# Patient Record
Sex: MALE | Race: WHITE | Employment: OTHER | URBAN - METROPOLITAN AREA
[De-identification: names, ages, dates, MRNs, and addresses within clinical notes are randomized per-mention and may not be internally consistent; named-entity substitution may affect disease eponyms.]

---

## 2017-01-02 ENCOUNTER — TELEPHONE (OUTPATIENT)
Dept: INTERNAL MEDICINE | Age: 52
End: 2017-01-02

## 2017-01-07 ENCOUNTER — TELEPHONE (OUTPATIENT)
Dept: INTERNAL MEDICINE | Age: 52
End: 2017-01-07

## 2017-01-18 RX ORDER — SIMVASTATIN 20 MG
20 TABLET ORAL EVERY EVENING
Qty: 30 TABLET | Refills: 2
Start: 2017-01-18 | End: 2017-04-21 | Stop reason: SDUPTHER

## 2017-01-18 RX ORDER — RAMIPRIL 10 MG/1
10 CAPSULE ORAL 2 TIMES DAILY
Qty: 30 CAPSULE | Refills: 2
Start: 2017-01-18 | End: 2017-04-21 | Stop reason: SDUPTHER

## 2017-01-18 RX ORDER — FAMOTIDINE 20 MG/1
20 TABLET, FILM COATED ORAL 2 TIMES DAILY
Qty: 60 TABLET | Refills: 2
Start: 2017-01-18 | End: 2017-04-21 | Stop reason: SDUPTHER

## 2017-01-24 ENCOUNTER — TELEPHONE (OUTPATIENT)
Dept: SURGERY | Age: 52
End: 2017-01-24

## 2017-01-30 ENCOUNTER — OFFICE VISIT (OUTPATIENT)
Dept: INTERNAL MEDICINE | Age: 52
End: 2017-01-30
Attending: INTERNAL MEDICINE

## 2017-01-30 VITALS
SYSTOLIC BLOOD PRESSURE: 166 MMHG | HEART RATE: 68 BPM | RESPIRATION RATE: 16 BRPM | TEMPERATURE: 97.3 F | BODY MASS INDEX: 33.86 KG/M2 | WEIGHT: 250 LBS | OXYGEN SATURATION: 97 % | DIASTOLIC BLOOD PRESSURE: 95 MMHG | HEIGHT: 72 IN

## 2017-01-30 DIAGNOSIS — H93.12 TINNITUS AURIUM, LEFT: ICD-10-CM

## 2017-01-30 DIAGNOSIS — R13.13 PHARYNGEAL DYSPHAGIA: Primary | ICD-10-CM

## 2017-01-30 RX ORDER — METHADONE HYDROCHLORIDE 5 MG/1
5 TABLET ORAL EVERY 4 HOURS PRN
COMMUNITY
End: 2017-07-24 | Stop reason: ALTCHOICE

## 2017-01-30 RX ORDER — ASPIRIN 81 MG/1
81 TABLET ORAL DAILY
Qty: 30 TABLET | Refills: 0 | Status: SHIPPED | OUTPATIENT
Start: 2017-01-30 | End: 2017-02-27 | Stop reason: ALTCHOICE

## 2017-01-30 RX ORDER — METOPROLOL SUCCINATE 100 MG/1
TABLET, EXTENDED RELEASE ORAL
Qty: 30 TABLET | Refills: 2 | Status: SHIPPED | OUTPATIENT
Start: 2017-01-30 | End: 2017-03-27 | Stop reason: SDUPTHER

## 2017-01-30 ASSESSMENT — ENCOUNTER SYMPTOMS
SHORTNESS OF BREATH: 0
BACK PAIN: 1
BLOOD IN STOOL: 0
DIARRHEA: 0
NAUSEA: 0
ANAL BLEEDING: 0
COUGH: 0
ABDOMINAL PAIN: 0

## 2017-02-07 ENCOUNTER — OFFICE VISIT (OUTPATIENT)
Dept: SURGERY | Age: 52
End: 2017-02-07

## 2017-02-07 VITALS
HEART RATE: 72 BPM | SYSTOLIC BLOOD PRESSURE: 144 MMHG | DIASTOLIC BLOOD PRESSURE: 86 MMHG | OXYGEN SATURATION: 94 % | TEMPERATURE: 98.5 F | WEIGHT: 253 LBS | BODY MASS INDEX: 34.27 KG/M2 | HEIGHT: 72 IN

## 2017-02-07 DIAGNOSIS — K63.5 DYSPLASTIC POLYP OF COLON: Primary | ICD-10-CM

## 2017-02-07 PROCEDURE — 99213 OFFICE O/P EST LOW 20 MIN: CPT | Performed by: SURGERY

## 2017-02-07 PROCEDURE — 4004F PT TOBACCO SCREEN RCVD TLK: CPT | Performed by: SURGERY

## 2017-02-07 PROCEDURE — 3017F COLORECTAL CA SCREEN DOC REV: CPT | Performed by: SURGERY

## 2017-02-07 PROCEDURE — G8427 DOCREV CUR MEDS BY ELIG CLIN: HCPCS | Performed by: SURGERY

## 2017-02-07 PROCEDURE — G8598 ASA/ANTIPLAT THER USED: HCPCS | Performed by: SURGERY

## 2017-02-07 PROCEDURE — G8484 FLU IMMUNIZE NO ADMIN: HCPCS | Performed by: SURGERY

## 2017-02-07 PROCEDURE — G8419 CALC BMI OUT NRM PARAM NOF/U: HCPCS | Performed by: SURGERY

## 2017-02-27 ENCOUNTER — OFFICE VISIT (OUTPATIENT)
Dept: INTERNAL MEDICINE | Age: 52
End: 2017-02-27
Attending: INTERNAL MEDICINE

## 2017-02-27 VITALS
HEART RATE: 66 BPM | WEIGHT: 250 LBS | OXYGEN SATURATION: 97 % | RESPIRATION RATE: 16 BRPM | BODY MASS INDEX: 33.86 KG/M2 | HEIGHT: 72 IN | TEMPERATURE: 97.9 F | SYSTOLIC BLOOD PRESSURE: 144 MMHG | DIASTOLIC BLOOD PRESSURE: 91 MMHG

## 2017-02-27 DIAGNOSIS — I10 ESSENTIAL HYPERTENSION: Primary | ICD-10-CM

## 2017-02-27 ASSESSMENT — ENCOUNTER SYMPTOMS
BLOOD IN STOOL: 0
BACK PAIN: 1
NAUSEA: 0
VOMITING: 0
SHORTNESS OF BREATH: 0
ABDOMINAL PAIN: 0
TROUBLE SWALLOWING: 0

## 2017-03-13 PROBLEM — Z90.49 HISTORY OF PARTIAL COLECTOMY: Chronic | Status: ACTIVE | Noted: 2017-03-13

## 2017-03-13 PROBLEM — Z90.49 HISTORY OF PARTIAL COLECTOMY: Status: ACTIVE | Noted: 2017-03-13

## 2017-03-20 ENCOUNTER — TELEPHONE (OUTPATIENT)
Dept: INTERNAL MEDICINE | Age: 52
End: 2017-03-20

## 2017-03-20 DIAGNOSIS — K13.0 CHEILITIS: Primary | ICD-10-CM

## 2017-03-20 DIAGNOSIS — K13.0 ANGULAR CHEILOSIS: ICD-10-CM

## 2017-03-20 DIAGNOSIS — K13.0 LIP PAIN: ICD-10-CM

## 2017-03-20 DIAGNOSIS — D64.9 ANEMIA, UNSPECIFIED TYPE: ICD-10-CM

## 2017-03-20 DIAGNOSIS — K13.0 SORE LIPS: ICD-10-CM

## 2017-03-27 ENCOUNTER — OFFICE VISIT (OUTPATIENT)
Dept: INTERNAL MEDICINE | Age: 52
End: 2017-03-27
Attending: INTERNAL MEDICINE

## 2017-03-27 VITALS
BODY MASS INDEX: 34.27 KG/M2 | TEMPERATURE: 97.9 F | OXYGEN SATURATION: 95 % | DIASTOLIC BLOOD PRESSURE: 79 MMHG | HEIGHT: 72 IN | RESPIRATION RATE: 18 BRPM | WEIGHT: 253 LBS | HEART RATE: 70 BPM | SYSTOLIC BLOOD PRESSURE: 128 MMHG

## 2017-03-27 DIAGNOSIS — L82.1 SEBORRHEIC KERATOSIS: ICD-10-CM

## 2017-03-27 DIAGNOSIS — K13.0 CHEILITIS: Primary | ICD-10-CM

## 2017-03-27 RX ORDER — METOPROLOL SUCCINATE 100 MG/1
100 TABLET, EXTENDED RELEASE ORAL DAILY
Qty: 30 TABLET | Refills: 3 | Status: ON HOLD
Start: 2017-03-27 | End: 2017-06-07 | Stop reason: HOSPADM

## 2017-03-27 ASSESSMENT — ENCOUNTER SYMPTOMS
NAUSEA: 0
COUGH: 0
BLOOD IN STOOL: 0
ABDOMINAL PAIN: 0
SORE THROAT: 0
SHORTNESS OF BREATH: 0
DIARRHEA: 0
VOICE CHANGE: 0

## 2017-03-30 ENCOUNTER — OFFICE VISIT (OUTPATIENT)
Dept: CARDIOLOGY CLINIC | Age: 52
End: 2017-03-30

## 2017-03-30 VITALS
WEIGHT: 253.2 LBS | DIASTOLIC BLOOD PRESSURE: 78 MMHG | HEART RATE: 64 BPM | SYSTOLIC BLOOD PRESSURE: 122 MMHG | BODY MASS INDEX: 34.34 KG/M2

## 2017-03-30 DIAGNOSIS — I49.3 PVC (PREMATURE VENTRICULAR CONTRACTION): ICD-10-CM

## 2017-03-30 DIAGNOSIS — Z95.2 S/P AVR (AORTIC VALVE REPLACEMENT): ICD-10-CM

## 2017-03-30 DIAGNOSIS — Z98.61 POST PTCA: ICD-10-CM

## 2017-03-30 DIAGNOSIS — I25.10 CAD IN NATIVE ARTERY: Primary | ICD-10-CM

## 2017-03-30 DIAGNOSIS — I10 ESSENTIAL HYPERTENSION: ICD-10-CM

## 2017-03-30 DIAGNOSIS — Z95.1 S/P CABG (CORONARY ARTERY BYPASS GRAFT): ICD-10-CM

## 2017-03-30 PROCEDURE — G8598 ASA/ANTIPLAT THER USED: HCPCS | Performed by: INTERNAL MEDICINE

## 2017-03-30 PROCEDURE — 4004F PT TOBACCO SCREEN RCVD TLK: CPT | Performed by: INTERNAL MEDICINE

## 2017-03-30 PROCEDURE — G8417 CALC BMI ABV UP PARAM F/U: HCPCS | Performed by: INTERNAL MEDICINE

## 2017-03-30 PROCEDURE — G8427 DOCREV CUR MEDS BY ELIG CLIN: HCPCS | Performed by: INTERNAL MEDICINE

## 2017-03-30 PROCEDURE — 99214 OFFICE O/P EST MOD 30 MIN: CPT | Performed by: INTERNAL MEDICINE

## 2017-03-30 PROCEDURE — G8484 FLU IMMUNIZE NO ADMIN: HCPCS | Performed by: INTERNAL MEDICINE

## 2017-03-30 PROCEDURE — 3017F COLORECTAL CA SCREEN DOC REV: CPT | Performed by: INTERNAL MEDICINE

## 2017-04-06 ENCOUNTER — HOSPITAL ENCOUNTER (OUTPATIENT)
Dept: NON INVASIVE DIAGNOSTICS | Age: 52
Discharge: HOME | End: 2017-04-06
Attending: INTERNAL MEDICINE | Admitting: INTERNAL MEDICINE

## 2017-04-06 ENCOUNTER — HOSPITAL ENCOUNTER (OUTPATIENT)
Dept: OTHER | Age: 52
Discharge: HOME | End: 2017-04-06
Attending: ANESTHESIOLOGY | Admitting: ANESTHESIOLOGY

## 2017-04-06 DIAGNOSIS — M25.559 PAIN IN JOINT, PELVIC REGION AND THIGH, UNSPECIFIED LATERALITY: ICD-10-CM

## 2017-04-06 DIAGNOSIS — I25.10 ATHEROSCLEROTIC HEART DISEASE OF NATIVE CORONARY ARTERY WITHOUT ANGINA PECTORIS: ICD-10-CM

## 2017-04-06 LAB
LV EF: 58 %
LVEF MODALITY: NORMAL

## 2017-04-21 RX ORDER — SIMVASTATIN 20 MG
20 TABLET ORAL EVERY EVENING
Qty: 30 TABLET | Refills: 3 | Status: ON HOLD
Start: 2017-04-21 | End: 2017-05-10 | Stop reason: HOSPADM

## 2017-04-21 RX ORDER — RAMIPRIL 10 MG/1
10 CAPSULE ORAL 2 TIMES DAILY
Qty: 60 CAPSULE | Refills: 3 | Status: ON HOLD
Start: 2017-04-21 | End: 2017-05-10 | Stop reason: HOSPADM

## 2017-04-21 RX ORDER — FAMOTIDINE 20 MG/1
20 TABLET, FILM COATED ORAL 2 TIMES DAILY
Qty: 60 TABLET | Refills: 3
Start: 2017-04-21 | End: 2017-07-26 | Stop reason: SDUPTHER

## 2017-05-03 RX ORDER — SIMVASTATIN 20 MG
20 TABLET ORAL EVERY EVENING
Qty: 30 TABLET | Refills: 3 | Status: CANCELLED | OUTPATIENT
Start: 2017-05-03

## 2017-05-06 ENCOUNTER — TELEPHONE (OUTPATIENT)
Dept: INTERNAL MEDICINE | Age: 52
End: 2017-05-06

## 2017-05-08 ENCOUNTER — OFFICE VISIT (OUTPATIENT)
Dept: INTERNAL MEDICINE | Age: 52
End: 2017-05-08

## 2017-05-08 VITALS
BODY MASS INDEX: 34.61 KG/M2 | WEIGHT: 255.5 LBS | HEIGHT: 72 IN | TEMPERATURE: 97.2 F | SYSTOLIC BLOOD PRESSURE: 96 MMHG | HEART RATE: 79 BPM | DIASTOLIC BLOOD PRESSURE: 55 MMHG | OXYGEN SATURATION: 97 % | RESPIRATION RATE: 20 BRPM

## 2017-05-08 DIAGNOSIS — R07.89 OTHER CHEST PAIN: ICD-10-CM

## 2017-05-08 DIAGNOSIS — R10.32 LEFT LOWER QUADRANT PAIN: Primary | ICD-10-CM

## 2017-05-08 RX ORDER — PANTOPRAZOLE SODIUM 40 MG/1
40 TABLET, DELAYED RELEASE ORAL DAILY
Qty: 30 TABLET | Refills: 2 | Status: SHIPPED | OUTPATIENT
Start: 2017-05-08 | End: 2017-06-14 | Stop reason: ALTCHOICE

## 2017-05-08 ASSESSMENT — ENCOUNTER SYMPTOMS
BACK PAIN: 1
COUGH: 1
SHORTNESS OF BREATH: 1
CHEST TIGHTNESS: 0
RHINORRHEA: 1
SORE THROAT: 0

## 2017-05-10 PROBLEM — I21.4 NSTEMI (NON-ST ELEVATED MYOCARDIAL INFARCTION) (HCC): Status: ACTIVE | Noted: 2017-05-10

## 2017-05-10 PROBLEM — I35.1 NONRHEUMATIC AORTIC VALVE INSUFFICIENCY: Status: ACTIVE | Noted: 2017-05-10

## 2017-05-17 ENCOUNTER — OFFICE VISIT (OUTPATIENT)
Dept: CARDIOTHORACIC SURGERY | Age: 52
End: 2017-05-17

## 2017-05-17 VITALS
TEMPERATURE: 97 F | SYSTOLIC BLOOD PRESSURE: 120 MMHG | HEART RATE: 80 BPM | HEIGHT: 72 IN | WEIGHT: 244.8 LBS | BODY MASS INDEX: 33.16 KG/M2 | DIASTOLIC BLOOD PRESSURE: 64 MMHG | OXYGEN SATURATION: 94 %

## 2017-05-17 DIAGNOSIS — T82.897A AORTIC PROSTHETIC VALVE REGURGITATION, INITIAL ENCOUNTER: Primary | ICD-10-CM

## 2017-05-17 DIAGNOSIS — I25.10 CORONARY ARTERY DISEASE INVOLVING NATIVE CORONARY ARTERY OF NATIVE HEART WITHOUT ANGINA PECTORIS: ICD-10-CM

## 2017-05-17 DIAGNOSIS — I21.4 NON-ST ELEVATION MYOCARDIAL INFARCTION (NSTEMI), SUBENDOCARDIAL INFARCTION, SUBSEQUENT EPISODE OF CARE (HCC): ICD-10-CM

## 2017-05-17 DIAGNOSIS — I10 ESSENTIAL HYPERTENSION: ICD-10-CM

## 2017-05-17 DIAGNOSIS — E66.9 OBESITY (BMI 30.0-34.9): ICD-10-CM

## 2017-05-17 DIAGNOSIS — F17.200 TOBACCO USE DISORDER: ICD-10-CM

## 2017-05-17 PROCEDURE — 99205 OFFICE O/P NEW HI 60 MIN: CPT | Performed by: THORACIC SURGERY (CARDIOTHORACIC VASCULAR SURGERY)

## 2017-05-17 ASSESSMENT — ENCOUNTER SYMPTOMS
CONSTIPATION: 0
APNEA: 0
SHORTNESS OF BREATH: 1
SORE THROAT: 0
EYE PAIN: 0
COUGH: 0
ABDOMINAL PAIN: 1
TROUBLE SWALLOWING: 0
DIARRHEA: 0
EYE ITCHING: 0
CHEST TIGHTNESS: 1
EYE REDNESS: 0
BACK PAIN: 1
BLOOD IN STOOL: 0
NAUSEA: 1

## 2017-05-18 ENCOUNTER — TELEPHONE (OUTPATIENT)
Dept: INTERNAL MEDICINE | Age: 52
End: 2017-05-18

## 2017-05-19 ENCOUNTER — TELEPHONE (OUTPATIENT)
Dept: INTERNAL MEDICINE | Age: 52
End: 2017-05-19

## 2017-05-26 ENCOUNTER — HOSPITAL ENCOUNTER (OUTPATIENT)
Dept: VASCULAR LAB | Age: 52
Discharge: HOME | End: 2017-05-26
Attending: THORACIC SURGERY (CARDIOTHORACIC VASCULAR SURGERY) | Admitting: THORACIC SURGERY (CARDIOTHORACIC VASCULAR SURGERY)

## 2017-05-26 ENCOUNTER — HOSPITAL ENCOUNTER (OUTPATIENT)
Dept: PREADMISSION TESTING | Age: 52
Discharge: HOME | End: 2017-05-26
Attending: THORACIC SURGERY (CARDIOTHORACIC VASCULAR SURGERY) | Admitting: THORACIC SURGERY (CARDIOTHORACIC VASCULAR SURGERY)

## 2017-05-26 ENCOUNTER — TELEPHONE (OUTPATIENT)
Dept: CARDIOTHORACIC SURGERY | Age: 52
End: 2017-05-26

## 2017-05-26 VITALS
BODY MASS INDEX: 32.37 KG/M2 | HEIGHT: 72 IN | HEART RATE: 71 BPM | OXYGEN SATURATION: 93 % | RESPIRATION RATE: 18 BRPM | WEIGHT: 239 LBS | DIASTOLIC BLOOD PRESSURE: 59 MMHG | SYSTOLIC BLOOD PRESSURE: 104 MMHG | TEMPERATURE: 97 F

## 2017-05-26 DIAGNOSIS — I25.10 ATHEROSCLEROTIC HEART DISEASE OF NATIVE CORONARY ARTERY WITHOUT ANGINA PECTORIS: ICD-10-CM

## 2017-05-26 LAB
A/G RATIO: 1.3 (ref 1.1–2.2)
ABO/RH: NORMAL
ALBUMIN SERPL-MCNC: 4.2 G/DL (ref 3.4–5)
ALP BLD-CCNC: 69 U/L (ref 40–129)
ALT SERPL-CCNC: 14 U/L (ref 10–40)
ANION GAP SERPL CALCULATED.3IONS-SCNC: 13 MMOL/L (ref 3–16)
ANTIBODY SCREEN: NORMAL
APTT: 32 SEC (ref 21–31.8)
AST SERPL-CCNC: 16 U/L (ref 15–37)
BASE EXCESS ARTERIAL: 1.8 MMOL/L (ref -3–3)
BILIRUB SERPL-MCNC: 1 MG/DL (ref 0–1)
BILIRUBIN URINE: NEGATIVE
BLOOD, URINE: ABNORMAL
BUN BLDV-MCNC: 9 MG/DL (ref 7–20)
CALCIUM SERPL-MCNC: 9.3 MG/DL (ref 8.3–10.6)
CARBOXYHEMOGLOBIN ARTERIAL: 2.7 % (ref 0–1.5)
CARBOXYHEMOGLOBIN: 2.9 % (ref 0–2)
CHLORIDE BLD-SCNC: 97 MMOL/L (ref 99–110)
CLARITY: CLEAR
CO2: 26 MMOL/L (ref 21–32)
COLOR: YELLOW
CREAT SERPL-MCNC: 1.1 MG/DL (ref 0.9–1.3)
EKG ATRIAL RATE: 70 BPM
EKG DIAGNOSIS: NORMAL
EKG P AXIS: 66 DEGREES
EKG P-R INTERVAL: 172 MS
EKG Q-T INTERVAL: 444 MS
EKG QRS DURATION: 116 MS
EKG QTC CALCULATION (BAZETT): 479 MS
EKG R AXIS: 59 DEGREES
EKG T AXIS: 72 DEGREES
EKG VENTRICULAR RATE: 70 BPM
FIBRINOGEN: 497 MG/DL (ref 200–397)
GFR AFRICAN AMERICAN: >60
GFR NON-AFRICAN AMERICAN: >60
GLOBULIN: 3.3 G/DL
GLUCOSE BLD-MCNC: 116 MG/DL (ref 70–99)
GLUCOSE BLD-MCNC: 125 MG/DL (ref 70–99)
GLUCOSE URINE: NEGATIVE MG/DL
HCO3 ARTERIAL: 25 MMOL/L (ref 21–29)
HCT VFR BLD CALC: 39 % (ref 40.5–52.5)
HEMOGLOBIN, ART, EXTENDED: 12.6 G/DL
HEMOGLOBIN: 12.8 G/DL (ref 13.5–17.5)
INR BLD: 1.24 (ref 0.85–1.15)
KETONES, URINE: NEGATIVE MG/DL
LEUKOCYTE ESTERASE, URINE: NEGATIVE
MAGNESIUM: 2.6 MG/DL (ref 1.8–2.4)
MCH RBC QN AUTO: 31.6 PG (ref 26–34)
MCHC RBC AUTO-ENTMCNC: 32.7 G/DL (ref 31–36)
MCV RBC AUTO: 96.5 FL (ref 80–100)
METHEMOGLOBIN ARTERIAL: 0.5 % (ref 0–1.4)
MICROSCOPIC EXAMINATION: YES
NITRITE, URINE: NEGATIVE
O2 SAT, ARTERIAL: 92 % (ref 93–100)
PCO2 ARTERIAL: 38 MMHG (ref 35–45)
PDW BLD-RTO: 15.7 % (ref 12.4–15.4)
PERFORMED ON: ABNORMAL
PH ARTERIAL: 7.44 (ref 7.35–7.45)
PH UA: 6
PLATELET # BLD: 211 K/UL (ref 135–450)
PMV BLD AUTO: 8.4 FL (ref 5–10.5)
PO2 ARTERIAL: 62.7 MMHG (ref 75–108)
POTASSIUM SERPL-SCNC: 4.4 MMOL/L (ref 3.5–5.1)
PROTEIN UA: NEGATIVE MG/DL
PROTHROMBIN TIME: 14 SEC (ref 9.6–13)
RBC # BLD: 4.04 M/UL (ref 4.2–5.9)
RBC UA: NORMAL /HPF (ref 0–2)
SODIUM BLD-SCNC: 136 MMOL/L (ref 136–145)
SPECIFIC GRAVITY UA: <=1.005
TCO2 ARTERIAL: 27 MMOL/L
TOTAL PROTEIN: 7.5 G/DL (ref 6.4–8.2)
URINE TYPE: ABNORMAL
UROBILINOGEN, URINE: 2 E.U./DL
WBC # BLD: 9.3 K/UL (ref 4–11)
WBC UA: NORMAL /HPF (ref 0–5)

## 2017-05-26 PROCEDURE — 93010 ELECTROCARDIOGRAM REPORT: CPT | Performed by: INTERNAL MEDICINE

## 2017-05-26 ASSESSMENT — PAIN DESCRIPTION - LOCATION: LOCATION: BACK;KNEE

## 2017-05-26 ASSESSMENT — ENCOUNTER SYMPTOMS: SHORTNESS OF BREATH: 1

## 2017-05-26 ASSESSMENT — PAIN SCALES - GENERAL: PAINLEVEL_OUTOF10: 4

## 2017-05-26 ASSESSMENT — PAIN DESCRIPTION - DESCRIPTORS: DESCRIPTORS: ACHING

## 2017-05-26 ASSESSMENT — PAIN DESCRIPTION - PAIN TYPE: TYPE: CHRONIC PAIN

## 2017-05-27 LAB
ESTIMATED AVERAGE GLUCOSE: 119.8 MG/DL
HBA1C MFR BLD: 5.8 %
MRSA SCREEN RT-PCR: NORMAL

## 2017-05-29 LAB — URINE CULTURE, ROUTINE: NORMAL

## 2017-06-08 ENCOUNTER — ANTI-COAG VISIT (OUTPATIENT)
Dept: CARDIOTHORACIC SURGERY | Age: 52
End: 2017-06-08

## 2017-06-08 LAB — INR BLD: 2

## 2017-06-09 ENCOUNTER — ANTI-COAG VISIT (OUTPATIENT)
Dept: CARDIOTHORACIC SURGERY | Age: 52
End: 2017-06-09

## 2017-06-09 LAB — INR BLD: 2.4

## 2017-06-12 ENCOUNTER — ANTI-COAG VISIT (OUTPATIENT)
Dept: CARDIOTHORACIC SURGERY | Age: 52
End: 2017-06-12

## 2017-06-12 LAB — INR BLD: 2.5

## 2017-06-14 ENCOUNTER — OFFICE VISIT (OUTPATIENT)
Dept: CARDIOTHORACIC SURGERY | Age: 52
End: 2017-06-14

## 2017-06-14 VITALS
HEIGHT: 72 IN | OXYGEN SATURATION: 98 % | TEMPERATURE: 98.6 F | DIASTOLIC BLOOD PRESSURE: 78 MMHG | SYSTOLIC BLOOD PRESSURE: 124 MMHG | BODY MASS INDEX: 32.02 KG/M2 | HEART RATE: 72 BPM | WEIGHT: 236.4 LBS

## 2017-06-14 DIAGNOSIS — Z09 FOLLOW-UP EXAMINATION FOLLOWING SURGERY: Primary | ICD-10-CM

## 2017-06-14 PROCEDURE — 99024 POSTOP FOLLOW-UP VISIT: CPT | Performed by: THORACIC SURGERY (CARDIOTHORACIC VASCULAR SURGERY)

## 2017-06-15 ENCOUNTER — ANTI-COAG VISIT (OUTPATIENT)
Dept: CARDIOTHORACIC SURGERY | Age: 52
End: 2017-06-15

## 2017-06-15 LAB — INR BLD: 2.4

## 2017-06-16 RX ORDER — ROSUVASTATIN CALCIUM 20 MG/1
20 TABLET, COATED ORAL NIGHTLY
Qty: 30 TABLET | Refills: 3
Start: 2017-06-16 | End: 2017-10-17 | Stop reason: SDUPTHER

## 2017-06-19 ENCOUNTER — OFFICE VISIT (OUTPATIENT)
Dept: INTERNAL MEDICINE | Age: 52
End: 2017-06-19
Attending: INTERNAL MEDICINE

## 2017-06-19 VITALS
RESPIRATION RATE: 18 BRPM | HEART RATE: 72 BPM | SYSTOLIC BLOOD PRESSURE: 117 MMHG | BODY MASS INDEX: 33.18 KG/M2 | DIASTOLIC BLOOD PRESSURE: 70 MMHG | HEIGHT: 72 IN | OXYGEN SATURATION: 97 % | TEMPERATURE: 99.2 F | WEIGHT: 245 LBS

## 2017-06-19 DIAGNOSIS — Z95.2 S/P AORTIC VALVE REPLACEMENT: Primary | ICD-10-CM

## 2017-06-19 ASSESSMENT — ENCOUNTER SYMPTOMS
BACK PAIN: 1
CHEST TIGHTNESS: 0
SORE THROAT: 0
NAUSEA: 0
SHORTNESS OF BREATH: 0
DIARRHEA: 0
WHEEZING: 0
ABDOMINAL PAIN: 0
COUGH: 1

## 2017-06-20 ENCOUNTER — TELEPHONE (OUTPATIENT)
Dept: CARDIOTHORACIC SURGERY | Age: 52
End: 2017-06-20

## 2017-06-20 ENCOUNTER — ANTI-COAG VISIT (OUTPATIENT)
Dept: CARDIOTHORACIC SURGERY | Age: 52
End: 2017-06-20

## 2017-06-20 LAB — INR BLD: 3.3

## 2017-06-23 ENCOUNTER — ANTI-COAG VISIT (OUTPATIENT)
Dept: CARDIOTHORACIC SURGERY | Age: 52
End: 2017-06-23

## 2017-06-23 LAB — INR BLD: 4.1

## 2017-06-26 ENCOUNTER — ANTI-COAG VISIT (OUTPATIENT)
Dept: CARDIOTHORACIC SURGERY | Age: 52
End: 2017-06-26

## 2017-06-26 ENCOUNTER — TELEPHONE (OUTPATIENT)
Dept: INTERNAL MEDICINE | Age: 52
End: 2017-06-26

## 2017-06-26 LAB — INR BLD: 2.8

## 2017-06-29 ENCOUNTER — ANTI-COAG VISIT (OUTPATIENT)
Dept: CARDIOTHORACIC SURGERY | Age: 52
End: 2017-06-29

## 2017-06-29 ENCOUNTER — OFFICE VISIT (OUTPATIENT)
Dept: DERMATOLOGY | Age: 52
End: 2017-06-29

## 2017-06-29 DIAGNOSIS — L57.0 AK (ACTINIC KERATOSIS): ICD-10-CM

## 2017-06-29 DIAGNOSIS — L30.9 LIP LICKING DERMATITIS: Primary | ICD-10-CM

## 2017-06-29 DIAGNOSIS — L82.1 SK (SEBORRHEIC KERATOSIS): ICD-10-CM

## 2017-06-29 DIAGNOSIS — Z95.2 S/P AVR: Primary | ICD-10-CM

## 2017-06-29 LAB — INR BLD: 2

## 2017-06-29 PROCEDURE — G8417 CALC BMI ABV UP PARAM F/U: HCPCS | Performed by: DERMATOLOGY

## 2017-06-29 PROCEDURE — 99213 OFFICE O/P EST LOW 20 MIN: CPT | Performed by: DERMATOLOGY

## 2017-06-29 PROCEDURE — 4004F PT TOBACCO SCREEN RCVD TLK: CPT | Performed by: DERMATOLOGY

## 2017-06-29 PROCEDURE — G8427 DOCREV CUR MEDS BY ELIG CLIN: HCPCS | Performed by: DERMATOLOGY

## 2017-06-29 PROCEDURE — G8598 ASA/ANTIPLAT THER USED: HCPCS | Performed by: DERMATOLOGY

## 2017-06-29 PROCEDURE — 3017F COLORECTAL CA SCREEN DOC REV: CPT | Performed by: DERMATOLOGY

## 2017-06-29 PROCEDURE — 1111F DSCHRG MED/CURRENT MED MERGE: CPT | Performed by: DERMATOLOGY

## 2017-06-29 RX ORDER — OXYCODONE HYDROCHLORIDE AND ACETAMINOPHEN 5; 325 MG/1; MG/1
1 TABLET ORAL EVERY 4 HOURS PRN
COMMUNITY

## 2017-07-03 ENCOUNTER — ANTI-COAG VISIT (OUTPATIENT)
Dept: CARDIOTHORACIC SURGERY | Age: 52
End: 2017-07-03

## 2017-07-03 ENCOUNTER — TELEPHONE (OUTPATIENT)
Dept: INTERNAL MEDICINE | Age: 52
End: 2017-07-03

## 2017-07-03 DIAGNOSIS — Z95.2 S/P AVR: ICD-10-CM

## 2017-07-03 DIAGNOSIS — Z95.1 S/P CABG (CORONARY ARTERY BYPASS GRAFT): Primary | ICD-10-CM

## 2017-07-03 DIAGNOSIS — Z95.1 S/P CABG (CORONARY ARTERY BYPASS GRAFT): ICD-10-CM

## 2017-07-03 DIAGNOSIS — Z95.2 S/P AVR: Primary | ICD-10-CM

## 2017-07-03 NOTE — TELEPHONE ENCOUNTER
I have discussed with Coral Mitchell NP of CT surgeon, Dr. Kervin Uribe:  Cardiac rehab after evaluation of cardiologist. I will issue a referral for cardiologist Dr. Tuan Key

## 2017-07-05 ENCOUNTER — ANTI-COAG VISIT (OUTPATIENT)
Dept: CARDIOTHORACIC SURGERY | Age: 52
End: 2017-07-05

## 2017-07-05 DIAGNOSIS — Z95.2 S/P AVR: ICD-10-CM

## 2017-07-05 LAB
INR BLD: 1.23 (ref 0.85–1.15)
PROTHROMBIN TIME: 13.9 SEC (ref 9.6–13)

## 2017-07-07 ENCOUNTER — ANTI-COAG VISIT (OUTPATIENT)
Dept: CARDIOTHORACIC SURGERY | Age: 52
End: 2017-07-07

## 2017-07-07 DIAGNOSIS — Z95.2 S/P AVR: ICD-10-CM

## 2017-07-07 LAB
INR BLD: 1.3 (ref 0.85–1.15)
PROTHROMBIN TIME: 14.7 SEC (ref 9.6–13)

## 2017-07-10 ENCOUNTER — ANTI-COAG VISIT (OUTPATIENT)
Dept: CARDIOTHORACIC SURGERY | Age: 52
End: 2017-07-10

## 2017-07-10 DIAGNOSIS — Z95.2 S/P AVR: ICD-10-CM

## 2017-07-10 LAB
INR BLD: 1.68 (ref 0.85–1.15)
PROTHROMBIN TIME: 19 SEC (ref 9.6–13)

## 2017-07-13 ENCOUNTER — ANTI-COAG VISIT (OUTPATIENT)
Dept: CARDIOTHORACIC SURGERY | Age: 52
End: 2017-07-13

## 2017-07-13 DIAGNOSIS — Z95.2 S/P AVR: ICD-10-CM

## 2017-07-13 LAB
INR BLD: 2.88 (ref 0.85–1.15)
PROTHROMBIN TIME: 32.6 SEC (ref 9.6–13)

## 2017-07-17 ENCOUNTER — ANTI-COAG VISIT (OUTPATIENT)
Dept: CARDIOTHORACIC SURGERY | Age: 52
End: 2017-07-17

## 2017-07-18 ENCOUNTER — ANTI-COAG VISIT (OUTPATIENT)
Dept: CARDIOTHORACIC SURGERY | Age: 52
End: 2017-07-18

## 2017-07-18 DIAGNOSIS — Z95.2 S/P AVR: ICD-10-CM

## 2017-07-18 LAB
INR BLD: 2.57 (ref 0.85–1.15)
PROTHROMBIN TIME: 29 SEC (ref 9.6–13)

## 2017-07-21 ENCOUNTER — ANTI-COAG VISIT (OUTPATIENT)
Dept: CARDIOTHORACIC SURGERY | Age: 52
End: 2017-07-21

## 2017-07-21 ENCOUNTER — OFFICE VISIT (OUTPATIENT)
Dept: INTERNAL MEDICINE | Age: 52
End: 2017-07-21
Attending: RADIOLOGY

## 2017-07-21 ENCOUNTER — OFFICE VISIT (OUTPATIENT)
Dept: INTERNAL MEDICINE | Age: 52
End: 2017-07-21
Attending: PODIATRIST

## 2017-07-21 VITALS
BODY MASS INDEX: 31.97 KG/M2 | OXYGEN SATURATION: 97 % | RESPIRATION RATE: 18 BRPM | HEART RATE: 72 BPM | SYSTOLIC BLOOD PRESSURE: 172 MMHG | TEMPERATURE: 98.4 F | WEIGHT: 236 LBS | HEIGHT: 72 IN | DIASTOLIC BLOOD PRESSURE: 91 MMHG

## 2017-07-21 DIAGNOSIS — M79.671 RIGHT FOOT PAIN: Primary | ICD-10-CM

## 2017-07-21 DIAGNOSIS — Z95.2 S/P AVR: ICD-10-CM

## 2017-07-21 DIAGNOSIS — K43.2 INCISIONAL HERNIA, WITHOUT OBSTRUCTION OR GANGRENE: ICD-10-CM

## 2017-07-21 DIAGNOSIS — M72.2 PLANTAR FASCIITIS: ICD-10-CM

## 2017-07-21 LAB
INR BLD: 3.21 (ref 0.85–1.15)
PROTHROMBIN TIME: 36.3 SEC (ref 9.6–13)

## 2017-07-21 RX ORDER — BLOOD PRESSURE TEST KIT
KIT MISCELLANEOUS
Qty: 1 KIT | Refills: 0 | Status: SHIPPED | OUTPATIENT
Start: 2017-07-21 | End: 2017-07-21 | Stop reason: SDUPTHER

## 2017-07-21 RX ORDER — BLOOD PRESSURE TEST KIT
KIT MISCELLANEOUS
Qty: 1 KIT | Refills: 0 | Status: SHIPPED | OUTPATIENT
Start: 2017-07-21

## 2017-07-24 ENCOUNTER — TELEPHONE (OUTPATIENT)
Dept: CARDIOTHORACIC SURGERY | Age: 52
End: 2017-07-24

## 2017-07-24 ENCOUNTER — ANTI-COAG VISIT (OUTPATIENT)
Dept: CARDIOTHORACIC SURGERY | Age: 52
End: 2017-07-24

## 2017-07-24 ENCOUNTER — OFFICE VISIT (OUTPATIENT)
Dept: CARDIOLOGY CLINIC | Age: 52
End: 2017-07-24

## 2017-07-24 VITALS
HEART RATE: 68 BPM | SYSTOLIC BLOOD PRESSURE: 152 MMHG | DIASTOLIC BLOOD PRESSURE: 80 MMHG | WEIGHT: 237 LBS | HEIGHT: 72 IN | BODY MASS INDEX: 32.1 KG/M2

## 2017-07-24 DIAGNOSIS — I10 ESSENTIAL HYPERTENSION: ICD-10-CM

## 2017-07-24 DIAGNOSIS — Z98.61 POST PTCA: ICD-10-CM

## 2017-07-24 DIAGNOSIS — I49.3 PVC (PREMATURE VENTRICULAR CONTRACTION): ICD-10-CM

## 2017-07-24 DIAGNOSIS — I25.10 CAD IN NATIVE ARTERY: Primary | ICD-10-CM

## 2017-07-24 DIAGNOSIS — Z95.1 S/P CABG (CORONARY ARTERY BYPASS GRAFT): ICD-10-CM

## 2017-07-24 DIAGNOSIS — Z95.2 S/P AVR (AORTIC VALVE REPLACEMENT): ICD-10-CM

## 2017-07-24 PROBLEM — M72.2 PLANTAR FASCIITIS: Status: ACTIVE | Noted: 2017-07-24

## 2017-07-24 PROCEDURE — G8417 CALC BMI ABV UP PARAM F/U: HCPCS | Performed by: INTERNAL MEDICINE

## 2017-07-24 PROCEDURE — 3017F COLORECTAL CA SCREEN DOC REV: CPT | Performed by: INTERNAL MEDICINE

## 2017-07-24 PROCEDURE — 4004F PT TOBACCO SCREEN RCVD TLK: CPT | Performed by: INTERNAL MEDICINE

## 2017-07-24 PROCEDURE — 99214 OFFICE O/P EST MOD 30 MIN: CPT | Performed by: INTERNAL MEDICINE

## 2017-07-24 PROCEDURE — G8427 DOCREV CUR MEDS BY ELIG CLIN: HCPCS | Performed by: INTERNAL MEDICINE

## 2017-07-24 PROCEDURE — G8598 ASA/ANTIPLAT THER USED: HCPCS | Performed by: INTERNAL MEDICINE

## 2017-07-25 ENCOUNTER — ANTI-COAG VISIT (OUTPATIENT)
Dept: CARDIOTHORACIC SURGERY | Age: 52
End: 2017-07-25

## 2017-07-25 DIAGNOSIS — Z95.2 S/P AVR: ICD-10-CM

## 2017-07-25 LAB
INR BLD: 2.45 (ref 0.85–1.15)
PROTHROMBIN TIME: 27.7 SEC (ref 9.6–13)

## 2017-07-26 ENCOUNTER — OFFICE VISIT (OUTPATIENT)
Dept: CARDIOTHORACIC SURGERY | Age: 52
End: 2017-07-26

## 2017-07-26 VITALS
SYSTOLIC BLOOD PRESSURE: 142 MMHG | TEMPERATURE: 98.1 F | WEIGHT: 236.4 LBS | DIASTOLIC BLOOD PRESSURE: 86 MMHG | HEIGHT: 72 IN | OXYGEN SATURATION: 98 % | HEART RATE: 64 BPM | BODY MASS INDEX: 32.02 KG/M2

## 2017-07-26 DIAGNOSIS — Z09 FOLLOW-UP EXAMINATION FOLLOWING SURGERY: Primary | ICD-10-CM

## 2017-07-26 PROCEDURE — 99024 POSTOP FOLLOW-UP VISIT: CPT | Performed by: THORACIC SURGERY (CARDIOTHORACIC VASCULAR SURGERY)

## 2017-07-26 RX ORDER — FAMOTIDINE 20 MG/1
20 TABLET, FILM COATED ORAL 2 TIMES DAILY
Qty: 60 TABLET | Refills: 2
Start: 2017-07-26 | End: 2017-11-20 | Stop reason: SDUPTHER

## 2017-07-28 ENCOUNTER — ANTI-COAG VISIT (OUTPATIENT)
Dept: CARDIOTHORACIC SURGERY | Age: 52
End: 2017-07-28

## 2017-07-31 ENCOUNTER — ANTI-COAG VISIT (OUTPATIENT)
Dept: CARDIOTHORACIC SURGERY | Age: 52
End: 2017-07-31

## 2017-07-31 ENCOUNTER — OFFICE VISIT (OUTPATIENT)
Dept: INTERNAL MEDICINE | Age: 52
End: 2017-07-31
Attending: SURGERY

## 2017-07-31 VITALS
DIASTOLIC BLOOD PRESSURE: 92 MMHG | BODY MASS INDEX: 32.41 KG/M2 | SYSTOLIC BLOOD PRESSURE: 188 MMHG | HEART RATE: 66 BPM | TEMPERATURE: 98.4 F | WEIGHT: 239 LBS | OXYGEN SATURATION: 96 % | RESPIRATION RATE: 20 BRPM

## 2017-07-31 DIAGNOSIS — Z95.2 S/P AVR: ICD-10-CM

## 2017-07-31 DIAGNOSIS — R10.9 ABDOMINAL DISCOMFORT: Primary | ICD-10-CM

## 2017-07-31 LAB
INR BLD: 2.4 (ref 0.85–1.15)
PROTHROMBIN TIME: 27.1 SEC (ref 9.6–13)

## 2017-08-04 RX ORDER — AMIODARONE HYDROCHLORIDE 200 MG/1
200 TABLET ORAL DAILY
Qty: 30 TABLET | Refills: 2
Start: 2017-08-04 | End: 2017-10-11 | Stop reason: ALTCHOICE

## 2017-08-07 ENCOUNTER — ANTI-COAG VISIT (OUTPATIENT)
Dept: CARDIOTHORACIC SURGERY | Age: 52
End: 2017-08-07

## 2017-08-08 ENCOUNTER — ANTI-COAG VISIT (OUTPATIENT)
Dept: CARDIOTHORACIC SURGERY | Age: 52
End: 2017-08-08

## 2017-08-08 DIAGNOSIS — Z95.2 S/P AVR: ICD-10-CM

## 2017-08-08 LAB
INR BLD: 2.81 (ref 0.85–1.15)
PROTHROMBIN TIME: 31.7 SEC (ref 9.6–13)

## 2017-08-14 ENCOUNTER — TELEPHONE (OUTPATIENT)
Dept: CARDIOTHORACIC SURGERY | Age: 52
End: 2017-08-14

## 2017-08-14 ENCOUNTER — OFFICE VISIT (OUTPATIENT)
Dept: INTERNAL MEDICINE | Age: 52
End: 2017-08-14
Attending: INTERNAL MEDICINE

## 2017-08-14 ENCOUNTER — ANTI-COAG VISIT (OUTPATIENT)
Dept: CARDIOTHORACIC SURGERY | Age: 52
End: 2017-08-14

## 2017-08-14 VITALS
WEIGHT: 245.6 LBS | OXYGEN SATURATION: 98 % | HEART RATE: 63 BPM | RESPIRATION RATE: 18 BRPM | SYSTOLIC BLOOD PRESSURE: 157 MMHG | BODY MASS INDEX: 33.31 KG/M2 | TEMPERATURE: 99.1 F | DIASTOLIC BLOOD PRESSURE: 90 MMHG

## 2017-08-14 DIAGNOSIS — I10 ESSENTIAL HYPERTENSION: Primary | Chronic | ICD-10-CM

## 2017-08-14 DIAGNOSIS — Z95.2 S/P AVR: ICD-10-CM

## 2017-08-14 LAB
INR BLD: 3.45 (ref 0.85–1.15)
PROTHROMBIN TIME: 39 SEC (ref 9.6–13)

## 2017-08-14 RX ORDER — LISINOPRIL 5 MG/1
5 TABLET ORAL 2 TIMES DAILY
Qty: 60 TABLET | Refills: 3 | Status: SHIPPED | OUTPATIENT
Start: 2017-08-14 | End: 2017-08-17

## 2017-08-14 RX ORDER — LISINOPRIL 2.5 MG/1
5 TABLET ORAL 2 TIMES DAILY
Qty: 30 TABLET | Refills: 3 | Status: SHIPPED | OUTPATIENT
Start: 2017-08-14 | End: 2017-08-14 | Stop reason: SDUPTHER

## 2017-08-17 ENCOUNTER — OFFICE VISIT (OUTPATIENT)
Dept: INTERNAL MEDICINE | Age: 52
End: 2017-08-17
Attending: RADIOLOGY

## 2017-08-17 ENCOUNTER — CLINICAL DOCUMENTATION (OUTPATIENT)
Dept: INTERNAL MEDICINE | Age: 52
End: 2017-08-17

## 2017-08-17 VITALS
OXYGEN SATURATION: 99 % | HEART RATE: 68 BPM | SYSTOLIC BLOOD PRESSURE: 229 MMHG | DIASTOLIC BLOOD PRESSURE: 127 MMHG | RESPIRATION RATE: 22 BRPM

## 2017-08-17 DIAGNOSIS — I10 ESSENTIAL HYPERTENSION: Primary | ICD-10-CM

## 2017-08-18 ENCOUNTER — ANTI-COAG VISIT (OUTPATIENT)
Dept: CARDIOTHORACIC SURGERY | Age: 52
End: 2017-08-18

## 2017-08-18 DIAGNOSIS — Z95.2 S/P AVR: ICD-10-CM

## 2017-08-18 LAB
INR BLD: 1.7 (ref 0.85–1.15)
PROTHROMBIN TIME: 19.2 SEC (ref 9.6–13)

## 2017-08-21 ENCOUNTER — TELEPHONE (OUTPATIENT)
Dept: CARDIOTHORACIC SURGERY | Age: 52
End: 2017-08-21

## 2017-08-22 ENCOUNTER — ANTI-COAG VISIT (OUTPATIENT)
Dept: CARDIOTHORACIC SURGERY | Age: 52
End: 2017-08-22

## 2017-08-22 DIAGNOSIS — Z95.2 S/P AVR: ICD-10-CM

## 2017-08-22 LAB
INR BLD: 2.06 (ref 0.85–1.15)
PROTHROMBIN TIME: 23.3 SEC (ref 9.6–13)

## 2017-08-25 ENCOUNTER — ANTI-COAG VISIT (OUTPATIENT)
Dept: CARDIOTHORACIC SURGERY | Age: 52
End: 2017-08-25

## 2017-08-28 ENCOUNTER — ANTI-COAG VISIT (OUTPATIENT)
Dept: CARDIOTHORACIC SURGERY | Age: 52
End: 2017-08-28

## 2017-08-28 DIAGNOSIS — Z95.2 S/P AVR: ICD-10-CM

## 2017-08-28 LAB
INR BLD: 2.35 (ref 0.85–1.15)
PROTHROMBIN TIME: 26.5 SEC (ref 9.6–13)

## 2017-09-01 ENCOUNTER — ANTI-COAG VISIT (OUTPATIENT)
Dept: CARDIOTHORACIC SURGERY | Age: 52
End: 2017-09-01

## 2017-09-05 ENCOUNTER — ANTI-COAG VISIT (OUTPATIENT)
Dept: CARDIOTHORACIC SURGERY | Age: 52
End: 2017-09-05

## 2017-09-05 DIAGNOSIS — Z95.2 S/P AVR: ICD-10-CM

## 2017-09-05 LAB
INR BLD: 2.16 (ref 0.85–1.15)
PROTHROMBIN TIME: 24.4 SEC (ref 9.6–13)

## 2017-09-06 RX ORDER — WARFARIN SODIUM 5 MG/1
TABLET ORAL
Qty: 30 TABLET | Refills: 0 | Status: SHIPPED | OUTPATIENT
Start: 2017-09-06 | End: 2017-09-25 | Stop reason: SDUPTHER

## 2017-09-07 ENCOUNTER — ANTI-COAG VISIT (OUTPATIENT)
Dept: CARDIOTHORACIC SURGERY | Age: 52
End: 2017-09-07

## 2017-09-11 ENCOUNTER — ANTI-COAG VISIT (OUTPATIENT)
Dept: CARDIOTHORACIC SURGERY | Age: 52
End: 2017-09-11

## 2017-09-11 DIAGNOSIS — Z95.2 S/P AVR: ICD-10-CM

## 2017-09-11 LAB
INR BLD: 2.27 (ref 0.85–1.15)
PROTHROMBIN TIME: 25.6 SEC (ref 9.6–13)

## 2017-09-15 ENCOUNTER — OFFICE VISIT (OUTPATIENT)
Dept: INTERNAL MEDICINE | Age: 52
End: 2017-09-15
Attending: RADIOLOGY

## 2017-09-15 VITALS
OXYGEN SATURATION: 96 % | TEMPERATURE: 98.6 F | SYSTOLIC BLOOD PRESSURE: 169 MMHG | WEIGHT: 252 LBS | HEART RATE: 61 BPM | HEIGHT: 72 IN | DIASTOLIC BLOOD PRESSURE: 110 MMHG | BODY MASS INDEX: 34.13 KG/M2

## 2017-09-15 DIAGNOSIS — I10 ESSENTIAL HYPERTENSION: Primary | ICD-10-CM

## 2017-09-15 RX ORDER — AMLODIPINE BESYLATE 5 MG/1
5 TABLET ORAL NIGHTLY PRN
Qty: 30 TABLET | Refills: 2 | Status: SHIPPED | OUTPATIENT
Start: 2017-09-15 | End: 2018-01-19 | Stop reason: SDUPTHER

## 2017-09-15 RX ORDER — LISINOPRIL 10 MG/1
10 TABLET ORAL 2 TIMES DAILY
Qty: 60 TABLET | Refills: 3 | Status: SHIPPED | OUTPATIENT
Start: 2017-09-15 | End: 2018-01-19 | Stop reason: SDUPTHER

## 2017-09-15 RX ORDER — LISINOPRIL 10 MG/1
10 TABLET ORAL 2 TIMES DAILY
Qty: 30 TABLET | Refills: 3 | Status: SHIPPED | OUTPATIENT
Start: 2017-09-15 | End: 2017-09-15 | Stop reason: SDUPTHER

## 2017-09-18 ENCOUNTER — ANTI-COAG VISIT (OUTPATIENT)
Dept: CARDIOTHORACIC SURGERY | Age: 52
End: 2017-09-18

## 2017-09-18 DIAGNOSIS — Z95.2 S/P AVR: ICD-10-CM

## 2017-09-18 LAB
INR BLD: 2.31 (ref 0.85–1.15)
PROTHROMBIN TIME: 26.1 SEC (ref 9.6–13)

## 2017-09-22 ENCOUNTER — TELEPHONE (OUTPATIENT)
Dept: CARDIOTHORACIC SURGERY | Age: 52
End: 2017-09-22

## 2017-09-22 ENCOUNTER — OFFICE VISIT (OUTPATIENT)
Dept: CARDIOLOGY CLINIC | Age: 52
End: 2017-09-22

## 2017-09-22 VITALS
HEART RATE: 66 BPM | SYSTOLIC BLOOD PRESSURE: 145 MMHG | WEIGHT: 256 LBS | DIASTOLIC BLOOD PRESSURE: 80 MMHG | BODY MASS INDEX: 34.72 KG/M2

## 2017-09-22 DIAGNOSIS — Z98.61 POST PTCA: ICD-10-CM

## 2017-09-22 DIAGNOSIS — I10 ESSENTIAL HYPERTENSION: ICD-10-CM

## 2017-09-22 DIAGNOSIS — I25.10 CAD IN NATIVE ARTERY: Primary | ICD-10-CM

## 2017-09-22 DIAGNOSIS — I48.0 PAROXYSMAL ATRIAL FIBRILLATION (HCC): ICD-10-CM

## 2017-09-22 DIAGNOSIS — I49.3 PVC (PREMATURE VENTRICULAR CONTRACTION): ICD-10-CM

## 2017-09-22 DIAGNOSIS — Z95.2 S/P AVR (AORTIC VALVE REPLACEMENT): ICD-10-CM

## 2017-09-22 DIAGNOSIS — Z95.1 S/P CABG (CORONARY ARTERY BYPASS GRAFT): ICD-10-CM

## 2017-09-22 PROCEDURE — 3017F COLORECTAL CA SCREEN DOC REV: CPT | Performed by: INTERNAL MEDICINE

## 2017-09-22 PROCEDURE — G8428 CUR MEDS NOT DOCUMENT: HCPCS | Performed by: INTERNAL MEDICINE

## 2017-09-22 PROCEDURE — 99214 OFFICE O/P EST MOD 30 MIN: CPT | Performed by: INTERNAL MEDICINE

## 2017-09-22 PROCEDURE — G8598 ASA/ANTIPLAT THER USED: HCPCS | Performed by: INTERNAL MEDICINE

## 2017-09-22 PROCEDURE — 4004F PT TOBACCO SCREEN RCVD TLK: CPT | Performed by: INTERNAL MEDICINE

## 2017-09-22 PROCEDURE — G8417 CALC BMI ABV UP PARAM F/U: HCPCS | Performed by: INTERNAL MEDICINE

## 2017-09-25 ENCOUNTER — ANTI-COAG VISIT (OUTPATIENT)
Dept: CARDIOTHORACIC SURGERY | Age: 52
End: 2017-09-25

## 2017-09-25 DIAGNOSIS — Z95.2 S/P AVR: Primary | ICD-10-CM

## 2017-09-25 DIAGNOSIS — Z79.01 WARFARIN ANTICOAGULATION: ICD-10-CM

## 2017-09-25 RX ORDER — WARFARIN SODIUM 5 MG/1
TABLET ORAL
Qty: 60 TABLET | Refills: 0 | Status: SHIPPED | OUTPATIENT
Start: 2017-09-25 | End: 2017-11-22 | Stop reason: SDUPTHER

## 2017-09-26 ENCOUNTER — ANTI-COAG VISIT (OUTPATIENT)
Dept: CARDIOTHORACIC SURGERY | Age: 52
End: 2017-09-26

## 2017-09-27 ENCOUNTER — ANTI-COAG VISIT (OUTPATIENT)
Dept: CARDIOTHORACIC SURGERY | Age: 52
End: 2017-09-27

## 2017-09-27 DIAGNOSIS — Z95.2 S/P AVR: ICD-10-CM

## 2017-09-27 LAB
INR BLD: 2.1 (ref 0.85–1.15)
PROTHROMBIN TIME: 23.7 SEC (ref 9.6–13)

## 2017-10-02 ENCOUNTER — TELEPHONE (OUTPATIENT)
Dept: DERMATOLOGY | Age: 52
End: 2017-10-02

## 2017-10-02 NOTE — TELEPHONE ENCOUNTER
Rx sent. When better, use Aquaphor frequently throughout the day. If continues to have issues, I need to see him back in the office.

## 2017-10-02 NOTE — TELEPHONE ENCOUNTER
Aishwarya Sigala was in on 6/29/17 and was prescribed triamcinolone ointment for his lips. He is now out of that medication and his lips are very dry and cracked again. His lips were better when he used it but he always had to or his lips would flare up. He is wondering if Dr. Naga Renee would need to see him again or if he can get a refill on the medication or even if Dr. Naag Renee would recommend another. He can be reached at [phone].

## 2017-10-04 ENCOUNTER — ANTI-COAG VISIT (OUTPATIENT)
Dept: CARDIOTHORACIC SURGERY | Age: 52
End: 2017-10-04

## 2017-10-04 ENCOUNTER — TELEPHONE (OUTPATIENT)
Dept: PHARMACY | Facility: CLINIC | Age: 52
End: 2017-10-04

## 2017-10-04 DIAGNOSIS — Z95.2 S/P AVR: ICD-10-CM

## 2017-10-04 LAB
INR BLD: 1.95 (ref 0.85–1.15)
PROTHROMBIN TIME: 22 SEC (ref 9.6–13)

## 2017-10-05 NOTE — TELEPHONE ENCOUNTER
First appointment scheduled for Wednesday, 10/11 @ 8788. Patient provided clinic address and phone number. Patient asked to bring insurance card and arrive 15 minutes early to first appointment. [place].  Emily Salcido, PharmD  [facility]  Ph: [phone]  10/5/2017 3:47 PM

## 2017-10-11 ENCOUNTER — ANTI-COAG VISIT (OUTPATIENT)
Dept: PHARMACY | Facility: CLINIC | Age: 52
End: 2017-10-11

## 2017-10-11 ENCOUNTER — HOSPITAL ENCOUNTER (OUTPATIENT)
Dept: OTHER | Age: 52
Discharge: HOME | End: 2017-10-31
Attending: INTERNAL MEDICINE | Admitting: INTERNAL MEDICINE

## 2017-10-11 DIAGNOSIS — Z95.2 S/P AORTIC VALVE REPLACEMENT: ICD-10-CM

## 2017-10-11 DIAGNOSIS — I48.0 PAROXYSMAL ATRIAL FIBRILLATION (HCC): ICD-10-CM

## 2017-10-11 LAB — INTERNATIONAL NORMALIZATION RATIO, POC: 3

## 2017-10-11 NOTE — PROGRESS NOTES
ANTICOAGULATION SERVICE    Clark Zepeda is a 45 y.o. male with PMHx significant for AVR (re-do in May, 2017), CAD s/p CABG (x3 in May, 2017), pA-fib, HLD, HTN, testicular CA who presents to clinic 10/11/2017 for anticoagulation monitoring and adjustment. Anticoagulation Indication(s):  Heart Valve Replacement - AVR   Referring Physician:  Dr. Lynda Vitale  Goal INR Range:  2-3  Duration of Anticoagulation Therapy:  TBD  Time of day dose taken:  PM  Product patient has at home:  warfarin 5 mg (peach)    Recent INR Results:  Lab Results   Component Value Date    INR 1.95 (H) 10/04/2017    INR 2.10 (H) 09/27/2017    INR 2.31 (H) 09/18/2017    INR 2.27 (H) 09/11/2017       INR Summary                            Warfarin regimen (mg)  Date INR   A/P    Sun Mon Tue Wed Thu Fri Sat Mg/wk  10/11 3.0 At goal, decrease  5 7.5 5 5 5 7.5 5 40    Patient History:  Recent hospitalizations/HC visits Patient is s/p re-do AVR and re-do CABG x3 on 5/30/17 per Dr. Suellen Goodpasture; patient has been following with Dr. Bettina Andrews office until now   Recent medication changes D/C amiodarone as of 9/22 per Dr. Lynda Vitale   Medications taken regularly that may interact with warfarin or alter INR ASA 81 mg   Warfarin dose taken as prescribed Yes - no missed doses, usually compliant  Does not use pillbox  Patient has been taking warfarin since re-do AVR in May, 2017   Signs/symptoms of bleeding No h/o major bleeding   Vitamin K intake Normally has ~0 servings of green, leafy vegetables per week   Recent vomiting/diarrhea/fever, changes in weight or activity level None reported   Tobacco or alcohol use Patient reports smoking 3-4 cigarettes/day  Patient denies any alcohol or illicit drug use   Upcoming surgeries or procedures None reported     Assessment/Plan:  Patient's INR was therapeutic today (3.0). Clark Zepeda was instructed to decrease warfarin to 5 mg daily, except 7.5 mg on Mon/Fri. Repeat INR in 1 week.   Patient was reminded to maintain consistent vitamin K intake and call with any bleeding, medication changes, or fever/vomiting/diarrhea. As it was the first visit to [facility] for Darren Stearns, the following was also reviewed with the patient in detail:  · Reason for and intended duration of therapy  · INR goal and frequency of INR monitoring  · Medication Interactions: Call clinic if new any new medications are started--especially antibiotics. Avoid NSAIDs for pain. Use Tylenol instead. · Food-Drug Interactions: Foods high in vitamin K can change the effects of warfarin (decrease INR)--Stressed consistency with these foods. Reviewed a list of high vitamin K foods- mostly leafy green vegetables. · Side effects: May bruise easier and small cuts may take longer to clot than usual.  Seek medical attention for any cuts that won't stop bleeding or falls involving head injury. Any blood in the urine or stool should be reported immediately. · Effect of alcohol and tobacco on INR  · Call the clinic with planned surgeries or procedures  · An information pamphlet \"A Guide to Your Warfarin Therapy\" was provided to the patient    Next Clinic Appointment:  10/    Please call [facility] at [phone] with any questions. Thanks! Little Sessions.  Matthew De Leon, PharmD  [facility]  Ph: [phone]  10/11/2017 2:08 PM

## 2017-10-16 DIAGNOSIS — Z95.2 S/P AORTIC VALVE REPLACEMENT: Chronic | ICD-10-CM

## 2017-10-16 DIAGNOSIS — I10 ESSENTIAL HYPERTENSION: Primary | Chronic | ICD-10-CM

## 2017-10-16 DIAGNOSIS — I25.10 CORONARY ARTERY DISEASE INVOLVING NATIVE CORONARY ARTERY OF NATIVE HEART WITHOUT ANGINA PECTORIS: ICD-10-CM

## 2017-10-16 RX ORDER — METOPROLOL TARTRATE 50 MG/1
TABLET, FILM COATED ORAL
Qty: 60 TABLET | Refills: 5 | Status: SHIPPED | OUTPATIENT
Start: 2017-10-16 | End: 2018-04-23 | Stop reason: SDUPTHER

## 2017-10-17 RX ORDER — ROSUVASTATIN CALCIUM 20 MG/1
20 TABLET, COATED ORAL NIGHTLY
Qty: 30 TABLET | Refills: 3
Start: 2017-10-17 | End: 2018-02-20 | Stop reason: SDUPTHER

## 2017-10-18 ENCOUNTER — ANTI-COAG VISIT (OUTPATIENT)
Dept: PHARMACY | Facility: CLINIC | Age: 52
End: 2017-10-18

## 2017-10-18 DIAGNOSIS — Z95.2 S/P AORTIC VALVE REPLACEMENT: ICD-10-CM

## 2017-10-18 DIAGNOSIS — I48.0 PAROXYSMAL ATRIAL FIBRILLATION (HCC): ICD-10-CM

## 2017-10-18 LAB — INTERNATIONAL NORMALIZATION RATIO, POC: 2.2

## 2017-10-20 ENCOUNTER — OFFICE VISIT (OUTPATIENT)
Dept: INTERNAL MEDICINE | Age: 52
End: 2017-10-20
Attending: RADIOLOGY

## 2017-10-20 VITALS
TEMPERATURE: 97.4 F | HEART RATE: 70 BPM | WEIGHT: 262 LBS | RESPIRATION RATE: 18 BRPM | DIASTOLIC BLOOD PRESSURE: 81 MMHG | SYSTOLIC BLOOD PRESSURE: 130 MMHG | BODY MASS INDEX: 35.53 KG/M2 | OXYGEN SATURATION: 99 %

## 2017-10-20 DIAGNOSIS — G56.23 ULNAR NEUROPATHY OF BOTH UPPER EXTREMITIES: Primary | ICD-10-CM

## 2017-10-20 RX ORDER — TIZANIDINE 4 MG/1
4 TABLET ORAL 3 TIMES DAILY
COMMUNITY
End: 2019-06-13 | Stop reason: ALTCHOICE

## 2017-10-20 NOTE — PROGRESS NOTES
Department of Internal Medicine  Clinic Progress Note    Date: 10/20/17                                               Subjective     Last seen: 9/15/17    Patient presents to clinic today for follow up visit after change in blood pressure medications from last visit, as well as new symptoms of bilateral hand pain and URI symptoms. Hand pain is described as bilateral, mostly in the 5th digit and lateral half of 4th (ulnar distribution) that is worse in the morning, made better by activity. Patient goes to a friends shop to work on things daily as a hobby and says that working with his hands actually improves symptoms. No associated numbness / tingling, but on palpation of elbow elicits a pain down the medial side of his arms with pain in the 4-5th digits, worse on the right. He is not taking any NSAIDs at home for this pain, and has an extensive history of arthritis in hips / knees as well as spinal stenosis and chronic back pain issues. URI symptoms started a few days ago, not associated with fevers or productive sputum. Occasionally has clear nasal discharge. He remains a 4-6 cigarette smoker daily. No sick contacts, no sore throat, fevers, chills, chest pain, shortness of breath. Occasional wheezes, but this is temporary and chronic. No loss of hearing or ear fullness. Does describe occasional headaches located above his eyes.        Objectives     Patient Active Problem List    Diagnosis Date Noted    Pure hypercholesterolemia      Priority: High    Coronary artery disease involving native coronary artery of native heart without angina pectoris      Priority: High    Paroxysmal atrial fibrillation (HCC)      Priority: High    NSTEMI (non-ST elevated myocardial infarction) ([facility] [address] 05/10/2017     Priority: High    Nonrheumatic aortic valve insufficiency 05/10/2017     Priority: High    Plantar fasciitis 07/24/2017    Obesity, Class I, BMI 30.0-34.9 (see actual BMI)     S/P AVR     History of partial colectomy 03/13/2017    Carpal tunnel syndrome 09/24/2014    GERD (gastroesophageal reflux disease) 09/13/2013    Myofascial pain dysfunction syndrome 08/07/2013    Postlaminectomy syndrome, lumbar region 08/07/2013    S/P hernia repair 08/03/2012    Exostosis of toe 01/19/2012    Lumbar spinal stenosis 09/02/2011    Lumbar herniated disc 09/02/2011    S/P aortic valve replacement 09/02/2011    Hx of CABG 09/02/2011    Needle phobia 09/02/2011    CAD, multiple vessel 09/02/2011    History of testicular cancer 09/02/2011    Degenerative arthritis of right knee 09/02/2011    Essential hypertension 08/19/2011    Hyperlipidemia 08/19/2011    Generalized Arthralgias  08/19/2011       Review of Systems    ROS  Listed above     Vitals:    /81   Pulse 70   Temp 97.4 °F (36.3 °C)   Resp 18   Wt 262 lb (118.8 kg)   SpO2 99%   BMI 35.53 kg/m²     PHYSICAL EXAM:    Physical Exam   Constitutional: He is oriented to person, place, and time and well-developed, well-nourished, and in no distress. No distress. HENT:   Head: Normocephalic and atraumatic. Mouth/Throat: No oropharyngeal exudate. Bilateral mild fluid behind TMs, no sign of infection, light reflex normal    Eyes: Conjunctivae and EOM are normal. Pupils are equal, round, and reactive to light. Neck: Normal range of motion. Neck supple. No tracheal deviation present. Cardiovascular: Normal rate and regular rhythm. Exam reveals no gallop. Murmur (II/VI systolic murmur RUSB) heard. Pulmonary/Chest: Effort normal and breath sounds normal. No respiratory distress. He has no wheezes. He has no rales. Abdominal: Soft. Bowel sounds are normal. He exhibits no distension. There is no tenderness. Musculoskeletal: Normal range of motion. He exhibits no deformity. Lymphadenopathy:     He has no cervical adenopathy. Neurological: He is alert and oriented to person, place, and time. No cranial nerve deficit.    Normal strength throughout, pain elicited bilateral with handshake, palpation of medial elbow elicits pain down medial side of forearm into 4-5th digits bilaterally, worse on right   Skin: He is not diaphoretic. Assessment/Plan     The patient is a 45 y.o. male with h/o of HTN, CAD s/p CABG x3 and aVR, A-fib on warfarin, and chronic back pain who presents with bilateral hand pain for 1 month, and new onset URI symptoms. 1. Bilateral Hand Pain, likely ulnar neuropathy   -Given symptoms and history, patient likely has osteoarthritis vs radiculopathy. He has chronic arthritis issues as well as a history of spinal stenosis. Patient today in a hurry to help his wife who is in the hospital, so at this time will defer imaging and refer patient to previous orthopedic surgeon for evaluation. 2. URI  -No fevers or productive sputum, likely viral URI vs allergies  -Patient takes benadryl at home and does not have subjective history of allergies  -No intervention at this time, advised patient risks of continued smoking and how it relates to nonspecific URI symptoms, and also can lead to infection.  -Advised to return to clinic if symptoms worsen  -Coricidin cold and sinus prescribed over the counter as needed for symptoms       3. Essential HTN  -Blood pressure much better controlled today with resolution of symptoms after increase in lisinopril last visit, as well as adding amlodipine. Instructed patient to continue to monitor symptoms and blood pressure at home.        4. CAD s/p CABGx3 + AVR 5/30/17  -F/U with cardiology & coumadin clinic  -Warfarin 7.5mg Tues and Friday, 5mg other days  - continue statin     5, Afib: Afib during recent hospitalization, NSR on most recent 6/2 EKG  -Continue warfarin as prescribed      6.  Chronic back pain: Managed by pain management  -On percocet  -Per patient on Tizanidine TID      Medication changes made at this visit: Added coricidin for URI symptoms   Labs needing follow up: None  Follow up in: 4-6 weeks     Discussed with Dr. John Abdalla MD PGY-1  Internal Medicine Resident  10/20/2017  1:34 PM  Pager #: [phone]    Addendum to Resident H& P/Progress note:  I have personally seen,examined and evaluated the patient.  I have reviewed the current history, physical findings, labs and assessment and plan and agree with note as documented by resident MD ( Vicenta Copeland)      Catarino Tamayo MD, [address]

## 2017-10-20 NOTE — PATIENT INSTRUCTIONS
Before any of you medication is completely gone, call your pharmacy AT LEAST 1 WEEK ahead for refills. Review all information regarding your medication before starting. If you become ill when the clinic is closed, please call the [facility]  at   #[phone] and ask the  to page the AOD between 6:00 AM and 6:00 PM or page the AON between 6:00 PM and 6:00 am    Labs are done Tuesday thru Friday from 8:00 to 9:00 AM. For fasting labs do not eat anything for 12 hours prior. You may drink water. The clinic is not able to process MY CHART requests for appointments or messages including requests. Please call the [address] [place] [address] at [phone]  For appointments and messages. Call your pharmacy for medication refills.        Return to clinic  6 weeks    Coricidin for nasal congestion    Referral to Dr Solitario Winter [phone]    Continue medication as listed on discharge sheet    Instructions reviewed before discharge and copy given to patient    [address] [phone]

## 2017-10-23 ENCOUNTER — TELEPHONE (OUTPATIENT)
Dept: DERMATOLOGY | Age: 52
End: 2017-10-23

## 2017-10-23 NOTE — TELEPHONE ENCOUNTER
Oscar Payton was using aquaphor and triamcinolone ointment and those broke his lips out worse and he said it is extreme pain when he tries to eat. He stopped using both of those and his lips have gotten a bit better. He said Dr. Thompson wanted to see him back if those two things didn't work. He is asking to come in next week if possible. He is unavailable this week due to his wife having treatments. He can be reached at [phone].

## 2017-11-01 ENCOUNTER — HOSPITAL ENCOUNTER (OUTPATIENT)
Dept: OTHER | Age: 52
Discharge: HOME | End: 2017-11-30
Attending: INTERNAL MEDICINE | Admitting: INTERNAL MEDICINE

## 2017-11-06 ENCOUNTER — ANTI-COAG VISIT (OUTPATIENT)
Dept: PHARMACY | Facility: CLINIC | Age: 52
End: 2017-11-06

## 2017-11-06 DIAGNOSIS — Z95.2 S/P AORTIC VALVE REPLACEMENT: ICD-10-CM

## 2017-11-06 DIAGNOSIS — I48.0 PAROXYSMAL ATRIAL FIBRILLATION (HCC): ICD-10-CM

## 2017-11-06 LAB — INTERNATIONAL NORMALIZATION RATIO, POC: 1.8

## 2017-11-10 ENCOUNTER — TELEPHONE (OUTPATIENT)
Dept: DERMATOLOGY | Age: 52
End: 2017-11-10

## 2017-11-14 ENCOUNTER — TELEPHONE (OUTPATIENT)
Dept: DERMATOLOGY | Age: 52
End: 2017-11-14

## 2017-11-14 ENCOUNTER — OFFICE VISIT (OUTPATIENT)
Dept: DERMATOLOGY | Age: 52
End: 2017-11-14

## 2017-11-14 DIAGNOSIS — K13.0 CHEILITIS: Primary | ICD-10-CM

## 2017-11-14 PROCEDURE — 3017F COLORECTAL CA SCREEN DOC REV: CPT | Performed by: DERMATOLOGY

## 2017-11-14 PROCEDURE — G8598 ASA/ANTIPLAT THER USED: HCPCS | Performed by: DERMATOLOGY

## 2017-11-14 PROCEDURE — G8417 CALC BMI ABV UP PARAM F/U: HCPCS | Performed by: DERMATOLOGY

## 2017-11-14 PROCEDURE — G8427 DOCREV CUR MEDS BY ELIG CLIN: HCPCS | Performed by: DERMATOLOGY

## 2017-11-14 PROCEDURE — G8484 FLU IMMUNIZE NO ADMIN: HCPCS | Performed by: DERMATOLOGY

## 2017-11-14 PROCEDURE — 99213 OFFICE O/P EST LOW 20 MIN: CPT | Performed by: DERMATOLOGY

## 2017-11-14 PROCEDURE — 4004F PT TOBACCO SCREEN RCVD TLK: CPT | Performed by: DERMATOLOGY

## 2017-11-14 RX ORDER — NYSTATIN AND TRIAMCINOLONE ACETONIDE 100000; 1 [USP'U]/G; MG/G
OINTMENT TOPICAL
Qty: 30 G | Refills: 1 | Status: SHIPPED | OUTPATIENT
Start: 2017-11-14 | End: 2019-09-18 | Stop reason: ALTCHOICE

## 2017-11-14 NOTE — TELEPHONE ENCOUNTER
Spoke to Pedro Luis at [facility] she states she can see the rx in pt's chart but they didn't receive it. I gave a verbal rx.

## 2017-11-14 NOTE — PROGRESS NOTES
10/10/2016    Dr. Diogo Garcia - w/laparascopic lysis of extensive adhesions, open transverse colon resection, excision of old abd mesh adhering to colon    CORONARY ANGIOPLASTY WITH STENT PLACEMENT  07/2014    CORONARY ARTERY BYPASS GRAFT  08/27/2004    Dr. Melany Navas - x2 (LIMA-LAD, SVG sequentially to ascending aorta & D1)    CORONARY ARTERY BYPASS GRAFT  05/30/2017    Dr. Arcos Body - redo x3 (reverse AC SVG sequentially to D1, OM1 & PDA) w/explant of prior prosthetic valve & removal of embedded sternal wires x7    EMG  04/16/2012    FOOT SURGERY Left 01/24/2012    Dr. Marcia Schultz, DPM - hallux nail evulsion & exostectomy    [address]  2010    multiple    LUMBAR DISCECTOMY  2012    L4-5    SHOULDER ARTHROSCOPY Right 11/21/2013    Dr. Eloise Landau - w/subacromial decompression, debridement of the SLAP tear, distal clavicle excision    SOFT TISSUE TUMOR RESECTION  2001    retroperitoneal mass    TESTICLE REMOVAL Left 2001    radical orchiectomy    TRANSESOPHAGEAL ECHOCARDIOGRAM  05/30/2017    during redo CABG & AVR    TUNNELED VENOUS PORT PLACEMENT  2002    portacath        Allergies   Allergen Reactions    Atorvastatin Calcium [Lipitor] Other (See Comments)     Severe headaches      Vicodin [Hydrocodone-Acetaminophen] Itching     Outpatient Prescriptions Marked as Taking for the 11/14/17 encounter (Office Visit) with Kiana Marcus MD   Medication Sig Dispense Refill    nystatin-triamcinolone (MYCOLOG) 403531-1.7 UNIT/GM-% ointment Apply topically 2 times daily until improved.  30 g 1    tiZANidine (ZANAFLEX) 4 MG tablet Take 4 mg by mouth 3 times daily      rosuvastatin (CRESTOR) 20 MG tablet Take 1 tablet by mouth nightly 30 tablet 3    metoprolol tartrate (LOPRESSOR) 50 MG tablet Take 1 tablet by mouth 2 times daily for BP & heart 60 tablet 5    warfarin (COUMADIN) 5 MG tablet Start with 5 mg daily; dose will be adjusted as needed by Dr. Darlyn Israel office based on INR result

## 2017-11-14 NOTE — TELEPHONE ENCOUNTER
Brenda Ferrara called from [facility] on this patient. She sees the nystatin-triamcinolone [facility]) 912841-8.1 UNIT/GM-% ointment in the system but it needs released to her.     Call back # [phone]

## 2017-11-14 NOTE — PATIENT INSTRUCTIONS
For your well being, we encourage you to stop smoking or using tobacco products. Smoking and the use of other tobacco products, including cigars and smokeless tobacco, causes or worsens numerous diseases and conditions. Some products also expose nearby people to toxic secondhand smoke. Smoking is the leading cause of preventable death in the [place] causing over 438,000 deaths per year. Secondhand smoke is a serious health hazard for people of all ages, causing more than 41,000 deaths each year. Marijuana smoke contains many of the same toxins, irritants and carcinogens as tobacco smoke. Electronic cigarettes are a new tobacco product, and the potential health consequences and safety of these products are unknown. Smokeless Tobacco products are a known cause of cancer, and are not a safe alternative to cigarettes. 1. Make the decision to quit smoking  Stopping smoking is the best thing you can do for your health. If you smoke, you are more likely to get diseases of the lungs, heart, and brain. You are also more likely to get many kinds of cancer. After you quit, you will be less likely to get these diseases. Stopping smoking is hardbut you can do it! Your doctor can help you. 2. Get ready to quit  Once you decide to quit, make a plan with the help of your doctor. Here are some things you need to do:  Choose a day to quit. Talk to your primary care doctor about using the nicotine patch, gum, inhaler, or nasal spray to help you quit smoking. Getting nicotine some way other than in a cigarette can help make quitting easier. Talk to your primary care doctor about using a prescription medicine like bupropion (brand name: Zyban) to reduce your urge to smoke. If you decide to use a medicine, start taking it two weeks before your quit day. Talk with your primary care doctor about when you smoke. For example, you may smoke first thing in the morning, after a meal, or when you feel stressed.  Plan what you will do instead of smoking. Tell your family and friends that you are going to try to quit and on what date. Put together a list of phone numbers of friends and family members who can give you support when you feel you might break down and have a cigarette. Before your quit day, put all tobacco products, ashtrays, and lighters away. 3. Put your plan into action  When you wake up on your quit day, start using a nicotine replacement method if you had planned to do that. For the first few days after you quit, you may have some signs of nicotine withdrawal. You may feel restless and cranky. You may find it hard to think. You might need to change your dose of nicotine if these signs upset you. Do not smoke! If you feel like you want to smoke, call a friend or family member who has agreed to help you. Also, there might be someone in your doctor's office you can call. Put into action your plans for doing things other than smoking. For example, when you feel the urge to smoke, you might take a walk. Or, you might visit friends who do not smoke. It is best to stay away from places where you used to smoke. 4. If you return to smoking  Quitting smoking is not easy. Many people have to try several times before they succeed. If you start smoking again, call your primary care doctor's office soon to talk about what happened. Think about what you can do to keep from smoking when you try to quit again. Part of this handout is provided by the [employer]. More information is available at the American Lung Association https://rodrigues.com/.  This information provides a general overview and may not apply to everyone. Talk to your primary care doctor to find out if this information applies to you and to get more information on this subject.

## 2017-11-20 RX ORDER — FAMOTIDINE 20 MG/1
20 TABLET, FILM COATED ORAL 2 TIMES DAILY
Qty: 60 TABLET | Refills: 5
Start: 2017-11-20 | End: 2018-04-18 | Stop reason: SDUPTHER

## 2017-11-21 ENCOUNTER — ANTI-COAG VISIT (OUTPATIENT)
Dept: PHARMACY | Facility: CLINIC | Age: 52
End: 2017-11-21

## 2017-11-21 DIAGNOSIS — I48.0 PAROXYSMAL ATRIAL FIBRILLATION (HCC): ICD-10-CM

## 2017-11-21 DIAGNOSIS — Z95.2 S/P AORTIC VALVE REPLACEMENT: ICD-10-CM

## 2017-11-21 LAB — INTERNATIONAL NORMALIZATION RATIO, POC: 2

## 2017-11-21 NOTE — PROGRESS NOTES
warfarin 5 mg daily, except 7.5 mg on Tue/Fri. Repeat INR in 2 weeks. Patient was reminded to maintain consistent vitamin K intake and call with any bleeding, medication changes, or fever/vomiting/diarrhea. Next Clinic Appointment:  12/6    Please call [facility] at [phone] with any questions. Thanks! Kenya Montano.  Vasiliy Schuler, PharmD  [facility]  Ph: [phone]  11/21/2017 9:43 AM

## 2017-11-22 DIAGNOSIS — Z79.01 WARFARIN ANTICOAGULATION: ICD-10-CM

## 2017-11-22 DIAGNOSIS — Z95.2 S/P AVR: ICD-10-CM

## 2017-11-22 RX ORDER — WARFARIN SODIUM 5 MG/1
TABLET ORAL
Qty: 60 TABLET | Refills: 6 | Status: SHIPPED | OUTPATIENT
Start: 2017-11-22 | End: 2018-09-18 | Stop reason: SDUPTHER

## 2017-12-01 ENCOUNTER — HOSPITAL ENCOUNTER (OUTPATIENT)
Dept: OTHER | Age: 52
Discharge: HOME | End: 2017-12-31
Attending: INTERNAL MEDICINE | Admitting: INTERNAL MEDICINE

## 2017-12-06 ENCOUNTER — OFFICE VISIT (OUTPATIENT)
Dept: CARDIOLOGY CLINIC | Age: 52
End: 2017-12-06

## 2017-12-06 ENCOUNTER — ANTI-COAG VISIT (OUTPATIENT)
Dept: PHARMACY | Facility: CLINIC | Age: 52
End: 2017-12-06

## 2017-12-06 VITALS
BODY MASS INDEX: 36.81 KG/M2 | SYSTOLIC BLOOD PRESSURE: 122 MMHG | DIASTOLIC BLOOD PRESSURE: 62 MMHG | HEART RATE: 62 BPM | WEIGHT: 271.4 LBS

## 2017-12-06 DIAGNOSIS — Z95.2 S/P AVR (AORTIC VALVE REPLACEMENT): ICD-10-CM

## 2017-12-06 DIAGNOSIS — I48.0 PAROXYSMAL ATRIAL FIBRILLATION (HCC): ICD-10-CM

## 2017-12-06 DIAGNOSIS — Z95.1 HX OF CABG: ICD-10-CM

## 2017-12-06 DIAGNOSIS — I49.3 PVC (PREMATURE VENTRICULAR CONTRACTION): ICD-10-CM

## 2017-12-06 DIAGNOSIS — I25.10 CAD IN NATIVE ARTERY: Primary | ICD-10-CM

## 2017-12-06 DIAGNOSIS — I10 ESSENTIAL HYPERTENSION: ICD-10-CM

## 2017-12-06 DIAGNOSIS — Z95.2 S/P AORTIC VALVE REPLACEMENT: ICD-10-CM

## 2017-12-06 DIAGNOSIS — Z98.61 POST PTCA: ICD-10-CM

## 2017-12-06 LAB — INTERNATIONAL NORMALIZATION RATIO, POC: 2.1

## 2017-12-06 PROCEDURE — G8417 CALC BMI ABV UP PARAM F/U: HCPCS | Performed by: INTERNAL MEDICINE

## 2017-12-06 PROCEDURE — 99214 OFFICE O/P EST MOD 30 MIN: CPT | Performed by: INTERNAL MEDICINE

## 2017-12-06 PROCEDURE — G8427 DOCREV CUR MEDS BY ELIG CLIN: HCPCS | Performed by: INTERNAL MEDICINE

## 2017-12-06 PROCEDURE — 4004F PT TOBACCO SCREEN RCVD TLK: CPT | Performed by: INTERNAL MEDICINE

## 2017-12-06 PROCEDURE — G8598 ASA/ANTIPLAT THER USED: HCPCS | Performed by: INTERNAL MEDICINE

## 2017-12-06 PROCEDURE — G8484 FLU IMMUNIZE NO ADMIN: HCPCS | Performed by: INTERNAL MEDICINE

## 2017-12-06 PROCEDURE — 3017F COLORECTAL CA SCREEN DOC REV: CPT | Performed by: INTERNAL MEDICINE

## 2017-12-06 NOTE — PROGRESS NOTES
ANTICOAGULATION SERVICE    Slick Layton is a 45 y.o. male with PMHx significant for AVR (re-do in May, 2017), CAD s/p CABG (x3 in May, 2017), pA-fib, HLD, HTN, testicular CA who presents to clinic 12/6/2017 for anticoagulation monitoring and adjustment. Anticoagulation Indication(s):  Heart Valve Replacement - AVR   Referring Physician:  Dr. Smaantha Butts  Goal INR Range:  2-3  Duration of Anticoagulation Therapy:  TBD  Time of day dose taken:  PM  Product patient has at home:  warfarin 5 mg (peach)      INR Summary                            Warfarin regimen (mg)  Date INR   A/P    Sun Mon Tue Wed Thu Fri Sat Mg/wk  12/6 2.1 At goal, no change  5 5 7.5 5 5 7.5 5 40  11/21 2.0 At goal, no change  5 5 7.5 5 5 7.5 5 40  11/6 1.8 Below goal, continue  5 7.5/5 7.5 5 5 7.5 5 40  10/18 2.2 At goal, no change  5 5 7.5 5 5 7.5 5 40  10/11 3.0 At goal, decrease  5 7.5 5 5 5 7.5 5 40    Patient History:  Recent hospitalizations/HC visits Patient is s/p re-do AVR and re-do CABG x3 on 5/30/17 per Dr. Miriam Jones; patient has been following with Dr. Pizano Divers office until now   Recent medication changes D/C amiodarone as of 9/22 per Dr. Samantha Butts   Medications taken regularly that may interact with warfarin or alter INR ASA 81 mg   Warfarin dose taken as prescribed Yes - no missed doses, usually compliant  Does not use pillbox  Patient has been taking warfarin since re-do AVR in May, 2017   Signs/symptoms of bleeding No h/o major bleeding   Vitamin K intake Normally has ~0 servings of green, leafy vegetables per week   Recent vomiting/diarrhea/fever, changes in weight or activity level None reported   Tobacco or alcohol use Patient reports smoking 3-4 cigarettes/day  Patient denies any alcohol or illicit drug use   Upcoming surgeries or procedures None reported     Assessment/Plan:  Patient's INR was therapeutic today (2.1).   Patient has been stable at 40 mg of warfarin weekly; however, d/c of amiodarone 2 months ago may cause gradual INR

## 2017-12-27 ENCOUNTER — ANTI-COAG VISIT (OUTPATIENT)
Dept: PHARMACY | Facility: CLINIC | Age: 52
End: 2017-12-27

## 2017-12-27 DIAGNOSIS — I48.0 PAROXYSMAL ATRIAL FIBRILLATION (HCC): ICD-10-CM

## 2017-12-27 DIAGNOSIS — Z95.2 S/P AORTIC VALVE REPLACEMENT: ICD-10-CM

## 2017-12-27 LAB — INTERNATIONAL NORMALIZATION RATIO, POC: 1.7

## 2017-12-27 NOTE — PROGRESS NOTES
Patient has been stable at 40 mg of warfarin weekly. D/c of amiodarone was 3 months ago, thus would not expect effect on INR now. Patient denies missed warfarin doses, medication changes, and confirms that he does not eat any greens. Tamea Sports was instructed to increase warfarin to 7.5 mg on Sun/Tue/Fri, and 5 mg all other days (~6% weekly dose increase). Repeat INR in 2 weeks. Patient was reminded to maintain consistent vitamin K intake and call with any bleeding, medication changes, or fever/vomiting/diarrhea. Next Clinic Appointment:  1/10    Please call [facility] at [phone] with any questions. Thanks! Donnie Ahmadi.  Peña Thompson, PharmD  [facility]  Ph: [phone]  12/27/2017 7:55 AM

## 2018-01-01 ENCOUNTER — HOSPITAL ENCOUNTER (OUTPATIENT)
Dept: OTHER | Age: 53
Discharge: HOME | End: 2018-01-31
Attending: INTERNAL MEDICINE | Admitting: INTERNAL MEDICINE

## 2018-01-05 ENCOUNTER — OFFICE VISIT (OUTPATIENT)
Dept: INTERNAL MEDICINE | Age: 53
End: 2018-01-05
Attending: RADIOLOGY

## 2018-01-05 VITALS
WEIGHT: 270 LBS | SYSTOLIC BLOOD PRESSURE: 126 MMHG | OXYGEN SATURATION: 97 % | RESPIRATION RATE: 20 BRPM | HEART RATE: 72 BPM | BODY MASS INDEX: 36.57 KG/M2 | HEIGHT: 72 IN | DIASTOLIC BLOOD PRESSURE: 87 MMHG | TEMPERATURE: 98 F

## 2018-01-05 DIAGNOSIS — I10 ESSENTIAL HYPERTENSION: ICD-10-CM

## 2018-01-05 DIAGNOSIS — K13.0 ANGULAR CHEILOSIS: ICD-10-CM

## 2018-01-05 DIAGNOSIS — D64.9 ANEMIA, UNSPECIFIED TYPE: ICD-10-CM

## 2018-01-05 DIAGNOSIS — R07.89 OTHER CHEST PAIN: ICD-10-CM

## 2018-01-05 DIAGNOSIS — K13.0 LIP PAIN: ICD-10-CM

## 2018-01-05 DIAGNOSIS — K13.0 CHEILITIS: ICD-10-CM

## 2018-01-05 DIAGNOSIS — K13.0 SORE LIPS: ICD-10-CM

## 2018-01-05 DIAGNOSIS — R10.32 LEFT LOWER QUADRANT PAIN: ICD-10-CM

## 2018-01-05 RX ORDER — LANOLIN ALCOHOL/MO/W.PET/CERES
3 CREAM (GRAM) TOPICAL NIGHTLY PRN
Qty: 30 TABLET | Refills: 3 | Status: SHIPPED | OUTPATIENT
Start: 2018-01-05

## 2018-01-05 NOTE — PATIENT INSTRUCTIONS
Call your pharmacy for medication refills at least 48 hours ahead at [phone] (Monday -Friday, 08:00 AM to 4:00 PM .If you become ill when the clinic is closed, please call [facility]  at [phone] and ask the  to page the AOD between 6:00 AM and 6:00 PM or page the AON between 6:00 PM & 6:00 AM.    The clinic is not able to process [facility] requests for appointments or messages including refill requests. Please call the [employer] at [phone] for appointments and messages. The [facility] [employer]  Telephone:[phone]    Return 2-3 months. Try over the counter Melatonin for sleep    Check Blood Pressure in the mornings when you have a headache. Write them down. .  If elevated greater than 150s give us a call. [phone]. If headaches worsen, call us. Instructions reviewed with patient and copy given to patient upon discharge.      1:47 PM1/5/2018

## 2018-01-05 NOTE — PROGRESS NOTES
[facility]       NURSING PROGRESS NOTE      January 5, 2018  Kojomichael Ortiz    Chief Complaint:   Chief Complaint   Patient presents with    Follow-up     HTN; CAD; A-fib; chronic back pain       Constitutional: alert and oriented,calm; weight gain 14lbs in 3 months. denies fever,chills,illness. Eyes: negative  Ears, nose, mouth, throat, and face: negative  Respiratory: negative  Cardiovascular: patient checks his B/P at night and per order takes Amlodipine 5 mg. if systolic is over 510; he needs to take the Amlodipine a few days per week. When he takes it, the next am he gets a \"bad headache\" that lasts about one hr. after arising. He follows with  and Coumadin clinic. Gastrointestinal: negative  Genitourinary: negative  Integument/breast: negative  Hematologic/lymphatic: negative  Musculoskeletal: pain mgmt. for chronic back pain: Percocet,Zanaflex. Neurological: negative  Diabetes: No    Pain Assessment:  Pain Present: no  Pain Score: 0  Pain Quality/Description: no c/o pain now    Mobility/Safety/Self-Care:  Steady Gait: Yes  History of Falls: No   History of a Fall within the last 30 days No  Assistive Device: None  Poor Hygiene: No    Psychosocial:   Depression: Yes; wife is very ill.   If YES,    Does Patient express current thoughts of harming self or others?: No  Anxious: No  Insomnia: Yes  Inappropriate Behavior: No  Alcohol Abuse: no  Substance Abuse: no  Signs of Physical Abuse: No  Signs of Emotional Abuse: No    Educational:  Identify the learner who is being assessed for education:  patient                    Ability to Learn:  Exhibits ability to grasp concepts and respond to questions: High  Ready to Learn: Yes  calm   Preferred Method of Learning:  written  Barriers to Learning: Verbalizes interest  Special Considerations due to cultural, Jainism, spiritual beliefs:  No  Language:  English  :  No    Note:   wife is very ill at this time      1:45 PM

## 2018-01-10 ENCOUNTER — ANTI-COAG VISIT (OUTPATIENT)
Dept: PHARMACY | Facility: CLINIC | Age: 53
End: 2018-01-10

## 2018-01-10 DIAGNOSIS — Z95.2 S/P AORTIC VALVE REPLACEMENT: ICD-10-CM

## 2018-01-10 DIAGNOSIS — I48.0 PAROXYSMAL ATRIAL FIBRILLATION (HCC): ICD-10-CM

## 2018-01-10 LAB — INTERNATIONAL NORMALIZATION RATIO, POC: 2.1

## 2018-01-10 NOTE — PROGRESS NOTES
ANTICOAGULATION SERVICE    Armando Jaimes is a 45 y.o. male with PMHx significant for AVR (re-do in May, 2017), CAD s/p CABG (x3 in May, 2017), pA-fib, HLD, HTN, testicular CA who presents to clinic 1/10/2018 for anticoagulation monitoring and adjustment.     Anticoagulation Indication(s):  Heart Valve Replacement - AVR  Referring Physician:  Dr. Leroy Meadows  Goal INR Range:  2-3  Duration of Anticoagulation Therapy:  TBD  Time of day dose taken:  PM  Product patient has at home:  warfarin 5 mg (peach)      INR Summary                            Warfarin regimen (mg)  Date INR   A/P    Sun Mon Tue Wed Thu Fri Sat Mg/wk  1/10 2.1 At goal, no change  7.5 5 7.5 5 5 7.5 5 42.5  12/27 1.7 Below goal, increase  7.5 5 7.5 5 5 7.5 5 42.5    12/6 2.1 At goal, no change  5 5 7.5 5 5 7.5 5 40  11/21 2.0 At goal, no change  5 5 7.5 5 5 7.5 5 40  11/6 1.8 Below goal, continue  5 7.5/5 7.5 5 5 7.5 5 40  10/18 2.2 At goal, no change  5 5 7.5 5 5 7.5 5 40  10/11 3.0 At goal, decrease  5 7.5 5 5 5 7.5 5 40    Patient History:  Recent hospitalizations/HC visits -12/6 Dr. Leroy Meadows: no med changes  -Patient is s/p re-do AVR and re-do CABG x3 on 5/30/17 per Dr. Gretel Loving; patient has been following with Dr. Maddison Carrasquillo office until now   Recent medication changes -Started melatonin for sleep as of 1/5, but has not been taking regularly (no interaction with warfarin)  -D/C amiodarone as of 9/22 per Dr. Leroy Meadows   Medications taken regularly that may interact with warfarin or alter INR ASA 81 mg   Warfarin dose taken as prescribed Yes - no missed doses, usually compliant  Does not use pillbox  Patient has been taking warfarin since re-do AVR in May, 2017   Signs/symptoms of bleeding No h/o major bleeding   Vitamin K intake Normally has ~0 servings of green, leafy vegetables per week   Recent vomiting/diarrhea/fever, changes in weight or activity level None reported   Tobacco or alcohol use Patient reports smoking 3-4 cigarettes/day; now increased to 8-10 cigarettes/day  Patient denies any alcohol or illicit drug use   Upcoming surgeries or procedures None reported     Assessment/Plan:  Patient's INR was therapeutic today (2.1) following dose increase at last visit. Patient had been stable at 40 mg of warfarin weekly. D/c of amiodarone was 3 months ago, thus would not expect effect on INR now. Patient does report increased use of tobacco from 3-4 cigarettes/day to 8-10 cigarettes/day, which could potentially have an effect on warfarin requirements. Aziza Mccray was instructed to continue warfarin 7.5 mg on Sun/Tue/Fri, and 5 mg all other days. Repeat INR in 2 weeks. Patient was reminded to maintain consistent vitamin K intake and call with any bleeding, medication changes, or fever/vomiting/diarrhea. Next Clinic Appointment:  1/24    Please call [facility] at [phone] with any questions. Thanks! Rafal Edge.  Mitch Chan, PharmD  [facility]  Ph: [phone]  1/10/2018 8:11 AM

## 2018-01-19 RX ORDER — LISINOPRIL 10 MG/1
10 TABLET ORAL 2 TIMES DAILY
Qty: 60 TABLET | Refills: 3
Start: 2018-01-19 | End: 2018-04-18 | Stop reason: SDUPTHER

## 2018-01-19 RX ORDER — AMLODIPINE BESYLATE 5 MG/1
5 TABLET ORAL NIGHTLY PRN
Qty: 30 TABLET | Refills: 5 | Status: SHIPPED | OUTPATIENT
Start: 2018-01-19 | End: 2018-09-18 | Stop reason: SDUPTHER

## 2018-01-22 ENCOUNTER — OFFICE VISIT (OUTPATIENT)
Dept: DERMATOLOGY | Age: 53
End: 2018-01-22

## 2018-01-22 DIAGNOSIS — K13.0 ANGULAR CHEILITIS: ICD-10-CM

## 2018-01-22 DIAGNOSIS — K13.0 CHEILITIS: Primary | ICD-10-CM

## 2018-01-22 PROCEDURE — G8598 ASA/ANTIPLAT THER USED: HCPCS | Performed by: DERMATOLOGY

## 2018-01-22 PROCEDURE — G8417 CALC BMI ABV UP PARAM F/U: HCPCS | Performed by: DERMATOLOGY

## 2018-01-22 PROCEDURE — G8484 FLU IMMUNIZE NO ADMIN: HCPCS | Performed by: DERMATOLOGY

## 2018-01-22 PROCEDURE — 4004F PT TOBACCO SCREEN RCVD TLK: CPT | Performed by: DERMATOLOGY

## 2018-01-22 PROCEDURE — 3017F COLORECTAL CA SCREEN DOC REV: CPT | Performed by: DERMATOLOGY

## 2018-01-22 PROCEDURE — 99212 OFFICE O/P EST SF 10 MIN: CPT | Performed by: DERMATOLOGY

## 2018-01-22 PROCEDURE — G8427 DOCREV CUR MEDS BY ELIG CLIN: HCPCS | Performed by: DERMATOLOGY

## 2018-01-22 NOTE — PROGRESS NOTES
as needed by Dr. Lucien Caal office based on INR result 60 tablet 6    famotidine (PEPCID) 20 MG tablet Take 1 tablet by mouth 2 times daily 60 tablet 5    nystatin-triamcinolone (MYCOLOG) 176062-6.1 UNIT/GM-% ointment Apply topically 2 times daily until improved. 30 g 1    tiZANidine (ZANAFLEX) 4 MG tablet Take 4 mg by mouth 3 times daily      rosuvastatin (CRESTOR) 20 MG tablet Take 1 tablet by mouth nightly 30 tablet 3    metoprolol tartrate (LOPRESSOR) 50 MG tablet Take 1 tablet by mouth 2 times daily for BP & heart 60 tablet 5    Blood Pressure KIT 1 Unit 1 kit 0    oxyCODONE-acetaminophen (PERCOCET) 5-325 MG per tablet Take 1 tablet by mouth every 4 hours as needed for Pain  .  aspirin 81 MG EC tablet Take 1 tablet by mouth daily 30 tablet 0    docusate sodium (COLACE) 100 MG capsule Take 100 mg by mouth every other day       gabapentin (NEURONTIN) 300 MG capsule Take 300 mg by mouth 3 times daily            Physical Examination       The following were examined and determined to be normal: Psych/Neuro, Scalp/hair, Head/face, Conjunctivae/eyelids, Gums/teeth/lips and Neck. The following were examined and determined to be abnormal: None. Well appearing. 1.  Clear. Assessment and Plan     1. Cheilitis - clear   2. Angular cheilitis - clear     Continue use of Vaseline. Mycolog ointment twice daily as needed for recurrences. Return in about 6 months (around 7/22/2018).

## 2018-01-22 NOTE — PATIENT INSTRUCTIONS
For your well being, we encourage you to stop smoking or using tobacco products. Smoking and the use of other tobacco products, including cigars and smokeless tobacco, causes or worsens numerous diseases and conditions. Some products also expose nearby people to toxic secondhand smoke. Smoking is the leading cause of preventable death in the [place] causing over 438,000 deaths per year. Secondhand smoke is a serious health hazard for people of all ages, causing more than 41,000 deaths each year. Marijuana smoke contains many of the same toxins, irritants and carcinogens as tobacco smoke. Electronic cigarettes are a new tobacco product, and the potential health consequences and safety of these products are unknown. Smokeless Tobacco products are a known cause of cancer, and are not a safe alternative to cigarettes. 1. Make the decision to quit smoking  Stopping smoking is the best thing you can do for your health. If you smoke, you are more likely to get diseases of the lungs, heart, and brain. You are also more likely to get many kinds of cancer. After you quit, you will be less likely to get these diseases. Stopping smoking is hardbut you can do it! Your doctor can help you. 2. Get ready to quit  Once you decide to quit, make a plan with the help of your doctor. Here are some things you need to do:  Choose a day to quit. Talk to your primary care doctor about using the nicotine patch, gum, inhaler, or nasal spray to help you quit smoking. Getting nicotine some way other than in a cigarette can help make quitting easier. Talk to your primary care doctor about using a prescription medicine like bupropion (brand name: Zyban) to reduce your urge to smoke. If you decide to use a medicine, start taking it two weeks before your quit day. Talk with your primary care doctor about when you smoke. For example, you may smoke first thing in the morning, after a meal, or when you feel stressed.  Plan what you will do instead of smoking. Tell your family and friends that you are going to try to quit and on what date. Put together a list of phone numbers of friends and family members who can give you support when you feel you might break down and have a cigarette. Before your quit day, put all tobacco products, ashtrays, and lighters away. 3. Put your plan into action  When you wake up on your quit day, start using a nicotine replacement method if you had planned to do that. For the first few days after you quit, you may have some signs of nicotine withdrawal. You may feel restless and cranky. You may find it hard to think. You might need to change your dose of nicotine if these signs upset you. Do not smoke! If you feel like you want to smoke, call a friend or family member who has agreed to help you. Also, there might be someone in your doctor's office you can call. Put into action your plans for doing things other than smoking. For example, when you feel the urge to smoke, you might take a walk. Or, you might visit friends who do not smoke. It is best to stay away from places where you used to smoke. 4. If you return to smoking  Quitting smoking is not easy. Many people have to try several times before they succeed. If you start smoking again, call your primary care doctor's office soon to talk about what happened. Think about what you can do to keep from smoking when you try to quit again. Part of this handout is provided by the [employer]. More information is available at the American Lung Association https://rodrigues.com/.  This information provides a general overview and may not apply to everyone. Talk to your primary care doctor to find out if this information applies to you and to get more information on this subject.

## 2018-01-24 ENCOUNTER — ANTI-COAG VISIT (OUTPATIENT)
Dept: PHARMACY | Facility: CLINIC | Age: 53
End: 2018-01-24

## 2018-01-24 DIAGNOSIS — I48.0 PAROXYSMAL ATRIAL FIBRILLATION (HCC): ICD-10-CM

## 2018-01-24 DIAGNOSIS — Z95.2 S/P AORTIC VALVE REPLACEMENT: ICD-10-CM

## 2018-01-24 LAB — INTERNATIONAL NORMALIZATION RATIO, POC: 2.4

## 2018-01-24 NOTE — PROGRESS NOTES
ANTICOAGULATION SERVICE    Rocio Arreaga is a 45 y.o. male with PMHx significant for AVR (re-do in May, 2017), CAD s/p CABG (x3 in May, 2017), pA-fib, HLD, HTN, testicular CA who presents to clinic 1/24/2018 for anticoagulation monitoring and adjustment.     Anticoagulation Indication(s):  Heart Valve Replacement - AVR  Referring Physician:  Dr. Savanah Chan  Goal INR Range:  2-3  Duration of Anticoagulation Therapy:  TBD  Time of day dose taken:  PM  Product patient has at home:  warfarin 5 mg (peach)      INR Summary                            Warfarin regimen (mg)  Date INR   A/P    Sun Mon Tue Wed Thu Fri Sat Mg/wk  1/24 2.4 At goal, no change  7.5 5 7.5 5 5 7.5 5 42.5  1/10 2.1 At goal, no change  7.5 5 7.5 5 5 7.5 5 42.5  12/27 1.7 Below goal, increase  7.5 5 7.5 5 5 7.5 5 42.5    12/6 2.1 At goal, no change  5 5 7.5 5 5 7.5 5 40  11/21 2.0 At goal, no change  5 5 7.5 5 5 7.5 5 40  11/6 1.8 Below goal, continue  5 7.5/5 7.5 5 5 7.5 5 40  10/18 2.2 At goal, no change  5 5 7.5 5 5 7.5 5 40  10/11 3.0 At goal, decrease  5 7.5 5 5 5 7.5 5 40    Patient History:  Recent hospitalizations/HC visits -12/6 Dr. Savanah Chan: no med changes  -Patient is s/p re-do AVR and re-do CABG x3 on 5/30/17 per Dr. Shan Saunders; patient has been following with Dr. Gilbert Ward office until now   Recent medication changes -Percocet switched to morphine  -Started melatonin for sleep as of 1/5, but has not been taking regularly (no interaction with warfarin)  -D/C amiodarone as of 9/22 per Dr. Savanah Chan   Medications taken regularly that may interact with warfarin or alter INR ASA 81 mg   Warfarin dose taken as prescribed Yes - no missed doses, usually compliant  Does not use pillbox  Patient has been taking warfarin since re-do AVR in May, 2017   Signs/symptoms of bleeding No h/o major bleeding   Vitamin K intake Normally has ~0 servings of green, leafy vegetables per week   Recent vomiting/diarrhea/fever, changes in weight or activity level None reported

## 2018-02-01 ENCOUNTER — HOSPITAL ENCOUNTER (OUTPATIENT)
Dept: OTHER | Age: 53
Discharge: HOME | End: 2018-02-28
Attending: INTERNAL MEDICINE | Admitting: INTERNAL MEDICINE

## 2018-02-20 RX ORDER — ROSUVASTATIN CALCIUM 20 MG/1
20 TABLET, COATED ORAL NIGHTLY
Qty: 30 TABLET | Refills: 2
Start: 2018-02-20 | End: 2018-04-18 | Stop reason: SDUPTHER

## 2018-02-23 ENCOUNTER — ANTI-COAG VISIT (OUTPATIENT)
Dept: PHARMACY | Facility: CLINIC | Age: 53
End: 2018-02-23

## 2018-02-23 DIAGNOSIS — Z95.2 S/P AORTIC VALVE REPLACEMENT: ICD-10-CM

## 2018-02-23 DIAGNOSIS — I48.0 PAROXYSMAL ATRIAL FIBRILLATION (HCC): ICD-10-CM

## 2018-02-23 LAB — INTERNATIONAL NORMALIZATION RATIO, POC: 2

## 2018-03-01 ENCOUNTER — HOSPITAL ENCOUNTER (OUTPATIENT)
Dept: OTHER | Age: 53
Discharge: HOME | End: 2018-03-31
Attending: INTERNAL MEDICINE | Admitting: INTERNAL MEDICINE

## 2018-03-02 ENCOUNTER — OFFICE VISIT (OUTPATIENT)
Dept: CARDIOLOGY CLINIC | Age: 53
End: 2018-03-02

## 2018-03-02 VITALS
SYSTOLIC BLOOD PRESSURE: 130 MMHG | OXYGEN SATURATION: 98 % | HEART RATE: 78 BPM | WEIGHT: 273 LBS | HEIGHT: 72 IN | DIASTOLIC BLOOD PRESSURE: 72 MMHG | BODY MASS INDEX: 36.98 KG/M2

## 2018-03-02 DIAGNOSIS — Z98.61 POST PTCA: ICD-10-CM

## 2018-03-02 DIAGNOSIS — I48.0 PAROXYSMAL ATRIAL FIBRILLATION (HCC): ICD-10-CM

## 2018-03-02 DIAGNOSIS — I10 ESSENTIAL HYPERTENSION: ICD-10-CM

## 2018-03-02 DIAGNOSIS — I49.3 PVC (PREMATURE VENTRICULAR CONTRACTION): ICD-10-CM

## 2018-03-02 DIAGNOSIS — I25.10 CAD IN NATIVE ARTERY: Primary | ICD-10-CM

## 2018-03-02 DIAGNOSIS — Z95.2 S/P AVR (AORTIC VALVE REPLACEMENT): ICD-10-CM

## 2018-03-02 DIAGNOSIS — Z95.1 HX OF CABG: ICD-10-CM

## 2018-03-02 PROCEDURE — G8417 CALC BMI ABV UP PARAM F/U: HCPCS | Performed by: INTERNAL MEDICINE

## 2018-03-02 PROCEDURE — G8598 ASA/ANTIPLAT THER USED: HCPCS | Performed by: INTERNAL MEDICINE

## 2018-03-02 PROCEDURE — G8484 FLU IMMUNIZE NO ADMIN: HCPCS | Performed by: INTERNAL MEDICINE

## 2018-03-02 PROCEDURE — 4004F PT TOBACCO SCREEN RCVD TLK: CPT | Performed by: INTERNAL MEDICINE

## 2018-03-02 PROCEDURE — G8427 DOCREV CUR MEDS BY ELIG CLIN: HCPCS | Performed by: INTERNAL MEDICINE

## 2018-03-02 PROCEDURE — 3017F COLORECTAL CA SCREEN DOC REV: CPT | Performed by: INTERNAL MEDICINE

## 2018-03-02 PROCEDURE — 99214 OFFICE O/P EST MOD 30 MIN: CPT | Performed by: INTERNAL MEDICINE

## 2018-03-23 ENCOUNTER — ANTI-COAG VISIT (OUTPATIENT)
Dept: PHARMACY | Facility: CLINIC | Age: 53
End: 2018-03-23

## 2018-03-23 DIAGNOSIS — Z95.2 S/P AORTIC VALVE REPLACEMENT: ICD-10-CM

## 2018-03-23 DIAGNOSIS — I48.0 PAROXYSMAL ATRIAL FIBRILLATION (HCC): ICD-10-CM

## 2018-03-23 LAB — INTERNATIONAL NORMALIZATION RATIO, POC: 1.7

## 2018-03-23 NOTE — PROGRESS NOTES
week   Tobacco or alcohol use Patient reports smoking 3-4 cigarettes/day; now increased to 8-10 cigarettes/day  Patient denies any alcohol or illicit drug use   Upcoming surgeries or procedures None reported     Assessment/Plan:  Patient's INR was subtherapeutic today (1.7), likely due to missed dose on Sunday. Patient did recently report increased use of tobacco from 3-4 cigarettes/day to 8-10 cigarettes/day, which could potentially have an effect on warfarin requirements. [facility] was instructed to continue warfarin 7.5 mg on Sun/Tue/Fri, and 5 mg all other days for now. Repeat INR in 2 weeks, and may need to increase maintenance dose if still low. Patient was reminded to maintain consistent vitamin K intake and call with any bleeding, medication changes, or fever/vomiting/diarrhea. Next Clinic Appointment:  4/6    Please call [facility] at [phone] with any questions. Thanks! Bill Yeung.  Pepe Spicer, PharmD  [facility]  Ph: [phone]  3/23/2018 7:56 AM

## 2018-04-01 ENCOUNTER — HOSPITAL ENCOUNTER (OUTPATIENT)
Dept: OTHER | Age: 53
Discharge: HOME | End: 2018-04-30
Attending: INTERNAL MEDICINE | Admitting: INTERNAL MEDICINE

## 2018-04-06 ENCOUNTER — ANTI-COAG VISIT (OUTPATIENT)
Dept: PHARMACY | Facility: CLINIC | Age: 53
End: 2018-04-06

## 2018-04-06 ENCOUNTER — OFFICE VISIT (OUTPATIENT)
Dept: INTERNAL MEDICINE | Age: 53
End: 2018-04-06
Attending: RADIOLOGY

## 2018-04-06 VITALS
WEIGHT: 274.5 LBS | RESPIRATION RATE: 20 BRPM | HEART RATE: 80 BPM | SYSTOLIC BLOOD PRESSURE: 155 MMHG | TEMPERATURE: 97.5 F | DIASTOLIC BLOOD PRESSURE: 101 MMHG | BODY MASS INDEX: 37.23 KG/M2 | OXYGEN SATURATION: 95 %

## 2018-04-06 DIAGNOSIS — I10 ESSENTIAL HYPERTENSION: Primary | ICD-10-CM

## 2018-04-06 DIAGNOSIS — Z95.2 S/P AORTIC VALVE REPLACEMENT: ICD-10-CM

## 2018-04-06 DIAGNOSIS — I48.0 PAROXYSMAL ATRIAL FIBRILLATION (HCC): ICD-10-CM

## 2018-04-06 DIAGNOSIS — F43.22 ADJUSTMENT DISORDER WITH ANXIOUS MOOD: ICD-10-CM

## 2018-04-06 LAB — INTERNATIONAL NORMALIZATION RATIO, POC: 1.6

## 2018-04-06 RX ORDER — PAROXETINE 10 MG/1
10 TABLET, FILM COATED ORAL DAILY
Qty: 30 TABLET | Refills: 0 | Status: SHIPPED | OUTPATIENT
Start: 2018-04-06 | End: 2018-05-04 | Stop reason: ALTCHOICE

## 2018-04-06 ASSESSMENT — ENCOUNTER SYMPTOMS
DIARRHEA: 0
DOUBLE VISION: 0
WHEEZING: 0
NAUSEA: 0
VOMITING: 0
HEMOPTYSIS: 0
COUGH: 0
SORE THROAT: 0
SHORTNESS OF BREATH: 0
BLURRED VISION: 0
BACK PAIN: 1
ABDOMINAL PAIN: 0
HEARTBURN: 0
SPUTUM PRODUCTION: 0
SINUS PAIN: 0

## 2018-04-18 RX ORDER — LISINOPRIL 10 MG/1
10 TABLET ORAL 2 TIMES DAILY
Qty: 60 TABLET | Refills: 2
Start: 2018-04-18 | End: 2018-07-24 | Stop reason: SDUPTHER

## 2018-04-18 RX ORDER — FAMOTIDINE 20 MG/1
20 TABLET, FILM COATED ORAL 2 TIMES DAILY
Qty: 60 TABLET | Refills: 2 | Status: SHIPPED | OUTPATIENT
Start: 2018-04-18 | End: 2018-10-19 | Stop reason: SDUPTHER

## 2018-04-18 RX ORDER — ROSUVASTATIN CALCIUM 20 MG/1
20 TABLET, COATED ORAL NIGHTLY
Qty: 30 TABLET | Refills: 2
Start: 2018-04-18 | End: 2018-10-19 | Stop reason: SDUPTHER

## 2018-04-20 ENCOUNTER — ANTI-COAG VISIT (OUTPATIENT)
Dept: PHARMACY | Facility: CLINIC | Age: 53
End: 2018-04-20

## 2018-04-20 DIAGNOSIS — Z95.2 S/P AORTIC VALVE REPLACEMENT: ICD-10-CM

## 2018-04-20 DIAGNOSIS — I48.0 PAROXYSMAL ATRIAL FIBRILLATION (HCC): ICD-10-CM

## 2018-04-20 LAB — INTERNATIONAL NORMALIZATION RATIO, POC: 1.8

## 2018-04-23 DIAGNOSIS — Z95.2 S/P AORTIC VALVE REPLACEMENT: Chronic | ICD-10-CM

## 2018-04-23 DIAGNOSIS — I10 ESSENTIAL HYPERTENSION: Chronic | ICD-10-CM

## 2018-04-23 DIAGNOSIS — I25.10 CORONARY ARTERY DISEASE INVOLVING NATIVE CORONARY ARTERY OF NATIVE HEART WITHOUT ANGINA PECTORIS: ICD-10-CM

## 2018-04-23 RX ORDER — METOPROLOL TARTRATE 50 MG/1
TABLET, FILM COATED ORAL
Qty: 60 TABLET | Refills: 2
Start: 2018-04-23 | End: 2018-07-20 | Stop reason: SDUPTHER

## 2018-05-01 ENCOUNTER — HOSPITAL ENCOUNTER (OUTPATIENT)
Dept: OTHER | Age: 53
Discharge: HOME | End: 2018-05-31
Attending: INTERNAL MEDICINE | Admitting: INTERNAL MEDICINE

## 2018-05-04 ENCOUNTER — OFFICE VISIT (OUTPATIENT)
Dept: INTERNAL MEDICINE | Age: 53
End: 2018-05-04
Attending: RADIOLOGY

## 2018-05-04 VITALS
DIASTOLIC BLOOD PRESSURE: 88 MMHG | BODY MASS INDEX: 37.11 KG/M2 | TEMPERATURE: 98.5 F | HEART RATE: 72 BPM | HEIGHT: 72 IN | OXYGEN SATURATION: 97 % | WEIGHT: 274 LBS | RESPIRATION RATE: 20 BRPM | SYSTOLIC BLOOD PRESSURE: 145 MMHG

## 2018-05-04 DIAGNOSIS — F43.21 ADJUSTMENT DISORDER WITH DEPRESSED MOOD: Primary | ICD-10-CM

## 2018-05-04 RX ORDER — PAROXETINE HYDROCHLORIDE 20 MG/1
20 TABLET, FILM COATED ORAL DAILY
Qty: 30 TABLET | Refills: 3 | Status: SHIPPED | OUTPATIENT
Start: 2018-05-04 | End: 2018-08-20 | Stop reason: SDUPTHER

## 2018-05-04 RX ORDER — PAROXETINE HYDROCHLORIDE 20 MG/1
20 TABLET, FILM COATED ORAL DAILY
Qty: 30 TABLET | Refills: 3 | Status: SHIPPED | OUTPATIENT
Start: 2018-05-04 | End: 2018-05-04 | Stop reason: CLARIF

## 2018-05-04 ASSESSMENT — ENCOUNTER SYMPTOMS
RESPIRATORY NEGATIVE: 1
GASTROINTESTINAL NEGATIVE: 1

## 2018-05-11 ENCOUNTER — ANTI-COAG VISIT (OUTPATIENT)
Dept: PHARMACY | Facility: CLINIC | Age: 53
End: 2018-05-11

## 2018-05-11 DIAGNOSIS — Z95.2 S/P AORTIC VALVE REPLACEMENT: ICD-10-CM

## 2018-05-11 DIAGNOSIS — I48.0 PAROXYSMAL ATRIAL FIBRILLATION (HCC): ICD-10-CM

## 2018-05-11 LAB — INTERNATIONAL NORMALIZATION RATIO, POC: 2.2

## 2018-06-01 ENCOUNTER — HOSPITAL ENCOUNTER (OUTPATIENT)
Dept: OTHER | Age: 53
Discharge: HOME | End: 2018-06-30
Attending: INTERNAL MEDICINE | Admitting: INTERNAL MEDICINE

## 2018-06-01 ENCOUNTER — ANTI-COAG VISIT (OUTPATIENT)
Dept: PHARMACY | Facility: CLINIC | Age: 53
End: 2018-06-01

## 2018-06-01 ENCOUNTER — OFFICE VISIT (OUTPATIENT)
Dept: INTERNAL MEDICINE | Age: 53
End: 2018-06-01
Attending: RADIOLOGY

## 2018-06-01 VITALS
HEIGHT: 72 IN | SYSTOLIC BLOOD PRESSURE: 132 MMHG | OXYGEN SATURATION: 97 % | RESPIRATION RATE: 20 BRPM | WEIGHT: 270 LBS | HEART RATE: 67 BPM | BODY MASS INDEX: 36.57 KG/M2 | TEMPERATURE: 98.4 F | DIASTOLIC BLOOD PRESSURE: 86 MMHG

## 2018-06-01 DIAGNOSIS — E66.9 OBESITY (BMI 30-39.9): ICD-10-CM

## 2018-06-01 DIAGNOSIS — R19.7 DIARRHEA DUE TO MALABSORPTION: ICD-10-CM

## 2018-06-01 DIAGNOSIS — K90.9 DIARRHEA DUE TO MALABSORPTION: ICD-10-CM

## 2018-06-01 DIAGNOSIS — R19.7 DIARRHEA, UNSPECIFIED TYPE: Primary | ICD-10-CM

## 2018-06-01 DIAGNOSIS — Z79.899 OTHER LONG TERM (CURRENT) DRUG THERAPY: ICD-10-CM

## 2018-06-01 LAB — INTERNATIONAL NORMALIZATION RATIO, POC: 2.1

## 2018-06-08 ENCOUNTER — OFFICE VISIT (OUTPATIENT)
Dept: CARDIOLOGY CLINIC | Age: 53
End: 2018-06-08

## 2018-06-08 VITALS
HEART RATE: 60 BPM | SYSTOLIC BLOOD PRESSURE: 130 MMHG | DIASTOLIC BLOOD PRESSURE: 80 MMHG | WEIGHT: 270 LBS | BODY MASS INDEX: 36.62 KG/M2

## 2018-06-08 DIAGNOSIS — I49.3 PVC (PREMATURE VENTRICULAR CONTRACTION): ICD-10-CM

## 2018-06-08 DIAGNOSIS — I10 ESSENTIAL HYPERTENSION: ICD-10-CM

## 2018-06-08 DIAGNOSIS — I48.0 PAROXYSMAL ATRIAL FIBRILLATION (HCC): ICD-10-CM

## 2018-06-08 DIAGNOSIS — Z95.2 S/P AVR (AORTIC VALVE REPLACEMENT): ICD-10-CM

## 2018-06-08 DIAGNOSIS — Z98.61 HISTORY OF PTCA: ICD-10-CM

## 2018-06-08 DIAGNOSIS — I25.10 CAD IN NATIVE ARTERY: Primary | ICD-10-CM

## 2018-06-08 DIAGNOSIS — Z95.1 HX OF CABG: ICD-10-CM

## 2018-06-08 PROCEDURE — G8417 CALC BMI ABV UP PARAM F/U: HCPCS | Performed by: INTERNAL MEDICINE

## 2018-06-08 PROCEDURE — G8427 DOCREV CUR MEDS BY ELIG CLIN: HCPCS | Performed by: INTERNAL MEDICINE

## 2018-06-08 PROCEDURE — 99214 OFFICE O/P EST MOD 30 MIN: CPT | Performed by: INTERNAL MEDICINE

## 2018-06-08 PROCEDURE — G8598 ASA/ANTIPLAT THER USED: HCPCS | Performed by: INTERNAL MEDICINE

## 2018-06-08 PROCEDURE — 3017F COLORECTAL CA SCREEN DOC REV: CPT | Performed by: INTERNAL MEDICINE

## 2018-06-08 PROCEDURE — 4004F PT TOBACCO SCREEN RCVD TLK: CPT | Performed by: INTERNAL MEDICINE

## 2018-06-19 ENCOUNTER — OFFICE VISIT (OUTPATIENT)
Dept: SURGERY | Age: 53
End: 2018-06-19

## 2018-06-19 VITALS
SYSTOLIC BLOOD PRESSURE: 142 MMHG | WEIGHT: 275 LBS | HEIGHT: 72 IN | OXYGEN SATURATION: 95 % | TEMPERATURE: 97.6 F | DIASTOLIC BLOOD PRESSURE: 89 MMHG | HEART RATE: 64 BPM | BODY MASS INDEX: 37.25 KG/M2

## 2018-06-19 DIAGNOSIS — K43.2 RECURRENT INCISIONAL HERNIA: ICD-10-CM

## 2018-06-19 DIAGNOSIS — K63.5 DYSPLASTIC POLYP OF COLON: Primary | ICD-10-CM

## 2018-06-19 PROCEDURE — G8598 ASA/ANTIPLAT THER USED: HCPCS | Performed by: SURGERY

## 2018-06-19 PROCEDURE — 99215 OFFICE O/P EST HI 40 MIN: CPT | Performed by: SURGERY

## 2018-06-19 PROCEDURE — G8427 DOCREV CUR MEDS BY ELIG CLIN: HCPCS | Performed by: SURGERY

## 2018-06-19 PROCEDURE — G8417 CALC BMI ABV UP PARAM F/U: HCPCS | Performed by: SURGERY

## 2018-06-19 PROCEDURE — 3017F COLORECTAL CA SCREEN DOC REV: CPT | Performed by: SURGERY

## 2018-06-19 PROCEDURE — 4004F PT TOBACCO SCREEN RCVD TLK: CPT | Performed by: SURGERY

## 2018-06-19 NOTE — PROGRESS NOTES
resection, excision of old abd mesh adhering to colon    CORONARY ANGIOPLASTY WITH STENT PLACEMENT  07/2014    CORONARY ARTERY BYPASS GRAFT  08/27/2004    Dr. Berenice Roach - x2 (LIMA-LAD, SVG sequentially to ascending aorta & D1)    CORONARY ARTERY BYPASS GRAFT  05/30/2017    Dr. Ariella Castellon - redo x3 (reverse AC SVG sequentially to D1, OM1 & PDA) w/explant of prior prosthetic valve & removal of embedded sternal wires x7    EMG  04/16/2012    FOOT SURGERY Left 01/24/2012    Dr. Maria D Minor, DPM - hallux nail evulsion & exostectomy    [address]  [address]    multiple    LUMBAR DISCECTOMY  2012    L4-5    SHOULDER ARTHROSCOPY Right 11/21/2013    Dr. Hari Wesley - w/subacromial decompression, debridement of the SLAP tear, distal clavicle excision    SOFT TISSUE TUMOR RESECTION  2001    retroperitoneal mass    TESTICLE REMOVAL Left 2001    radical orchiectomy    TRANSESOPHAGEAL ECHOCARDIOGRAM  05/30/2017    during redo CABG & AVR    TUNNELED VENOUS PORT PLACEMENT  2002    portacath      Social:   Social History   Substance Use Topics    Smoking status: Current Some Day Smoker     Packs/day: 0.10     Years: 32.00     Types: Cigarettes     Last attempt to quit: 5/20/2017    Smokeless tobacco: Never Used      Comment: stopped 5-18    Alcohol use No     Family History   Problem Relation Age of Onset    Cancer Mother     Cancer Father      lung    Alzheimer's Disease Sister     Liver Cancer Brother      Allergies: Atorvastatin calcium [lipitor] and Vicodin [hydrocodone-acetaminophen]  Current Outpatient Prescriptions   Medication Sig Dispense Refill    Psyllium (METAMUCIL SMOOTH TEXTURE) 28.3 % POWD Mixed with orange juice once or twice daily      PARoxetine (PAXIL) 20 MG tablet Take 1 tablet by mouth daily 30 tablet 3    metoprolol tartrate (LOPRESSOR) 50 MG tablet Take 1 tablet by mouth 2 times daily for BP & heart 60 tablet 2    lisinopril (PRINIVIL;ZESTRIL) 10 MG tablet Take 1 tablet by mouth 2 times daily 60 tablet 2    rosuvastatin (CRESTOR) 20 MG tablet Take 1 tablet by mouth nightly 30 tablet 2    famotidine (PEPCID) 20 MG tablet Take 1 tablet by mouth 2 times daily 60 tablet 2    amLODIPine (NORVASC) 5 MG tablet Take 1 tablet by mouth nightly as needed (If systolic blood pressure over 130, take amlodipine 5mg at night, if under 130 do not take amlopidine) 30 tablet 5    melatonin (RA MELATONIN) 3 MG TABS tablet Take 1 tablet by mouth nightly as needed (insomnia) 30 tablet 3    warfarin (COUMADIN) 5 MG tablet Start with 5 mg daily; dose will be adjusted as needed by Dr. Jamey Kraft office based on INR result 60 tablet 6    nystatin-triamcinolone (MYCOLOG) 723625-0.1 UNIT/GM-% ointment Apply topically 2 times daily until improved. 30 g 1    tiZANidine (ZANAFLEX) 4 MG tablet Take 4 mg by mouth 3 times daily      Blood Pressure KIT 1 Unit 1 kit 0    oxyCODONE-acetaminophen (PERCOCET) 5-325 MG per tablet Take 1 tablet by mouth every 4 hours as needed for Pain  .  aspirin 81 MG EC tablet Take 1 tablet by mouth daily 30 tablet 0    docusate sodium (COLACE) 100 MG capsule Take 100 mg by mouth every other day       gabapentin (NEURONTIN) 300 MG capsule Take 300 mg by mouth 3 times daily        No current facility-administered medications for this visit. Review of Systems: See HPI  All other systems reviewed and are negative. Vitals:    06/19/18 0909   BP: (!) 142/89   Pulse: 64   Temp: 97.6 °F (36.4 °C)   TempSrc: Oral   SpO2: 95%   Weight: 275 lb (124.7 kg)   Height: 6' (1.829 m)     Wt Readings from Last 3 Encounters:   06/19/18 275 lb (124.7 kg)   06/08/18 270 lb (122.5 kg)   06/01/18 270 lb (122.5 kg)     Body mass index is 37.3 kg/m². Physical Exam:   Constitutional: He is oriented to person, place, and time. He appears well-developed and well-nourished. No distress. HENT: Moist mucus membranes  Head: Normocephalic and atraumatic.    Eyes: EOM are normal. Pupils are

## 2018-06-22 ENCOUNTER — ANTI-COAG VISIT (OUTPATIENT)
Dept: PHARMACY | Facility: CLINIC | Age: 53
End: 2018-06-22

## 2018-06-22 DIAGNOSIS — Z95.2 S/P AORTIC VALVE REPLACEMENT: ICD-10-CM

## 2018-06-22 DIAGNOSIS — I48.0 PAROXYSMAL ATRIAL FIBRILLATION (HCC): ICD-10-CM

## 2018-06-22 LAB — INTERNATIONAL NORMALIZATION RATIO, POC: 2.6

## 2018-07-01 ENCOUNTER — HOSPITAL ENCOUNTER (OUTPATIENT)
Dept: OTHER | Age: 53
Discharge: HOME | End: 2018-07-01
Attending: INTERNAL MEDICINE | Admitting: INTERNAL MEDICINE

## 2018-07-19 ENCOUNTER — ANTI-COAG VISIT (OUTPATIENT)
Dept: PHARMACY | Age: 53
End: 2018-07-19
Payer: MEDICARE

## 2018-07-19 ENCOUNTER — OFFICE VISIT (OUTPATIENT)
Dept: DERMATOLOGY | Age: 53
End: 2018-07-19

## 2018-07-19 DIAGNOSIS — I48.0 PAROXYSMAL ATRIAL FIBRILLATION (HCC): ICD-10-CM

## 2018-07-19 DIAGNOSIS — K13.0 CHEILITIS: Primary | ICD-10-CM

## 2018-07-19 DIAGNOSIS — Z95.2 S/P AORTIC VALVE REPLACEMENT: ICD-10-CM

## 2018-07-19 LAB — INTERNATIONAL NORMALIZATION RATIO, POC: 2.9

## 2018-07-19 PROCEDURE — G8427 DOCREV CUR MEDS BY ELIG CLIN: HCPCS | Performed by: DERMATOLOGY

## 2018-07-19 PROCEDURE — 4004F PT TOBACCO SCREEN RCVD TLK: CPT | Performed by: DERMATOLOGY

## 2018-07-19 PROCEDURE — 3017F COLORECTAL CA SCREEN DOC REV: CPT | Performed by: DERMATOLOGY

## 2018-07-19 PROCEDURE — 99211 OFF/OP EST MAY X REQ PHY/QHP: CPT

## 2018-07-19 PROCEDURE — 85610 PROTHROMBIN TIME: CPT

## 2018-07-19 PROCEDURE — G8598 ASA/ANTIPLAT THER USED: HCPCS | Performed by: DERMATOLOGY

## 2018-07-19 PROCEDURE — G8417 CALC BMI ABV UP PARAM F/U: HCPCS | Performed by: DERMATOLOGY

## 2018-07-19 PROCEDURE — 99212 OFFICE O/P EST SF 10 MIN: CPT | Performed by: DERMATOLOGY

## 2018-07-19 NOTE — PROGRESS NOTES
CARDIAC CATHETERIZATION  08/26/2004    Dr. Sunita Root Right 03/17/2015    Dr. Manuela De Anda  10/10/2016    Dr. Ann Adame - w/laparascopic lysis of extensive adhesions, open transverse colon resection, excision of old abd mesh adhering to colon    CORONARY ANGIOPLASTY WITH STENT PLACEMENT  07/2014    CORONARY ARTERY BYPASS GRAFT  08/27/2004    Dr. Sirena Mensah - x2 (VELEZ-LAD, SVG sequentially to ascending aorta & D1)    CORONARY ARTERY BYPASS GRAFT  05/30/2017    Dr. Manju Grijalva - redo x3 (reverse AC SVG sequentially to D1, OM1 & PDA) w/explant of prior prosthetic valve & removal of embedded sternal wires x7    EMG  04/16/2012    FOOT SURGERY Left 01/24/2012    Dr. Asha Khan, DP - hallux nail evulsion & exostectomy    [address]  2010    multiple    LUMBAR DISCECTOMY  2012    L4-5    SHOULDER ARTHROSCOPY Right 11/21/2013    Dr. Karthik Green - w/subacromial decompression, debridement of the SLAP tear, distal clavicle excision    SOFT TISSUE TUMOR RESECTION  2001    retroperitoneal mass    TESTICLE REMOVAL Left 2001    radical orchiectomy    TRANSESOPHAGEAL ECHOCARDIOGRAM  05/30/2017    during redo CABG & AVR    TUNNELED VENOUS PORT PLACEMENT  2002    portacath        Allergies   Allergen Reactions    Atorvastatin Calcium [Lipitor] Other (See Comments)     Severe headaches      Vicodin [Hydrocodone-Acetaminophen] Itching     Outpatient Prescriptions Marked as Taking for the 7/19/18 encounter (Office Visit) with Annmarie Jordan MD   Medication Sig Dispense Refill    PARoxetine (PAXIL) 20 MG tablet Take 1 tablet by mouth daily 30 tablet 3    metoprolol tartrate (LOPRESSOR) 50 MG tablet Take 1 tablet by mouth 2 times daily for BP & heart 60 tablet 2    lisinopril (PRINIVIL;ZESTRIL) 10 MG tablet Take 1 tablet by mouth 2 times daily 60 tablet 2    rosuvastatin (CRESTOR) 20 MG tablet Take 1 tablet by mouth nightly 30 tablet 2    famotidine (PEPCID) 20 MG tablet Take 1 tablet by mouth 2 times daily 60 tablet 2    amLODIPine (NORVASC) 5 MG tablet Take 1 tablet by mouth nightly as needed (If systolic blood pressure over 130, take amlodipine 5mg at night, if under 130 do not take amlopidine) 30 tablet 5    melatonin (RA MELATONIN) 3 MG TABS tablet Take 1 tablet by mouth nightly as needed (insomnia) 30 tablet 3    warfarin (COUMADIN) 5 MG tablet Start with 5 mg daily; dose will be adjusted as needed by Dr. [facility] based on INR result 60 tablet 6    nystatin-triamcinolone (MYCOLOG) 172354-7.1 UNIT/GM-% ointment Apply topically 2 times daily until improved. 30 g 1    tiZANidine (ZANAFLEX) 4 MG tablet Take 4 mg by mouth 3 times daily      Blood Pressure KIT 1 Unit 1 kit 0    oxyCODONE-acetaminophen (PERCOCET) 5-325 MG per tablet Take 1 tablet by mouth every 4 hours as needed for Pain  .  aspirin 81 MG EC tablet Take 1 tablet by mouth daily 30 tablet 0    gabapentin (NEURONTIN) 300 MG capsule Take 300 mg by mouth 3 times daily            Physical Examination       Well appearing. 1.  Lips unremarkable today. Assessment and Plan     1. Cheilitis - under good control    Continue use of petrolatum. Mycolog for recurrences. Return in about 1 year (around 7/19/2019).

## 2018-07-19 NOTE — PROGRESS NOTES
ANTICOAGULATION SERVICE    Jane Huntley is a 45 y.o. male with PMHx significant for AVR (re-do in May, 2017), CAD s/p CABG (x3 in May, 2017), pA-fib, HLD, HTN, testicular CA who presents to clinic 7/19/2018 for anticoagulation monitoring and adjustment.     Anticoagulation Indication(s):  Heart Valve Replacement - AVR  Referring Physician:  Dr. Yuni Orozco  Goal INR Range:  2-3  Duration of Anticoagulation Therapy:  TBD  Time of day dose taken:  PM  Product patient has at home:  warfarin 5 mg (peach)    Lab Results   Component Value Date    RBC 5.52 08/17/2017    HGB 15.9 08/17/2017    HCT 49.5 08/17/2017    MCV 89.6 08/17/2017    MCH 28.8 08/17/2017    MPV 8.2 08/17/2017    RDW 18.1 (H) 08/17/2017     08/17/2017     INR Summary                            Warfarin regimen (mg)  Date INR   A/P    Sun Mon Tue Wed Thu Fri Sat Mg/wk  7/19 2.9 At goal, no change  7.5 7.5 7.5 5 5 7.5 7.5 47.5  6/22 2.6 At goal, no change  7.5 7.5 7.5 5 5 7.5 7.5 47.5  6/1 2.1 At goal, no change  7.5 7.5 7.5 5 5 7.5 7.5 47.5  5/11 2.2 At goal, no change  7.5 7.5 7.5 5 5 7.5 7.5 47.5  4/20 1.8 Below goal, increase  7.5 7.5 7.5 5 5 7.5 7.5 47.5  4/6 1.6 Below goal, increase  7.5 5 7.5 5 5 7.5 7.5 45  3/23 1.7 Missed dose - continue 7.5 5 7.5 5 5 7.5 5 42.5  2/23 2.0 At goal, no change  7.5 5 7.5 5 5 7.5 5 42.5  1/24 2.4 At goal, no change  7.5 5 7.5 5 5 7.5 5 42.5  1/10 2.1 At goal, no change  7.5 5 7.5 5 5 7.5 5 42.5  12/27 1.7 Below goal, increase  7.5 5 7.5 5 5 7.5 5 42.5    12/6 2.1 At goal, no change  5 5 7.5 5 5 7.5 5 40  11/21 2.0 At goal, no change  5 5 7.5 5 5 7.5 5 40  11/6 1.8 Below goal, continue  5 7.5/5 7.5 5 5 7.5 5 40  10/18 2.2 At goal, no change  5 5 7.5 5 5 7.5 5 40  10/11 3.0 At goal, decrease  5 7.5 5 5 5 7.5 5 40    Patient History:  Recent hospitalizations/HC visits -6/19 Dr. Chasity Estrada for potential hernia repair  -6/8 Dr. Yuni Orozco: no med changes  -6/1 Dr. Jodi Stringer for routine follow up: no med changes  -Patient is s/p re-do AVR and re-do CABG x3 on 5/30/17 per Dr. Demetrius Loaiza   Recent medication changes None reported today   Medications taken regularly that may interact with warfarin or alter INR ASA 81 mg   Warfarin dose taken as prescribed Yes, usually compliant  Does not use pillbox  Patient has been taking warfarin since re-do AVR in May, 2017   Signs/symptoms of bleeding No h/o major bleeding   Vitamin K intake Normally has ~0 servings of green, leafy vegetables per week   Recent vomiting/diarrhea/fever, changes in weight or activity level Patient's wife passed from cancer in mid-March   Tobacco or alcohol use Patient reports smoking 3-4 cigarettes/day; now increased to 1 PPD  Patient denies any alcohol or illicit drug use   Upcoming surgeries or procedures None reported     Assessment/Plan:  Patient's INR was therapeutic today (2.9). Patient was instructed to continue warfarin 7.5 mg daily, except 5 mg on Wed/Thu.  Repeat INR in 4 weeks. Patient was reminded to maintain consistent vitamin K intake and call with any bleeding, medication changes, or fever/vomiting/diarrhea. Patient understands dosing directions and information discussed. Dosing schedule and follow up appointment given to patient. Progress note routed to referring physician's office. Patient acknowledges working in consult agreement with pharmacist as referred by his/her physician. Next Clinic Appointment:  8/16    Please call [facility] at [phone] with any questions. Thanks! Jeff Masterson.  Yola Jay, PharmD  [facility]  Ph: [phone]  7/19/2018 10:31 AM

## 2018-07-20 DIAGNOSIS — Z95.2 S/P AORTIC VALVE REPLACEMENT: Chronic | ICD-10-CM

## 2018-07-20 DIAGNOSIS — I25.10 CORONARY ARTERY DISEASE INVOLVING NATIVE CORONARY ARTERY OF NATIVE HEART WITHOUT ANGINA PECTORIS: ICD-10-CM

## 2018-07-20 DIAGNOSIS — I10 ESSENTIAL HYPERTENSION: Chronic | ICD-10-CM

## 2018-07-20 RX ORDER — METOPROLOL TARTRATE 50 MG/1
TABLET, FILM COATED ORAL
Qty: 60 TABLET | Refills: 2
Start: 2018-07-20 | End: 2018-08-21 | Stop reason: SDUPTHER

## 2018-07-24 RX ORDER — LISINOPRIL 10 MG/1
10 TABLET ORAL 2 TIMES DAILY
Qty: 60 TABLET | Refills: 2 | Status: SHIPPED | OUTPATIENT
Start: 2018-07-24 | End: 2018-10-19 | Stop reason: SDUPTHER

## 2018-07-25 ENCOUNTER — TELEPHONE (OUTPATIENT)
Dept: INTERNAL MEDICINE CLINIC | Age: 53
End: 2018-07-25

## 2018-08-17 ENCOUNTER — ANTI-COAG VISIT (OUTPATIENT)
Dept: PHARMACY | Age: 53
End: 2018-08-17
Payer: MEDICARE

## 2018-08-17 DIAGNOSIS — Z95.2 S/P AORTIC VALVE REPLACEMENT: ICD-10-CM

## 2018-08-17 DIAGNOSIS — I48.0 PAROXYSMAL ATRIAL FIBRILLATION (HCC): ICD-10-CM

## 2018-08-17 LAB — INTERNATIONAL NORMALIZATION RATIO, POC: 2.8

## 2018-08-17 PROCEDURE — 85610 PROTHROMBIN TIME: CPT

## 2018-08-17 PROCEDURE — 99211 OFF/OP EST MAY X REQ PHY/QHP: CPT

## 2018-08-17 NOTE — PROGRESS NOTES
ANTICOAGULATION SERVICE    Lion Patten is a 52 y.o. male with PMHx significant for AVR (re-do in May, 2017), CAD s/p CABG (x3 in May, 2017), pA-fib, HLD, HTN, testicular CA who presents to clinic 8/17/2018 for anticoagulation monitoring and adjustment.     Anticoagulation Indication(s):  Heart Valve Replacement - AVR  Referring Physician:  Dr. Hawk Goldberg  Goal INR Range:  2-3  Duration of Anticoagulation Therapy:  TBD  Time of day dose taken:  PM  Product patient has at home:  warfarin 5 mg (peach)    Lab Results   Component Value Date    RBC 5.52 08/17/2017    HGB 15.9 08/17/2017    HCT 49.5 08/17/2017    MCV 89.6 08/17/2017    MCH 28.8 08/17/2017    MPV 8.2 08/17/2017    RDW 18.1 (H) 08/17/2017     08/17/2017     INR Summary                            Warfarin regimen (mg)  Date INR   A/P    Sun Mon Tue Wed Thu Fri Sat Mg/wk  8/17 2.8 At goal, no change  7.5 7.5 7.5 5 5 7.5 7.5 47.5   7/19 2.9 At goal, no change  7.5 7.5 7.5 5 5 7.5 7.5 47.5  6/22 2.6 At goal, no change  7.5 7.5 7.5 5 5 7.5 7.5 47.5  6/1 2.1 At goal, no change  7.5 7.5 7.5 5 5 7.5 7.5 47.5  5/11 2.2 At goal, no change  7.5 7.5 7.5 5 5 7.5 7.5 47.5  4/20 1.8 Below goal, increase  7.5 7.5 7.5 5 5 7.5 7.5 47.5  4/6 1.6 Below goal, increase  7.5 5 7.5 5 5 7.5 7.5 45  3/23 1.7 Missed dose - continue 7.5 5 7.5 5 5 7.5 5 42.5  2/23 2.0 At goal, no change  7.5 5 7.5 5 5 7.5 5 42.5  1/24 2.4 At goal, no change  7.5 5 7.5 5 5 7.5 5 42.5  1/10 2.1 At goal, no change  7.5 5 7.5 5 5 7.5 5 42.5  12/27 1.7 Below goal, increase  7.5 5 7.5 5 5 7.5 5 42.5    12/6 2.1 At goal, no change  5 5 7.5 5 5 7.5 5 40  11/21 2.0 At goal, no change  5 5 7.5 5 5 7.5 5 40  11/6 1.8 Below goal, continue  5 7.5/5 7.5 5 5 7.5 5 40  10/18 2.2 At goal, no change  5 5 7.5 5 5 7.5 5 40  10/11 3.0 At goal, decrease  5 7.5 5 5 5 7.5 5 40    Patient History:  Recent hospitalizations/HC visits -7/19 OV Dr Gustavo Johnson   -6/19 Dr. Jennifer Ramos for potential hernia repair  -6/8 Dr. Hawk Goldberg: no med

## 2018-08-20 RX ORDER — PAROXETINE HYDROCHLORIDE 20 MG/1
20 TABLET, FILM COATED ORAL DAILY
Qty: 30 TABLET | Refills: 2
Start: 2018-08-20 | End: 2018-10-19 | Stop reason: SDUPTHER

## 2018-08-21 DIAGNOSIS — Z95.2 S/P AORTIC VALVE REPLACEMENT: Chronic | ICD-10-CM

## 2018-08-21 DIAGNOSIS — I25.10 CORONARY ARTERY DISEASE INVOLVING NATIVE CORONARY ARTERY OF NATIVE HEART WITHOUT ANGINA PECTORIS: ICD-10-CM

## 2018-08-21 DIAGNOSIS — I10 ESSENTIAL HYPERTENSION: Chronic | ICD-10-CM

## 2018-08-21 RX ORDER — METOPROLOL TARTRATE 50 MG/1
TABLET, FILM COATED ORAL
Qty: 60 TABLET | Refills: 3
Start: 2018-08-21 | End: 2018-11-16 | Stop reason: SDUPTHER

## 2018-09-14 ENCOUNTER — OFFICE VISIT (OUTPATIENT)
Dept: CARDIOLOGY CLINIC | Age: 53
End: 2018-09-14

## 2018-09-14 VITALS
WEIGHT: 282 LBS | SYSTOLIC BLOOD PRESSURE: 132 MMHG | HEART RATE: 72 BPM | DIASTOLIC BLOOD PRESSURE: 88 MMHG | BODY MASS INDEX: 38.25 KG/M2

## 2018-09-14 DIAGNOSIS — Z98.61 HISTORY OF PTCA: ICD-10-CM

## 2018-09-14 DIAGNOSIS — I10 ESSENTIAL HYPERTENSION: ICD-10-CM

## 2018-09-14 DIAGNOSIS — I48.0 PAROXYSMAL ATRIAL FIBRILLATION (HCC): ICD-10-CM

## 2018-09-14 DIAGNOSIS — Z95.2 S/P AVR: ICD-10-CM

## 2018-09-14 DIAGNOSIS — I25.10 CAD IN NATIVE ARTERY: Primary | ICD-10-CM

## 2018-09-14 DIAGNOSIS — Z95.1 HX OF CABG: ICD-10-CM

## 2018-09-14 DIAGNOSIS — I49.3 PVC (PREMATURE VENTRICULAR CONTRACTION): ICD-10-CM

## 2018-09-14 PROCEDURE — 99214 OFFICE O/P EST MOD 30 MIN: CPT | Performed by: INTERNAL MEDICINE

## 2018-09-14 PROCEDURE — G8427 DOCREV CUR MEDS BY ELIG CLIN: HCPCS | Performed by: INTERNAL MEDICINE

## 2018-09-14 PROCEDURE — 4004F PT TOBACCO SCREEN RCVD TLK: CPT | Performed by: INTERNAL MEDICINE

## 2018-09-14 PROCEDURE — 3017F COLORECTAL CA SCREEN DOC REV: CPT | Performed by: INTERNAL MEDICINE

## 2018-09-14 PROCEDURE — G8598 ASA/ANTIPLAT THER USED: HCPCS | Performed by: INTERNAL MEDICINE

## 2018-09-14 PROCEDURE — G8417 CALC BMI ABV UP PARAM F/U: HCPCS | Performed by: INTERNAL MEDICINE

## 2018-09-17 ENCOUNTER — ANTI-COAG VISIT (OUTPATIENT)
Dept: PHARMACY | Age: 53
End: 2018-09-17
Payer: MEDICARE

## 2018-09-17 ENCOUNTER — OFFICE VISIT (OUTPATIENT)
Dept: INTERNAL MEDICINE CLINIC | Age: 53
End: 2018-09-17
Payer: MEDICARE

## 2018-09-17 VITALS
SYSTOLIC BLOOD PRESSURE: 104 MMHG | RESPIRATION RATE: 18 BRPM | DIASTOLIC BLOOD PRESSURE: 63 MMHG | OXYGEN SATURATION: 95 % | HEART RATE: 90 BPM | TEMPERATURE: 98.7 F | WEIGHT: 282.4 LBS | BODY MASS INDEX: 38.3 KG/M2

## 2018-09-17 DIAGNOSIS — N40.1 BPH ASSOCIATED WITH NOCTURIA: ICD-10-CM

## 2018-09-17 DIAGNOSIS — I25.10 CORONARY ARTERY DISEASE INVOLVING NATIVE CORONARY ARTERY OF NATIVE HEART WITHOUT ANGINA PECTORIS: ICD-10-CM

## 2018-09-17 DIAGNOSIS — Z95.2 S/P AORTIC VALVE REPLACEMENT: ICD-10-CM

## 2018-09-17 DIAGNOSIS — Z85.47 HISTORY OF TESTICULAR CANCER: ICD-10-CM

## 2018-09-17 DIAGNOSIS — I48.0 PAROXYSMAL ATRIAL FIBRILLATION (HCC): ICD-10-CM

## 2018-09-17 DIAGNOSIS — R35.1 BPH ASSOCIATED WITH NOCTURIA: ICD-10-CM

## 2018-09-17 DIAGNOSIS — E66.9 OBESITY (BMI 35.0-39.9 WITHOUT COMORBIDITY): ICD-10-CM

## 2018-09-17 DIAGNOSIS — I10 ESSENTIAL HYPERTENSION: Primary | ICD-10-CM

## 2018-09-17 DIAGNOSIS — E78.00 PURE HYPERCHOLESTEROLEMIA: ICD-10-CM

## 2018-09-17 LAB — INTERNATIONAL NORMALIZATION RATIO, POC: 2.6

## 2018-09-17 PROCEDURE — 99211 OFF/OP EST MAY X REQ PHY/QHP: CPT

## 2018-09-17 PROCEDURE — G0463 HOSPITAL OUTPT CLINIC VISIT: HCPCS | Performed by: STUDENT IN AN ORGANIZED HEALTH CARE EDUCATION/TRAINING PROGRAM

## 2018-09-17 PROCEDURE — 85610 PROTHROMBIN TIME: CPT

## 2018-09-17 PROCEDURE — 99213 OFFICE O/P EST LOW 20 MIN: CPT | Performed by: STUDENT IN AN ORGANIZED HEALTH CARE EDUCATION/TRAINING PROGRAM

## 2018-09-17 PROCEDURE — G0463 HOSPITAL OUTPT CLINIC VISIT: HCPCS

## 2018-09-17 NOTE — PROGRESS NOTES
Department Of Internal Medicine  General Medicine/Primary Care  Established Patient Visit    Patient:  Skyler Gardner                                               : 1965  Age: 52 y.o. MRN: [id number]  Date : 2018    History Obtained From:  patient    REASON FOR VISIT:  headache    HISTORY OF PRESENT ILLNESS:   The patient is a 52 y.o. male  With past medical history of CAD,HLD, HTN, HX OF testicular cancer, who presents for mild headache for the last 10 days, headache 4/10 In intensity, in frontal area, dull in nature, comes and goes, no aggravating, no relieving factors, no radiating. Patient mention that blood pressure reading was high in the last week measured by home device while in clinic Dr with cardiologist it was normal reading we measured BP three times it was less than 125/80. Patient denies blurred vision, chest pain, nausea, neck pain, stiffness, fever, weakness. patient lost his wife before 3 months, however he does not showed any sign of depression.      Past Medical History:        Diagnosis Date    Abdominal hernia     s/p repair     Aortic valve prosthesis present     CAD (coronary artery disease)     Chronic back pain greater than 3 months duration     Clostridium difficile diarrhea 10/17/2016    PCR    DDD (degenerative disc disease), lumbosacral 2013    Erectile dysfunction     GERD (gastroesophageal reflux disease)     Hyperlipidemia     Hypertension     Joint pain     Left testicular cancer ([facility] [address]     s/p abdominal resection    Myalgia and myositis, unspecified 2013    Neuropathy     OA (osteoarthritis) of knee     bilateral    Obesity, Class I, BMI 30.0-34.9 (see actual BMI)     Prolonged emergence from general anesthesia     after CABG    S/P AVR 2005    tissue valve    S/P CABG x 2     w/AVR & primary repair of dissected ascending aorta       Past Surgical History:        Procedure Laterality Date    AORTA SURGERY  2004     smokeless tobacco.  ETOH:   reports that he does not drink alcohol. OCCUPATION:      Allergies:  Atorvastatin calcium [lipitor] and Vicodin [hydrocodone-acetaminophen]    Current Medications:    Prior to Admission medications    Medication Sig Start Date End Date Taking? Authorizing Provider   metoprolol tartrate (LOPRESSOR) 50 MG tablet Take 1 tablet by mouth 2 times daily for BP & heart 8/21/18  Yes Mickiel Schwab My [place] MD Saeid   PARoxetine (PAXIL) 20 MG tablet Take 1 tablet by mouth daily 8/20/18  Yes Bobbi Rivera MD   lisinopril (PRINIVIL;ZESTRIL) 10 MG tablet Take 1 tablet by mouth 2 times daily 7/24/18  Yes Rabia Farooq MD   rosuvastatin (CRESTOR) 20 MG tablet Take 1 tablet by mouth nightly 4/18/18  Yes Johnathan Townsend MD   famotidine (PEPCID) 20 MG tablet Take 1 tablet by mouth 2 times daily 4/18/18  Yes Johnathan Townsend MD   amLODIPine (NORVASC) 5 MG tablet Take 1 tablet by mouth nightly as needed (If systolic blood pressure over 130, take amlodipine 5mg at night, if under 130 do not take amlopidine) 1/19/18  Yes Hiral Pollack MD   melatonin (RA MELATONIN) 3 MG TABS tablet Take 1 tablet by mouth nightly as needed (insomnia) 1/5/18  Yes Raman Madden MD   warfarin (COUMADIN) 5 MG tablet Start with 5 mg daily; dose will be adjusted as needed by Dr. Robert Vargas office based on INR result 11/22/17  Yes Noreen Robles MD   nystatin-triamcinolone [facility]) 538978-9.1 UNIT/GM-% ointment Apply topically 2 times daily until improved. 11/14/17  Yes Rowena Peres MD   tiZANidine (ZANAFLEX) 4 MG tablet Take 4 mg by mouth 3 times daily   Yes Historical Provider, MD   Blood Pressure KIT 1 Unit 7/21/17  Yes Raman Madden MD   oxyCODONE-acetaminophen (PERCOCET) 5-325 MG per tablet Take 1 tablet by mouth every 4 hours as needed for Pain  .    Yes Historical Provider, MD   aspirin 81 MG EC tablet Take 1 tablet by mouth daily 5/10/17  Yes Jama Stacie, DO   docusate sodium (COLACE) 100 MG capsule Take 100 mg by mouth every other day    Yes MCV, MCH, MCHC, RDW, EOSABS in the last 72 hours. Invalid input(s): NEUTP, LYMPHP, MONOSP, EOSP, BASOP, NEUTABS, LYMPHABS, MONOABS, BASOABS    Lab Results   Component Value Date    IRON 113 03/20/2017    TIBC 324 03/20/2017    FERRITIN 168.6 03/20/2017    FOLATE 11.86 03/20/2017    OWODIXSZ33 358 03/20/2017       Lipid:  Lab Results   Component Value Date    CHOL 147 04/13/2016    HDL 22 (L) 04/13/2016    LDLCALC see below 04/13/2016    TRIG 325 (H) 04/13/2016       U/A:  Lab Results   Component Value Date    LABMICR YES 05/26/2017       Imaging:   No results found. Assessment/Plan:     Olga Bradford is a 52 y.o. male  With past medical history of CAD,HLD, HTN, HX OF testicular cancer, who presents for mild headache for the last 10 days, headache 4/10 In intensity, in frontal area, dull in nature, comes and goes, no aggravating, no relieving factors, no radiating. Patient mention that blood pressure reading was high in the last week measured by home device while in clinic Dr with cardiologist it was normal reading we measured BP three times it was less than 125/80    Mild headache:  Frontal headache, 3/10, no radiating comes and goes,hx of psychological stress loss his wife before 3 months. - relaxing technique was demonstrated  - advise to participate in social activity  - Pain medication on need        Essential hypertension: long hx of hypertension, CAD, CABAG, followed up with cardiologist  - advise to lose weight  -followed dash diet  -Check blood pressure regularly  -Continue home medication Metoprolol, lisinopril, amlodipine on need  -     Health Maintenance       Case discussed with Dr Yenny Gonzales, PGY1  [phone]  9/17/2018  1:45 PM     Addendum to Resident H& P/Progress note:  I have personally seen,examined and evaluated the patient.  I have reviewed the current history, physical findings, labs and assessment and plan and agree with note as documented by resident MD (

## 2018-09-17 NOTE — PROGRESS NOTES
changes  -6/19 Dr. Mejia Ours for potential hernia repair   Recent medication changes None reported today   Medications taken regularly that may interact with warfarin or alter INR ASA 81 mg   Warfarin dose taken as prescribed Yes, usually compliant  Does not use pillbox  Patient has been taking warfarin since re-do AVR in May, 2017   Signs/symptoms of bleeding No h/o major bleeding   Vitamin K intake Normally has ~0 servings of green, leafy vegetables per week   Recent vomiting/diarrhea/fever, changes in weight or activity level Patient's wife passed from cancer in mid-March   Tobacco or alcohol use Patient reports smoking 3-4 cigarettes/day; now increased to 1 PPD, which continues  Patient denies any alcohol or illicit drug use   Upcoming surgeries or procedures None reported     Assessment/Plan:  Patient's INR was therapeutic today (2.6). Patient was instructed to continue warfarin 7.5 mg daily, except 5 mg on Wed/Thu.  Repeat INR in 4 weeks. Patient was reminded to maintain consistent vitamin K intake and call with any bleeding, medication changes, or fever/vomiting/diarrhea. Patient understands dosing directions and information discussed. Dosing schedule and follow up appointment given to patient. Progress note routed to referring physician's office. Patient acknowledges working in consult agreement with pharmacist as referred by his/her physician. Next Clinic Appointment:  10/15    Please call [facility] at [phone] with any questions. Thanks! Michelle Valenzuela.  Sol Golden, PharmD  [facility]  Ph: [phone]  9/17/2018 11:22 AM

## 2018-09-18 DIAGNOSIS — Z79.01 WARFARIN ANTICOAGULATION: ICD-10-CM

## 2018-09-18 DIAGNOSIS — Z95.2 S/P AVR: ICD-10-CM

## 2018-09-18 RX ORDER — WARFARIN SODIUM 5 MG/1
TABLET ORAL
Qty: 60 TABLET | Refills: 2
Start: 2018-09-18 | End: 2019-06-18 | Stop reason: SDUPTHER

## 2018-09-18 RX ORDER — AMLODIPINE BESYLATE 5 MG/1
5 TABLET ORAL NIGHTLY PRN
Qty: 30 TABLET | Refills: 2
Start: 2018-09-18 | End: 2019-04-11 | Stop reason: SDUPTHER

## 2018-10-15 ENCOUNTER — ANTI-COAG VISIT (OUTPATIENT)
Dept: PHARMACY | Age: 53
End: 2018-10-15
Payer: MEDICARE

## 2018-10-15 DIAGNOSIS — Z95.2 S/P AORTIC VALVE REPLACEMENT: ICD-10-CM

## 2018-10-15 DIAGNOSIS — I48.0 PAROXYSMAL ATRIAL FIBRILLATION (HCC): ICD-10-CM

## 2018-10-15 LAB — INTERNATIONAL NORMALIZATION RATIO, POC: 2.1

## 2018-10-15 PROCEDURE — 85610 PROTHROMBIN TIME: CPT

## 2018-10-15 PROCEDURE — 99211 OFF/OP EST MAY X REQ PHY/QHP: CPT

## 2018-10-19 RX ORDER — ROSUVASTATIN CALCIUM 20 MG/1
20 TABLET, COATED ORAL NIGHTLY
Qty: 30 TABLET | Refills: 3
Start: 2018-10-19 | End: 2019-02-15 | Stop reason: SDUPTHER

## 2018-10-19 RX ORDER — PAROXETINE HYDROCHLORIDE 20 MG/1
20 TABLET, FILM COATED ORAL DAILY
Qty: 30 TABLET | Refills: 3
Start: 2018-10-19 | End: 2019-02-15 | Stop reason: SDUPTHER

## 2018-10-19 RX ORDER — FAMOTIDINE 20 MG/1
20 TABLET, FILM COATED ORAL 2 TIMES DAILY
Qty: 60 TABLET | Refills: 3 | OUTPATIENT
Start: 2018-10-19 | End: 2019-02-15 | Stop reason: SDUPTHER

## 2018-10-19 RX ORDER — LISINOPRIL 10 MG/1
10 TABLET ORAL 2 TIMES DAILY
Qty: 60 TABLET | Refills: 3 | OUTPATIENT
Start: 2018-10-19 | End: 2019-02-15 | Stop reason: SDUPTHER

## 2018-11-12 ENCOUNTER — ANTI-COAG VISIT (OUTPATIENT)
Dept: PHARMACY | Age: 53
End: 2018-11-12
Payer: MEDICARE

## 2018-11-12 DIAGNOSIS — Z95.2 S/P AORTIC VALVE REPLACEMENT: ICD-10-CM

## 2018-11-12 DIAGNOSIS — I48.0 PAROXYSMAL ATRIAL FIBRILLATION (HCC): ICD-10-CM

## 2018-11-12 LAB — INTERNATIONAL NORMALIZATION RATIO, POC: 2.8

## 2018-11-12 PROCEDURE — 99211 OFF/OP EST MAY X REQ PHY/QHP: CPT

## 2018-11-12 PROCEDURE — 85610 PROTHROMBIN TIME: CPT

## 2018-11-12 NOTE — PROGRESS NOTES
ANTICOAGULATION SERVICE    John Tiwari is a 52 y.o. male with PMHx significant for AVR (re-do in May, 2017), CAD s/p CABG (x3 in May, 2017), pA-fib, HLD, HTN, testicular CA who presents to clinic 11/12/2018 for anticoagulation monitoring and adjustment.     Anticoagulation Indication(s):  Heart Valve Replacement - AVR  Referring Physician:  Dr. Juan Daniel Swanson  Goal INR Range:  2-3  Duration of Anticoagulation Therapy:  TBD  Time of day dose taken:  PM  Product patient has at home:  warfarin 5 mg (peach)    Lab Results   Component Value Date    RBC 5.52 08/17/2017    HGB 15.9 08/17/2017    HCT 49.5 08/17/2017    MCV 89.6 08/17/2017    MCH 28.8 08/17/2017    MPV 8.2 08/17/2017    RDW 18.1 (H) 08/17/2017     08/17/2017     INR Summary                            Warfarin regimen (mg)  Date INR   A/P    Sun Mon Tue Wed Thu Fri Sat Mg/wk  11/12 2.8 At goal, no change  7.5 7.5 7.5 5 5 7.5 7.5 47.5  10/15 2.1 At goal, no change  7.5 7.5 7.5 5 5 7.5 7.5 47.5  9/17 2.6 At goal, no change  7.5 7.5 7.5 5 5 7.5 7.5 47.5  8/17 2.8 At goal, no change  7.5 7.5 7.5 5 5 7.5 7.5 47.5   7/19 2.9 At goal, no change  7.5 7.5 7.5 5 5 7.5 7.5 47.5  6/22 2.6 At goal, no change  7.5 7.5 7.5 5 5 7.5 7.5 47.5  6/1 2.1 At goal, no change  7.5 7.5 7.5 5 5 7.5 7.5 47.5  5/11 2.2 At goal, no change  7.5 7.5 7.5 5 5 7.5 7.5 47.5  4/20 1.8 Below goal, increase  7.5 7.5 7.5 5 5 7.5 7.5 47.5  4/6 1.6 Below goal, increase  7.5 5 7.5 5 5 7.5 7.5 45  3/23 1.7 Missed dose - continue 7.5 5 7.5 5 5 7.5 5 42.5  2/23 2.0 At goal, no change  7.5 5 7.5 5 5 7.5 5 42.5  1/24 2.4 At goal, no change  7.5 5 7.5 5 5 7.5 5 42.5  1/10 2.1 At goal, no change  7.5 5 7.5 5 5 7.5 5 42.5  12/27 1.7 Below goal, increase  7.5 5 7.5 5 5 7.5 5 42.5    12/6 2.1 At goal, no change  5 5 7.5 5 5 7.5 5 40  11/21 2.0 At goal, no change  5 5 7.5 5 5 7.5 5 40  11/6 1.8 Below goal, continue  5 7.5/5 7.5 5 5 7.5 5 40  10/18 2.2 At goal, no change  5 5 7.5 5 5 7.5 5 40  10/11 3.0 At goal,

## 2018-11-16 DIAGNOSIS — I25.10 CORONARY ARTERY DISEASE INVOLVING NATIVE CORONARY ARTERY OF NATIVE HEART WITHOUT ANGINA PECTORIS: ICD-10-CM

## 2018-11-16 DIAGNOSIS — Z95.2 S/P AORTIC VALVE REPLACEMENT: Chronic | ICD-10-CM

## 2018-11-16 DIAGNOSIS — I10 ESSENTIAL HYPERTENSION: Chronic | ICD-10-CM

## 2018-11-16 RX ORDER — METOPROLOL TARTRATE 50 MG/1
TABLET, FILM COATED ORAL
Qty: 60 TABLET | Refills: 3
Start: 2018-11-16 | End: 2019-02-15 | Stop reason: SDUPTHER

## 2018-11-29 ENCOUNTER — OFFICE VISIT (OUTPATIENT)
Dept: CARDIOLOGY CLINIC | Age: 53
End: 2018-11-29
Payer: MEDICARE

## 2018-11-29 VITALS
HEART RATE: 60 BPM | WEIGHT: 286 LBS | SYSTOLIC BLOOD PRESSURE: 128 MMHG | BODY MASS INDEX: 38.79 KG/M2 | DIASTOLIC BLOOD PRESSURE: 70 MMHG

## 2018-11-29 DIAGNOSIS — I48.0 PAROXYSMAL ATRIAL FIBRILLATION (HCC): ICD-10-CM

## 2018-11-29 DIAGNOSIS — Z98.61 HISTORY OF PTCA: ICD-10-CM

## 2018-11-29 DIAGNOSIS — Z95.2 S/P AVR (AORTIC VALVE REPLACEMENT): ICD-10-CM

## 2018-11-29 DIAGNOSIS — I49.3 PVC (PREMATURE VENTRICULAR CONTRACTION): ICD-10-CM

## 2018-11-29 DIAGNOSIS — Z95.1 HX OF CABG: ICD-10-CM

## 2018-11-29 DIAGNOSIS — I10 ESSENTIAL HYPERTENSION: ICD-10-CM

## 2018-11-29 DIAGNOSIS — I25.10 CAD IN NATIVE ARTERY: Primary | ICD-10-CM

## 2018-11-29 PROCEDURE — 3017F COLORECTAL CA SCREEN DOC REV: CPT | Performed by: INTERNAL MEDICINE

## 2018-11-29 PROCEDURE — G8427 DOCREV CUR MEDS BY ELIG CLIN: HCPCS | Performed by: INTERNAL MEDICINE

## 2018-11-29 PROCEDURE — 99214 OFFICE O/P EST MOD 30 MIN: CPT | Performed by: INTERNAL MEDICINE

## 2018-11-29 PROCEDURE — G8598 ASA/ANTIPLAT THER USED: HCPCS | Performed by: INTERNAL MEDICINE

## 2018-11-29 PROCEDURE — 4004F PT TOBACCO SCREEN RCVD TLK: CPT | Performed by: INTERNAL MEDICINE

## 2018-11-29 PROCEDURE — G8417 CALC BMI ABV UP PARAM F/U: HCPCS | Performed by: INTERNAL MEDICINE

## 2018-11-29 PROCEDURE — G8484 FLU IMMUNIZE NO ADMIN: HCPCS | Performed by: INTERNAL MEDICINE

## 2018-11-29 NOTE — PROGRESS NOTES
Musculoskeletal: Negative for myalgias. Neurological: Negative for dizziness, syncope and light-headedness. Psychiatric/Behavioral: Negative for behavioral problems, confusion and agitation. Other systems reviewed negative as done      Objective:   Physical Exam   Constitutional: He is oriented to person, place, and time. He appears well-developed and well-nourished. No distress. HENT:   Head: Normocephalic and atraumatic. Eyes: Conjunctivae and EOM are normal. Right eye exhibits no discharge. Left eye exhibits no discharge. Neck: Normal range of motion. Neck supple. No JVD present. Cardiovascular: Normal rate, regular rhythm and normal heart sounds. Exam reveals no gallop. No murmur heard. Pulmonary/Chest: Effort normal and breath sounds normal. No respiratory distress. He has min wheezes. He has no rales. Abdominal: Soft. Bowel sounds are normal. There is no tenderness. Musculoskeletal: Normal range of motion. He exhibits no edema. Neurological: He is alert and oriented to person, place, and time. Skin: Skin is warm and dry. Psychiatric: He has a normal mood and affect. His behavior is normal. Thought content normal.       Assessment:         Diagnosis Orders   1. CAD in native artery     2. Hx of CABG     3. S/P AVR (aortic valve replacement)     4. History of PTCA     5. PVC (premature ventricular contraction)     6. Essential hypertension     7. Paroxysmal atrial fibrillation (HCC)           Plan:      CV stable. Rhythm stable. Mourning wife still. No angina. Remains compensated. BP reasonable. IM watching. Remains compensated. Reviewed previous records and testing including echo 5/17 and cath 5/17. Continues to smoke. Encouraged to stop. Wt up. Encouraged diet, exercise and wt. No changes. Continue to monitor. Lipids per PCP. Follow up 3 months.

## 2018-12-10 ENCOUNTER — ANTI-COAG VISIT (OUTPATIENT)
Dept: PHARMACY | Age: 53
End: 2018-12-10
Payer: MEDICARE

## 2018-12-10 DIAGNOSIS — I48.0 PAROXYSMAL ATRIAL FIBRILLATION (HCC): ICD-10-CM

## 2018-12-10 DIAGNOSIS — Z95.2 S/P AORTIC VALVE REPLACEMENT: ICD-10-CM

## 2018-12-10 LAB — INTERNATIONAL NORMALIZATION RATIO, POC: 2.5

## 2018-12-10 PROCEDURE — 99211 OFF/OP EST MAY X REQ PHY/QHP: CPT

## 2018-12-10 PROCEDURE — 85610 PROTHROMBIN TIME: CPT

## 2018-12-10 NOTE — PROGRESS NOTES
ANTICOAGULATION SERVICE    Ray Doran is a 52 y.o. male with PMHx significant for AVR (re-do in May, 2017), CAD s/p CABG (x3 in May, 2017), pA-fib, HLD, HTN, testicular CA who presents to clinic 12/10/2018 for anticoagulation monitoring and adjustment.     Anticoagulation Indication(s):  Heart Valve Replacement - AVR  Referring Physician:  Dr. Mark De Anda  Goal INR Range:  2-3  Duration of Anticoagulation Therapy:  TBD  Time of day dose taken:  PM  Product patient has at home:  warfarin 5 mg (peach)    Lab Results   Component Value Date    RBC 5.52 08/17/2017    HGB 15.9 08/17/2017    HCT 49.5 08/17/2017    MCV 89.6 08/17/2017    MCH 28.8 08/17/2017    MPV 8.2 08/17/2017    RDW 18.1 (H) 08/17/2017     08/17/2017     INR Summary                            Warfarin regimen (mg)  Date INR   A/P    Sun Mon Tue Wed Thu Fri Sat Mg/wk  12/10 2.5 At goal, no change  7.5 7.5 7.5 5 5 7.5 7.5 47.5  11/12 2.8 At goal, no change  7.5 7.5 7.5 5 5 7.5 7.5 47.5  10/15 2.1 At goal, no change  7.5 7.5 7.5 5 5 7.5 7.5 47.5  9/17 2.6 At goal, no change  7.5 7.5 7.5 5 5 7.5 7.5 47.5  8/17 2.8 At goal, no change  7.5 7.5 7.5 5 5 7.5 7.5 47.5   7/19 2.9 At goal, no change  7.5 7.5 7.5 5 5 7.5 7.5 47.5  6/22 2.6 At goal, no change  7.5 7.5 7.5 5 5 7.5 7.5 47.5  6/1 2.1 At goal, no change  7.5 7.5 7.5 5 5 7.5 7.5 47.5  5/11 2.2 At goal, no change  7.5 7.5 7.5 5 5 7.5 7.5 47.5  4/20 1.8 Below goal, increase  7.5 7.5 7.5 5 5 7.5 7.5 47.5  4/6 1.6 Below goal, increase  7.5 5 7.5 5 5 7.5 7.5 45  3/23 1.7 Missed dose - continue 7.5 5 7.5 5 5 7.5 5 42.5  2/23 2.0 At goal, no change  7.5 5 7.5 5 5 7.5 5 42.5  1/24 2.4 At goal, no change  7.5 5 7.5 5 5 7.5 5 42.5  1/10 2.1 At goal, no change  7.5 5 7.5 5 5 7.5 5 42.5  12/27 1.7 Below goal, increase  7.5 5 7.5 5 5 7.5 5 42.5    12/6 2.1 At goal, no change  5 5 7.5 5 5 7.5 5 40  11/21 2.0 At goal, no change  5 5 7.5 5 5 7.5 5 40  11/6 1.8 Below goal,

## 2019-01-07 ENCOUNTER — ANTI-COAG VISIT (OUTPATIENT)
Dept: PHARMACY | Age: 54
End: 2019-01-07
Payer: MEDICARE

## 2019-01-07 DIAGNOSIS — I48.0 PAROXYSMAL ATRIAL FIBRILLATION (HCC): ICD-10-CM

## 2019-01-07 DIAGNOSIS — Z95.2 S/P AORTIC VALVE REPLACEMENT: ICD-10-CM

## 2019-01-07 LAB — INTERNATIONAL NORMALIZATION RATIO, POC: 2.4

## 2019-01-07 PROCEDURE — 85610 PROTHROMBIN TIME: CPT

## 2019-01-07 PROCEDURE — 99211 OFF/OP EST MAY X REQ PHY/QHP: CPT

## 2019-01-14 ENCOUNTER — OFFICE VISIT (OUTPATIENT)
Dept: INTERNAL MEDICINE CLINIC | Age: 54
End: 2019-01-14
Payer: MEDICARE

## 2019-01-14 VITALS
OXYGEN SATURATION: 96 % | DIASTOLIC BLOOD PRESSURE: 74 MMHG | BODY MASS INDEX: 38.99 KG/M2 | HEART RATE: 75 BPM | SYSTOLIC BLOOD PRESSURE: 165 MMHG | RESPIRATION RATE: 16 BRPM | TEMPERATURE: 98 F | WEIGHT: 287.5 LBS

## 2019-01-14 DIAGNOSIS — N52.9 IMPOTENCE: Primary | ICD-10-CM

## 2019-01-14 DIAGNOSIS — I48.0 PAROXYSMAL ATRIAL FIBRILLATION (HCC): ICD-10-CM

## 2019-01-14 PROCEDURE — 99213 OFFICE O/P EST LOW 20 MIN: CPT | Performed by: STUDENT IN AN ORGANIZED HEALTH CARE EDUCATION/TRAINING PROGRAM

## 2019-01-14 RX ORDER — SILDENAFIL 100 MG/1
100 TABLET, FILM COATED ORAL PRN
Qty: 10 TABLET | Refills: 0 | Status: SHIPPED | OUTPATIENT
Start: 2019-01-14 | End: 2019-02-15 | Stop reason: SDUPTHER

## 2019-01-14 ASSESSMENT — ENCOUNTER SYMPTOMS
SHORTNESS OF BREATH: 0
PHOTOPHOBIA: 0
EYE REDNESS: 0
ABDOMINAL DISTENTION: 0
COUGH: 0
EYE DISCHARGE: 0
ANAL BLEEDING: 0
CHEST TIGHTNESS: 0
BACK PAIN: 0
EYE PAIN: 0
CHOKING: 0
CONSTIPATION: 0
STRIDOR: 0
BLOOD IN STOOL: 0
EYE ITCHING: 0
FACIAL SWELLING: 0
APNEA: 0
DIARRHEA: 0
ABDOMINAL PAIN: 0

## 2019-02-11 ENCOUNTER — TELEPHONE (OUTPATIENT)
Dept: PHARMACY | Age: 54
End: 2019-02-11

## 2019-02-13 ENCOUNTER — ANTI-COAG VISIT (OUTPATIENT)
Dept: PHARMACY | Age: 54
End: 2019-02-13
Payer: MEDICARE

## 2019-02-13 DIAGNOSIS — I48.0 PAROXYSMAL ATRIAL FIBRILLATION (HCC): ICD-10-CM

## 2019-02-13 DIAGNOSIS — Z95.2 S/P AORTIC VALVE REPLACEMENT: ICD-10-CM

## 2019-02-13 LAB — INTERNATIONAL NORMALIZATION RATIO, POC: 3.1

## 2019-02-13 PROCEDURE — 85610 PROTHROMBIN TIME: CPT

## 2019-02-13 PROCEDURE — 99211 OFF/OP EST MAY X REQ PHY/QHP: CPT

## 2019-02-15 DIAGNOSIS — I25.10 CORONARY ARTERY DISEASE INVOLVING NATIVE CORONARY ARTERY OF NATIVE HEART WITHOUT ANGINA PECTORIS: ICD-10-CM

## 2019-02-15 DIAGNOSIS — Z95.2 S/P AORTIC VALVE REPLACEMENT: Chronic | ICD-10-CM

## 2019-02-15 DIAGNOSIS — I10 ESSENTIAL HYPERTENSION: Chronic | ICD-10-CM

## 2019-02-15 DIAGNOSIS — N52.9 IMPOTENCE: ICD-10-CM

## 2019-02-15 RX ORDER — FAMOTIDINE 20 MG/1
20 TABLET, FILM COATED ORAL 2 TIMES DAILY
Qty: 60 TABLET | Refills: 1
Start: 2019-02-15 | End: 2019-04-11 | Stop reason: SDUPTHER

## 2019-02-15 RX ORDER — LISINOPRIL 10 MG/1
10 TABLET ORAL 2 TIMES DAILY
Qty: 60 TABLET | Refills: 1
Start: 2019-02-15 | End: 2019-04-11 | Stop reason: SDUPTHER

## 2019-02-15 RX ORDER — SILDENAFIL 100 MG/1
100 TABLET, FILM COATED ORAL PRN
Qty: 10 TABLET | Refills: 0
Start: 2019-02-15 | End: 2021-10-12 | Stop reason: ALTCHOICE

## 2019-02-15 RX ORDER — METOPROLOL TARTRATE 50 MG/1
TABLET, FILM COATED ORAL
Qty: 60 TABLET | Refills: 2
Start: 2019-02-15 | End: 2019-04-11 | Stop reason: SDUPTHER

## 2019-02-15 RX ORDER — PAROXETINE HYDROCHLORIDE 20 MG/1
20 TABLET, FILM COATED ORAL DAILY
Qty: 30 TABLET | Refills: 1
Start: 2019-02-15 | End: 2019-04-11 | Stop reason: SDUPTHER

## 2019-02-15 RX ORDER — ROSUVASTATIN CALCIUM 20 MG/1
20 TABLET, COATED ORAL NIGHTLY
Qty: 30 TABLET | Refills: 1
Start: 2019-02-15 | End: 2019-04-11 | Stop reason: SDUPTHER

## 2019-02-27 ENCOUNTER — ANTI-COAG VISIT (OUTPATIENT)
Dept: PHARMACY | Age: 54
End: 2019-02-27
Payer: MEDICARE

## 2019-02-27 DIAGNOSIS — Z95.2 S/P AORTIC VALVE REPLACEMENT: ICD-10-CM

## 2019-02-27 DIAGNOSIS — I48.0 PAROXYSMAL ATRIAL FIBRILLATION (HCC): ICD-10-CM

## 2019-02-27 LAB — INTERNATIONAL NORMALIZATION RATIO, POC: 2.5

## 2019-02-27 PROCEDURE — 99406 BEHAV CHNG SMOKING 3-10 MIN: CPT

## 2019-02-27 PROCEDURE — 85610 PROTHROMBIN TIME: CPT

## 2019-02-27 PROCEDURE — 99211 OFF/OP EST MAY X REQ PHY/QHP: CPT

## 2019-03-07 ENCOUNTER — OFFICE VISIT (OUTPATIENT)
Dept: CARDIOLOGY CLINIC | Age: 54
End: 2019-03-07
Payer: MEDICARE

## 2019-03-07 VITALS
DIASTOLIC BLOOD PRESSURE: 60 MMHG | WEIGHT: 285 LBS | BODY MASS INDEX: 38.65 KG/M2 | HEART RATE: 68 BPM | SYSTOLIC BLOOD PRESSURE: 118 MMHG

## 2019-03-07 DIAGNOSIS — Z98.61 HISTORY OF PTCA: ICD-10-CM

## 2019-03-07 DIAGNOSIS — I25.10 CAD IN NATIVE ARTERY: Primary | ICD-10-CM

## 2019-03-07 DIAGNOSIS — Z95.2 S/P AVR (AORTIC VALVE REPLACEMENT): ICD-10-CM

## 2019-03-07 DIAGNOSIS — I48.0 PAROXYSMAL ATRIAL FIBRILLATION (HCC): ICD-10-CM

## 2019-03-07 DIAGNOSIS — Z95.1 HX OF CABG: ICD-10-CM

## 2019-03-07 DIAGNOSIS — I49.3 PVC (PREMATURE VENTRICULAR CONTRACTION): ICD-10-CM

## 2019-03-07 DIAGNOSIS — I10 ESSENTIAL HYPERTENSION: ICD-10-CM

## 2019-03-07 PROCEDURE — G8417 CALC BMI ABV UP PARAM F/U: HCPCS | Performed by: INTERNAL MEDICINE

## 2019-03-07 PROCEDURE — 3017F COLORECTAL CA SCREEN DOC REV: CPT | Performed by: INTERNAL MEDICINE

## 2019-03-07 PROCEDURE — G8484 FLU IMMUNIZE NO ADMIN: HCPCS | Performed by: INTERNAL MEDICINE

## 2019-03-07 PROCEDURE — 99214 OFFICE O/P EST MOD 30 MIN: CPT | Performed by: INTERNAL MEDICINE

## 2019-03-07 PROCEDURE — G8428 CUR MEDS NOT DOCUMENT: HCPCS | Performed by: INTERNAL MEDICINE

## 2019-03-07 PROCEDURE — G8598 ASA/ANTIPLAT THER USED: HCPCS | Performed by: INTERNAL MEDICINE

## 2019-03-07 PROCEDURE — 4004F PT TOBACCO SCREEN RCVD TLK: CPT | Performed by: INTERNAL MEDICINE

## 2019-03-13 ENCOUNTER — ANTI-COAG VISIT (OUTPATIENT)
Dept: PHARMACY | Age: 54
End: 2019-03-13
Payer: MEDICARE

## 2019-03-13 DIAGNOSIS — I48.0 PAROXYSMAL ATRIAL FIBRILLATION (HCC): ICD-10-CM

## 2019-03-13 DIAGNOSIS — Z95.2 S/P AORTIC VALVE REPLACEMENT: ICD-10-CM

## 2019-03-13 LAB — INTERNATIONAL NORMALIZATION RATIO, POC: 3

## 2019-03-13 PROCEDURE — 85610 PROTHROMBIN TIME: CPT

## 2019-03-13 PROCEDURE — 99211 OFF/OP EST MAY X REQ PHY/QHP: CPT

## 2019-03-27 ENCOUNTER — ANTI-COAG VISIT (OUTPATIENT)
Dept: PHARMACY | Age: 54
End: 2019-03-27
Payer: MEDICARE

## 2019-03-27 DIAGNOSIS — I48.0 PAROXYSMAL ATRIAL FIBRILLATION (HCC): ICD-10-CM

## 2019-03-27 DIAGNOSIS — Z95.2 S/P AORTIC VALVE REPLACEMENT: ICD-10-CM

## 2019-03-27 LAB — INTERNATIONAL NORMALIZATION RATIO, POC: 3.6

## 2019-03-27 PROCEDURE — 99211 OFF/OP EST MAY X REQ PHY/QHP: CPT

## 2019-03-27 PROCEDURE — 85610 PROTHROMBIN TIME: CPT

## 2019-04-10 ENCOUNTER — ANTI-COAG VISIT (OUTPATIENT)
Dept: PHARMACY | Age: 54
End: 2019-04-10
Payer: MEDICARE

## 2019-04-10 DIAGNOSIS — Z95.2 S/P AORTIC VALVE REPLACEMENT: ICD-10-CM

## 2019-04-10 DIAGNOSIS — I48.0 PAROXYSMAL ATRIAL FIBRILLATION (HCC): ICD-10-CM

## 2019-04-10 LAB — INTERNATIONAL NORMALIZATION RATIO, POC: 3.6

## 2019-04-10 PROCEDURE — 99211 OFF/OP EST MAY X REQ PHY/QHP: CPT

## 2019-04-10 PROCEDURE — 85610 PROTHROMBIN TIME: CPT

## 2019-04-10 NOTE — PROGRESS NOTES
ANTICOAGULATION SERVICE    Willy Max is a 52 y.o. male with PMHx significant for AVR (re-do in May, 2017), CAD s/p CABG (x3 in May, 2017), pA-fib, HLD, HTN, testicular CA who presents to clinic 4/10/2019 for anticoagulation monitoring and adjustment.     Anticoagulation Indication(s):  Heart Valve Replacement - AVR  Referring Physician:  Dr. Jeevan Izaguirre  Goal INR Range:  2-3  Duration of Anticoagulation Therapy:  TBD  Time of day dose taken:  PM  Product patient has at home:  warfarin 5 mg (peach)    Lab Results   Component Value Date    RBC 5.52 08/17/2017    HGB 15.9 08/17/2017    HCT 49.5 08/17/2017    MCV 89.6 08/17/2017    MCH 28.8 08/17/2017    MPV 8.2 08/17/2017    RDW 18.1 (H) 08/17/2017     08/17/2017     INR Summary                            Warfarin regimen (mg)  Date INR   A/P    Sun Mon Tue Wed Thu Fri Sat Mg/wk  4/10 3.6 Above goal, hold x 1  7.5 7.5 5 0/5 5 5 7.5 42.5   3/27 3.6 Above goal, decrease  7.5 7.5 5 5 5 5 7.5 42.5  3/13 3.0 At goal, no change   7.5 7.5 7.5 5 5 7.5 7.5 47.5  2/27 2.5 At goal, no change  7.5 7.5 7.5 5 5 7.5 7.5 47.5  2/13 3.1 Above goal, continue  7.5 7.5 7.5 5 5 7.5 7.5 47.5  1/7 2.4 At goal, no change  7.5 7.5 7.5 5 5 7.5 7.5 47.5  12/10 2.5 At goal, no change  7.5 7.5 7.5 5 5 7.5 7.5 47.5  11/12 2.8 At goal, no change  7.5 7.5 7.5 5 5 7.5 7.5 47.5  10/15 2.1 At goal, no change  7.5 7.5 7.5 5 5 7.5 7.5 47.5  9/17 2.6 At goal, no change  7.5 7.5 7.5 5 5 7.5 7.5 47.5  8/17 2.8 At goal, no change  7.5 7.5 7.5 5 5 7.5 7.5 47.5   7/19 2.9 At goal, no change  7.5 7.5 7.5 5 5 7.5 7.5 47.5  6/22 2.6 At goal, no change  7.5 7.5 7.5 5 5 7.5 7.5 47.5  6/1 2.1 At goal, no change  7.5 7.5 7.5 5 5 7.5 7.5 47.5  5/11 2.2 At goal, no change  7.5 7.5 7.5 5 5 7.5 7.5 47.5  4/20 1.8 Below goal, increase  7.5 7.5 7.5 5 5 7.5 7.5 47.5  4/6 1.6 Below goal, increase  7.5 5 7.5 5 5 7.5 7.5 45  3/23 1.7 Missed dose - continue 7.5 5 7.5 5 5 7.5 5 42.5  2/23 2.0 At goal, no change  7.5 5 7.5 5 5 7.5 5 42.5  1/24 2.4 At goal, no change  7.5 5 7.5 5 5 7.5 5 42.5  1/10 2.1 At goal, no change  7.5 5 7.5 5 5 7.5 5 42.5    Patient History:  Recent hospitalizations/HC visits -1/14 PCP for chronic conditions: no med changes  -11/29 Dr. Azael Pearl: no changes   Recent medication changes None reported today   Medications taken regularly that may interact with warfarin or alter INR ASA 81 mg   Warfarin dose taken as prescribed Usually compliant. Does not use pillbox  Patient has been taking warfarin since re-do AVR in May, 2017   Signs/symptoms of bleeding No h/o major bleeding   Vitamin K intake Normally has ~0 servings of green, leafy vegetables per week   Recent vomiting/diarrhea/fever, changes in weight or activity level Patient's wife passed from cancer in mid-March, this upcoming month will be difficult for him. Tobacco or alcohol use 2/27: Patient reports cutting back from 2 PPD to 3/4 PPD over the past 3 weeks since he moved in with his daughter. Decrease in smoking typically increases INR gradually. Patient denies any alcohol or illicit drug use   Upcoming surgeries or procedures None reported     Assessment/Plan:  Patient's INR was supratherapeutic today (3.6), unchanged from two weeks ago despite warfarin dose reduction. INR elevation likely due to significant decrease in number of cigarettes patient smokes everyday. Congratulated patient on smoking cessation efforts and encouraged him to pay attention to triggers, motivation, and smoking habits. Fortunately, he now lives with his daughter, who does not allow smoking in her house. Discussed medications that can help him quit, but he prefers to try on his own. Also emphasized the health benefits of quitting. Patient was instructed to hold warfarin today, then resume lower dose of 5 mg daily, except 7.5 mg on Sun/Mon/Sat (10.5% decrease in weekly dose).   Patient prefers to take the same doses of warfain in a row because it

## 2019-04-11 DIAGNOSIS — I25.10 CORONARY ARTERY DISEASE INVOLVING NATIVE CORONARY ARTERY OF NATIVE HEART WITHOUT ANGINA PECTORIS: ICD-10-CM

## 2019-04-11 DIAGNOSIS — Z95.2 S/P AORTIC VALVE REPLACEMENT: Chronic | ICD-10-CM

## 2019-04-11 DIAGNOSIS — I10 ESSENTIAL HYPERTENSION: Chronic | ICD-10-CM

## 2019-04-11 RX ORDER — ROSUVASTATIN CALCIUM 20 MG/1
20 TABLET, COATED ORAL NIGHTLY
Qty: 30 TABLET | Refills: 2
Start: 2019-04-11 | End: 2019-07-11 | Stop reason: SDUPTHER

## 2019-04-11 RX ORDER — LISINOPRIL 10 MG/1
TABLET ORAL
Qty: 60 TABLET | Refills: 1 | OUTPATIENT
Start: 2019-04-11

## 2019-04-11 RX ORDER — PAROXETINE HYDROCHLORIDE 20 MG/1
TABLET, FILM COATED ORAL
Qty: 30 TABLET | Refills: 1 | OUTPATIENT
Start: 2019-04-11

## 2019-04-11 RX ORDER — LISINOPRIL 10 MG/1
10 TABLET ORAL 2 TIMES DAILY
Qty: 60 TABLET | Refills: 2
Start: 2019-04-11 | End: 2019-07-11 | Stop reason: SDUPTHER

## 2019-04-11 RX ORDER — FAMOTIDINE 20 MG/1
20 TABLET, FILM COATED ORAL 2 TIMES DAILY
Qty: 60 TABLET | Refills: 2
Start: 2019-04-11 | End: 2019-07-11 | Stop reason: SDUPTHER

## 2019-04-11 RX ORDER — AMLODIPINE BESYLATE 5 MG/1
5 TABLET ORAL NIGHTLY PRN
Qty: 30 TABLET | Refills: 2
Start: 2019-04-11 | End: 2022-04-05

## 2019-04-11 RX ORDER — PAROXETINE HYDROCHLORIDE 20 MG/1
20 TABLET, FILM COATED ORAL DAILY
Qty: 30 TABLET | Refills: 2
Start: 2019-04-11 | End: 2019-07-17 | Stop reason: ALTCHOICE

## 2019-04-11 RX ORDER — FAMOTIDINE 20 MG/1
TABLET, FILM COATED ORAL
Qty: 60 TABLET | Refills: 1 | OUTPATIENT
Start: 2019-04-11

## 2019-04-11 RX ORDER — METOPROLOL TARTRATE 50 MG/1
TABLET, FILM COATED ORAL
Qty: 60 TABLET | Refills: 2
Start: 2019-04-11 | End: 2019-07-11 | Stop reason: SDUPTHER

## 2019-04-11 RX ORDER — ROSUVASTATIN CALCIUM 20 MG/1
TABLET, COATED ORAL
Qty: 30 TABLET | Refills: 1 | OUTPATIENT
Start: 2019-04-11

## 2019-04-22 ENCOUNTER — OFFICE VISIT (OUTPATIENT)
Dept: INTERNAL MEDICINE CLINIC | Age: 54
End: 2019-04-22
Payer: MEDICARE

## 2019-04-22 VITALS
OXYGEN SATURATION: 95 % | TEMPERATURE: 98.9 F | HEIGHT: 72 IN | SYSTOLIC BLOOD PRESSURE: 153 MMHG | DIASTOLIC BLOOD PRESSURE: 92 MMHG | HEART RATE: 68 BPM | WEIGHT: 281.4 LBS | RESPIRATION RATE: 20 BRPM | BODY MASS INDEX: 38.11 KG/M2

## 2019-04-22 DIAGNOSIS — M51.36 DISC DEGENERATION, LUMBAR: Primary | ICD-10-CM

## 2019-04-22 DIAGNOSIS — N52.1 ERECTILE DISORDER DUE TO MEDICAL CONDITION IN MALE: ICD-10-CM

## 2019-04-22 PROCEDURE — 99213 OFFICE O/P EST LOW 20 MIN: CPT | Performed by: STUDENT IN AN ORGANIZED HEALTH CARE EDUCATION/TRAINING PROGRAM

## 2019-04-22 NOTE — PROGRESS NOTES
Negative for arthralgias, back pain, gait problem and joint swelling. Neurological: Negative for dizziness, seizures, syncope, facial asymmetry, speech difficulty, light-headedness. Positive for bilateral feet numbness         Prior to Visit Medications    Medication Sig Taking? Authorizing Provider   PARoxetine (PAXIL) 20 MG tablet Take 1 tablet by mouth daily Yes Jessie Miranda MD   lisinopril (PRINIVIL;ZESTRIL) 10 MG tablet Take 1 tablet by mouth 2 times daily Yes Jessie Miranda MD   rosuvastatin (CRESTOR) 20 MG tablet Take 1 tablet by mouth nightly Yes Jessie Miranda MD   famotidine (PEPCID) 20 MG tablet Take 1 tablet by mouth 2 times daily Yes Jessie Miranda MD   amLODIPine (NORVASC) 5 MG tablet Take 1 tablet by mouth nightly as needed (If systolic blood pressure over 130, take amlodipine 5mg at night, if under 130 do not take amlopidine) Yes Jessie Miranda MD   metoprolol tartrate (LOPRESSOR) 50 MG tablet Take 1 tablet by mouth 2 times daily for BP & heart Yes Jessie Miranda MD   warfarin (COUMADIN) 5 MG tablet Start with 5 mg daily; dose will be adjusted as needed by Dr. Josue King office based on INR result Yes Viral Little MD   melatonin (RA MELATONIN) 3 MG TABS tablet Take 1 tablet by mouth nightly as needed (insomnia) Yes Yohana Bonilla MD   tiZANidine (ZANAFLEX) 4 MG tablet Take 4 mg by mouth 3 times daily Yes Historical Provider, MD   oxyCODONE-acetaminophen (PERCOCET) 5-325 MG per tablet Take 1 tablet by mouth every 4 hours as needed for Pain  . Yes Historical Provider, MD   aspirin 81 MG EC tablet Take 1 tablet by mouth daily Yes Itzel Jo,    sildenafil (VIAGRA) 100 MG tablet Take 1 tablet by mouth as needed for Erectile Dysfunction Start with 1/2 tab.  If it does not work try full tab but no more 1 tab a day  Jessie Miranda MD   Psyllium (METAMUCIL SMOOTH TEXTURE) 28.3 % POWD Mixed with orange juice once or twice daily  Yohana Bonilla MD   nystatin-triamcinolone Dr. Ricci Elizabeth  10/10/2016    Dr. Veronica Delacruz - w/laparascopic lysis of extensive adhesions, open transverse colon resection, excision of old abd mesh adhering to colon    CORONARY ANGIOPLASTY WITH STENT PLACEMENT  2014    CORONARY ARTERY BYPASS GRAFT  2004    Dr. Anni Arceo - x2 (LIMA-LAD, SVG sequentially to ascending aorta & D1)    CORONARY ARTERY BYPASS GRAFT  2017    Dr. Nan Haskins - redo x3 (reverse AC SVG sequentially to D1, OM1 & PDA) w/explant of prior prosthetic valve & removal of embedded sternal wires x7    EMG  2012    FOOT SURGERY Left 2012    Dr. Stephanie Salazar, DPM - hallux nail evulsion & exostectomy    INCISIONAL HERNIA REPAIR      multiple    LUMBAR DISCECTOMY      L4-5    SHOULDER ARTHROSCOPY Right 2013    Dr. Noelle Soler - w/subacromial decompression, debridement of the SLAP tear, distal clavicle excision    SOFT TISSUE TUMOR RESECTION      retroperitoneal mass    TESTICLE REMOVAL Left     radical orchiectomy    TRANSESOPHAGEAL ECHOCARDIOGRAM  2017    during redo CABG & AVR    TUNNELED VENOUS PORT PLACEMENT  2002    portacath        Social History     Socioeconomic History    Marital status:      Spouse name: Not on file    Number of children: Not on file    Years of education: Not on file    Highest education level: Not on file   Occupational History    Not on file   Social Needs    Financial resource strain: Not on file    Food insecurity:     Worry: Not on file     Inability: Not on file    Transportation needs:     Medical: Not on file     Non-medical: Not on file   Tobacco Use    Smoking status: Current Some Day Smoker     Packs/day: 0.10     Years: 32.00     Pack years: 3.20     Types: Cigarettes     Last attempt to quit: 2017     Years since quittin.9    Smokeless tobacco: Never Used    Tobacco comment: stopped 5-18   Substance and Sexual Activity    Alcohol use:  No Alcohol/week: 0.0 oz    Drug use: No    Sexual activity: Yes     Partners: Female   Lifestyle    Physical activity:     Days per week: Not on file     Minutes per session: Not on file    Stress: Not on file   Relationships    Social connections:     Talks on phone: Not on file     Gets together: Not on file     Attends Confucianism service: Not on file     Active member of club or organization: Not on file     Attends meetings of clubs or organizations: Not on file     Relationship status: Not on file    Intimate partner violence:     Fear of current or ex partner: Not on file     Emotionally abused: Not on file     Physically abused: Not on file     Forced sexual activity: Not on file   Other Topics Concern    Not on file   Social History Narrative    Not on file        Family History   Problem Relation Age of Onset    Cancer Mother     Cancer Father         lung    Alzheimer's Disease Sister     Liver Cancer Brother        Vitals:    04/22/19 1313   BP: (!) 153/92   Site: Left Upper Arm   Position: Sitting   Cuff Size: Large Adult   Pulse: 68   Resp: 20   Temp: 98.9 °F (37.2 °C)   SpO2: 95%   Weight: 281 lb 6.4 oz (127.6 kg)   Height: 6' (1.829 m)     Estimated body mass index is 38.16 kg/m² as calculated from the following:    Height as of this encounter: 6' (1.829 m). Weight as of this encounter: 281 lb 6.4 oz (127.6 kg). Physical Exam   Constitutional: He appears well-developed and well-nourished. HENT:   Head: Normocephalic. Right Ear: External ear normal.   Left Ear: External ear normal.   Eyes: Pupils are equal, round, and reactive to light. Conjunctivae are normal. Right eye exhibits no discharge. Left eye exhibits no discharge. Neck: Normal range of motion. Neck supple. No JVD present. No tracheal deviation present. No thyromegaly present. Cardiovascular: Normal rate, regular rhythm, normal heart sounds and intact distal pulses. Exam reveals no gallop and no friction rub.     No

## 2019-04-22 NOTE — PATIENT INSTRUCTIONS
Before any of you medication is completely gone, call your pharmacy AT LEAST 1 WEEK ahead for refills. Review all information regarding your medication before starting. If you become ill when the clinic is closed, please call the [facility]  at   #[phone] and ask the  to page the AOD between 6:00 AM and 6:00 PM or page the AON between 6:00 PM and 6:00 am    The clinic is not able to process MY CHART requests for appointments or messages including requests. Please call the [address] [place] [address] at [phone]  For appointments and messages. Call your pharmacy for medication refills. Return to clinic after colonoscopy    Continue medication as listed on discharge sheet  Call Dr. Tara Mcallister office to make appointment [phone]  Get  renal today then MRI after blood work.     Instructions reviewed before discharge and copy given to patient    [address] [phone]

## 2019-04-22 NOTE — PROGRESS NOTES
[address] [place] [address]       NURSING PROGRESS NOTE      April 22, 2019  Maria Del Carmen Weathers    Chief Complaint:   Chief Complaint   Patient presents with    Hypertension       Constitutional: negative  Eyes: negative  Ears, nose, mouth, throat, and face: negative  Respiratory: negative  Cardiovascular: negative, HTN,replaced aortic valve  Gastrointestinal: watery stools and formed stool. Occasionally incontinent. Stools were orange for a few days. Now normal in color  Genitourinary: negative  Integument/breast: negative  Hematologic/lymphatic: negative  Musculoskeletal: negative  Neurological: positive for vertigo  Diabetes: No    Pain Assessment:  Pain Present: yes  Pain Score: 7  Pain Quality/Description: Aching and Sharp    Mobility/Safety/Self-Care:  Steady Gait: Yes  History of Falls: No   History of a Fall within the last 30 days No  Assistive Device: None  Poor Hygiene: No    Psychosocial:   Depression: No  If YES,    Does Patient express current thoughts of harming self or others?: No  Anxious: No  Insomnia: No  Inappropriate Behavior: No  Alcohol Abuse: no  Substance Abuse: no  Signs of Physical Abuse: No  Signs of Emotional Abuse: No    Educational:  Identify the learner who is being assessed for education:  patient                    Ability to Learn:  Exhibits ability to grasp concepts and respond to questions: Medium  Ready to Learn: Yes  calm   Preferred Method of Learning:  written  Barriers to Learning: Verbalizes interest  Special Considerations due to cultural, Mosque, spiritual beliefs:  No  Language:  English  :  No    Note:   Wants to stop Paxil.       1:18 PM 4/22/2019

## 2019-04-24 ENCOUNTER — ANTI-COAG VISIT (OUTPATIENT)
Dept: PHARMACY | Age: 54
End: 2019-04-24
Payer: MEDICARE

## 2019-04-24 DIAGNOSIS — Z95.2 S/P AORTIC VALVE REPLACEMENT: ICD-10-CM

## 2019-04-24 DIAGNOSIS — I48.0 PAROXYSMAL ATRIAL FIBRILLATION (HCC): ICD-10-CM

## 2019-04-24 DIAGNOSIS — N52.1 ERECTILE DISORDER DUE TO MEDICAL CONDITION IN MALE: ICD-10-CM

## 2019-04-24 LAB
ANION GAP SERPL CALCULATED.3IONS-SCNC: 14 MMOL/L (ref 3–16)
BUN BLDV-MCNC: 7 MG/DL (ref 7–20)
CALCIUM SERPL-MCNC: 9 MG/DL (ref 8.3–10.6)
CHLORIDE BLD-SCNC: 98 MMOL/L (ref 99–110)
CO2: 27 MMOL/L (ref 21–32)
CREAT SERPL-MCNC: 1.1 MG/DL (ref 0.9–1.3)
GFR AFRICAN AMERICAN: >60
GFR NON-AFRICAN AMERICAN: >60
GLUCOSE BLD-MCNC: 114 MG/DL (ref 70–99)
INTERNATIONAL NORMALIZATION RATIO, POC: 2.9
POTASSIUM SERPL-SCNC: 3.8 MMOL/L (ref 3.5–5.1)
SODIUM BLD-SCNC: 139 MMOL/L (ref 136–145)

## 2019-04-24 PROCEDURE — 85610 PROTHROMBIN TIME: CPT

## 2019-04-24 PROCEDURE — 99211 OFF/OP EST MAY X REQ PHY/QHP: CPT

## 2019-04-24 NOTE — PROGRESS NOTES
continue 7.5 5 7.5 5 5 7.5 5 42.5  2/23 2.0 At goal, no change  7.5 5 7.5 5 5 7.5 5 42.5  1/24 2.4 At goal, no change  7.5 5 7.5 5 5 7.5 5 42.5  1/10 2.1 At goal, no change  7.5 5 7.5 5 5 7.5 5 42.5    Patient History:  Recent hospitalizations/HC visits -1/14 PCP for chronic conditions: no med changes  -11/29 Dr. Shweta Roy: no changes   Recent medication changes Tapering off Paxil   Medications taken regularly that may interact with warfarin or alter INR ASA 81 mg   Warfarin dose taken as prescribed Usually compliant   Does not use pillbox  Patient has been taking warfarin since re-do AVR in May, 2017   Signs/symptoms of bleeding No h/o major bleeding   Vitamin K intake Normally has ~0 servings of green, leafy vegetables per week   Recent vomiting/diarrhea/fever, changes in weight or activity level Patient's wife passed from cancer in mid-March, this upcoming month will be difficult for him. Tobacco or alcohol use 2/27: Patient reports cutting back from 2 PPD to 3/4 PPD over the past 3 weeks since he moved in with his daughter. Decrease in smoking typically increases INR gradually. Patient denies any alcohol or illicit drug use   Upcoming surgeries or procedures Colonoscopy in near future     Assessment/Plan:  Patient's INR was therapeutic today (2.9). Recent INR elevations likely due to significant decrease in cigarette smoking. Congratulated patient on smoking cessation efforts and encouraged him to pay attention to triggers, motivation, and smoking habits. Fortunately, he now lives with his daughter, who does not allow smoking in her house. Discussed medications that can help him quit, but he prefers to try on his own. Also emphasized the health benefits of quitting. Patient was instructed to continue warfarin 5 mg daily, except 7.5 mg on Sun/Mon/Sat. Patient prefers to take the same doses of warfain in a row because it is easier to remember.   Patient is aware that he may require more frequent INR monitoring while quitting smoking. Repeat INR in 3 weeks. Patient was reminded to maintain consistent vitamin K intake and call with any bleeding, medication changes, or fever/vomiting/diarrhea. Patient understands dosing directions and information discussed. Dosing schedule and follow up appointment given to patient. Progress note routed to referring physician's office. Patient acknowledges working in consult agreement with pharmacist as referred by his/her physician. Next Clinic Appointment:  5/15    Please call [facility] at [phone] with any questions. Thanks! Alejandra Matamoros.  Gia Minaya, PharmD  [facility]  Ph: [phone]  4/24/2019 9:03 AM

## 2019-04-25 ENCOUNTER — HOSPITAL ENCOUNTER (OUTPATIENT)
Dept: MRI IMAGING | Age: 54
Discharge: HOME | End: 2019-04-25
Payer: MEDICARE

## 2019-04-25 DIAGNOSIS — M51.36 DISC DEGENERATION, LUMBAR: ICD-10-CM

## 2019-04-25 PROCEDURE — 72148 MRI LUMBAR SPINE W/O DYE: CPT

## 2019-05-16 ENCOUNTER — ANTI-COAG VISIT (OUTPATIENT)
Dept: PHARMACY | Age: 54
End: 2019-05-16
Payer: MEDICARE

## 2019-05-16 DIAGNOSIS — I48.0 PAROXYSMAL ATRIAL FIBRILLATION (HCC): ICD-10-CM

## 2019-05-16 DIAGNOSIS — Z95.2 S/P AORTIC VALVE REPLACEMENT: ICD-10-CM

## 2019-05-16 LAB — INTERNATIONAL NORMALIZATION RATIO, POC: 2.3

## 2019-05-16 PROCEDURE — 99211 OFF/OP EST MAY X REQ PHY/QHP: CPT

## 2019-05-16 PROCEDURE — 85610 PROTHROMBIN TIME: CPT

## 2019-05-16 NOTE — PROGRESS NOTES
ANTICOAGULATION SERVICE    Marcela Sher is a 52 y.o. male with PMHx significant for AVR (re-do in May, 2017), CAD s/p CABG (x3 in May, 2017), pA-fib, HLD, HTN, testicular CA who presents to clinic 5/16/2019 for anticoagulation monitoring and adjustment.     Anticoagulation Indication(s):  Heart Valve Replacement - bovine AVR  Referring Physician:  Dr. Leonel Lewis  Goal INR Range:  2-3  Duration of Anticoagulation Therapy:  TBD  Time of day dose taken:  PM  Product patient has at home:  warfarin 5 mg (peach)    [id number] Score for Atrial Fibrillation Stroke Risk   Risk   Factors  Component Value   C CHF No 0   H HTN Yes 1   A2 Age >= 75 No,  (52 y.o.) 0   D DM No 0   S2 Prior Stroke/TIA No 0   V Vascular Disease Yes 1   A Age 74-69 No,  (52 y.o.) 0   Sc Sex male 0    [id number]  Score  2   Score last updated 5/30/19 1:51 PM      Lab Results   Component Value Date    RBC 5.52 08/17/2017    HGB 15.9 08/17/2017    HCT 49.5 08/17/2017    MCV 89.6 08/17/2017    MCH 28.8 08/17/2017    MPV 8.2 08/17/2017    RDW 18.1 (H) 08/17/2017     08/17/2017     INR Summary                            Warfarin regimen (mg)  Date INR   A/P    Sun Mon Tue Wed Thu Fri Sat Mg/wk  5/16 2.3 At goal, no change  7.5 7.5 5 5 5 5 7.5 42.5  4/24 2.9 At goal, no change  7.5 7.5 5 5 5 5 7.5 42.5  4/10 3.6 Above goal, hold x 1  7.5 7.5 5 0/5 5 5 7.5 42.5   3/27 3.6 Above goal, decrease  7.5 7.5 5 5 5 5 7.5 42.5  3/13 3.0 At goal, no change   7.5 7.5 7.5 5 5 7.5 7.5 47.5  2/27 2.5 At goal, no change  7.5 7.5 7.5 5 5 7.5 7.5 47.5  2/13 3.1 Above goal, continue  7.5 7.5 7.5 5 5 7.5 7.5 47.5  1/7 2.4 At goal, no change  7.5 7.5 7.5 5 5 7.5 7.5 47.5  12/10 2.5 At goal, no change  7.5 7.5 7.5 5 5 7.5 7.5 47.5  11/12 2.8 At goal, no change  7.5 7.5 7.5 5 5 7.5 7.5 47.5  10/15 2.1 At goal, no change  7.5 7.5 7.5 5 5 7.5 7.5 47.5  9/17 2.6 At goal, no change  7.5 7.5 7.5 5 5 7.5 7.5 47.5  8/17 2.8 At goal, no change  7.5 7.5 7.5 5 5 7.5 7.5 47.5   7/19 2.9 At goal, no change  7.5 7.5 7.5 5 5 7.5 7.5 47.5  6/22 2.6 At goal, no change  7.5 7.5 7.5 5 5 7.5 7.5 47.5  6/1 2.1 At goal, no change  7.5 7.5 7.5 5 5 7.5 7.5 47.5  5/11 2.2 At goal, no change  7.5 7.5 7.5 5 5 7.5 7.5 47.5  4/20 1.8 Below goal, increase  7.5 7.5 7.5 5 5 7.5 7.5 47.5  4/6 1.6 Below goal, increase  7.5 5 7.5 5 5 7.5 7.5 45  3/23 1.7 Missed dose - continue 7.5 5 7.5 5 5 7.5 5 42.5  2/23 2.0 At goal, no change  7.5 5 7.5 5 5 7.5 5 42.5  1/24 2.4 At goal, no change  7.5 5 7.5 5 5 7.5 5 42.5  1/10 2.1 At goal, no change  7.5 5 7.5 5 5 7.5 5 42.5    Patient History:  Recent hospitalizations/HC visits -1/14 PCP for chronic conditions: no med changes  -11/29 Dr. Shira Shah: no changes   Recent medication changes Tapering off Paxil   Medications taken regularly that may interact with warfarin or alter INR ASA 81 mg   Warfarin dose taken as prescribed Usually compliant   Does not use pillbox  Patient has been taking warfarin since re-do AVR in May, 2017   Signs/symptoms of bleeding No h/o major bleeding   Vitamin K intake Normally has ~0 servings of green, leafy vegetables per week   Recent vomiting/diarrhea/fever, changes in weight or activity level Patient's wife passed from cancer in March, 2018    Tobacco or alcohol use 2/27: Patient reports cutting back from 2 PPD to 3/4 PPD over the past 3 weeks since he moved in with his daughter. Decrease in smoking typically increases INR gradually. Patient denies any alcohol or illicit drug use   Upcoming surgeries or procedures Colonoscopy in near future     Assessment/Plan:  Patient's INR was therapeutic today (2.3). Recent INR elevations likely due to significant decrease in cigarette smoking. Congratulated patient on smoking cessation efforts and encouraged him to pay attention to triggers, motivation, and smoking habits. Fortunately, he now lives with his daughter, who does not allow smoking in her house. Discussed medications that can help him quit, but he prefers to try on his own. Also emphasized the health benefits of quitting. Patient was instructed to continue warfarin 5 mg daily, except 7.5 mg on Sun/Mon/Sat. Patient prefers to take the same doses of warfain in a row because it is easier to remember. Patient is aware that he may require more frequent INR monitoring while quitting smoking. Repeat INR in 4 weeks. Patient was reminded to maintain consistent vitamin K intake and call with any bleeding, medication changes, or fever/vomiting/diarrhea. Patient understands dosing directions and information discussed. Dosing schedule and follow up appointment given to patient. Progress note routed to referring physician's office. Patient acknowledges working in consult agreement with pharmacist as referred by his/her physician. Next Clinic Appointment:  6/13    Please call [facility] at [phone] with any questions. Thanks! Ivy Bolanos.  Toribio Howell, PharmD  [facility]  Ph: [phone]  5/16/2019 9:52 AM

## 2019-06-13 ENCOUNTER — ANTI-COAG VISIT (OUTPATIENT)
Dept: PHARMACY | Age: 54
End: 2019-06-13
Payer: MEDICARE

## 2019-06-13 DIAGNOSIS — I48.0 PAROXYSMAL ATRIAL FIBRILLATION (HCC): ICD-10-CM

## 2019-06-13 DIAGNOSIS — Z95.2 S/P AORTIC VALVE REPLACEMENT: ICD-10-CM

## 2019-06-13 LAB — INTERNATIONAL NORMALIZATION RATIO, POC: 2.2

## 2019-06-13 PROCEDURE — 99211 OFF/OP EST MAY X REQ PHY/QHP: CPT

## 2019-06-13 PROCEDURE — 85610 PROTHROMBIN TIME: CPT

## 2019-06-13 NOTE — PROGRESS NOTES
ANTICOAGULATION SERVICE    Gerardo Serrato is a 52 y.o. male with PMHx significant for AVR (re-do in May, 2017), CAD s/p CABG (x3 in May, 2017), pA-fib, HLD, HTN, testicular CA who presents to clinic 6/13/2019 for anticoagulation monitoring and adjustment.     Anticoagulation Indication(s):  Heart Valve Replacement - bovine AVR  Referring Physician:  Dr. Rick Crandall  Goal INR Range:  2-3  Duration of Anticoagulation Therapy:  TBD  Time of day dose taken:  PM  Product patient has at home:  warfarin 5 mg (peach)    [id number] Score for Atrial Fibrillation Stroke Risk   Risk   Factors  Component Value   C CHF No 0   H HTN Yes 1   A2 Age >= 75 No,  (52 y.o.) 0   D DM No 0   S2 Prior Stroke/TIA No 0   V Vascular Disease Yes 1   A Age 74-69 No,  (52 y.o.) 0   Sc Sex male 0    [id number]  Score  2   Score last updated 5/30/19 1:51 PM      Lab Results   Component Value Date    RBC 5.52 08/17/2017    HGB 15.9 08/17/2017    HCT 49.5 08/17/2017    MCV 89.6 08/17/2017    MCH 28.8 08/17/2017    MPV 8.2 08/17/2017    RDW 18.1 (H) 08/17/2017     08/17/2017     INR Summary                            Warfarin regimen (mg)  Date INR   A/P    Sun Mon Tue Wed Thu Fri Sat Mg/wk  6/13 2.2 At goal, no change  7.5 7.5 5 5 5 5 7.5 42.5  5/16 2.3 At goal, no change  7.5 7.5 5 5 5 5 7.5 42.5  4/24 2.9 At goal, no change  7.5 7.5 5 5 5 5 7.5 42.5  4/10 3.6 Above goal, hold x 1  7.5 7.5 5 0/5 5 5 7.5 42.5   3/27 3.6 Above goal, decrease  7.5 7.5 5 5 5 5 7.5 42.5  3/13 3.0 At goal, no change   7.5 7.5 7.5 5 5 7.5 7.5 47.5  2/27 2.5 At goal, no change  7.5 7.5 7.5 5 5 7.5 7.5 47.5  2/13 3.1 Above goal, continue  7.5 7.5 7.5 5 5 7.5 7.5 47.5  1/7 2.4 At goal, no change  7.5 7.5 7.5 5 5 7.5 7.5 47.5  12/10 2.5 At goal, no change  7.5 7.5 7.5 5 5 7.5 7.5 47.5  11/12 2.8 At goal, no change  7.5 7.5 7.5 5 5 7.5 7.5 47.5  10/15 2.1 At goal, no change  7.5 7.5 7.5 5 5 7.5 7.5 47.5  9/17 2.6 At goal, no change  7.5 7.5 7.5 5 5 7.5 7.5 47.5  8/17 2.8 At goal, no change  7.5 7.5 7.5 5 5 7.5 7.5 47.5   7/19 2.9 At goal, no change  7.5 7.5 7.5 5 5 7.5 7.5 47.5  6/22 2.6 At goal, no change  7.5 7.5 7.5 5 5 7.5 7.5 47.5  6/1 2.1 At goal, no change  7.5 7.5 7.5 5 5 7.5 7.5 47.5  5/11 2.2 At goal, no change  7.5 7.5 7.5 5 5 7.5 7.5 47.5  4/20 1.8 Below goal, increase  7.5 7.5 7.5 5 5 7.5 7.5 47.5  4/6 1.6 Below goal, increase  7.5 5 7.5 5 5 7.5 7.5 45  3/23 1.7 Missed dose - continue 7.5 5 7.5 5 5 7.5 5 42.5  2/23 2.0 At goal, no change  7.5 5 7.5 5 5 7.5 5 42.5  1/24 2.4 At goal, no change  7.5 5 7.5 5 5 7.5 5 42.5  1/10 2.1 At goal, no change  7.5 5 7.5 5 5 7.5 5 42.5    Patient History:  Recent hospitalizations/HC visits -6/6 colonoscopy  -4/22 PCP for stool incontinence: referred for colonoscopy  -11/29 Dr. Kwaku Meehan: no changes   Recent medication changes Tapering off Paxil   Medications taken regularly that may interact with warfarin or alter INR ASA 81 mg   Warfarin dose taken as prescribed Held warfarin 6/1-6/5 for colonoscopy on 6/6  Usually compliant   Does not use pillbox  Patient has been taking warfarin since re-do AVR in May, 2017   Signs/symptoms of bleeding No h/o major bleeding   Vitamin K intake Normally has ~0 servings of green, leafy vegetables per week   Recent vomiting/diarrhea/fever, changes in weight or activity level Patient's wife passed from cancer in March, 2018    Tobacco or alcohol use Patient cut back from 2 PPD to 3/4 PPD in February when he moved in with his daughter. Decrease in smoking typically increases INR gradually. Patient denies any alcohol or illicit drug use   Upcoming surgeries or procedures None reported     Assessment/Plan:  Patient's INR was therapeutic today (2.2). Elevated INR in March & April likely due to significant decrease in cigarette smoking. Congratulated patient on smoking cessation efforts and encouraged him to pay attention to triggers, motivation, and smoking habits.   Fortunately, he now lives with his daughter, who does not allow smoking in her house. Discussed medications that can help him quit, but he prefers to try on his own. Also emphasized the health benefits of quitting. Patient was instructed to continue warfarin 5 mg daily, except 7.5 mg on Sat/Sun/Mon. Patient prefers to take the same doses of warfain in a row because it is easier to remember. Patient is aware that he may require more frequent INR monitoring while quitting smoking. Repeat INR in 5 weeks to coordinate with derm appt. Patient was reminded to maintain consistent vitamin K intake and call with any bleeding, medication changes, or fever/vomiting/diarrhea. Patient understands dosing directions and information discussed. Dosing schedule and follow up appointment given to patient. Progress note routed to referring physician's office. Patient acknowledges working in consult agreement with pharmacist as referred by his/her physician. Next Clinic Appointment:  7/17    Please call [facility] at [phone] with any questions. Thanks! Wendy Bernheim.  Rock Faiza PharmD  [facility]  Ph: [phone]  6/13/2019 9:39 AM

## 2019-06-17 ENCOUNTER — OFFICE VISIT (OUTPATIENT)
Dept: INTERNAL MEDICINE CLINIC | Age: 54
End: 2019-06-17
Payer: MEDICARE

## 2019-06-17 VITALS
SYSTOLIC BLOOD PRESSURE: 134 MMHG | BODY MASS INDEX: 37.58 KG/M2 | HEART RATE: 69 BPM | TEMPERATURE: 98 F | OXYGEN SATURATION: 95 % | WEIGHT: 277.1 LBS | RESPIRATION RATE: 16 BRPM | DIASTOLIC BLOOD PRESSURE: 88 MMHG

## 2019-06-17 DIAGNOSIS — Z09 FOLLOW UP: Primary | ICD-10-CM

## 2019-06-17 PROCEDURE — 99213 OFFICE O/P EST LOW 20 MIN: CPT | Performed by: STUDENT IN AN ORGANIZED HEALTH CARE EDUCATION/TRAINING PROGRAM

## 2019-06-17 NOTE — PROGRESS NOTES
2019    Jasmyne Hatch (:  1965) is a 52 y.o. male,     HPI  52 y. o. male With past medical history of CAD,HLD, HTN, HX OF testicular cancer  who presents for  follow up, pt just  recently done colonoscopy with dr Holly Bowen the removed 12 ployps  from his colon with size 1-6 mm, however he still has one plyp could not be removed by endoscopy so pt was referred to dr Lara Thapa for ileo-colectomy  in  for, pt otherwise he felt better, no stolol incontinence, blood with stool, pt states he is willing to lose some weight. Review of Systems   HENT: Negative for congestion, dental problem, drooling, ear discharge, ear pain, facial swelling, hearing loss, mouth sores and nosebleeds.    Eyes: Negative for photophobia, pain, discharge, redness, itching and visual disturbance. Respiratory: Negative for apnea, cough, choking, chest tightness, shortness of breath and stridor.    Cardiovascular: Negative for chest pain and leg swelling. Gastrointestinal: Negative for abdominal distention, abdominal pain, anal bleeding, blood in stool, constipation,   Endocrine: Negative for cold intolerance, heat intolerance and polyphagia. Genitourinary: Negative for decreased urine volume, difficulty urinating, dysuria, enuresis, flank pain, frequency, genital sores, hematuria and urgency. Musculoskeletal: Negative for arthralgias, back pain, gait problem and joint swelling. Neurological: Negative for dizziness, seizures, syncope, facial asymmetry, speech difficulty, light-headedness. Positive for bilateral feet numbness        Prior to Visit Medications    Medication Sig Taking?  Authorizing Provider   lisinopril (PRINIVIL;ZESTRIL) 10 MG tablet Take 1 tablet by mouth 2 times daily Yes Kameron Torres MD   rosuvastatin (CRESTOR) 20 MG tablet Take 1 tablet by mouth nightly Yes Kameron Torres MD   famotidine (PEPCID) 20 MG tablet Take 1 tablet by mouth 2 times daily Yes Kameron Torres MD   amLODIPine (NORVASC) 5 MG tablet Take 1 tablet by mouth nightly as needed (If systolic blood pressure over 130, take amlodipine 5mg at night, if under 130 do not take amlopidine) Yes Jessie Mirnada MD   metoprolol tartrate (LOPRESSOR) 50 MG tablet Take 1 tablet by mouth 2 times daily for BP & heart Yes Jessie Miranda MD   warfarin (COUMADIN) 5 MG tablet Start with 5 mg daily; dose will be adjusted as needed by Dr. Josue King office based on INR result Yes Viral Little MD   Psyllium (METAMUCIL SMOOTH TEXTURE) 28.3 % POWD Mixed with orange juice once or twice daily Yes Yohana Bonilla MD   melatonin (RA MELATONIN) 3 MG TABS tablet Take 1 tablet by mouth nightly as needed (insomnia) Yes Yohana Bonilla MD   Blood Pressure KIT 1 Unit Yes Yohana Bonilla MD   oxyCODONE-acetaminophen (PERCOCET) 5-325 MG per tablet Take 1 tablet by mouth every 4 hours as needed for Pain  . Yes Historical Provider, MD   aspirin 81 MG EC tablet Take 1 tablet by mouth daily Yes Itzel Jo DO   gabapentin (NEURONTIN) 300 MG capsule Take 300 mg by mouth 3 times daily  Yes Historical Provider, MD   PARoxetine (PAXIL) 20 MG tablet Take 1 tablet by mouth daily  Jessie Miranda MD   sildenafil (VIAGRA) 100 MG tablet Take 1 tablet by mouth as needed for Erectile Dysfunction Start with 1/2 tab. If it does not work try full tab but no more 1 tab a day  Jessie Miranda MD   nystatin-triamcinolone [facility]) 261808-5.1 UNIT/GM-% ointment Apply topically 2 times daily until improved.   Renata Garcia MD   docusate sodium (COLACE) 100 MG capsule Take 100 mg by mouth every other day   Historical Provider, MD        Allergies   Allergen Reactions    Atorvastatin Calcium [Lipitor] Other (See Comments)     Severe headaches      Vicodin [Hydrocodone-Acetaminophen] Itching       Past Medical History:   Diagnosis Date    Abdominal hernia     s/p repair 2010    Aortic valve prosthesis present     CAD (coronary artery disease)     Chronic back pain greater than 3 months duration     Clostridium difficile diarrhea 10/17/2016    PCR    DDD (degenerative disc disease), lumbosacral 8/7/2013    Erectile dysfunction     GERD (gastroesophageal reflux disease)     Hyperlipidemia     Hypertension     Joint pain     Left testicular cancer ([facility] [address] 2002    s/p abdominal resection    Myalgia and myositis, unspecified 8/26/2013    Neuropathy     OA (osteoarthritis) of knee     bilateral    Obesity, Class I, BMI 30.0-34.9 (see actual BMI)     Prolonged emergence from general anesthesia     after CABG    S/P AVR 2005    tissue valve    S/P CABG x 2 2005    w/AVR & primary repair of dissected ascending aorta       Past Surgical History:   Procedure Laterality Date    AORTA SURGERY  08/27/2004    Dr. Juan Alberto Frye - primary repair of limited ascending aortic dissection    AORTIC VALVE REPLACEMENT  05/30/2017    Dr. Ivan Arceo - gino w/25mm Billie Handley ThermaFix model 3300 bovine pericardial bioprosthesis    AORTIC VALVE SURGERY  08/27/2004    Dr. Juan Alberto Frye - 27mm Kelton-Castelan pericardial prosthesis     CARDIAC CATHETERIZATION  05/09/2017    Dr. Roman Brain  08/26/2004    [facility] 03/17/[facility]    Dr. Brianna Herman  10/10/2016    Dr. Marian Velarde - w/laparascopic lysis of extensive adhesions, open transverse colon resection, excision of old abd mesh adhering to colon    CORONARY ANGIOPLASTY WITH STENT PLACEMENT  07/2014    CORONARY ARTERY BYPASS GRAFT  08/27/2004    Dr. Juan Alberto Frye - x2 (LIMA-LAD, SVG sequentially to ascending aorta & D1)    CORONARY ARTERY BYPASS GRAFT  05/30/2017    Dr. Ivan Arceo - gino x3 (reverse AC SVG sequentially to D1, OM1 & PDA) w/explant of prior prosthetic valve & removal of embedded sternal wires x7    EMG  04/16/2012    FOOT SURGERY Left 01/24/2012    Dr. Jazmin Oro, DPM - hallux nail evulsion & exostectomy    INCISIONAL HERNIA REPAIR  2010 multiple    LUMBAR DISCECTOMY  2012    L4-5    SHOULDER ARTHROSCOPY Right 2013    Dr. Edelmira Collier - w/subacromial decompression, debridement of the SLAP tear, distal clavicle excision    SOFT TISSUE TUMOR RESECTION      retroperitoneal mass    TESTICLE REMOVAL Left     radical orchiectomy    TRANSESOPHAGEAL ECHOCARDIOGRAM  2017    during redo CABG & AVR    TUNNELED VENOUS PORT PLACEMENT  2002    portacath        Social History     Socioeconomic History    Marital status:       Spouse name: Not on file    Number of children: Not on file    Years of education: Not on file    Highest education level: Not on file   Occupational History    Not on file   Social Needs    Financial resource strain: Not on file    Food insecurity:     Worry: Not on file     Inability: Not on file    Transportation needs:     Medical: Not on file     Non-medical: Not on file   Tobacco Use    Smoking status: Current Some Day Smoker     Packs/day: 0.10     Years: 32.00     Pack years: 3.20     Types: Cigarettes     Last attempt to quit: 2017     Years since quittin.0    Smokeless tobacco: Never Used    Tobacco comment: stopped -18   Substance and Sexual Activity    Alcohol use: No     Alcohol/week: 0.0 oz    Drug use: No    Sexual activity: Yes     Partners: Female   Lifestyle    Physical activity:     Days per week: Not on file     Minutes per session: Not on file    Stress: Not on file   Relationships    Social connections:     Talks on phone: Not on file     Gets together: Not on file     Attends Rastafarian service: Not on file     Active member of club or organization: Not on file     Attends meetings of clubs or organizations: Not on file     Relationship status: Not on file    Intimate partner violence:     Fear of current or ex partner: Not on file     Emotionally abused: Not on file     Physically abused: Not on file     Forced sexual activity: Not on file   Other Topics Concern    Not on file   Social History Narrative    Not on file        Family History   Problem Relation Age of Onset    Cancer Mother     Cancer Father         lung    Alzheimer's Disease Sister     Liver Cancer Brother        Vitals:    06/17/19 1509   BP: 134/88   Pulse: 69   Resp: 16   Temp: 98 °F (36.7 °C)   TempSrc: Oral   SpO2: 95%   Weight: 277 lb 1.6 oz (125.7 kg)     Estimated body mass index is 37.58 kg/m² as calculated from the following:    Height as of 4/22/19: 6' (1.829 m). Weight as of this encounter: 277 lb 1.6 oz (125.7 kg). Physical Exam    Constitutional: He appears well-developed and well-nourished. HENT:   Head: Normocephalic. Right Ear: External ear normal.   Left Ear: External ear normal.   Eyes: Pupils are equal, round, and reactive to light. Conjunctivae are normal. Right eye exhibits no discharge. Left eye exhibits no discharge. Neck: Normal range of motion. Neck supple. No JVD present. No tracheal deviation present. No thyromegaly present. Cardiovascular: Normal rate, regular rhythm, normal heart sounds and intact distal pulses.  Exam reveals no gallop and no friction rub.    No murmur heard. Pulmonary/Chest: Effort normal and breath sounds normal. No respiratory distress. He has no wheezes. He has no rales. Abdominal: Soft. Bowel sounds are normal. He exhibits no distension and no mass. There is no tenderness. There is no rebound and no guarding. Musculoskeletal: Normal range of motion. He exhibits no edema, tenderness or deformity. Lymphadenopathy:     He has no cervical adenopathy. Neurological: He is alert. He displays normal reflexes except Right patellar reflex was +1(Hyporeflexia). No cranial nerve deficit,  He exhibits normal muscle tone. Coordination normal.   Skin: Skin is warm. Capillary refill takes less than 2 seconds. No rash noted. No erythema.  No pallor.              ASSESSMENT/PLAN:      Hx of dysplastic polyp followed by colectomy 2019  Pt has hx of partial colectomy with out Post op colonoscopy. 2017, pt had colonoscopy 2 weeks ago showed 13 polyps 12 removed endoscopically, one polyp is big and need surgery   -Follow up with Marco Antonio Florence for ileo-colectomy         Bilateral feet neuropathy:  Pt has  Hx of bilateral feet numbness, loss of control of stool with hx of erectile dysfunction, Back pain with hx of disc prolapse , MRI back  showed disc protrusion at L4-L5 mildly compressive with partial effacement of proximal right L5 nerve root  -Continue Gabapentin for pain control  -recommend physical therapy, lose weight  -stable.      Essential hypertension: long hx of hypertension, CAD, CABAG, followed up with cardiologist  - advise to lose weight  -followed dash diet  -Check blood pressure regularly  -Continue home medication Metoprolol, lisinopril, amlodipine on need        Smoking:  - counseling given            --Carole Mock MD on 6/17/2019 at 3:42 PM     Addendum to Resident H& P/Progress note:  I have personally seen,examined and evaluated the patient.  I have reviewed the current history, physical findings, labs and assessment and plan and agree with note as documented by resident MD ( Jeffy Hines)      Maria Isabel Chavarria MD, [address]

## 2019-06-18 DIAGNOSIS — Z95.2 S/P AVR: ICD-10-CM

## 2019-06-18 DIAGNOSIS — Z79.01 WARFARIN ANTICOAGULATION: ICD-10-CM

## 2019-06-18 RX ORDER — WARFARIN SODIUM 5 MG/1
TABLET ORAL
Qty: 60 TABLET | Refills: 2
Start: 2019-06-18 | End: 2020-02-27

## 2019-06-20 ENCOUNTER — OFFICE VISIT (OUTPATIENT)
Dept: CARDIOLOGY CLINIC | Age: 54
End: 2019-06-20
Payer: MEDICARE

## 2019-06-20 VITALS
WEIGHT: 277 LBS | HEART RATE: 72 BPM | BODY MASS INDEX: 37.57 KG/M2 | SYSTOLIC BLOOD PRESSURE: 124 MMHG | DIASTOLIC BLOOD PRESSURE: 80 MMHG

## 2019-06-20 DIAGNOSIS — Z95.1 HX OF CABG: ICD-10-CM

## 2019-06-20 DIAGNOSIS — I49.3 PVC (PREMATURE VENTRICULAR CONTRACTION): ICD-10-CM

## 2019-06-20 DIAGNOSIS — I10 ESSENTIAL HYPERTENSION: ICD-10-CM

## 2019-06-20 DIAGNOSIS — Z98.61 HISTORY OF PTCA: ICD-10-CM

## 2019-06-20 DIAGNOSIS — Z95.2 S/P AVR (AORTIC VALVE REPLACEMENT): ICD-10-CM

## 2019-06-20 DIAGNOSIS — I48.0 PAROXYSMAL ATRIAL FIBRILLATION (HCC): ICD-10-CM

## 2019-06-20 DIAGNOSIS — I25.10 CAD IN NATIVE ARTERY: Primary | ICD-10-CM

## 2019-06-20 PROCEDURE — 3017F COLORECTAL CA SCREEN DOC REV: CPT | Performed by: INTERNAL MEDICINE

## 2019-06-20 PROCEDURE — 99214 OFFICE O/P EST MOD 30 MIN: CPT | Performed by: INTERNAL MEDICINE

## 2019-06-20 PROCEDURE — G8598 ASA/ANTIPLAT THER USED: HCPCS | Performed by: INTERNAL MEDICINE

## 2019-06-20 PROCEDURE — 4004F PT TOBACCO SCREEN RCVD TLK: CPT | Performed by: INTERNAL MEDICINE

## 2019-06-20 PROCEDURE — G8417 CALC BMI ABV UP PARAM F/U: HCPCS | Performed by: INTERNAL MEDICINE

## 2019-06-20 PROCEDURE — G8427 DOCREV CUR MEDS BY ELIG CLIN: HCPCS | Performed by: INTERNAL MEDICINE

## 2019-06-20 NOTE — PROGRESS NOTES
Subjective:      Patient ID: Romana Leyland is a 52 y.o. male. HPI  Mr. Charlotte Euceda is here today for follow up CAD/CABG/AVR/Afib/HTN. No complaints. Remains active. Wt down again. Still smoking. Less though. No exertional sx. No sob/cp. No pnd. No orthopnea. No tachycardia. No syncope. BP good at home. Rhythm stable.          Past Medical History:   Diagnosis Date    Abdominal hernia     s/p repair 2010    Aortic valve prosthesis present     CAD (coronary artery disease)     Chronic back pain greater than 3 months duration     Clostridium difficile diarrhea 10/17/2016    PCR    DDD (degenerative disc disease), lumbosacral 8/7/2013    Erectile dysfunction     GERD (gastroesophageal reflux disease)     Hyperlipidemia     Hypertension     Joint pain     Left testicular cancer ([facility] [address] 2002    s/p abdominal resection    Myalgia and myositis, unspecified 8/26/2013    Neuropathy     OA (osteoarthritis) of knee     bilateral    Obesity, Class I, BMI 30.0-34.9 (see actual BMI)     Prolonged emergence from general anesthesia     after CABG    S/P AVR 2005    tissue valve    S/P CABG x 2 2005    w/AVR & primary repair of dissected ascending aorta     Past Surgical History:   Procedure Laterality Date    AORTA SURGERY  08/27/2004    Dr. Sunita Tobar - primary repair of limited ascending aortic dissection    AORTIC VALVE REPLACEMENT  05/30/2017    Dr. Abhijit Granados - redo w/25mm Hernandez Kojo ThermaFix model 3300 bovine pericardial bioprosthesis    AORTIC VALVE SURGERY  08/27/2004    Dr. Sunita Tobar - 27mm Kelton-Castelan pericardial prosthesis     CARDIAC CATHETERIZATION  05/09/2017    Dr. Kalani Alexander  08/26/2004    Dr. Lianet Szymanski [facility] 03/17/2015    Dr. Kenia Muller  10/10/2016    Dr. Cathi Prajapati - w/laparascopic lysis of extensive adhesions, open transverse colon resection, excision of old abd mesh adhering to colon    CORONARY ANGIOPLASTY WITH STENT PLACEMENT  2014    CORONARY ARTERY BYPASS GRAFT  2004    Dr. Vanessa Diop - x2 (LIMA-LAD, SVG sequentially to ascending aorta & D1)    CORONARY ARTERY BYPASS GRAFT  2017    Dr. Geralynn Osler - redo x3 (reverse AC SVG sequentially to D1, OM1 & PDA) w/explant of prior prosthetic valve & removal of embedded sternal wires x7    EMG  2012    FOOT SURGERY Left 2012    Dr. Francisco Bourgeois, DPM - hallux nail evulsion & exostectomy    INCISIONAL HERNIA REPAIR      multiple    LUMBAR DISCECTOMY      L4-5    SHOULDER ARTHROSCOPY Right 2013    Dr. Allen Nolasco - w/subacromial decompression, debridement of the SLAP tear, distal clavicle excision    SOFT TISSUE TUMOR RESECTION      retroperitoneal mass    TESTICLE REMOVAL Left     radical orchiectomy    TRANSESOPHAGEAL ECHOCARDIOGRAM  2017    during redo CABG & AVR    TUNNELED VENOUS PORT PLACEMENT  2002    portacath      Social History     Socioeconomic History    Marital status:       Spouse name: Not on file    Number of children: Not on file    Years of education: Not on file    Highest education level: Not on file   Occupational History    Not on file   Social Needs    Financial resource strain: Not on file    Food insecurity:     Worry: Not on file     Inability: Not on file    Transportation needs:     Medical: Not on file     Non-medical: Not on file   Tobacco Use    Smoking status: Current Some Day Smoker     Packs/day: 0.10     Years: 32.00     Pack years: 3.20     Types: Cigarettes     Last attempt to quit: 2017     Years since quittin.0    Smokeless tobacco: Never Used    Tobacco comment: stopped 5-18   Substance and Sexual Activity    Alcohol use: No     Alcohol/week: 0.0 oz    Drug use: No    Sexual activity: Yes     Partners: Female   Lifestyle    Physical activity:     Days per week: Not on file     Minutes per session: Not on file    Stress: Not on file Relationships    Social connections:     Talks on phone: Not on file     Gets together: Not on file     Attends Denominational service: Not on file     Active member of club or organization: Not on file     Attends meetings of clubs or organizations: Not on file     Relationship status: Not on file    Intimate partner violence:     Fear of current or ex partner: Not on file     Emotionally abused: Not on file     Physically abused: Not on file     Forced sexual activity: Not on file   Other Topics Concern    Not on file   Social History Narrative    Not on file     FH reviewed, denies FH cardiac issues     Vitals:    06/20/19 1028   BP: 124/80   Pulse: 72       Wt 277    Review of Systems   Constitutional: Negative for activity change. Has  fatigue. Respiratory: Negative for apnea, chest tightness and shortness of breath. Cardiovascular: Negative for chest pain, palpitations and leg swelling. No PND or orthopnea. No tachycardia. Gastrointestinal: Negative for abdominal distention. Musculoskeletal: Negative for myalgias. Neurological: Negative for dizziness, syncope and light-headedness. Psychiatric/Behavioral: Negative for behavioral problems, confusion and agitation. All other systems reviewed negative as done      Objective:   Physical Exam   Constitutional: He is oriented to person, place, and time. He appears well-developed and well-nourished. No distress. HENT:   Head: Normocephalic and atraumatic. Eyes: Conjunctivae and EOM are normal. Right eye exhibits no discharge. Left eye exhibits no discharge. Neck: Normal range of motion. Neck supple. No JVD present. Cardiovascular: Normal rate, regular rhythm and normal heart sounds. Exam reveals no gallop. 1/6 syst murmur heard. Pulmonary/Chest: Effort normal and breath sounds normal. No respiratory distress. He has min wheezes. He has no rales. Abdominal: Soft. Bowel sounds are normal. There is no tenderness.    Musculoskeletal: Normal range of motion. He exhibits no edema. Neurological: He is alert and oriented to person, place, and time. Skin: Skin is warm and dry. Psychiatric: He has a normal mood and affect. His behavior is normal. Thought content normal.       Assessment:         Diagnosis Orders   1. CAD in native artery     2. S/P AVR (aortic valve replacement)     3. Hx of CABG     4. History of PTCA     5. PVC (premature ventricular contraction)     6. Paroxysmal atrial fibrillation (HCC)     7. Essential hypertension           Plan:      CV stable. Rhythm stable. No angina. Remains compensated. BP good. Remains compensated. Possible colectomy for polyp. Reviewed previous records and testing including echo 5/17 and cath 5/17. Continues to smoke. Encouraged to stop. Wt better. Encouraged diet, exercise and wt. No changes. Continue to monitor. Lipids per PCP. Follow up 3 months.

## 2019-06-27 NOTE — PROGRESS NOTES
Paul    COLECTOMY  10/10/2016    Dr. Jake Engle - w/laparascopic lysis of extensive adhesions, open transverse colon resection, excision of old abd mesh adhering to colon    CORONARY ANGIOPLASTY WITH STENT PLACEMENT  2014    CORONARY ARTERY BYPASS GRAFT  2004    Dr. Cervantes Screen - x2 (LIMA-LAD, SVG sequentially to ascending aorta & D1)    CORONARY ARTERY BYPASS GRAFT  2017    Dr. Cassy Mayfield - redo x3 (reverse AC SVG sequentially to D1, OM1 & PDA) w/explant of prior prosthetic valve & removal of embedded sternal wires x7    EMG  2012    FOOT SURGERY Left 2012    Dr. Yamilex Kapoor, DPM - hallux nail evulsion & exostectomy    INCISIONAL HERNIA REPAIR      multiple    LUMBAR DISCECTOMY  2012    L4-5    SHOULDER ARTHROSCOPY Right 2013    Dr. Karrie Bajwa - w/subacromial decompression, debridement of the SLAP tear, distal clavicle excision    SOFT TISSUE TUMOR RESECTION      retroperitoneal mass    TESTICLE REMOVAL Left     radical orchiectomy    TRANSESOPHAGEAL ECHOCARDIOGRAM  2017    during redo CABG & AVR    TUNNELED VENOUS PORT PLACEMENT      portacath      Social:   Social History     Tobacco Use    Smoking status: Current Some Day Smoker     Packs/day: 0.10     Years: 32.00     Pack years: 3.20     Types: Cigarettes     Last attempt to quit: 2017     Years since quittin.1    Smokeless tobacco: Never Used    Tobacco comment: stopped 5-18   Substance Use Topics    Alcohol use: No     Alcohol/week: 0.0 oz    Drug use: No     Family History   Problem Relation Age of Onset    Cancer Mother     Cancer Father         lung    Alzheimer's Disease Sister     Liver Cancer Brother      Allergies: Atorvastatin calcium [lipitor] and Vicodin [hydrocodone-acetaminophen]  Current Outpatient Medications   Medication Sig Dispense Refill    warfarin (COUMADIN) 5 MG tablet Start with 5 mg daily; dose will be adjusted as needed by Dr. Isa Turcios

## 2019-06-28 ENCOUNTER — OFFICE VISIT (OUTPATIENT)
Dept: SURGERY | Age: 54
End: 2019-06-28
Payer: MEDICARE

## 2019-06-28 VITALS
TEMPERATURE: 97.5 F | HEART RATE: 70 BPM | SYSTOLIC BLOOD PRESSURE: 114 MMHG | HEIGHT: 72 IN | DIASTOLIC BLOOD PRESSURE: 77 MMHG | OXYGEN SATURATION: 98 % | WEIGHT: 277 LBS | BODY MASS INDEX: 37.52 KG/M2

## 2019-06-28 DIAGNOSIS — K63.89 CECUM MASS: ICD-10-CM

## 2019-06-28 DIAGNOSIS — Z90.49 HISTORY OF PARTIAL COLECTOMY: Primary | ICD-10-CM

## 2019-06-28 PROCEDURE — 99215 OFFICE O/P EST HI 40 MIN: CPT | Performed by: SURGERY

## 2019-06-28 PROCEDURE — G8598 ASA/ANTIPLAT THER USED: HCPCS | Performed by: SURGERY

## 2019-06-28 PROCEDURE — 3017F COLORECTAL CA SCREEN DOC REV: CPT | Performed by: SURGERY

## 2019-06-28 PROCEDURE — G8417 CALC BMI ABV UP PARAM F/U: HCPCS | Performed by: SURGERY

## 2019-06-28 PROCEDURE — G8427 DOCREV CUR MEDS BY ELIG CLIN: HCPCS | Performed by: SURGERY

## 2019-06-28 PROCEDURE — 4004F PT TOBACCO SCREEN RCVD TLK: CPT | Performed by: SURGERY

## 2019-07-01 ENCOUNTER — TELEPHONE (OUTPATIENT)
Dept: SURGERY | Age: 54
End: 2019-07-01

## 2019-07-11 DIAGNOSIS — Z95.2 S/P AORTIC VALVE REPLACEMENT: Chronic | ICD-10-CM

## 2019-07-11 DIAGNOSIS — I10 ESSENTIAL HYPERTENSION: Chronic | ICD-10-CM

## 2019-07-11 DIAGNOSIS — I25.10 CORONARY ARTERY DISEASE INVOLVING NATIVE CORONARY ARTERY OF NATIVE HEART WITHOUT ANGINA PECTORIS: ICD-10-CM

## 2019-07-12 RX ORDER — METOPROLOL TARTRATE 50 MG/1
TABLET, FILM COATED ORAL
Qty: 180 TABLET | Refills: 3 | Status: SHIPPED | OUTPATIENT
Start: 2019-07-12 | End: 2019-12-18 | Stop reason: SDUPTHER

## 2019-07-12 RX ORDER — FAMOTIDINE 20 MG/1
TABLET, FILM COATED ORAL
Qty: 180 TABLET | Refills: 0 | Status: SHIPPED | OUTPATIENT
Start: 2019-07-12 | End: 2019-10-23 | Stop reason: SDUPTHER

## 2019-07-12 RX ORDER — LISINOPRIL 10 MG/1
TABLET ORAL
Qty: 180 TABLET | Refills: 0 | Status: SHIPPED | OUTPATIENT
Start: 2019-07-12 | End: 2019-10-23 | Stop reason: SDUPTHER

## 2019-07-12 RX ORDER — ROSUVASTATIN CALCIUM 20 MG/1
TABLET, COATED ORAL
Qty: 90 TABLET | Refills: 0 | Status: SHIPPED | OUTPATIENT
Start: 2019-07-12 | End: 2019-10-23 | Stop reason: SDUPTHER

## 2019-07-16 ENCOUNTER — TELEPHONE (OUTPATIENT)
Dept: SURGERY | Age: 54
End: 2019-07-16

## 2019-07-16 NOTE — TELEPHONE ENCOUNTER
Patient states he is returning a call from Arigo. Please try to reach him again tomorrow at # [phone]. He will also try again to reach you.

## 2019-07-17 ENCOUNTER — ANTI-COAG VISIT (OUTPATIENT)
Dept: PHARMACY | Age: 54
End: 2019-07-17
Payer: MEDICARE

## 2019-07-17 ENCOUNTER — OFFICE VISIT (OUTPATIENT)
Dept: DERMATOLOGY | Age: 54
End: 2019-07-17
Payer: MEDICARE

## 2019-07-17 DIAGNOSIS — K13.0 CHEILITIS: Primary | ICD-10-CM

## 2019-07-17 DIAGNOSIS — Z95.2 S/P AORTIC VALVE REPLACEMENT: ICD-10-CM

## 2019-07-17 DIAGNOSIS — L82.1 SEBORRHEIC KERATOSIS: ICD-10-CM

## 2019-07-17 DIAGNOSIS — I48.0 PAROXYSMAL ATRIAL FIBRILLATION (HCC): ICD-10-CM

## 2019-07-17 DIAGNOSIS — L82.0 INFLAMED SEBORRHEIC KERATOSIS: ICD-10-CM

## 2019-07-17 LAB — INTERNATIONAL NORMALIZATION RATIO, POC: 2.6

## 2019-07-17 PROCEDURE — G8417 CALC BMI ABV UP PARAM F/U: HCPCS | Performed by: DERMATOLOGY

## 2019-07-17 PROCEDURE — G8427 DOCREV CUR MEDS BY ELIG CLIN: HCPCS | Performed by: DERMATOLOGY

## 2019-07-17 PROCEDURE — 17110 DESTRUCTION B9 LES UP TO 14: CPT | Performed by: DERMATOLOGY

## 2019-07-17 PROCEDURE — 85610 PROTHROMBIN TIME: CPT

## 2019-07-17 PROCEDURE — G8598 ASA/ANTIPLAT THER USED: HCPCS | Performed by: DERMATOLOGY

## 2019-07-17 PROCEDURE — 99211 OFF/OP EST MAY X REQ PHY/QHP: CPT

## 2019-07-17 PROCEDURE — 3017F COLORECTAL CA SCREEN DOC REV: CPT | Performed by: DERMATOLOGY

## 2019-07-17 PROCEDURE — 4004F PT TOBACCO SCREEN RCVD TLK: CPT | Performed by: DERMATOLOGY

## 2019-07-17 PROCEDURE — 99213 OFFICE O/P EST LOW 20 MIN: CPT | Performed by: DERMATOLOGY

## 2019-07-22 ENCOUNTER — TELEPHONE (OUTPATIENT)
Dept: PHARMACY | Age: 54
End: 2019-07-22

## 2019-07-22 NOTE — TELEPHONE ENCOUNTER
Patient LVM to report he is scheduled for robotic vs laparoscopic right colectomy and cholecystectomy per Dr. Roby Chong on 7/26. He is holding warfarin x 5 days PTP. Next INR check is not scheduled until 8/21. Called patient back and LVM to discuss moving INR check up to 1-2 weeks post-op, depending on how long he will be hospitalized after surgery. Sarah Osman.  Karin MannD  [facility]  Ph: [phone]  7/22/2019 6:45 PM

## 2019-07-23 NOTE — PROGRESS NOTES
hospitalization, the order may or may not be in effect during this procedure. I give my doctor permission to give me blood or blood products. I understand that there are risks with receiving blood such as hepatitis, AIDS, fever, or allergic reaction. I acknowledge that the risks, benefits, and alternatives of this treatment have been explained to me and that no express or implied warranty has been given by the hospital, any blood bank, or any person or entity as to the blood or blood components transfused. At the discretion of my doctor, I agree to allow observers, equipment/product representatives and allow photographing, and/or televising of the procedure, provided my name or identity is maintained confidentially. I agree the hospital may dispose of or use for scientific or educational purposes any tissue, fluid, or body parts which may be removed.     ________________________________Date________Time______ am/pm  ([facility])  Patient or Signature of Closest Relative or Legal Guardian    ________________________________Date________Time______am/pm      Page 1 of  1  Witness

## 2019-07-24 ENCOUNTER — HOSPITAL ENCOUNTER (OUTPATIENT)
Dept: PREADMISSION TESTING | Age: 54
Discharge: HOME | End: 2019-07-24
Payer: MEDICARE

## 2019-07-24 ENCOUNTER — OFFICE VISIT (OUTPATIENT)
Dept: INTERNAL MEDICINE CLINIC | Age: 54
End: 2019-07-24
Payer: MEDICARE

## 2019-07-24 VITALS
HEIGHT: 72 IN | HEART RATE: 63 BPM | RESPIRATION RATE: 20 BRPM | TEMPERATURE: 98.9 F | SYSTOLIC BLOOD PRESSURE: 147 MMHG | DIASTOLIC BLOOD PRESSURE: 86 MMHG | WEIGHT: 280 LBS | OXYGEN SATURATION: 96 % | BODY MASS INDEX: 37.93 KG/M2

## 2019-07-24 DIAGNOSIS — R46.89 HEALTH SEEKING BEHAVIOR: Primary | ICD-10-CM

## 2019-07-24 PROCEDURE — 99213 OFFICE O/P EST LOW 20 MIN: CPT | Performed by: STUDENT IN AN ORGANIZED HEALTH CARE EDUCATION/TRAINING PROGRAM

## 2019-07-24 NOTE — TELEPHONE ENCOUNTER
Patient called back to discuss upcoming procedure. He was told he will be hospitalized for ~5 days post-op. Patient will call when released from the hospital to reschedule INR check. [facility].  Edmund Hewitt, PharmD  [facility]  Ph: [phone]  7/24/2019 12:52 PM

## 2019-07-25 ENCOUNTER — ANESTHESIA EVENT (OUTPATIENT)
Dept: OPERATING ROOM | Age: 54
DRG: 330 | End: 2019-07-25
Payer: MEDICARE

## 2019-07-26 ENCOUNTER — ANESTHESIA (OUTPATIENT)
Dept: OPERATING ROOM | Age: 54
DRG: 330 | End: 2019-07-26
Payer: MEDICARE

## 2019-07-26 ENCOUNTER — HOSPITAL ENCOUNTER (INPATIENT)
Age: 54
LOS: 4 days | Discharge: HOME | DRG: 330 | End: 2019-07-30
Attending: SURGERY | Admitting: SURGERY
Payer: MEDICARE

## 2019-07-26 ENCOUNTER — APPOINTMENT (OUTPATIENT)
Dept: GENERAL RADIOLOGY | Age: 54
DRG: 330 | End: 2019-07-26
Attending: SURGERY
Payer: MEDICARE

## 2019-07-26 VITALS — DIASTOLIC BLOOD PRESSURE: 69 MMHG | TEMPERATURE: 97 F | OXYGEN SATURATION: 91 % | SYSTOLIC BLOOD PRESSURE: 130 MMHG

## 2019-07-26 PROBLEM — K63.89 COLONIC MASS: Status: ACTIVE | Noted: 2019-07-26

## 2019-07-26 LAB
A/G RATIO: 1.4 (ref 1.1–2.2)
ABO/RH: NORMAL
ALBUMIN SERPL-MCNC: 4.3 G/DL (ref 3.4–5)
ALP BLD-CCNC: 58 U/L (ref 40–129)
ALT SERPL-CCNC: 42 U/L (ref 10–40)
ANION GAP SERPL CALCULATED.3IONS-SCNC: 11 MMOL/L (ref 3–16)
ANTIBODY SCREEN: NORMAL
APTT: 37.8 SEC (ref 26–36)
AST SERPL-CCNC: 36 U/L (ref 15–37)
BILIRUB SERPL-MCNC: 0.5 MG/DL (ref 0–1)
BUN BLDV-MCNC: 7 MG/DL (ref 7–20)
CALCIUM SERPL-MCNC: 9.1 MG/DL (ref 8.3–10.6)
CHLORIDE BLD-SCNC: 96 MMOL/L (ref 99–110)
CO2: 27 MMOL/L (ref 21–32)
CREAT SERPL-MCNC: 0.8 MG/DL (ref 0.9–1.3)
EKG ATRIAL RATE: 66 BPM
EKG DIAGNOSIS: NORMAL
EKG P AXIS: 33 DEGREES
EKG P-R INTERVAL: 196 MS
EKG Q-T INTERVAL: 410 MS
EKG QRS DURATION: 106 MS
EKG QTC CALCULATION (BAZETT): 429 MS
EKG R AXIS: 41 DEGREES
EKG T AXIS: 72 DEGREES
EKG VENTRICULAR RATE: 66 BPM
GFR AFRICAN AMERICAN: >60
GFR NON-AFRICAN AMERICAN: >60
GLOBULIN: 3 G/DL
GLUCOSE BLD-MCNC: 130 MG/DL (ref 70–99)
HCT VFR BLD CALC: 42.3 % (ref 40.5–52.5)
HEMOGLOBIN: 14 G/DL (ref 13.5–17.5)
INR BLD: 1.09 (ref 0.86–1.14)
MCH RBC QN AUTO: 32.6 PG (ref 26–34)
MCHC RBC AUTO-ENTMCNC: 33.1 G/DL (ref 31–36)
MCV RBC AUTO: 98.6 FL (ref 80–100)
PDW BLD-RTO: 15.9 % (ref 12.4–15.4)
PLATELET # BLD: 190 K/UL (ref 135–450)
PMV BLD AUTO: 8 FL (ref 5–10.5)
POTASSIUM SERPL-SCNC: 4.2 MMOL/L (ref 3.5–5.1)
PROTHROMBIN TIME: 12.4 SEC (ref 9.8–13)
RBC # BLD: 4.29 M/UL (ref 4.2–5.9)
SODIUM BLD-SCNC: 134 MMOL/L (ref 136–145)
TOTAL PROTEIN: 7.3 G/DL (ref 6.4–8.2)
WBC # BLD: 8.2 K/UL (ref 4–11)

## 2019-07-26 PROCEDURE — 47562 LAPAROSCOPIC CHOLECYSTECTOMY: CPT | Performed by: SURGERY

## 2019-07-26 PROCEDURE — 49905 OMENTAL FLAP INTRA-ABDOM: CPT | Performed by: SURGERY

## 2019-07-26 PROCEDURE — 2580000003 HC RX 258: Performed by: STUDENT IN AN ORGANIZED HEALTH CARE EDUCATION/TRAINING PROGRAM

## 2019-07-26 PROCEDURE — 85730 THROMBOPLASTIN TIME PARTIAL: CPT

## 2019-07-26 PROCEDURE — 2500000003 HC RX 250 WO HCPCS: Performed by: STUDENT IN AN ORGANIZED HEALTH CARE EDUCATION/TRAINING PROGRAM

## 2019-07-26 PROCEDURE — 88307 TISSUE EXAM BY PATHOLOGIST: CPT

## 2019-07-26 PROCEDURE — 7100000000 HC PACU RECOVERY - FIRST 15 MIN: Performed by: SURGERY

## 2019-07-26 PROCEDURE — 86900 BLOOD TYPING SEROLOGIC ABO: CPT

## 2019-07-26 PROCEDURE — 1200000000 HC SEMI PRIVATE

## 2019-07-26 PROCEDURE — 7100000001 HC PACU RECOVERY - ADDTL 15 MIN: Performed by: SURGERY

## 2019-07-26 PROCEDURE — 8E0W4CZ ROBOTIC ASSISTED PROCEDURE OF TRUNK REGION, PERCUTANEOUS ENDOSCOPIC APPROACH: ICD-10-PCS | Performed by: SURGERY

## 2019-07-26 PROCEDURE — 2580000003 HC RX 258: Performed by: SURGERY

## 2019-07-26 PROCEDURE — 2580000003 HC RX 258: Performed by: NURSE ANESTHETIST, CERTIFIED REGISTERED

## 2019-07-26 PROCEDURE — 2500000003 HC RX 250 WO HCPCS: Performed by: SURGERY

## 2019-07-26 PROCEDURE — 93005 ELECTROCARDIOGRAM TRACING: CPT | Performed by: SURGERY

## 2019-07-26 PROCEDURE — 6360000002 HC RX W HCPCS: Performed by: ANESTHESIOLOGY

## 2019-07-26 PROCEDURE — 15860 IV NJX TST VASC FLO FLAP/GRF: CPT | Performed by: SURGERY

## 2019-07-26 PROCEDURE — 94761 N-INVAS EAR/PLS OXIMETRY MLT: CPT

## 2019-07-26 PROCEDURE — C9113 INJ PANTOPRAZOLE SODIUM, VIA: HCPCS | Performed by: STUDENT IN AN ORGANIZED HEALTH CARE EDUCATION/TRAINING PROGRAM

## 2019-07-26 PROCEDURE — 71046 X-RAY EXAM CHEST 2 VIEWS: CPT

## 2019-07-26 PROCEDURE — 80053 COMPREHEN METABOLIC PANEL: CPT

## 2019-07-26 PROCEDURE — 6360000002 HC RX W HCPCS: Performed by: NURSE ANESTHETIST, CERTIFIED REGISTERED

## 2019-07-26 PROCEDURE — 6370000000 HC RX 637 (ALT 250 FOR IP): Performed by: STUDENT IN AN ORGANIZED HEALTH CARE EDUCATION/TRAINING PROGRAM

## 2019-07-26 PROCEDURE — 3600000019 HC SURGERY ROBOT ADDTL 15MIN: Performed by: SURGERY

## 2019-07-26 PROCEDURE — 94150 VITAL CAPACITY TEST: CPT

## 2019-07-26 PROCEDURE — 2720000010 HC SURG SUPPLY STERILE: Performed by: SURGERY

## 2019-07-26 PROCEDURE — 6360000002 HC RX W HCPCS

## 2019-07-26 PROCEDURE — 6360000002 HC RX W HCPCS: Performed by: STUDENT IN AN ORGANIZED HEALTH CARE EDUCATION/TRAINING PROGRAM

## 2019-07-26 PROCEDURE — 3700000000 HC ANESTHESIA ATTENDED CARE: Performed by: SURGERY

## 2019-07-26 PROCEDURE — 0DN83ZZ RELEASE SMALL INTESTINE, PERCUTANEOUS APPROACH: ICD-10-PCS | Performed by: SURGERY

## 2019-07-26 PROCEDURE — 86901 BLOOD TYPING SEROLOGIC RH(D): CPT

## 2019-07-26 PROCEDURE — 2709999900 HC NON-CHARGEABLE SUPPLY: Performed by: SURGERY

## 2019-07-26 PROCEDURE — 85610 PROTHROMBIN TIME: CPT

## 2019-07-26 PROCEDURE — 0FT44ZZ RESECTION OF GALLBLADDER, PERCUTANEOUS ENDOSCOPIC APPROACH: ICD-10-PCS | Performed by: SURGERY

## 2019-07-26 PROCEDURE — 6360000002 HC RX W HCPCS: Performed by: SURGERY

## 2019-07-26 PROCEDURE — 2500000003 HC RX 250 WO HCPCS: Performed by: NURSE ANESTHETIST, CERTIFIED REGISTERED

## 2019-07-26 PROCEDURE — BF12YZZ FLUOROSCOPY OF GALLBLADDER USING OTHER CONTRAST: ICD-10-PCS | Performed by: SURGERY

## 2019-07-26 PROCEDURE — 93010 ELECTROCARDIOGRAM REPORT: CPT | Performed by: INTERNAL MEDICINE

## 2019-07-26 PROCEDURE — 88304 TISSUE EXAM BY PATHOLOGIST: CPT

## 2019-07-26 PROCEDURE — 2580000003 HC RX 258: Performed by: ANESTHESIOLOGY

## 2019-07-26 PROCEDURE — 85027 COMPLETE CBC AUTOMATED: CPT

## 2019-07-26 PROCEDURE — 0DTH4ZZ RESECTION OF CECUM, PERCUTANEOUS ENDOSCOPIC APPROACH: ICD-10-PCS | Performed by: SURGERY

## 2019-07-26 PROCEDURE — 2700000000 HC OXYGEN THERAPY PER DAY

## 2019-07-26 PROCEDURE — 44205 LAP COLECTOMY PART W/ILEUM: CPT | Performed by: SURGERY

## 2019-07-26 PROCEDURE — S2900 ROBOTIC SURGICAL SYSTEM: HCPCS | Performed by: SURGERY

## 2019-07-26 PROCEDURE — 3600000009 HC SURGERY ROBOT BASE: Performed by: SURGERY

## 2019-07-26 PROCEDURE — 3700000001 HC ADD 15 MINUTES (ANESTHESIA): Performed by: SURGERY

## 2019-07-26 PROCEDURE — 2500000003 HC RX 250 WO HCPCS: Performed by: ANESTHESIOLOGY

## 2019-07-26 PROCEDURE — 86850 RBC ANTIBODY SCREEN: CPT

## 2019-07-26 RX ORDER — LIDOCAINE HYDROCHLORIDE 20 MG/ML
INJECTION, SOLUTION INTRAVENOUS PRN
Status: DISCONTINUED | OUTPATIENT
Start: 2019-07-26 | End: 2019-07-26 | Stop reason: SDUPTHER

## 2019-07-26 RX ORDER — MAGNESIUM HYDROXIDE 1200 MG/15ML
LIQUID ORAL CONTINUOUS PRN
Status: COMPLETED | OUTPATIENT
Start: 2019-07-26 | End: 2019-07-26

## 2019-07-26 RX ORDER — GABAPENTIN 300 MG/1
300 CAPSULE ORAL 3 TIMES DAILY
Status: DISCONTINUED | OUTPATIENT
Start: 2019-07-26 | End: 2019-07-30 | Stop reason: HOSPADM

## 2019-07-26 RX ORDER — HYDROMORPHONE HCL 110MG/55ML
PATIENT CONTROLLED ANALGESIA SYRINGE INTRAVENOUS PRN
Status: DISCONTINUED | OUTPATIENT
Start: 2019-07-26 | End: 2019-07-26 | Stop reason: SDUPTHER

## 2019-07-26 RX ORDER — SODIUM CHLORIDE, SODIUM LACTATE, POTASSIUM CHLORIDE, AND CALCIUM CHLORIDE .6; .31; .03; .02 G/100ML; G/100ML; G/100ML; G/100ML
IRRIGANT IRRIGATION PRN
Status: DISCONTINUED | OUTPATIENT
Start: 2019-07-26 | End: 2019-07-26 | Stop reason: ALTCHOICE

## 2019-07-26 RX ORDER — FAMOTIDINE 20 MG/1
TABLET, FILM COATED ORAL
Status: CANCELLED | OUTPATIENT
Start: 2019-07-26

## 2019-07-26 RX ORDER — SODIUM CHLORIDE 0.9 % (FLUSH) 0.9 %
10 SYRINGE (ML) INJECTION EVERY 12 HOURS SCHEDULED
Status: DISCONTINUED | OUTPATIENT
Start: 2019-07-26 | End: 2019-07-26 | Stop reason: HOSPADM

## 2019-07-26 RX ORDER — PROPOFOL 10 MG/ML
INJECTION, EMULSION INTRAVENOUS PRN
Status: DISCONTINUED | OUTPATIENT
Start: 2019-07-26 | End: 2019-07-26 | Stop reason: SDUPTHER

## 2019-07-26 RX ORDER — METOPROLOL TARTRATE 5 MG/5ML
5 INJECTION INTRAVENOUS EVERY 6 HOURS
Status: DISCONTINUED | OUTPATIENT
Start: 2019-07-26 | End: 2019-07-30 | Stop reason: HOSPADM

## 2019-07-26 RX ORDER — SODIUM CHLORIDE, SODIUM LACTATE, POTASSIUM CHLORIDE, CALCIUM CHLORIDE 600; 310; 30; 20 MG/100ML; MG/100ML; MG/100ML; MG/100ML
INJECTION, SOLUTION INTRAVENOUS CONTINUOUS PRN
Status: DISCONTINUED | OUTPATIENT
Start: 2019-07-26 | End: 2019-07-26 | Stop reason: SDUPTHER

## 2019-07-26 RX ORDER — LABETALOL 20 MG/4 ML (5 MG/ML) INTRAVENOUS SYRINGE
5 EVERY 10 MIN PRN
Status: DISCONTINUED | OUTPATIENT
Start: 2019-07-26 | End: 2019-07-26 | Stop reason: HOSPADM

## 2019-07-26 RX ORDER — ROSUVASTATIN CALCIUM 20 MG/1
TABLET, COATED ORAL EVERY EVENING
Status: CANCELLED | OUTPATIENT
Start: 2019-07-26

## 2019-07-26 RX ORDER — SUCCINYLCHOLINE/SOD CL,ISO/PF 200MG/10ML
SYRINGE (ML) INTRAVENOUS PRN
Status: DISCONTINUED | OUTPATIENT
Start: 2019-07-26 | End: 2019-07-26 | Stop reason: SDUPTHER

## 2019-07-26 RX ORDER — DIPHENHYDRAMINE HYDROCHLORIDE 50 MG/ML
INJECTION INTRAMUSCULAR; INTRAVENOUS
Status: COMPLETED
Start: 2019-07-26 | End: 2019-07-26

## 2019-07-26 RX ORDER — MIDAZOLAM HYDROCHLORIDE 1 MG/ML
2 INJECTION INTRAMUSCULAR; INTRAVENOUS ONCE
Status: COMPLETED | OUTPATIENT
Start: 2019-07-26 | End: 2019-07-26

## 2019-07-26 RX ORDER — BUPIVACAINE HYDROCHLORIDE AND EPINEPHRINE 5; 5 MG/ML; UG/ML
INJECTION, SOLUTION EPIDURAL; INTRACAUDAL; PERINEURAL PRN
Status: DISCONTINUED | OUTPATIENT
Start: 2019-07-26 | End: 2019-07-26 | Stop reason: ALTCHOICE

## 2019-07-26 RX ORDER — INDOCYANINE GREEN AND WATER 25 MG
KIT INJECTION PRN
Status: DISCONTINUED | OUTPATIENT
Start: 2019-07-26 | End: 2019-07-26 | Stop reason: SDUPTHER

## 2019-07-26 RX ORDER — SODIUM CHLORIDE, SODIUM LACTATE, POTASSIUM CHLORIDE, CALCIUM CHLORIDE 600; 310; 30; 20 MG/100ML; MG/100ML; MG/100ML; MG/100ML
INJECTION, SOLUTION INTRAVENOUS CONTINUOUS
Status: DISCONTINUED | OUTPATIENT
Start: 2019-07-26 | End: 2019-07-26

## 2019-07-26 RX ORDER — AMLODIPINE BESYLATE 5 MG/1
5 TABLET ORAL NIGHTLY PRN
Status: CANCELLED | OUTPATIENT
Start: 2019-07-26

## 2019-07-26 RX ORDER — MIDAZOLAM HYDROCHLORIDE 1 MG/ML
2 INJECTION INTRAMUSCULAR; INTRAVENOUS ONCE
Status: DISCONTINUED | OUTPATIENT
Start: 2019-07-26 | End: 2019-07-26 | Stop reason: HOSPADM

## 2019-07-26 RX ORDER — HYDRALAZINE HYDROCHLORIDE 20 MG/ML
5 INJECTION INTRAMUSCULAR; INTRAVENOUS EVERY 10 MIN PRN
Status: DISCONTINUED | OUTPATIENT
Start: 2019-07-26 | End: 2019-07-26 | Stop reason: HOSPADM

## 2019-07-26 RX ORDER — 0.9 % SODIUM CHLORIDE 0.9 %
10 VIAL (ML) INJECTION DAILY
Status: DISCONTINUED | OUTPATIENT
Start: 2019-07-26 | End: 2019-07-30 | Stop reason: HOSPADM

## 2019-07-26 RX ORDER — CHLORZOXAZONE 750 MG/1
750 TABLET ORAL 3 TIMES DAILY
COMMUNITY
End: 2019-09-18 | Stop reason: ALTCHOICE

## 2019-07-26 RX ORDER — SODIUM CHLORIDE, SODIUM LACTATE, POTASSIUM CHLORIDE, CALCIUM CHLORIDE 600; 310; 30; 20 MG/100ML; MG/100ML; MG/100ML; MG/100ML
INJECTION, SOLUTION INTRAVENOUS CONTINUOUS
Status: DISCONTINUED | OUTPATIENT
Start: 2019-07-26 | End: 2019-07-30

## 2019-07-26 RX ORDER — PROMETHAZINE HYDROCHLORIDE 25 MG/ML
6.25 INJECTION, SOLUTION INTRAMUSCULAR; INTRAVENOUS
Status: DISCONTINUED | OUTPATIENT
Start: 2019-07-26 | End: 2019-07-26 | Stop reason: HOSPADM

## 2019-07-26 RX ORDER — GLYCOPYRROLATE 1 MG/5 ML
SYRINGE (ML) INTRAVENOUS PRN
Status: DISCONTINUED | OUTPATIENT
Start: 2019-07-26 | End: 2019-07-26 | Stop reason: SDUPTHER

## 2019-07-26 RX ORDER — SODIUM CHLORIDE 0.9 % (FLUSH) 0.9 %
10 SYRINGE (ML) INJECTION EVERY 12 HOURS SCHEDULED
Status: DISCONTINUED | OUTPATIENT
Start: 2019-07-26 | End: 2019-07-30 | Stop reason: HOSPADM

## 2019-07-26 RX ORDER — DEXAMETHASONE SODIUM PHOSPHATE 4 MG/ML
INJECTION, SOLUTION INTRA-ARTICULAR; INTRALESIONAL; INTRAMUSCULAR; INTRAVENOUS; SOFT TISSUE PRN
Status: DISCONTINUED | OUTPATIENT
Start: 2019-07-26 | End: 2019-07-26 | Stop reason: SDUPTHER

## 2019-07-26 RX ORDER — PANTOPRAZOLE SODIUM 40 MG/10ML
40 INJECTION, POWDER, LYOPHILIZED, FOR SOLUTION INTRAVENOUS DAILY
Status: DISCONTINUED | OUTPATIENT
Start: 2019-07-26 | End: 2019-07-30 | Stop reason: HOSPADM

## 2019-07-26 RX ORDER — ROCURONIUM BROMIDE 10 MG/ML
INJECTION, SOLUTION INTRAVENOUS PRN
Status: DISCONTINUED | OUTPATIENT
Start: 2019-07-26 | End: 2019-07-26 | Stop reason: SDUPTHER

## 2019-07-26 RX ORDER — ONDANSETRON 2 MG/ML
4 INJECTION INTRAMUSCULAR; INTRAVENOUS EVERY 6 HOURS PRN
Status: DISCONTINUED | OUTPATIENT
Start: 2019-07-26 | End: 2019-07-30 | Stop reason: HOSPADM

## 2019-07-26 RX ORDER — ONDANSETRON 2 MG/ML
INJECTION INTRAMUSCULAR; INTRAVENOUS PRN
Status: DISCONTINUED | OUTPATIENT
Start: 2019-07-26 | End: 2019-07-26 | Stop reason: SDUPTHER

## 2019-07-26 RX ORDER — DIPHENHYDRAMINE HYDROCHLORIDE 50 MG/ML
25 INJECTION INTRAMUSCULAR; INTRAVENOUS ONCE
Status: CANCELLED | OUTPATIENT
Start: 2019-07-26

## 2019-07-26 RX ORDER — ONDANSETRON 2 MG/ML
4 INJECTION INTRAMUSCULAR; INTRAVENOUS
Status: DISCONTINUED | OUTPATIENT
Start: 2019-07-26 | End: 2019-07-26 | Stop reason: HOSPADM

## 2019-07-26 RX ORDER — ACETAMINOPHEN 500 MG
1000 TABLET ORAL EVERY 6 HOURS
Status: DISCONTINUED | OUTPATIENT
Start: 2019-07-26 | End: 2019-07-27

## 2019-07-26 RX ORDER — FENTANYL CITRATE 50 UG/ML
25 INJECTION, SOLUTION INTRAMUSCULAR; INTRAVENOUS EVERY 5 MIN PRN
Status: COMPLETED | OUTPATIENT
Start: 2019-07-26 | End: 2019-07-26

## 2019-07-26 RX ORDER — MIDAZOLAM HYDROCHLORIDE 1 MG/ML
INJECTION INTRAMUSCULAR; INTRAVENOUS
Status: DISCONTINUED
Start: 2019-07-26 | End: 2019-07-26

## 2019-07-26 RX ORDER — SODIUM CHLORIDE 0.9 % (FLUSH) 0.9 %
10 SYRINGE (ML) INJECTION PRN
Status: DISCONTINUED | OUTPATIENT
Start: 2019-07-26 | End: 2019-07-30 | Stop reason: HOSPADM

## 2019-07-26 RX ORDER — FENTANYL CITRATE 50 UG/ML
INJECTION, SOLUTION INTRAMUSCULAR; INTRAVENOUS PRN
Status: DISCONTINUED | OUTPATIENT
Start: 2019-07-26 | End: 2019-07-26 | Stop reason: SDUPTHER

## 2019-07-26 RX ORDER — NEOSTIGMINE METHYLSULFATE 5 MG/5 ML
SYRINGE (ML) INTRAVENOUS PRN
Status: DISCONTINUED | OUTPATIENT
Start: 2019-07-26 | End: 2019-07-26 | Stop reason: SDUPTHER

## 2019-07-26 RX ORDER — DOCUSATE SODIUM 100 MG/1
100 CAPSULE, LIQUID FILLED ORAL EVERY OTHER DAY
Status: CANCELLED | OUTPATIENT
Start: 2019-07-26

## 2019-07-26 RX ORDER — LIDOCAINE HYDROCHLORIDE 10 MG/ML
1 INJECTION, SOLUTION EPIDURAL; INFILTRATION; INTRACAUDAL; PERINEURAL
Status: DISCONTINUED | OUTPATIENT
Start: 2019-07-26 | End: 2019-07-26 | Stop reason: HOSPADM

## 2019-07-26 RX ORDER — FENTANYL CITRATE 50 UG/ML
50 INJECTION, SOLUTION INTRAMUSCULAR; INTRAVENOUS EVERY 5 MIN PRN
Status: COMPLETED | OUTPATIENT
Start: 2019-07-26 | End: 2019-07-26

## 2019-07-26 RX ORDER — ACETAMINOPHEN 10 MG/ML
1000 INJECTION, SOLUTION INTRAVENOUS ONCE
Status: COMPLETED | OUTPATIENT
Start: 2019-07-26 | End: 2019-07-26

## 2019-07-26 RX ORDER — UREA 10 %
3 LOTION (ML) TOPICAL NIGHTLY PRN
Status: DISCONTINUED | OUTPATIENT
Start: 2019-07-26 | End: 2019-07-29

## 2019-07-26 RX ORDER — SODIUM CHLORIDE 0.9 % (FLUSH) 0.9 %
10 SYRINGE (ML) INJECTION PRN
Status: DISCONTINUED | OUTPATIENT
Start: 2019-07-26 | End: 2019-07-26 | Stop reason: HOSPADM

## 2019-07-26 RX ADMIN — HYDROMORPHONE HYDROCHLORIDE 0.5 MG: 2 INJECTION, SOLUTION INTRAMUSCULAR; INTRAVENOUS; SUBCUTANEOUS at 17:05

## 2019-07-26 RX ADMIN — METRONIDAZOLE 500 MG: 500 INJECTION, SOLUTION INTRAVENOUS at 11:12

## 2019-07-26 RX ADMIN — PHENYLEPHRINE HYDROCHLORIDE 100 MCG: 10 INJECTION, SOLUTION INTRAMUSCULAR; INTRAVENOUS; SUBCUTANEOUS at 11:16

## 2019-07-26 RX ADMIN — FENTANYL CITRATE 50 MCG: 50 INJECTION INTRAMUSCULAR; INTRAVENOUS at 17:30

## 2019-07-26 RX ADMIN — PHENYLEPHRINE HYDROCHLORIDE 100 MCG: 10 INJECTION, SOLUTION INTRAMUSCULAR; INTRAVENOUS; SUBCUTANEOUS at 12:41

## 2019-07-26 RX ADMIN — PHENYLEPHRINE HYDROCHLORIDE 100 MCG: 10 INJECTION, SOLUTION INTRAMUSCULAR; INTRAVENOUS; SUBCUTANEOUS at 12:25

## 2019-07-26 RX ADMIN — FENTANYL CITRATE 50 MCG: 50 INJECTION INTRAMUSCULAR; INTRAVENOUS at 17:45

## 2019-07-26 RX ADMIN — DIPHENHYDRAMINE HYDROCHLORIDE 25 MG: 50 INJECTION, SOLUTION INTRAMUSCULAR; INTRAVENOUS at 19:22

## 2019-07-26 RX ADMIN — HYDROMORPHONE HYDROCHLORIDE 0.5 MG: 1 INJECTION, SOLUTION INTRAMUSCULAR; INTRAVENOUS; SUBCUTANEOUS at 18:09

## 2019-07-26 RX ADMIN — SODIUM CHLORIDE, SODIUM LACTATE, POTASSIUM CHLORIDE, AND CALCIUM CHLORIDE: 600; 310; 30; 20 INJECTION, SOLUTION INTRAVENOUS at 09:39

## 2019-07-26 RX ADMIN — INDOCYANINE GREEN AND WATER 7.5 MG: KIT at 14:58

## 2019-07-26 RX ADMIN — FENTANYL CITRATE 25 MCG: 50 INJECTION INTRAMUSCULAR; INTRAVENOUS at 18:53

## 2019-07-26 RX ADMIN — Medication 10 ML: at 21:58

## 2019-07-26 RX ADMIN — PHENYLEPHRINE HYDROCHLORIDE 100 MCG: 10 INJECTION, SOLUTION INTRAMUSCULAR; INTRAVENOUS; SUBCUTANEOUS at 11:02

## 2019-07-26 RX ADMIN — FENTANYL CITRATE 50 MCG: 50 INJECTION INTRAMUSCULAR; INTRAVENOUS at 11:38

## 2019-07-26 RX ADMIN — SODIUM CHLORIDE, SODIUM LACTATE, POTASSIUM CHLORIDE, AND CALCIUM CHLORIDE: 600; 310; 30; 20 INJECTION, SOLUTION INTRAVENOUS at 11:00

## 2019-07-26 RX ADMIN — FENTANYL CITRATE 50 MCG: 50 INJECTION INTRAMUSCULAR; INTRAVENOUS at 15:50

## 2019-07-26 RX ADMIN — GABAPENTIN 300 MG: 300 CAPSULE ORAL at 21:57

## 2019-07-26 RX ADMIN — HYDROMORPHONE HYDROCHLORIDE 0.5 MG: 1 INJECTION, SOLUTION INTRAMUSCULAR; INTRAVENOUS; SUBCUTANEOUS at 22:09

## 2019-07-26 RX ADMIN — Medication 0.6 MG: at 16:36

## 2019-07-26 RX ADMIN — ROCURONIUM BROMIDE 45 MG: 10 INJECTION, SOLUTION INTRAVENOUS at 10:46

## 2019-07-26 RX ADMIN — PHENYLEPHRINE HYDROCHLORIDE 100 MCG: 10 INJECTION, SOLUTION INTRAMUSCULAR; INTRAVENOUS; SUBCUTANEOUS at 12:33

## 2019-07-26 RX ADMIN — ROCURONIUM BROMIDE 30 MG: 10 INJECTION, SOLUTION INTRAVENOUS at 13:25

## 2019-07-26 RX ADMIN — CEFTRIAXONE SODIUM 2 G: 2 INJECTION, POWDER, FOR SOLUTION INTRAMUSCULAR; INTRAVENOUS at 11:11

## 2019-07-26 RX ADMIN — FENTANYL CITRATE 50 MCG: 50 INJECTION INTRAMUSCULAR; INTRAVENOUS at 17:52

## 2019-07-26 RX ADMIN — SODIUM CHLORIDE, SODIUM LACTATE, POTASSIUM CHLORIDE, AND CALCIUM CHLORIDE: 600; 310; 30; 20 INJECTION, SOLUTION INTRAVENOUS at 10:33

## 2019-07-26 RX ADMIN — METOPROLOL TARTRATE 5 MG: 5 INJECTION INTRAVENOUS at 21:57

## 2019-07-26 RX ADMIN — FENTANYL CITRATE 25 MCG: 50 INJECTION INTRAMUSCULAR; INTRAVENOUS at 18:48

## 2019-07-26 RX ADMIN — INDOCYANINE GREEN AND WATER 7.5 MG: KIT at 11:26

## 2019-07-26 RX ADMIN — SODIUM CHLORIDE, SODIUM LACTATE, POTASSIUM CHLORIDE, AND CALCIUM CHLORIDE: 600; 310; 30; 20 INJECTION, SOLUTION INTRAVENOUS at 13:25

## 2019-07-26 RX ADMIN — PHENYLEPHRINE HYDROCHLORIDE 100 MCG: 10 INJECTION, SOLUTION INTRAMUSCULAR; INTRAVENOUS; SUBCUTANEOUS at 10:44

## 2019-07-26 RX ADMIN — FENTANYL CITRATE 50 MCG: 50 INJECTION INTRAMUSCULAR; INTRAVENOUS at 15:55

## 2019-07-26 RX ADMIN — ROCURONIUM BROMIDE 20 MG: 10 INJECTION, SOLUTION INTRAVENOUS at 14:09

## 2019-07-26 RX ADMIN — LIDOCAINE HYDROCHLORIDE 50 MG: 20 INJECTION, SOLUTION INTRAVENOUS at 10:40

## 2019-07-26 RX ADMIN — SODIUM CHLORIDE, SODIUM LACTATE, POTASSIUM CHLORIDE, AND CALCIUM CHLORIDE: 600; 310; 30; 20 INJECTION, SOLUTION INTRAVENOUS at 16:02

## 2019-07-26 RX ADMIN — HYDROMORPHONE HYDROCHLORIDE 0.5 MG: 1 INJECTION, SOLUTION INTRAMUSCULAR; INTRAVENOUS; SUBCUTANEOUS at 18:15

## 2019-07-26 RX ADMIN — HYDROMORPHONE HYDROCHLORIDE 0.5 MG: 2 INJECTION, SOLUTION INTRAMUSCULAR; INTRAVENOUS; SUBCUTANEOUS at 11:53

## 2019-07-26 RX ADMIN — PHENYLEPHRINE HYDROCHLORIDE 100 MCG: 10 INJECTION, SOLUTION INTRAMUSCULAR; INTRAVENOUS; SUBCUTANEOUS at 12:17

## 2019-07-26 RX ADMIN — DEXAMETHASONE SODIUM PHOSPHATE 4 MG: 4 INJECTION, SOLUTION INTRAMUSCULAR; INTRAVENOUS at 15:48

## 2019-07-26 RX ADMIN — ACETAMINOPHEN 1000 MG: 500 TABLET ORAL at 21:57

## 2019-07-26 RX ADMIN — PANTOPRAZOLE SODIUM 40 MG: 40 INJECTION, POWDER, LYOPHILIZED, FOR SOLUTION INTRAVENOUS at 21:57

## 2019-07-26 RX ADMIN — FENTANYL CITRATE 25 MCG: 50 INJECTION INTRAMUSCULAR; INTRAVENOUS at 19:03

## 2019-07-26 RX ADMIN — SODIUM CHLORIDE, POTASSIUM CHLORIDE, SODIUM LACTATE AND CALCIUM CHLORIDE 500 ML: 600; 310; 30; 20 INJECTION, SOLUTION INTRAVENOUS at 18:45

## 2019-07-26 RX ADMIN — FENTANYL CITRATE 50 MCG: 50 INJECTION INTRAMUSCULAR; INTRAVENOUS at 16:34

## 2019-07-26 RX ADMIN — INDOCYANINE GREEN AND WATER 5 MG: KIT at 14:24

## 2019-07-26 RX ADMIN — HYDROMORPHONE HYDROCHLORIDE 0.5 MG: 2 INJECTION, SOLUTION INTRAMUSCULAR; INTRAVENOUS; SUBCUTANEOUS at 14:19

## 2019-07-26 RX ADMIN — MIDAZOLAM 2 MG: 1 INJECTION INTRAMUSCULAR; INTRAVENOUS at 09:58

## 2019-07-26 RX ADMIN — Medication 120 MG: at 10:40

## 2019-07-26 RX ADMIN — PHENYLEPHRINE HYDROCHLORIDE 100 MCG: 10 INJECTION, SOLUTION INTRAMUSCULAR; INTRAVENOUS; SUBCUTANEOUS at 12:47

## 2019-07-26 RX ADMIN — HYDROMORPHONE HYDROCHLORIDE 0.5 MG: 1 INJECTION, SOLUTION INTRAMUSCULAR; INTRAVENOUS; SUBCUTANEOUS at 18:24

## 2019-07-26 RX ADMIN — ROCURONIUM BROMIDE 5 MG: 10 INJECTION, SOLUTION INTRAVENOUS at 10:40

## 2019-07-26 RX ADMIN — ROCURONIUM BROMIDE 20 MG: 10 INJECTION, SOLUTION INTRAVENOUS at 12:19

## 2019-07-26 RX ADMIN — METRONIDAZOLE 500 MG: 500 INJECTION, SOLUTION INTRAVENOUS at 18:52

## 2019-07-26 RX ADMIN — FENTANYL CITRATE 50 MCG: 50 INJECTION INTRAMUSCULAR; INTRAVENOUS at 17:20

## 2019-07-26 RX ADMIN — FENTANYL CITRATE 50 MCG: 50 INJECTION INTRAMUSCULAR; INTRAVENOUS at 11:29

## 2019-07-26 RX ADMIN — PROPOFOL 250 MG: 10 INJECTION, EMULSION INTRAVENOUS at 10:40

## 2019-07-26 RX ADMIN — PROPOFOL 50 MG: 10 INJECTION, EMULSION INTRAVENOUS at 13:02

## 2019-07-26 RX ADMIN — FENTANYL CITRATE 50 MCG: 50 INJECTION INTRAMUSCULAR; INTRAVENOUS at 16:31

## 2019-07-26 RX ADMIN — LABETALOL 20 MG/4 ML (5 MG/ML) INTRAVENOUS SYRINGE 5 MG: at 17:33

## 2019-07-26 RX ADMIN — SODIUM CHLORIDE, POTASSIUM CHLORIDE, SODIUM LACTATE AND CALCIUM CHLORIDE: 600; 310; 30; 20 INJECTION, SOLUTION INTRAVENOUS at 21:57

## 2019-07-26 RX ADMIN — FENTANYL CITRATE 100 MCG: 50 INJECTION INTRAMUSCULAR; INTRAVENOUS at 10:40

## 2019-07-26 RX ADMIN — ONDANSETRON 4 MG: 2 INJECTION INTRAMUSCULAR; INTRAVENOUS at 15:48

## 2019-07-26 RX ADMIN — PHENYLEPHRINE HYDROCHLORIDE 100 MCG: 10 INJECTION, SOLUTION INTRAMUSCULAR; INTRAVENOUS; SUBCUTANEOUS at 11:10

## 2019-07-26 RX ADMIN — Medication 3 MG: at 16:36

## 2019-07-26 RX ADMIN — PHENYLEPHRINE HYDROCHLORIDE 100 MCG: 10 INJECTION, SOLUTION INTRAMUSCULAR; INTRAVENOUS; SUBCUTANEOUS at 11:06

## 2019-07-26 RX ADMIN — HYDROMORPHONE HYDROCHLORIDE 0.5 MG: 2 INJECTION, SOLUTION INTRAMUSCULAR; INTRAVENOUS; SUBCUTANEOUS at 15:46

## 2019-07-26 RX ADMIN — PHENYLEPHRINE HYDROCHLORIDE 100 MCG: 10 INJECTION, SOLUTION INTRAMUSCULAR; INTRAVENOUS; SUBCUTANEOUS at 16:03

## 2019-07-26 RX ADMIN — HYDROMORPHONE HYDROCHLORIDE 0.5 MG: 1 INJECTION, SOLUTION INTRAMUSCULAR; INTRAVENOUS; SUBCUTANEOUS at 18:37

## 2019-07-26 RX ADMIN — PHENYLEPHRINE HYDROCHLORIDE 100 MCG: 10 INJECTION, SOLUTION INTRAMUSCULAR; INTRAVENOUS; SUBCUTANEOUS at 10:47

## 2019-07-26 RX ADMIN — HYDROMORPHONE HYDROCHLORIDE 0.5 MG: 2 INJECTION, SOLUTION INTRAMUSCULAR; INTRAVENOUS; SUBCUTANEOUS at 11:45

## 2019-07-26 RX ADMIN — ROCURONIUM BROMIDE 30 MG: 10 INJECTION, SOLUTION INTRAVENOUS at 11:28

## 2019-07-26 RX ADMIN — FENTANYL CITRATE 25 MCG: 50 INJECTION INTRAMUSCULAR; INTRAVENOUS at 18:58

## 2019-07-26 RX ADMIN — HYDROMORPHONE HYDROCHLORIDE 0.5 MG: 2 INJECTION, SOLUTION INTRAMUSCULAR; INTRAVENOUS; SUBCUTANEOUS at 17:00

## 2019-07-26 RX ADMIN — ACETAMINOPHEN 1000 MG: 10 INJECTION, SOLUTION INTRAVENOUS at 18:30

## 2019-07-26 ASSESSMENT — PULMONARY FUNCTION TESTS
PIF_VALUE: 24
PIF_VALUE: 24
PIF_VALUE: 28
PIF_VALUE: 32
PIF_VALUE: 31
PIF_VALUE: 1
PIF_VALUE: 26
PIF_VALUE: 27
PIF_VALUE: 31
PIF_VALUE: 22
PIF_VALUE: 32
PIF_VALUE: 22
PIF_VALUE: 22
PIF_VALUE: 31
PIF_VALUE: 22
PIF_VALUE: 34
PIF_VALUE: 23
PIF_VALUE: 24
PIF_VALUE: 31
PIF_VALUE: 32
PIF_VALUE: 31
PIF_VALUE: 26
PIF_VALUE: 28
PIF_VALUE: 1
PIF_VALUE: 24
PIF_VALUE: 34
PIF_VALUE: 26
PIF_VALUE: 35
PIF_VALUE: 31
PIF_VALUE: 35
PIF_VALUE: 26
PIF_VALUE: 31
PIF_VALUE: 0
PIF_VALUE: 25
PIF_VALUE: 32
PIF_VALUE: 30
PIF_VALUE: 22
PIF_VALUE: 31
PIF_VALUE: 30
PIF_VALUE: 35
PIF_VALUE: 31
PIF_VALUE: 12
PIF_VALUE: 35
PIF_VALUE: 35
PIF_VALUE: 31
PIF_VALUE: 35
PIF_VALUE: 34
PIF_VALUE: 35
PIF_VALUE: 31
PIF_VALUE: 36
PIF_VALUE: 31
PIF_VALUE: 24
PIF_VALUE: 31
PIF_VALUE: 34
PIF_VALUE: 34
PIF_VALUE: 23
PIF_VALUE: 31
PIF_VALUE: 26
PIF_VALUE: 22
PIF_VALUE: 22
PIF_VALUE: 35
PIF_VALUE: 31
PIF_VALUE: 32
PIF_VALUE: 24
PIF_VALUE: 32
PIF_VALUE: 31
PIF_VALUE: 35
PIF_VALUE: 31
PIF_VALUE: 30
PIF_VALUE: 24
PIF_VALUE: 29
PIF_VALUE: 35
PIF_VALUE: 35
PIF_VALUE: 30
PIF_VALUE: 22
PIF_VALUE: 34
PIF_VALUE: 31
PIF_VALUE: 1
PIF_VALUE: 32
PIF_VALUE: 31
PIF_VALUE: 35
PIF_VALUE: 32
PIF_VALUE: 24
PIF_VALUE: 35
PIF_VALUE: 30
PIF_VALUE: 21
PIF_VALUE: 23
PIF_VALUE: 31
PIF_VALUE: 22
PIF_VALUE: 32
PIF_VALUE: 0
PIF_VALUE: 24
PIF_VALUE: 31
PIF_VALUE: 35
PIF_VALUE: 36
PIF_VALUE: 31
PIF_VALUE: 23
PIF_VALUE: 32
PIF_VALUE: 34
PIF_VALUE: 31
PIF_VALUE: 35
PIF_VALUE: 24
PIF_VALUE: 32
PIF_VALUE: 0
PIF_VALUE: 30
PIF_VALUE: 23
PIF_VALUE: 31
PIF_VALUE: 31
PIF_VALUE: 25
PIF_VALUE: 32
PIF_VALUE: 31
PIF_VALUE: 35
PIF_VALUE: 23
PIF_VALUE: 31
PIF_VALUE: 24
PIF_VALUE: 34
PIF_VALUE: 32
PIF_VALUE: 32
PIF_VALUE: 22
PIF_VALUE: 35
PIF_VALUE: 22
PIF_VALUE: 31
PIF_VALUE: 31
PIF_VALUE: 32
PIF_VALUE: 35
PIF_VALUE: 31
PIF_VALUE: 7
PIF_VALUE: 30
PIF_VALUE: 31
PIF_VALUE: 35
PIF_VALUE: 31
PIF_VALUE: 35
PIF_VALUE: 22
PIF_VALUE: 32
PIF_VALUE: 31
PIF_VALUE: 24
PIF_VALUE: 31
PIF_VALUE: 34
PIF_VALUE: 31
PIF_VALUE: 31
PIF_VALUE: 35
PIF_VALUE: 36
PIF_VALUE: 32
PIF_VALUE: 24
PIF_VALUE: 32
PIF_VALUE: 35
PIF_VALUE: 24
PIF_VALUE: 32
PIF_VALUE: 0
PIF_VALUE: 31
PIF_VALUE: 24
PIF_VALUE: 35
PIF_VALUE: 35
PIF_VALUE: 32
PIF_VALUE: 31
PIF_VALUE: 23
PIF_VALUE: 23
PIF_VALUE: 33
PIF_VALUE: 34
PIF_VALUE: 31
PIF_VALUE: 9
PIF_VALUE: 31
PIF_VALUE: 31
PIF_VALUE: 0
PIF_VALUE: 26
PIF_VALUE: 22
PIF_VALUE: 35
PIF_VALUE: 35
PIF_VALUE: 24
PIF_VALUE: 10
PIF_VALUE: 25
PIF_VALUE: 31
PIF_VALUE: 32
PIF_VALUE: 31
PIF_VALUE: 31
PIF_VALUE: 26
PIF_VALUE: 23
PIF_VALUE: 32
PIF_VALUE: 16
PIF_VALUE: 31
PIF_VALUE: 1
PIF_VALUE: 31
PIF_VALUE: 23
PIF_VALUE: 24
PIF_VALUE: 34
PIF_VALUE: 35
PIF_VALUE: 24
PIF_VALUE: 35
PIF_VALUE: 34
PIF_VALUE: 31
PIF_VALUE: 35
PIF_VALUE: 31
PIF_VALUE: 31
PIF_VALUE: 0
PIF_VALUE: 32
PIF_VALUE: 23
PIF_VALUE: 0
PIF_VALUE: 31
PIF_VALUE: 32
PIF_VALUE: 33
PIF_VALUE: 30
PIF_VALUE: 31
PIF_VALUE: 23
PIF_VALUE: 24
PIF_VALUE: 34
PIF_VALUE: 1
PIF_VALUE: 24
PIF_VALUE: 0
PIF_VALUE: 22
PIF_VALUE: 30
PIF_VALUE: 35
PIF_VALUE: 31
PIF_VALUE: 32
PIF_VALUE: 24
PIF_VALUE: 24
PIF_VALUE: 0
PIF_VALUE: 32
PIF_VALUE: 26
PIF_VALUE: 32
PIF_VALUE: 24
PIF_VALUE: 24
PIF_VALUE: 1
PIF_VALUE: 32
PIF_VALUE: 36
PIF_VALUE: 0
PIF_VALUE: 30
PIF_VALUE: 35
PIF_VALUE: 16
PIF_VALUE: 36
PIF_VALUE: 1
PIF_VALUE: 1
PIF_VALUE: 22
PIF_VALUE: 26
PIF_VALUE: 30
PIF_VALUE: 35
PIF_VALUE: 35
PIF_VALUE: 22
PIF_VALUE: 31
PIF_VALUE: 23
PIF_VALUE: 22
PIF_VALUE: 32
PIF_VALUE: 35
PIF_VALUE: 28
PIF_VALUE: 35
PIF_VALUE: 22
PIF_VALUE: 0
PIF_VALUE: 24
PIF_VALUE: 31
PIF_VALUE: 30
PIF_VALUE: 23
PIF_VALUE: 31
PIF_VALUE: 26
PIF_VALUE: 31
PIF_VALUE: 32
PIF_VALUE: 31
PIF_VALUE: 31
PIF_VALUE: 32
PIF_VALUE: 26
PIF_VALUE: 24
PIF_VALUE: 23
PIF_VALUE: 31
PIF_VALUE: 34
PIF_VALUE: 22
PIF_VALUE: 31
PIF_VALUE: 34
PIF_VALUE: 36
PIF_VALUE: 22
PIF_VALUE: 23
PIF_VALUE: 22
PIF_VALUE: 24
PIF_VALUE: 1
PIF_VALUE: 0
PIF_VALUE: 35
PIF_VALUE: 35
PIF_VALUE: 30
PIF_VALUE: 31
PIF_VALUE: 32
PIF_VALUE: 31
PIF_VALUE: 35
PIF_VALUE: 34
PIF_VALUE: 31
PIF_VALUE: 23
PIF_VALUE: 21
PIF_VALUE: 24
PIF_VALUE: 31
PIF_VALUE: 31
PIF_VALUE: 30
PIF_VALUE: 0
PIF_VALUE: 26
PIF_VALUE: 31
PIF_VALUE: 30
PIF_VALUE: 35
PIF_VALUE: 34
PIF_VALUE: 31
PIF_VALUE: 31
PIF_VALUE: 22
PIF_VALUE: 35
PIF_VALUE: 34
PIF_VALUE: 31
PIF_VALUE: 26
PIF_VALUE: 32
PIF_VALUE: 24
PIF_VALUE: 32
PIF_VALUE: 31
PIF_VALUE: 34
PIF_VALUE: 22
PIF_VALUE: 31
PIF_VALUE: 35
PIF_VALUE: 34
PIF_VALUE: 24
PIF_VALUE: 35
PIF_VALUE: 34
PIF_VALUE: 24
PIF_VALUE: 31
PIF_VALUE: 24
PIF_VALUE: 0
PIF_VALUE: 34
PIF_VALUE: 35
PIF_VALUE: 32
PIF_VALUE: 34
PIF_VALUE: 24
PIF_VALUE: 24
PIF_VALUE: 31
PIF_VALUE: 34
PIF_VALUE: 31
PIF_VALUE: 17
PIF_VALUE: 26
PIF_VALUE: 24
PIF_VALUE: 1
PIF_VALUE: 24
PIF_VALUE: 31
PIF_VALUE: 24
PIF_VALUE: 34
PIF_VALUE: 31
PIF_VALUE: 32
PIF_VALUE: 31
PIF_VALUE: 34
PIF_VALUE: 30
PIF_VALUE: 35
PIF_VALUE: 31
PIF_VALUE: 26
PIF_VALUE: 31
PIF_VALUE: 0
PIF_VALUE: 35
PIF_VALUE: 2
PIF_VALUE: 31
PIF_VALUE: 24
PIF_VALUE: 35
PIF_VALUE: 31
PIF_VALUE: 23
PIF_VALUE: 32
PIF_VALUE: 32
PIF_VALUE: 34
PIF_VALUE: 24
PIF_VALUE: 31
PIF_VALUE: 32
PIF_VALUE: 1
PIF_VALUE: 32
PIF_VALUE: 31
PIF_VALUE: 35
PIF_VALUE: 32
PIF_VALUE: 31
PIF_VALUE: 31
PIF_VALUE: 34
PIF_VALUE: 32
PIF_VALUE: 31
PIF_VALUE: 0
PIF_VALUE: 22
PIF_VALUE: 5
PIF_VALUE: 32
PIF_VALUE: 26
PIF_VALUE: 36
PIF_VALUE: 30
PIF_VALUE: 31
PIF_VALUE: 20
PIF_VALUE: 31
PIF_VALUE: 0
PIF_VALUE: 31
PIF_VALUE: 22
PIF_VALUE: 32
PIF_VALUE: 32
PIF_VALUE: 31
PIF_VALUE: 31
PIF_VALUE: 24
PIF_VALUE: 31
PIF_VALUE: 32
PIF_VALUE: 35
PIF_VALUE: 32
PIF_VALUE: 23
PIF_VALUE: 31
PIF_VALUE: 35
PIF_VALUE: 31
PIF_VALUE: 24
PIF_VALUE: 23
PIF_VALUE: 0
PIF_VALUE: 31
PIF_VALUE: 0
PIF_VALUE: 31
PIF_VALUE: 34
PIF_VALUE: 31

## 2019-07-26 ASSESSMENT — PAIN DESCRIPTION - DESCRIPTORS
DESCRIPTORS: ACHING;CONSTANT
DESCRIPTORS: STABBING
DESCRIPTORS: ACHING;CONSTANT
DESCRIPTORS: STABBING

## 2019-07-26 ASSESSMENT — PAIN DESCRIPTION - PAIN TYPE
TYPE: SURGICAL PAIN

## 2019-07-26 ASSESSMENT — PAIN DESCRIPTION - LOCATION
LOCATION: ABDOMEN

## 2019-07-26 ASSESSMENT — PAIN SCALES - GENERAL
PAINLEVEL_OUTOF10: 8
PAINLEVEL_OUTOF10: 0
PAINLEVEL_OUTOF10: 5
PAINLEVEL_OUTOF10: 8
PAINLEVEL_OUTOF10: 7
PAINLEVEL_OUTOF10: 6
PAINLEVEL_OUTOF10: 8
PAINLEVEL_OUTOF10: 5
PAINLEVEL_OUTOF10: 5
PAINLEVEL_OUTOF10: 7
PAINLEVEL_OUTOF10: 0
PAINLEVEL_OUTOF10: 5
PAINLEVEL_OUTOF10: 8
PAINLEVEL_OUTOF10: 6
PAINLEVEL_OUTOF10: 8
PAINLEVEL_OUTOF10: 6
PAINLEVEL_OUTOF10: 5
PAINLEVEL_OUTOF10: 9
PAINLEVEL_OUTOF10: 6

## 2019-07-26 ASSESSMENT — PAIN DESCRIPTION - PROGRESSION
CLINICAL_PROGRESSION: NOT CHANGED
CLINICAL_PROGRESSION: GRADUALLY WORSENING

## 2019-07-26 ASSESSMENT — PAIN DESCRIPTION - FREQUENCY
FREQUENCY: INTERMITTENT
FREQUENCY: CONTINUOUS

## 2019-07-26 ASSESSMENT — PAIN DESCRIPTION - ONSET
ONSET: GRADUAL
ONSET: ON-GOING

## 2019-07-26 ASSESSMENT — PAIN DESCRIPTION - ORIENTATION
ORIENTATION: MID
ORIENTATION: MID

## 2019-07-26 ASSESSMENT — PAIN - FUNCTIONAL ASSESSMENT
PAIN_FUNCTIONAL_ASSESSMENT: ACTIVITIES ARE NOT PREVENTED
PAIN_FUNCTIONAL_ASSESSMENT: ACTIVITIES ARE NOT PREVENTED

## 2019-07-26 ASSESSMENT — PAIN SCALES - WONG BAKER
WONGBAKER_NUMERICALRESPONSE: 8
WONGBAKER_NUMERICALRESPONSE: 8
WONGBAKER_NUMERICALRESPONSE: 6

## 2019-07-26 ASSESSMENT — LIFESTYLE VARIABLES: SMOKING_STATUS: 1

## 2019-07-26 NOTE — ANESTHESIA PRE PROCEDURE
08/17/2017    MCV 89.6 08/17/2017    RDW 18.1 08/17/2017     08/17/2017       CMP:   Lab Results   Component Value Date     04/24/2019    K 3.8 04/24/2019    CL 98 04/24/2019    CO2 27 04/24/2019    BUN 7 04/24/2019    CREATININE 1.1 04/24/2019    GFRAA >60 04/24/2019    GFRAA 134 04/26/2013    AGRATIO 1.3 05/26/2017    LABGLOM >60 04/24/2019    GLUCOSE 114 04/24/2019    PROT 6.3 06/02/2017    PROT 7.6 08/03/2012    CALCIUM 9.0 04/24/2019    BILITOT 0.6 06/02/2017    ALKPHOS 68 06/02/2017     06/02/2017     06/02/2017       POC Tests: No results for input(s): POCGLU, POCNA, POCK, POCCL, POCBUN, POCHEMO, POCHCT in the last 72 hours. Coags:   Lab Results   Component Value Date    PROTIME 22.0 10/04/2017    INR 2.6 07/17/2019    INR 1.95 10/04/2017    APTT 27.2 05/30/2017       HCG (If Applicable): No results found for: PREGTESTUR, PREGSERUM, HCG, HCGQUANT     ABGs:   Lab Results   Component Value Date    PHART 7.450 05/31/2017    PO2ART 62 05/31/2017    LJW4VWE 32 05/31/2017    FPF7AIZ 22.4 05/31/2017    BEART -2 05/31/2017    K0QPVBLH 93 05/31/2017        Type & Screen (If Applicable):  No results found for: LABABO, [address]    Anesthesia Evaluation  Patient summary reviewed and Nursing notes reviewed no history of anesthetic complications:   Airway: Mallampati: II  TM distance: >3 FB   Neck ROM: full  Mouth opening: > = 3 FB Dental: normal exam   (+) edentulous      Pulmonary:normal exam  breath sounds clear to auscultation  (+) current smoker    (-) sleep apnea          Patient smoked on day of surgery.                  Cardiovascular:    (+) hypertension: moderate, past MI: no interval change and > 6 months, CAD: no interval change, CABG/stent:, dysrhythmias: atrial fibrillation, hyperlipidemia      ECG reviewed  Rhythm: regular  Rate: normal      Cleared by cardiology     Beta Blocker:  Dose within 24 Hrs      ROS comment: S/P aortic valve replacement  Hx of CABG 2017 Same p(ace in

## 2019-07-26 NOTE — PROGRESS NOTES
PACU Transfer Note    Vitals:    07/26/19 1945   BP: 123/79   Pulse: 100   Resp: 8   Temp: 97.2 °F (36.2 °C)   SpO2: 95%       In: 2380 [P.O.:30; I.V.:2350]  Out: 550 [Urine:450]    Pain assessment:  Pain now tolerable, ice pack in place as well as abd binder  Pain Level: 5    Report given to Receiving unit NAMRATA Cline via telephone per her request. Report included pt history, surgical procedure, intraop meds, pacu meds, all I's & o's, head to toe assessment, surgical dressing description, pt home narcotic/benadryl use and Vs. She verbalized understanding.    7/26/2019 7:52 PM

## 2019-07-26 NOTE — H&P
[address] CATHETERIZATION  2004    Dr. Mcintyre Eliot Right 2015    Dr. Meehan Meals  10/10/2016    Dr. Sarah Mayfield - w/laparascopic lysis of extensive adhesions, open transverse colon resection, excision of old abd mesh adhering to colon    CORONARY ANGIOPLASTY WITH STENT PLACEMENT  2014    CORONARY ARTERY BYPASS GRAFT  2004    Dr. Tenisha Grijalva - x2 (LIMA-LAD, SVG sequentially to ascending aorta & D1)    CORONARY ARTERY BYPASS GRAFT  2017    Dr. Anette Casillas - redo x3 (reverse AC SVG sequentially to D1, OM1 & PDA) w/explant of prior prosthetic valve & removal of embedded sternal wires x7    EMG  2012    FOOT SURGERY Left 2012    Dr. Anat Flores, DPM - hallux nail evulsion & exostectomy    INCISIONAL HERNIA REPAIR      multiple    LUMBAR DISCECTOMY  2012    L4-5    SHOULDER ARTHROSCOPY Right 2013    Dr. Silvino Carpio - w/subacromial decompression, debridement of the SLAP tear, distal clavicle excision    SOFT TISSUE TUMOR RESECTION      retroperitoneal mass    TESTICLE REMOVAL Left     radical orchiectomy    TRANSESOPHAGEAL ECHOCARDIOGRAM  2017    during redo CABG & AVR    TUNNELED VENOUS PORT PLACEMENT  2002    portacath      Past Social History:  Social History     Socioeconomic History    Marital status:       Spouse name: Not on file    Number of children: Not on file    Years of education: Not on file    Highest education level: Not on file   Occupational History    Not on file   Social Needs    Financial resource strain: Not on file    Food insecurity:     Worry: Not on file     Inability: Not on file    Transportation needs:     Medical: Not on file     Non-medical: Not on file   Tobacco Use    Smoking status: Current Some Day Smoker     Packs/day: 0.10     Years: 32.00     Pack years: 3.20     Types: Cigarettes     Last attempt to quit: 2017     Years since quittin.1    Smokeless tobacco: Never Used    Tobacco comment: stopped 5-18   Substance and Sexual Activity    Alcohol use: No     Alcohol/week: 0.0 standard drinks    Drug use: No    Sexual activity: Yes     Partners: Female   Lifestyle    Physical activity:     Days per week: Not on file     Minutes per session: Not on file    Stress: Not on file   Relationships    Social connections:     Talks on phone: Not on file     Gets together: Not on file     Attends Judaism service: Not on file     Active member of club or organization: Not on file     Attends meetings of clubs or organizations: Not on file     Relationship status: Not on file    Intimate partner violence:     Fear of current or ex partner: Not on file     Emotionally abused: Not on file     Physically abused: Not on file     Forced sexual activity: Not on file   Other Topics Concern    Not on file   Social History Narrative    Not on file         Medications Prior to Admission:      Prior to Admission medications    Medication Sig Start Date End Date Taking?  Authorizing Provider   chlorzoxazone (LORZONE) 750 MG TABS tablet Take 750 mg by mouth 3 times daily   Yes Historical Provider, MD   metoprolol tartrate (LOPRESSOR) 50 MG tablet TAKE 1 TABLET BY MOUTH TWICE DAILY FOR BLOOD PRESSURE AND HEART 7/12/19  Yes Sharon Stokes MD   famotidine (PEPCID) 20 MG tablet TAKE 1 TABLET BY MOUTH 2 TIMES A DAY 7/12/19  Yes Sharon Stokes MD   lisinopril (PRINIVIL;ZESTRIL) 10 MG tablet TAKE 1 TABLET BY MOUTH 2 TIMES A DAY 7/12/19  Yes Sharon Stokes MD   rosuvastatin (CRESTOR) 20 MG tablet TAKE ONE TABLET BY MOUTH EVERY EVENING 7/12/19  Yes Sharon Stokes MD   warfarin (COUMADIN) 5 MG tablet Start with 5 mg daily; dose will be adjusted as needed by Dr. Anna Marie Vergara office based on INR result 6/18/19  Yes Nelly Leblanc MD   amLODIPine (NORVASC) 5 MG tablet Take 1 tablet by mouth nightly as needed (If systolic blood pressure over 130, take amlodipine

## 2019-07-27 LAB
ALBUMIN SERPL-MCNC: 3.7 G/DL (ref 3.4–5)
ANION GAP SERPL CALCULATED.3IONS-SCNC: 13 MMOL/L (ref 3–16)
BASOPHILS ABSOLUTE: 0.1 K/UL (ref 0–0.2)
BASOPHILS RELATIVE PERCENT: 0.8 %
BUN BLDV-MCNC: 8 MG/DL (ref 7–20)
CALCIUM SERPL-MCNC: 8.6 MG/DL (ref 8.3–10.6)
CHLORIDE BLD-SCNC: 99 MMOL/L (ref 99–110)
CO2: 26 MMOL/L (ref 21–32)
CREAT SERPL-MCNC: 0.9 MG/DL (ref 0.9–1.3)
EOSINOPHILS ABSOLUTE: 0 K/UL (ref 0–0.6)
EOSINOPHILS RELATIVE PERCENT: 0.1 %
GFR AFRICAN AMERICAN: >60
GFR NON-AFRICAN AMERICAN: >60
GLUCOSE BLD-MCNC: 164 MG/DL (ref 70–99)
HCT VFR BLD CALC: 42.5 % (ref 40.5–52.5)
HEMOGLOBIN: 13.8 G/DL (ref 13.5–17.5)
INR BLD: 1.15 (ref 0.86–1.14)
LYMPHOCYTES ABSOLUTE: 1.6 K/UL (ref 1–5.1)
LYMPHOCYTES RELATIVE PERCENT: 14 %
MAGNESIUM: 1.9 MG/DL (ref 1.8–2.4)
MCH RBC QN AUTO: 32.5 PG (ref 26–34)
MCHC RBC AUTO-ENTMCNC: 32.4 G/DL (ref 31–36)
MCV RBC AUTO: 100.2 FL (ref 80–100)
MONOCYTES ABSOLUTE: 1.1 K/UL (ref 0–1.3)
MONOCYTES RELATIVE PERCENT: 9.9 %
NEUTROPHILS ABSOLUTE: 8.4 K/UL (ref 1.7–7.7)
NEUTROPHILS RELATIVE PERCENT: 75.2 %
PDW BLD-RTO: 15.9 % (ref 12.4–15.4)
PHOSPHORUS: 3.4 MG/DL (ref 2.5–4.9)
PLATELET # BLD: 186 K/UL (ref 135–450)
PMV BLD AUTO: 8.2 FL (ref 5–10.5)
POTASSIUM SERPL-SCNC: 4.3 MMOL/L (ref 3.5–5.1)
PROTHROMBIN TIME: 13.1 SEC (ref 9.8–13)
RBC # BLD: 4.25 M/UL (ref 4.2–5.9)
SODIUM BLD-SCNC: 138 MMOL/L (ref 136–145)
WBC # BLD: 11.2 K/UL (ref 4–11)

## 2019-07-27 PROCEDURE — C9113 INJ PANTOPRAZOLE SODIUM, VIA: HCPCS | Performed by: STUDENT IN AN ORGANIZED HEALTH CARE EDUCATION/TRAINING PROGRAM

## 2019-07-27 PROCEDURE — 6360000002 HC RX W HCPCS: Performed by: STUDENT IN AN ORGANIZED HEALTH CARE EDUCATION/TRAINING PROGRAM

## 2019-07-27 PROCEDURE — 2500000003 HC RX 250 WO HCPCS: Performed by: STUDENT IN AN ORGANIZED HEALTH CARE EDUCATION/TRAINING PROGRAM

## 2019-07-27 PROCEDURE — 85025 COMPLETE CBC W/AUTO DIFF WBC: CPT

## 2019-07-27 PROCEDURE — 85610 PROTHROMBIN TIME: CPT

## 2019-07-27 PROCEDURE — 6370000000 HC RX 637 (ALT 250 FOR IP): Performed by: STUDENT IN AN ORGANIZED HEALTH CARE EDUCATION/TRAINING PROGRAM

## 2019-07-27 PROCEDURE — 2580000003 HC RX 258: Performed by: STUDENT IN AN ORGANIZED HEALTH CARE EDUCATION/TRAINING PROGRAM

## 2019-07-27 PROCEDURE — 6360000002 HC RX W HCPCS: Performed by: ANESTHESIOLOGY

## 2019-07-27 PROCEDURE — C9290 INJ, BUPIVACAINE LIPOSOME: HCPCS | Performed by: ANESTHESIOLOGY

## 2019-07-27 PROCEDURE — 36415 COLL VENOUS BLD VENIPUNCTURE: CPT

## 2019-07-27 PROCEDURE — 83735 ASSAY OF MAGNESIUM: CPT

## 2019-07-27 PROCEDURE — 99024 POSTOP FOLLOW-UP VISIT: CPT | Performed by: SURGERY

## 2019-07-27 PROCEDURE — 80069 RENAL FUNCTION PANEL: CPT

## 2019-07-27 PROCEDURE — 64488 TAP BLOCK BI INJECTION: CPT | Performed by: ANESTHESIOLOGY

## 2019-07-27 PROCEDURE — 2500000003 HC RX 250 WO HCPCS: Performed by: ANESTHESIOLOGY

## 2019-07-27 PROCEDURE — 1200000000 HC SEMI PRIVATE

## 2019-07-27 RX ORDER — ALBUTEROL SULFATE 2.5 MG/3ML
2.5 SOLUTION RESPIRATORY (INHALATION) EVERY 6 HOURS PRN
Status: DISCONTINUED | OUTPATIENT
Start: 2019-07-27 | End: 2019-07-28

## 2019-07-27 RX ORDER — KETOROLAC TROMETHAMINE 30 MG/ML
30 INJECTION, SOLUTION INTRAMUSCULAR; INTRAVENOUS ONCE
Status: COMPLETED | OUTPATIENT
Start: 2019-07-27 | End: 2019-07-27

## 2019-07-27 RX ORDER — BUPIVACAINE HYDROCHLORIDE 5 MG/ML
INJECTION, SOLUTION EPIDURAL; INTRACAUDAL PRN
Status: DISCONTINUED | OUTPATIENT
Start: 2019-07-27 | End: 2019-07-27 | Stop reason: SDUPTHER

## 2019-07-27 RX ORDER — MAGNESIUM SULFATE 1 G/100ML
1 INJECTION INTRAVENOUS ONCE
Status: COMPLETED | OUTPATIENT
Start: 2019-07-27 | End: 2019-07-27

## 2019-07-27 RX ORDER — BUPIVACAINE HYDROCHLORIDE 5 MG/ML
30 INJECTION, SOLUTION EPIDURAL; INTRACAUDAL ONCE
Status: DISCONTINUED | OUTPATIENT
Start: 2019-07-27 | End: 2019-07-28

## 2019-07-27 RX ORDER — ENALAPRILAT 2.5 MG/2ML
1.25 INJECTION INTRAVENOUS EVERY 6 HOURS PRN
Status: DISCONTINUED | OUTPATIENT
Start: 2019-07-27 | End: 2019-07-30 | Stop reason: HOSPADM

## 2019-07-27 RX ORDER — ACETAMINOPHEN 10 MG/ML
1000 INJECTION, SOLUTION INTRAVENOUS EVERY 6 HOURS PRN
Status: DISCONTINUED | OUTPATIENT
Start: 2019-07-27 | End: 2019-07-28

## 2019-07-27 RX ORDER — KETOROLAC TROMETHAMINE 30 MG/ML
15 INJECTION, SOLUTION INTRAMUSCULAR; INTRAVENOUS EVERY 6 HOURS
Status: DISCONTINUED | OUTPATIENT
Start: 2019-07-27 | End: 2019-07-30 | Stop reason: HOSPADM

## 2019-07-27 RX ADMIN — HYDROMORPHONE HYDROCHLORIDE 0.5 MG: 1 INJECTION, SOLUTION INTRAMUSCULAR; INTRAVENOUS; SUBCUTANEOUS at 02:15

## 2019-07-27 RX ADMIN — METOPROLOL TARTRATE 5 MG: 5 INJECTION INTRAVENOUS at 20:43

## 2019-07-27 RX ADMIN — HYDROMORPHONE HYDROCHLORIDE 1 MG: 1 INJECTION, SOLUTION INTRAMUSCULAR; INTRAVENOUS; SUBCUTANEOUS at 23:26

## 2019-07-27 RX ADMIN — METOPROLOL TARTRATE 5 MG: 5 INJECTION INTRAVENOUS at 15:25

## 2019-07-27 RX ADMIN — HYDROMORPHONE HYDROCHLORIDE 0.5 MG: 1 INJECTION, SOLUTION INTRAMUSCULAR; INTRAVENOUS; SUBCUTANEOUS at 05:04

## 2019-07-27 RX ADMIN — ACETAMINOPHEN 1000 MG: 10 INJECTION, SOLUTION INTRAVENOUS at 22:20

## 2019-07-27 RX ADMIN — ACETAMINOPHEN 1000 MG: 10 INJECTION, SOLUTION INTRAVENOUS at 14:11

## 2019-07-27 RX ADMIN — HYDROMORPHONE HYDROCHLORIDE 1 MG: 1 INJECTION, SOLUTION INTRAMUSCULAR; INTRAVENOUS; SUBCUTANEOUS at 12:47

## 2019-07-27 RX ADMIN — ENOXAPARIN SODIUM 30 MG: 30 INJECTION SUBCUTANEOUS at 08:20

## 2019-07-27 RX ADMIN — Medication 3 MG: at 22:20

## 2019-07-27 RX ADMIN — HYDROMORPHONE HYDROCHLORIDE 0.5 MG: 1 INJECTION, SOLUTION INTRAMUSCULAR; INTRAVENOUS; SUBCUTANEOUS at 01:29

## 2019-07-27 RX ADMIN — METHOCARBAMOL 500 MG: 100 INJECTION, SOLUTION INTRAMUSCULAR; INTRAVENOUS at 18:25

## 2019-07-27 RX ADMIN — BUPIVACAINE 10 ML: 13.3 INJECTION, SUSPENSION, LIPOSOMAL INFILTRATION at 11:29

## 2019-07-27 RX ADMIN — METHOCARBAMOL 500 MG: 100 INJECTION, SOLUTION INTRAMUSCULAR; INTRAVENOUS at 08:10

## 2019-07-27 RX ADMIN — BUPIVACAINE 10 ML: 13.3 INJECTION, SUSPENSION, LIPOSOMAL INFILTRATION at 11:34

## 2019-07-27 RX ADMIN — Medication 3 MG: at 01:29

## 2019-07-27 RX ADMIN — BUPIVACAINE HYDROCHLORIDE 10 ML: 5 INJECTION, SOLUTION EPIDURAL; INTRACAUDAL; PERINEURAL at 11:34

## 2019-07-27 RX ADMIN — KETOROLAC TROMETHAMINE 15 MG: 30 INJECTION, SOLUTION INTRAMUSCULAR at 14:50

## 2019-07-27 RX ADMIN — ENOXAPARIN SODIUM 30 MG: 30 INJECTION SUBCUTANEOUS at 20:44

## 2019-07-27 RX ADMIN — HYDROMORPHONE HYDROCHLORIDE 0.5 MG: 1 INJECTION, SOLUTION INTRAMUSCULAR; INTRAVENOUS; SUBCUTANEOUS at 04:27

## 2019-07-27 RX ADMIN — ONDANSETRON 4 MG: 2 INJECTION INTRAMUSCULAR; INTRAVENOUS at 17:32

## 2019-07-27 RX ADMIN — CEFTRIAXONE SODIUM 2 G: 2 INJECTION, POWDER, FOR SOLUTION INTRAMUSCULAR; INTRAVENOUS at 11:30

## 2019-07-27 RX ADMIN — GABAPENTIN 300 MG: 300 CAPSULE ORAL at 14:19

## 2019-07-27 RX ADMIN — KETOROLAC TROMETHAMINE 30 MG: 30 INJECTION, SOLUTION INTRAMUSCULAR at 08:53

## 2019-07-27 RX ADMIN — PANTOPRAZOLE SODIUM 40 MG: 40 INJECTION, POWDER, LYOPHILIZED, FOR SOLUTION INTRAVENOUS at 08:20

## 2019-07-27 RX ADMIN — METRONIDAZOLE 500 MG: 500 INJECTION, SOLUTION INTRAVENOUS at 04:31

## 2019-07-27 RX ADMIN — Medication 10 ML: at 08:20

## 2019-07-27 RX ADMIN — HYDROMORPHONE HYDROCHLORIDE 1 MG: 1 INJECTION, SOLUTION INTRAMUSCULAR; INTRAVENOUS; SUBCUTANEOUS at 07:59

## 2019-07-27 RX ADMIN — ONDANSETRON 4 MG: 2 INJECTION INTRAMUSCULAR; INTRAVENOUS at 23:26

## 2019-07-27 RX ADMIN — SODIUM CHLORIDE, POTASSIUM CHLORIDE, SODIUM LACTATE AND CALCIUM CHLORIDE: 600; 310; 30; 20 INJECTION, SOLUTION INTRAVENOUS at 17:32

## 2019-07-27 RX ADMIN — GABAPENTIN 300 MG: 300 CAPSULE ORAL at 20:47

## 2019-07-27 RX ADMIN — METHOCARBAMOL 500 MG: 100 INJECTION, SOLUTION INTRAMUSCULAR; INTRAVENOUS at 13:12

## 2019-07-27 RX ADMIN — METOPROLOL TARTRATE 5 MG: 5 INJECTION INTRAVENOUS at 04:27

## 2019-07-27 RX ADMIN — HYDROMORPHONE HYDROCHLORIDE 1 MG: 1 INJECTION, SOLUTION INTRAMUSCULAR; INTRAVENOUS; SUBCUTANEOUS at 15:55

## 2019-07-27 RX ADMIN — MAGNESIUM SULFATE HEPTAHYDRATE 1 G: 1 INJECTION, SOLUTION INTRAVENOUS at 08:53

## 2019-07-27 RX ADMIN — Medication 10 ML: at 20:47

## 2019-07-27 RX ADMIN — METOPROLOL TARTRATE 5 MG: 5 INJECTION INTRAVENOUS at 10:11

## 2019-07-27 RX ADMIN — METRONIDAZOLE 500 MG: 500 INJECTION, SOLUTION INTRAVENOUS at 12:37

## 2019-07-27 RX ADMIN — ENALAPRILAT 1.25 MG: 2.5 INJECTION INTRAVENOUS at 23:03

## 2019-07-27 RX ADMIN — BUPIVACAINE HYDROCHLORIDE 10 ML: 5 INJECTION, SOLUTION EPIDURAL; INTRACAUDAL; PERINEURAL at 11:29

## 2019-07-27 RX ADMIN — GABAPENTIN 300 MG: 300 CAPSULE ORAL at 08:21

## 2019-07-27 RX ADMIN — KETOROLAC TROMETHAMINE 15 MG: 30 INJECTION, SOLUTION INTRAMUSCULAR at 20:44

## 2019-07-27 RX ADMIN — HYDROMORPHONE HYDROCHLORIDE 0.25 MG: 1 INJECTION, SOLUTION INTRAMUSCULAR; INTRAVENOUS; SUBCUTANEOUS at 20:46

## 2019-07-27 RX ADMIN — ACETAMINOPHEN 1000 MG: 10 INJECTION, SOLUTION INTRAVENOUS at 04:39

## 2019-07-27 ASSESSMENT — PAIN DESCRIPTION - DESCRIPTORS
DESCRIPTORS: ACHING;CONSTANT;SHARP;STABBING
DESCRIPTORS: ACHING;SHARP
DESCRIPTORS: ACHING;CONSTANT;CRAMPING;STABBING
DESCRIPTORS: CONSTANT;SHARP
DESCRIPTORS: BURNING
DESCRIPTORS: BURNING

## 2019-07-27 ASSESSMENT — PAIN DESCRIPTION - PAIN TYPE
TYPE: SURGICAL PAIN

## 2019-07-27 ASSESSMENT — PAIN SCALES - GENERAL
PAINLEVEL_OUTOF10: 6
PAINLEVEL_OUTOF10: 10
PAINLEVEL_OUTOF10: 9
PAINLEVEL_OUTOF10: 6
PAINLEVEL_OUTOF10: 10
PAINLEVEL_OUTOF10: 8
PAINLEVEL_OUTOF10: 10
PAINLEVEL_OUTOF10: 8
PAINLEVEL_OUTOF10: 10
PAINLEVEL_OUTOF10: 8
PAINLEVEL_OUTOF10: 7
PAINLEVEL_OUTOF10: 6
PAINLEVEL_OUTOF10: 10
PAINLEVEL_OUTOF10: 9
PAINLEVEL_OUTOF10: 8
PAINLEVEL_OUTOF10: 7
PAINLEVEL_OUTOF10: 8
PAINLEVEL_OUTOF10: 0
PAINLEVEL_OUTOF10: 10
PAINLEVEL_OUTOF10: 8
PAINLEVEL_OUTOF10: 0

## 2019-07-27 ASSESSMENT — PAIN DESCRIPTION - PROGRESSION
CLINICAL_PROGRESSION: GRADUALLY WORSENING
CLINICAL_PROGRESSION: NOT CHANGED
CLINICAL_PROGRESSION: GRADUALLY WORSENING
CLINICAL_PROGRESSION: GRADUALLY IMPROVING
CLINICAL_PROGRESSION: GRADUALLY IMPROVING
CLINICAL_PROGRESSION: NOT CHANGED
CLINICAL_PROGRESSION: GRADUALLY IMPROVING

## 2019-07-27 ASSESSMENT — PAIN DESCRIPTION - ORIENTATION
ORIENTATION: RIGHT;MID;LEFT
ORIENTATION: MID
ORIENTATION: MID
ORIENTATION: RIGHT
ORIENTATION: RIGHT
ORIENTATION: RIGHT;MID;LEFT
ORIENTATION: RIGHT;MID;LEFT

## 2019-07-27 ASSESSMENT — PAIN DESCRIPTION - FREQUENCY
FREQUENCY: CONTINUOUS

## 2019-07-27 ASSESSMENT — PAIN DESCRIPTION - LOCATION
LOCATION: ABDOMEN

## 2019-07-27 ASSESSMENT — PAIN DESCRIPTION - ONSET
ONSET: ON-GOING

## 2019-07-27 ASSESSMENT — PAIN - FUNCTIONAL ASSESSMENT
PAIN_FUNCTIONAL_ASSESSMENT: ACTIVITIES ARE NOT PREVENTED

## 2019-07-27 NOTE — PLAN OF CARE
Problem: Daily Care:  Goal: Daily care needs are met  Description  Daily care needs are met  Outcome: Ongoing     -IV antibiotics  -IV fluids  -Pain control scheduled and PRN  -incentive spirometer  -encourage activity

## 2019-07-27 NOTE — CARE COORDINATION
Case Management Assessment           Initial Evaluation                Date / Time of Evaluation: 7/27/2019 11:03 AM                 Assessment Completed by: Carisaon Severs     Spoke with patient and he does not believe he will have any needs upon discharge to home. Patient Name: Melanie Tse     YOB: 1965  Diagnosis: Colonic mass [K63.9]     Date / Time: 7/26/2019  7:33 AM    Patient Admission Status: Inpatient    If patient is discharged prior to next notation, then this note serves as note for discharge by case management. Current PCP: Nato Grant MD  Clinic Patient: No    Chart Reviewed: Yes  Patient/ Family Interviewed: Yes    Initial assessment completed at bedside with: patient    Hospitalization in the last 30 days: No    Emergency Contacts:  Extended Emergency Contact Information  Primary Emergency Contact: [address] Phone: [phone]  Mobile Phone: [phone]  Relation: Child    Advance Directives:   Code Status: Full Code    Healthcare Power of : No  Agent: NA  Contact Number: NA    Copy present: No     In paper Chart: No    Scanned into EMR No    Financial  Payor: MEDICARE / Plan: MEDICARE PART A AND B / Product Type: *No Product type* /     Pre-cert required for SNF: No    Pharmacy    [place] [address] [address] Taty Pickens [phone] - F [phone]  [facility] [place] [address]  Phone: [phone] Fax: [phone]      Potential assistance Purchasing Medications: Potential Assistance Purchasing Medications: No  Does Patient want to participate in local refill/ meds to beds program?: Yes    Meds To Beds General Rules:  1. Can ONLY be done Monday- Friday between 8:30am-5pm  2. Prescription(s) must be in pharmacy by 3pm to be filled same day  3. Copy of patient's insurance/ prescription drug card and patient face sheet must be sent along with the prescription(s)  4.  Cost of Rx cannot be added to

## 2019-07-27 NOTE — PROGRESS NOTES
Pt reporting abdominal pain, not controlled with 0.5 mg dilaudid. Dr. Joselito Estevez made aware throughout shift. Increased dilaudid dosages and added PRN ofirmev. Pt asleep upon reassessment. Abdominal binder remains in place. Pt satting 91% on 2L. Velasquez in place.

## 2019-07-27 NOTE — PROGRESS NOTES
Pt admitted in room 5304 from PACU via bed. VSS. Telemetry monitor applied. Pt oriented to room, call light within reach. Plan of care reviewed with pt and daughter at bedside. Will continue to monitor.

## 2019-07-27 NOTE — ANESTHESIA PROCEDURE NOTES
Peripheral Block    Patient location during procedure: post-op  Start time: 7/27/2019 11:26 AM  End time: 7/27/2019 11:38 AM  Staffing  Anesthesiologist: Christiano Garcia MD  Performed: anesthesiologist   Preanesthetic Checklist  Completed: patient identified, site marked, surgical consent, pre-op evaluation, timeout performed, IV checked, risks and benefits discussed, monitors and equipment checked, anesthesia consent given, oxygen available and patient being monitored  Peripheral Block  Patient position: supine  Prep: ChloraPrep  Patient monitoring: cardiac monitor, continuous pulse ox, frequent blood pressure checks and IV access  Block type: TAP  Laterality: bilateral  Injection technique: single-shot  Procedures: ultrasound guided  Provider prep: mask and sterile gloves  Needle  Needle type: short-bevel   Needle gauge: 22 G  Needle length: 8 cm  Needle localization: ultrasound guidance  Assessment  Injection assessment: negative aspiration for heme, no paresthesia on injection and local visualized surrounding nerve on ultrasound  Paresthesia pain: none  Slow fractionated injection: yes  Hemodynamics: stable  Additional Notes  Bilateral Ultrasound-Guided TAP Block Note    Indication: Postoperative analgesia upon request of the attending surgeon. Procedure: Informed consent obtained and pre-procedural timeout performed. Sterile prep and drape of both sides of the abdomen. A 22G 80mm insulated regional block needle was used to perform the blocks. The external oblique, internal oblique, and transversus abdominis muscles were identified using ultrasound at the level of the umbilicus on both sides. For each block, the needle was advanced in plane from medial to lateral under direct ultrasound visualization, with the needle traversing both the external and internal oblique muscles and the needle tip being placed just under the fascia overlying the transversus abdominis.   A mixture of 0.9% Normal Saline (10cc)

## 2019-07-27 NOTE — PROGRESS NOTES
Patient continues to complain of severe pain, patient states I'm afraid to take a deep breath because I dont want to cough. Advised patient that deep breathing is very important, educated on incentive spirometer use, which patient is familiar. Patient advised that some pain is expected but discussed importance of moving forward with POC. RN will continue to address pain.

## 2019-07-28 LAB
ALBUMIN SERPL-MCNC: 3.6 G/DL (ref 3.4–5)
ANION GAP SERPL CALCULATED.3IONS-SCNC: 12 MMOL/L (ref 3–16)
BASOPHILS ABSOLUTE: 0.1 K/UL (ref 0–0.2)
BASOPHILS RELATIVE PERCENT: 0.6 %
BUN BLDV-MCNC: 10 MG/DL (ref 7–20)
CALCIUM SERPL-MCNC: 8.4 MG/DL (ref 8.3–10.6)
CHLORIDE BLD-SCNC: 96 MMOL/L (ref 99–110)
CO2: 26 MMOL/L (ref 21–32)
CREAT SERPL-MCNC: 0.8 MG/DL (ref 0.9–1.3)
EOSINOPHILS ABSOLUTE: 0 K/UL (ref 0–0.6)
EOSINOPHILS RELATIVE PERCENT: 0.3 %
GFR AFRICAN AMERICAN: >60
GFR NON-AFRICAN AMERICAN: >60
GLUCOSE BLD-MCNC: 146 MG/DL (ref 70–99)
HCT VFR BLD CALC: 39.8 % (ref 40.5–52.5)
HEMOGLOBIN: 13.2 G/DL (ref 13.5–17.5)
INR BLD: 1.11 (ref 0.86–1.14)
LYMPHOCYTES ABSOLUTE: 1.3 K/UL (ref 1–5.1)
LYMPHOCYTES RELATIVE PERCENT: 12 %
MAGNESIUM: 2.2 MG/DL (ref 1.8–2.4)
MCH RBC QN AUTO: 33.2 PG (ref 26–34)
MCHC RBC AUTO-ENTMCNC: 33.2 G/DL (ref 31–36)
MCV RBC AUTO: 99.8 FL (ref 80–100)
MONOCYTES ABSOLUTE: 1 K/UL (ref 0–1.3)
MONOCYTES RELATIVE PERCENT: 8.9 %
NEUTROPHILS ABSOLUTE: 8.6 K/UL (ref 1.7–7.7)
NEUTROPHILS RELATIVE PERCENT: 78.2 %
PDW BLD-RTO: 15.9 % (ref 12.4–15.4)
PHOSPHORUS: 2.8 MG/DL (ref 2.5–4.9)
PLATELET # BLD: 163 K/UL (ref 135–450)
PMV BLD AUTO: 7.7 FL (ref 5–10.5)
POTASSIUM SERPL-SCNC: 3.9 MMOL/L (ref 3.5–5.1)
PROTHROMBIN TIME: 12.6 SEC (ref 9.8–13)
RBC # BLD: 3.98 M/UL (ref 4.2–5.9)
SODIUM BLD-SCNC: 134 MMOL/L (ref 136–145)
WBC # BLD: 11 K/UL (ref 4–11)

## 2019-07-28 PROCEDURE — 85025 COMPLETE CBC W/AUTO DIFF WBC: CPT

## 2019-07-28 PROCEDURE — 83735 ASSAY OF MAGNESIUM: CPT

## 2019-07-28 PROCEDURE — 2580000003 HC RX 258: Performed by: STUDENT IN AN ORGANIZED HEALTH CARE EDUCATION/TRAINING PROGRAM

## 2019-07-28 PROCEDURE — 2500000003 HC RX 250 WO HCPCS: Performed by: STUDENT IN AN ORGANIZED HEALTH CARE EDUCATION/TRAINING PROGRAM

## 2019-07-28 PROCEDURE — 6370000000 HC RX 637 (ALT 250 FOR IP): Performed by: STUDENT IN AN ORGANIZED HEALTH CARE EDUCATION/TRAINING PROGRAM

## 2019-07-28 PROCEDURE — 6360000002 HC RX W HCPCS: Performed by: STUDENT IN AN ORGANIZED HEALTH CARE EDUCATION/TRAINING PROGRAM

## 2019-07-28 PROCEDURE — 94668 MNPJ CHEST WALL SBSQ: CPT

## 2019-07-28 PROCEDURE — 99024 POSTOP FOLLOW-UP VISIT: CPT | Performed by: SURGERY

## 2019-07-28 PROCEDURE — 85610 PROTHROMBIN TIME: CPT

## 2019-07-28 PROCEDURE — 36415 COLL VENOUS BLD VENIPUNCTURE: CPT

## 2019-07-28 PROCEDURE — 97116 GAIT TRAINING THERAPY: CPT

## 2019-07-28 PROCEDURE — 1200000000 HC SEMI PRIVATE

## 2019-07-28 PROCEDURE — 97161 PT EVAL LOW COMPLEX 20 MIN: CPT

## 2019-07-28 PROCEDURE — 97535 SELF CARE MNGMENT TRAINING: CPT

## 2019-07-28 PROCEDURE — 80069 RENAL FUNCTION PANEL: CPT

## 2019-07-28 PROCEDURE — 94761 N-INVAS EAR/PLS OXIMETRY MLT: CPT

## 2019-07-28 PROCEDURE — 94667 MNPJ CHEST WALL 1ST: CPT

## 2019-07-28 PROCEDURE — 2700000000 HC OXYGEN THERAPY PER DAY

## 2019-07-28 PROCEDURE — 97165 OT EVAL LOW COMPLEX 30 MIN: CPT

## 2019-07-28 PROCEDURE — C9113 INJ PANTOPRAZOLE SODIUM, VIA: HCPCS | Performed by: STUDENT IN AN ORGANIZED HEALTH CARE EDUCATION/TRAINING PROGRAM

## 2019-07-28 RX ORDER — ACETAMINOPHEN 500 MG
1000 TABLET ORAL EVERY 6 HOURS
Status: DISPENSED | OUTPATIENT
Start: 2019-07-28 | End: 2019-07-30

## 2019-07-28 RX ORDER — POTASSIUM CHLORIDE 7.45 MG/ML
10 INJECTION INTRAVENOUS ONCE
Status: COMPLETED | OUTPATIENT
Start: 2019-07-28 | End: 2019-07-28

## 2019-07-28 RX ORDER — METOCLOPRAMIDE HYDROCHLORIDE 5 MG/ML
10 INJECTION INTRAMUSCULAR; INTRAVENOUS EVERY 6 HOURS
Status: DISCONTINUED | OUTPATIENT
Start: 2019-07-28 | End: 2019-07-30 | Stop reason: HOSPADM

## 2019-07-28 RX ADMIN — Medication 10 ML: at 19:56

## 2019-07-28 RX ADMIN — Medication 10 ML: at 08:26

## 2019-07-28 RX ADMIN — ACETAMINOPHEN 1000 MG: 500 TABLET ORAL at 04:17

## 2019-07-28 RX ADMIN — ACETAMINOPHEN 1000 MG: 500 TABLET ORAL at 15:40

## 2019-07-28 RX ADMIN — ONDANSETRON 4 MG: 2 INJECTION INTRAMUSCULAR; INTRAVENOUS at 12:21

## 2019-07-28 RX ADMIN — ACETAMINOPHEN 1000 MG: 500 TABLET ORAL at 20:42

## 2019-07-28 RX ADMIN — GABAPENTIN 300 MG: 300 CAPSULE ORAL at 08:25

## 2019-07-28 RX ADMIN — METOPROLOL TARTRATE 5 MG: 5 INJECTION INTRAVENOUS at 17:15

## 2019-07-28 RX ADMIN — Medication 3 MG: at 20:42

## 2019-07-28 RX ADMIN — ENALAPRILAT 1.25 MG: 2.5 INJECTION INTRAVENOUS at 23:35

## 2019-07-28 RX ADMIN — SODIUM PHOSPHATE, MONOBASIC, MONOHYDRATE 10 MMOL: 276; 142 INJECTION, SOLUTION INTRAVENOUS at 14:03

## 2019-07-28 RX ADMIN — HYDROMORPHONE HYDROCHLORIDE 1 MG: 1 INJECTION, SOLUTION INTRAMUSCULAR; INTRAVENOUS; SUBCUTANEOUS at 04:24

## 2019-07-28 RX ADMIN — METOCLOPRAMIDE 10 MG: 5 INJECTION, SOLUTION INTRAMUSCULAR; INTRAVENOUS at 12:09

## 2019-07-28 RX ADMIN — ACETAMINOPHEN 1000 MG: 500 TABLET ORAL at 12:09

## 2019-07-28 RX ADMIN — PANTOPRAZOLE SODIUM 40 MG: 40 INJECTION, POWDER, LYOPHILIZED, FOR SOLUTION INTRAVENOUS at 08:25

## 2019-07-28 RX ADMIN — METHOCARBAMOL 1000 MG: 100 INJECTION, SOLUTION INTRAMUSCULAR; INTRAVENOUS at 01:16

## 2019-07-28 RX ADMIN — KETOROLAC TROMETHAMINE 15 MG: 30 INJECTION, SOLUTION INTRAMUSCULAR at 19:49

## 2019-07-28 RX ADMIN — ENOXAPARIN SODIUM 30 MG: 30 INJECTION SUBCUTANEOUS at 08:25

## 2019-07-28 RX ADMIN — METOCLOPRAMIDE 10 MG: 5 INJECTION, SOLUTION INTRAMUSCULAR; INTRAVENOUS at 17:15

## 2019-07-28 RX ADMIN — GABAPENTIN 300 MG: 300 CAPSULE ORAL at 19:49

## 2019-07-28 RX ADMIN — KETOROLAC TROMETHAMINE 15 MG: 30 INJECTION, SOLUTION INTRAMUSCULAR at 02:58

## 2019-07-28 RX ADMIN — Medication 10 ML: at 08:27

## 2019-07-28 RX ADMIN — METHOCARBAMOL 1000 MG: 100 INJECTION, SOLUTION INTRAMUSCULAR; INTRAVENOUS at 19:49

## 2019-07-28 RX ADMIN — METOCLOPRAMIDE 10 MG: 5 INJECTION, SOLUTION INTRAMUSCULAR; INTRAVENOUS at 20:42

## 2019-07-28 RX ADMIN — HYDROMORPHONE HYDROCHLORIDE 1 MG: 1 INJECTION, SOLUTION INTRAMUSCULAR; INTRAVENOUS; SUBCUTANEOUS at 12:21

## 2019-07-28 RX ADMIN — METHOCARBAMOL 1000 MG: 100 INJECTION, SOLUTION INTRAMUSCULAR; INTRAVENOUS at 14:07

## 2019-07-28 RX ADMIN — METOPROLOL TARTRATE 5 MG: 5 INJECTION INTRAVENOUS at 12:09

## 2019-07-28 RX ADMIN — METOPROLOL TARTRATE 5 MG: 5 INJECTION INTRAVENOUS at 03:03

## 2019-07-28 RX ADMIN — ENOXAPARIN SODIUM 30 MG: 30 INJECTION SUBCUTANEOUS at 19:50

## 2019-07-28 RX ADMIN — METOPROLOL TARTRATE 5 MG: 5 INJECTION INTRAVENOUS at 20:43

## 2019-07-28 RX ADMIN — METHOCARBAMOL 1000 MG: 100 INJECTION, SOLUTION INTRAMUSCULAR; INTRAVENOUS at 06:15

## 2019-07-28 RX ADMIN — GABAPENTIN 300 MG: 300 CAPSULE ORAL at 15:40

## 2019-07-28 RX ADMIN — POTASSIUM CHLORIDE 10 MEQ: 7.46 INJECTION, SOLUTION INTRAVENOUS at 12:10

## 2019-07-28 RX ADMIN — KETOROLAC TROMETHAMINE 15 MG: 30 INJECTION, SOLUTION INTRAMUSCULAR at 15:41

## 2019-07-28 RX ADMIN — KETOROLAC TROMETHAMINE 15 MG: 30 INJECTION, SOLUTION INTRAMUSCULAR at 08:26

## 2019-07-28 ASSESSMENT — PAIN SCALES - GENERAL
PAINLEVEL_OUTOF10: 8
PAINLEVEL_OUTOF10: 8
PAINLEVEL_OUTOF10: 6
PAINLEVEL_OUTOF10: 8
PAINLEVEL_OUTOF10: 8
PAINLEVEL_OUTOF10: 5
PAINLEVEL_OUTOF10: 8
PAINLEVEL_OUTOF10: 5
PAINLEVEL_OUTOF10: 5
PAINLEVEL_OUTOF10: 0
PAINLEVEL_OUTOF10: 8
PAINLEVEL_OUTOF10: 5
PAINLEVEL_OUTOF10: 5
PAINLEVEL_OUTOF10: 7

## 2019-07-28 ASSESSMENT — PAIN DESCRIPTION - LOCATION
LOCATION: ABDOMEN
LOCATION: BACK

## 2019-07-28 ASSESSMENT — PAIN DESCRIPTION - PAIN TYPE: TYPE: SURGICAL PAIN

## 2019-07-28 ASSESSMENT — PAIN DESCRIPTION - DESCRIPTORS: DESCRIPTORS: BURNING

## 2019-07-28 ASSESSMENT — PAIN DESCRIPTION - ORIENTATION: ORIENTATION: MID;RIGHT

## 2019-07-28 ASSESSMENT — PAIN DESCRIPTION - FREQUENCY: FREQUENCY: CONTINUOUS

## 2019-07-28 ASSESSMENT — PAIN DESCRIPTION - ONSET: ONSET: ON-GOING

## 2019-07-28 ASSESSMENT — PAIN DESCRIPTION - PROGRESSION: CLINICAL_PROGRESSION: NOT CHANGED

## 2019-07-28 ASSESSMENT — PAIN - FUNCTIONAL ASSESSMENT: PAIN_FUNCTIONAL_ASSESSMENT: PREVENTS OR INTERFERES SOME ACTIVE ACTIVITIES AND ADLS

## 2019-07-28 NOTE — PROGRESS NOTES
bypass graft (08/27/2004); Foot surgery (Left, 01/24/2012); Shoulder arthroscopy (Right, 11/21/2013); Coronary angioplasty with stent (07/2014); Carpal tunnel release (Right, 03/17/2015); lumbar discectomy (2012); Tunneled venous port placement (2002); colectomy (10/10/2016); Incisional hernia repair (2010); EMG (04/16/2012); Cardiac catheterization (05/09/2017); Aorta surgery (08/27/2004); Soft Tissue Tumor Resection (2001); Cardiac catheterization (08/26/2004); Aortic valve replacement (05/30/2017); Coronary artery bypass graft (05/30/2017); transesophageal echocardiogram (05/30/2017); and back surgery. Restrictions  Position Activity Restriction  Other position/activity restrictions: Ambulate patient     Vision/Hearing  Vision: Impaired  Vision Exceptions: Wears glasses for reading  Hearing: Within functional limits       Subjective  General  Chart Reviewed: Yes  Additional Pertinent Hx: Pt admitted on 7/26/19 with colonic mass. H/o abdominal hernia repair 2010, CAD, chronic back pain, cdiff, DDD lumbar, HTN, joint pain, L testicular cancer 2002, myalgia and myositis 2013, neuropathy, OA, CABG x 2, CABG 2017, lumbar discectomy 2012. Family / Caregiver Present: No  Referring Practitioner: Dr. Velasquez Gamez  Diagnosis: Colonic mass  Subjective  Subjective: Pt supine in bed and agreeable to PT. Pt states \"It feels good to walk\".     Pain Screening  Patient Currently in Pain: Yes  Pain Assessment  Pain Level: 8     Orientation  Orientation  Overall Orientation Status: Within Normal Limits     Social/Functional History  Social/Functional History  Lives With: Daughter  Type of Home: House  Home Layout: Two level, Able to Live on Main level with bedroom/bathroom  Home Access: Level entry  Bathroom Shower/Tub: Walk-in shower  Bathroom Toilet: Standard  Home Equipment: Panopto  ADL Assistance: Independent  Homemaking Assistance: Independent  Ambulation Assistance: Independent  Active : Yes     Objective  AROM RLE Normal vision: sees adequately in most situations; can see medication labels, newsprint

## 2019-07-28 NOTE — OP NOTE
Ascending colon was divided using a ENRIQUE stapler robotically. Two loads  were required. A terminal ileum was divided using another load on the  ENRIQUE stapler. Terminal ileum and ascending colon were placed  side-to-side manner. Enterotomy and colotomy were done. A  side-to-side functional end-to-end anastomosis was made using a robotic  ENRIQUE stapler. Common channel was closed with running V-Loc suture. Same  suture was run backward as a second layer. Anastomosis was adequate at the  end of the procedure. Hemostasis was adequate. A well vascularized omental flap was created and placed at anastomosis given high risk of leak from patient characteristics including smoking. Thorough suction and irrigation was done. The stapler port was extended laterally and wound protector was placed. The specimen was removed. Rectus muscle and sheath were  closed with running loop PDS suture. Subcutaneous tissue was closed and  approximated with Vicryl. All the ports of the skin were closed with  2-0 Monocryl. The patient was extubated and transferred to the recovery  room uneventfully. The patient tolerated the procedure well. I was present for all critical portions of the surgery.       aMrgaret Lopez MD    D: 07/26/2019 18:05:11       T: 07/27/2019 5:27:52     /[email or web address]  Job#: [id number]     Doc#: [id number]    CC:

## 2019-07-28 NOTE — PROGRESS NOTES
Surgery Daily Progress Note      CC: CECAL MASS/ SYMPTOMATIC CHOLELITHIASIS    SUBJECTIVE:  Patient had TAP block yesterday with mild improvement in pain, other pain medications adjusted throughout the day. Was up to the chair a few times during the evening and this AM. IS 1 L. Small episode of emesis overnight and reports continued belching and hiccups. Pain is improved this morning compared to yesterday. Denies flatus or BM. Voiding appropriately. ROS:   A 14 point review of systems was conducted, significant findings as noted above. All other systems negative. OBJECTIVE:    PHYSICAL EXAM:  Vitals:    07/27/19 2039 07/27/19 2301 07/27/19 2302 07/28/19 0300   BP: (!) 185/96 (!) 170/111 (!) 163/102 (!) 134/90   Pulse: 103 100  100   Resp: 18 18  18   Temp: 98.8 °F (37.1 °C) 98.7 °F (37.1 °C)  98.7 °F (37.1 °C)   TempSrc: Oral Oral  Oral   SpO2: 91% 91%  92%   Weight:       Height:           General appearance: alert, sitting in chair, NAD  Neuro: A&Ox3, no focal deficits, sensation intact  Chest/Lungs: normal effort, coughing, roncherous breath sounds, no accessory muscle use, 2 L NC  Cardiovascular: tachycardic, hypertensive  Abdomen: soft, appropriately tender, moderately distended, no bowel sounds, abdominal binder in place  :  No farias   Extremities: no edema, no cyanosis      ASSESSMENT & PLAN:   This is a 52 y.o. male with a diagnosis of CECAL MASS/ SYMPTOMATIC CHOLELITHIASIS s/p ROBOTIC ASSISTED  LAPAROSCOPIC RIGHT COLECTOMY, ROBOTIC ASSISTED  LAPAROSCOPIC CHOLECYSTECTOMY, ICG angiography    - sips/chips/pops   - awaiting return of GI function; if bowel sounds or passing gas, can advance to clears  - IS, PRN breathing treatments, acapella, EZPAP  - continue to hold warfarin  - continue current pain control  - strongly encourage OOB and ambulation    Kate Collins DO  PGY1, General Surgery  07/28/19  8:04 AM  [phone]    I am post-call and off campus today.  If you have any questions or concerns regarding this patient's care today, please page: Mary Chang DO PGY-1 [phone]    I have seen, examined, and reviewed the patients chart. I agree with the residents assessment and have made appropriate changes. Distended, waiting for GI function, needs to minimize narcotics and ambulate.     Sabino Bhardwaj

## 2019-07-29 ENCOUNTER — APPOINTMENT (OUTPATIENT)
Dept: GENERAL RADIOLOGY | Age: 54
DRG: 330 | End: 2019-07-29
Attending: SURGERY
Payer: MEDICARE

## 2019-07-29 LAB
ALBUMIN SERPL-MCNC: 3.6 G/DL (ref 3.4–5)
ANION GAP SERPL CALCULATED.3IONS-SCNC: 13 MMOL/L (ref 3–16)
BASOPHILS ABSOLUTE: 0.1 K/UL (ref 0–0.2)
BASOPHILS RELATIVE PERCENT: 0.7 %
BUN BLDV-MCNC: 10 MG/DL (ref 7–20)
CALCIUM SERPL-MCNC: 8.8 MG/DL (ref 8.3–10.6)
CHLORIDE BLD-SCNC: 96 MMOL/L (ref 99–110)
CO2: 26 MMOL/L (ref 21–32)
CREAT SERPL-MCNC: 0.7 MG/DL (ref 0.9–1.3)
EOSINOPHILS ABSOLUTE: 0.1 K/UL (ref 0–0.6)
EOSINOPHILS RELATIVE PERCENT: 1 %
GFR AFRICAN AMERICAN: >60
GFR NON-AFRICAN AMERICAN: >60
GLUCOSE BLD-MCNC: 148 MG/DL (ref 70–99)
HCT VFR BLD CALC: 37.8 % (ref 40.5–52.5)
HEMOGLOBIN: 12.5 G/DL (ref 13.5–17.5)
LYMPHOCYTES ABSOLUTE: 1.2 K/UL (ref 1–5.1)
LYMPHOCYTES RELATIVE PERCENT: 14.4 %
MAGNESIUM: 2.1 MG/DL (ref 1.8–2.4)
MCH RBC QN AUTO: 32.8 PG (ref 26–34)
MCHC RBC AUTO-ENTMCNC: 33 G/DL (ref 31–36)
MCV RBC AUTO: 99.4 FL (ref 80–100)
MONOCYTES ABSOLUTE: 0.7 K/UL (ref 0–1.3)
MONOCYTES RELATIVE PERCENT: 8.5 %
NEUTROPHILS ABSOLUTE: 6.3 K/UL (ref 1.7–7.7)
NEUTROPHILS RELATIVE PERCENT: 75.4 %
PDW BLD-RTO: 16.3 % (ref 12.4–15.4)
PHOSPHORUS: 2.8 MG/DL (ref 2.5–4.9)
PLATELET # BLD: 150 K/UL (ref 135–450)
PMV BLD AUTO: 7.7 FL (ref 5–10.5)
POTASSIUM SERPL-SCNC: 3.7 MMOL/L (ref 3.5–5.1)
RBC # BLD: 3.8 M/UL (ref 4.2–5.9)
SODIUM BLD-SCNC: 135 MMOL/L (ref 136–145)
WBC # BLD: 8.4 K/UL (ref 4–11)

## 2019-07-29 PROCEDURE — 6360000002 HC RX W HCPCS: Performed by: STUDENT IN AN ORGANIZED HEALTH CARE EDUCATION/TRAINING PROGRAM

## 2019-07-29 PROCEDURE — 6370000000 HC RX 637 (ALT 250 FOR IP): Performed by: STUDENT IN AN ORGANIZED HEALTH CARE EDUCATION/TRAINING PROGRAM

## 2019-07-29 PROCEDURE — 80069 RENAL FUNCTION PANEL: CPT

## 2019-07-29 PROCEDURE — 2500000003 HC RX 250 WO HCPCS: Performed by: STUDENT IN AN ORGANIZED HEALTH CARE EDUCATION/TRAINING PROGRAM

## 2019-07-29 PROCEDURE — 2580000003 HC RX 258: Performed by: STUDENT IN AN ORGANIZED HEALTH CARE EDUCATION/TRAINING PROGRAM

## 2019-07-29 PROCEDURE — 1200000000 HC SEMI PRIVATE

## 2019-07-29 PROCEDURE — 15860 IV NJX TST VASC FLO FLAP/GRF: CPT | Performed by: SURGERY

## 2019-07-29 PROCEDURE — 94669 MECHANICAL CHEST WALL OSCILL: CPT

## 2019-07-29 PROCEDURE — 6370000000 HC RX 637 (ALT 250 FOR IP): Performed by: SURGERY

## 2019-07-29 PROCEDURE — 94640 AIRWAY INHALATION TREATMENT: CPT

## 2019-07-29 PROCEDURE — C9113 INJ PANTOPRAZOLE SODIUM, VIA: HCPCS | Performed by: STUDENT IN AN ORGANIZED HEALTH CARE EDUCATION/TRAINING PROGRAM

## 2019-07-29 PROCEDURE — 74019 RADEX ABDOMEN 2 VIEWS: CPT

## 2019-07-29 PROCEDURE — 99024 POSTOP FOLLOW-UP VISIT: CPT | Performed by: SURGERY

## 2019-07-29 PROCEDURE — 85025 COMPLETE CBC W/AUTO DIFF WBC: CPT

## 2019-07-29 PROCEDURE — 83735 ASSAY OF MAGNESIUM: CPT

## 2019-07-29 PROCEDURE — 36415 COLL VENOUS BLD VENIPUNCTURE: CPT

## 2019-07-29 RX ORDER — UREA 10 %
6 LOTION (ML) TOPICAL NIGHTLY PRN
Status: DISCONTINUED | OUTPATIENT
Start: 2019-07-29 | End: 2019-07-30 | Stop reason: HOSPADM

## 2019-07-29 RX ORDER — BISACODYL 10 MG
10 SUPPOSITORY, RECTAL RECTAL ONCE
Status: COMPLETED | OUTPATIENT
Start: 2019-07-29 | End: 2019-07-29

## 2019-07-29 RX ORDER — POTASSIUM CHLORIDE 7.45 MG/ML
10 INJECTION INTRAVENOUS
Status: DISCONTINUED | OUTPATIENT
Start: 2019-07-29 | End: 2019-07-29

## 2019-07-29 RX ORDER — PROMETHAZINE HYDROCHLORIDE 25 MG/ML
INJECTION, SOLUTION INTRAMUSCULAR; INTRAVENOUS
Status: DISPENSED
Start: 2019-07-29 | End: 2019-07-30

## 2019-07-29 RX ORDER — PROMETHAZINE HYDROCHLORIDE 25 MG/ML
12.5 INJECTION, SOLUTION INTRAMUSCULAR; INTRAVENOUS EVERY 4 HOURS PRN
Status: DISCONTINUED | OUTPATIENT
Start: 2019-07-29 | End: 2019-07-30 | Stop reason: HOSPADM

## 2019-07-29 RX ADMIN — ACETAMINOPHEN 1000 MG: 500 TABLET ORAL at 18:19

## 2019-07-29 RX ADMIN — Medication 10 ML: at 08:09

## 2019-07-29 RX ADMIN — KETOROLAC TROMETHAMINE 15 MG: 30 INJECTION, SOLUTION INTRAMUSCULAR at 15:21

## 2019-07-29 RX ADMIN — KETOROLAC TROMETHAMINE 15 MG: 30 INJECTION, SOLUTION INTRAMUSCULAR at 08:05

## 2019-07-29 RX ADMIN — GABAPENTIN 300 MG: 300 CAPSULE ORAL at 08:04

## 2019-07-29 RX ADMIN — ACETAMINOPHEN 1000 MG: 500 TABLET ORAL at 22:55

## 2019-07-29 RX ADMIN — KETOROLAC TROMETHAMINE 15 MG: 30 INJECTION, SOLUTION INTRAMUSCULAR at 21:24

## 2019-07-29 RX ADMIN — METOPROLOL TARTRATE 5 MG: 5 INJECTION INTRAVENOUS at 22:56

## 2019-07-29 RX ADMIN — Medication 10 MG: at 08:09

## 2019-07-29 RX ADMIN — METHOCARBAMOL 1000 MG: 100 INJECTION, SOLUTION INTRAMUSCULAR; INTRAVENOUS at 19:15

## 2019-07-29 RX ADMIN — ONDANSETRON 4 MG: 2 INJECTION INTRAMUSCULAR; INTRAVENOUS at 01:04

## 2019-07-29 RX ADMIN — POTASSIUM CHLORIDE 10 MEQ: 7.46 INJECTION, SOLUTION INTRAVENOUS at 09:41

## 2019-07-29 RX ADMIN — PANTOPRAZOLE SODIUM 40 MG: 40 INJECTION, POWDER, LYOPHILIZED, FOR SOLUTION INTRAVENOUS at 08:04

## 2019-07-29 RX ADMIN — HYDROMORPHONE HYDROCHLORIDE 1 MG: 1 INJECTION, SOLUTION INTRAMUSCULAR; INTRAVENOUS; SUBCUTANEOUS at 00:59

## 2019-07-29 RX ADMIN — PROMETHAZINE HYDROCHLORIDE 12.5 MG: 25 INJECTION INTRAMUSCULAR; INTRAVENOUS at 13:11

## 2019-07-29 RX ADMIN — METHOCARBAMOL 1000 MG: 100 INJECTION, SOLUTION INTRAMUSCULAR; INTRAVENOUS at 00:59

## 2019-07-29 RX ADMIN — KETOROLAC TROMETHAMINE 15 MG: 30 INJECTION, SOLUTION INTRAMUSCULAR at 03:21

## 2019-07-29 RX ADMIN — POTASSIUM & SODIUM PHOSPHATES POWDER PACK 280-160-250 MG 500 MG: 280-160-250 PACK at 11:33

## 2019-07-29 RX ADMIN — HYDROMORPHONE HYDROCHLORIDE 0.5 MG: 1 INJECTION, SOLUTION INTRAMUSCULAR; INTRAVENOUS; SUBCUTANEOUS at 18:18

## 2019-07-29 RX ADMIN — Medication 6 MG: at 21:25

## 2019-07-29 RX ADMIN — Medication 10 ML: at 08:11

## 2019-07-29 RX ADMIN — ENOXAPARIN SODIUM 30 MG: 30 INJECTION SUBCUTANEOUS at 08:04

## 2019-07-29 RX ADMIN — METHOCARBAMOL 1000 MG: 100 INJECTION, SOLUTION INTRAMUSCULAR; INTRAVENOUS at 06:31

## 2019-07-29 RX ADMIN — ENOXAPARIN SODIUM 30 MG: 30 INJECTION SUBCUTANEOUS at 21:25

## 2019-07-29 RX ADMIN — ACETAMINOPHEN 1000 MG: 500 TABLET ORAL at 04:24

## 2019-07-29 RX ADMIN — METOPROLOL TARTRATE 5 MG: 5 INJECTION INTRAVENOUS at 18:20

## 2019-07-29 RX ADMIN — METOCLOPRAMIDE 10 MG: 5 INJECTION, SOLUTION INTRAMUSCULAR; INTRAVENOUS at 18:19

## 2019-07-29 RX ADMIN — GABAPENTIN 300 MG: 300 CAPSULE ORAL at 15:20

## 2019-07-29 RX ADMIN — METOCLOPRAMIDE 10 MG: 5 INJECTION, SOLUTION INTRAMUSCULAR; INTRAVENOUS at 22:56

## 2019-07-29 RX ADMIN — HYDROMORPHONE HYDROCHLORIDE 1 MG: 1 INJECTION, SOLUTION INTRAMUSCULAR; INTRAVENOUS; SUBCUTANEOUS at 04:55

## 2019-07-29 RX ADMIN — METHOCARBAMOL 1000 MG: 100 INJECTION, SOLUTION INTRAMUSCULAR; INTRAVENOUS at 15:22

## 2019-07-29 RX ADMIN — METOCLOPRAMIDE 10 MG: 5 INJECTION, SOLUTION INTRAMUSCULAR; INTRAVENOUS at 03:21

## 2019-07-29 RX ADMIN — PROMETHAZINE HYDROCHLORIDE 12.5 MG: 25 INJECTION INTRAMUSCULAR; INTRAVENOUS at 18:21

## 2019-07-29 RX ADMIN — METOCLOPRAMIDE 10 MG: 5 INJECTION, SOLUTION INTRAMUSCULAR; INTRAVENOUS at 11:32

## 2019-07-29 RX ADMIN — GABAPENTIN 300 MG: 300 CAPSULE ORAL at 21:25

## 2019-07-29 RX ADMIN — METOPROLOL TARTRATE 5 MG: 5 INJECTION INTRAVENOUS at 03:21

## 2019-07-29 RX ADMIN — METOPROLOL TARTRATE 5 MG: 5 INJECTION INTRAVENOUS at 11:33

## 2019-07-29 RX ADMIN — ONDANSETRON 4 MG: 2 INJECTION INTRAMUSCULAR; INTRAVENOUS at 11:32

## 2019-07-29 RX ADMIN — POTASSIUM CHLORIDE 10 MEQ: 7.46 INJECTION, SOLUTION INTRAVENOUS at 08:03

## 2019-07-29 ASSESSMENT — PAIN SCALES - GENERAL
PAINLEVEL_OUTOF10: 7
PAINLEVEL_OUTOF10: 7
PAINLEVEL_OUTOF10: 6
PAINLEVEL_OUTOF10: 8
PAINLEVEL_OUTOF10: 5
PAINLEVEL_OUTOF10: 7
PAINLEVEL_OUTOF10: 9
PAINLEVEL_OUTOF10: 4
PAINLEVEL_OUTOF10: 7
PAINLEVEL_OUTOF10: 7
PAINLEVEL_OUTOF10: 6

## 2019-07-29 NOTE — PROGRESS NOTES
Patient has been ambulating and using IS. Voiding well. Experienced nausea and small amount of emesis. Dr. Angie Abraham was called and came to the bedside.   Electronically signed by Jo Castro RN on 7/29/2019 at 7:58 PM

## 2019-07-29 NOTE — PLAN OF CARE
Problem: Pain:  Goal: Pain level will decrease  Description  Pain level will decrease  Outcome: Ongoing  Pt's scheduled and prn medication given to manage pain. Will continue to monitor. Problem: Falls - Risk of:  Goal: Will remain free from falls  Description  Will remain free from falls  Outcome: Ongoing   Pt alert and oriented, able to use call light for help. Fall prevention in place. Will continue to monitor.

## 2019-07-29 NOTE — CARE COORDINATION
CM following, pt awaiting return GI function, plan is to return home with wife no needs.   Electronically signed by Andry Sadler RN on 7/29/2019 at 12:03 PM  [phone]

## 2019-07-30 VITALS
DIASTOLIC BLOOD PRESSURE: 117 MMHG | HEART RATE: 72 BPM | RESPIRATION RATE: 16 BRPM | WEIGHT: 275.13 LBS | OXYGEN SATURATION: 97 % | BODY MASS INDEX: 37.27 KG/M2 | SYSTOLIC BLOOD PRESSURE: 162 MMHG | HEIGHT: 72 IN | TEMPERATURE: 98.4 F

## 2019-07-30 LAB
ALBUMIN SERPL-MCNC: 3.7 G/DL (ref 3.4–5)
ANION GAP SERPL CALCULATED.3IONS-SCNC: 15 MMOL/L (ref 3–16)
BASOPHILS ABSOLUTE: 0 K/UL (ref 0–0.2)
BASOPHILS RELATIVE PERCENT: 0.7 %
BUN BLDV-MCNC: 13 MG/DL (ref 7–20)
CALCIUM SERPL-MCNC: 8.8 MG/DL (ref 8.3–10.6)
CHLORIDE BLD-SCNC: 98 MMOL/L (ref 99–110)
CO2: 25 MMOL/L (ref 21–32)
CREAT SERPL-MCNC: 0.8 MG/DL (ref 0.9–1.3)
EOSINOPHILS ABSOLUTE: 0.1 K/UL (ref 0–0.6)
EOSINOPHILS RELATIVE PERCENT: 1.7 %
GFR AFRICAN AMERICAN: >60
GFR NON-AFRICAN AMERICAN: >60
GLUCOSE BLD-MCNC: 111 MG/DL (ref 70–99)
HCT VFR BLD CALC: 36.5 % (ref 40.5–52.5)
HEMOGLOBIN: 12.4 G/DL (ref 13.5–17.5)
LYMPHOCYTES ABSOLUTE: 1.2 K/UL (ref 1–5.1)
LYMPHOCYTES RELATIVE PERCENT: 20.4 %
MAGNESIUM: 2 MG/DL (ref 1.8–2.4)
MCH RBC QN AUTO: 33.5 PG (ref 26–34)
MCHC RBC AUTO-ENTMCNC: 34 G/DL (ref 31–36)
MCV RBC AUTO: 98.7 FL (ref 80–100)
MONOCYTES ABSOLUTE: 0.5 K/UL (ref 0–1.3)
MONOCYTES RELATIVE PERCENT: 8.8 %
NEUTROPHILS ABSOLUTE: 3.9 K/UL (ref 1.7–7.7)
NEUTROPHILS RELATIVE PERCENT: 68.4 %
PDW BLD-RTO: 15.6 % (ref 12.4–15.4)
PHOSPHORUS: 2.8 MG/DL (ref 2.5–4.9)
PLATELET # BLD: 139 K/UL (ref 135–450)
PMV BLD AUTO: 7.6 FL (ref 5–10.5)
POTASSIUM SERPL-SCNC: 3.5 MMOL/L (ref 3.5–5.1)
RBC # BLD: 3.7 M/UL (ref 4.2–5.9)
SODIUM BLD-SCNC: 138 MMOL/L (ref 136–145)
WBC # BLD: 5.7 K/UL (ref 4–11)

## 2019-07-30 PROCEDURE — 6370000000 HC RX 637 (ALT 250 FOR IP): Performed by: STUDENT IN AN ORGANIZED HEALTH CARE EDUCATION/TRAINING PROGRAM

## 2019-07-30 PROCEDURE — 2580000003 HC RX 258: Performed by: STUDENT IN AN ORGANIZED HEALTH CARE EDUCATION/TRAINING PROGRAM

## 2019-07-30 PROCEDURE — 83735 ASSAY OF MAGNESIUM: CPT

## 2019-07-30 PROCEDURE — 85025 COMPLETE CBC W/AUTO DIFF WBC: CPT

## 2019-07-30 PROCEDURE — C9113 INJ PANTOPRAZOLE SODIUM, VIA: HCPCS | Performed by: STUDENT IN AN ORGANIZED HEALTH CARE EDUCATION/TRAINING PROGRAM

## 2019-07-30 PROCEDURE — 6360000002 HC RX W HCPCS: Performed by: STUDENT IN AN ORGANIZED HEALTH CARE EDUCATION/TRAINING PROGRAM

## 2019-07-30 PROCEDURE — 36415 COLL VENOUS BLD VENIPUNCTURE: CPT

## 2019-07-30 PROCEDURE — 2500000003 HC RX 250 WO HCPCS: Performed by: STUDENT IN AN ORGANIZED HEALTH CARE EDUCATION/TRAINING PROGRAM

## 2019-07-30 PROCEDURE — 80069 RENAL FUNCTION PANEL: CPT

## 2019-07-30 PROCEDURE — 99024 POSTOP FOLLOW-UP VISIT: CPT | Performed by: SURGERY

## 2019-07-30 RX ORDER — ACETAMINOPHEN 500 MG
1000 TABLET ORAL EVERY 6 HOURS
Status: DISCONTINUED | OUTPATIENT
Start: 2019-07-30 | End: 2019-07-30 | Stop reason: HOSPADM

## 2019-07-30 RX ORDER — POTASSIUM CHLORIDE 20 MEQ/1
40 TABLET, EXTENDED RELEASE ORAL ONCE
Status: COMPLETED | OUTPATIENT
Start: 2019-07-30 | End: 2019-07-30

## 2019-07-30 RX ORDER — DOCUSATE SODIUM 100 MG/1
100 CAPSULE, LIQUID FILLED ORAL 2 TIMES DAILY
Qty: 20 CAPSULE | Refills: 0 | Status: SHIPPED | OUTPATIENT
Start: 2019-07-30 | End: 2019-08-06

## 2019-07-30 RX ADMIN — Medication 10 ML: at 09:05

## 2019-07-30 RX ADMIN — KETOROLAC TROMETHAMINE 15 MG: 30 INJECTION, SOLUTION INTRAMUSCULAR at 03:11

## 2019-07-30 RX ADMIN — GABAPENTIN 300 MG: 300 CAPSULE ORAL at 09:03

## 2019-07-30 RX ADMIN — PANTOPRAZOLE SODIUM 40 MG: 40 INJECTION, POWDER, LYOPHILIZED, FOR SOLUTION INTRAVENOUS at 09:05

## 2019-07-30 RX ADMIN — SODIUM CHLORIDE, POTASSIUM CHLORIDE, SODIUM LACTATE AND CALCIUM CHLORIDE: 600; 310; 30; 20 INJECTION, SOLUTION INTRAVENOUS at 01:12

## 2019-07-30 RX ADMIN — ENOXAPARIN SODIUM 30 MG: 30 INJECTION SUBCUTANEOUS at 09:04

## 2019-07-30 RX ADMIN — METHOCARBAMOL 1000 MG: 100 INJECTION, SOLUTION INTRAMUSCULAR; INTRAVENOUS at 01:02

## 2019-07-30 RX ADMIN — METOPROLOL TARTRATE 5 MG: 5 INJECTION INTRAVENOUS at 04:02

## 2019-07-30 RX ADMIN — ACETAMINOPHEN 1000 MG: 500 TABLET ORAL at 14:44

## 2019-07-30 RX ADMIN — DIBASIC SODIUM PHOSPHATE, MONOBASIC POTASSIUM PHOSPHATE AND MONOBASIC SODIUM PHOSPHATE 2 TABLET: 852; 155; 130 TABLET ORAL at 09:04

## 2019-07-30 RX ADMIN — METOCLOPRAMIDE 10 MG: 5 INJECTION, SOLUTION INTRAMUSCULAR; INTRAVENOUS at 04:02

## 2019-07-30 RX ADMIN — METOCLOPRAMIDE 10 MG: 5 INJECTION, SOLUTION INTRAMUSCULAR; INTRAVENOUS at 17:31

## 2019-07-30 RX ADMIN — METOCLOPRAMIDE 10 MG: 5 INJECTION, SOLUTION INTRAMUSCULAR; INTRAVENOUS at 09:04

## 2019-07-30 RX ADMIN — METOPROLOL TARTRATE 5 MG: 5 INJECTION INTRAVENOUS at 17:31

## 2019-07-30 RX ADMIN — ACETAMINOPHEN 1000 MG: 500 TABLET ORAL at 07:10

## 2019-07-30 RX ADMIN — GABAPENTIN 300 MG: 300 CAPSULE ORAL at 14:44

## 2019-07-30 RX ADMIN — POTASSIUM CHLORIDE 40 MEQ: 20 TABLET, EXTENDED RELEASE ORAL at 09:03

## 2019-07-30 RX ADMIN — METHOCARBAMOL 1000 MG: 100 INJECTION, SOLUTION INTRAMUSCULAR; INTRAVENOUS at 07:08

## 2019-07-30 RX ADMIN — METHOCARBAMOL 1000 MG: 100 INJECTION, SOLUTION INTRAMUSCULAR; INTRAVENOUS at 14:43

## 2019-07-30 RX ADMIN — KETOROLAC TROMETHAMINE 15 MG: 30 INJECTION, SOLUTION INTRAMUSCULAR at 14:43

## 2019-07-30 RX ADMIN — METOPROLOL TARTRATE 5 MG: 5 INJECTION INTRAVENOUS at 09:04

## 2019-07-30 RX ADMIN — KETOROLAC TROMETHAMINE 15 MG: 30 INJECTION, SOLUTION INTRAMUSCULAR at 09:04

## 2019-07-30 RX ADMIN — ENALAPRILAT 1.25 MG: 2.5 INJECTION INTRAVENOUS at 14:40

## 2019-07-30 ASSESSMENT — PAIN SCALES - GENERAL
PAINLEVEL_OUTOF10: 3
PAINLEVEL_OUTOF10: 7
PAINLEVEL_OUTOF10: 5
PAINLEVEL_OUTOF10: 3

## 2019-07-30 NOTE — DISCHARGE SUMMARY
compared to yesterday. Denies flatus or BM. Voiding appropriately. 7/29- Patient reports that his pain has improved after changing pain regimen yesterday. Has been passing some flatus. No BM. Some postop ileus expected for extensive lysis of adhesions. 7/30- Patient had an episode of emesis last night. Had 2 large BMs yesterday, BM this AM.    Discharge physical exam:  General appearance: alert, sitting in chair, NAD  Neuro: A&Ox3, no focal deficits, sensation intact  Chest/Lungs: normal effort, coughing, roncherous breath sounds, no accessory muscle use, RA  Cardiovascular: RRR  Abdomen: soft, appropriately tender, mildly distended, erythema around port sites, abdominal binder in place  :  No farias   Extremities: no edema, no cyanosis    Disposition:    Home    Condition at discharge:  Stable    Discharge Instructions:  See separate form    Patient Instructions:      Medication List      CHANGE how you take these medications    * docusate sodium 100 MG capsule  Commonly known as:  COLACE  What changed:  Another medication with the same name was added. Make sure you understand how and when to take each. * docusate sodium 100 MG capsule  Commonly known as:  COLACE  Take 1 capsule by mouth 2 times daily for 7 days Please take while taking narcotic pain medicine. If you develop loose or watery stools, then stop taking. What changed: You were already taking a medication with the same name, and this prescription was added. Make sure you understand how and when to take each. * This list has 2 medication(s) that are the same as other medications prescribed for you. Read the directions carefully, and ask your doctor or other care provider to review them with you.             CONTINUE taking these medications    amLODIPine 5 MG tablet  Commonly known as:  NORVASC  Take 1 tablet by mouth nightly as needed (If systolic blood pressure over 130, take amlodipine 5mg at night, if under 130 do not take medications and follow up with me and PCP.     oSbia Marcus MD  Surgery Attending

## 2019-07-31 DIAGNOSIS — I48.0 PAROXYSMAL ATRIAL FIBRILLATION (HCC): Primary | ICD-10-CM

## 2019-08-06 ENCOUNTER — OFFICE VISIT (OUTPATIENT)
Dept: SURGERY | Age: 54
End: 2019-08-06

## 2019-08-06 VITALS
HEIGHT: 72 IN | SYSTOLIC BLOOD PRESSURE: 109 MMHG | DIASTOLIC BLOOD PRESSURE: 76 MMHG | OXYGEN SATURATION: 95 % | BODY MASS INDEX: 36.16 KG/M2 | HEART RATE: 73 BPM | TEMPERATURE: 98.4 F | WEIGHT: 267 LBS

## 2019-08-06 DIAGNOSIS — Z09 POSTOP CHECK: Primary | ICD-10-CM

## 2019-08-06 DIAGNOSIS — K63.89 CECUM MASS: ICD-10-CM

## 2019-08-06 PROCEDURE — 99024 POSTOP FOLLOW-UP VISIT: CPT | Performed by: SURGERY

## 2019-08-07 ENCOUNTER — ANTI-COAG VISIT (OUTPATIENT)
Dept: PHARMACY | Age: 54
End: 2019-08-07
Payer: MEDICARE

## 2019-08-07 DIAGNOSIS — Z95.2 S/P AORTIC VALVE REPLACEMENT: ICD-10-CM

## 2019-08-07 DIAGNOSIS — I48.0 PAROXYSMAL ATRIAL FIBRILLATION (HCC): ICD-10-CM

## 2019-08-07 LAB — INTERNATIONAL NORMALIZATION RATIO, POC: 1.8

## 2019-08-07 PROCEDURE — 85610 PROTHROMBIN TIME: CPT

## 2019-08-07 PROCEDURE — 99211 OFF/OP EST MAY X REQ PHY/QHP: CPT

## 2019-08-15 ENCOUNTER — ANTI-COAG VISIT (OUTPATIENT)
Dept: PHARMACY | Age: 54
End: 2019-08-15
Payer: MEDICARE

## 2019-08-15 DIAGNOSIS — Z95.2 S/P AORTIC VALVE REPLACEMENT: ICD-10-CM

## 2019-08-15 DIAGNOSIS — I48.0 PAROXYSMAL ATRIAL FIBRILLATION (HCC): ICD-10-CM

## 2019-08-15 LAB — INTERNATIONAL NORMALIZATION RATIO, POC: 3.1

## 2019-08-15 PROCEDURE — 85610 PROTHROMBIN TIME: CPT

## 2019-08-15 PROCEDURE — 99211 OFF/OP EST MAY X REQ PHY/QHP: CPT

## 2019-08-15 NOTE — PROGRESS NOTES
procedures None reported     Assessment/Plan:  Patient's INR was slightly supratherapeutic today (3.1), possibly explained by decreased PO intake following colectomy + cholecystectomy on 7/26. Patient is not on a restricted diet following surgery; however, he is eating less overall post-op. Patient denies taking any extra/incorrect warfarin doses, medication changes, vomiting, diarrhea, fever, etc.  Patient already takes Percocet at home, so he was not prescribed any new medications for pain following surgery. As patient states his diet is almost back to normal, he was instructed to continue warfarin 5 mg daily, except 7.5 mg on Sat/Sun/Mon. Patient prefers to take the same doses of warfain in a row because it is easier to remember. Repeat INR in 2 weeks. Patient was reminded to maintain consistent vitamin K intake and call with any bleeding, medication changes, or fever/vomiting/diarrhea. Patient understands dosing directions and information discussed. Dosing schedule and follow up appointment given to patient. Progress note routed to referring physician's office. Patient acknowledges working in consult agreement with pharmacist as referred by his/her physician. Next Clinic Appointment:  8/30    Please call [place] at [phone] with any questions. Thanks! Reyes Fontana.  Benny Contreras, PharmD  [facility]  Ph: [phone]  8/15/2019 7:57 AM

## 2019-08-30 ENCOUNTER — ANTI-COAG VISIT (OUTPATIENT)
Dept: PHARMACY | Age: 54
End: 2019-08-30
Payer: MEDICARE

## 2019-08-30 LAB — INTERNATIONAL NORMALIZATION RATIO, POC: 2.8

## 2019-08-30 PROCEDURE — 99211 OFF/OP EST MAY X REQ PHY/QHP: CPT | Performed by: PHARMACIST

## 2019-08-30 PROCEDURE — 85610 PROTHROMBIN TIME: CPT | Performed by: PHARMACIST

## 2019-09-13 ENCOUNTER — OFFICE VISIT (OUTPATIENT)
Dept: CARDIOLOGY CLINIC | Age: 54
End: 2019-09-13
Payer: MEDICARE

## 2019-09-13 VITALS
HEART RATE: 60 BPM | DIASTOLIC BLOOD PRESSURE: 60 MMHG | SYSTOLIC BLOOD PRESSURE: 100 MMHG | BODY MASS INDEX: 36.21 KG/M2 | WEIGHT: 267 LBS

## 2019-09-13 DIAGNOSIS — I48.0 PAROXYSMAL ATRIAL FIBRILLATION (HCC): ICD-10-CM

## 2019-09-13 DIAGNOSIS — Z95.2 S/P AVR (AORTIC VALVE REPLACEMENT): ICD-10-CM

## 2019-09-13 DIAGNOSIS — I10 ESSENTIAL HYPERTENSION: ICD-10-CM

## 2019-09-13 DIAGNOSIS — I25.10 CAD IN NATIVE ARTERY: Primary | ICD-10-CM

## 2019-09-13 DIAGNOSIS — I49.3 PVC (PREMATURE VENTRICULAR CONTRACTION): ICD-10-CM

## 2019-09-13 DIAGNOSIS — Z95.1 HX OF CABG: ICD-10-CM

## 2019-09-13 DIAGNOSIS — Z98.61 HISTORY OF PTCA: ICD-10-CM

## 2019-09-13 PROCEDURE — 4004F PT TOBACCO SCREEN RCVD TLK: CPT | Performed by: INTERNAL MEDICINE

## 2019-09-13 PROCEDURE — G8427 DOCREV CUR MEDS BY ELIG CLIN: HCPCS | Performed by: INTERNAL MEDICINE

## 2019-09-13 PROCEDURE — 3017F COLORECTAL CA SCREEN DOC REV: CPT | Performed by: INTERNAL MEDICINE

## 2019-09-13 PROCEDURE — 99214 OFFICE O/P EST MOD 30 MIN: CPT | Performed by: INTERNAL MEDICINE

## 2019-09-13 PROCEDURE — G8598 ASA/ANTIPLAT THER USED: HCPCS | Performed by: INTERNAL MEDICINE

## 2019-09-13 PROCEDURE — G8417 CALC BMI ABV UP PARAM F/U: HCPCS | Performed by: INTERNAL MEDICINE

## 2019-09-13 NOTE — PROGRESS NOTES
Subjective:      Patient ID: Kimmie Abdul is a 62 y.o. male. HPI  MrMara Aguila is here today for follow up CAD/CABG/AVR/Afib/HTN. No complaints. Remains active. Having reflux. Antiacids help. Not exertion related. Wt down further. Still smoking. No exertional sx. No sob/cp. No pnd. No orthopnea. No tachycardia. No syncope. BP good at home. Rhythm stable.          Past Medical History:   Diagnosis Date    Abdominal hernia     s/p repair 2010    Aortic valve prosthesis present     CAD (coronary artery disease)     Chronic back pain greater than 3 months duration     Clostridium difficile diarrhea 10/17/2016    PCR    DDD (degenerative disc disease), lumbosacral 8/7/2013    Erectile dysfunction     GERD (gastroesophageal reflux disease)     Hyperlipidemia     Hypertension     Joint pain     Left testicular cancer ([facility] [address] 2002    s/p abdominal resection    Myalgia and myositis, unspecified 8/26/2013    Neuropathy     OA (osteoarthritis) of knee     bilateral    Obesity, Class I, BMI 30.0-34.9 (see actual BMI)     Prolonged emergence from general anesthesia     after CABG    S/P AVR 2005    tissue valve    S/P CABG x 2 2005    w/AVR & primary repair of dissected ascending aorta     Past Surgical History:   Procedure Laterality Date    AORTA SURGERY  08/27/2004    Dr. Santos Perish - primary repair of limited ascending aortic dissection    AORTIC VALVE REPLACEMENT  05/30/2017    Dr. Brittaney Moralez - redo w/25mm Lori Bookman ThermaFix model 3300 bovine pericardial bioprosthesis    AORTIC VALVE SURGERY  08/27/2004    Dr. Santos Perish - 27mm Kelton-Castelan pericardial prosthesis     BACK SURGERY      L4-S1    CARDIAC CATHETERIZATION  05/09/2017    Dr. Gunnar Starkey  08/26/2004    Dr. David Jon 03/17/2015    Dr. Thao Ibarra  10/10/2016    Dr. Michael Miller - w/laparascopic lysis of extensive adhesions, open transverse

## 2019-09-18 ENCOUNTER — OFFICE VISIT (OUTPATIENT)
Dept: INTERNAL MEDICINE CLINIC | Age: 54
End: 2019-09-18
Payer: MEDICARE

## 2019-09-18 VITALS
HEART RATE: 67 BPM | OXYGEN SATURATION: 98 % | TEMPERATURE: 98.3 F | BODY MASS INDEX: 53.01 KG/M2 | DIASTOLIC BLOOD PRESSURE: 89 MMHG | WEIGHT: 270 LBS | HEIGHT: 60 IN | SYSTOLIC BLOOD PRESSURE: 128 MMHG

## 2019-09-18 DIAGNOSIS — M25.532 WRIST PAIN, ACUTE, LEFT: Primary | ICD-10-CM

## 2019-09-18 PROCEDURE — 99213 OFFICE O/P EST LOW 20 MIN: CPT | Performed by: STUDENT IN AN ORGANIZED HEALTH CARE EDUCATION/TRAINING PROGRAM

## 2019-09-18 ASSESSMENT — PATIENT HEALTH QUESTIONNAIRE - PHQ9
2. FEELING DOWN, DEPRESSED OR HOPELESS: 0
SUM OF ALL RESPONSES TO PHQ9 QUESTIONS 1 & 2: 0
1. LITTLE INTEREST OR PLEASURE IN DOING THINGS: 0
SUM OF ALL RESPONSES TO PHQ QUESTIONS 1-9: 0
SUM OF ALL RESPONSES TO PHQ QUESTIONS 1-9: 0

## 2019-09-18 NOTE — PROGRESS NOTES
Highest education level: Not on file   Occupational History    Not on file   Social Needs    Financial resource strain: Not on file    Food insecurity:     Worry: Not on file     Inability: Not on file    Transportation needs:     Medical: Not on file     Non-medical: Not on file   Tobacco Use    Smoking status: Current Some Day Smoker     Packs/day: 0.10     Years: 32.00     Pack years: 3.20     Types: Cigarettes     Last attempt to quit: 2017     Years since quittin.3    Smokeless tobacco: Never Used    Tobacco comment: stopped    Substance and Sexual Activity    Alcohol use: No     Alcohol/week: 0.0 standard drinks    Drug use: No    Sexual activity: Yes     Partners: Female   Lifestyle    Physical activity:     Days per week: Not on file     Minutes per session: Not on file    Stress: Not on file   Relationships    Social connections:     Talks on phone: Not on file     Gets together: Not on file     Attends Oriental orthodox service: Not on file     Active member of club or organization: Not on file     Attends meetings of clubs or organizations: Not on file     Relationship status: Not on file    Intimate partner violence:     Fear of current or ex partner: Not on file     Emotionally abused: Not on file     Physically abused: Not on file     Forced sexual activity: Not on file   Other Topics Concern    Not on file   Social History Narrative    Not on file        Family History   Problem Relation Age of Onset    Cancer Mother     Cancer Father         lung    Alzheimer's Disease Sister     Liver Cancer Brother        Vitals:    19 0914   BP: 128/89   Site: Left Upper Arm   Position: Sitting   Cuff Size: Large Adult   Pulse: 67   Temp: 98.3 °F (36.8 °C)   TempSrc: Oral   SpO2: 98%   Weight: 270 lb (122.5 kg)   Height: (!) 6\" (0.152 m)     Estimated body mass index is 5,273.08 kg/m² as calculated from the following:    Height as of this encounter: 6\" (0.152 m).     Weight as of this encounter: 270 lb (122.5 kg). Physical Exam    General appearance:  No apparent distress. HEENT:  Normal cephalic, atraumatic without obvious deformity. PERRL. Extra ocular muscles intact. Conjunctivae/corneas clear. Neck: Supple. No JVD. Trachea midline. Respiratory:  Normal respiratory effort. CTAB w/o r/r/w. Cardiovascular:  RRR; nl S1&S2; w/o m/r/g. Abdomen: Soft, nt/nd w/ normal bowel sounds. Musculoskeletal:  No clubbing, cyanosis or edema bilaterally. Skin: Skin color, texture, turgor normal.  Mild erythema over left wrist and associated swelling. Tender to palpation. Reduced ROM and tenderness in all directions. No cuts or abrasions noted. Neurologic:  Neurovascularly intact without any focal sensory/motor deficits. Cranial nerves: II-XII intact, grossly non-focal.  Psychiatric:  Alert and oriented, thought content appropriate, normal insight  Capillary Refill: Brisk,< 3 seconds   Peripheral Pulses: +2 palpable, equal bilaterally       ASSESSMENT/PLAN:  Wrist pain  Was playing with dog - got struck. Uncertain how. Pain, swelling, redness, decreased ROM.   - immobilize and ice 20 mins BID + splint or ace wrap    Hx of dysplastic polyp followed by colectomy 7/26/2019  s/p ileo-colectomy and cholecystectomy  - followed by Carter Fan MD; at post op follow up (no evidence of invasive adenocarcinoma identified, sampled lymph nodes showed no evidence of malignancy) - no post-op complications - f/u in 0-86 months.      CAD/CABG/AVR/Afib/HTN  - followed by Eve Avila; most recently on 9/13/2019 - f/u 3 months    Bilateral feet neuropathy:  Pt has  Hx of bilateral feet numbness, loss of control of stool with hx of erectile dysfunction, Back pain with hx of disc prolapse , MRI back  showed disc protrusion at L4-L5 mildly compressive with partial effacement of proximal right L5 nerve root  -Continue Gabapentin for pain control  -recommend physical therapy, lose weight  -stable.      Essential

## 2019-09-27 ENCOUNTER — ANTI-COAG VISIT (OUTPATIENT)
Dept: PHARMACY | Age: 54
End: 2019-09-27
Payer: MEDICARE

## 2019-09-27 DIAGNOSIS — Z95.2 S/P AORTIC VALVE REPLACEMENT: ICD-10-CM

## 2019-09-27 DIAGNOSIS — I48.0 PAROXYSMAL ATRIAL FIBRILLATION (HCC): ICD-10-CM

## 2019-09-27 LAB — INTERNATIONAL NORMALIZATION RATIO, POC: 2.9

## 2019-09-27 PROCEDURE — 85610 PROTHROMBIN TIME: CPT

## 2019-09-27 PROCEDURE — 99211 OFF/OP EST MAY X REQ PHY/QHP: CPT

## 2019-10-24 RX ORDER — LISINOPRIL 10 MG/1
TABLET ORAL
Qty: 180 TABLET | Refills: 0 | Status: SHIPPED | OUTPATIENT
Start: 2019-10-24 | End: 2019-12-18 | Stop reason: SDUPTHER

## 2019-10-24 RX ORDER — FAMOTIDINE 20 MG/1
TABLET, FILM COATED ORAL
Qty: 180 TABLET | Refills: 0 | Status: SHIPPED | OUTPATIENT
Start: 2019-10-24 | End: 2020-01-03

## 2019-10-24 RX ORDER — ROSUVASTATIN CALCIUM 20 MG/1
TABLET, COATED ORAL
Qty: 90 TABLET | Refills: 0 | Status: SHIPPED | OUTPATIENT
Start: 2019-10-24 | End: 2019-12-18 | Stop reason: SDUPTHER

## 2019-10-25 ENCOUNTER — ANTI-COAG VISIT (OUTPATIENT)
Dept: PHARMACY | Age: 54
End: 2019-10-25
Payer: MEDICARE

## 2019-10-25 DIAGNOSIS — I48.0 PAROXYSMAL ATRIAL FIBRILLATION (HCC): ICD-10-CM

## 2019-10-25 DIAGNOSIS — Z95.2 S/P AORTIC VALVE REPLACEMENT: ICD-10-CM

## 2019-10-25 LAB — INTERNATIONAL NORMALIZATION RATIO, POC: 2.5

## 2019-10-25 PROCEDURE — 99211 OFF/OP EST MAY X REQ PHY/QHP: CPT

## 2019-10-25 PROCEDURE — 85610 PROTHROMBIN TIME: CPT

## 2019-11-22 ENCOUNTER — ANTI-COAG VISIT (OUTPATIENT)
Dept: PHARMACY | Age: 54
End: 2019-11-22
Payer: MEDICARE

## 2019-11-22 DIAGNOSIS — Z95.2 S/P AORTIC VALVE REPLACEMENT: ICD-10-CM

## 2019-11-22 DIAGNOSIS — I48.0 PAROXYSMAL ATRIAL FIBRILLATION (HCC): ICD-10-CM

## 2019-11-22 LAB — INTERNATIONAL NORMALIZATION RATIO, POC: 2.5

## 2019-11-22 PROCEDURE — 99211 OFF/OP EST MAY X REQ PHY/QHP: CPT

## 2019-11-22 PROCEDURE — 85610 PROTHROMBIN TIME: CPT

## 2019-12-13 ENCOUNTER — OFFICE VISIT (OUTPATIENT)
Dept: CARDIOLOGY CLINIC | Age: 54
End: 2019-12-13
Payer: MEDICARE

## 2019-12-13 VITALS
WEIGHT: 276 LBS | SYSTOLIC BLOOD PRESSURE: 124 MMHG | BODY MASS INDEX: 5390.26 KG/M2 | DIASTOLIC BLOOD PRESSURE: 80 MMHG | HEART RATE: 68 BPM

## 2019-12-13 DIAGNOSIS — I49.3 PVC (PREMATURE VENTRICULAR CONTRACTION): ICD-10-CM

## 2019-12-13 DIAGNOSIS — I25.10 CAD IN NATIVE ARTERY: Primary | ICD-10-CM

## 2019-12-13 DIAGNOSIS — I10 ESSENTIAL HYPERTENSION: ICD-10-CM

## 2019-12-13 DIAGNOSIS — Z95.2 S/P AVR (AORTIC VALVE REPLACEMENT): ICD-10-CM

## 2019-12-13 DIAGNOSIS — Z98.61 HISTORY OF PTCA: ICD-10-CM

## 2019-12-13 DIAGNOSIS — I48.0 PAROXYSMAL ATRIAL FIBRILLATION (HCC): ICD-10-CM

## 2019-12-13 DIAGNOSIS — Z95.1 HX OF CABG: ICD-10-CM

## 2019-12-13 PROCEDURE — G8417 CALC BMI ABV UP PARAM F/U: HCPCS | Performed by: INTERNAL MEDICINE

## 2019-12-13 PROCEDURE — 99214 OFFICE O/P EST MOD 30 MIN: CPT | Performed by: INTERNAL MEDICINE

## 2019-12-13 PROCEDURE — G8598 ASA/ANTIPLAT THER USED: HCPCS | Performed by: INTERNAL MEDICINE

## 2019-12-13 PROCEDURE — G8484 FLU IMMUNIZE NO ADMIN: HCPCS | Performed by: INTERNAL MEDICINE

## 2019-12-13 PROCEDURE — 4004F PT TOBACCO SCREEN RCVD TLK: CPT | Performed by: INTERNAL MEDICINE

## 2019-12-13 PROCEDURE — 3017F COLORECTAL CA SCREEN DOC REV: CPT | Performed by: INTERNAL MEDICINE

## 2019-12-13 PROCEDURE — G8427 DOCREV CUR MEDS BY ELIG CLIN: HCPCS | Performed by: INTERNAL MEDICINE

## 2019-12-18 ENCOUNTER — OFFICE VISIT (OUTPATIENT)
Dept: INTERNAL MEDICINE CLINIC | Age: 54
End: 2019-12-18
Payer: MEDICARE

## 2019-12-18 ENCOUNTER — ANTI-COAG VISIT (OUTPATIENT)
Dept: PHARMACY | Age: 54
End: 2019-12-18
Payer: MEDICARE

## 2019-12-18 VITALS
HEIGHT: 72 IN | SYSTOLIC BLOOD PRESSURE: 146 MMHG | TEMPERATURE: 97.9 F | DIASTOLIC BLOOD PRESSURE: 86 MMHG | WEIGHT: 275.4 LBS | HEART RATE: 82 BPM | BODY MASS INDEX: 37.3 KG/M2 | RESPIRATION RATE: 16 BRPM | OXYGEN SATURATION: 98 %

## 2019-12-18 DIAGNOSIS — Z95.2 S/P AORTIC VALVE REPLACEMENT: ICD-10-CM

## 2019-12-18 DIAGNOSIS — Z95.2 S/P AORTIC VALVE REPLACEMENT: Chronic | ICD-10-CM

## 2019-12-18 DIAGNOSIS — I48.0 PAROXYSMAL ATRIAL FIBRILLATION (HCC): ICD-10-CM

## 2019-12-18 DIAGNOSIS — I25.10 CORONARY ARTERY DISEASE INVOLVING NATIVE CORONARY ARTERY OF NATIVE HEART WITHOUT ANGINA PECTORIS: ICD-10-CM

## 2019-12-18 DIAGNOSIS — Z00.00 ENCOUNTER FOR MEDICARE ANNUAL WELLNESS EXAM: Primary | ICD-10-CM

## 2019-12-18 DIAGNOSIS — I10 ESSENTIAL HYPERTENSION: Chronic | ICD-10-CM

## 2019-12-18 DIAGNOSIS — Z79.01 WARFARIN ANTICOAGULATION: ICD-10-CM

## 2019-12-18 DIAGNOSIS — Z95.2 S/P AVR: ICD-10-CM

## 2019-12-18 DIAGNOSIS — Z87.891 PERSONAL HISTORY OF NICOTINE DEPENDENCE: ICD-10-CM

## 2019-12-18 LAB — INTERNATIONAL NORMALIZATION RATIO, POC: 3.4

## 2019-12-18 PROCEDURE — 85610 PROTHROMBIN TIME: CPT

## 2019-12-18 PROCEDURE — 99212 OFFICE O/P EST SF 10 MIN: CPT

## 2019-12-18 PROCEDURE — 99213 OFFICE O/P EST LOW 20 MIN: CPT | Performed by: STUDENT IN AN ORGANIZED HEALTH CARE EDUCATION/TRAINING PROGRAM

## 2019-12-18 RX ORDER — ASPIRIN 81 MG/1
81 TABLET ORAL DAILY
Qty: 30 TABLET | Refills: 0 | Status: SHIPPED | OUTPATIENT
Start: 2019-12-18

## 2019-12-18 RX ORDER — ROSUVASTATIN CALCIUM 20 MG/1
TABLET, COATED ORAL
Qty: 90 TABLET | Refills: 0 | Status: SHIPPED | OUTPATIENT
Start: 2019-12-18 | End: 2020-04-22

## 2019-12-18 RX ORDER — METOPROLOL TARTRATE 50 MG/1
TABLET, FILM COATED ORAL
Qty: 180 TABLET | Refills: 3 | Status: SHIPPED | OUTPATIENT
Start: 2019-12-18 | End: 2020-05-19

## 2019-12-18 RX ORDER — LISINOPRIL 10 MG/1
TABLET ORAL
Qty: 180 TABLET | Refills: 0 | Status: SHIPPED | OUTPATIENT
Start: 2019-12-18 | End: 2020-04-22

## 2019-12-18 RX ORDER — WARFARIN SODIUM 5 MG/1
TABLET ORAL
Qty: 60 TABLET | Refills: 2 | Status: CANCELLED | OUTPATIENT
Start: 2019-12-18

## 2019-12-18 ASSESSMENT — PATIENT HEALTH QUESTIONNAIRE - PHQ9
SUM OF ALL RESPONSES TO PHQ QUESTIONS 1-9: 2
SUM OF ALL RESPONSES TO PHQ QUESTIONS 1-9: 2
SUM OF ALL RESPONSES TO PHQ9 QUESTIONS 1 & 2: 2
1. LITTLE INTEREST OR PLEASURE IN DOING THINGS: 1
2. FEELING DOWN, DEPRESSED OR HOPELESS: 1

## 2019-12-19 PROCEDURE — 6360000002 HC RX W HCPCS

## 2019-12-19 PROCEDURE — 2500000003 HC RX 250 WO HCPCS

## 2020-01-03 RX ORDER — FAMOTIDINE 20 MG/1
TABLET, FILM COATED ORAL
Qty: 180 TABLET | Refills: 0 | Status: SHIPPED | OUTPATIENT
Start: 2020-01-03 | End: 2020-04-22

## 2020-01-08 ENCOUNTER — ANTI-COAG VISIT (OUTPATIENT)
Dept: PHARMACY | Age: 55
End: 2020-01-08
Payer: MEDICARE

## 2020-01-08 LAB — INTERNATIONAL NORMALIZATION RATIO, POC: 2.9

## 2020-01-08 PROCEDURE — 85610 PROTHROMBIN TIME: CPT

## 2020-01-08 PROCEDURE — 99211 OFF/OP EST MAY X REQ PHY/QHP: CPT

## 2020-01-08 NOTE — PROGRESS NOTES
ANTICOAGULATION SERVICE    Jina Hernandez is a 62 y.o. male with PMHx significant for AVR (re-do in May, 2017), CAD s/p CABG (x3 in May, 2017), pA-fib, HLD, HTN, testicular CA who presents to clinic 1/8/2020 for anticoagulation monitoring and adjustment.     Anticoagulation Indication(s):  Heart Valve Replacement - bovine AVR  Referring Physician:  Dr. Giraldo Every  Goal INR Range:  2-3  Duration of Anticoagulation Therapy:  TBD  Time of day dose taken:  PM  Product patient has at home:  warfarin 5 mg (peach)    [id number] Score for Atrial Fibrillation Stroke Risk   Risk   Factors  Component Value   C CHF No 0   H HTN Yes 1   A2 Age >= 75 No,  (58 y.o.) 0   D DM No 0   S2 Prior Stroke/TIA No 0   V Vascular Disease Yes 1   A Age 74-69 No,  (58 y.o.) 0   Sc Sex male 0    [id number]  Score  2   Score last updated 5/30/19 1:51 PM      Lab Results   Component Value Date    RBC 3.70 (L) 07/30/2019    HGB 12.4 (L) 07/30/2019    HCT 36.5 (L) 07/30/2019    MCV 98.7 07/30/2019    MCH 33.5 07/30/2019    MPV 7.6 07/30/2019    RDW 15.6 (H) 07/30/2019     07/30/2019     INR Summary                            Warfarin regimen (mg)  Date INR   A/P    Sun Mon Tue Wed Thu Fri Sat Mg/wk  1/8 2.9 At goal, no change  7.5 7.5 5 5 5 5 7.5 42.5  12/18 3.4 Above goal, reduce x 1 7.5 7.5 5 2.5/5 5 5 7.5 42.5  11/22 2.5 At goal, no change  7.5 7.5 5 5 5 5 7.5 42.5  10/25 2.5 At goal, no change  7.5 7.5 5 5 5 5 7.5 42.5  9/27 2.9 At goal, no change  7.5 7.5 5 5 5 5 7.5 42.5   8/30 2.8 At goal, no change  7.5 7.5 5 5 5 5 7.5 42.5  8/15 3.1 Above goal, continue  7.5 7.5 5 5 5 5 7.5 42.5  8/7 1.8 Below goal (held x 10 d) 7.5 7.5 5 5 5 5 7.5 42.5  7/17 2.6 At goal, no change  7.5 7.5 5 5 5 5 7.5 42.5  6/13 2.2 At goal, no change  7.5 7.5 5 5 5 5 7.5 42.5  5/16 2.3 At goal, no change  7.5 7.5 5 5 5 5 7.5 42.5  4/24 2.9 At goal, no change  7.5 7.5 5 5 5 5 7.5 42.5  4/10 3.6 Above goal, hold x 1  7.5 7.5 5 0/5 5 5 7.5 42.5   3/27 3.6 Above goal,

## 2020-02-05 ENCOUNTER — ANTI-COAG VISIT (OUTPATIENT)
Dept: PHARMACY | Age: 55
End: 2020-02-05
Payer: MEDICARE

## 2020-02-05 LAB — INTERNATIONAL NORMALIZATION RATIO, POC: 3.3

## 2020-02-05 PROCEDURE — 85610 PROTHROMBIN TIME: CPT

## 2020-02-05 PROCEDURE — 99212 OFFICE O/P EST SF 10 MIN: CPT

## 2020-02-05 NOTE — PROGRESS NOTES
ANTICOAGULATION SERVICE    Víctor Ortiz is a 62 y.o. male with PMHx significant for AVR (re-do in May, 2017), CAD s/p CABG (x3 in May, 2017), pA-fib, HLD, HTN, testicular CA who presents to clinic 2/5/2020 for anticoagulation monitoring and adjustment.     Anticoagulation Indication(s):  Heart Valve Replacement - bovine AVR  Referring Physician:  Dr. Bisi Barnard  Goal INR Range:  2-3  Duration of Anticoagulation Therapy:  TBD  Time of day dose taken:  PM  Product patient has at home:  warfarin 5 mg (peach)    [id number] Score for Atrial Fibrillation Stroke Risk   Risk   Factors  Component Value   C CHF No 0   H HTN Yes 1   A2 Age >= 75 No,  (58 y.o.) 0   D DM No 0   S2 Prior Stroke/TIA No 0   V Vascular Disease Yes 1   A Age 74-69 No,  (58 y.o.) 0   Sc Sex male 0    [id number]  Score  2   Score last updated 5/30/19 1:51 PM      Lab Results   Component Value Date    RBC 3.70 (L) 07/30/2019    HGB 12.4 (L) 07/30/2019    HCT 36.5 (L) 07/30/2019    MCV 98.7 07/30/2019    MCH 33.5 07/30/2019    MPV 7.6 07/30/2019    RDW 15.6 (H) 07/30/2019     07/30/2019     INR Summary                            Warfarin regimen (mg)  Date INR   A/P    Sun Mon Tue Wed Thu Fri Sat Mg/wk  2/5 3.3 Above goal, decrease  7.5 5 5 5 5 5 7.5 40  1/8 2.9 At goal, no change  7.5 7.5 5 5 5 5 7.5 42.5  12/18 3.4 Above goal, reduce x 1 7.5 7.5 5 2.5/5 5 5 7.5 42.5  11/22 2.5 At goal, no change  7.5 7.5 5 5 5 5 7.5 42.5  10/25 2.5 At goal, no change  7.5 7.5 5 5 5 5 7.5 42.5  9/27 2.9 At goal, no change  7.5 7.5 5 5 5 5 7.5 42.5   8/30 2.8 At goal, no change  7.5 7.5 5 5 5 5 7.5 42.5  8/15 3.1 Above goal, continue  7.5 7.5 5 5 5 5 7.5 42.5  8/7 1.8 Below goal (held x 10 d) 7.5 7.5 5 5 5 5 7.5 42.5  7/17 2.6 At goal, no change  7.5 7.5 5 5 5 5 7.5 42.5  6/13 2.2 At goal, no change  7.5 7.5 5 5 5 5 7.5 42.5  5/16 2.3 At goal, no change  7.5 7.5 5 5 5 5 7.5 42.5  4/24 2.9 At goal, no change  7.5 7.5 5 5 5 5 7.5 42.5  4/10 3.6 Above goal, hold x None reported     Assessment/Plan:  Patient's INR was supratherapeutic today (3.3). Patient's INR was also supratherapeutic on 12/18 due to unknown etiology, and has been trending high since then. Patient denies any missed/extra warfarin doses, diet changes, medication changes, antibiotics/steroids, recent illness, change in smoking, etc since last visit. Patient was instructed to decrease warfarin dose to 5 mg daily, except 7.5 mg on Sat+Sun. Patient prefers to take the same doses of warfain in a row because it is easier to remember. Repeat INR in 3 weeks. Patient was reminded to maintain consistent vitamin K intake and call with any bleeding, medication changes, or fever/vomiting/diarrhea. Patient understands dosing directions and information discussed. Dosing schedule and follow up appointment given to patient. Progress note routed to referring physician's office. Patient acknowledges working in consult agreement with pharmacist as referred by his/her physician. Next Clinic Appointment:  2/26    Please call [facility] at [phone] with any questions. Thanks! Klaudia Martins.  Evan Farrell, PharmD, BCPS  [facility]  Ph: [phone]  2/5/2020 7:58 AM

## 2020-02-26 ENCOUNTER — ANTI-COAG VISIT (OUTPATIENT)
Dept: PHARMACY | Age: 55
End: 2020-02-26
Payer: MEDICARE

## 2020-02-26 LAB — INR BLD: 2.3

## 2020-02-26 PROCEDURE — 85610 PROTHROMBIN TIME: CPT

## 2020-02-26 PROCEDURE — 99211 OFF/OP EST MAY X REQ PHY/QHP: CPT

## 2020-02-26 NOTE — PROGRESS NOTES
ANTICOAGULATION SERVICE    Abad Goldberg is a 62 y.o. male with PMHx significant for AVR (re-do in May, 2017), CAD s/p CABG (x3 in May, 2017), pA-fib, HLD, HTN, testicular CA who presents to clinic 2/26/2020 for anticoagulation monitoring and adjustment.     Anticoagulation Indication(s):  Heart Valve Replacement - bovine AVR  Referring Physician:  Dr. Napoleon Sigala  Goal INR Range:  2-3  Duration of Anticoagulation Therapy:  TBD  Time of day dose taken:  PM  Product patient has at home:  warfarin 5 mg (peach)    [id number] Score for Atrial Fibrillation Stroke Risk   Risk   Factors  Component Value   C CHF No 0   H HTN Yes 1   A2 Age >= 75 No,  (58 y.o.) 0   D DM No 0   S2 Prior Stroke/TIA No 0   V Vascular Disease Yes 1   A Age 74-69 No,  (58 y.o.) 0   Sc Sex male 0    [id number]  Score  2   Score last updated 5/30/19 1:51 PM      Lab Results   Component Value Date    RBC 3.70 (L) 07/30/2019    HGB 12.4 (L) 07/30/2019    HCT 36.5 (L) 07/30/2019    MCV 98.7 07/30/2019    MCH 33.5 07/30/2019    MPV 7.6 07/30/2019    RDW 15.6 (H) 07/30/2019     07/30/2019     INR Summary                            Warfarin regimen (mg)  Date INR   A/P    Sun Mon Tue Wed Thu Fri Sat Mg/wk  2/26 2.3 At goal, no change  7.5 5 5 5 5 5 7.5 40  2/5 3.3 Above goal, decrease  7.5 5 5 5 5 5 7.5 40  1/8 2.9 At goal, no change  7.5 7.5 5 5 5 5 7.5 42.5  12/18 3.4 Above goal, reduce x 1 7.5 7.5 5 2.5/5 5 5 7.5 42.5  11/22 2.5 At goal, no change  7.5 7.5 5 5 5 5 7.5 42.5  10/25 2.5 At goal, no change  7.5 7.5 5 5 5 5 7.5 42.5  9/27 2.9 At goal, no change  7.5 7.5 5 5 5 5 7.5 42.5   8/30 2.8 At goal, no change  7.5 7.5 5 5 5 5 7.5 42.5  8/15 3.1 Above goal, continue  7.5 7.5 5 5 5 5 7.5 42.5  8/7 1.8 Below goal (held x 10 d) 7.5 7.5 5 5 5 5 7.5 42.5  7/17 2.6 At goal, no change  7.5 7.5 5 5 5 5 7.5 42.5  6/13 2.2 At goal, no change  7.5 7.5 5 5 5 5 7.5 42.5  5/16 2.3 At goal, no change  7.5 7.5 5 5 5 5 7.5 42.5  4/24 2.9 At goal, no change  7.5 7.5 5 5 5 5 7.5 42.5  4/10 3.6 Above goal, hold x 1  7.5 7.5 5 0/5 5 5 7.5 42.5   3/27 3.6 Above goal, decrease  7.5 7.5 5 5 5 5 7.5 42.5  3/13 3.0 At goal, no change   7.5 7.5 7.5 5 5 7.5 7.5 47.5  2/27 2.5 At goal, no change  7.5 7.5 7.5 5 5 7.5 7.5 47.5  2/13 3.1 Above goal, continue  7.5 7.5 7.5 5 5 7.5 7.5 47.5  1/7 2.4 At goal, no change  7.5 7.5 7.5 5 5 7.5 7.5 47.5  12/10 2.5 At goal, no change  7.5 7.5 7.5 5 5 7.5 7.5 47.5  11/12 2.8 At goal, no change  7.5 7.5 7.5 5 5 7.5 7.5 47.5  10/15 2.1 At goal, no change  7.5 7.5 7.5 5 5 7.5 7.5 47.5  9/17 2.6 At goal, no change  7.5 7.5 7.5 5 5 7.5 7.5 47.5  8/17 2.8 At goal, no change  7.5 7.5 7.5 5 5 7.5 7.5 47.5   7/19 2.9 At goal, no change  7.5 7.5 7.5 5 5 7.5 7.5 47.5  6/22 2.6 At goal, no change  7.5 7.5 7.5 5 5 7.5 7.5 47.5  6/1 2.1 At goal, no change  7.5 7.5 7.5 5 5 7.5 7.5 47.5  5/11 2.2 At goal, no change  7.5 7.5 7.5 5 5 7.5 7.5 47.5    Patient History:  Recent hospitalizations/HC visits -12/18: Dr. Hema Samuels: no med changes   -12/13 Dr. Fay Fuller: no med changes  -7/26-7/30 admit [facility] for laparoscopic right colectomy and cholecystectomy: warfarin held 5 days PTP and throughout admission  -6/6 colonoscopy   Recent medication changes None reported   Medications taken regularly that may interact with warfarin or alter INR ASA 81 mg   Warfarin dose taken as prescribed Yes, usually compliant   Does not use pillbox  Patient has been taking warfarin since re-do AVR in May, 2017   Signs/symptoms of bleeding No h/o major bleeding   Vitamin K intake Normally has ~0 servings of green, leafy vegetables per week   Recent vomiting/diarrhea/fever, changes in weight or activity level Patient's wife passed from cancer in March, 2018    Tobacco or alcohol use -No recent changes in smoking as of 2/26/20  -Patient cut back from 2 PPD to 3/4 PPD in February (2019) when he moved in with his daughter.  Decrease in smoking typically increases INR gradually.   -Patient

## 2020-02-27 RX ORDER — WARFARIN SODIUM 5 MG/1
TABLET ORAL
Qty: 135 TABLET | Refills: 2 | Status: SHIPPED | OUTPATIENT
Start: 2020-02-27 | End: 2020-06-16 | Stop reason: SDUPTHER

## 2020-02-27 NOTE — TELEPHONE ENCOUNTER
[facility] Medication Refills:    Requested Prescriptions     Pending Prescriptions Disp Refills    warfarin (COUMADIN) 5 MG tablet [Pharmacy Med Name: WARFARIN SODIUM 5MG TABS] 180 tablet 3     Sig: TAKE 5 MG DAILY, EXCEPT ON SATURDAY, SUNDAY, AND MONDAY TAKE 7.5 MG AS DIRECTED                 Last Office Visit:12/13/19  Next Office Visit: 09/19/20

## 2020-03-30 ENCOUNTER — OFFICE VISIT (OUTPATIENT)
Dept: INTERNAL MEDICINE CLINIC | Age: 55
End: 2020-03-30
Payer: MEDICARE

## 2020-03-30 ENCOUNTER — ANTI-COAG VISIT (OUTPATIENT)
Dept: PHARMACY | Age: 55
End: 2020-03-30
Payer: MEDICARE

## 2020-03-30 VITALS
RESPIRATION RATE: 16 BRPM | OXYGEN SATURATION: 98 % | HEART RATE: 72 BPM | DIASTOLIC BLOOD PRESSURE: 85 MMHG | WEIGHT: 272 LBS | BODY MASS INDEX: 36.89 KG/M2 | TEMPERATURE: 97.4 F | SYSTOLIC BLOOD PRESSURE: 123 MMHG

## 2020-03-30 DIAGNOSIS — Z95.2 S/P AORTIC VALVE REPLACEMENT: ICD-10-CM

## 2020-03-30 DIAGNOSIS — I48.0 PAROXYSMAL ATRIAL FIBRILLATION (HCC): ICD-10-CM

## 2020-03-30 LAB
INTERNATIONAL NORMALIZATION RATIO, POC: 3
PROTHROMBIN TIME, POC: NORMAL

## 2020-03-30 PROCEDURE — 83036 HEMOGLOBIN GLYCOSYLATED A1C: CPT | Performed by: STUDENT IN AN ORGANIZED HEALTH CARE EDUCATION/TRAINING PROGRAM

## 2020-03-30 PROCEDURE — 99211 OFF/OP EST MAY X REQ PHY/QHP: CPT

## 2020-03-30 PROCEDURE — 99213 OFFICE O/P EST LOW 20 MIN: CPT | Performed by: STUDENT IN AN ORGANIZED HEALTH CARE EDUCATION/TRAINING PROGRAM

## 2020-03-30 NOTE — PROGRESS NOTES
Outpatient Clinic Established Patient Note    Patient: Vidhi Johnson  : 1965 (54 y.o.)  Date: 3/30/2020    CC: Follow up/back pain    HPI:    Hafsa Wheeler is a 61 yo male with hx of CAD s/p CABG, HLD, HTN, afib on warfarin, testicular cancer with mets s/p chemo/cholectomy and cholecystectomy visits clinic for a follow up. He was seen 3 month ago. He continues to have back pain and joint pain and he regularly visits pain clinic. Denies SOB, fever, red eyes, chest pain, fatigue, or GI symptoms. Home Meds:  Prior to Visit Medications    Medication Sig Taking? Authorizing Provider   warfarin (COUMADIN) 5 MG tablet TAKE 1 TABLET (5 MG) OR 1.5 TABLETS (7.5 MG) BY MOUTH DAILY AS DIRECTED BY CLINIC Yes Modesto Edwards MD   famotidine (PEPCID) 20 MG tablet TAKE 1 TABLET BY MOUTH 2 TIMES A DAY Yes Elham Tenorio MD   rosuvastatin (CRESTOR) 20 MG tablet TAKE ONE TABLET BY MOUTH EVERY EVENING Yes Jere Leggett MD   lisinopril (PRINIVIL;ZESTRIL) 10 MG tablet TAKE 1 TABLET BY MOUTH 2 TIMES A DAY Yes Jere Leggett MD   metoprolol tartrate (LOPRESSOR) 50 MG tablet TAKE 1 TABLET BY MOUTH TWICE DAILY FOR BLOOD PRESSURE AND HEART Yes Jere Leggett MD   aspirin 81 MG EC tablet Take 1 tablet by mouth daily Yes Jere Leggett MD   CYCLOBENZAPRINE HCL PO Take by mouth Yes Historical Provider, MD   sildenafil (VIAGRA) 100 MG tablet Take 1 tablet by mouth as needed for Erectile Dysfunction Start with 1/2 tab. If it does not work try full tab but no more 1 tab a day Yes Jaciel Reyez MD   melatonin (RA MELATONIN) 3 MG TABS tablet Take 1 tablet by mouth nightly as needed (insomnia) Yes Edward Dang MD   Blood Pressure KIT 1 Unit Yes Edward Dang MD   oxyCODONE-acetaminophen (PERCOCET) 5-325 MG per tablet Take 1 tablet by mouth every 4 hours as needed for Pain  .  Yes Historical Provider, MD   gabapentin (NEURONTIN) 300 MG capsule Take 300 mg by mouth 3 times daily  Yes Historical Provider, MD   amLODIPine (NORVASC) 5 MG tablet Take 1 tablet by mouth nightly as needed (If systolic blood pressure over 130, take amlodipine 5mg at night, if under 130 do not take amlopidine)  Patient not taking: Reported on 3/30/2020  Jaciel Reyez MD       Allergies:    Atorvastatin calcium [lipitor] and Vicodin [hydrocodone-acetaminophen]    Health Maintenance Due   Topic Date Due    Hepatitis C screen  1965    DTaP/Tdap/Td vaccine (1 - Tdap) 08/08/1982    Shingles Vaccine (1 of 2) 08/08/2013    Lipid screen  04/13/2017    A1C test (Diabetic or Prediabetic)  05/26/2018    Low dose CT lung screening  08/08/2018    Annual Wellness Visit (AWV)  06/20/2019       Immunization History   Administered Date(s) Administered    Pneumococcal Polysaccharide ([id number]) 07/26/2014       Review of Systems  A 10-organ Review Of Systems was obtained and otherwise unremarkable except as per HPI. Data: Old records have been reviewed electronically. PHYSICAL EXAM:  /85 (Site: Right Upper Arm, Position: Sitting, Cuff Size: Large Adult)   Pulse 72   Temp 97.4 °F (36.3 °C) (Oral)   Resp 16   Wt 272 lb (123.4 kg)   SpO2 98%   BMI 36.89 kg/m²   Physical Exam  Constitutional:       Appearance: Normal appearance. He is obese. HENT:      Head: Normocephalic and atraumatic. Nose: Nose normal.      Mouth/Throat:      Mouth: Mucous membranes are dry. Eyes:      Extraocular Movements: Extraocular movements intact. Pupils: Pupils are equal, round, and reactive to light. Neck:      Musculoskeletal: Normal range of motion. Cardiovascular:      Rate and Rhythm: Normal rate and regular rhythm. Pulses: Normal pulses. Heart sounds: No murmur. Pulmonary:      Effort: Pulmonary effort is normal.      Breath sounds: Wheezing present. Abdominal:      General: Abdomen is flat. Bowel sounds are normal.      Palpations: Abdomen is soft. Genitourinary:     Penis: Normal.    Musculoskeletal: Normal range of motion. Skin:     General: Skin is warm and dry. Capillary Refill: Capillary refill takes less than 2 seconds. Neurological:      General: No focal deficit present. Mental Status: He is alert and oriented to person, place, and time. Assessment & Plan:      1. Essential hypertension  - CBC WITH AUTO DIFFERENTIAL; Future  - COMPREHENSIVE METABOLIC PANEL; Future    2. CAD, multiple vessel s/p CABG x5  - CBC WITH AUTO DIFFERENTIAL; Future  - COMPREHENSIVE METABOLIC PANEL; Future  - continue statin, BB, ACEI, ASA    3. Pure hypercholesterolemia  - Lipid, Fasting; Future    4. Obesity, Class I, BMI 30.0-34.9 (see actual BMI)  - POCT glycosylated hemoglobin (Hb A1C)  - lipid panel    5. Screening for diabetes mellitus (DM)  - POCT glycosylated hemoglobin (Hb A1C)    6. Gastroesophageal reflux disease without esophagitis  - Pepcid    7. Afib on coumadin  - continue coumadin  - INR today    8. Tobacco abuse  - smoking cessation  - lung cancer screening CT    9. Back pain chronic  - follow up pain management    Return in about 3 months (around 6/30/2020).     Dispo: Pt has been staffed with Dr. Dian Gary  _______________  Sang Valdez MD, 3/30/2020 10:02 AM   PGY-3

## 2020-03-30 NOTE — PROGRESS NOTES
ANTICOAGULATION SERVICE    Lois Starr is a 62 y.o. male with PMHx significant for AVR (re-do in May, 2017), CAD s/p CABG (x3 in May, 2017), pA-fib, HLD, HTN, testicular CA who had INR drawn by PCP on 3/30/2020 for anticoagulation monitoring and adjustment.     Anticoagulation Indication(s):  Heart Valve Replacement - bovine AVR  Referring Physician:  Dr. Karina Martinez  Goal INR Range:  2-3  Duration of Anticoagulation Therapy:  TBD  Time of day dose taken:  PM  Product patient has at home:  warfarin 5 mg (peach)    [id number] Score for Atrial Fibrillation Stroke Risk   Risk   Factors  Component Value   C CHF No 0   H HTN Yes 1   A2 Age >= 75 No,  (58 y.o.) 0   D DM No 0   S2 Prior Stroke/TIA No 0   V Vascular Disease Yes 1   A Age 74-69 No,  (58 y.o.) 0   Sc Sex male 0    [id number]  Score  2   Score last updated 5/30/19 1:51 PM      Lab Results   Component Value Date    RBC 3.70 (L) 07/30/2019    HGB 12.4 (L) 07/30/2019    HCT 36.5 (L) 07/30/2019    MCV 98.7 07/30/2019    MCH 33.5 07/30/2019    MPV 7.6 07/30/2019    RDW 15.6 (H) 07/30/2019     07/30/2019     INR Summary                            Warfarin regimen (mg)  Date INR   A/P    Sun Mon Tue Wed Thu Fri Sat Mg/wk  3/30 3.0 At goal, no change  7.5 5 5 5 5 5 7.5 40  2/26 2.3 At goal, no change  7.5 5 5 5 5 5 7.5 40  2/5 3.3 Above goal, decrease  7.5 5 5 5 5 5 7.5 40  1/8 2.9 At goal, no change  7.5 7.5 5 5 5 5 7.5 42.5  12/18 3.4 Above goal, reduce x 1 7.5 7.5 5 2.5/5 5 5 7.5 42.5  11/22 2.5 At goal, no change  7.5 7.5 5 5 5 5 7.5 42.5  10/25 2.5 At goal, no change  7.5 7.5 5 5 5 5 7.5 42.5  9/27 2.9 At goal, no change  7.5 7.5 5 5 5 5 7.5 42.5   8/30 2.8 At goal, no change  7.5 7.5 5 5 5 5 7.5 42.5  8/15 3.1 Above goal, continue  7.5 7.5 5 5 5 5 7.5 42.5  8/7 1.8 Below goal (held x 10 d) 7.5 7.5 5 5 5 5 7.5 42.5  7/17 2.6 At goal, no change  7.5 7.5 5 5 5 5 7.5 42.5  6/13 2.2 At goal, no change  7.5 7.5 5 5 5 5 7.5 42.5  5/16 2.3 At goal, no change  7.5 7.5 5 5 5 5 7.5 42.5  4/24 2.9 At goal, no change  7.5 7.5 5 5 5 5 7.5 42.5  4/10 3.6 Above goal, hold x 1  7.5 7.5 5 0/5 5 5 7.5 42.5   3/27 3.6 Above goal, decrease  7.5 7.5 5 5 5 5 7.5 42.5  3/13 3.0 At goal, no change   7.5 7.5 7.5 5 5 7.5 7.5 47.5  2/27 2.5 At goal, no change  7.5 7.5 7.5 5 5 7.5 7.5 47.5  2/13 3.1 Above goal, continue  7.5 7.5 7.5 5 5 7.5 7.5 47.5  1/7 2.4 At goal, no change  7.5 7.5 7.5 5 5 7.5 7.5 47.5  12/10 2.5 At goal, no change  7.5 7.5 7.5 5 5 7.5 7.5 47.5  11/12 2.8 At goal, no change  7.5 7.5 7.5 5 5 7.5 7.5 47.5  10/15 2.1 At goal, no change  7.5 7.5 7.5 5 5 7.5 7.5 47.5  9/17 2.6 At goal, no change  7.5 7.5 7.5 5 5 7.5 7.5 47.5  8/17 2.8 At goal, no change  7.5 7.5 7.5 5 5 7.5 7.5 47.5   7/19 2.9 At goal, no change  7.5 7.5 7.5 5 5 7.5 7.5 47.5  6/22 2.6 At goal, no change  7.5 7.5 7.5 5 5 7.5 7.5 47.5  6/1 2.1 At goal, no change  7.5 7.5 7.5 5 5 7.5 7.5 47.5  5/11 2.2 At goal, no change  7.5 7.5 7.5 5 5 7.5 7.5 47.5    Patient History:  Recent hospitalizations/HC visits -12/18: Dr. Karen Baker: no med changes   -12/13 Dr. Louise Ortiz: no med changes  -7/26-7/30 admit [facility] for laparoscopic right colectomy and cholecystectomy: warfarin held 5 days PTP and throughout admission  -6/6 colonoscopy   Recent medication changes None reported   Medications taken regularly that may interact with warfarin or alter INR ASA 81 mg   Warfarin dose taken as prescribed Yes, usually compliant   Does not use pillbox  Patient has been taking warfarin since re-do AVR in May, 2017   Signs/symptoms of bleeding No h/o major bleeding   Vitamin K intake Normally has ~0 servings of green, leafy vegetables per week   Recent vomiting/diarrhea/fever, changes in weight or activity level Patient's wife passed from cancer in March, 2018    Tobacco or alcohol use -No recent changes in smoking as of 2/26/20  -Patient cut back from 2 PPD to 3/4 PPD in February (2019) when he moved in with his daughter.  Decrease in

## 2020-03-30 NOTE — PROGRESS NOTES
CLINICAL PHARMACY CONSULT: MED RECONCILIATION/REVIEW ADDENDUM    For Pharmacy Admin Tracking Only    PHSO: No  Total # of Interventions Recommended: 0  - Maintenance Safety Lab Monitoring #: 1  Total Interventions Accepted: 0  Time Spent (min): 13    Concetta Uribe, PharmD, BCPS  [facility]  Ph: [phone]  3/30/2020 2:31 PM

## 2020-04-22 RX ORDER — FAMOTIDINE 20 MG/1
TABLET, FILM COATED ORAL
Qty: 180 TABLET | Refills: 0 | Status: SHIPPED | OUTPATIENT
Start: 2020-04-22 | End: 2020-07-29

## 2020-04-22 RX ORDER — LISINOPRIL 10 MG/1
TABLET ORAL
Qty: 180 TABLET | Refills: 0 | Status: SHIPPED | OUTPATIENT
Start: 2020-04-22 | End: 2020-07-29

## 2020-04-22 RX ORDER — ROSUVASTATIN CALCIUM 20 MG/1
TABLET, COATED ORAL
Qty: 90 TABLET | Refills: 0 | Status: SHIPPED | OUTPATIENT
Start: 2020-04-22 | End: 2020-06-18

## 2020-05-01 ENCOUNTER — TELEPHONE (OUTPATIENT)
Dept: INTERNAL MEDICINE CLINIC | Age: 55
End: 2020-05-01

## 2020-05-07 ENCOUNTER — TELEPHONE (OUTPATIENT)
Dept: PHARMACY | Age: 55
End: 2020-05-07

## 2020-05-11 ENCOUNTER — ANTI-COAG VISIT (OUTPATIENT)
Dept: PHARMACY | Age: 55
End: 2020-05-11
Payer: MEDICARE

## 2020-05-11 LAB — INTERNATIONAL NORMALIZATION RATIO, POC: 2.9

## 2020-05-11 PROCEDURE — 85610 PROTHROMBIN TIME: CPT

## 2020-05-11 PROCEDURE — 99211 OFF/OP EST MAY X REQ PHY/QHP: CPT

## 2020-05-11 NOTE — PROGRESS NOTES
ANTICOAGULATION SERVICE    Casper Roca is a 62 y.o. male with PMHx significant for AVR (re-do in May, 2017), CAD s/p CABG (x3 in May, 2017), pA-fib, HLD, HTN, testicular CA who presents to clinic on 5/11/2020 for anticoagulation monitoring and adjustment.     Anticoagulation Indication(s):  Heart Valve Replacement - bovine AVR  Referring Physician:  Dr. Marii Pierce  Goal INR Range:  2-3  Duration of Anticoagulation Therapy:  TBD  Time of day dose taken:  PM  Product patient has at home:  warfarin 5 mg (peach)    [id number] Score for Atrial Fibrillation Stroke Risk   Risk   Factors  Component Value   C CHF No 0   H HTN Yes 1   A2 Age >= 75 No,  (58 y.o.) 0   D DM No 0   S2 Prior Stroke/TIA No 0   V Vascular Disease Yes 1   A Age 74-69 No,  (58 y.o.) 0   Sc Sex male 0    [id number]  Score  2   Score last updated 5/30/19 1:51 PM      Lab Results   Component Value Date    RBC 3.70 (L) 07/30/2019    HGB 12.4 (L) 07/30/2019    HCT 36.5 (L) 07/30/2019    MCV 98.7 07/30/2019    MCH 33.5 07/30/2019    MPV 7.6 07/30/2019    RDW 15.6 (H) 07/30/2019     07/30/2019     INR Summary                            Warfarin regimen (mg)  Date INR   A/P    Sun Mon Tue Wed Thu Fri Sat Mg/wk  5/11 2.9 At goal, no change  7.5 5 5 5 5 5 7.5 40  3/30 3.0 At goal, no change  7.5 5 5 5 5 5 7.5 40  2/26 2.3 At goal, no change  7.5 5 5 5 5 5 7.5 40  2/5 3.3 Above goal, decrease  7.5 5 5 5 5 5 7.5 40  1/8 2.9 At goal, no change  7.5 7.5 5 5 5 5 7.5 42.5  12/18 3.4 Above goal, reduce x 1 7.5 7.5 5 2.5/5 5 5 7.5 42.5  11/22 2.5 At goal, no change  7.5 7.5 5 5 5 5 7.5 42.5  10/25 2.5 At goal, no change  7.5 7.5 5 5 5 5 7.5 42.5  9/27 2.9 At goal, no change  7.5 7.5 5 5 5 5 7.5 42.5   8/30 2.8 At goal, no change  7.5 7.5 5 5 5 5 7.5 42.5  8/15 3.1 Above goal, continue  7.5 7.5 5 5 5 5 7.5 42.5  8/7 1.8 Below goal (held x 10 d) 7.5 7.5 5 5 5 5 7.5 42.5  7/17 2.6 At goal, no change  7.5 7.5 5 5 5 5 7.5 42.5  6/13 2.2 At goal, no 2 PPD to 3/4 PPD in February (2019) when he moved in with his daughter. Decrease in smoking typically increases INR gradually.   -Patient denies any alcohol or illicit drug use   Upcoming surgeries or procedures None reported     Assessment/Plan:  Patient's INR was therapeutic today (2.9) for third consecutive visit following warfarin dose reduction on 2/5. He thinks he may have accidentally taken 7.5 mg instead of 5 mg on 5/8, which would correlate with INR on upper end of goal range today. Patient denies any missed warfarin doses, diet changes, medication changes, recent illness, bleeding, change in smoking, etc since last visit. As INR remains therapeutic, patient was instructed to continue warfarin dose of 5 mg daily, except 7.5 mg on Sat+Sun. Patient prefers to take the same doses of warfain in a row because it is easier to remember. Repeat INR in 4 weeks due to COVID-19 pandemic. Patient was reminded to maintain consistent vitamin K intake and call with any bleeding, medication changes, or fever/vomiting/diarrhea. Patient understands dosing directions and information discussed. Dosing schedule and follow up appointment given to patient. Progress note routed to referring physician's office. Patient acknowledges working in consult agreement with pharmacist as referred by his/her physician. Next INR Check:  6/8    Please call [facility] at [phone] with any questions. Thanks! Elmer Copeland.  Angie Garcia, PharmD, [facility]  [facility]  Ph: [phone]  5/11/2020 9:04 AM    CLINICAL PHARMACY CONSULT: MED RECONCILIATION/REVIEW ADDENDUM    For Pharmacy Admin Tracking Only    PHSO: No  Total # of Interventions Recommended: 0  - Maintenance Safety Lab Monitoring #: 1  Total Interventions Accepted: 0  Time Spent (min): 15

## 2020-05-11 NOTE — TELEPHONE ENCOUNTER
Spoke to patient-he has had 3-4 flares in the last year. But in the last week the left corner of his mouth is cracked and really sore. Patient has a follow up in 7/2020.

## 2020-05-12 RX ORDER — NYSTATIN AND TRIAMCINOLONE ACETONIDE 100000; 1 [USP'U]/G; MG/G
OINTMENT TOPICAL
Qty: 30 G | Refills: 0 | Status: SHIPPED | OUTPATIENT
Start: 2020-05-12 | End: 2021-07-27 | Stop reason: SDUPTHER

## 2020-06-03 ENCOUNTER — TELEPHONE (OUTPATIENT)
Dept: PHARMACY | Age: 55
End: 2020-06-03

## 2020-06-08 ENCOUNTER — ANTI-COAG VISIT (OUTPATIENT)
Dept: PHARMACY | Age: 55
End: 2020-06-08
Payer: MEDICARE

## 2020-06-08 LAB — INTERNATIONAL NORMALIZATION RATIO, POC: 2.8

## 2020-06-08 PROCEDURE — 85610 PROTHROMBIN TIME: CPT

## 2020-06-08 PROCEDURE — 99211 OFF/OP EST MAY X REQ PHY/QHP: CPT

## 2020-06-08 NOTE — PROGRESS NOTES
ANTICOAGULATION SERVICE    Terrence Cheadle is a 62 y.o. male with PMHx significant for AVR (re-do in May, 2017), CAD s/p CABG (x3 in May, 2017), pA-fib, HLD, HTN, testicular CA who presents to clinic on 6/8/2020 for anticoagulation monitoring and adjustment.     Anticoagulation Indication(s):  Heart Valve Replacement (bovine AVR), A-fib  Referring Physician:  Dr. Tavia Kern  Goal INR Range:  2-3  Duration of Anticoagulation Therapy:  TBD  Time of day dose taken:  PM  Product patient has at home:  warfarin 5 mg (peach)    [id number] Score for Atrial Fibrillation Stroke Risk   Risk   Factors  Component Value   C CHF No 0   H HTN Yes 1   A2 Age >= 75 No,  (58 y.o.) 0   D DM No 0   S2 Prior Stroke/TIA No 0   V Vascular Disease Yes 1   A Age 74-69 No,  (58 y.o.) 0   Sc Sex male 0    [id number]  Score  2   Score last updated 5/30/19 1:51 PM      Lab Results   Component Value Date    RBC 3.70 (L) 07/30/2019    HGB 12.4 (L) 07/30/2019    HCT 36.5 (L) 07/30/2019    MCV 98.7 07/30/2019    MCH 33.5 07/30/2019    MPV 7.6 07/30/2019    RDW 15.6 (H) 07/30/2019     07/30/2019     INR Summary                            Warfarin regimen (mg)  Date INR   A/P    Sun Mon Tue Wed Thu Fri Sat Mg/wk  6/8 2.8 At goal, no change  7.5 5 5 5 5 5 7.5 40  5/11 2.9 At goal, no change  7.5 5 5 5 5 5 7.5 40  3/30 3.0 At goal, no change  7.5 5 5 5 5 5 7.5 40  2/26 2.3 At goal, no change  7.5 5 5 5 5 5 7.5 40  2/5 3.3 Above goal, decrease  7.5 5 5 5 5 5 7.5 40  1/8 2.9 At goal, no change  7.5 7.5 5 5 5 5 7.5 42.5  12/18 3.4 Above goal, reduce x 1 7.5 7.5 5 2.5/5 5 5 7.5 42.5  11/22 2.5 At goal, no change  7.5 7.5 5 5 5 5 7.5 42.5  10/25 2.5 At goal, no change  7.5 7.5 5 5 5 5 7.5 42.5  9/27 2.9 At goal, no change  7.5 7.5 5 5 5 5 7.5 42.5   8/30 2.8 At goal, no change  7.5 7.5 5 5 5 5 7.5 42.5  8/15 3.1 Above goal, continue  7.5 7.5 5 5 5 5 7.5 42.5  8/7 1.8 Below goal (held x 10 d) 7.5 7.5 5 5 5 5 7.5 42.5  7/17 2.6 At goal, no change  7.5 7.5 5 5 5 5 7.5 42.5  6/13 2.2 At goal, no change  7.5 7.5 5 5 5 5 7.5 42.5  5/16 2.3 At goal, no change  7.5 7.5 5 5 5 5 7.5 42.5  4/24 2.9 At goal, no change  7.5 7.5 5 5 5 5 7.5 42.5  4/10 3.6 Above goal, hold x 1  7.5 7.5 5 0/5 5 5 7.5 42.5   3/27 3.6 Above goal, decrease  7.5 7.5 5 5 5 5 7.5 42.5  3/13 3.0 At goal, no change   7.5 7.5 7.5 5 5 7.5 7.5 47.5  2/27 2.5 At goal, no change  7.5 7.5 7.5 5 5 7.5 7.5 47.5  2/13 3.1 Above goal, continue  7.5 7.5 7.5 5 5 7.5 7.5 47.5  1/7 2.4 At goal, no change  7.5 7.5 7.5 5 5 7.5 7.5 47.5  12/10 2.5 At goal, no change  7.5 7.5 7.5 5 5 7.5 7.5 47.5  11/12 2.8 At goal, no change  7.5 7.5 7.5 5 5 7.5 7.5 47.5  10/15 2.1 At goal, no change  7.5 7.5 7.5 5 5 7.5 7.5 47.5  9/17 2.6 At goal, no change  7.5 7.5 7.5 5 5 7.5 7.5 47.5  8/17 2.8 At goal, no change  7.5 7.5 7.5 5 5 7.5 7.5 47.5   7/19 2.9 At goal, no change  7.5 7.5 7.5 5 5 7.5 7.5 47.5  6/22 2.6 At goal, no change  7.5 7.5 7.5 5 5 7.5 7.5 47.5  6/1 2.1 At goal, no change  7.5 7.5 7.5 5 5 7.5 7.5 47.5  5/11 2.2 At goal, no change  7.5 7.5 7.5 5 5 7.5 7.5 47.5    Patient History:  Recent hospitalizations/HC visits -3/30 Dr. Parminder Zelaya (PCP office): no med changes   -12/13 Dr. Maddie Hodge: no med changes  -7/26-7/30 admit [facility] for laparoscopic right colectomy and cholecystectomy: warfarin held 5 days PTP and throughout admission   Recent medication changes None reported   Medications taken regularly that may interact with warfarin or alter INR ASA 81 mg   Warfarin dose taken as prescribed Yes  Usually compliant   Does not use pillbox  Patient has been taking warfarin since re-do AVR in May, 2017   Signs/symptoms of bleeding No h/o major bleeding   Vitamin K intake Normally has ~0 servings of green, leafy vegetables per week   Recent vomiting/diarrhea/fever, changes in weight or activity level Patient's wife passed from cancer in March, 2018    Tobacco or alcohol use -No recent changes in smoking as of 2/26/20  -Patient cut back from 2 PPD to 3/4 PPD in February (2019) when he moved in with his daughter. Decrease in smoking typically increases INR gradually.   -Patient denies any alcohol or illicit drug use   Upcoming surgeries or procedures None reported     Assessment/Plan:  Patient's INR was therapeutic today (2.8) for fourth consecutive visit following warfarin dose reduction on 2/5. Patient denies any missed/extra warfarin doses, diet changes, medication changes, recent illness, bleeding, change in smoking, etc since last visit. As INR remains therapeutic, patient was instructed to continue warfarin dose of 5 mg daily, except 7.5 mg on Sat+Sun. Patient prefers to take the same doses of warfain in a row because it is easier to remember. Repeat INR in 4 weeks. Patient was reminded to maintain consistent vitamin K intake and call with any bleeding, medication changes, or fever/vomiting/diarrhea. Patient understands dosing directions and information discussed. Dosing schedule and follow up appointment given to patient. Progress note routed to referring physician's office. Patient acknowledges working in consult agreement with pharmacist as referred by his/her physician. Next INR Check:  7/6    Please call [facility] at [phone] with any questions. Thanks! Ronni Lan.  Chantelle Castanon, PharmD, BCPS  [facility]  Ph: [phone]  6/8/2020 8:53 AM    CLINICAL PHARMACY CONSULT: MED RECONCILIATION/REVIEW ADDENDUM    For Pharmacy Admin Tracking Only    PHSO: No  Total # of Interventions Recommended: 0  - Maintenance Safety Lab Monitoring #: 1  Total Interventions Accepted: 0  Time Spent (min): 15

## 2020-06-16 ENCOUNTER — OFFICE VISIT (OUTPATIENT)
Dept: CARDIOLOGY CLINIC | Age: 55
End: 2020-06-16
Payer: MEDICARE

## 2020-06-16 VITALS
SYSTOLIC BLOOD PRESSURE: 136 MMHG | DIASTOLIC BLOOD PRESSURE: 74 MMHG | HEART RATE: 68 BPM | TEMPERATURE: 97.5 F | WEIGHT: 274.8 LBS | BODY MASS INDEX: 37.27 KG/M2

## 2020-06-16 PROCEDURE — G8417 CALC BMI ABV UP PARAM F/U: HCPCS | Performed by: INTERNAL MEDICINE

## 2020-06-16 PROCEDURE — 4004F PT TOBACCO SCREEN RCVD TLK: CPT | Performed by: INTERNAL MEDICINE

## 2020-06-16 PROCEDURE — 3017F COLORECTAL CA SCREEN DOC REV: CPT | Performed by: INTERNAL MEDICINE

## 2020-06-16 PROCEDURE — G8427 DOCREV CUR MEDS BY ELIG CLIN: HCPCS | Performed by: INTERNAL MEDICINE

## 2020-06-16 PROCEDURE — 99214 OFFICE O/P EST MOD 30 MIN: CPT | Performed by: INTERNAL MEDICINE

## 2020-06-16 RX ORDER — WARFARIN SODIUM 5 MG/1
TABLET ORAL
Qty: 135 TABLET | Refills: 2 | Status: SHIPPED | OUTPATIENT
Start: 2020-06-16 | End: 2021-07-22

## 2020-06-16 NOTE — PROGRESS NOTES
stopped 5-18   Substance and Sexual Activity    Alcohol use: No     Alcohol/week: 0.0 standard drinks    Drug use: No    Sexual activity: Yes     Partners: Female   Lifestyle    Physical activity     Days per week: Not on file     Minutes per session: Not on file    Stress: Not on file   Relationships    Social connections     Talks on phone: Not on file     Gets together: Not on file     Attends [employer] service: Not on file     Active member of club or organization: Not on file     Attends meetings of clubs or organizations: Not on file     Relationship status: Not on file    Intimate partner violence     Fear of current or ex partner: Not on file     Emotionally abused: Not on file     Physically abused: Not on file     Forced sexual activity: Not on file   Other Topics Concern    Not on file   Social History Narrative    Not on file     FH reviewed, denies FH cardiac issues     Vitals:    06/16/20 0902   BP: 136/74   Pulse: 68   Temp: 97.5 °F (36.4 °C)       Wt 274    Review of Systems   Constitutional: Negative for activity change. Has  fatigue. Respiratory: Negative for apnea, chest tightness and shortness of breath. Cardiovascular: Negative for chest pain, palpitations and leg swelling. No PND or orthopnea. No tachycardia. Gastrointestinal: Negative for abdominal distention. Musculoskeletal: Negative for myalgias. Neurological: Negative for dizziness, syncope and light-headedness. Psychiatric/Behavioral: Negative for behavioral problems, confusion and agitation. All other systems reviewed negative as done      Objective:   Physical Exam   Constitutional: He is oriented to person, place, and time. He appears well-developed and well-nourished. No distress. HENT:   Head: Normocephalic and atraumatic. Eyes: Conjunctivae and EOM are normal. Right eye exhibits no discharge. Left eye exhibits no discharge. Neck: Normal range of motion. Neck supple. No JVD present. Cardiovascular: Normal rate, regular rhythm and normal heart sounds. Exam reveals no gallop. 1/6 syst murmur heard. Pulmonary/Chest: Effort normal and breath sounds normal. No respiratory distress. He has min wheezes. He has no rales. Abdominal: Soft. Bowel sounds are normal. There is no tenderness. Musculoskeletal: Normal range of motion. He exhibits no edema. Neurological: He is alert and oriented to person, place, and time. Skin: Skin is warm and dry. Psychiatric: He has a normal mood and affect. His behavior is normal. Thought content normal.       Assessment:         Diagnosis Orders   1. CAD in native artery     2. Hx of CABG     3. S/P AVR (aortic valve replacement)     4. Paroxysmal atrial fibrillation (HCC)     5. Essential hypertension     6. PVC (premature ventricular contraction)           Plan:      CV stable. Rhythm stable. No angina. BP good. Compensated. Reviewed previous records and testing including echo 5/17 and cath 5/17. Continues to smoke. Encouraged to stop. Wt up again. Encouraged diet, exercise and wt. No changes. Continue to monitor. Lipids per PCP. Follow up 3 months. Echo from next visit in view of covid.

## 2020-06-18 RX ORDER — ROSUVASTATIN CALCIUM 20 MG/1
TABLET, COATED ORAL
Qty: 90 TABLET | Refills: 0 | Status: SHIPPED | OUTPATIENT
Start: 2020-06-18 | End: 2020-10-23

## 2020-06-23 ENCOUNTER — OFFICE VISIT (OUTPATIENT)
Dept: INTERNAL MEDICINE CLINIC | Age: 55
End: 2020-06-23
Payer: MEDICARE

## 2020-06-23 VITALS
SYSTOLIC BLOOD PRESSURE: 146 MMHG | RESPIRATION RATE: 18 BRPM | TEMPERATURE: 98.4 F | HEART RATE: 90 BPM | OXYGEN SATURATION: 96 % | WEIGHT: 273.3 LBS | BODY MASS INDEX: 37.07 KG/M2 | DIASTOLIC BLOOD PRESSURE: 90 MMHG

## 2020-06-23 PROCEDURE — 99213 OFFICE O/P EST LOW 20 MIN: CPT

## 2020-06-23 ASSESSMENT — PATIENT HEALTH QUESTIONNAIRE - PHQ9
SUM OF ALL RESPONSES TO PHQ QUESTIONS 1-9: 0
1. LITTLE INTEREST OR PLEASURE IN DOING THINGS: 0
2. FEELING DOWN, DEPRESSED OR HOPELESS: 0
SUM OF ALL RESPONSES TO PHQ QUESTIONS 1-9: 0
SUM OF ALL RESPONSES TO PHQ9 QUESTIONS 1 & 2: 0

## 2020-07-06 ENCOUNTER — ANTI-COAG VISIT (OUTPATIENT)
Dept: PHARMACY | Age: 55
End: 2020-07-06
Payer: MEDICARE

## 2020-07-06 LAB — INTERNATIONAL NORMALIZATION RATIO, POC: 2.7

## 2020-07-06 PROCEDURE — 99211 OFF/OP EST MAY X REQ PHY/QHP: CPT

## 2020-07-06 PROCEDURE — 85610 PROTHROMBIN TIME: CPT

## 2020-07-06 NOTE — PROGRESS NOTES
d) 7.5 7.5 5 5 5 5 7.5 42.5  7/17 2.6 At goal, no change  7.5 7.5 5 5 5 5 7.5 42.5  6/13 2.2 At goal, no change  7.5 7.5 5 5 5 5 7.5 42.5  5/16 2.3 At goal, no change  7.5 7.5 5 5 5 5 7.5 42.5  4/24 2.9 At goal, no change  7.5 7.5 5 5 5 5 7.5 42.5  4/10 3.6 Above goal, hold x 1  7.5 7.5 5 0/5 5 5 7.5 42.5   3/27 3.6 Above goal, decrease  7.5 7.5 5 5 5 5 7.5 42.5  3/13 3.0 At goal, no change   7.5 7.5 7.5 5 5 7.5 7.5 47.5  2/27 2.5 At goal, no change  7.5 7.5 7.5 5 5 7.5 7.5 47.5  2/13 3.1 Above goal, continue  7.5 7.5 7.5 5 5 7.5 7.5 47.5  1/7 2.4 At goal, no change  7.5 7.5 7.5 5 5 7.5 7.5 47.5  12/10 2.5 At goal, no change  7.5 7.5 7.5 5 5 7.5 7.5 47.5  11/12 2.8 At goal, no change  7.5 7.5 7.5 5 5 7.5 7.5 47.5  10/15 2.1 At goal, no change  7.5 7.5 7.5 5 5 7.5 7.5 47.5  9/17 2.6 At goal, no change  7.5 7.5 7.5 5 5 7.5 7.5 47.5  8/17 2.8 At goal, no change  7.5 7.5 7.5 5 5 7.5 7.5 47.5   7/19 2.9 At goal, no change  7.5 7.5 7.5 5 5 7.5 7.5 47.5  6/22 2.6 At goal, no change  7.5 7.5 7.5 5 5 7.5 7.5 47.5  6/1 2.1 At goal, no change  7.5 7.5 7.5 5 5 7.5 7.5 47.5  5/11 2.2 At goal, no change  7.5 7.5 7.5 5 5 7.5 7.5 47.5    Patient History:  Recent hospitalizations/HC visits -6/23 Dr. Jorje Garrido (PCP office): ordered labs; no med changes   -6/16 Dr. Jose Peterson: no med changes  -7/26-7/30 admit [facility] for laparoscopic right colectomy and cholecystectomy: warfarin held 5 days PTP and throughout admission   Recent medication changes None reported   Medications taken regularly that may interact with warfarin or alter INR ASA 81 mg   Warfarin dose taken as prescribed Yes  Usually compliant   Does not use pillbox  Patient has been taking warfarin since re-do AVR in May, 2017   Signs/symptoms of bleeding No h/o major bleeding   Vitamin K intake Normally has ~0 servings of green, leafy vegetables per week   Recent vomiting/diarrhea/fever, changes in weight or activity level None reported   Tobacco or alcohol use -No recent changes in

## 2020-07-20 ENCOUNTER — TELEPHONE (OUTPATIENT)
Dept: DERMATOLOGY | Age: 55
End: 2020-07-20

## 2020-07-20 NOTE — TELEPHONE ENCOUNTER
Dr Briana Call pt  C/b [phone]  States:  - need to cancel apt 1/10 due to conflict of other apt  -need to r/s  pls call to discuss

## 2020-07-27 ENCOUNTER — OFFICE VISIT (OUTPATIENT)
Dept: DERMATOLOGY | Age: 55
End: 2020-07-27
Payer: MEDICARE

## 2020-07-27 VITALS — TEMPERATURE: 97.9 F

## 2020-07-27 PROCEDURE — 4004F PT TOBACCO SCREEN RCVD TLK: CPT | Performed by: DERMATOLOGY

## 2020-07-27 PROCEDURE — 3017F COLORECTAL CA SCREEN DOC REV: CPT | Performed by: DERMATOLOGY

## 2020-07-27 PROCEDURE — G8427 DOCREV CUR MEDS BY ELIG CLIN: HCPCS | Performed by: DERMATOLOGY

## 2020-07-27 PROCEDURE — G8417 CALC BMI ABV UP PARAM F/U: HCPCS | Performed by: DERMATOLOGY

## 2020-07-27 PROCEDURE — 99213 OFFICE O/P EST LOW 20 MIN: CPT | Performed by: DERMATOLOGY

## 2020-07-27 NOTE — PATIENT INSTRUCTIONS
For your well being, we encourage you to stop smoking or using tobacco products. Smoking and the use of other tobacco products, including cigars and smokeless tobacco, causes or worsens numerous diseases and conditions. Some products also expose nearby people to toxic secondhand smoke. Smoking is the leading cause of preventable death in the [place] causing over 438,000 deaths per year. Secondhand smoke is a serious health hazard for people of all ages, causing more than 41,000 deaths each year. Marijuana smoke contains many of the same toxins, irritants and carcinogens as tobacco smoke. Electronic cigarettes are a new tobacco product, and the potential health consequences and safety of these products are unknown. Smokeless Tobacco products are a known cause of cancer, and are not a safe alternative to cigarettes. 1. Make the decision to quit smoking  Stopping smoking is the best thing you can do for your health. If you smoke, you are more likely to get diseases of the lungs, heart, and brain. You are also more likely to get many kinds of cancer. After you quit, you will be less likely to get these diseases. Stopping smoking is hard--but you can do it! Your doctor can help you. 2. Get ready to quit  Once you decide to quit, make a plan with the help of your doctor. Here are some things you need to do:  Choose a day to quit. Talk to your primary care doctor about using the nicotine patch, gum, inhaler, or nasal spray to help you quit smoking. Getting nicotine some way other than in a cigarette can help make quitting easier. Talk to your primary care doctor about using a prescription medicine like bupropion (brand name: Zyban) to reduce your urge to smoke. If you decide to use a medicine, start taking it two weeks before your quit day. Talk with your primary care doctor about when you smoke. For example, you may smoke first thing in the morning, after a meal, or when you feel stressed.  Plan what you will do instead of smoking. Tell your family and friends that you are going to try to quit and on what date. Put together a list of phone numbers of friends and family members who can give you support when you feel you might break down and have a cigarette. Before your quit day, put all tobacco products, ashtrays, and lighters away. 3. Put your plan into action  When you wake up on your quit day, start using a nicotine replacement method if you had planned to do that. For the first few days after you quit, you may have some signs of nicotine withdrawal. You may feel restless and cranky. You may find it hard to think. You might need to change your dose of nicotine if these signs upset you. Do not smoke! If you feel like you want to smoke, call a friend or family member who has agreed to help you. Also, there might be someone in your doctor's office you can call. Put into action your plans for doing things other than smoking. For example, when you feel the urge to smoke, you might take a walk. Or, you might visit friends who do not smoke. It is best to stay away from places where you used to smoke. 4. If you return to smoking--  Quitting smoking is not easy. Many people have to try several times before they succeed. If you start smoking again, call your primary care doctor's office soon to talk about what happened. Think about what you can do to keep from smoking when you try to quit again. Part of this handout is provided by the [employer]. More information is available at the American [employer] Association https://rodrigues.com/.  This information provides a general overview and may not apply to everyone. Talk to your primary care doctor to find out if this information applies to you and to get more information on this subject.

## 2020-07-27 NOTE — PROGRESS NOTES
Formerly [facility], later [facility]  Raman Guillermo MD  [phone]      Nolan Khan  1965    62 y.o. male     Date of Visit: 7/27/2020    Chief Complaint: skin lesions, cheilitis    History of Present Illness:    1. He complains of several persistent asymptomatic lesions on the face and upper extremities. 2.  Unknown duration of asymptomatic scaly lesions on the scalp. 3.  Follow-up for history of chronic cheilitis-reports overall good control right now with use of Blistex. Uses nystatin triamcinolone ointment intermittently for flares. 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 On Warfarin. Has hx of AVR. Review of Systems:  Skin: No new or changing moles. Past Medical History, Family History, Surgical History, Medications and Allergies reviewed.     Past Medical History:   Diagnosis Date    Abdominal hernia     s/p repair 2010    Aortic valve prosthesis present     CAD (coronary artery disease)     Chronic back pain greater than 3 months duration     Clostridium difficile diarrhea 10/17/2016    PCR    DDD (degenerative disc disease), lumbosacral 8/7/2013    Erectile dysfunction     GERD (gastroesophageal reflux disease)     Hyperlipidemia     Hypertension     Joint pain     Left testicular cancer ([facility] [address] 2002    s/p abdominal resection    Myalgia and myositis, unspecified 8/26/2013    Neuropathy     OA (osteoarthritis) of knee     bilateral    Obesity, Class I, BMI 30.0-34.9 (see actual BMI)     Prolonged emergence from general anesthesia     after CABG    S/P AVR 2005    tissue valve    S/P CABG x 2 2005    w/AVR & primary repair of dissected ascending aorta     Past Surgical History:   Procedure Laterality Date    AORTA SURGERY  08/27/2004    Dr. Jolene Kearns - primary repair of limited ascending aortic dissection    AORTIC VALVE REPLACEMENT  05/30/2017    Dr. Dorie Ramos - redo w/25mm Juliane Ambrosio ThermaFix model 3300 bovine pericardial bioprosthesis  AORTIC VALVE SURGERY  08/27/2004    Dr. Garrett Colorado - 27mm Kelton-Castelan pericardial prosthesis     BACK SURGERY      L4-S1    CARDIAC CATHETERIZATION  05/09/2017    Dr. Rohit Saunders  08/26/2004    Dr. Diaz Torres Right 03/17/2015    Dr. Daisha Wellington  10/10/2016    Dr. Ike Merlin - w/laparascopic lysis of extensive adhesions, open transverse colon resection, excision of old abd mesh adhering to colon    CORONARY ANGIOPLASTY WITH STENT PLACEMENT  07/2014    CORONARY ARTERY BYPASS GRAFT  08/27/2004    Dr. Garrett Colorado - x2 (LIMA-LAD, SVG sequentially to ascending aorta & D1)    CORONARY ARTERY BYPASS GRAFT  05/30/2017    Dr. Esha Pina - redo x3 (reverse AC SVG sequentially to D1, OM1 & PDA) w/explant of prior prosthetic valve & removal of embedded sternal wires x7    EMG  04/16/2012    FOOT SURGERY Left 01/24/2012    Dr. Janey Her, DPM - hallux nail evulsion & exostectomy    HEMICOLECTOMY N/A 7/26/2019    ROBOTIC ASSISTED  LAPAROSCOPIC RIGHT COLECTOMY AND CHOLECYSTECTOMY performed by Candice Petreson MD at [address] [place] [address]  2010    multiple    LUMBAR DISCECTOMY  2012    L4-5    SHOULDER ARTHROSCOPY Right 11/21/2013    Dr. Shravan Barber - w/subacromial decompression, debridement of the SLAP tear, distal clavicle excision    SOFT TISSUE TUMOR RESECTION  2001    retroperitoneal mass    TESTICLE REMOVAL Left 2001    radical orchiectomy    TRANSESOPHAGEAL ECHOCARDIOGRAM  05/30/2017    during redo CABG & AVR    TUNNELED VENOUS PORT PLACEMENT  2002    portacath        Allergies   Allergen Reactions    Atorvastatin Calcium [Lipitor] Other (See Comments)     Severe headaches      Vicodin [Hydrocodone-Acetaminophen] Itching     Outpatient Medications Marked as Taking for the 7/27/20 encounter (Office Visit) with Annie Hernández MD   Medication Sig Dispense Refill    rosuvastatin (CRESTOR) 20 MG tablet TAKE ONE TABLET BY MOUTH EVERY EVENING 90 tablet 0    warfarin (COUMADIN) 5 MG tablet TAKE 1 TABLET (5 MG) OR 1.5 TABLETS (7.5 MG) BY MOUTH DAILY AS DIRECTED BY CLINIC 135 tablet 2    metoprolol tartrate (LOPRESSOR) 50 MG tablet TAKE 1 TABLET BY MOUTH 2 TIMES A DAY FOR BLOOD PRESSURE AND HEART 180 tablet 3    nystatin-triamcinolone (MYCOLOG) 995108-4.2 UNIT/GM-% ointment Apply topically 2 times daily until improved. 30 g 0    famotidine (PEPCID) 20 MG tablet TAKE 1 TABLET BY MOUTH 2 TIMES A  tablet 0    lisinopril (PRINIVIL;ZESTRIL) 10 MG tablet TAKE 1 TABLET BY MOUTH 2 TIMES A  tablet 0    aspirin 81 MG EC tablet Take 1 tablet by mouth daily 30 tablet 0    CYCLOBENZAPRINE HCL PO Take by mouth      amLODIPine (NORVASC) 5 MG tablet Take 1 tablet by mouth nightly as needed (If systolic blood pressure over 130, take amlodipine 5mg at night, if under 130 do not take amlopidine) 30 tablet 2    melatonin (RA MELATONIN) 3 MG TABS tablet Take 1 tablet by mouth nightly as needed (insomnia) 30 tablet 3    Blood Pressure KIT 1 Unit 1 kit 0    oxyCODONE-acetaminophen (PERCOCET) 5-325 MG per tablet Take 1 tablet by mouth every 4 hours as needed for Pain  .  gabapentin (NEURONTIN) 300 MG capsule Take 300 mg by mouth 3 times daily            Physical Examination       The following were examined and determined to be normal: Psych/Neuro, Head/face, Conjunctivae/eyelids, Gums/teeth/lips, Neck, Breast/axilla/chest, Abdomen, Back, RUE and LUE. The following were examined and determined to be abnormal: Scalp/hair. Well-appearing. 1.  Forehead, temples, forearms with stuck on appearing verrucous brown papules and plaques. 2.  Vertex scalp with few skin colored scaly macules. Left malar cheek with a small scaly skin colored macule. 3.  Lips unremarkable today. Assessment and Plan     1. SK (seborrheic keratosis)     Reassurance.       2. AK (actinic keratosis) - small and asymptomatic    Observe. Encouraged sun protective behaviors. 3. Cheilitis - under good control    Continue frequent use of petrolatum or Blistex throughout the day. Mycolog ointment twice daily as needed for flares. Return in about 1 year (around 7/27/2021).

## 2020-07-29 RX ORDER — LISINOPRIL 10 MG/1
TABLET ORAL
Qty: 180 TABLET | Refills: 0 | Status: SHIPPED | OUTPATIENT
Start: 2020-07-29 | End: 2021-04-16

## 2020-07-29 RX ORDER — FAMOTIDINE 20 MG/1
TABLET, FILM COATED ORAL
Qty: 180 TABLET | Refills: 0 | Status: SHIPPED | OUTPATIENT
Start: 2020-07-29 | End: 2020-08-04 | Stop reason: SDUPTHER

## 2020-08-03 ENCOUNTER — ANTI-COAG VISIT (OUTPATIENT)
Dept: PHARMACY | Age: 55
End: 2020-08-03
Payer: MEDICARE

## 2020-08-03 LAB — INTERNATIONAL NORMALIZATION RATIO, POC: 2.9

## 2020-08-03 PROCEDURE — 85610 PROTHROMBIN TIME: CPT

## 2020-08-03 PROCEDURE — 99211 OFF/OP EST MAY X REQ PHY/QHP: CPT

## 2020-08-03 NOTE — PROGRESS NOTES
continue  7.5 7.5 5 5 5 5 7.5 42.5  8/7 1.8 Below goal (held x 10 d) 7.5 7.5 5 5 5 5 7.5 42.5  7/17 2.6 At goal, no change  7.5 7.5 5 5 5 5 7.5 42.5  6/13 2.2 At goal, no change  7.5 7.5 5 5 5 5 7.5 42.5  5/16 2.3 At goal, no change  7.5 7.5 5 5 5 5 7.5 42.5  4/24 2.9 At goal, no change  7.5 7.5 5 5 5 5 7.5 42.5  4/10 3.6 Above goal, hold x 1  7.5 7.5 5 0/5 5 5 7.5 42.5   3/27 3.6 Above goal, decrease  7.5 7.5 5 5 5 5 7.5 42.5  3/13 3.0 At goal, no change   7.5 7.5 7.5 5 5 7.5 7.5 47.5  2/27 2.5 At goal, no change  7.5 7.5 7.5 5 5 7.5 7.5 47.5  2/13 3.1 Above goal, continue  7.5 7.5 7.5 5 5 7.5 7.5 47.5  1/7 2.4 At goal, no change  7.5 7.5 7.5 5 5 7.5 7.5 47.5  12/10 2.5 At goal, no change  7.5 7.5 7.5 5 5 7.5 7.5 47.5  11/12 2.8 At goal, no change  7.5 7.5 7.5 5 5 7.5 7.5 47.5  10/15 2.1 At goal, no change  7.5 7.5 7.5 5 5 7.5 7.5 47.5  9/17 2.6 At goal, no change  7.5 7.5 7.5 5 5 7.5 7.5 47.5  8/17 2.8 At goal, no change  7.5 7.5 7.5 5 5 7.5 7.5 47.5   7/19 2.9 At goal, no change  7.5 7.5 7.5 5 5 7.5 7.5 47.5  6/22 2.6 At goal, no change  7.5 7.5 7.5 5 5 7.5 7.5 47.5  6/1 2.1 At goal, no change  7.5 7.5 7.5 5 5 7.5 7.5 47.5  5/11 2.2 At goal, no change  7.5 7.5 7.5 5 5 7.5 7.5 47.5    Patient History:  Recent hospitalizations/HC visits -6/23 Dr. Leopold Oram (PCP office): ordered labs; no med changes   -6/16 Dr. Alejandra Llamas: no med changes  -7/26-7/30 admit [facility] for laparoscopic right colectomy and cholecystectomy: warfarin held 5 days PTP and throughout admission   Recent medication changes None reported   Medications taken regularly that may interact with warfarin or alter INR ASA 81 mg   Warfarin dose taken as prescribed Yes  Usually compliant   Does not use pillbox  Patient has been taking warfarin since re-do AVR in May, 2017   Signs/symptoms of bleeding No h/o major bleeding   Vitamin K intake Normally has ~0 servings of green, leafy vegetables per week   Recent vomiting/diarrhea/fever, changes in weight or activity level None reported   Tobacco or alcohol use -No recent changes in smoking as of 2/26/20  -Patient cut back from 2 PPD to 3/4 PPD in February (2019) when he moved in with his daughter. Decrease in smoking typically increases INR gradually.   -Patient denies any alcohol or illicit drug use   Upcoming surgeries or procedures Potential for stimulator implant in back for pain: patient will let clinic know if/when scheduled     Assessment/Plan:  Patient's INR was therapeutic today (2.9) for sixth consecutive visit following warfarin dose reduction on 2/5. Patient denies any missed/extra warfarin doses, diet changes, medication changes, recent illness, bleeding, change in smoking, etc since last visit. Patient was instructed to continue warfarin dose of 5 mg daily, except 7.5 mg on Sat+Sun. Patient prefers to take the same doses of warfain in a row because it is easier to remember. Repeat INR in 4 weeks. Patient was reminded to maintain consistent vitamin K intake and call with any bleeding, medication changes, or fever/vomiting/diarrhea. Patient understands dosing directions and information discussed. Dosing schedule and follow up appointment given to patient. Progress note routed to referring physician's office. Patient acknowledges working in consult agreement with pharmacist as referred by his/her physician. Next INR Check:  9/3    Please call [facility] at [phone] with any questions. Thanks! Tyler Garvey.  Riddhi Justin, PharmD, [facility]  [facility]  Ph: [phone]  8/3/2020 9:01 AM    CLINICAL PHARMACY CONSULT: MED RECONCILIATION/REVIEW ADDENDUM    For Pharmacy Admin Tracking Only    PHSO: No  Total # of Interventions Recommended: 0  - Maintenance Safety Lab Monitoring #: 1  Total Interventions Accepted: 0  Time Spent (min): 15

## 2020-08-04 RX ORDER — FAMOTIDINE 20 MG/1
20 TABLET, FILM COATED ORAL 2 TIMES DAILY
Qty: 60 TABLET | Refills: 2 | Status: SHIPPED | OUTPATIENT
Start: 2020-08-04 | End: 2021-04-16

## 2020-08-26 ENCOUNTER — TELEPHONE (OUTPATIENT)
Dept: PHARMACY | Age: 55
End: 2020-08-26

## 2020-08-26 NOTE — TELEPHONE ENCOUNTER
Patient LVM to inform [facility] that he is scheduled for procedure to have a stimulator implanted in his back on 9/18 and will be stopping warfarin on 9/11. Patient has an INR check scheduled on 9/3 and would like to discuss plan further at that time. Patient has A-fib with CHADS-VASc score of 2, so I do not anticipate the need for Lovenox bridging for procedure. Bryon Saul.  Kelley Amador, PharmD, BCPS  [facility]  Ph: [phone]  8/26/2020 12:30 PM     CLINICAL PHARMACY CONSULT: MED RECONCILIATION/REVIEW ADDENDUM    For Pharmacy Admin Tracking Only    PHSO: No  Total # of Interventions Recommended: 0  Total Interventions Accepted: 0  Time Spent (min): 5

## 2020-09-03 ENCOUNTER — ANTI-COAG VISIT (OUTPATIENT)
Dept: PHARMACY | Age: 55
End: 2020-09-03
Payer: MEDICARE

## 2020-09-03 LAB — INTERNATIONAL NORMALIZATION RATIO, POC: 2.4

## 2020-09-03 PROCEDURE — 85610 PROTHROMBIN TIME: CPT

## 2020-09-03 PROCEDURE — 99211 OFF/OP EST MAY X REQ PHY/QHP: CPT

## 2020-09-03 NOTE — PROGRESS NOTES
ANTICOAGULATION SERVICE    Abel Verma is a 60 y.o. male with PMHx significant for AVR (re-do in May, 2017), CAD s/p CABG (x3 in May, 2017), pA-fib, HLD, HTN, testicular CA who presents to clinic on 9/3/2020 for anticoagulation monitoring and adjustment.     Anticoagulation Indication(s):  Heart Valve Replacement (bovine AVR), A-fib  Referring Physician:  Dr. Lilly Wilkins  Goal INR Range:  2-3  Duration of Anticoagulation Therapy:  TBD  Time of day dose taken:  PM  Product patient has at home:  warfarin 5 mg (peach)    [id number] Score for Atrial Fibrillation Stroke Risk   Risk   Factors  Component Value   C CHF No 0   H HTN Yes 1   A2 Age >= 75 No,  (56 y.o.) 0   D DM No 0   S2 Prior Stroke/TIA No 0   V Vascular Disease Yes 1   A Age 74-69 No,  (56 y.o.) 0   Sc Sex male 0    [id number]  Score  2   Score last updated 5/30/19 1:51 PM      Lab Results   Component Value Date    RBC 3.70 (L) 07/30/2019    HGB 12.4 (L) 07/30/2019    HCT 36.5 (L) 07/30/2019    MCV 98.7 07/30/2019    MCH 33.5 07/30/2019    MPV 7.6 07/30/2019    RDW 15.6 (H) 07/30/2019     07/30/2019     INR Summary                            Warfarin regimen (mg)  Date INR   A/P    Sun Mon Tue Wed Thu Fri Sat Mg/wk  9/3 2.4 At goal, no change  7.5 5 5 5 5 5 7.5 40  8/3 2.9 At goal, no change  7.5 5 5 5 5 5 7.5 40  7/6 2.7 At goal, no change  7.5 5 5 5 5 5 7.5 40  6/8 2.8 At goal, no change  7.5 5 5 5 5 5 7.5 40  5/11 2.9 At goal, no change  7.5 5 5 5 5 5 7.5 40  3/30 3.0 At goal, no change  7.5 5 5 5 5 5 7.5 40  2/26 2.3 At goal, no change  7.5 5 5 5 5 5 7.5 40  2/5 3.3 Above goal, decrease  7.5 5 5 5 5 5 7.5 40  1/8 2.9 At goal, no change  7.5 7.5 5 5 5 5 7.5 42.5  12/18 3.4 Above goal, reduce x 1 7.5 7.5 5 2.5/5 5 5 7.5 42.5  11/22 2.5 At goal, no change  7.5 7.5 5 5 5 5 7.5 42.5  10/25 2.5 At goal, no change  7.5 7.5 5 5 5 5 7.5 42.5  9/27 2.9 At goal, no change  7.5 7.5 5 5 5 5 7.5 42.5   8/30 2.8 At goal, no change  7.5 7.5 5 5 5 5 7.5 42.5  8/15 3.1 Above goal, continue  7.5 7.5 5 5 5 5 7.5 42.5  8/7 1.8 Below goal (held x 10 d) 7.5 7.5 5 5 5 5 7.5 42.5  7/17 2.6 At goal, no change  7.5 7.5 5 5 5 5 7.5 42.5  6/13 2.2 At goal, no change  7.5 7.5 5 5 5 5 7.5 42.5  5/16 2.3 At goal, no change  7.5 7.5 5 5 5 5 7.5 42.5  4/24 2.9 At goal, no change  7.5 7.5 5 5 5 5 7.5 42.5  4/10 3.6 Above goal, hold x 1  7.5 7.5 5 0/5 5 5 7.5 42.5   3/27 3.6 Above goal, decrease  7.5 7.5 5 5 5 5 7.5 42.5  3/13 3.0 At goal, no change   7.5 7.5 7.5 5 5 7.5 7.5 47.5  2/27 2.5 At goal, no change  7.5 7.5 7.5 5 5 7.5 7.5 47.5  2/13 3.1 Above goal, continue  7.5 7.5 7.5 5 5 7.5 7.5 47.5  1/7 2.4 At goal, no change  7.5 7.5 7.5 5 5 7.5 7.5 47.5  12/10 2.5 At goal, no change  7.5 7.5 7.5 5 5 7.5 7.5 47.5  11/12 2.8 At goal, no change  7.5 7.5 7.5 5 5 7.5 7.5 47.5  10/15 2.1 At goal, no change  7.5 7.5 7.5 5 5 7.5 7.5 47.5  9/17 2.6 At goal, no change  7.5 7.5 7.5 5 5 7.5 7.5 47.5  8/17 2.8 At goal, no change  7.5 7.5 7.5 5 5 7.5 7.5 47.5   7/19 2.9 At goal, no change  7.5 7.5 7.5 5 5 7.5 7.5 47.5  6/22 2.6 At goal, no change  7.5 7.5 7.5 5 5 7.5 7.5 47.5  6/1 2.1 At goal, no change  7.5 7.5 7.5 5 5 7.5 7.5 47.5  5/11 2.2 At goal, no change  7.5 7.5 7.5 5 5 7.5 7.5 47.5    Patient History:  Recent hospitalizations/HC visits -6/23 Dr. Jorje Garrido (PCP office): ordered labs; no med changes   -6/16 Dr. Jose Peterson: no med changes  -7/26-7/30 admit [facility] for laparoscopic right colectomy and cholecystectomy: warfarin held 5 days PTP and throughout admission   Recent medication changes None reported   Medications taken regularly that may interact with warfarin or alter INR ASA 81 mg   Warfarin dose taken as prescribed Yes  Usually compliant   Does not use pillbox  Patient has been taking warfarin since re-do AVR in May, 2017   Signs/symptoms of bleeding No h/o major bleeding   Vitamin K intake Normally has ~0 servings of green, leafy vegetables per week   Recent vomiting/diarrhea/fever, changes in weight or activity level None reported   Tobacco or alcohol use -No recent changes in smoking as of 2/26/20  -Patient cut back from 2 PPD to 3/4 PPD in February (2019) when he moved in with his daughter. Decrease in smoking typically increases INR gradually.   -Patient denies any alcohol or illicit drug use   Upcoming surgeries or procedures 9/18 temporary stimulator implant in back: patient holding warfarin x 7 days PTP; may hold longer if permanent stimulator is placed the following week     Assessment/Plan:  Patient's INR was therapeutic today (2.4) for seventh consecutive visit. Patient denies any missed/extra warfarin doses, diet changes, medication changes, bleeding, etc since last visit. Patient was instructed to continue warfarin 5 mg daily, except 7.5 mg on Sat+Sun. Patient prefers to take the same doses of warfain in a row because it is easier to remember. Repeat INR in 4 weeks. Patient was reminded to maintain consistent vitamin K intake and call with any bleeding, medication changes, or fever/vomiting/diarrhea. Patient understands dosing directions and information discussed. Dosing schedule and follow up appointment given to patient. Progress note routed to referring physician's office. Patient acknowledges working in consult agreement with pharmacist as referred by his/her physician. Next INR Check:  10/2    Please call [facility] at [phone] with any questions. Thanks! Abdoul Ospina.  Dimas Madsen, PharmD, [facility]  [facility]  Ph: [phone]  9/3/2020 8:49 AM    CLINICAL PHARMACY CONSULT: MED RECONCILIATION/REVIEW ADDENDUM    For Pharmacy Admin Tracking Only    PHSO: No  Total # of Interventions Recommended: 0  - Maintenance Safety Lab Monitoring #: 1  Total Interventions Accepted: 0  Time Spent (min): 15

## 2020-10-01 ENCOUNTER — OFFICE VISIT (OUTPATIENT)
Dept: CARDIOLOGY CLINIC | Age: 55
End: 2020-10-01
Payer: MEDICARE

## 2020-10-01 VITALS
SYSTOLIC BLOOD PRESSURE: 138 MMHG | OXYGEN SATURATION: 95 % | DIASTOLIC BLOOD PRESSURE: 72 MMHG | HEART RATE: 75 BPM | TEMPERATURE: 97.8 F | HEIGHT: 72 IN | BODY MASS INDEX: 37.93 KG/M2 | WEIGHT: 280 LBS

## 2020-10-01 PROBLEM — E66.01 MORBIDLY OBESE (HCC): Status: ACTIVE | Noted: 2020-10-01

## 2020-10-01 PROCEDURE — G8427 DOCREV CUR MEDS BY ELIG CLIN: HCPCS | Performed by: INTERNAL MEDICINE

## 2020-10-01 PROCEDURE — G8484 FLU IMMUNIZE NO ADMIN: HCPCS | Performed by: INTERNAL MEDICINE

## 2020-10-01 PROCEDURE — 3017F COLORECTAL CA SCREEN DOC REV: CPT | Performed by: INTERNAL MEDICINE

## 2020-10-01 PROCEDURE — 99214 OFFICE O/P EST MOD 30 MIN: CPT | Performed by: INTERNAL MEDICINE

## 2020-10-01 PROCEDURE — 4004F PT TOBACCO SCREEN RCVD TLK: CPT | Performed by: INTERNAL MEDICINE

## 2020-10-01 PROCEDURE — G8417 CALC BMI ABV UP PARAM F/U: HCPCS | Performed by: INTERNAL MEDICINE

## 2020-10-01 NOTE — PROGRESS NOTES
Subjective:      Patient ID: Pilar Neff is a 60 y.o. male. HPI  Mr. Cristina Walden is here today for follow up CAD/CABG/AVR/Afib/HTN. No complaints. Remains active. Only back issues, spinal stenosis. No exertional chest pain. Wt up. Still smoking. No exertional sx. No sob/cp. No pnd. No orthopnea. No tachycardia. No syncope. BP good at home. Rhythm stable.          Past Medical History:   Diagnosis Date    Abdominal hernia     s/p repair 2010    Aortic valve prosthesis present     CAD (coronary artery disease)     Chronic back pain greater than 3 months duration     Clostridium difficile diarrhea 10/17/2016    PCR    DDD (degenerative disc disease), lumbosacral 8/7/2013    Erectile dysfunction     GERD (gastroesophageal reflux disease)     Hyperlipidemia     Hypertension     Joint pain     Left testicular cancer ([facility] [address] 2002    s/p abdominal resection    Myalgia and myositis, unspecified 8/26/2013    Neuropathy     OA (osteoarthritis) of knee     bilateral    Obesity, Class I, BMI 30.0-34.9 (see actual BMI)     Prolonged emergence from general anesthesia     after CABG    S/P AVR 2005    tissue valve    S/P CABG x 2 2005    w/AVR & primary repair of dissected ascending aorta     Past Surgical History:   Procedure Laterality Date    AORTA SURGERY  08/27/2004    Dr. Vin Cao - primary repair of limited ascending aortic dissection    AORTIC VALVE REPLACEMENT  05/30/2017    Dr. Angelo Guerra - redo w/25mm Keyanna Maryland ThermaFix model 3300 bovine pericardial bioprosthesis    AORTIC VALVE SURGERY  08/27/2004    Dr. Vin Cao - 27mm Kelton-Castelan pericardial prosthesis     BACK SURGERY      L4-S1    CARDIAC CATHETERIZATION  05/09/2017    Dr. Lucretia White  08/26/2004    Dr. Ava Mustafa Right 03/17/2015    Dr. Hines Amen  10/10/2016    Dr. Joselin Lucero - w/laparascopic lysis of extensive adhesions, open transverse colon resection, excision of old abd mesh adhering to colon    CORONARY ANGIOPLASTY WITH STENT PLACEMENT  07/2014    CORONARY ARTERY BYPASS GRAFT  08/27/2004    Dr. Shazia Callejas - x2 (LIMA-LAD, SVG sequentially to ascending aorta & D1)    CORONARY ARTERY BYPASS GRAFT  05/30/2017    Dr. Mazin Weeks - redo x3 (reverse AC SVG sequentially to D1, OM1 & PDA) w/explant of prior prosthetic valve & removal of embedded sternal wires x7    EMG  04/16/2012    FOOT SURGERY Left 01/24/2012    Dr. Carlos Jiménez, DPM - hallux nail evulsion & exostectomy    HEMICOLECTOMY N/A 7/26/2019    ROBOTIC ASSISTED  LAPAROSCOPIC RIGHT COLECTOMY AND CHOLECYSTECTOMY performed by Belle Mccabe MD at [address] [place] [address]  2010    multiple    LUMBAR DISCECTOMY  2012    L4-5    SHOULDER ARTHROSCOPY Right 11/21/2013    Dr. Matt Mosqueda - w/subacromial decompression, debridement of the SLAP tear, distal clavicle excision    SOFT TISSUE TUMOR RESECTION  2001    retroperitoneal mass    TESTICLE REMOVAL Left 2001    radical orchiectomy    TRANSESOPHAGEAL ECHOCARDIOGRAM  05/30/2017    during redo CABG & AVR    TUNNELED VENOUS PORT PLACEMENT  2002    portacath      Social History     Socioeconomic History    Marital status:       Spouse name: Not on file    Number of children: Not on file    Years of education: Not on file    Highest education level: Not on file   Occupational History    Not on file   Social Needs    Financial resource strain: Not on file    Food insecurity     Worry: Not on file     Inability: Not on file    Transportation needs     Medical: Not on file     Non-medical: Not on file   Tobacco Use    Smoking status: Current Some Day Smoker     Packs/day: 0.75     Years: 40.00     Pack years: 30.00     Types: Cigarettes     Start date: 8/8/1947     Last attempt to quit: 5/20/2017     Years since quitting: 3.3    Smokeless tobacco: Never Used    Tobacco comment: stopped 5-18   Substance and Sexual Activity    Alcohol use: No     Alcohol/week: 0.0 standard drinks    Drug use: No    Sexual activity: Yes     Partners: Female   Lifestyle    Physical activity     Days per week: Not on file     Minutes per session: Not on file    Stress: Not on file   Relationships    Social connections     Talks on phone: Not on file     Gets together: Not on file     Attends Buddhist service: Not on file     Active member of club or organization: Not on file     Attends meetings of clubs or organizations: Not on file     Relationship status: Not on file    Intimate partner violence     Fear of current or ex partner: Not on file     Emotionally abused: Not on file     Physically abused: Not on file     Forced sexual activity: Not on file   Other Topics Concern    Not on file   Social History Narrative    Not on file     FH reviewed, denies FH cardiac issues     Vitals:    10/01/20 0925   BP: 138/72   Pulse:    Temp:    SpO2:          Wt 280    Review of Systems   Constitutional: Negative for activity change. Has  fatigue. Respiratory: Negative for apnea, chest tightness and shortness of breath. Cardiovascular: Negative for chest pain, palpitations and leg swelling. No PND or orthopnea. No tachycardia. Gastrointestinal: Negative for abdominal distention. Musculoskeletal: Negative for myalgias. Neurological: Negative for dizziness, syncope and light-headedness. Psychiatric/Behavioral: Negative for behavioral problems, confusion and agitation. All other systems reviewed negative as done      Objective:   Physical Exam   Constitutional: He is oriented to person, place, and time. He appears well-developed and well-nourished. No distress. HENT:   Head: Normocephalic and atraumatic. Eyes: Conjunctivae and EOM are normal. Right eye exhibits no discharge. Left eye exhibits no discharge. Neck: Normal range of motion. Neck supple. No JVD present.    Cardiovascular: Normal rate, regular rhythm and

## 2020-10-02 ENCOUNTER — ANTI-COAG VISIT (OUTPATIENT)
Dept: PHARMACY | Age: 55
End: 2020-10-02
Payer: MEDICARE

## 2020-10-02 LAB — INTERNATIONAL NORMALIZATION RATIO, POC: 2.2

## 2020-10-02 PROCEDURE — 99211 OFF/OP EST MAY X REQ PHY/QHP: CPT

## 2020-10-02 PROCEDURE — 85610 PROTHROMBIN TIME: CPT

## 2020-10-02 NOTE — PROGRESS NOTES
ANTICOAGULATION SERVICE    Ganesh Schreiber is a 60 y.o. male with PMHx significant for AVR (re-do in May, 2017), CAD s/p CABG (x3 in May, 2017), pA-fib, HLD, HTN, testicular CA who presents to clinic on 10/2/2020 for anticoagulation monitoring and adjustment.     Anticoagulation Indication(s):  Heart Valve Replacement (bovine AVR), A-fib  Referring Physician:  Dr. Erika Guzman  Goal INR Range:  2-3  Duration of Anticoagulation Therapy:  TBD  Time of day dose taken:  PM  Product patient has at home:  warfarin 5 mg (peach)    [id number] Score for Atrial Fibrillation Stroke Risk   Risk   Factors  Component Value   C CHF No 0   H HTN Yes 1   A2 Age >= 75 No,  (56 y.o.) 0   D DM No 0   S2 Prior Stroke/TIA No 0   V Vascular Disease Yes 1   A Age 74-69 No,  (56 y.o.) 0   Sc Sex male 0    [id number]  Score  2   Score last updated 5/30/19 1:51 PM      Lab Results   Component Value Date    RBC 3.70 (L) 07/30/2019    HGB 12.4 (L) 07/30/2019    HCT 36.5 (L) 07/30/2019    MCV 98.7 07/30/2019    MCH 33.5 07/30/2019    MPV 7.6 07/30/2019    RDW 15.6 (H) 07/30/2019     07/30/2019     INR Summary                            Warfarin regimen (mg)  Date INR   A/P    Sun Mon Tue Wed Thu Fri Sat Mg/wk  10/2 2.2 At goal, no change  7.5 5 5 5 5 5 7.5 40  9/3 2.4 At goal, no change  7.5 5 5 5 5 5 7.5 40  8/3 2.9 At goal, no change  7.5 5 5 5 5 5 7.5 40  7/6 2.7 At goal, no change  7.5 5 5 5 5 5 7.5 40  6/8 2.8 At goal, no change  7.5 5 5 5 5 5 7.5 40  5/11 2.9 At goal, no change  7.5 5 5 5 5 5 7.5 40  3/30 3.0 At goal, no change  7.5 5 5 5 5 5 7.5 40  2/26 2.3 At goal, no change  7.5 5 5 5 5 5 7.5 40  2/5 3.3 Above goal, decrease  7.5 5 5 5 5 5 7.5 40  1/8 2.9 At goal, no change  7.5 7.5 5 5 5 5 7.5 42.5  12/18 3.4 Above goal, reduce x 1 7.5 7.5 5 2.5/5 5 5 7.5 42.5  11/22 2.5 At goal, no change  7.5 7.5 5 5 5 5 7.5 42.5  10/25 2.5 At goal, no change  7.5 7.5 5 5 5 5 7.5 42.5  9/27 2.9 At goal, no change  7.5 7.5 5 5 5 5 7.5 42.5   8/30 2.8 At goal, no change  7.5 7.5 5 5 5 5 7.5 42.5  8/15 3.1 Above goal, continue  7.5 7.5 5 5 5 5 7.5 42.5  8/7 1.8 Below goal (held x 10 d) 7.5 7.5 5 5 5 5 7.5 42.5  7/17 2.6 At goal, no change  7.5 7.5 5 5 5 5 7.5 42.5  6/13 2.2 At goal, no change  7.5 7.5 5 5 5 5 7.5 42.5  5/16 2.3 At goal, no change  7.5 7.5 5 5 5 5 7.5 42.5  4/24 2.9 At goal, no change  7.5 7.5 5 5 5 5 7.5 42.5  4/10 3.6 Above goal, hold x 1  7.5 7.5 5 0/5 5 5 7.5 42.5   3/27 3.6 Above goal, decrease  7.5 7.5 5 5 5 5 7.5 42.5  3/13 3.0 At goal, no change   7.5 7.5 7.5 5 5 7.5 7.5 47.5  2/27 2.5 At goal, no change  7.5 7.5 7.5 5 5 7.5 7.5 47.5  2/13 3.1 Above goal, continue  7.5 7.5 7.5 5 5 7.5 7.5 47.5  1/7 2.4 At goal, no change  7.5 7.5 7.5 5 5 7.5 7.5 47.5  12/10 2.5 At goal, no change  7.5 7.5 7.5 5 5 7.5 7.5 47.5  11/12 2.8 At goal, no change  7.5 7.5 7.5 5 5 7.5 7.5 47.5  10/15 2.1 At goal, no change  7.5 7.5 7.5 5 5 7.5 7.5 47.5  9/17 2.6 At goal, no change  7.5 7.5 7.5 5 5 7.5 7.5 47.5  8/17 2.8 At goal, no change  7.5 7.5 7.5 5 5 7.5 7.5 47.5   7/19 2.9 At goal, no change  7.5 7.5 7.5 5 5 7.5 7.5 47.5  6/22 2.6 At goal, no change  7.5 7.5 7.5 5 5 7.5 7.5 47.5  6/1 2.1 At goal, no change  7.5 7.5 7.5 5 5 7.5 7.5 47.5  5/11 2.2 At goal, no change  7.5 7.5 7.5 5 5 7.5 7.5 47.5    Patient History:  Recent hospitalizations/HC visits -10/1 Dr. Mariusz Hodges: no med changes  -6/23 Dr. Domenica Gamez (PCP office): ordered labs; no med changes    Recent medication changes None reported   Medications taken regularly that may interact with warfarin or alter INR ASA 81 mg   Warfarin dose taken as prescribed Yes, held 9/14-9/23 for temporary back stimulator implant  Usually compliant   Does not use pillbox  Patient has been taking warfarin since re-do AVR in May, 2017   Signs/symptoms of bleeding No h/o major bleeding   Vitamin K intake Normally has ~0 servings of green, leafy vegetables per week   Recent vomiting/diarrhea/fever, changes in weight or activity level None reported   Tobacco or alcohol use -No recent changes in smoking as of 2/26/20  -Patient cut back from 2 PPD to 3/4 PPD in February (2019) when he moved in with his daughter. Decrease in smoking typically increases INR gradually.   -Patient denies any alcohol or illicit drug use   Upcoming surgeries or procedures Permanent stimulator implant in back (date TBD): patient will likely be holding warfarin x 7 days PTP; patient will inform clinic when scheduled     Assessment/Plan:  Patient's INR was therapeutic today (2.2) for eighth consecutive visit. He held warfarin x 10 days for temporary stimulator implant in his back on 9/18, but has been back on his regular dose for a week. Patient denies any missed/extra warfarin doses, diet changes, medication changes, bleeding, etc since last visit. Patient was instructed to continue warfarin 5 mg daily, except 7.5 mg on Sat+Sun. Patient prefers to take the same doses of warfain in a row because it is easier to remember. Repeat INR in 4 weeks. Patient was reminded to maintain consistent vitamin K intake and call with any bleeding, medication changes, or fever/vomiting/diarrhea. Patient understands dosing directions and information discussed. Dosing schedule and follow up appointment given to patient. Progress note routed to referring physician's office. Patient acknowledges working in consult agreement with pharmacist as referred by his/her physician. Next INR Check:  10/30    Please call [facility] at [phone] with any questions. Thanks! Saima Ferguson.  Saadia Gonzales, PharmD, BCPS  [facility]  Ph: [phone]  10/2/2020 8:50 AM    CLINICAL PHARMACY CONSULT: MED RECONCILIATION/REVIEW ADDENDUM    For Pharmacy Admin Tracking Only    PHSO: No  Total # of Interventions Recommended: 0  - Maintenance Safety Lab Monitoring #: 1  Total Interventions Accepted: 0  Time Spent (min): 15

## 2020-10-23 RX ORDER — ROSUVASTATIN CALCIUM 20 MG/1
TABLET, COATED ORAL
Qty: 90 TABLET | Refills: 0 | Status: SHIPPED | OUTPATIENT
Start: 2020-10-23 | End: 2021-01-19

## 2020-10-30 ENCOUNTER — ANTI-COAG VISIT (OUTPATIENT)
Dept: PHARMACY | Age: 55
End: 2020-10-30
Payer: MEDICARE

## 2020-10-30 LAB — INR BLD: 2.5

## 2020-10-30 PROCEDURE — 99211 OFF/OP EST MAY X REQ PHY/QHP: CPT

## 2020-10-30 PROCEDURE — 85610 PROTHROMBIN TIME: CPT

## 2020-10-30 NOTE — PROGRESS NOTES
ANTICOAGULATION SERVICE    Terry Payton is a 60 y.o. male with PMHx significant for AVR (re-do in May, 2017), CAD s/p CABG (x3 in May, 2017), pA-fib, HLD, HTN, testicular CA who presents to clinic on 10/30/2020 for anticoagulation monitoring and adjustment.     Anticoagulation Indication(s):  Heart Valve Replacement (bovine AVR), A-fib  Referring Physician:  Dr. Raulito Fatima  Goal INR Range:  2-3  Duration of Anticoagulation Therapy:  TBD  Time of day dose taken:  PM  Product patient has at home:  warfarin 5 mg (peach)    [id number] Score for Atrial Fibrillation Stroke Risk   Risk   Factors  Component Value   C CHF No 0   H HTN Yes 1   A2 Age >= 75 No,  (56 y.o.) 0   D DM No 0   S2 Prior Stroke/TIA No 0   V Vascular Disease Yes 1   A Age 74-69 No,  (56 y.o.) 0   Sc Sex male 0    [id number]  Score  2   Score last updated 5/30/19 1:51 PM      Lab Results   Component Value Date    RBC 3.70 (L) 07/30/2019    HGB 12.4 (L) 07/30/2019    HCT 36.5 (L) 07/30/2019    MCV 98.7 07/30/2019    MCH 33.5 07/30/2019    MPV 7.6 07/30/2019    RDW 15.6 (H) 07/30/2019     07/30/2019     INR Summary                            Warfarin regimen (mg)  Date INR   A/P    Sun Mon Tue Wed Thu Fri Sat Mg/wk  10/30 2.5 At goal, no change  7.5 5 5 5 5 5 7.5 40  10/2 2.2 At goal, no change  7.5 5 5 5 5 5 7.5 40  9/3 2.4 At goal, no change  7.5 5 5 5 5 5 7.5 40  8/3 2.9 At goal, no change  7.5 5 5 5 5 5 7.5 40  7/6 2.7 At goal, no change  7.5 5 5 5 5 5 7.5 40  6/8 2.8 At goal, no change  7.5 5 5 5 5 5 7.5 40  5/11 2.9 At goal, no change  7.5 5 5 5 5 5 7.5 40  3/30 3.0 At goal, no change  7.5 5 5 5 5 5 7.5 40  2/26 2.3 At goal, no change  7.5 5 5 5 5 5 7.5 40  2/5 3.3 Above goal, decrease  7.5 5 5 5 5 5 7.5 40  1/8 2.9 At goal, no change  7.5 7.5 5 5 5 5 7.5 42.5  12/18 3.4 Above goal, reduce x 1 7.5 7.5 5 2.5/5 5 5 7.5 42.5  11/22 2.5 At goal, no change  7.5 7.5 5 5 5 5 7.5 42.5  10/25 2.5 At goal, no change  7.5 7.5 5 5 5 5 7.5 42.5  9/27 2.9 At goal, no change  7.5 7.5 5 5 5 5 7.5 42.5   8/30 2.8 At goal, no change  7.5 7.5 5 5 5 5 7.5 42.5  8/15 3.1 Above goal, continue  7.5 7.5 5 5 5 5 7.5 42.5  8/7 1.8 Below goal (held x 10 d) 7.5 7.5 5 5 5 5 7.5 42.5  7/17 2.6 At goal, no change  7.5 7.5 5 5 5 5 7.5 42.5  6/13 2.2 At goal, no change  7.5 7.5 5 5 5 5 7.5 42.5  5/16 2.3 At goal, no change  7.5 7.5 5 5 5 5 7.5 42.5  4/24 2.9 At goal, no change  7.5 7.5 5 5 5 5 7.5 42.5  4/10 3.6 Above goal, hold x 1  7.5 7.5 5 0/5 5 5 7.5 42.5   3/27 3.6 Above goal, decrease  7.5 7.5 5 5 5 5 7.5 42.5  3/13 3.0 At goal, no change   7.5 7.5 7.5 5 5 7.5 7.5 47.5  2/27 2.5 At goal, no change  7.5 7.5 7.5 5 5 7.5 7.5 47.5  2/13 3.1 Above goal, continue  7.5 7.5 7.5 5 5 7.5 7.5 47.5  1/7 2.4 At goal, no change  7.5 7.5 7.5 5 5 7.5 7.5 47.5  12/10 2.5 At goal, no change  7.5 7.5 7.5 5 5 7.5 7.5 47.5  11/12 2.8 At goal, no change  7.5 7.5 7.5 5 5 7.5 7.5 47.5  10/15 2.1 At goal, no change  7.5 7.5 7.5 5 5 7.5 7.5 47.5  9/17 2.6 At goal, no change  7.5 7.5 7.5 5 5 7.5 7.5 47.5  8/17 2.8 At goal, no change  7.5 7.5 7.5 5 5 7.5 7.5 47.5   7/19 2.9 At goal, no change  7.5 7.5 7.5 5 5 7.5 7.5 47.5  6/22 2.6 At goal, no change  7.5 7.5 7.5 5 5 7.5 7.5 47.5  6/1 2.1 At goal, no change  7.5 7.5 7.5 5 5 7.5 7.5 47.5  5/11 2.2 At goal, no change  7.5 7.5 7.5 5 5 7.5 7.5 47.5    Patient History:  Recent hospitalizations/HC visits -10/1 Dr. Scotty Prajapati: no med changes  -6/23 Dr. Darvin Ramirez (PCP office): ordered labs; no med changes    Recent medication changes 10/30: sleep aid (benadryl)   Medications taken regularly that may interact with warfarin or alter INR ASA 81 mg   Warfarin dose taken as prescribed Yes, held 9/14-9/23 for temporary back stimulator implant  Usually compliant   Does not use pillbox  Patient has been taking warfarin since re-do AVR in May, 2017   Signs/symptoms of bleeding No h/o major bleeding   Vitamin K intake Normally has ~0

## 2020-11-24 ENCOUNTER — ANTI-COAG VISIT (OUTPATIENT)
Dept: PHARMACY | Age: 55
End: 2020-11-24
Payer: MEDICARE

## 2020-11-24 LAB — INTERNATIONAL NORMALIZATION RATIO, POC: 2.4

## 2020-11-24 PROCEDURE — 85610 PROTHROMBIN TIME: CPT

## 2020-11-24 PROCEDURE — 99211 OFF/OP EST MAY X REQ PHY/QHP: CPT

## 2020-11-24 NOTE — PROGRESS NOTES
ANTICOAGULATION SERVICE    Makenzie Hazel is a 60 y.o. male with PMHx significant for AVR (re-do in May, 2017), CAD s/p CABG (x3 in May, 2017), pA-fib, HLD, HTN, testicular CA who presents to clinic on 11/24/2020 for anticoagulation monitoring and adjustment.     Anticoagulation Indication(s):  Heart Valve Replacement (bovine AVR), A-fib  Referring Physician:  Dr. Lulu Lam  Goal INR Range:  2-3  Duration of Anticoagulation Therapy:  TBD  Time of day dose taken:  PM  Product patient has at home:  warfarin 5 mg (peach)    [id number] Score for Atrial Fibrillation Stroke Risk   Risk   Factors  Component Value   C CHF No 0   H HTN Yes 1   A2 Age >= 75 No,  (56 y.o.) 0   D DM No 0   S2 Prior Stroke/TIA No 0   V Vascular Disease Yes 1   A Age 74-69 No,  (56 y.o.) 0   Sc Sex male 0    [id number]  Score  2   Score last updated 5/30/19 1:51 PM      Lab Results   Component Value Date    RBC 3.70 (L) 07/30/2019    HGB 12.4 (L) 07/30/2019    HCT 36.5 (L) 07/30/2019    MCV 98.7 07/30/2019    MCH 33.5 07/30/2019    MPV 7.6 07/30/2019    RDW 15.6 (H) 07/30/2019     07/30/2019     INR Summary                            Warfarin regimen (mg)  Date INR   A/P    Sun Mon Tue Wed Thu Fri Sat Mg/wk  11/24 2.4 At goal, no change  7.5 5 5 5 5 5 7.5 40  10/30 2.5 At goal, no change  7.5 5 5 5 5 5 7.5 40  10/2 2.2 At goal, no change  7.5 5 5 5 5 5 7.5 40  9/3 2.4 At goal, no change  7.5 5 5 5 5 5 7.5 40  8/3 2.9 At goal, no change  7.5 5 5 5 5 5 7.5 40  7/6 2.7 At goal, no change  7.5 5 5 5 5 5 7.5 40  6/8 2.8 At goal, no change  7.5 5 5 5 5 5 7.5 40  5/11 2.9 At goal, no change  7.5 5 5 5 5 5 7.5 40  3/30 3.0 At goal, no change  7.5 5 5 5 5 5 7.5 40  2/26 2.3 At goal, no change  7.5 5 5 5 5 5 7.5 40  2/5 3.3 Above goal, decrease  7.5 5 5 5 5 5 7.5 40  1/8 2.9 At goal, no change  7.5 7.5 5 5 5 5 7.5 42.5  12/18 3.4 Above goal, reduce x 1 7.5 7.5 5 2.5/5 5 5 7.5 42.5  11/22 2.5 At goal, no change  7.5 7.5 5 5 5 5 7.5 42.5  10/25 2.5 At goal, no change  7.5 7.5 5 5 5 5 7.5 42.5  9/27 2.9 At goal, no change  7.5 7.5 5 5 5 5 7.5 42.5   8/30 2.8 At goal, no change  7.5 7.5 5 5 5 5 7.5 42.5  8/15 3.1 Above goal, continue  7.5 7.5 5 5 5 5 7.5 42.5  8/7 1.8 Below goal (held x 10 d) 7.5 7.5 5 5 5 5 7.5 42.5  7/17 2.6 At goal, no change  7.5 7.5 5 5 5 5 7.5 42.5  6/13 2.2 At goal, no change  7.5 7.5 5 5 5 5 7.5 42.5  5/16 2.3 At goal, no change  7.5 7.5 5 5 5 5 7.5 42.5  4/24 2.9 At goal, no change  7.5 7.5 5 5 5 5 7.5 42.5  4/10 3.6 Above goal, hold x 1  7.5 7.5 5 0/5 5 5 7.5 42.5   3/27 3.6 Above goal, decrease  7.5 7.5 5 5 5 5 7.5 42.5  3/13 3.0 At goal, no change   7.5 7.5 7.5 5 5 7.5 7.5 47.5  2/27 2.5 At goal, no change  7.5 7.5 7.5 5 5 7.5 7.5 47.5  2/13 3.1 Above goal, continue  7.5 7.5 7.5 5 5 7.5 7.5 47.5  1/7 2.4 At goal, no change  7.5 7.5 7.5 5 5 7.5 7.5 47.5  12/10 2.5 At goal, no change  7.5 7.5 7.5 5 5 7.5 7.5 47.5  11/12 2.8 At goal, no change  7.5 7.5 7.5 5 5 7.5 7.5 47.5  10/15 2.1 At goal, no change  7.5 7.5 7.5 5 5 7.5 7.5 47.5  9/17 2.6 At goal, no change  7.5 7.5 7.5 5 5 7.5 7.5 47.5  8/17 2.8 At goal, no change  7.5 7.5 7.5 5 5 7.5 7.5 47.5   7/19 2.9 At goal, no change  7.5 7.5 7.5 5 5 7.5 7.5 47.5  6/22 2.6 At goal, no change  7.5 7.5 7.5 5 5 7.5 7.5 47.5  6/1 2.1 At goal, no change  7.5 7.5 7.5 5 5 7.5 7.5 47.5  5/11 2.2 At goal, no change  7.5 7.5 7.5 5 5 7.5 7.5 47.5    Patient History:  Recent hospitalizations/HC visits -10/1 Dr. Nenita Dixon: no med changes  -6/23 Dr. Mary Longo (PCP office): ordered labs; no med changes    Recent medication changes None reported   Medications taken regularly that may interact with warfarin or alter INR ASA 81 mg   Warfarin dose taken as prescribed Yes  Usually compliant   Does not use pillbox  Patient has been taking warfarin since re-do AVR in May, 2017   Signs/symptoms of bleeding No h/o major bleeding   Vitamin K intake Normally has ~0 servings of green, leafy vegetables per week   Recent vomiting/diarrhea/fever, changes in weight or activity level None reported   Tobacco or alcohol use -No recent changes in smoking as of 11/24/20 (~3/4 PPD)  -Patient cut back from 2 PPD to 3/4 PPD in February (2019) when he moved in with his daughter. Decrease in smoking typically increases INR gradually.   -Patient denies any alcohol or illicit drug use   Upcoming surgeries or procedures None reported  Back stimulator implant canceled due to cost     Assessment/Plan:  Patient's INR was therapeutic today (2.4). Patient denies any missed/extra warfarin doses, diet changes, medication changes, bleeding, etc since last visit. Patient was instructed to continue warfarin 5 mg daily, except 7.5 mg on Sat+Sun. Patient prefers to take the same doses of warfain in a row because it is easier to remember. Repeat INR in 4 weeks. Patient was reminded to maintain consistent vitamin K intake and call with any bleeding, medication changes, or fever/vomiting/diarrhea. Patient understands dosing directions and information discussed. Dosing schedule and follow up appointment given to patient. Progress note routed to referring physician's office. Patient acknowledges working in consult agreement with pharmacist as referred by his/her physician. Next INR Check:  12/22    Please call [facility] at [phone] with any questions. Thanks! Sivakumar Her.  Devin Wilhelm, PharmD, [facility]  [facility]  Ph: [phone]  11/24/2020 9:15 AM    CLINICAL PHARMACY CONSULT: MED RECONCILIATION/REVIEW ADDENDUM    For Pharmacy Admin Tracking Only    PHSO: No  Total # of Interventions Recommended: 0  - Maintenance Safety Lab Monitoring #: 1  Total Interventions Accepted: 0  Time Spent (min): 15

## 2020-12-21 ENCOUNTER — OFFICE VISIT (OUTPATIENT)
Dept: PRIMARY CARE CLINIC | Age: 55
End: 2020-12-21
Payer: MEDICARE

## 2020-12-21 PROCEDURE — G8417 CALC BMI ABV UP PARAM F/U: HCPCS | Performed by: NURSE PRACTITIONER

## 2020-12-21 PROCEDURE — 99211 OFF/OP EST MAY X REQ PHY/QHP: CPT | Performed by: NURSE PRACTITIONER

## 2020-12-21 PROCEDURE — G8428 CUR MEDS NOT DOCUMENT: HCPCS | Performed by: NURSE PRACTITIONER

## 2020-12-21 NOTE — PROGRESS NOTES
Luis Felipe Sun received a viral test for COVID-19. They were educated on isolation and quarantine as appropriate. For any symptoms, they were directed to seek care from their PCP, given contact information to establish with a doctor, directed to an urgent care or the emergency room.

## 2020-12-21 NOTE — PATIENT INSTRUCTIONS
When You Can be Around Others After You Had or Likely Had COVID-19     If you have or think you might have COVID-19, it is important to stay home and away from other people. Staying away from others helps stop the spread of COVID-19. If you have an emergency warning sign (including trouble breathing), get emergency medical care immediately. When you can be around others (end home isolation) depends on different factors for different situations. Find CDC's recommendations for your situation below. I think or know I had COVID-19, and I had symptoms  You can be with others after  ? 3 days with no fever and  ? Respiratory symptoms have improved (e.g. cough, shortness of breath) and  ? 10 days since symptoms first appeared  Depending on your healthcare provider's advice and availability of testing, you might get tested to see if you still have COVID-19. If you will be tested, you can be around others when you have no fever, respiratory symptoms have improved, and you receive two negative test results in a row, at least 24 hours apart. I tested positive for COVID-19 but had no symptoms  If you continue to have no symptoms, you can be with others after:  ? 10 days have passed since test or 14 days since your exposure test   Depending on your healthcare provider's advice and availability of testing, you might get tested to see if you still have COVID-19. If you will be tested, you can be around others after you receive two negative test results in a row, at least 24 hours apart. If you develop symptoms after testing positive, follow the guidance above for I think or know I had COVID, and I had symptoms.   For Anyone Who Has Been Around a Person with COVID-19  It is important to remember that anyone who has close contact with someone with COVID-19 should stay home for 14 days after exposure based on the time it takes to develop illness. Testing is not necessary.     www.cdc.gov/coronavirus/2019-ncov/index.html

## 2020-12-22 LAB — SARS-COV-2, NAA: NOT DETECTED

## 2020-12-28 ENCOUNTER — ANTI-COAG VISIT (OUTPATIENT)
Dept: PHARMACY | Age: 55
End: 2020-12-28
Payer: MEDICARE

## 2020-12-28 ENCOUNTER — OFFICE VISIT (OUTPATIENT)
Dept: INTERNAL MEDICINE CLINIC | Age: 55
End: 2020-12-28
Payer: MEDICARE

## 2020-12-28 VITALS
OXYGEN SATURATION: 97 % | BODY MASS INDEX: 37.49 KG/M2 | HEART RATE: 74 BPM | TEMPERATURE: 97.5 F | SYSTOLIC BLOOD PRESSURE: 133 MMHG | HEIGHT: 72 IN | RESPIRATION RATE: 16 BRPM | WEIGHT: 276.8 LBS | DIASTOLIC BLOOD PRESSURE: 80 MMHG

## 2020-12-28 LAB — INTERNATIONAL NORMALIZATION RATIO, POC: 2

## 2020-12-28 PROCEDURE — 85610 PROTHROMBIN TIME: CPT

## 2020-12-28 PROCEDURE — 99211 OFF/OP EST MAY X REQ PHY/QHP: CPT

## 2020-12-28 PROCEDURE — 99213 OFFICE O/P EST LOW 20 MIN: CPT | Performed by: STUDENT IN AN ORGANIZED HEALTH CARE EDUCATION/TRAINING PROGRAM

## 2020-12-28 RX ORDER — DULOXETIN HYDROCHLORIDE 30 MG/1
30 CAPSULE, DELAYED RELEASE ORAL DAILY
Qty: 30 CAPSULE | Refills: 0 | Status: SHIPPED | OUTPATIENT
Start: 2020-12-28 | End: 2021-01-22 | Stop reason: SDUPTHER

## 2020-12-28 ASSESSMENT — ENCOUNTER SYMPTOMS
WHEEZING: 0
ABDOMINAL DISTENTION: 0
VOMITING: 0
COUGH: 0
SHORTNESS OF BREATH: 0
DIARRHEA: 0
NAUSEA: 0

## 2020-12-28 ASSESSMENT — PATIENT HEALTH QUESTIONNAIRE - PHQ9
6. FEELING BAD ABOUT YOURSELF - OR THAT YOU ARE A FAILURE OR HAVE LET YOURSELF OR YOUR FAMILY DOWN: 0
8. MOVING OR SPEAKING SO SLOWLY THAT OTHER PEOPLE COULD HAVE NOTICED. OR THE OPPOSITE, BEING SO FIGETY OR RESTLESS THAT YOU HAVE BEEN MOVING AROUND A LOT MORE THAN USUAL: 0
5. POOR APPETITE OR OVEREATING: 3
10. IF YOU CHECKED OFF ANY PROBLEMS, HOW DIFFICULT HAVE THESE PROBLEMS MADE IT FOR YOU TO DO YOUR WORK, TAKE CARE OF THINGS AT HOME, OR GET ALONG WITH OTHER PEOPLE: 0
SUM OF ALL RESPONSES TO PHQ9 QUESTIONS 1 & 2: 6
SUM OF ALL RESPONSES TO PHQ QUESTIONS 1-9: 16
1. LITTLE INTEREST OR PLEASURE IN DOING THINGS: 3
9. THOUGHTS THAT YOU WOULD BE BETTER OFF DEAD, OR OF HURTING YOURSELF: 0
3. TROUBLE FALLING OR STAYING ASLEEP: 3
4. FEELING TIRED OR HAVING LITTLE ENERGY: 3
SUM OF ALL RESPONSES TO PHQ QUESTIONS 1-9: 16
SUM OF ALL RESPONSES TO PHQ QUESTIONS 1-9: 16
2. FEELING DOWN, DEPRESSED OR HOPELESS: 3
7. TROUBLE CONCENTRATING ON THINGS, SUCH AS READING THE NEWSPAPER OR WATCHING TELEVISION: 1

## 2020-12-28 ASSESSMENT — COLUMBIA-SUICIDE SEVERITY RATING SCALE - C-SSRS
2. HAVE YOU ACTUALLY HAD ANY THOUGHTS OF KILLING YOURSELF?: NO
1. WITHIN THE PAST MONTH, HAVE YOU WISHED YOU WERE DEAD OR WISHED YOU COULD GO TO SLEEP AND NOT WAKE UP?: NO
6. HAVE YOU EVER DONE ANYTHING, STARTED TO DO ANYTHING, OR PREPARED TO DO ANYTHING TO END YOUR LIFE?: NO

## 2020-12-28 NOTE — PROGRESS NOTES
Outpatient Clinic Visit Note    Patient: Kait Ewing  : 1965 (30 y.o.)  Date: 2020    CC: Follow up    HPI:      Depression - Daughter concerned he is depressed. He has no interest in doing anything. Depresssion screen of 14. Has no interest in doing anything as he feels that he is unable to do anything due to pain. Becomes more emotional when discussing his mood with others and ideas of depression    HTN - well controlled, takes BP at home in the evening SBP < 130. Seeing cardiologist. Has not needed to take amlodipine as his BP is well controlled. No disequilibrium, nausea, vomiting, headache, chest pain, SOB    Afib on Coumadin - followed by anticoag clinic. No iussuies with bleeding. CAD s/p CABG - followed by cardiology. No chest pain, SOB, leg swelling     Pain clinic - follows with pain. Tries to get some work in when he can. 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 Flavia Carter works for about an hour until the pain is too bad. Diffiuculty getting to the store from his car. Needs to rest and see if pain improves with short walks. Pain is worst in his hips and knees. No falls. ROS:  Review of Systems   Constitutional: Negative for activity change, appetite change, fatigue and fever. Respiratory: Negative for cough, shortness of breath and wheezing. Cardiovascular: Negative for chest pain, palpitations and leg swelling. Gastrointestinal: Negative for abdominal distention, diarrhea, nausea and vomiting. Genitourinary: Negative for difficulty urinating and hematuria. Musculoskeletal: Positive for arthralgias, gait problem, joint swelling and myalgias. Skin: Negative for rash. Neurological: Negative for dizziness, tremors, seizures, syncope, weakness, numbness and headaches. Psychiatric/Behavioral: Positive for decreased concentration and dysphoric mood. Negative for confusion and self-injury. The patient is not nervous/anxious.         Past Medical History: Dr. Tierney Graft - redo x3 (reverse AC SVG sequentially to D1, OM1 & PDA) w/explant of prior prosthetic valve & removal of embedded sternal wires x7    EMG  04/16/2012    FOOT SURGERY Left 01/24/2012    Dr. Estela Ferrara, DPM - hallux nail evulsion & exostectomy    HEMICOLECTOMY N/A 7/26/2019    ROBOTIC ASSISTED  LAPAROSCOPIC RIGHT COLECTOMY AND CHOLECYSTECTOMY performed by Billie Harper MD at [address] [place] [address]  2010    multiple    LUMBAR DISCECTOMY  2012    L4-5    SHOULDER ARTHROSCOPY Right 11/21/2013    Dr. Kalia Howe - w/subacromial decompression, debridement of the SLAP tear, distal clavicle excision    SOFT TISSUE TUMOR RESECTION  2001    retroperitoneal mass    TESTICLE REMOVAL Left 2001    radical orchiectomy    TRANSESOPHAGEAL ECHOCARDIOGRAM  05/30/2017    during redo CABG & AVR    TUNNELED VENOUS PORT PLACEMENT  2002    portacath        Home Medications:  Prior to Visit Medications    Medication Sig Taking? Authorizing Provider   DULoxetine (CYMBALTA) 30 MG extended release capsule Take 1 capsule by mouth daily Yes Sanya Bailey DO   Handicap Placard MISC by Does not apply route Effective through 12/28/2020 through 12/27/2025 Yes Sanya Bailey DO   rosuvastatin (CRESTOR) 20 MG tablet TAKE ONE TABLET BY MOUTH EVERY EVENING Yes Genaro Chow MD   lisinopril (PRINIVIL;ZESTRIL) 10 MG tablet TAKE 1 TABLET BY MOUTH 2 TIMES A DAY Yes Amrit Mata MD   warfarin (COUMADIN) 5 MG tablet TAKE 1 TABLET (5 MG) OR 1.5 TABLETS (7.5 MG) BY MOUTH DAILY AS DIRECTED BY CLINIC Yes Vincent Enamorado MD   metoprolol tartrate (LOPRESSOR) 50 MG tablet TAKE 1 TABLET BY MOUTH 2 TIMES A DAY FOR BLOOD PRESSURE AND HEART Yes Merlene Quintanilla MD   nystatin-triamcinolone [facility]) 392327-9.3 UNIT/GM-% ointment Apply topically 2 times daily until improved.  Yes Michelle Nielson MD   aspirin 81 MG EC tablet Take 1 tablet by mouth daily Yes Jessenia Severino MD CYCLOBENZAPRINE HCL PO Take by mouth Yes Historical Provider, MD   amLODIPine (NORVASC) 5 MG tablet Take 1 tablet by mouth nightly as needed (If systolic blood pressure over 130, take amlodipine 5mg at night, if under 130 do not take amlopidine) Yes Jama Bagley MD   sildenafil (VIAGRA) 100 MG tablet Take 1 tablet by mouth as needed for Erectile Dysfunction Start with 1/2 tab. If it does not work try full tab but no more 1 tab a day Yes Jama Bagley MD   melatonin (RA MELATONIN) 3 MG TABS tablet Take 1 tablet by mouth nightly as needed (insomnia) Yes Anitra Camargo MD   Blood Pressure KIT 1 Unit Yes Anitra Camargo MD   oxyCODONE-acetaminophen (PERCOCET) 5-325 MG per tablet Take 1 tablet by mouth every 4 hours as needed for Pain  . Yes Historical Provider, MD   gabapentin (NEURONTIN) 300 MG capsule Take 300 mg by mouth 3 times daily  Yes Historical Provider, MD   famotidine (PEPCID) 20 MG tablet Take 1 tablet by mouth 2 times daily  Jayy Terry MD        Allergies:    Atorvastatin calcium [lipitor] and Vicodin [hydrocodone-acetaminophen]    Family History:       Problem Relation Age of Onset    Cancer Mother     Cancer Father         lung    Alzheimer's Disease Sister     Liver Cancer Brother        Social History:  Social History     Tobacco Use    Smoking status: Current Some Day Smoker     Packs/day: 0.75     Years: 40.00     Pack years: 30.00     Types: Cigarettes     Start date: 8/8/1947     Last attempt to quit: 5/20/2017     Years since quitting: 3.6    Smokeless tobacco: Never Used    Tobacco comment: stopped 5-18   Substance Use Topics    Alcohol use: No     Alcohol/week: 0.0 standard drinks    Drug use: No       Data: Old records have been reviewed electronically.     PHYSICAL EXAM: /80 (Site: Left Upper Arm, Position: Sitting, Cuff Size: Large Adult)   Pulse 74   Temp 97.5 °F (36.4 °C) (Temporal)   Resp 16   Ht 6' (1.829 m)   Wt 276 lb 12.8 oz (125.6 kg)   SpO2 97%   BMI 37.54 kg/m²   Physical Exam  Constitutional:       General: He is not in acute distress. Appearance: He is not diaphoretic. HENT:      Head: Normocephalic and atraumatic. Mouth/Throat:      Mouth: Mucous membranes are moist.      Pharynx: Oropharynx is clear. No oropharyngeal exudate. Eyes:      General: No scleral icterus. Extraocular Movements: Extraocular movements intact. Conjunctiva/sclera: Conjunctivae normal.      Pupils: Pupils are equal, round, and reactive to light. Cardiovascular:      Rate and Rhythm: Normal rate and regular rhythm. Pulses: Normal pulses. Heart sounds: No murmur. No gallop. Pulmonary:      Effort: Pulmonary effort is normal. No respiratory distress. Breath sounds: Normal breath sounds. No wheezing or rales. Abdominal:      General: There is no distension. Palpations: Abdomen is soft. Tenderness: There is no abdominal tenderness. Musculoskeletal: Normal range of motion. General: No swelling. Right lower leg: No edema. Left lower leg: No edema. Neurological:      General: No focal deficit present. Mental Status: He is alert and oriented to person, place, and time.          Health Maintanence:  Health Maintenance   Topic Date Due    Hepatitis C screen  1965    DTaP/Tdap/Td vaccine (1 - Tdap) 08/08/1982    Shingles Vaccine (1 of 2) 08/08/2013    Lipid screen  04/13/2017    A1C test (Diabetic or Prediabetic)  05/26/2018    Low dose CT lung screening  08/08/2018    Annual Wellness Visit (AWV)  06/20/2019    Potassium monitoring  07/30/2020    Creatinine monitoring  07/30/2020    Flu vaccine (1) 09/01/2020    Colon cancer screen colonoscopy  08/31/2026

## 2020-12-28 NOTE — PROGRESS NOTES
ANTICOAGULATION SERVICE    Kym Hodges is a 60 y.o. male with PMHx significant for AVR (re-do in May, 2017), CAD s/p CABG (x3 in May, 2017), pA-fib, HLD, HTN, testicular CA who presents to clinic on 12/28/2020 for anticoagulation monitoring and adjustment.     Anticoagulation Indication(s):  Heart Valve Replacement (bovine AVR), A-fib  Referring Physician:  Dr. Scotty Prajapati  Goal INR Range:  2-3  Duration of Anticoagulation Therapy:  TBD  Time of day dose taken:  PM  Product patient has at home:  warfarin 5 mg (peach)    [id number] Score for Atrial Fibrillation Stroke Risk   Risk   Factors  Component Value   C CHF No 0   H HTN Yes 1   A2 Age >= 75 No,  (56 y.o.) 0   D DM No 0   S2 Prior Stroke/TIA No 0   V Vascular Disease Yes 1   A Age 74-69 No,  (56 y.o.) 0   Sc Sex male 0    [id number]  Score  2   Score last updated 5/30/19 1:51 PM      Lab Results   Component Value Date    RBC 3.70 (L) 07/30/2019    HGB 12.4 (L) 07/30/2019    HCT 36.5 (L) 07/30/2019    MCV 98.7 07/30/2019    MCH 33.5 07/30/2019    MPV 7.6 07/30/2019    RDW 15.6 (H) 07/30/2019     07/30/2019     INR Summary                            Warfarin regimen (mg)  Date INR   A/P    Sun Mon Tue Wed Thu Fri Sat Mg/wk  12/28 2.0 At goal, no change  7.5 5 5 5 5 5 7.5 40  11/24 2.4 At goal, no change  7.5 5 5 5 5 5 7.5 40  10/30 2.5 At goal, no change  7.5 5 5 5 5 5 7.5 40  10/2 2.2 At goal, no change  7.5 5 5 5 5 5 7.5 40  9/3 2.4 At goal, no change  7.5 5 5 5 5 5 7.5 40  8/3 2.9 At goal, no change  7.5 5 5 5 5 5 7.5 40  7/6 2.7 At goal, no change  7.5 5 5 5 5 5 7.5 40  6/8 2.8 At goal, no change  7.5 5 5 5 5 5 7.5 40  5/11 2.9 At goal, no change  7.5 5 5 5 5 5 7.5 40  3/30 3.0 At goal, no change  7.5 5 5 5 5 5 7.5 40  2/26 2.3 At goal, no change  7.5 5 5 5 5 5 7.5 40  2/5 3.3 Above goal, decrease  7.5 5 5 5 5 5 7.5 40  1/8 2.9 At goal, no change  7.5 7.5 5 5 5 5 7.5 42.5  12/18 3.4 Above goal, reduce x 1 7.5 7.5 5 2.5/5 5 5 7.5 42.5 11/22 2.5 At goal, no change  7.5 7.5 5 5 5 5 7.5 42.5  10/25 2.5 At goal, no change  7.5 7.5 5 5 5 5 7.5 42.5  9/27 2.9 At goal, no change  7.5 7.5 5 5 5 5 7.5 42.5   8/30 2.8 At goal, no change  7.5 7.5 5 5 5 5 7.5 42.5  8/15 3.1 Above goal, continue  7.5 7.5 5 5 5 5 7.5 42.5  8/7 1.8 Below goal (held x 10 d) 7.5 7.5 5 5 5 5 7.5 42.5  7/17 2.6 At goal, no change  7.5 7.5 5 5 5 5 7.5 42.5  6/13 2.2 At goal, no change  7.5 7.5 5 5 5 5 7.5 42.5  5/16 2.3 At goal, no change  7.5 7.5 5 5 5 5 7.5 42.5  4/24 2.9 At goal, no change  7.5 7.5 5 5 5 5 7.5 42.5  4/10 3.6 Above goal, hold x 1  7.5 7.5 5 0/5 5 5 7.5 42.5   3/27 3.6 Above goal, decrease  7.5 7.5 5 5 5 5 7.5 42.5  3/13 3.0 At goal, no change   7.5 7.5 7.5 5 5 7.5 7.5 47.5  2/27 2.5 At goal, no change  7.5 7.5 7.5 5 5 7.5 7.5 47.5  2/13 3.1 Above goal, continue  7.5 7.5 7.5 5 5 7.5 7.5 47.5  1/7 2.4 At goal, no change  7.5 7.5 7.5 5 5 7.5 7.5 47.5  12/10 2.5 At goal, no change  7.5 7.5 7.5 5 5 7.5 7.5 47.5  11/12 2.8 At goal, no change  7.5 7.5 7.5 5 5 7.5 7.5 47.5  10/15 2.1 At goal, no change  7.5 7.5 7.5 5 5 7.5 7.5 47.5  9/17 2.6 At goal, no change  7.5 7.5 7.5 5 5 7.5 7.5 47.5  8/17 2.8 At goal, no change  7.5 7.5 7.5 5 5 7.5 7.5 47.5   7/19 2.9 At goal, no change  7.5 7.5 7.5 5 5 7.5 7.5 47.5  6/22 2.6 At goal, no change  7.5 7.5 7.5 5 5 7.5 7.5 47.5  6/1 2.1 At goal, no change  7.5 7.5 7.5 5 5 7.5 7.5 47.5  5/11 2.2 At goal, no change  7.5 7.5 7.5 5 5 7.5 7.5 47.5    Patient History:  Recent hospitalizations/HC visits -12/28 Dr. Marcus Riggs (PCP office): prescribed Cymbalta  -10/1 Dr. Kya Louis: no med changes    Recent medication changes 12/28 Cymbalta 30 mg daily per PCP (may increase INR)   Medications taken regularly that may interact with warfarin or alter INR ASA 81 mg   Warfarin dose taken as prescribed Took 5 mg instead of 7.5 mg on 12/26  Usually compliant   Does not use pillbox  Patient has been taking warfarin since re-do AVR in May, 2017 Signs/symptoms of bleeding No h/o major bleeding   Vitamin K intake Normally has ~0 servings of green, leafy vegetables per week   Recent vomiting/diarrhea/fever, changes in weight or activity level None reported   Tobacco or alcohol use -No recent changes in smoking as of 11/24/20 (~3/4 PPD)  -Patient cut back from 2 PPD to 3/4 PPD in February (2019) when he moved in with his daughter. Decrease in smoking typically increases INR gradually.   -Patient denies any alcohol or illicit drug use   Upcoming surgeries or procedures None reported  Back stimulator implant canceled due to cost     Assessment/Plan:  Patient's INR was therapeutic today (2.0). He only took 5 mg of warfarin instead of 7.5 mg on 12/26, which would explain INR on low end of goal range today. He was prescribed Cymbalta today and plans to pick it up in 2-3 days. Cymbalta can increase INR; will monitor closely. Patient was instructed to continue warfarin 5 mg daily, except 7.5 mg on Sat+Sun. Patient prefers to take the same doses of warfain in a row because it is easier to remember. Repeat INR in 2 weeks. Patient was reminded to maintain consistent vitamin K intake and call with any bleeding, medication changes, or fever/vomiting/diarrhea. Patient understands dosing directions and information discussed. Dosing schedule and follow up appointment given to patient. Progress note routed to referring physician's office. Patient acknowledges working in consult agreement with pharmacist as referred by his/her physician. Next INR Check:  1/11    Please call [facility] at [phone] with any questions. Thanks! Carmita Hsu.  Cedrick Payan, KarinD, [facility]  [facility]  Ph: [phone]  12/28/2020 9:47 AM    CLINICAL PHARMACY CONSULT: MED RECONCILIATION/REVIEW ADDENDUM    For Pharmacy Admin Tracking Only    PHSO: No  Total # of Interventions Recommended: 0  - Maintenance Safety Lab Monitoring #: 1 Total Interventions Accepted: 0  Time Spent (min): 15

## 2021-01-08 ENCOUNTER — OFFICE VISIT (OUTPATIENT)
Dept: CARDIOLOGY CLINIC | Age: 56
End: 2021-01-08
Payer: MEDICARE

## 2021-01-08 VITALS
HEART RATE: 60 BPM | TEMPERATURE: 97.8 F | DIASTOLIC BLOOD PRESSURE: 70 MMHG | BODY MASS INDEX: 37.57 KG/M2 | SYSTOLIC BLOOD PRESSURE: 130 MMHG | WEIGHT: 277 LBS

## 2021-01-08 DIAGNOSIS — I25.10 CAD IN NATIVE ARTERY: Primary | ICD-10-CM

## 2021-01-08 DIAGNOSIS — I49.3 PVC (PREMATURE VENTRICULAR CONTRACTION): ICD-10-CM

## 2021-01-08 DIAGNOSIS — I10 ESSENTIAL HYPERTENSION: ICD-10-CM

## 2021-01-08 DIAGNOSIS — I48.0 PAROXYSMAL ATRIAL FIBRILLATION (HCC): ICD-10-CM

## 2021-01-08 DIAGNOSIS — Z95.1 HX OF CABG: ICD-10-CM

## 2021-01-08 DIAGNOSIS — Z95.2 S/P AVR (AORTIC VALVE REPLACEMENT): ICD-10-CM

## 2021-01-08 PROCEDURE — G8427 DOCREV CUR MEDS BY ELIG CLIN: HCPCS | Performed by: INTERNAL MEDICINE

## 2021-01-08 PROCEDURE — 99214 OFFICE O/P EST MOD 30 MIN: CPT | Performed by: INTERNAL MEDICINE

## 2021-01-08 PROCEDURE — 3017F COLORECTAL CA SCREEN DOC REV: CPT | Performed by: INTERNAL MEDICINE

## 2021-01-08 PROCEDURE — G8484 FLU IMMUNIZE NO ADMIN: HCPCS | Performed by: INTERNAL MEDICINE

## 2021-01-08 PROCEDURE — G8417 CALC BMI ABV UP PARAM F/U: HCPCS | Performed by: INTERNAL MEDICINE

## 2021-01-08 PROCEDURE — 4004F PT TOBACCO SCREEN RCVD TLK: CPT | Performed by: INTERNAL MEDICINE

## 2021-01-08 NOTE — PROGRESS NOTES
Subjective:      Patient ID: El Dunne is a 60 y.o. male. HPI  . Lydia Bernheim is here today for follow up CAD/CABG/AVR/Afib/HTN. No complaints. Remains active. Only back issues, spinal stenosis. No exertional chest pain. Wt up. Still smoking. No exertional sx. No sob/cp. No pnd. No orthopnea. No tachycardia. No syncope. BP good at home. Rhythm stable.          Past Medical History:   Diagnosis Date    Abdominal hernia     s/p repair 2010    Aortic valve prosthesis present     CAD (coronary artery disease)     Chronic back pain greater than 3 months duration     Clostridium difficile diarrhea 10/17/2016    PCR    DDD (degenerative disc disease), lumbosacral 8/7/2013    Erectile dysfunction     GERD (gastroesophageal reflux disease)     Hyperlipidemia     Hypertension     Joint pain     Left testicular cancer ([facility] [address] 2002    s/p abdominal resection    Myalgia and myositis, unspecified 8/26/2013    Neuropathy     OA (osteoarthritis) of knee     bilateral    Obesity, Class I, BMI 30.0-34.9 (see actual BMI)     Prolonged emergence from general anesthesia     after CABG    S/P AVR 2005    tissue valve    S/P CABG x 2 2005    w/AVR & primary repair of dissected ascending aorta     Past Surgical History:   Procedure Laterality Date    AORTA SURGERY  08/27/2004    Dr. Villa Rinaldi - primary repair of limited ascending aortic dissection    AORTIC VALVE REPLACEMENT  05/30/2017    Dr. Lluvia Savage - redo w/25mm Emaline Feliciano ThermaFix model 3300 bovine pericardial bioprosthesis    AORTIC VALVE SURGERY  08/27/2004    Dr. Villa Rinaldi - 27mm Kelton-Castelan pericardial prosthesis     BACK SURGERY      L4-S1    CARDIAC CATHETERIZATION  05/09/2017    Dr. Coats Haylie  08/26/2004    Dr. Ray Pat Right 03/17/2015    Dr. Mcintyre [place]  10/10/2016    Dr. Adalberto Arreaga - w/laparascopic lysis of extensive adhesions, open transverse colon resection, excision of old abd mesh adhering to colon    CORONARY ANGIOPLASTY WITH STENT PLACEMENT  07/2014    CORONARY ARTERY BYPASS GRAFT  08/27/2004    Dr. Conrado Hess - x2 (VELEZ-LAD, SVG sequentially to ascending aorta & D1)    CORONARY ARTERY BYPASS GRAFT  05/30/2017    Dr. Harish Kahn - redo x3 (reverse AC SVG sequentially to D1, OM1 & PDA) w/explant of prior prosthetic valve & removal of embedded sternal wires x7    EMG  04/16/2012    FOOT SURGERY Left 01/24/2012    Dr. Aurora Acosta, DPM - hallux nail evulsion & exostectomy    HEMICOLECTOMY N/A 7/26/2019    ROBOTIC ASSISTED  LAPAROSCOPIC RIGHT COLECTOMY AND CHOLECYSTECTOMY performed by Loki Gauthier MD at [address] [place] [address]  2010    multiple    LUMBAR DISCECTOMY  2012    L4-5    SHOULDER ARTHROSCOPY Right 11/21/2013    Dr. Karuna Jacobo - w/subacromial decompression, debridement of the SLAP tear, distal clavicle excision    SOFT TISSUE TUMOR RESECTION  2001    retroperitoneal mass    TESTICLE REMOVAL Left 2001    radical orchiectomy    TRANSESOPHAGEAL ECHOCARDIOGRAM  05/30/2017    during redo CABG & AVR    TUNNELED VENOUS PORT PLACEMENT  2002    portacath      Social History     Socioeconomic History    Marital status:       Spouse name: Not on file    Number of children: Not on file    Years of education: Not on file    Highest education level: Not on file   Occupational History    Not on file   Social Needs    Financial resource strain: Not on file    Food insecurity     Worry: Not on file     Inability: Not on file    Transportation needs     Medical: Not on file     Non-medical: Not on file   Tobacco Use    Smoking status: Current Some Day Smoker     Packs/day: 0.75     Years: 40.00     Pack years: 30.00     Types: Cigarettes     Start date: 8/8/1947     Last attempt to quit: 5/20/2017     Years since quitting: 3.6    Smokeless tobacco: Never Used    Tobacco comment: stopped 5-18   Substance and rhythm and prosthetic valve sounds normal .  Exam reveals no gallop. 1/6 syst murmur heard. Pulmonary/Chest: Effort normal and breath sounds normal. No respiratory distress. He has min wheezes. He has no rales. Abdominal: Soft. Bowel sounds are normal. There is no tenderness. Musculoskeletal: Normal range of motion. He exhibits no edema. Neurological: He is alert and oriented to person, place, and time. Skin: Skin is warm and dry. Psychiatric: He has a normal mood and affect. His behavior is normal. Thought content normal.       Assessment:         Diagnosis Orders   1. CAD in native artery     2. Hx of CABG     3. S/P AVR (aortic valve replacement)     4. Paroxysmal atrial fibrillation (HCC)     5. Essential hypertension     6. PVC (premature ventricular contraction)           Plan:      CV stable. Rhythm stable. No angina. BP good. Compensated. Reviewed previous records and testing including echo 5/17 and cath 5/17. Continues to smoke. Encouraged to stop. Wt stable. Encouraged diet, exercise and wt. No changes. Continue to monitor. Lipids per PCP. Follow up 3 months. Echo l be discussed next visit due to covid.

## 2021-01-11 ENCOUNTER — ANTI-COAG VISIT (OUTPATIENT)
Dept: PHARMACY | Age: 56
End: 2021-01-11
Payer: MEDICARE

## 2021-01-11 DIAGNOSIS — Z95.2 S/P AORTIC VALVE REPLACEMENT: ICD-10-CM

## 2021-01-11 DIAGNOSIS — I48.0 PAROXYSMAL ATRIAL FIBRILLATION (HCC): ICD-10-CM

## 2021-01-11 LAB — INTERNATIONAL NORMALIZATION RATIO, POC: 4.5

## 2021-01-11 PROCEDURE — 99212 OFFICE O/P EST SF 10 MIN: CPT

## 2021-01-11 PROCEDURE — 85610 PROTHROMBIN TIME: CPT

## 2021-01-11 NOTE — PROGRESS NOTES
ANTICOAGULATION SERVICE    Jefferson Morley is a 60 y.o. male with PMHx significant for AVR (re-do in May, 2017), CAD s/p CABG (x3 in May, 2017), pA-fib, HLD, HTN, testicular CA who presents to clinic on 1/11/2021 for anticoagulation monitoring and adjustment.     Anticoagulation Indication(s):  Heart Valve Replacement (bovine AVR), A-fib  Referring Physician:  Dr. Leopold Budds  Goal INR Range:  2-3  Duration of Anticoagulation Therapy:  TBD  Time of day dose taken:  PM  Product patient has at home:  warfarin 5 mg (peach)    [id number] Score for Atrial Fibrillation Stroke Risk   Risk   Factors  Component Value   C CHF No 0   H HTN Yes 1   A2 Age >= 75 No,  (56 y.o.) 0   D DM No 0   S2 Prior Stroke/TIA No 0   V Vascular Disease Yes 1   A Age 74-69 No,  (56 y.o.) 0   Sc Sex male 0    [id number]  Score  2   Score last updated 5/30/19 1:51 PM      Lab Results   Component Value Date    RBC 3.70 (L) 07/30/2019    HGB 12.4 (L) 07/30/2019    HCT 36.5 (L) 07/30/2019    MCV 98.7 07/30/2019    MCH 33.5 07/30/2019    MPV 7.6 07/30/2019    RDW 15.6 (H) 07/30/2019     07/30/2019     INR Summary                            Warfarin regimen (mg)  Date INR   A/P    Sun Mon Tue Wed Thu Fri Sat Mg/wk  1/11 4.5 Above goal (Cymbalta) 5 0/5 0/5 5 5 5 5 35  12/28 2.0 At goal, no change  7.5 5 5 5 5 5 7.5 40  11/24 2.4 At goal, no change  7.5 5 5 5 5 5 7.5 40  10/30 2.5 At goal, no change  7.5 5 5 5 5 5 7.5 40  10/2 2.2 At goal, no change  7.5 5 5 5 5 5 7.5 40  9/3 2.4 At goal, no change  7.5 5 5 5 5 5 7.5 40  8/3 2.9 At goal, no change  7.5 5 5 5 5 5 7.5 40  7/6 2.7 At goal, no change  7.5 5 5 5 5 5 7.5 40  6/8 2.8 At goal, no change  7.5 5 5 5 5 5 7.5 40  5/11 2.9 At goal, no change  7.5 5 5 5 5 5 7.5 40  3/30 3.0 At goal, no change  7.5 5 5 5 5 5 7.5 40  2/26 2.3 At goal, no change  7.5 5 5 5 5 5 7.5 40  2/5 3.3 Above goal, decrease  7.5 5 5 5 5 5 7.5 40  1/8 2.9 At goal, no change  7.5 7.5 5 5 5 5 7.5 42.5 12/18 3.4 Above goal, reduce x 1 7.5 7.5 5 2.5/5 5 5 7.5 42.5  11/22 2.5 At goal, no change  7.5 7.5 5 5 5 5 7.5 42.5  10/25 2.5 At goal, no change  7.5 7.5 5 5 5 5 7.5 42.5  9/27 2.9 At goal, no change  7.5 7.5 5 5 5 5 7.5 42.5   8/30 2.8 At goal, no change  7.5 7.5 5 5 5 5 7.5 42.5  8/15 3.1 Above goal, continue  7.5 7.5 5 5 5 5 7.5 42.5  8/7 1.8 Below goal (held x 10 d) 7.5 7.5 5 5 5 5 7.5 42.5  7/17 2.6 At goal, no change  7.5 7.5 5 5 5 5 7.5 42.5  6/13 2.2 At goal, no change  7.5 7.5 5 5 5 5 7.5 42.5  5/16 2.3 At goal, no change  7.5 7.5 5 5 5 5 7.5 42.5  4/24 2.9 At goal, no change  7.5 7.5 5 5 5 5 7.5 42.5  4/10 3.6 Above goal, hold x 1  7.5 7.5 5 0/5 5 5 7.5 42.5   3/27 3.6 Above goal, decrease  7.5 7.5 5 5 5 5 7.5 42.5  3/13 3.0 At goal, no change   7.5 7.5 7.5 5 5 7.5 7.5 47.5  2/27 2.5 At goal, no change  7.5 7.5 7.5 5 5 7.5 7.5 47.5  2/13 3.1 Above goal, continue  7.5 7.5 7.5 5 5 7.5 7.5 47.5  1/7 2.4 At goal, no change  7.5 7.5 7.5 5 5 7.5 7.5 47.5  12/10 2.5 At goal, no change  7.5 7.5 7.5 5 5 7.5 7.5 47.5  11/12 2.8 At goal, no change  7.5 7.5 7.5 5 5 7.5 7.5 47.5  10/15 2.1 At goal, no change  7.5 7.5 7.5 5 5 7.5 7.5 47.5  9/17 2.6 At goal, no change  7.5 7.5 7.5 5 5 7.5 7.5 47.5  8/17 2.8 At goal, no change  7.5 7.5 7.5 5 5 7.5 7.5 47.5   7/19 2.9 At goal, no change  7.5 7.5 7.5 5 5 7.5 7.5 47.5  6/22 2.6 At goal, no change  7.5 7.5 7.5 5 5 7.5 7.5 47.5  6/1 2.1 At goal, no change  7.5 7.5 7.5 5 5 7.5 7.5 47.5  5/11 2.2 At goal, no change  7.5 7.5 7.5 5 5 7.5 7.5 47.5    Patient History:  Recent hospitalizations/HC visits -12/28 Dr. Cristiane Carrion (PCP office): prescribed Cymbalta  -10/1 Dr. Robby Terrazas: no med changes    Recent medication changes 12/28 Cymbalta 30 mg daily per PCP (may increase INR)   Medications taken regularly that may interact with warfarin or alter INR ASA 81 mg   Warfarin dose taken as prescribed Yes  Usually compliant   Does not use pillbox Patient has been taking warfarin since re-do AVR in May, 2017   Signs/symptoms of bleeding No h/o major bleeding   Vitamin K intake Normally has ~0 servings of green, leafy vegetables per week   Recent vomiting/diarrhea/fever, changes in weight or activity level None reported   Tobacco or alcohol use -No recent changes in smoking as of 11/24/20 (~3/4 PPD)  -Patient cut back from 2 PPD to 3/4 PPD in February (2019) when he moved in with his daughter. Decrease in smoking typically increases INR gradually.   -Patient denies any alcohol or illicit drug use   Upcoming surgeries or procedures None reported  Back stimulator implant canceled due to cost     Assessment/Plan:  Patient's INR was supratherapeutic today (4.5), likely due to starting Cymbalta. Prior to starting Cymbalta on 12/28, patient's INR had been therapeutic on current regimen since February. He denies any recent warfarin dosing errors, diet changes, illness, or bleeding. He does report that he hasn't been feeling that well since starting Cymbalta and endorses dry mouth and blurred vision. He is planning to contact his PCP to see if he should continue taking it. For now, patient was instructed to hold warfarin today and tomorrow, then decrease dose to 5 mg daily. If patient decides not to take Cymbalta, will likely resume previously stable regimen. Repeat INR in 1 week. Patient was reminded to maintain consistent vitamin K intake and call with any bleeding, medication changes, or fever/vomiting/diarrhea. Patient understands dosing directions and information discussed. Dosing schedule and follow up appointment given to patient. Progress note routed to referring physician's office. Patient acknowledges working in consult agreement with pharmacist as referred by his/her physician. Next INR Check:  1/20    Please call [facility] at [phone] with any questions. Thanks! Mey Berrios.  Fatou Bass, PharmD, BCPS [facility]  Ph: [phone]  1/11/2021 8:57 AM    CLINICAL PHARMACY CONSULT: MED RECONCILIATION/REVIEW ADDENDUM    For Pharmacy Admin Tracking Only    PHSO: No  Total # of Interventions Recommended: 1  - Decreased Dose #: 1  - Maintenance Safety Lab Monitoring #: 1  Total Interventions Accepted: 1  Time Spent (min): 15

## 2021-01-19 DIAGNOSIS — I25.10 CORONARY ARTERY DISEASE INVOLVING NATIVE CORONARY ARTERY OF NATIVE HEART WITHOUT ANGINA PECTORIS: ICD-10-CM

## 2021-01-20 ENCOUNTER — ANTI-COAG VISIT (OUTPATIENT)
Dept: PHARMACY | Age: 56
End: 2021-01-20
Payer: MEDICARE

## 2021-01-20 DIAGNOSIS — I48.0 PAROXYSMAL ATRIAL FIBRILLATION (HCC): ICD-10-CM

## 2021-01-20 DIAGNOSIS — Z95.2 S/P AORTIC VALVE REPLACEMENT: ICD-10-CM

## 2021-01-20 LAB — INTERNATIONAL NORMALIZATION RATIO, POC: 2.7

## 2021-01-20 PROCEDURE — 99211 OFF/OP EST MAY X REQ PHY/QHP: CPT

## 2021-01-20 PROCEDURE — 85610 PROTHROMBIN TIME: CPT

## 2021-01-20 NOTE — PROGRESS NOTES
ANTICOAGULATION SERVICE    Vick Ceballos is a 60 y.o. male with PMHx significant for AVR (re-do in May, 2017), CAD s/p CABG (x3 in May, 2017), pA-fib, HLD, HTN, testicular CA who presents to clinic on 1/20/2021 for anticoagulation monitoring and adjustment.     Anticoagulation Indication(s):  Heart Valve Replacement (bovine AVR), A-fib  Referring Physician:  Dr. Maritza Hollis  Goal INR Range:  2-3  Duration of Anticoagulation Therapy:  TBD  Time of day dose taken:  PM  Product patient has at home:  warfarin 5 mg (peach)    [id number] Score for Atrial Fibrillation Stroke Risk   Risk   Factors  Component Value   C CHF No 0   H HTN Yes 1   A2 Age >= 75 No,  (56 y.o.) 0   D DM No 0   S2 Prior Stroke/TIA No 0   V Vascular Disease Yes 1   A Age 74-69 No,  (56 y.o.) 0   Sc Sex male 0    [id number]  Score  2   Score last updated 5/30/19 1:51 PM      Lab Results   Component Value Date    RBC 3.70 (L) 07/30/2019    HGB 12.4 (L) 07/30/2019    HCT 36.5 (L) 07/30/2019    MCV 98.7 07/30/2019    MCH 33.5 07/30/2019    MPV 7.6 07/30/2019    RDW 15.6 (H) 07/30/2019     07/30/2019     INR Summary                            Warfarin regimen (mg)  Date INR   A/P    Sun Mon Tue Wed Thu Fri Sat Mg/wk  1/20 2.7 At goal, no change  5 5 5 5 5 5 5 35  1/11 4.5 Above goal (Cymbalta) 5 0/5 0/5 5 5 5 5 35  12/28 2.0 At goal, no change  7.5 5 5 5 5 5 7.5 40  11/24 2.4 At goal, no change  7.5 5 5 5 5 5 7.5 40  10/30 2.5 At goal, no change  7.5 5 5 5 5 5 7.5 40  10/2 2.2 At goal, no change  7.5 5 5 5 5 5 7.5 40  9/3 2.4 At goal, no change  7.5 5 5 5 5 5 7.5 40  8/3 2.9 At goal, no change  7.5 5 5 5 5 5 7.5 40  7/6 2.7 At goal, no change  7.5 5 5 5 5 5 7.5 40  6/8 2.8 At goal, no change  7.5 5 5 5 5 5 7.5 40  5/11 2.9 At goal, no change  7.5 5 5 5 5 5 7.5 40  3/30 3.0 At goal, no change  7.5 5 5 5 5 5 7.5 40  2/26 2.3 At goal, no change  7.5 5 5 5 5 5 7.5 40  2/5 3.3 Above goal, decrease  7.5 5 5 5 5 5 7.5 40 1/8 2.9 At goal, no change  7.5 7.5 5 5 5 5 7.5 42.5  12/18 3.4 Above goal, reduce x 1 7.5 7.5 5 2.5/5 5 5 7.5 42.5  11/22 2.5 At goal, no change  7.5 7.5 5 5 5 5 7.5 42.5  10/25 2.5 At goal, no change  7.5 7.5 5 5 5 5 7.5 42.5  9/27 2.9 At goal, no change  7.5 7.5 5 5 5 5 7.5 42.5   8/30 2.8 At goal, no change  7.5 7.5 5 5 5 5 7.5 42.5  8/15 3.1 Above goal, continue  7.5 7.5 5 5 5 5 7.5 42.5  8/7 1.8 Below goal (held x 10 d) 7.5 7.5 5 5 5 5 7.5 42.5  7/17 2.6 At goal, no change  7.5 7.5 5 5 5 5 7.5 42.5  6/13 2.2 At goal, no change  7.5 7.5 5 5 5 5 7.5 42.5  5/16 2.3 At goal, no change  7.5 7.5 5 5 5 5 7.5 42.5  4/24 2.9 At goal, no change  7.5 7.5 5 5 5 5 7.5 42.5  4/10 3.6 Above goal, hold x 1  7.5 7.5 5 0/5 5 5 7.5 42.5   3/27 3.6 Above goal, decrease  7.5 7.5 5 5 5 5 7.5 42.5  3/13 3.0 At goal, no change   7.5 7.5 7.5 5 5 7.5 7.5 47.5  2/27 2.5 At goal, no change  7.5 7.5 7.5 5 5 7.5 7.5 47.5  2/13 3.1 Above goal, continue  7.5 7.5 7.5 5 5 7.5 7.5 47.5  1/7 2.4 At goal, no change  7.5 7.5 7.5 5 5 7.5 7.5 47.5  12/10 2.5 At goal, no change  7.5 7.5 7.5 5 5 7.5 7.5 47.5  11/12 2.8 At goal, no change  7.5 7.5 7.5 5 5 7.5 7.5 47.5  10/15 2.1 At goal, no change  7.5 7.5 7.5 5 5 7.5 7.5 47.5  9/17 2.6 At goal, no change  7.5 7.5 7.5 5 5 7.5 7.5 47.5  8/17 2.8 At goal, no change  7.5 7.5 7.5 5 5 7.5 7.5 47.5   7/19 2.9 At goal, no change  7.5 7.5 7.5 5 5 7.5 7.5 47.5  6/22 2.6 At goal, no change  7.5 7.5 7.5 5 5 7.5 7.5 47.5  6/1 2.1 At goal, no change  7.5 7.5 7.5 5 5 7.5 7.5 47.5  5/11 2.2 At goal, no change  7.5 7.5 7.5 5 5 7.5 7.5 47.5    Patient History:  Recent hospitalizations/HC visits -12/28 Dr. Yajaira Ortiz (PCP office): prescribed Cymbalta  -10/1 Dr. Johnie Burks: no med changes    Recent medication changes 12/28 Cymbalta 30 mg daily per PCP (may increase INR)   Medications taken regularly that may interact with warfarin or alter INR ASA 81 mg   Warfarin dose taken as prescribed Yes  Usually compliant Does not use pillbox  Patient has been taking warfarin since re-do AVR in May, 2017   Signs/symptoms of bleeding No h/o major bleeding   Vitamin K intake Normally has ~0 servings of green, leafy vegetables per week   Recent vomiting/diarrhea/fever, changes in weight or activity level None reported   Tobacco or alcohol use -No recent changes in smoking as of 11/24/20 (~3/4 PPD)  -Patient cut back from 2 PPD to 3/4 PPD in February (2019) when he moved in with his daughter. Decrease in smoking typically increases INR gradually.   -Patient denies any alcohol or illicit drug use   Upcoming surgeries or procedures None reported  Back stimulator implant canceled due to cost     Assessment/Plan:  Patient's INR was therapeutic today (2.7) after holding warfarin x 2 days and decreasing maintenance dose last week for INR of 4.5 (attributed to Cymbalta). Prior to starting Cymbalta on 12/28, patient's INR had been therapeutic on warfarin 40 mg/week for almost a year. He denies any recent warfarin dosing errors, diet changes, illness, or bleeding. He does report that he hasn't been feeling that well since starting Cymbalta and endorses dry mouth, dizziness, and blurred vision. These side effects have improved since last visit. For now, patient was instructed to continue warfarin 5 mg daily. If patient decides not to take Cymbalta, will likely resume previously stable regimen. Repeat INR in 1 week. Patient was reminded to maintain consistent vitamin K intake and call with any bleeding, medication changes, or fever/vomiting/diarrhea. Patient understands dosing directions and information discussed. Dosing schedule and follow up appointment given to patient. Progress note routed to referring physician's office. Patient acknowledges working in consult agreement with pharmacist as referred by his/her physician. Next INR Check:  1/29    Please call [facility] at [phone] with any questions. Thanks! Ricky Sawyer.  Rowena Yi, PharmD, BCPS  [facility]  Ph: [phone]  1/20/2021 8:45 AM    CLINICAL PHARMACY CONSULT: MED RECONCILIATION/REVIEW ADDENDUM    For Pharmacy Admin Tracking Only    PHSO: Yes  Total # of Interventions Recommended: 0  - Maintenance Safety Lab Monitoring #: 1  Total Interventions Accepted: 0  Time Spent (min): 15

## 2021-01-22 DIAGNOSIS — M17.11 OSTEOARTHRITIS OF RIGHT KNEE, UNSPECIFIED OSTEOARTHRITIS TYPE: ICD-10-CM

## 2021-01-22 DIAGNOSIS — M51.26 LUMBAR HERNIATED DISC: Chronic | ICD-10-CM

## 2021-01-22 DIAGNOSIS — F32.89 OTHER DEPRESSION: ICD-10-CM

## 2021-01-22 DIAGNOSIS — M25.50 ARTHRALGIA, UNSPECIFIED JOINT: ICD-10-CM

## 2021-01-22 NOTE — TELEPHONE ENCOUNTER
Last OV - 12/28/2020 - Dr. Deborah Graves  Next Ov - 02/01/2021 - Dr. Deborah Graves    Pt needs refill on Rosuvastatin Calcium  Last filled  10/23/2020 - 0 rf

## 2021-02-01 ENCOUNTER — TELEPHONE (OUTPATIENT)
Dept: PHARMACY | Age: 56
End: 2021-02-01

## 2021-02-01 RX ORDER — DULOXETIN HYDROCHLORIDE 30 MG/1
30 CAPSULE, DELAYED RELEASE ORAL DAILY
Qty: 30 CAPSULE | Refills: 0 | Status: SHIPPED | OUTPATIENT
Start: 2021-02-01 | End: 2021-03-22

## 2021-02-01 NOTE — TELEPHONE ENCOUNTER
Patient called and scheduled INR check for 2/4 @ 0830. Yovany Richards.  Buckley Najjar, PharmD, BCPS  [facility]  Ph: [phone]  2/1/2021 11:52 AM

## 2021-02-01 NOTE — TELEPHONE ENCOUNTER
I will refill it for 1 more month but he needs to come in and be seen since he missed his visit today regarding how he has been doing since we started this medication.

## 2021-02-02 RX ORDER — ROSUVASTATIN CALCIUM 20 MG/1
TABLET, COATED ORAL
Qty: 90 TABLET | Refills: 1 | Status: SHIPPED | OUTPATIENT
Start: 2021-02-02 | End: 2021-07-15

## 2021-02-04 ENCOUNTER — ANTI-COAG VISIT (OUTPATIENT)
Dept: PHARMACY | Age: 56
End: 2021-02-04
Payer: MEDICARE

## 2021-02-04 DIAGNOSIS — I48.0 PAROXYSMAL ATRIAL FIBRILLATION (HCC): ICD-10-CM

## 2021-02-04 DIAGNOSIS — Z95.2 S/P AORTIC VALVE REPLACEMENT: ICD-10-CM

## 2021-02-04 LAB — INTERNATIONAL NORMALIZATION RATIO, POC: 2.7

## 2021-02-04 PROCEDURE — 99211 OFF/OP EST MAY X REQ PHY/QHP: CPT

## 2021-02-04 PROCEDURE — 85610 PROTHROMBIN TIME: CPT

## 2021-02-04 NOTE — PROGRESS NOTES
ANTICOAGULATION SERVICE    Shira Kapoor is a 60 y.o. male with PMHx significant for AVR (re-do in May, 2017), CAD s/p CABG (x3 in May, 2017), pA-fib, HLD, HTN, testicular CA who presents to clinic on 2/4/2021 for anticoagulation monitoring and adjustment.     Anticoagulation Indication(s):  Heart Valve Replacement (bovine AVR), A-fib  Referring Physician:  Dr. Wyatt Salazar  Goal INR Range:  2-3  Duration of Anticoagulation Therapy:  TBD  Time of day dose taken:  PM  Product patient has at home:  warfarin 5 mg (peach)    [id number] Score for Atrial Fibrillation Stroke Risk   Risk   Factors  Component Value   C CHF No 0   H HTN Yes 1   A2 Age >= 75 No,  (56 y.o.) 0   D DM No 0   S2 Prior Stroke/TIA No 0   V Vascular Disease Yes 1   A Age 74-69 No,  (56 y.o.) 0   Sc Sex male 0    [id number]  Score  2   Score last updated 5/30/19 1:51 PM      Lab Results   Component Value Date    RBC 3.70 (L) 07/30/2019    HGB 12.4 (L) 07/30/2019    HCT 36.5 (L) 07/30/2019    MCV 98.7 07/30/2019    MCH 33.5 07/30/2019    MPV 7.6 07/30/2019    RDW 15.6 (H) 07/30/2019     07/30/2019     INR Summary                            Warfarin regimen (mg)  Date INR   A/P    Sun Mon Tue Wed Thu Fri Sat Mg/wk  2/4 2.7 At goal, no change  5 5 5 5 5 5 5 35  1/20 2.7 At goal, no change  5 5 5 5 5 5 5 35  1/11 4.5 Above goal (Cymbalta) 5 0/5 0/5 5 5 5 5 35  12/28 2.0 At goal, no change  7.5 5 5 5 5 5 7.5 40  11/24 2.4 At goal, no change  7.5 5 5 5 5 5 7.5 40  10/30 2.5 At goal, no change  7.5 5 5 5 5 5 7.5 40  10/2 2.2 At goal, no change  7.5 5 5 5 5 5 7.5 40  9/3 2.4 At goal, no change  7.5 5 5 5 5 5 7.5 40  8/3 2.9 At goal, no change  7.5 5 5 5 5 5 7.5 40  7/6 2.7 At goal, no change  7.5 5 5 5 5 5 7.5 40  6/8 2.8 At goal, no change  7.5 5 5 5 5 5 7.5 40  5/11 2.9 At goal, no change  7.5 5 5 5 5 5 7.5 40  3/30 3.0 At goal, no change  7.5 5 5 5 5 5 7.5 40  2/26 2.3 At goal, no change  7.5 5 5 5 5 5 7.5 40 2/5 3.3 Above goal, decrease  7.5 5 5 5 5 5 7.5 40  1/8 2.9 At goal, no change  7.5 7.5 5 5 5 5 7.5 42.5  12/18 3.4 Above goal, reduce x 1 7.5 7.5 5 2.5/5 5 5 7.5 42.5  11/22 2.5 At goal, no change  7.5 7.5 5 5 5 5 7.5 42.5  10/25 2.5 At goal, no change  7.5 7.5 5 5 5 5 7.5 42.5  9/27 2.9 At goal, no change  7.5 7.5 5 5 5 5 7.5 42.5   8/30 2.8 At goal, no change  7.5 7.5 5 5 5 5 7.5 42.5  8/15 3.1 Above goal, continue  7.5 7.5 5 5 5 5 7.5 42.5  8/7 1.8 Below goal (held x 10 d) 7.5 7.5 5 5 5 5 7.5 42.5  7/17 2.6 At goal, no change  7.5 7.5 5 5 5 5 7.5 42.5  6/13 2.2 At goal, no change  7.5 7.5 5 5 5 5 7.5 42.5  5/16 2.3 At goal, no change  7.5 7.5 5 5 5 5 7.5 42.5  4/24 2.9 At goal, no change  7.5 7.5 5 5 5 5 7.5 42.5  4/10 3.6 Above goal, hold x 1  7.5 7.5 5 0/5 5 5 7.5 42.5   3/27 3.6 Above goal, decrease  7.5 7.5 5 5 5 5 7.5 42.5  3/13 3.0 At goal, no change   7.5 7.5 7.5 5 5 7.5 7.5 47.5  2/27 2.5 At goal, no change  7.5 7.5 7.5 5 5 7.5 7.5 47.5  2/13 3.1 Above goal, continue  7.5 7.5 7.5 5 5 7.5 7.5 47.5  1/7 2.4 At goal, no change  7.5 7.5 7.5 5 5 7.5 7.5 47.5  12/10 2.5 At goal, no change  7.5 7.5 7.5 5 5 7.5 7.5 47.5  11/12 2.8 At goal, no change  7.5 7.5 7.5 5 5 7.5 7.5 47.5  10/15 2.1 At goal, no change  7.5 7.5 7.5 5 5 7.5 7.5 47.5  9/17 2.6 At goal, no change  7.5 7.5 7.5 5 5 7.5 7.5 47.5  8/17 2.8 At goal, no change  7.5 7.5 7.5 5 5 7.5 7.5 47.5   7/19 2.9 At goal, no change  7.5 7.5 7.5 5 5 7.5 7.5 47.5  6/22 2.6 At goal, no change  7.5 7.5 7.5 5 5 7.5 7.5 47.5  6/1 2.1 At goal, no change  7.5 7.5 7.5 5 5 7.5 7.5 47.5  5/11 2.2 At goal, no change  7.5 7.5 7.5 5 5 7.5 7.5 47.5    Patient History:  Recent hospitalizations/HC visits -12/28 Dr. Deepti Mendoza (PCP office): prescribed Cymbalta  -10/1 Dr. Augustin Calixto: no med changes    Recent medication changes 12/28 Cymbalta 30 mg daily per PCP (may increase INR); stopped taking on 1/30    Medications taken regularly that may interact with warfarin or alter INR ASA 81 mg Warfarin dose taken as prescribed Yes  Usually compliant   Does not use pillbox  Patient has been taking warfarin since re-do AVR in May, 2017   Signs/symptoms of bleeding No h/o major bleeding   Vitamin K intake Normally has ~0 servings of green, leafy vegetables per week   Recent vomiting/diarrhea/fever, changes in weight or activity level None reported   Tobacco or alcohol use -No recent changes in smoking as of 11/24/20 (~3/4 PPD)  -Patient cut back from 2 PPD to 3/4 PPD in February (2019) when he moved in with his daughter. Decrease in smoking typically increases INR gradually.   -Patient denies any alcohol or illicit drug use   Upcoming surgeries or procedures None reported  Back stimulator implant canceled due to cost     Assessment/Plan:  Patient's INR was therapeutic today (2.7) for second consecutive visit after decreasing warfarin dose on 1/11 due to interaction with Cymbalta. Prior to starting Cymbalta on 12/28, patient's INR had been therapeutic on warfarin 40 mg/week for almost a year. He denies any recent warfarin dosing errors, diet changes, or bleeding. He does report that he hasn't been feeling that well since starting Cymbalta and endorses dry mouth, dizziness, and blurred vision. These side effects were improving, but patient ran out of the Cymbalta on 1/30 and is no longer taking it. He is not sure if he wants to take it anymore due to side effects. He is planning to discuss with his PCP at next visit. For now, patient was instructed to continue warfarin 5 mg daily. If patient decides not to resume Cymbalta, will likely need to resume previously stable warfarin regimen of 40 mg/week. Repeat INR in 2 weeks. Patient was reminded to maintain consistent vitamin K intake and call with any bleeding, medication changes, or fever/vomiting/diarrhea. Patient understands dosing directions and information discussed. Dosing schedule and follow up appointment given to patient. Progress note routed to referring physician's office. Patient acknowledges working in consult agreement with pharmacist as referred by his/her physician. Next INR Check:  2/19    Please call [facility] at [phone] with any questions. Thanks! Karin RichterD, [facility]  [facility]  Ph: [phone]  2/4/2021 8:14 AM    CLINICAL PHARMACY CONSULT: MED RECONCILIATION/REVIEW ADDENDUM    For Pharmacy Admin Tracking Only    PHSO: Yes  Total # of Interventions Recommended: 0  - Maintenance Safety Lab Monitoring #: 1  Total Interventions Accepted: 0  Time Spent (min): 15

## 2021-02-19 ENCOUNTER — ANTI-COAG VISIT (OUTPATIENT)
Dept: PHARMACY | Age: 56
End: 2021-02-19
Payer: MEDICARE

## 2021-02-19 DIAGNOSIS — Z95.2 S/P AORTIC VALVE REPLACEMENT: ICD-10-CM

## 2021-02-19 DIAGNOSIS — I48.0 PAROXYSMAL ATRIAL FIBRILLATION (HCC): ICD-10-CM

## 2021-02-19 LAB — INTERNATIONAL NORMALIZATION RATIO, POC: 2

## 2021-02-19 PROCEDURE — 85610 PROTHROMBIN TIME: CPT

## 2021-02-19 PROCEDURE — 99211 OFF/OP EST MAY X REQ PHY/QHP: CPT

## 2021-02-19 NOTE — PROGRESS NOTES
ANTICOAGULATION SERVICE    Miranda Francisco is a 60 y.o. male with PMHx significant for AVR (re-do in May, 2017), CAD s/p CABG (x3 in May, 2017), pA-fib, HLD, HTN, testicular CA who presents to clinic on 2/19/2021 for anticoagulation monitoring and adjustment.     Anticoagulation Indication(s):  Heart Valve Replacement (bovine AVR), A-fib  Referring Physician:  Dr. Leroy Mathew  Goal INR Range:  2-3  Duration of Anticoagulation Therapy:  TBD  Time of day dose taken:  PM  Product patient has at home:  warfarin 5 mg (peach)    [id number] Score for Atrial Fibrillation Stroke Risk   Risk   Factors  Component Value   C CHF No 0   H HTN Yes 1   A2 Age >= 75 No,  (56 y.o.) 0   D DM No 0   S2 Prior Stroke/TIA No 0   V Vascular Disease Yes 1   A Age 74-69 No,  (56 y.o.) 0   Sc Sex male 0    [id number]  Score  2   Score last updated 5/30/19 1:51 PM      Lab Results   Component Value Date    RBC 3.70 (L) 07/30/2019    HGB 12.4 (L) 07/30/2019    HCT 36.5 (L) 07/30/2019    MCV 98.7 07/30/2019    MCH 33.5 07/30/2019    MPV 7.6 07/30/2019    RDW 15.6 (H) 07/30/2019     07/30/2019     INR Summary                            Warfarin regimen (mg)  Date INR   A/P    Sun Mon Tue Wed Thu Fri Sat Mg/wk  2/19 2.0 At goal, no change  5 5 5 5 5 5 5 35  2/4 2.7 At goal, no change  5 5 5 5 5 5 5 35  1/20 2.7 At goal, no change  5 5 5 5 5 5 5 35  1/11 4.5 Above goal (Cymbalta) 5 0/5 0/5 5 5 5 5 35  12/28 2.0 At goal, no change  7.5 5 5 5 5 5 7.5 40  11/24 2.4 At goal, no change  7.5 5 5 5 5 5 7.5 40  10/30 2.5 At goal, no change  7.5 5 5 5 5 5 7.5 40  10/2 2.2 At goal, no change  7.5 5 5 5 5 5 7.5 40  9/3 2.4 At goal, no change  7.5 5 5 5 5 5 7.5 40  8/3 2.9 At goal, no change  7.5 5 5 5 5 5 7.5 40  7/6 2.7 At goal, no change  7.5 5 5 5 5 5 7.5 40  6/8 2.8 At goal, no change  7.5 5 5 5 5 5 7.5 40  5/11 2.9 At goal, no change  7.5 5 5 5 5 5 7.5 40  3/30 3.0 At goal, no change  7.5 5 5 5 5 5 7.5 40  2/26 2.3 At goal, no change  7.5 5 5 5 5 5 7.5 40  2/5 3.3 Above goal, decrease  7.5 5 5 5 5 5 7.5 40  1/8 2.9 At goal, no change  7.5 7.5 5 5 5 5 7.5 42.5  12/18 3.4 Above goal, reduce x 1 7.5 7.5 5 2.5/5 5 5 7.5 42.5  11/22 2.5 At goal, no change  7.5 7.5 5 5 5 5 7.5 42.5  10/25 2.5 At goal, no change  7.5 7.5 5 5 5 5 7.5 42.5  9/27 2.9 At goal, no change  7.5 7.5 5 5 5 5 7.5 42.5   8/30 2.8 At goal, no change  7.5 7.5 5 5 5 5 7.5 42.5  8/15 3.1 Above goal, continue  7.5 7.5 5 5 5 5 7.5 42.5  8/7 1.8 Below goal (held x 10 d) 7.5 7.5 5 5 5 5 7.5 42.5  7/17 2.6 At goal, no change  7.5 7.5 5 5 5 5 7.5 42.5  6/13 2.2 At goal, no change  7.5 7.5 5 5 5 5 7.5 42.5  5/16 2.3 At goal, no change  7.5 7.5 5 5 5 5 7.5 42.5  4/24 2.9 At goal, no change  7.5 7.5 5 5 5 5 7.5 42.5  4/10 3.6 Above goal, hold x 1  7.5 7.5 5 0/5 5 5 7.5 42.5   3/27 3.6 Above goal, decrease  7.5 7.5 5 5 5 5 7.5 42.5  3/13 3.0 At goal, no change   7.5 7.5 7.5 5 5 7.5 7.5 47.5  2/27 2.5 At goal, no change  7.5 7.5 7.5 5 5 7.5 7.5 47.5  2/13 3.1 Above goal, continue  7.5 7.5 7.5 5 5 7.5 7.5 47.5  1/7 2.4 At goal, no change  7.5 7.5 7.5 5 5 7.5 7.5 47.5  12/10 2.5 At goal, no change  7.5 7.5 7.5 5 5 7.5 7.5 47.5  11/12 2.8 At goal, no change  7.5 7.5 7.5 5 5 7.5 7.5 47.5  10/15 2.1 At goal, no change  7.5 7.5 7.5 5 5 7.5 7.5 47.5  9/17 2.6 At goal, no change  7.5 7.5 7.5 5 5 7.5 7.5 47.5  8/17 2.8 At goal, no change  7.5 7.5 7.5 5 5 7.5 7.5 47.5   7/19 2.9 At goal, no change  7.5 7.5 7.5 5 5 7.5 7.5 47.5  6/22 2.6 At goal, no change  7.5 7.5 7.5 5 5 7.5 7.5 47.5  6/1 2.1 At goal, no change  7.5 7.5 7.5 5 5 7.5 7.5 47.5  5/11 2.2 At goal, no change  7.5 7.5 7.5 5 5 7.5 7.5 47.5    Patient History:  Recent hospitalizations/HC visits -12/28 Dr. Alivia Hodges (PCP office): prescribed Cymbalta  -10/1 Dr. Khushbu Leong: no med changes    Recent medication changes 12/28 Cymbalta 30 mg daily per PCP (may increase INR); stopped taking on 1/30; resumed ~2/4   Medications taken regularly that may interact with warfarin or alter INR ASA 81 mg   Warfarin dose taken as prescribed Yes  Usually compliant   Does not use pillbox  Patient has been taking warfarin since re-do AVR in May, 2017   Signs/symptoms of bleeding No h/o major bleeding   Vitamin K intake Normally has ~0 servings of green, leafy vegetables per week   Recent vomiting/diarrhea/fever, changes in weight or activity level None reported   Tobacco or alcohol use -No recent changes in smoking as of 11/24/20 (~3/4 PPD)  -Patient cut back from 2 PPD to 3/4 PPD in February (2019) when he moved in with his daughter. Decrease in smoking typically increases INR gradually.   -Patient denies any alcohol or illicit drug use   Upcoming surgeries or procedures None reported  Back stimulator implant canceled due to cost     Assessment/Plan:  Patient's INR was therapeutic today (2) for third consecutive visit after decreasing warfarin dose on 1/11 due to interaction with Cymbalta. Prior to starting Cymbalta on 12/28, patient's INR had been therapeutic on warfarin 40 mg/week for almost a year. He denies any recent warfarin dosing errors, diet changes, or bleeding. He did report that he hasn't been feeling that well since starting Cymbalta and endorses dry mouth, dizziness, and blurred vision. He does plan to continue Cymbalta for now, despite these side effects. Patient was instructed to continue warfarin 5 mg daily. Repeat INR in 2 weeks. Patient was reminded to maintain consistent vitamin K intake and call with any bleeding, medication changes, or fever/vomiting/diarrhea. Patient understands dosing directions and information discussed. Dosing schedule and follow up appointment given to patient. Progress note routed to referring physician's office. Patient acknowledges working in consult agreement with pharmacist as referred by his/her physician. Next INR Check:  3/8    Please call [facility] at [phone] with any questions. Thanks!   Fredrick Carls, PharmD  PGY1 Pharmacy Resident   Wireless: [phone]  2/19/2021 8:56 AM    CLINICAL PHARMACY CONSULT: MED RECONCILIATION/REVIEW ADDENDUM    For Pharmacy Admin Tracking Only    PHSO: Yes  Total # of Interventions Recommended: 0  - Maintenance Safety Lab Monitoring #: 1  Total Interventions Accepted: 0  Time Spent (min): 15

## 2021-03-08 ENCOUNTER — ANTI-COAG VISIT (OUTPATIENT)
Dept: PHARMACY | Age: 56
End: 2021-03-08
Payer: MEDICARE

## 2021-03-08 LAB — INTERNATIONAL NORMALIZATION RATIO, POC: 3

## 2021-03-08 PROCEDURE — 99211 OFF/OP EST MAY X REQ PHY/QHP: CPT | Performed by: PHARMACIST

## 2021-03-08 PROCEDURE — 85610 PROTHROMBIN TIME: CPT | Performed by: PHARMACIST

## 2021-03-08 NOTE — PROGRESS NOTES
ANTICOAGULATION SERVICE    Sarah Yarbrough is a 60 y.o. male with PMHx significant for AVR (re-do in May, 2017), CAD s/p CABG (x3 in May, 2017), pA-fib, HLD, HTN, testicular CA who presents to clinic on 3/8/2021 for anticoagulation monitoring and adjustment.     Anticoagulation Indication(s):  Heart Valve Replacement (bovine AVR), A-fib  Referring Physician:  Dr. Johnie Burks  Goal INR Range:  2-3  Duration of Anticoagulation Therapy:  TBD  Time of day dose taken:  PM  Product patient has at home:  warfarin 5 mg (peach)    [id number] Score for Atrial Fibrillation Stroke Risk   Risk   Factors  Component Value   C CHF No 0   H HTN Yes 1   A2 Age >= 75 No,  (56 y.o.) 0   D DM No 0   S2 Prior Stroke/TIA No 0   V Vascular Disease Yes 1   A Age 74-69 No,  (56 y.o.) 0   Sc Sex male 0    [id number]  Score  2   Score last updated 5/30/19 1:51 PM      Lab Results   Component Value Date    RBC 3.70 (L) 07/30/2019    HGB 12.4 (L) 07/30/2019    HCT 36.5 (L) 07/30/2019    MCV 98.7 07/30/2019    MCH 33.5 07/30/2019    MPV 7.6 07/30/2019    RDW 15.6 (H) 07/30/2019     07/30/2019     INR Summary                            Warfarin regimen (mg)  Date INR   A/P    Sun Mon Tue Wed Thu Fri Sat Mg/wk  3/8 3.0 At goal, no change  5 5 5 5 5 5 5 35  2/19 2.0 At goal, no change  5 5 5 5 5 5 5 35  2/4 2.7 At goal, no change  5 5 5 5 5 5 5 35  1/20 2.7 At goal, no change  5 5 5 5 5 5 5 35  1/11 4.5 Above goal (Cymbalta) 5 0/5 0/5 5 5 5 5 35  12/28 2.0 At goal, no change  7.5 5 5 5 5 5 7.5 40  11/24 2.4 At goal, no change  7.5 5 5 5 5 5 7.5 40  10/30 2.5 At goal, no change  7.5 5 5 5 5 5 7.5 40  10/2 2.2 At goal, no change  7.5 5 5 5 5 5 7.5 40  9/3 2.4 At goal, no change  7.5 5 5 5 5 5 7.5 40  8/3 2.9 At goal, no change  7.5 5 5 5 5 5 7.5 40  7/6 2.7 At goal, no change  7.5 5 5 5 5 5 7.5 40  6/8 2.8 At goal, no change  7.5 5 5 5 5 5 7.5 40  5/11 2.9 At goal, no change  7.5 5 5 5 5 5 7.5 40  3/30 3.0 At goal, no change  7.5 5 5 5 5 5 7.5 40 2/26 2.3 At goal, no change  7.5 5 5 5 5 5 7.5 40  2/5 3.3 Above goal, decrease  7.5 5 5 5 5 5 7.5 40  1/8 2.9 At goal, no change  7.5 7.5 5 5 5 5 7.5 42.5  12/18 3.4 Above goal, reduce x 1 7.5 7.5 5 2.5/5 5 5 7.5 42.5  11/22 2.5 At goal, no change  7.5 7.5 5 5 5 5 7.5 42.5  10/25 2.5 At goal, no change  7.5 7.5 5 5 5 5 7.5 42.5  9/27 2.9 At goal, no change  7.5 7.5 5 5 5 5 7.5 42.5   8/30 2.8 At goal, no change  7.5 7.5 5 5 5 5 7.5 42.5  8/15 3.1 Above goal, continue  7.5 7.5 5 5 5 5 7.5 42.5  8/7 1.8 Below goal (held x 10 d) 7.5 7.5 5 5 5 5 7.5 42.5  7/17 2.6 At goal, no change  7.5 7.5 5 5 5 5 7.5 42.5  6/13 2.2 At goal, no change  7.5 7.5 5 5 5 5 7.5 42.5  5/16 2.3 At goal, no change  7.5 7.5 5 5 5 5 7.5 42.5  4/24 2.9 At goal, no change  7.5 7.5 5 5 5 5 7.5 42.5  4/10 3.6 Above goal, hold x 1  7.5 7.5 5 0/5 5 5 7.5 42.5   3/27 3.6 Above goal, decrease  7.5 7.5 5 5 5 5 7.5 42.5  3/13 3.0 At goal, no change   7.5 7.5 7.5 5 5 7.5 7.5 47.5  2/27 2.5 At goal, no change  7.5 7.5 7.5 5 5 7.5 7.5 47.5  2/13 3.1 Above goal, continue  7.5 7.5 7.5 5 5 7.5 7.5 47.5  1/7 2.4 At goal, no change  7.5 7.5 7.5 5 5 7.5 7.5 47.5  12/10 2.5 At goal, no change  7.5 7.5 7.5 5 5 7.5 7.5 47.5  11/12 2.8 At goal, no change  7.5 7.5 7.5 5 5 7.5 7.5 47.5  10/15 2.1 At goal, no change  7.5 7.5 7.5 5 5 7.5 7.5 47.5  9/17 2.6 At goal, no change  7.5 7.5 7.5 5 5 7.5 7.5 47.5  8/17 2.8 At goal, no change  7.5 7.5 7.5 5 5 7.5 7.5 47.5   7/19 2.9 At goal, no change  7.5 7.5 7.5 5 5 7.5 7.5 47.5  6/22 2.6 At goal, no change  7.5 7.5 7.5 5 5 7.5 7.5 47.5  6/1 2.1 At goal, no change  7.5 7.5 7.5 5 5 7.5 7.5 47.5  5/11 2.2 At goal, no change  7.5 7.5 7.5 5 5 7.5 7.5 47.5    Patient History:  Recent hospitalizations/HC visits -12/28 Dr. Judith Parker (PCP office): prescribed Cymbalta  -10/1 Dr. Fontaine Bias: no med changes    Recent medication changes 12/28 Cymbalta 30 mg daily per PCP (may increase INR); stopped taking on 1/30; resumed ~2/4   Medications taken regularly that may interact with warfarin or alter INR ASA 81 mg   Warfarin dose taken as prescribed Yes  Usually compliant   Does not use pillbox  Patient has been taking warfarin since re-do AVR in May, 2017   Signs/symptoms of bleeding No h/o major bleeding   Vitamin K intake Normally has ~0 servings of green, leafy vegetables per week   Recent vomiting/diarrhea/fever, changes in weight or activity level None reported   Tobacco or alcohol use -No recent changes in smoking as of 11/24/20 (~3/4 PPD)  -Patient cut back from 2 PPD to 3/4 PPD in February (2019) when he moved in with his daughter. Decrease in smoking typically increases INR gradually.   -Patient denies any alcohol or illicit drug use   Upcoming surgeries or procedures None reported  Back stimulator implant canceled due to cost     Assessment/Plan:  Patient's INR was therapeutic today (3.0) for fourth consecutive visit after decreasing warfarin dose on 1/11 due to interaction with Cymbalta. Prior to starting Cymbalta on 12/28, patient's INR had been therapeutic on warfarin 40 mg/week for almost a year. He denies any recent warfarin dosing errors, diet changes, or bleeding. He did report that he hasn't been feeling that well since starting Cymbalta and endorses dry mouth, dizziness, and blurred vision. He does plan to continue Cymbalta for now, despite these side effects. Patient was instructed to continue warfarin 5 mg daily. Repeat INR in 4 weeks. Patient was reminded to maintain consistent vitamin K intake and call with any bleeding, medication changes, or fever/vomiting/diarrhea. Patient understands dosing directions and information discussed. Dosing schedule and follow up appointment given to patient. Progress note routed to referring physician's office. Patient acknowledges working in consult agreement with pharmacist as referred by his/her physician.      Next INR Check:  4/5    Please call [facility] at [phone] with any questions. Thanks!   Emerita Silva, PharmD, [address] [place] [address] Street: [phone]  [address] [place] [address] wireless: [phone]  3/8/2021 8:52 AM    CLINICAL PHARMACY CONSULT: MED RECONCILIATION/REVIEW ADDENDUM    For Pharmacy Admin Tracking Only    PHSO: Yes  Total # of Interventions Recommended: 0  - Maintenance Safety Lab Monitoring #: 1  Total Interventions Accepted: 0  Time Spent (min): 15

## 2021-03-13 ENCOUNTER — IMMUNIZATION (OUTPATIENT)
Dept: FAMILY MEDICINE CLINIC | Age: 56
End: 2021-03-13
Payer: MEDICARE

## 2021-03-13 PROCEDURE — 0031A PR IMM ADMN SARSCOV2 AD26 5X1010VP/0.5 ML 1 DOSE: CPT | Performed by: NURSE PRACTITIONER

## 2021-03-13 PROCEDURE — 91303 COVID-19, J&J VACCINE, PF, 0.5 ML DOSE: CPT | Performed by: NURSE PRACTITIONER

## 2021-03-16 DIAGNOSIS — F32.89 OTHER DEPRESSION: ICD-10-CM

## 2021-03-16 DIAGNOSIS — M51.26 LUMBAR HERNIATED DISC: Chronic | ICD-10-CM

## 2021-03-16 DIAGNOSIS — M17.11 OSTEOARTHRITIS OF RIGHT KNEE, UNSPECIFIED OSTEOARTHRITIS TYPE: ICD-10-CM

## 2021-03-16 DIAGNOSIS — M25.50 ARTHRALGIA, UNSPECIFIED JOINT: ICD-10-CM

## 2021-03-22 RX ORDER — DULOXETIN HYDROCHLORIDE 30 MG/1
CAPSULE, DELAYED RELEASE ORAL
Qty: 30 CAPSULE | Refills: 0 | Status: SHIPPED | OUTPATIENT
Start: 2021-03-22 | End: 2021-04-16

## 2021-04-05 ENCOUNTER — ANTI-COAG VISIT (OUTPATIENT)
Dept: PHARMACY | Age: 56
End: 2021-04-05
Payer: MEDICARE

## 2021-04-05 DIAGNOSIS — I48.0 PAROXYSMAL ATRIAL FIBRILLATION (HCC): ICD-10-CM

## 2021-04-05 DIAGNOSIS — Z95.2 S/P AORTIC VALVE REPLACEMENT: ICD-10-CM

## 2021-04-05 LAB — INTERNATIONAL NORMALIZATION RATIO, POC: 3.6

## 2021-04-05 PROCEDURE — 85610 PROTHROMBIN TIME: CPT

## 2021-04-05 PROCEDURE — 99212 OFFICE O/P EST SF 10 MIN: CPT

## 2021-04-05 NOTE — PROGRESS NOTES
ANTICOAGULATION SERVICE    Maria Fernanda Vega is a 60 y.o. male with PMHx significant for AVR (re-do in May, 2017), CAD s/p CABG (x3 in May, 2017), pA-fib, HLD, HTN, testicular CA who presents to clinic on 4/5/2021 for anticoagulation monitoring and adjustment.     Anticoagulation Indication(s):  Heart Valve Replacement (bovine AVR), A-fib  Referring Physician:  Dr. Latasha Krishna  Goal INR Range:  2-3  Duration of Anticoagulation Therapy:  TBD  Time of day dose taken:  PM  Product patient has at home:  warfarin 5 mg (peach)    [id number] Score for Atrial Fibrillation Stroke Risk   Risk   Factors  Component Value   C CHF No 0   H HTN Yes 1   A2 Age >= 75 No,  (56 y.o.) 0   D DM No 0   S2 Prior Stroke/TIA No 0   V Vascular Disease Yes 1   A Age 74-69 No,  (56 y.o.) 0   Sc Sex male 0    [id number]  Score  2   Score last updated 5/30/19 1:51 PM      Lab Results   Component Value Date    RBC 3.70 (L) 07/30/2019    HGB 12.4 (L) 07/30/2019    HCT 36.5 (L) 07/30/2019    MCV 98.7 07/30/2019    MCH 33.5 07/30/2019    MPV 7.6 07/30/2019    RDW 15.6 (H) 07/30/2019     07/30/2019     INR Summary                            Warfarin regimen (mg)  Date INR   A/P    Sun Mon Tue Wed Thu Fri Sat Mg/wk  4/5 3.6 Above goal, hold x 1  5 0/5 5 5 5 5 5 35   3/8 3.0 At goal, no change  5 5 5 5 5 5 5 35  2/19 2.0 At goal, no change  5 5 5 5 5 5 5 35  2/4 2.7 At goal, no change  5 5 5 5 5 5 5 35  1/20 2.7 At goal, no change  5 5 5 5 5 5 5 35  1/11 4.5 Above goal (Cymbalta) 5 0/5 0/5 5 5 5 5 35  12/28 2.0 At goal, no change  7.5 5 5 5 5 5 7.5 40  11/24 2.4 At goal, no change  7.5 5 5 5 5 5 7.5 40  10/30 2.5 At goal, no change  7.5 5 5 5 5 5 7.5 40  10/2 2.2 At goal, no change  7.5 5 5 5 5 5 7.5 40  9/3 2.4 At goal, no change  7.5 5 5 5 5 5 7.5 40  8/3 2.9 At goal, no change  7.5 5 5 5 5 5 7.5 40  7/6 2.7 At goal, no change  7.5 5 5 5 5 5 7.5 40  6/8 2.8 At goal, no change  7.5 5 5 5 5 5 7.5 40  5/11 2.9 At goal, no change  7.5 5 5 5 5 5 7.5 40 3/30 3.0 At goal, no change  7.5 5 5 5 5 5 7.5 40  2/26 2.3 At goal, no change  7.5 5 5 5 5 5 7.5 40  2/5 3.3 Above goal, decrease  7.5 5 5 5 5 5 7.5 40  1/8 2.9 At goal, no change  7.5 7.5 5 5 5 5 7.5 42.5  12/18 3.4 Above goal, reduce x 1 7.5 7.5 5 2.5/5 5 5 7.5 42.5  11/22 2.5 At goal, no change  7.5 7.5 5 5 5 5 7.5 42.5  10/25 2.5 At goal, no change  7.5 7.5 5 5 5 5 7.5 42.5  9/27 2.9 At goal, no change  7.5 7.5 5 5 5 5 7.5 42.5   8/30 2.8 At goal, no change  7.5 7.5 5 5 5 5 7.5 42.5  8/15 3.1 Above goal, continue  7.5 7.5 5 5 5 5 7.5 42.5  8/7 1.8 Below goal (held x 10 d) 7.5 7.5 5 5 5 5 7.5 42.5  7/17 2.6 At goal, no change  7.5 7.5 5 5 5 5 7.5 42.5  6/13 2.2 At goal, no change  7.5 7.5 5 5 5 5 7.5 42.5  5/16 2.3 At goal, no change  7.5 7.5 5 5 5 5 7.5 42.5  4/24 2.9 At goal, no change  7.5 7.5 5 5 5 5 7.5 42.5  4/10 3.6 Above goal, hold x 1  7.5 7.5 5 0/5 5 5 7.5 42.5   3/27 3.6 Above goal, decrease  7.5 7.5 5 5 5 5 7.5 42.5  3/13 3.0 At goal, no change   7.5 7.5 7.5 5 5 7.5 7.5 47.5  2/27 2.5 At goal, no change  7.5 7.5 7.5 5 5 7.5 7.5 47.5  2/13 3.1 Above goal, continue  7.5 7.5 7.5 5 5 7.5 7.5 47.5  1/7 2.4 At goal, no change  7.5 7.5 7.5 5 5 7.5 7.5 47.5  12/10 2.5 At goal, no change  7.5 7.5 7.5 5 5 7.5 7.5 47.5  11/12 2.8 At goal, no change  7.5 7.5 7.5 5 5 7.5 7.5 47.5  10/15 2.1 At goal, no change  7.5 7.5 7.5 5 5 7.5 7.5 47.5  9/17 2.6 At goal, no change  7.5 7.5 7.5 5 5 7.5 7.5 47.5  8/17 2.8 At goal, no change  7.5 7.5 7.5 5 5 7.5 7.5 47.5   7/19 2.9 At goal, no change  7.5 7.5 7.5 5 5 7.5 7.5 47.5  6/22 2.6 At goal, no change  7.5 7.5 7.5 5 5 7.5 7.5 47.5  6/1 2.1 At goal, no change  7.5 7.5 7.5 5 5 7.5 7.5 47.5  5/11 2.2 At goal, no change  7.5 7.5 7.5 5 5 7.5 7.5 47.5    Patient History:  Recent hospitalizations/HC visits -12/28 Dr. Pamla Closs (PCP office): prescribed Cymbalta  -10/1 Dr. Thomas Lesser: no med changes    Recent medication changes 12/28 Cymbalta 30 mg daily per PCP (may increase INR); stopped taking on 1/30; resumed ~2/4   Medications taken regularly that may interact with warfarin or alter INR ASA 81 mg   Warfarin dose taken as prescribed Yes  Usually compliant   Does not use pillbox  Patient has been taking warfarin since re-do AVR in May, 2017   Signs/symptoms of bleeding No h/o major bleeding   Vitamin K intake Normally has ~0 servings of green, leafy vegetables per week   Recent vomiting/diarrhea/fever, changes in weight or activity level None reported   Tobacco or alcohol use -No recent changes in smoking as of 11/24/20 (~3/4 PPD)  -Patient cut back from 2 PPD to 3/4 PPD in February (2019) when he moved in with his daughter. Decrease in smoking typically increases INR gradually.   -Patient denies any alcohol or illicit drug use   Upcoming surgeries or procedures None reported  Back stimulator implant canceled due to cost     Assessment/Plan:  Patient's INR was supratherapeutic today (3.6), unclear etiology. Warfarin dose was reduced to 5 mg daily on 1/11 due to interaction with Cymbalta, and INR has been therapeutic since then. Prior to starting Cymbalta on 12/28, patient's INR had been therapeutic on warfarin 40 mg/week for almost a year. He denies any warfarin dosing errors, change in vitamin K intake, medication changes, illness, or bleeding since last visit. He did run out of the Cymbalta for several days and recently restarted it, so potentially that caused INR to increase? He plans to continue Cymbalta for now, but states that after taking it for 3 months, he hasn't noticed improvement in his mood and continues to have bothersome side effects. He may ask his PCP to switch to something else at next visit. Patient was instructed to hold warfarin today, then continue warfarin 5 mg daily. I am hesitant to decrease warfarin dose today as patient may stop Cymbalta soon. Repeat INR in 2 weeks.   Patient was reminded to maintain consistent vitamin K intake and call with any bleeding, medication changes, or fever/vomiting/diarrhea. Patient understands dosing directions and information discussed. Dosing schedule and follow up appointment given to patient. Progress note routed to referring physician's office. Patient acknowledges working in consult agreement with pharmacist as referred by his/her physician. Next INR Check:  4/23    Please call [facility] at [phone] with any questions. Thanks! Zak Orelalna.  Karin EdwardsD, [facility]  [facility]  Ph: [phone]  4/5/2021 9:22 AM    CLINICAL PHARMACY CONSULT: MED RECONCILIATION/REVIEW ADDENDUM    For Pharmacy Admin Tracking Only    PHSO: Yes  Total # of Interventions Recommended: 1  - Decreased Dose #: 1  - Maintenance Safety Lab Monitoring #: 1  Total Interventions Accepted: 1  Time Spent (min): 15

## 2021-04-09 ENCOUNTER — OFFICE VISIT (OUTPATIENT)
Dept: CARDIOLOGY CLINIC | Age: 56
End: 2021-04-09
Payer: MEDICARE

## 2021-04-09 VITALS
HEART RATE: 78 BPM | BODY MASS INDEX: 36.7 KG/M2 | DIASTOLIC BLOOD PRESSURE: 80 MMHG | HEIGHT: 72 IN | OXYGEN SATURATION: 97 % | WEIGHT: 271 LBS | SYSTOLIC BLOOD PRESSURE: 130 MMHG

## 2021-04-09 DIAGNOSIS — I48.0 PAROXYSMAL ATRIAL FIBRILLATION (HCC): ICD-10-CM

## 2021-04-09 DIAGNOSIS — Z95.1 HX OF CABG: ICD-10-CM

## 2021-04-09 DIAGNOSIS — I49.3 PVC (PREMATURE VENTRICULAR CONTRACTION): ICD-10-CM

## 2021-04-09 DIAGNOSIS — Z95.2 S/P AVR (AORTIC VALVE REPLACEMENT): ICD-10-CM

## 2021-04-09 DIAGNOSIS — I10 ESSENTIAL HYPERTENSION: ICD-10-CM

## 2021-04-09 DIAGNOSIS — I25.10 CAD IN NATIVE ARTERY: Primary | ICD-10-CM

## 2021-04-09 PROCEDURE — 99213 OFFICE O/P EST LOW 20 MIN: CPT | Performed by: INTERNAL MEDICINE

## 2021-04-09 PROCEDURE — 3017F COLORECTAL CA SCREEN DOC REV: CPT | Performed by: INTERNAL MEDICINE

## 2021-04-09 PROCEDURE — 4004F PT TOBACCO SCREEN RCVD TLK: CPT | Performed by: INTERNAL MEDICINE

## 2021-04-09 PROCEDURE — G8427 DOCREV CUR MEDS BY ELIG CLIN: HCPCS | Performed by: INTERNAL MEDICINE

## 2021-04-09 PROCEDURE — G8417 CALC BMI ABV UP PARAM F/U: HCPCS | Performed by: INTERNAL MEDICINE

## 2021-04-09 NOTE — PROGRESS NOTES
Subjective:      Patient ID: Hector Chavez is a 60 y.o. male. HPI  Mr. Dennis Cohen is here today for follow up CAD/CABG/AVR/Afib/HTN. No complaints. Remains active. Only back issues, spinal stenosis. No exertional chest pain. Wt up. Still smoking. No exertional sx. No sob/cp. No pnd. No orthopnea. No tachycardia. No syncope. BP good at home. Rhythm stable.          Past Medical History:   Diagnosis Date    Abdominal hernia     s/p repair 2010    Aortic valve prosthesis present     CAD (coronary artery disease)     Chronic back pain greater than 3 months duration     Clostridium difficile diarrhea 10/17/2016    PCR    DDD (degenerative disc disease), lumbosacral 8/7/2013    Erectile dysfunction     GERD (gastroesophageal reflux disease)     Hyperlipidemia     Hypertension     Joint pain     Left testicular cancer ([facility] [address] 2002    s/p abdominal resection    Myalgia and myositis, unspecified 8/26/2013    Neuropathy     OA (osteoarthritis) of knee     bilateral    Obesity, Class I, BMI 30.0-34.9 (see actual BMI)     Prolonged emergence from general anesthesia     after CABG    S/P AVR 2005    tissue valve    S/P CABG x 2 2005    w/AVR & primary repair of dissected ascending aorta     Past Surgical History:   Procedure Laterality Date    AORTA SURGERY  08/27/2004    Dr. Delvis Reveles - primary repair of limited ascending aortic dissection    AORTIC VALVE REPLACEMENT  05/30/2017    Dr. Zak De Anda - redo w/25mm Santos Husky ThermaFix model 3300 bovine pericardial bioprosthesis    AORTIC VALVE SURGERY  08/27/2004    Dr. Delvis Reveles - 27mm Kelton-Castelan pericardial prosthesis     BACK SURGERY      L4-S1    CARDIAC CATHETERIZATION  05/09/2017    Dr. Jayy Monterroso  08/26/2004    Dr. Mimi Donovan Right 03/17/2015    Dr. Turner   10/10/2016    Dr. Mehrdad Nesbitt - w/laparascopic lysis of extensive adhesions, open transverse colon resection, excision of old abd mesh adhering to colon    CORONARY ANGIOPLASTY WITH STENT PLACEMENT  07/2014    CORONARY ARTERY BYPASS GRAFT  08/27/2004    Dr. Jennifer Marcano - x2 (VELEZ-LAD, SVG sequentially to ascending aorta & D1)    CORONARY ARTERY BYPASS GRAFT  05/30/2017    Dr. Reyes Kim - redo x3 (reverse AC SVG sequentially to D1, OM1 & PDA) w/explant of prior prosthetic valve & removal of embedded sternal wires x7    EMG  04/16/2012    FOOT SURGERY Left 01/24/2012    Dr. Bry Tejeda, DPM - hallux nail evulsion & exostectomy    HEMICOLECTOMY N/A 7/26/2019    ROBOTIC ASSISTED  LAPAROSCOPIC RIGHT COLECTOMY AND CHOLECYSTECTOMY performed by Katarzyna Macias MD at [address] [place] [address]  2010    multiple    LUMBAR DISCECTOMY  2012    L4-5    SHOULDER ARTHROSCOPY Right 11/21/2013    Dr. Elvin Weller - w/subacromial decompression, debridement of the SLAP tear, distal clavicle excision    SOFT TISSUE TUMOR RESECTION  2001    retroperitoneal mass    TESTICLE REMOVAL Left 2001    radical orchiectomy    TRANSESOPHAGEAL ECHOCARDIOGRAM  05/30/2017    during redo CABG & AVR    TUNNELED VENOUS PORT PLACEMENT  2002    portacath      Social History     Socioeconomic History    Marital status:       Spouse name: Not on file    Number of children: Not on file    Years of education: Not on file    Highest education level: Not on file   Occupational History    Not on file   Social Needs    Financial resource strain: Not on file    Food insecurity     Worry: Not on file     Inability: Not on file    Transportation needs     Medical: Not on file     Non-medical: Not on file   Tobacco Use    Smoking status: Current Some Day Smoker     Packs/day: 0.75     Years: 40.00     Pack years: 30.00     Types: Cigarettes     Start date: 8/8/1947     Last attempt to quit: 5/20/2017     Years since quitting: 3.8    Smokeless tobacco: Never Used    Tobacco comment: stopped 5-18   Substance and prosthetic valve sounds normal .  Exam reveals no gallop. 1/6 syst murmur heard. Pulmonary/Chest: Effort normal and breath sounds normal. No respiratory distress. He has min wheezes. He has no rales. Abdominal: Soft. Bowel sounds are normal. There is no tenderness. Musculoskeletal: Normal range of motion. He exhibits no edema. Neurological: He is alert and oriented to person, place, and time. Skin: Skin is warm and dry. Psychiatric: He has a normal mood and affect. His behavior is normal. Thought content normal.       Assessment:         Diagnosis Orders   1. CAD in native artery     2. Hx of CABG     3. S/P AVR (aortic valve replacement)     4. Paroxysmal atrial fibrillation (HCC)     5. Essential hypertension     6. PVC (premature ventricular contraction)           Plan:      CV stable. Rhythm stable. No angina. BP good. Compensated. Wt down. Reviewed previous records and testing including echo 5/17 and cath 5/17. Continues to smoke. Encouraged to stop. Encouraged diet, exercise and wt. No changes. Continue to monitor. Lipids per PCP. Follow up 3 months. Echo.

## 2021-04-15 DIAGNOSIS — M25.50 ARTHRALGIA, UNSPECIFIED JOINT: ICD-10-CM

## 2021-04-15 DIAGNOSIS — F32.89 OTHER DEPRESSION: ICD-10-CM

## 2021-04-15 DIAGNOSIS — I10 ESSENTIAL HYPERTENSION: Chronic | ICD-10-CM

## 2021-04-15 DIAGNOSIS — M17.11 OSTEOARTHRITIS OF RIGHT KNEE, UNSPECIFIED OSTEOARTHRITIS TYPE: ICD-10-CM

## 2021-04-15 DIAGNOSIS — M51.26 LUMBAR HERNIATED DISC: Chronic | ICD-10-CM

## 2021-04-16 DIAGNOSIS — I10 ESSENTIAL HYPERTENSION: Chronic | ICD-10-CM

## 2021-04-16 DIAGNOSIS — Z95.2 S/P AORTIC VALVE REPLACEMENT: Chronic | ICD-10-CM

## 2021-04-16 DIAGNOSIS — I25.10 CORONARY ARTERY DISEASE INVOLVING NATIVE CORONARY ARTERY OF NATIVE HEART WITHOUT ANGINA PECTORIS: ICD-10-CM

## 2021-04-16 RX ORDER — METOPROLOL TARTRATE 50 MG/1
TABLET, FILM COATED ORAL
Qty: 180 TABLET | Refills: 1 | Status: SHIPPED | OUTPATIENT
Start: 2021-04-16 | End: 2021-11-05

## 2021-04-19 RX ORDER — LISINOPRIL 10 MG/1
TABLET ORAL
Qty: 180 TABLET | Refills: 2 | Status: SHIPPED | OUTPATIENT
Start: 2021-04-19 | End: 2022-01-04 | Stop reason: SDUPTHER

## 2021-04-19 RX ORDER — FAMOTIDINE 20 MG/1
TABLET, FILM COATED ORAL
Qty: 180 TABLET | Refills: 2 | Status: SHIPPED | OUTPATIENT
Start: 2021-04-19 | End: 2022-01-04 | Stop reason: SDUPTHER

## 2021-04-19 RX ORDER — DULOXETIN HYDROCHLORIDE 30 MG/1
CAPSULE, DELAYED RELEASE ORAL
Qty: 30 CAPSULE | Refills: 2 | Status: SHIPPED
Start: 2021-04-19 | End: 2021-06-18 | Stop reason: SINTOL

## 2021-04-23 ENCOUNTER — ANTI-COAG VISIT (OUTPATIENT)
Dept: PHARMACY | Age: 56
End: 2021-04-23
Payer: MEDICARE

## 2021-04-23 DIAGNOSIS — Z95.2 S/P AORTIC VALVE REPLACEMENT: ICD-10-CM

## 2021-04-23 DIAGNOSIS — I48.0 PAROXYSMAL ATRIAL FIBRILLATION (HCC): ICD-10-CM

## 2021-04-23 LAB — INTERNATIONAL NORMALIZATION RATIO, POC: 2.7

## 2021-04-23 PROCEDURE — 99211 OFF/OP EST MAY X REQ PHY/QHP: CPT

## 2021-04-23 PROCEDURE — 85610 PROTHROMBIN TIME: CPT

## 2021-05-24 ENCOUNTER — ANTI-COAG VISIT (OUTPATIENT)
Dept: PHARMACY | Age: 56
End: 2021-05-24
Payer: MEDICARE

## 2021-05-24 DIAGNOSIS — I48.0 PAROXYSMAL ATRIAL FIBRILLATION (HCC): ICD-10-CM

## 2021-05-24 DIAGNOSIS — Z95.2 S/P AORTIC VALVE REPLACEMENT: Primary | ICD-10-CM

## 2021-05-24 LAB — INR BLD: 3.6

## 2021-05-24 PROCEDURE — 99212 OFFICE O/P EST SF 10 MIN: CPT

## 2021-05-24 PROCEDURE — 85610 PROTHROMBIN TIME: CPT

## 2021-05-24 NOTE — PROGRESS NOTES
ANTICOAGULATION SERVICE    Chapin Swenson is a 60 y.o. male with PMHx significant for AVR (re-do in May, 2017), CAD s/p CABG (x3 in May, 2017), pA-fib, HLD, HTN, testicular CA who presents to clinic on 5/24/2021 for anticoagulation monitoring and adjustment.     Anticoagulation Indication(s):  Heart Valve Replacement (bovine AVR), A-fib  Referring Physician:  Dr. Rosa King  Goal INR Range:  2-3  Duration of Anticoagulation Therapy:  TBD  Time of day dose taken:  PM  Product patient has at home:  warfarin 5 mg (peach)    [id number] Score for Atrial Fibrillation Stroke Risk   Risk   Factors  Component Value   C CHF No 0   H HTN Yes 1   A2 Age >= 75 No,  (56 y.o.) 0   D DM No 0   S2 Prior Stroke/TIA No 0   V Vascular Disease Yes 1   A Age 74-69 No,  (56 y.o.) 0   Sc Sex male 0    [id number]  Score  2   Score last updated 5/30/19 1:51 PM      Lab Results   Component Value Date    RBC 3.70 (L) 07/30/2019    HGB 12.4 (L) 07/30/2019    HCT 36.5 (L) 07/30/2019    MCV 98.7 07/30/2019    MCH 33.5 07/30/2019    MPV 7.6 07/30/2019    RDW 15.6 (H) 07/30/2019     07/30/2019     INR Summary                            Warfarin regimen (mg)  Date INR   A/P    Sun Mon Tue Wed Thu Fri Sat Mg/wk  5/24 3.6 Above goal, hold x 1  5 0/5 5 5 5 5 5 35  4/23 2.7 At goal, no change  5 5 5 5 5 5 5 35  4/5 3.6 Above goal, hold x 1  5 0/5 5 5 5 5 5 35   3/8 3.0 At goal, no change  5 5 5 5 5 5 5 35  2/19 2.0 At goal, no change  5 5 5 5 5 5 5 35  2/4 2.7 At goal, no change  5 5 5 5 5 5 5 35  1/20 2.7 At goal, no change  5 5 5 5 5 5 5 35  1/11 4.5 Above goal (Cymbalta) 5 0/5 0/5 5 5 5 5 35  12/28 2.0 At goal, no change  7.5 5 5 5 5 5 7.5 40  11/24 2.4 At goal, no change  7.5 5 5 5 5 5 7.5 40  10/30 2.5 At goal, no change  7.5 5 5 5 5 5 7.5 40  10/2 2.2 At goal, no change  7.5 5 5 5 5 5 7.5 40  9/3 2.4 At goal, no change  7.5 5 5 5 5 5 7.5 40  8/3 2.9 At goal, no change  7.5 5 5 5 5 5 7.5 40  7/6 2.7 At goal, no change  7.5 5 5 5 5 5 7.5 40  6/8 2.8 At goal, no change  7.5 5 5 5 5 5 7.5 40  5/11 2.9 At goal, no change  7.5 5 5 5 5 5 7.5 40  3/30 3.0 At goal, no change  7.5 5 5 5 5 5 7.5 40  2/26 2.3 At goal, no change  7.5 5 5 5 5 5 7.5 40  2/5 3.3 Above goal, decrease  7.5 5 5 5 5 5 7.5 40  1/8 2.9 At goal, no change  7.5 7.5 5 5 5 5 7.5 42.5    Patient History:  Recent hospitalizations/HC visits -4/9 Dr. Thomas Lesser: no med changes  -12/28 Dr. Pamla Closs (PCP office): prescribed Cymbalta    Recent medication changes 12/28 Cymbalta 30 mg daily per PCP (may increase INR); stopped taking on 1/30; resumed ~2/4   Medications taken regularly that may interact with warfarin or alter INR ASA 81 mg   Warfarin dose taken as prescribed Yes  Usually compliant   Does not use pillbox  Patient has been taking warfarin since re-do AVR in May, 2017   Signs/symptoms of bleeding No h/o major bleeding   Vitamin K intake Normally has ~0 servings of green, leafy vegetables per week   Recent vomiting/diarrhea/fever, changes in weight or activity level None reported   Tobacco or alcohol use -No recent changes in smoking as of 11/24/20 (~3/4 PPD)  -Patient cut back from 2 PPD to 3/4 PPD in February (2019) when he moved in with his daughter. Decrease in smoking typically increases INR gradually.   -Patient denies any alcohol or illicit drug use   Upcoming surgeries or procedures None reported  Back stimulator implant canceled due to cost     Assessment/Plan:  Patient's INR was supratherapeutic today (3.6), unknown etiology. He denies any warfarin dosing errors, change in vitamin K intake, medication changes, illness, change in smoking, change in physical activity, or bleeding since last visit. Patient was instructed to hold warfarin dose today, then continue warfarin 5 mg daily. Repeat INR in 2 weeks. Patient was reminded to maintain consistent vitamin K intake and call with any bleeding, medication changes, or fever/vomiting/diarrhea.     Warfarin dose was reduced to 5 mg daily on 1/11 due to interaction with Cymbalta, and INR has been mostly therapeutic since then. Prior to starting Cymbalta on , patient's INR had been therapeutic on warfarin 40 mg/week for almost a year. He plans to continue Cymbalta for now, but states that after taking it for a few months, he hasn't noticed improvement in his mood and continues to have bothersome side effects. He may ask his PCP to switch to something else at next visit. Patient understands dosing directions and information discussed. Dosing schedule and follow up appointment given to patient. Progress note routed to referring physician's office. Patient acknowledges working in consult agreement with pharmacist as referred by his/her physician. Next INR Check:      Please call [facility] at [phone] with any questions. Thanks!   [facility] PharmD Candidate     For Pharmacy Admin Tracking Only     Intervention Detail: Adherence Monitorin and Dose Adjustment: 1: reason: Therapy Optimization   Total # of Interventions Recommended: 1   Total # of Interventions Accepted: 1   Time Spent (min): 15

## 2021-06-14 ENCOUNTER — ANTI-COAG VISIT (OUTPATIENT)
Dept: PHARMACY | Age: 56
End: 2021-06-14
Payer: MEDICARE

## 2021-06-14 DIAGNOSIS — Z95.2 S/P AORTIC VALVE REPLACEMENT: Primary | ICD-10-CM

## 2021-06-14 DIAGNOSIS — I48.0 PAROXYSMAL ATRIAL FIBRILLATION (HCC): ICD-10-CM

## 2021-06-14 LAB — INTERNATIONAL NORMALIZATION RATIO, POC: 1.5

## 2021-06-14 PROCEDURE — 99211 OFF/OP EST MAY X REQ PHY/QHP: CPT

## 2021-06-14 PROCEDURE — 85610 PROTHROMBIN TIME: CPT

## 2021-06-14 NOTE — PROGRESS NOTES
ANTICOAGULATION SERVICE    Susan Roberts is a 60 y.o. male with PMHx significant for AVR (re-do in May, 2017), CAD s/p CABG (x3 in May, 2017), pA-fib, HLD, HTN, testicular CA who presents to clinic on 6/14/2021 for anticoagulation monitoring and adjustment.     Anticoagulation Indication(s):  Heart Valve Replacement (bovine AVR), A-fib  Referring Physician:  Dr. Kalpesh Castaneda  Goal INR Range:  2-3  Duration of Anticoagulation Therapy:  TBD  Time of day dose taken:  PM  Product patient has at home:  warfarin 5 mg (peach)    [id number] Score for Atrial Fibrillation Stroke Risk   Risk   Factors  Component Value   C CHF No 0   H HTN Yes 1   A2 Age >= 75 No,  (56 y.o.) 0   D DM No 0   S2 Prior Stroke/TIA No 0   V Vascular Disease Yes 1   A Age 74-69 No,  (56 y.o.) 0   Sc Sex male 0    [id number]  Score  2   Score last updated 5/30/19 1:51 PM      Lab Results   Component Value Date    RBC 3.70 (L) 07/30/2019    HGB 12.4 (L) 07/30/2019    HCT 36.5 (L) 07/30/2019    MCV 98.7 07/30/2019    MCH 33.5 07/30/2019    MPV 7.6 07/30/2019    RDW 15.6 (H) 07/30/2019     07/30/2019     INR Summary                            Warfarin regimen (mg)  Date INR   A/P    Sun Mon Tue Wed Thu Fri Sat Mg/wk  6/11 1.5 Below goal, increase  7.5 5 5 5 5 5 7.5 40  5/24 3.6 Above goal, hold x 1  5 0/5 5 5 5 5 5 35  4/23 2.7 At goal, no change  5 5 5 5 5 5 5 35  4/5 3.6 Above goal, hold x 1  5 0/5 5 5 5 5 5 35   3/8 3.0 At goal, no change  5 5 5 5 5 5 5 35  2/19 2.0 At goal, no change  5 5 5 5 5 5 5 35  2/4 2.7 At goal, no change  5 5 5 5 5 5 5 35  1/20 2.7 At goal, no change  5 5 5 5 5 5 5 35  1/11 4.5 Above goal (Cymbalta) 5 0/5 0/5 5 5 5 5 35  12/28 2.0 At goal, no change  7.5 5 5 5 5 5 7.5 40  11/24 2.4 At goal, no change  7.5 5 5 5 5 5 7.5 40  10/30 2.5 At goal, no change  7.5 5 5 5 5 5 7.5 40  10/2 2.2 At goal, no change  7.5 5 5 5 5 5 7.5 40  9/3 2.4 At goal, no change  7.5 5 5 5 5 5 7.5 40  8/3 2.9 At goal, no change  7.5 5 5 5 5 5 7.5 40  7/6 2.7 At goal, no change  7.5 5 5 5 5 5 7.5 40  6/8 2.8 At goal, no change  7.5 5 5 5 5 5 7.5 40  5/11 2.9 At goal, no change  7.5 5 5 5 5 5 7.5 40  3/30 3.0 At goal, no change  7.5 5 5 5 5 5 7.5 40  2/26 2.3 At goal, no change  7.5 5 5 5 5 5 7.5 40  2/5 3.3 Above goal, decrease  7.5 5 5 5 5 5 7.5 40  1/8 2.9 At goal, no change  7.5 7.5 5 5 5 5 7.5 42.5    Patient History:  Recent hospitalizations/HC visits -4/9 Dr. Martha Chavarria: no med changes  -12/28 Dr. Pamla Closs (PCP office): prescribed Cymbalta    Recent medication changes 6/11 stopped cymbalta approx 1-2 weeks ago d/t side effects  12/28 Cymbalta 30 mg daily per PCP (may increase INR); stopped taking on 1/30; resumed ~2/4   Medications taken regularly that may interact with warfarin or alter INR ASA 81 mg   Warfarin dose taken as prescribed Yes  Usually compliant   Does not use pillbox  Patient has been taking warfarin since re-do AVR in May, 2017   Signs/symptoms of bleeding No h/o major bleeding   Vitamin K intake Normally has ~0 servings of green, leafy vegetables per week   Recent vomiting/diarrhea/fever, changes in weight or activity level None reported   Tobacco or alcohol use -No recent changes in smoking as of 11/24/20 (~3/4 PPD)  -Patient cut back from 2 PPD to 3/4 PPD in February (2019) when he moved in with his daughter. Decrease in smoking typically increases INR gradually.   -Patient denies any alcohol or illicit drug use   Upcoming surgeries or procedures None reported  Back stimulator implant canceled due to cost     Assessment/Plan:  Patient's INR was subtherapeutic today (1.5), likely due to discontinuing Cymbalta. He denies any warfarin dosing errors, change in vitamin K intake, illness, change in smoking, change in physical activity, or bleeding since last visit. Patient was instructed to take warfarin 7.5 mg today then increase to warfarin 7.5 mg Sat and Sun, warfarin 5 mg all other days. Repeat INR in 2 weeks.

## 2021-06-18 ENCOUNTER — OFFICE VISIT (OUTPATIENT)
Dept: INTERNAL MEDICINE CLINIC | Age: 56
End: 2021-06-18
Payer: MEDICARE

## 2021-06-18 VITALS
HEIGHT: 72 IN | DIASTOLIC BLOOD PRESSURE: 86 MMHG | BODY MASS INDEX: 36.22 KG/M2 | TEMPERATURE: 97 F | RESPIRATION RATE: 20 BRPM | HEART RATE: 71 BPM | SYSTOLIC BLOOD PRESSURE: 134 MMHG | OXYGEN SATURATION: 98 % | WEIGHT: 267.4 LBS

## 2021-06-18 DIAGNOSIS — E78.2 MIXED HYPERLIPIDEMIA: ICD-10-CM

## 2021-06-18 DIAGNOSIS — R41.3 MEMORY LOSS OF UNKNOWN CAUSE: ICD-10-CM

## 2021-06-18 DIAGNOSIS — F33.9 UNIPOLAR DEPRESSION (HCC): Primary | ICD-10-CM

## 2021-06-18 DIAGNOSIS — R73.03 PREDIABETES: ICD-10-CM

## 2021-06-18 DIAGNOSIS — I10 ESSENTIAL HYPERTENSION: ICD-10-CM

## 2021-06-18 PROCEDURE — 99213 OFFICE O/P EST LOW 20 MIN: CPT

## 2021-06-18 RX ORDER — CITALOPRAM 20 MG/1
20 TABLET ORAL DAILY
Qty: 30 TABLET | Refills: 3 | Status: SHIPPED | OUTPATIENT
Start: 2021-06-18 | End: 2021-07-13

## 2021-06-18 RX ORDER — BUSPIRONE HYDROCHLORIDE 7.5 MG/1
7.5 TABLET ORAL 2 TIMES DAILY
Qty: 60 TABLET | Refills: 0 | Status: SHIPPED
Start: 2021-06-18 | End: 2021-06-18 | Stop reason: CLARIF

## 2021-06-18 RX ORDER — BUPROPION HYDROCHLORIDE 150 MG/1
150 TABLET ORAL EVERY MORNING
Qty: 30 TABLET | Refills: 0 | Status: SHIPPED
Start: 2021-06-18 | End: 2021-06-18 | Stop reason: SINTOL

## 2021-06-18 ASSESSMENT — PATIENT HEALTH QUESTIONNAIRE - PHQ9
4. FEELING TIRED OR HAVING LITTLE ENERGY: 1
3. TROUBLE FALLING OR STAYING ASLEEP: 0
5. POOR APPETITE OR OVEREATING: 0
SUM OF ALL RESPONSES TO PHQ QUESTIONS 1-9: 6
2. FEELING DOWN, DEPRESSED OR HOPELESS: 3
1. LITTLE INTEREST OR PLEASURE IN DOING THINGS: 1
SUM OF ALL RESPONSES TO PHQ QUESTIONS 1-9: 6
9. THOUGHTS THAT YOU WOULD BE BETTER OFF DEAD, OR OF HURTING YOURSELF: 0
7. TROUBLE CONCENTRATING ON THINGS, SUCH AS READING THE NEWSPAPER OR WATCHING TELEVISION: 1
SUM OF ALL RESPONSES TO PHQ9 QUESTIONS 1 & 2: 4
8. MOVING OR SPEAKING SO SLOWLY THAT OTHER PEOPLE COULD HAVE NOTICED. OR THE OPPOSITE, BEING SO FIGETY OR RESTLESS THAT YOU HAVE BEEN MOVING AROUND A LOT MORE THAN USUAL: 0
6. FEELING BAD ABOUT YOURSELF - OR THAT YOU ARE A FAILURE OR HAVE LET YOURSELF OR YOUR FAMILY DOWN: 0
SUM OF ALL RESPONSES TO PHQ QUESTIONS 1-9: 6

## 2021-06-18 NOTE — PROGRESS NOTES
2021    Abhinav Dickson (:  1965) is a 60 y.o. male, here for a preventive medicine evaluation. Pt here for routine f/u. Has been been in to clinic in some time. He is still depressed (since passing of wife 3 years ago). Meets criteria for atypical depression. Not suicidal/homicidal. Just moved out of daughter's home and bought new house in [place] with his 2 dogs (goldens). Still finds pleasure in some activities. No sleep trouble. He had lightheadedness and blurry vision while driving that was progressively worsening. At coumadin clinic the pharmacist stopped his Cymbalta (which had not touched his depression according to him) and his symptoms resolved. He has been having short-term memory problems lately. Sister  from Alzheimer's. During the interview he did acutely forget what we were just talking about.      Patient Active Problem List   Diagnosis    Essential hypertension    Hyperlipidemia    Generalized Arthralgias     Lumbar spinal stenosis    Lumbar herniated disc    S/P aortic valve replacement    Hx of CABG    Needle phobia    CAD, multiple vessel    History of testicular cancer    Degenerative arthritis of right knee    Exostosis of toe    S/P hernia repair    Myofascial pain dysfunction syndrome    Postlaminectomy syndrome, lumbar region    GERD (gastroesophageal reflux disease)    Carpal tunnel syndrome    History of partial colectomy    NSTEMI (non-ST elevated myocardial infarction) ([address]    Nonrheumatic aortic valve insufficiency    Obesity, Class I, BMI 30.0-34.9 (see actual BMI)    S/P AVR    Coronary artery disease involving native coronary artery of native heart without angina pectoris    Paroxysmal atrial fibrillation ([address]    Pure hypercholesterolemia    Plantar fasciitis    Adjustment disorder with depressed mood    BPH associated with nocturia    Colonic mass    Morbidly obese ([address]       Review of Systems - per hpi    Prior to Visit ascending aorta & D1)    CORONARY ARTERY BYPASS GRAFT  2017    Dr. Augustine Foreman - redo x3 (reverse AC SVG sequentially to D1, OM1 & PDA) w/explant of prior prosthetic valve & removal of embedded sternal wires x7    EMG  2012    FOOT SURGERY Left 2012    Dr. Stephan Matamoros, DPM - hallux nail evulsion & exostectomy    HEMICOLECTOMY N/A 2019    ROBOTIC ASSISTED  LAPAROSCOPIC RIGHT COLECTOMY AND CHOLECYSTECTOMY performed by Eddie Kumar MD at [address] [place] [address]      multiple    LUMBAR DISCECTOMY  2012    L4-5    SHOULDER ARTHROSCOPY Right 2013    Dr. Brenden Lewis - w/subacromial decompression, debridement of the SLAP tear, distal clavicle excision    SOFT TISSUE TUMOR RESECTION      retroperitoneal mass    TESTICLE REMOVAL Left     radical orchiectomy    TRANSESOPHAGEAL ECHOCARDIOGRAM  2017    during redo CABG & AVR    TUNNELED VENOUS PORT PLACEMENT  2002    portacath        Social History     Socioeconomic History    Marital status:       Spouse name: Not on file    Number of children: Not on file    Years of education: Not on file    Highest education level: Not on file   Occupational History    Not on file   Tobacco Use    Smoking status: Current Some Day Smoker     Packs/day: 0.75     Years: 40.00     Pack years: 30.00     Types: Cigarettes     Start date: 1947     Last attempt to quit: 2017     Years since quittin.0    Smokeless tobacco: Never Used    Tobacco comment: stopped 5-18   Vaping Use    Vaping Use: Never used   Substance and Sexual Activity    Alcohol use: No     Alcohol/week: 0.0 standard drinks    Drug use: No    Sexual activity: Yes     Partners: Female   Other Topics Concern    Not on file   Social History Narrative    Not on file     Social Determinants of Health     Financial Resource Strain:     Difficulty of Paying Living Expenses:    Food Insecurity:     Worried About Running Out of [email or web address] in the Last Year:    [address] in the Last Year:    Transportation Needs:     Lack of Transportation (Medical):  Lack of Transportation (Non-Medical):    Physical Activity:     Days of Exercise per Week:     Minutes of Exercise per Session:    Stress:     Feeling of Stress :    Social Connections:     Frequency of Communication with Friends and Family:     Frequency of Social Gatherings with Friends and Family:     Attends [employer] Services:     Active Member of Clubs or Organizations:     Attends Club or Organization Meetings:     Marital Status:    Intimate Partner Violence:     Fear of Current or Ex-Partner:     Emotionally Abused:     Physically Abused:     Sexually Abused:         Family History   Problem Relation Age of Onset    Cancer Mother     Cancer Father         lung    Alzheimer's Disease Sister     Liver Cancer Brother        ADVANCE DIRECTIVE: N, <no information>    Vitals:    06/18/21 1001 06/18/21 1004   BP: 129/89 134/86   Site: Left Upper Arm Left Upper Arm   Position: Sitting Sitting   Cuff Size: Large Adult Large Adult   Pulse: 71    Resp: 20    Temp: 97 °F (36.1 °C)    TempSrc: Temporal    SpO2: 98%    Weight: 267 lb 6.4 oz (121.3 kg)    Height: 6' (1.829 m)      Estimated body mass index is 36.27 kg/m² as calculated from the following:    Height as of this encounter: 6' (1.829 m). Weight as of this encounter: 267 lb 6.4 oz (121.3 kg). Physical Exam  Vitals and nursing note reviewed. Constitutional:       General: He is not in acute distress. Appearance: Normal appearance. He is obese. He is not toxic-appearing. HENT:      Head: Normocephalic and atraumatic. Mouth/Throat:      Mouth: Mucous membranes are moist.      Pharynx: Oropharynx is clear. Eyes:      Conjunctiva/sclera: Conjunctivae normal.      Pupils: Pupils are equal, round, and reactive to light. Cardiovascular:      Rate and Rhythm: Normal rate and regular rhythm.       Pulses: Normal pulses. Heart sounds: Normal heart sounds. Comments: Mechanical valve sound  Pulmonary:      Effort: Pulmonary effort is normal. No respiratory distress. Breath sounds: Normal breath sounds. No wheezing or rales. Abdominal:      General: Abdomen is flat. Bowel sounds are normal.      Palpations: Abdomen is soft. Musculoskeletal:         General: No swelling or tenderness. Normal range of motion. Right lower leg: No edema. Left lower leg: No edema. Skin:     General: Skin is warm and dry. Neurological:      General: No focal deficit present. Mental Status: He is alert and oriented to person, place, and time. No flowsheet data found. Lab Results   Component Value Date    CHOL 147 04/13/2016    CHOL 235 08/03/2012    TRIG 325 04/13/2016    TRIG 303 08/03/2012    HDL 22 04/13/2016    HDL 37 08/03/2012    LDLCALC see below 04/13/2016    LDLCALC 137 08/03/2012    GLUCOSE 111 07/30/2019    LABA1C 5.8 05/26/2017    LABA1C 5.8 08/03/2012       The ASCVD Risk score (Haylee Huynh, et al., 2013) failed to calculate for the following reasons:     The patient has a prior MI or stroke diagnosis    Immunization History   Administered Date(s) Administered    COVID-19, J&J, PF, 0.5 mL 03/13/2021    Pneumococcal Polysaccharide ([id number]) 07/26/2014       Health Maintenance   Topic Date Due    Hepatitis C screen  Never done    DTaP/Tdap/Td vaccine (1 - Tdap) Never done    Shingles Vaccine (1 of 2) Never done    Lipid screen  04/13/2017    A1C test (Diabetic or Prediabetic)  05/26/2018    Low dose CT lung screening  Never done   [employer] Visit (AWV)  Never done    Potassium monitoring  07/30/2020    Creatinine monitoring  07/30/2020    Flu vaccine (Season Ended) 09/01/2021    Colon cancer screen colonoscopy  08/31/2026    Pneumococcal 0-64 years Vaccine (2 of 2) 08/08/2028    COVID-19 Vaccine  Completed    HIV screen  Completed    Hepatitis A vaccine  Aged Out    Hepatitis B vaccine  Aged Out    Hib vaccine  Aged Out    Meningococcal (ACWY) vaccine  Aged Out          ASSESSMENT/PLAN:  1. Unipolar depression ([address]  -     Uncontrolled. Atypical depression. Had side effects with Cymbalta and Wellbutrin. Will try Celexa and check labs. -     TSH WITH REFLEX TO FT4; Future  -     citalopram (CELEXA) 20 MG tablet; Take 1 tablet by mouth daily, Disp-30 tablet, R-3Normal  -     Libertad Ramirez MD, [facility]    2. Memory loss of unknown cause  -     Sister had Alzheimer's at age 62 and  at 72. He is worried about this and has had memory problems for 5-6 mo. Will try and improve sleep and control his depression prior to moving on to neuroimaging to look for AD (on social security only and doesn't have much money)  -     Libertad Ramirez MD, [facility]    3. Essential hypertension  -     Well controlled, continue current regimen  -     BASIC METABOLIC PANEL; Future  -     CBC Auto Differential; Future  -     TSH WITH REFLEX TO FT4; Future    4. Mixed hyperlipidemia  -     Lipid Panel; Future    5. Prediabetes  -     Asymptomatic. Has not had testing in many years.   -     HEMOGLOBIN A1C; Future      Return in about 4 weeks (around 2021). An electronic signature was used to authenticate this note.     --Keara Castro MD on 2021 at 10:56 AM

## 2021-06-28 ENCOUNTER — ANTI-COAG VISIT (OUTPATIENT)
Dept: PHARMACY | Age: 56
End: 2021-06-28
Payer: MEDICARE

## 2021-06-28 DIAGNOSIS — I48.0 PAROXYSMAL ATRIAL FIBRILLATION (HCC): ICD-10-CM

## 2021-06-28 DIAGNOSIS — Z95.2 S/P AORTIC VALVE REPLACEMENT: Primary | ICD-10-CM

## 2021-06-28 LAB — INTERNATIONAL NORMALIZATION RATIO, POC: 2

## 2021-06-28 PROCEDURE — 85610 PROTHROMBIN TIME: CPT

## 2021-06-28 PROCEDURE — 99211 OFF/OP EST MAY X REQ PHY/QHP: CPT

## 2021-06-28 NOTE — PROGRESS NOTES
ANTICOAGULATION SERVICE    Xiomy Vasquez is a 60 y.o. male with PMHx significant for AVR (re-do in May, 2017), CAD s/p CABG (x3 in May, 2017), pA-fib, HLD, HTN, testicular CA who presents to clinic on 6/28/2021 for anticoagulation monitoring and adjustment.     Anticoagulation Indication(s):  Heart Valve Replacement (bovine AVR), A-fib  Referring Physician:  Dr. Miriam Sofia  Goal INR Range:  2-3  Duration of Anticoagulation Therapy:  TBD  Time of day dose taken:  PM  Product patient has at home:  warfarin 5 mg (peach)    [id number] Score for Atrial Fibrillation Stroke Risk   Risk   Factors  Component Value   C CHF No 0   H HTN Yes 1   A2 Age >= 75 No,  (56 y.o.) 0   D DM No 0   S2 Prior Stroke/TIA No 0   V Vascular Disease Yes 1   A Age 74-69 No,  (56 y.o.) 0   Sc Sex male 0    [id number]  Score  2   Score last updated 5/30/19 1:51 PM      Lab Results   Component Value Date    RBC 3.70 (L) 07/30/2019    HGB 12.4 (L) 07/30/2019    HCT 36.5 (L) 07/30/2019    MCV 98.7 07/30/2019    MCH 33.5 07/30/2019    MPV 7.6 07/30/2019    RDW 15.6 (H) 07/30/2019     07/30/2019     INR Summary                            Warfarin regimen (mg)  Date INR   A/P    Sun Mon Tue Wed Thu Fri Sat Mg/wk  6/28 2.0 At goal, no change  7.5 5 5 5 5 5 7.5 40  6/11 1.5 Below goal, increase  7.5 5 5 5 5 5 7.5 40  5/24 3.6 Above goal, hold x 1  5 0/5 5 5 5 5 5 35  4/23 2.7 At goal, no change  5 5 5 5 5 5 5 35  4/5 3.6 Above goal, hold x 1  5 0/5 5 5 5 5 5 35   3/8 3.0 At goal, no change  5 5 5 5 5 5 5 35  2/19 2.0 At goal, no change  5 5 5 5 5 5 5 35  2/4 2.7 At goal, no change  5 5 5 5 5 5 5 35  1/20 2.7 At goal, no change  5 5 5 5 5 5 5 35  1/11 4.5 Above goal (Cymbalta) 5 0/5 0/5 5 5 5 5 35  12/28 2.0 At goal, no change  7.5 5 5 5 5 5 7.5 40  11/24 2.4 At goal, no change  7.5 5 5 5 5 5 7.5 40  10/30 2.5 At goal, no change  7.5 5 5 5 5 5 7.5 40  10/2 2.2 At goal, no change  7.5 5 5 5 5 5 7.5 40  9/3 2.4 At goal, no change  7.5 5 5 5 5 5 7.5 40  8/3 2.9 At goal, no change  7.5 5 5 5 5 5 7.5 40  7/6 2.7 At goal, no change  7.5 5 5 5 5 5 7.5 40  6/8 2.8 At goal, no change  7.5 5 5 5 5 5 7.5 40  5/11 2.9 At goal, no change  7.5 5 5 5 5 5 7.5 40  3/30 3.0 At goal, no change  7.5 5 5 5 5 5 7.5 40  2/26 2.3 At goal, no change  7.5 5 5 5 5 5 7.5 40  2/5 3.3 Above goal, decrease  7.5 5 5 5 5 5 7.5 40  1/8 2.9 At goal, no change  7.5 7.5 5 5 5 5 7.5 42.5    Patient History:  Recent hospitalizations/HC visits -6/18/21: PCP   Recent medication changes 6/18/21 started citalopram 20 mg QD (may increase INR)  12/28 Cymbalta 30 mg daily (may increase INR); stopped taking on 1/30; resumed ~2/4; stopped ~6/1   Medications taken regularly that may interact with warfarin or alter INR ASA 81 mg   Warfarin dose taken as prescribed Yes  Usually compliant   Does not use pillbox  Patient has been taking warfarin since re-do AVR in May, 2017   Signs/symptoms of bleeding No h/o major bleeding   Vitamin K intake Normally has ~0 servings of green, leafy vegetables per week   Recent vomiting/diarrhea/fever, changes in weight or activity level None reported   Tobacco or alcohol use -No recent changes in smoking as of 11/24/20 (~3/4 PPD)  -Patient cut back from 2 PPD to 3/4 PPD in February (2019) when he moved in with his daughter. Decrease in smoking typically increases INR gradually.   -Patient denies any alcohol or illicit drug use   Upcoming surgeries or procedures None reported  Back stimulator implant canceled due to cost     Assessment/Plan:  Patient's INR was therapeutic today (2.0). He denies any warfarin dosing errors, change in vitamin K intake, illness, change in smoking, change in physical activity, or bleeding since last visit. Warfarin dose was reduced to 5 mg daily on 1/11 due to interaction with Cymbalta, and INR has been mostly therapeutic since then.   Prior to starting Cymbalta on 12/28, patient's INR had been therapeutic on warfarin 40 mg/week for almost a year. Patient stopped taking Cymbalta about 2 weeks ago (early June) due to side effects and does not plan to restart. Adjusted dose back to previously therapeutic dose prior to Cymbalta initiation at that time. He started citalopram ~10 days ago, which has the potential to increase INR also, but INR is therapeutic today. Patient was instructed to continue warfarin dose of 7.5 mg Sat and Sun, and 5 mg all other days. Repeat INR in 3 weeks. Patient was reminded to maintain consistent vitamin K intake and call with any bleeding, medication changes, or fever/vomiting/diarrhea. Patient understands dosing directions and information discussed. Dosing schedule and follow up appointment given to patient. Progress note routed to referring physician's office. Patient acknowledges working in consult agreement with pharmacist as referred by his/her physician. Next INR Check:  7/19    Please call [place] at [phone] with any questions. Thanks!   Briana Olson, PharmD, [facility]  Medication Management Clinic   Endy Gao3 Ph: [phone]  Tacos Juárez Ph: [phone]  6/28/2021 9:15 AM      For Pharmacy Admin Tracking Only     Total # of Interventions Recommended: 0   Total # of Interventions Accepted: 0   Time Spent (min): 15

## 2021-07-07 ENCOUNTER — TELEPHONE (OUTPATIENT)
Dept: PHARMACY | Age: 56
End: 2021-07-07

## 2021-07-07 DIAGNOSIS — Z95.2 S/P AORTIC VALVE REPLACEMENT: Primary | ICD-10-CM

## 2021-07-07 DIAGNOSIS — I48.0 PAROXYSMAL ATRIAL FIBRILLATION (HCC): ICD-10-CM

## 2021-07-07 NOTE — TELEPHONE ENCOUNTER
This patient needs updated signed referral from provider to continue management in anticoagulation clinic. A referral order has been pended within this encounter. Please sign order in encounter. Thank you,  Edelmira Zabala Pharm. D.    [facility]  [phone]
[FreeTextEntry1] : Patient is here for a followup visit\par \par She has flown for the last 19 hours and discussion at 3 AM, she has a lot of pain and fatigue. She states that everything hurts. She rates her pain as a 10 out of 10. She rates her fatigue around the same level. She has not been sick recently.\par \par On last visit we decided to switch to infusion of medication which has been stable so far. She denies any adverse effects.\par \par She's not currently using prednisone. She continues to use on her other medications. She has not seen any other physicians. She wants to know she can use a stronger pain medicine for the next few days to recover.\par \par She denies patchy alopecia, oral or nasal ulcers, dysphagia muscle pain or muscle weakness. She denies fevers or chills. She has an average appetite. She is a vegan.

## 2021-07-13 ENCOUNTER — TELEPHONE (OUTPATIENT)
Dept: INTERNAL MEDICINE CLINIC | Age: 56
End: 2021-07-13

## 2021-07-13 ENCOUNTER — OFFICE VISIT (OUTPATIENT)
Dept: INTERNAL MEDICINE CLINIC | Age: 56
End: 2021-07-13
Payer: MEDICARE

## 2021-07-13 VITALS
OXYGEN SATURATION: 98 % | WEIGHT: 271.4 LBS | DIASTOLIC BLOOD PRESSURE: 76 MMHG | HEART RATE: 62 BPM | TEMPERATURE: 97.3 F | BODY MASS INDEX: 36.76 KG/M2 | HEIGHT: 72 IN | SYSTOLIC BLOOD PRESSURE: 112 MMHG

## 2021-07-13 DIAGNOSIS — F33.9 UNIPOLAR DEPRESSION (HCC): ICD-10-CM

## 2021-07-13 DIAGNOSIS — E11.9 TYPE 2 DIABETES MELLITUS WITHOUT COMPLICATION, WITHOUT LONG-TERM CURRENT USE OF INSULIN (HCC): Primary | ICD-10-CM

## 2021-07-13 DIAGNOSIS — R26.9 ABNORMAL GAIT: ICD-10-CM

## 2021-07-13 PROBLEM — F32.9 UNIPOLAR DEPRESSION: Status: ACTIVE | Noted: 2021-07-13

## 2021-07-13 LAB — HBA1C MFR BLD: 9.1 %

## 2021-07-13 PROCEDURE — 83036 HEMOGLOBIN GLYCOSYLATED A1C: CPT

## 2021-07-13 PROCEDURE — 99213 OFFICE O/P EST LOW 20 MIN: CPT | Performed by: STUDENT IN AN ORGANIZED HEALTH CARE EDUCATION/TRAINING PROGRAM

## 2021-07-13 RX ORDER — CITALOPRAM 20 MG/1
40 TABLET ORAL DAILY
Qty: 30 TABLET | Refills: 3 | Status: SHIPPED | OUTPATIENT
Start: 2021-07-13 | End: 2021-07-29 | Stop reason: SDUPTHER

## 2021-07-13 NOTE — ASSESSMENT & PLAN NOTE
States his symptoms have been the same despite taking medications.   -Increase Celexa dose to 40 mg daily  -Advised patient not to take Wellbutrin and Buspar as they are not prescribed for him

## 2021-07-13 NOTE — TELEPHONE ENCOUNTER
Spoke to pt. Clarified medication list. Advised pt NOT to take Buspar and Wellbutrin as they are not prescribed for him.

## 2021-07-13 NOTE — TELEPHONE ENCOUNTER
Patient called stating that you need to discuss meds taking Buspar 7.5mg, Citalopram 20mg and Bupropion 150mg.  Please call

## 2021-07-13 NOTE — ASSESSMENT & PLAN NOTE
New diagnosis today HbA1c was 9.1 from 5.7 in 2017.  Symptomatic with polyuria, polydipsia and vision problems.   -We will start patient on Metformin 1000 twice daily  -Advised patient to maintain healthy lifestyle with diet low in carb and fat and to exercise daily  -Advised patient to join diabetes education class  -Advised patient to schedule an eye exam as soon as possible  -We will check renal function panel micro albumin /cr ratio

## 2021-07-13 NOTE — PROGRESS NOTES
Outpatient Clinic Established Patient Note    Patient: Tho Kong  : 1965 (85 y.o.)  Date: 2021    CC: follow up    HPI:    Mr. Espinoza Longo is a 47-year-old male who is here for a follow-up patient states his depression symptoms have not improved much with Celexa. Pt also reports pharmacy has filled prescription for him that he does not remember but they are not part of his current medication list.  Pt was advised to call back with the medication names. He also endorses nocturia and increased urinary frequency. It is affecting his sleep as he feels he is not getting enough sleep because of that. Patient also states he is having balance issues which he thinks started after being placed on Celexa. Patient is having some vision issues but he has not seen an eye doctor for very long time. He also endorses numbness in his bilateral feet which he thinks is a chronic problem. Patient also states that he drinks a lot of pop drinks and he has not been exercising much. States he has been having gait issues lately with transient lightheadedness as he requires holding on to something with standing. He thinks these symptoms started after starting Celexa. Otherwise, he denies fever, night sweats, chills, chest pain, cough, dyspnea, palpitations, nausea, vomiting, diarrhea, dysuria. Patient called back after getting home. He states the medications he started taking last month are Buspar and Bupropion. It is unknown how pharmacy received the scripts for those medications as they are not in his list. Patient was advised to stop taking them. Home Meds:  Prior to Visit Medications    Medication Sig Taking?  Authorizing Provider   citalopram (CELEXA) 20 MG tablet Take 2 tablets by mouth daily Yes Ebony Anderson MD   metFORMIN (GLUCOPHAGE) 500 MG tablet Take 2 tablets by mouth 2 times daily (with meals) Yes Ebony Anderson MD   lisinopril (PRINIVIL;ZESTRIL) 10 MG tablet TAKE 1 TABLET BY MOUTH 2 TIMES A DAY Yes Jessa Leonard MD   famotidine (PEPCID) 20 MG tablet TAKE 1 TABLET BY MOUTH 2 TIMES A DAY Yes Bunny Jeffries MD   metoprolol tartrate (LOPRESSOR) 50 MG tablet TAKE 1 TABLET BY MOUTH 2 TIMES A DAY FOR BLOOD PRESSURE AND HEART Yes Edgar Henry,    rosuvastatin (CRESTOR) 20 MG tablet TAKE ONE TABLET BY MOUTH EVERY EVENING Yes Matilde Whiteside MD   Handicap Placard MISC by Does not apply route Effective through 12/28/2020 through 12/27/2025 Yes Sanya Bailey,    warfarin (COUMADIN) 5 MG tablet TAKE 1 TABLET (5 MG) OR 1.5 TABLETS (7.5 MG) BY MOUTH DAILY AS DIRECTED BY CLINIC Yes Linette Morales MD   nystatin-triamcinolone [facility]) 447548-9.5 UNIT/GM-% ointment Apply topically 2 times daily until improved. Yes Douglas Johnson MD   aspirin 81 MG EC tablet Take 1 tablet by mouth daily Yes Renay Mukherjee MD   CYCLOBENZAPRINE HCL PO Take by mouth Yes Historical Provider, MD   amLODIPine (NORVASC) 5 MG tablet Take 1 tablet by mouth nightly as needed (If systolic blood pressure over 130, take amlodipine 5mg at night, if under 130 do not take amlopidine) Yes Leonel Fall MD   melatonin (RA MELATONIN) 3 MG TABS tablet Take 1 tablet by mouth nightly as needed (insomnia) Yes Carlos Armijo MD   Blood Pressure KIT 1 Unit Yes Carlos Armijo MD   oxyCODONE-acetaminophen (PERCOCET) 5-325 MG per tablet Take 1 tablet by mouth every 4 hours as needed for Pain  . Yes Historical Provider, MD   gabapentin (NEURONTIN) 300 MG capsule Take 300 mg by mouth 3 times daily  Yes Historical Provider, MD   sildenafil (VIAGRA) 100 MG tablet Take 1 tablet by mouth as needed for Erectile Dysfunction Start with 1/2 tab.  If it does not work try full tab but no more 1 tab a day  Patient not taking: Reported on 7/13/2021  Leonel Fall MD       Allergies:    Atorvastatin calcium [lipitor], Wellbutrin [bupropion], Cymbalta [duloxetine hcl], and Vicodin [hydrocodone-acetaminophen]    Health Maintenance Due   Topic Date Due    Hepatitis C screen  Never done    Diabetic foot exam  Never done    Diabetic retinal exam  Never done    Diabetic microalbuminuria test  Never done    DTaP/Tdap/Td vaccine (1 - Tdap) Never done    Shingles Vaccine (1 of 2) Never done    Low dose CT lung screening  Never done    Lipid screen  04/13/2017    Annual Wellness Visit (AWV)  Never done    Potassium monitoring  07/30/2020    Creatinine monitoring  07/30/2020       Immunization History   Administered Date(s) Administered    COVID-19, J&J, PF, 0.5 mL 03/13/2021    Pneumococcal Polysaccharide ([id number]) 07/26/2014       Review of Systems  A 10-organ Review Of Systems was obtained and otherwise unremarkable except as per HPI. Data: Old records have been reviewed electronically.       Past Medical History:    Past Medical History:   Diagnosis Date    Abdominal hernia     s/p repair 2010    Aortic valve prosthesis present     CAD (coronary artery disease)     Chronic back pain greater than 3 months duration     Clostridium difficile diarrhea 10/17/2016    PCR    DDD (degenerative disc disease), lumbosacral 8/7/2013    Erectile dysfunction     GERD (gastroesophageal reflux disease)     Hyperlipidemia     Hypertension     Joint pain     Left testicular cancer ([facility] [address] 2002    s/p abdominal resection    Myalgia and myositis, unspecified 8/26/2013    Neuropathy     OA (osteoarthritis) of knee     bilateral    Obesity, Class I, BMI 30.0-34.9 (see actual BMI)     Prolonged emergence from general anesthesia     after CABG    S/P AVR 2005    tissue valve    S/P CABG x 2 2005    w/AVR & primary repair of dissected ascending aorta    Type 2 diabetes mellitus without complication, without long-term current use of insulin ([facility] [address] 7/13/2021       Past Surgical History:  Past Surgical History:   Procedure Laterality Date    AORTA SURGERY  08/27/2004    Dr. Ren Beckwith - primary repair of limited ascending aortic dissection    AORTIC VALVE REPLACEMENT  05/30/2017    Dr. Barney Holt - redo w/25mm Cece Bibber ThermaFix model 3300 bovine pericardial bioprosthesis    AORTIC VALVE SURGERY  08/27/2004    Dr. Elias Alvarado - 27mm Kelton-Castelan pericardial prosthesis     BACK SURGERY      L4-S1    CARDIAC CATHETERIZATION  05/09/2017    Dr. Luciano Barber  08/26/2004    Dr. Leesa Roberts Right 03/17/2015    Dr. Thierry Ballesteros  10/10/2016    Dr. Jovon Mims - w/laparascopic lysis of extensive adhesions, open transverse colon resection, excision of old abd mesh adhering to colon    CORONARY ANGIOPLASTY WITH STENT PLACEMENT  07/2014    CORONARY ARTERY BYPASS GRAFT  08/27/2004    Dr. Elias Alvarado - x2 (LIMA-LAD, SVG sequentially to ascending aorta & D1)    CORONARY ARTERY BYPASS GRAFT  05/30/2017    Dr. Barney Holt - redo x3 (reverse AC SVG sequentially to D1, OM1 & PDA) w/explant of prior prosthetic valve & removal of embedded sternal wires x7    EMG  04/16/2012    FOOT SURGERY Left 01/24/2012    Dr. Soy Ruffin, DPM - hallux nail evulsion & exostectomy    HEMICOLECTOMY N/A 7/26/2019    ROBOTIC ASSISTED  LAPAROSCOPIC RIGHT COLECTOMY AND CHOLECYSTECTOMY performed by Amanda Mullen MD at [phone]  2010    multiple    LUMBAR DISCECTOMY  2012    L4-5    SHOULDER ARTHROSCOPY Right 11/21/2013    Dr. Gould Se - w/subacromial decompression, debridement of the SLAP tear, distal clavicle excision    SOFT TISSUE TUMOR RESECTION  2001    retroperitoneal mass    TESTICLE REMOVAL Left 2001    radical orchiectomy    TRANSESOPHAGEAL ECHOCARDIOGRAM  05/30/2017    during redo CABG & AVR    TUNNELED VENOUS PORT PLACEMENT  2002    portacath        Home Meds:  Prior to Visit Medications    Medication Sig Taking?  Authorizing Provider   citalopram (CELEXA) 20 MG tablet Take 2 tablets by mouth daily Yes Beena Tidwell MD   metFORMIN (GLUCOPHAGE) 500 MG tablet Take 2 tablets by mouth 2 times daily (with meals) Yes Haylee Lovett MD   lisinopril (PRINIVIL;ZESTRIL) 10 MG tablet TAKE 1 TABLET BY MOUTH 2 TIMES A DAY Yes Bunny Garcia MD   famotidine (PEPCID) 20 MG tablet TAKE 1 TABLET BY MOUTH 2 TIMES A DAY Yes Bunny Garcia MD   metoprolol tartrate (LOPRESSOR) 50 MG tablet TAKE 1 TABLET BY MOUTH 2 TIMES A DAY FOR BLOOD PRESSURE AND HEART Yes Eli Lang,    rosuvastatin (CRESTOR) 20 MG tablet TAKE ONE TABLET BY MOUTH EVERY EVENING Yes Tayla Fernandez MD   Handicap Placard MISC by Does not apply route Effective through 12/28/2020 through 12/27/2025 Yes Sanya Bailey,    warfarin (COUMADIN) 5 MG tablet TAKE 1 TABLET (5 MG) OR 1.5 TABLETS (7.5 MG) BY MOUTH DAILY AS DIRECTED BY CLINIC Yes Montez Rodriguez MD   nystatin-triamcinolone [facility]) 200472-3.7 UNIT/GM-% ointment Apply topically 2 times daily until improved. Yes Tani Rasheed MD   aspirin 81 MG EC tablet Take 1 tablet by mouth daily Yes Kaelyn Aparicio MD   CYCLOBENZAPRINE HCL PO Take by mouth Yes Historical Provider, MD   amLODIPine (NORVASC) 5 MG tablet Take 1 tablet by mouth nightly as needed (If systolic blood pressure over 130, take amlodipine 5mg at night, if under 130 do not take amlopidine) Yes Brigette Martinez MD   melatonin (RA MELATONIN) 3 MG TABS tablet Take 1 tablet by mouth nightly as needed (insomnia) Yes Yecenia Tellez MD   Blood Pressure KIT 1 Unit Yes Yecenia Tellez MD   oxyCODONE-acetaminophen (PERCOCET) 5-325 MG per tablet Take 1 tablet by mouth every 4 hours as needed for Pain  . Yes Historical Provider, MD   gabapentin (NEURONTIN) 300 MG capsule Take 300 mg by mouth 3 times daily  Yes Historical Provider, MD   sildenafil (VIAGRA) 100 MG tablet Take 1 tablet by mouth as needed for Erectile Dysfunction Start with 1/2 tab.  If it does not work try full tab but no more 1 tab a day  Patient not taking: Reported on 7/13/2021  Brigette Martinez MD       Allergies:    Atorvastatin calcium [lipitor], Wellbutrin [bupropion], Cymbalta [duloxetine hcl], and Vicodin [hydrocodone-acetaminophen]    Family History:       Problem Relation Age of Onset    Cancer Mother     Cancer Father         lung    Alzheimer's Disease Sister     Liver Cancer Brother          PHYSICAL EXAM:  /76 (Site: Right Upper Arm, Position: Sitting, Cuff Size: Medium Adult)   Pulse 62   Temp 97.3 °F (36.3 °C) (Temporal)   Ht 6' (1.829 m)   Wt 271 lb 6.4 oz (123.1 kg)   SpO2 98%   BMI 36.81 kg/m²   Physical Exam  Constitutional:       Appearance: Normal appearance. He is obese. HENT:      Head: Normocephalic and atraumatic. Cardiovascular:      Rate and Rhythm: Normal rate and regular rhythm. Pulses: Normal pulses. Heart sounds: Normal heart sounds. Pulmonary:      Effort: Pulmonary effort is normal.      Breath sounds: Normal breath sounds. No rales. Abdominal:      General: Bowel sounds are normal. There is distension. Palpations: Abdomen is soft. Musculoskeletal:         General: Normal range of motion. Cervical back: Normal range of motion and neck supple. Right lower leg: No edema. Left lower leg: No edema. Skin:     General: Skin is warm and dry. Capillary Refill: Capillary refill takes less than 2 seconds. Neurological:      Mental Status: He is alert. Coordination: Coordination normal.      Gait: Gait abnormal.         Assessment & Plan:      Problem List Items Addressed This Visit        Active Problems    Type 2 diabetes mellitus without complication, without long-term current use of insulin ([facility] [address] - Primary     New diagnosis today HbA1c was 9.1 from 5.7 in 2017.  Symptomatic with polyuria, polydipsia and vision problems.   -We will start patient on Metformin 1000 twice daily  -Advised patient to maintain healthy lifestyle with diet low in carb and fat and to exercise daily  -Advised patient to join diabetes education class  -Advised patient to schedule an eye exam as soon as possible  -We will check renal function panel micro albumin /cr ratio         Relevant Medications    metFORMIN (GLUCOPHAGE) 500 MG tablet    Other Relevant Orders    MICROALBUMIN / CREATININE URINE RATIO    VITAMIN B12    POCT glycosylated hemoglobin (Hb A1C) (Completed)    Unipolar depression ([address]     States his symptoms have been the same despite taking medications.   -Increase Celexa dose to 40 mg daily  -Advised patient not to take Wellbutrin and Buspar as they are not prescribed for him         Relevant Medications    citalopram (CELEXA) 20 MG tablet    Abnormal gait     +roomberg test. Onset a month ago. Could be related to diabetes neuropathy.   - manage diabetes as above  - will check B12, TSH                 Return in about 4 weeks (around 8/10/2021).     Patient Instructions   - please start taking metformin and increase dose every 3 days:     -- start 500 mg metformin daily for 3 days     -- 500 mg metformin twice daily for 3 days     -- 500 mg metformin three times daily for 3 days     -- 1000 mg metformin twice daily after that  - please adhere to healthy lifestyle with diet low in carbs, fat and exercise daily for 30 mins  - please go to lab for blood work  - please schedule eye exam        Dispo: Pt has been staffed with Dr. Amelia Chowdhury  _______________  Charito Waldrop MD, 7/13/2021 1:28 PM   PGY-3

## 2021-07-13 NOTE — ASSESSMENT & PLAN NOTE
+roomberg test. Onset a month ago.  Could be related to diabetes neuropathy.   - manage diabetes as above  - will check B12, TSH

## 2021-07-15 DIAGNOSIS — Z95.2 S/P AVR: ICD-10-CM

## 2021-07-15 DIAGNOSIS — Z79.01 WARFARIN ANTICOAGULATION: ICD-10-CM

## 2021-07-15 DIAGNOSIS — I25.10 CORONARY ARTERY DISEASE INVOLVING NATIVE CORONARY ARTERY OF NATIVE HEART WITHOUT ANGINA PECTORIS: ICD-10-CM

## 2021-07-15 RX ORDER — ROSUVASTATIN CALCIUM 20 MG/1
TABLET, COATED ORAL
Qty: 90 TABLET | Refills: 3 | Status: SHIPPED | OUTPATIENT
Start: 2021-07-15 | End: 2022-07-05 | Stop reason: SDUPTHER

## 2021-07-15 NOTE — TELEPHONE ENCOUNTER
Requested Prescriptions     Pending Prescriptions Disp Refills    warfarin (COUMADIN) 5 MG tablet [Pharmacy Med Name: WARFARIN SODIUM 5MG TABS] 135 tablet 2     Sig: TAKE 1 TABLET BY MOUTH ONE TIME A DAY OR TAKE ONE AND ONE-HALF TABLETS BY MOUTH EVERY DAY AS DIRECTED BY CLINIC          Number: 135    Refills: 2    Last Office Visit: 4/9/2021     Next Office Visit: 7/30/2021     Last Labs: [phone]

## 2021-07-19 ENCOUNTER — ANTI-COAG VISIT (OUTPATIENT)
Dept: PHARMACY | Age: 56
End: 2021-07-19
Payer: MEDICARE

## 2021-07-19 ENCOUNTER — TELEPHONE (OUTPATIENT)
Dept: PHARMACY | Age: 56
End: 2021-07-19

## 2021-07-19 DIAGNOSIS — Z95.2 S/P AORTIC VALVE REPLACEMENT: Primary | ICD-10-CM

## 2021-07-19 DIAGNOSIS — I48.0 PAROXYSMAL ATRIAL FIBRILLATION (HCC): ICD-10-CM

## 2021-07-19 NOTE — TELEPHONE ENCOUNTER
Patient no-showed to anticoagulation visit today. Called patient and left voicemail asking patient to call anticoagulation clinic back as soon as possible to reschedule appointment.     Ang Mina, PharmD, BCACP  7/19/2021 12:51 PM

## 2021-07-21 ENCOUNTER — ANTI-COAG VISIT (OUTPATIENT)
Dept: PHARMACY | Age: 56
End: 2021-07-21
Payer: MEDICARE

## 2021-07-21 DIAGNOSIS — Z95.2 S/P AORTIC VALVE REPLACEMENT: Primary | ICD-10-CM

## 2021-07-21 DIAGNOSIS — I48.0 PAROXYSMAL ATRIAL FIBRILLATION (HCC): ICD-10-CM

## 2021-07-21 LAB — INTERNATIONAL NORMALIZATION RATIO, POC: 1.4

## 2021-07-21 PROCEDURE — 99212 OFFICE O/P EST SF 10 MIN: CPT

## 2021-07-21 PROCEDURE — 85610 PROTHROMBIN TIME: CPT

## 2021-07-21 NOTE — PROGRESS NOTES
ANTICOAGULATION SERVICE    Laine Luis is a 60 y.o. male with PMHx significant for AVR (re-do in May, 2017), CAD s/p CABG (x3 in May, 2017), pA-fib, HLD, HTN, testicular CA who presents to clinic on 7/21/2021 for anticoagulation monitoring and adjustment.     Anticoagulation Indication(s):  Heart Valve Replacement (bovine AVR), A-fib  Referring Physician:  Dr. Liz Waggoner  Goal INR Range:  2-3  Duration of Anticoagulation Therapy:  TBD  Time of day dose taken:  PM  Product patient has at home:  warfarin 5 mg (peach)    [id number] Score for Atrial Fibrillation Stroke Risk   Risk   Factors  Component Value   C CHF No 0   H HTN Yes 1   A2 Age >= 75 No,  (56 y.o.) 0   D DM Yes 1   S2 Prior Stroke/TIA No 0   V Vascular Disease Yes 1   A Age 74-69 No,  (56 y.o.) 0   Sc Sex male 0    [id number]  Score  3   Score last updated 5/30/19 1:51 PM      Lab Results   Component Value Date    RBC 3.70 (L) 07/30/2019    HGB 12.4 (L) 07/30/2019    HCT 36.5 (L) 07/30/2019    MCV 98.7 07/30/2019    MCH 33.5 07/30/2019    MPV 7.6 07/30/2019    RDW 15.6 (H) 07/30/2019     07/30/2019     INR Summary                            Warfarin regimen (mg)  Date INR   A/P    Sun Mon Tue Wed Thu Fri Sat Mg/wk  7/19 6/28 2.0 At goal, no change  7.5 5 5 5 5 5 7.5 40  6/11 1.5 Below goal, increase  7.5 5 5 5 5 5 7.5 40  5/24 3.6 Above goal, hold x 1  5 0/5 5 5 5 5 5 35  4/23 2.7 At goal, no change  5 5 5 5 5 5 5 35  4/5 3.6 Above goal, hold x 1  5 0/5 5 5 5 5 5 35   3/8 3.0 At goal, no change  5 5 5 5 5 5 5 35  2/19 2.0 At goal, no change  5 5 5 5 5 5 5 35  2/4 2.7 At goal, no change  5 5 5 5 5 5 5 35  1/20 2.7 At goal, no change  5 5 5 5 5 5 5 35  1/11 4.5 Above goal (Cymbalta) 5 0/5 0/5 5 5 5 5 35  12/28 2.0 At goal, no change  7.5 5 5 5 5 5 7.5 40  11/24 2.4 At goal, no change  7.5 5 5 5 5 5 7.5 40  10/30 2.5 At goal, no change  7.5 5 5 5 5 5 7.5 40  10/2 2.2 At goal, no change  7.5 5 5 5 5 5 7.5 40  9/3 2.4 At goal, no change  7.5 5 5 5 5 5 7.5 40  8/3 2.9 At goal, no change  7.5 5 5 5 5 5 7.5 40  7/6 2.7 At goal, no change  7.5 5 5 5 5 5 7.5 40  6/8 2.8 At goal, no change  7.5 5 5 5 5 5 7.5 40  5/11 2.9 At goal, no change  7.5 5 5 5 5 5 7.5 40  3/30 3.0 At goal, no change  7.5 5 5 5 5 5 7.5 40  2/26 2.3 At goal, no change  7.5 5 5 5 5 5 7.5 40  2/5 3.3 Above goal, decrease  7.5 5 5 5 5 5 7.5 40  1/8 2.9 At goal, no change  7.5 7.5 5 5 5 5 7.5 42.5    Patient History:  Recent hospitalizations/HC visits -6/18/21: PCP   Recent medication changes Buspar? metformin    6/18/21 started citalopram 20 mg QD (may increase INR)  12/28 Cymbalta 30 mg daily (may increase INR); stopped taking on 1/30; resumed ~2/4; stopped ~6/1 7/21/21: increased citalopram    Medications taken regularly that may interact with warfarin or alter INR ASA 81 mg   Warfarin dose taken as prescribed Yes  Usually compliant   Does not use pillbox  Patient has been taking warfarin since re-do AVR in May, 2017   Signs/symptoms of bleeding No h/o major bleeding   Vitamin K intake Normally has ~0 servings of green, leafy vegetables per week  States that since diabetic needs to stop sugar but not adjusting vit k intake   Recent vomiting/diarrhea/fever, changes in weight or activity level None reported   Tobacco or alcohol use -No recent changes in smoking as of 11/24/20 (~3/4 PPD)  -Patient cut back from 2 PPD to 3/4 PPD in February (2019) when he moved in with his daughter. Decrease in smoking typically increases INR gradually.   -Patient denies any alcohol or illicit drug use   Upcoming surgeries or procedures None reported  Back stimulator implant canceled due to cost     Assessment/Plan:  Patient's INR was subtherapeutic today (1.4). He denies any warfarin dosing errors, change in vitamin K intake, illness, change in smoking, change in physical activity, or bleeding since last visit.   He states that since he is diabetic is changing diet to stop eating sugars but does not plan to eat and vit k. Warfarin dose was reduced to 5 mg daily on 1/11 due to interaction with Cymbalta, and INR has been mostly therapeutic since then. Prior to starting Cymbalta on 12/28, patient's INR had been therapeutic on warfarin 40 mg/week for almost a year. Patient stopped taking Cymbalta about 2 weeks ago (early June) due to side effects and does not plan to restart. Adjusted dose back to previously therapeutic dose prior to Cymbalta initiation at that time. He increased citalopram recently, which has the potential to increase INR also, but INR is subtherapeutic today. Patient was instructed to take 7.5 mg tonight then increase weekly dose to 5 mg on Mon, Wed and Fri and 7.5 mg all other days (12% inc). Repeat INR in 2 weeks. Patient was reminded to maintain consistent vitamin K intake and call with any bleeding, medication changes, or fever/vomiting/diarrhea. Patient understands dosing directions and information discussed. Dosing schedule and follow up appointment given to patient. Progress note routed to referring physician's office. Patient acknowledges working in consult agreement with pharmacist as referred by his/her physician. Next INR Check:  8/4    Please call [facility] at [phone] with any questions.         For Pharmacy Admin Tracking Only     Intervention Detail: Dose Adjustment: 1, reason: Therapy Optimization   Total # of Interventions Recommended: 1   Total # of Interventions Accepted: 1   Time Spent (min): 15

## 2021-07-22 RX ORDER — WARFARIN SODIUM 5 MG/1
TABLET ORAL
Qty: 135 TABLET | Refills: 2 | Status: SHIPPED | OUTPATIENT
Start: 2021-07-22 | End: 2022-06-03

## 2021-07-27 ENCOUNTER — OFFICE VISIT (OUTPATIENT)
Dept: DERMATOLOGY | Age: 56
End: 2021-07-27
Payer: MEDICARE

## 2021-07-27 VITALS — TEMPERATURE: 97.6 F

## 2021-07-27 DIAGNOSIS — K13.0 CHEILITIS: Primary | ICD-10-CM

## 2021-07-27 DIAGNOSIS — L82.1 SK (SEBORRHEIC KERATOSIS): ICD-10-CM

## 2021-07-27 DIAGNOSIS — L57.0 AK (ACTINIC KERATOSIS): ICD-10-CM

## 2021-07-27 DIAGNOSIS — L82.0 INFLAMED SEBORRHEIC KERATOSIS: ICD-10-CM

## 2021-07-27 PROCEDURE — G8427 DOCREV CUR MEDS BY ELIG CLIN: HCPCS | Performed by: DERMATOLOGY

## 2021-07-27 PROCEDURE — 17000 DESTRUCT PREMALG LESION: CPT | Performed by: DERMATOLOGY

## 2021-07-27 PROCEDURE — G8417 CALC BMI ABV UP PARAM F/U: HCPCS | Performed by: DERMATOLOGY

## 2021-07-27 PROCEDURE — 17110 DESTRUCTION B9 LES UP TO 14: CPT | Performed by: DERMATOLOGY

## 2021-07-27 PROCEDURE — 4004F PT TOBACCO SCREEN RCVD TLK: CPT | Performed by: DERMATOLOGY

## 2021-07-27 PROCEDURE — 99213 OFFICE O/P EST LOW 20 MIN: CPT | Performed by: DERMATOLOGY

## 2021-07-27 PROCEDURE — 3017F COLORECTAL CA SCREEN DOC REV: CPT | Performed by: DERMATOLOGY

## 2021-07-27 RX ORDER — NYSTATIN AND TRIAMCINOLONE ACETONIDE 100000; 1 [USP'U]/G; MG/G
OINTMENT TOPICAL
Qty: 30 G | Refills: 0 | Status: SHIPPED | OUTPATIENT
Start: 2021-07-27

## 2021-07-27 NOTE — PROGRESS NOTES
aortic dissection    AORTIC VALVE REPLACEMENT  05/30/2017    Dr. Ashley Mario - redo w/25mm [employer] ThermaFix model 3300 bovine pericardial bioprosthesis    AORTIC VALVE SURGERY  08/27/2004    Dr. José Antonio Jackman - 27mm Kelton-Castelan pericardial prosthesis     BACK SURGERY      L4-S1    CARDIAC CATHETERIZATION  05/09/2017    Dr. Belle Bowman  08/26/2004    Dr. Pedro Barragan Right 03/17/2015    Dr. Noel Barajas  10/10/2016    Dr. Bao Gonzalez - w/laparascopic lysis of extensive adhesions, open transverse colon resection, excision of old abd mesh adhering to colon    CORONARY ANGIOPLASTY WITH STENT PLACEMENT  07/2014    CORONARY ARTERY BYPASS GRAFT  08/27/2004    Dr. José Antonio Jackman - x2 (LIMA-LAD, SVG sequentially to ascending aorta & D1)    CORONARY ARTERY BYPASS GRAFT  05/30/2017    Dr. Ashley Mario - redo x3 (reverse AC SVG sequentially to D1, OM1 & PDA) w/explant of prior prosthetic valve & removal of embedded sternal wires x7    EMG  04/16/2012    FOOT SURGERY Left 01/24/2012    Dr. Cristiane Smith, DPM - hallux nail evulsion & exostectomy    HEMICOLECTOMY N/A 7/26/2019    ROBOTIC ASSISTED  LAPAROSCOPIC RIGHT COLECTOMY AND CHOLECYSTECTOMY performed by Charmayne Rusk, MD at [address] [place] [address]  2010    multiple    LUMBAR DISCECTOMY  2012    L4-5    SHOULDER ARTHROSCOPY Right 11/21/2013    Dr. Shruthi Elliott - w/subacromial decompression, debridement of the SLAP tear, distal clavicle excision    SOFT TISSUE TUMOR RESECTION  2001    retroperitoneal mass    TESTICLE REMOVAL Left 2001    radical orchiectomy    TRANSESOPHAGEAL ECHOCARDIOGRAM  05/30/2017    during redo CABG & AVR    TUNNELED VENOUS PORT PLACEMENT  2002    portacath        Allergies   Allergen Reactions    Atorvastatin Calcium [Lipitor] Other (See Comments)     Severe headaches      Wellbutrin [Bupropion] Anxiety     patient complains of feeling anxious, irritable and \"hyped up\" on wellbutrin around 2011.  Cymbalta [Duloxetine Hcl]     Vicodin [Hydrocodone-Acetaminophen] Itching     Outpatient Medications Marked as Taking for the 7/27/21 encounter (Office Visit) with Hal Leonard MD   Medication Sig Dispense Refill    nystatin-triamcinolone [facility]) 611867-7.5 UNIT/GM-% ointment Apply topically 2 times daily until improved. 30 g 0    warfarin (COUMADIN) 5 MG tablet TAKE 1 TABLET BY MOUTH ONE TIME A DAY OR TAKE ONE AND ONE-HALF TABLETS BY MOUTH EVERY DAY AS DIRECTED BY CLINIC 135 tablet 2    rosuvastatin (CRESTOR) 20 MG tablet TAKE ONE TABLET BY MOUTH EVERY EVENING 90 tablet 3    citalopram (CELEXA) 20 MG tablet Take 2 tablets by mouth daily 30 tablet 3    metFORMIN (GLUCOPHAGE) 500 MG tablet Take 2 tablets by mouth 2 times daily (with meals) 120 tablet 0    lisinopril (PRINIVIL;ZESTRIL) 10 MG tablet TAKE 1 TABLET BY MOUTH 2 TIMES A  tablet 2    famotidine (PEPCID) 20 MG tablet TAKE 1 TABLET BY MOUTH 2 TIMES A  tablet 2    metoprolol tartrate (LOPRESSOR) 50 MG tablet TAKE 1 TABLET BY MOUTH 2 TIMES A DAY FOR BLOOD PRESSURE AND HEART 180 tablet 1    Handicap Placard MISC by Does not apply route Effective through 12/28/2020 through 12/27/2025 1 each 0    aspirin 81 MG EC tablet Take 1 tablet by mouth daily 30 tablet 0    CYCLOBENZAPRINE HCL PO Take by mouth      amLODIPine (NORVASC) 5 MG tablet Take 1 tablet by mouth nightly as needed (If systolic blood pressure over 130, take amlodipine 5mg at night, if under 130 do not take amlopidine) 30 tablet 2    melatonin (RA MELATONIN) 3 MG TABS tablet Take 1 tablet by mouth nightly as needed (insomnia) 30 tablet 3    oxyCODONE-acetaminophen (PERCOCET) 5-325 MG per tablet Take 1 tablet by mouth every 4 hours as needed for Pain  .       gabapentin (NEURONTIN) 300 MG capsule Take 300 mg by mouth 3 times daily            Physical Examination       The following were examined and determined to be normal: Psych/Neuro, Head/face, Conjunctivae/eyelids, Gums/teeth/lips, Neck, Breast/axilla/chest, Abdomen and LUE. The following were examined and determined to be abnormal: Scalp/hair, Back and RUE. Well-appearing. 1.  Lips unremarkable today. 2.  Upper extremities with multiple stuck in appearing verrucous gray-brown papules and plaques. 3.  Right lateral upper arm2, left mid back1: Several stuck on appearing crusted and verrucous pink-brown papules. 4.  Crown of the scalp with a keratotic pink patch. Assessment and Plan     1. Cheilitis, chronicunder good control    Continue intermittent use of nystatin triamcinolone ointment for flares. Continue frequent use of Blistex. 2. SK (seborrheic keratosis)     Reassurance. 3. Inflamed seborrheic keratosis    2 cycles of liquid nitrogen applied to 3 ISKs: Right lateral upper arm2, left mid back1. Patient was educated regarding the potential risks of blister formation and discomfort. Wound care was discussed. 4. AK (actinic keratosis)     2 cycles of liquid nitrogen applied to 1 AK on the crown of the scalp. Patient was educated regarding the potential risks of blister formation and discomfort. Wound care was discussed. Return in about 1 year (around 7/27/2022).     --Татьяна Leslie MD

## 2021-07-28 DIAGNOSIS — F33.9 UNIPOLAR DEPRESSION (HCC): ICD-10-CM

## 2021-07-29 RX ORDER — CITALOPRAM 40 MG/1
40 TABLET ORAL DAILY
Qty: 30 TABLET | Refills: 0 | Status: SHIPPED | OUTPATIENT
Start: 2021-07-29 | End: 2021-10-12 | Stop reason: ALTCHOICE

## 2021-07-29 NOTE — TELEPHONE ENCOUNTER
PT states doing well needs refills but needs the 40 MG sent in or more of the 20MG Please look at RX

## 2021-07-30 ENCOUNTER — OFFICE VISIT (OUTPATIENT)
Dept: CARDIOLOGY CLINIC | Age: 56
End: 2021-07-30
Payer: MEDICARE

## 2021-07-30 VITALS
WEIGHT: 269 LBS | BODY MASS INDEX: 36.44 KG/M2 | OXYGEN SATURATION: 95 % | DIASTOLIC BLOOD PRESSURE: 74 MMHG | SYSTOLIC BLOOD PRESSURE: 146 MMHG | HEIGHT: 72 IN | HEART RATE: 66 BPM

## 2021-07-30 DIAGNOSIS — Z95.1 HX OF CABG: ICD-10-CM

## 2021-07-30 DIAGNOSIS — I25.10 CAD IN NATIVE ARTERY: Primary | ICD-10-CM

## 2021-07-30 DIAGNOSIS — Z95.2 S/P AVR (AORTIC VALVE REPLACEMENT): ICD-10-CM

## 2021-07-30 DIAGNOSIS — I49.3 PVC (PREMATURE VENTRICULAR CONTRACTION): ICD-10-CM

## 2021-07-30 DIAGNOSIS — I10 ESSENTIAL HYPERTENSION: ICD-10-CM

## 2021-07-30 DIAGNOSIS — I48.0 PAROXYSMAL ATRIAL FIBRILLATION (HCC): ICD-10-CM

## 2021-07-30 PROCEDURE — 99213 OFFICE O/P EST LOW 20 MIN: CPT | Performed by: INTERNAL MEDICINE

## 2021-07-30 PROCEDURE — G8417 CALC BMI ABV UP PARAM F/U: HCPCS | Performed by: INTERNAL MEDICINE

## 2021-07-30 PROCEDURE — G8428 CUR MEDS NOT DOCUMENT: HCPCS | Performed by: INTERNAL MEDICINE

## 2021-07-30 PROCEDURE — 4004F PT TOBACCO SCREEN RCVD TLK: CPT | Performed by: INTERNAL MEDICINE

## 2021-07-30 PROCEDURE — 3017F COLORECTAL CA SCREEN DOC REV: CPT | Performed by: INTERNAL MEDICINE

## 2021-07-30 NOTE — PROGRESS NOTES
Subjective:      Patient ID: Regine Brown is a 60 y.o. male. HPI  Mr. Brock Opitz is here today for follow up CAD/CABG/AVR/Afib/HTN. No complaints. Remains active. Only back issues, spinal stenosis. No exertional chest pain. Wt up. Still smoking. No exertional sx. No sob/cp. No pnd. No orthopnea. No tachycardia. No syncope. BP good at home. Rhythm stable.          Past Medical History:   Diagnosis Date    Abdominal hernia     s/p repair 2010    Aortic valve prosthesis present     CAD (coronary artery disease)     Chronic back pain greater than 3 months duration     Clostridium difficile diarrhea 10/17/2016    PCR    DDD (degenerative disc disease), lumbosacral 8/7/2013    Erectile dysfunction     GERD (gastroesophageal reflux disease)     Hyperlipidemia     Hypertension     Joint pain     Left testicular cancer ([facility] [address] 2002    s/p abdominal resection    Myalgia and myositis, unspecified 8/26/2013    Neuropathy     OA (osteoarthritis) of knee     bilateral    Obesity, Class I, BMI 30.0-34.9 (see actual BMI)     Prolonged emergence from general anesthesia     after CABG    S/P AVR 2005    tissue valve    S/P CABG x 2 2005    w/AVR & primary repair of dissected ascending aorta    Type 2 diabetes mellitus without complication, without long-term current use of insulin ([facility] [address] 7/13/2021     Past Surgical History:   Procedure Laterality Date    AORTA SURGERY  08/27/2004    Dr. Moni Mendoza - primary repair of limited ascending aortic dissection    AORTIC VALVE REPLACEMENT  05/30/2017    Dr. Sonia Esparza - redo w/25mm [facility] ThermaFix model 3300 bovine pericardial bioprosthesis    AORTIC VALVE SURGERY  08/27/2004    Dr. Moni Mendoza - 27mm Kelton-Castelan pericardial prosthesis     BACK SURGERY      L4-S1    CARDIAC CATHETERIZATION  05/09/2017    Dr. Abel Sr  08/26/2004    Dr. Jenna Boland Right 03/17/2015    Dr. Jackie Suero COLECTOMY  10/10/2016    Dr. Shannon Ireland - w/laparascopic lysis of extensive adhesions, open transverse colon resection, excision of old abd mesh adhering to colon    CORONARY ANGIOPLASTY WITH STENT PLACEMENT  2014    CORONARY ARTERY BYPASS GRAFT  2004    Dr. Melbourne Sacks - x2 (LIMA-LAD, SVG sequentially to ascending aorta & D1)    CORONARY ARTERY BYPASS GRAFT  2017    Dr. Jimmie Blandon - redo x3 (reverse AC SVG sequentially to D1, OM1 & PDA) w/explant of prior prosthetic valve & removal of embedded sternal wires x7    EMG  2012    FOOT SURGERY Left 2012    Dr. Ilene Hylton, DPM - hallux nail evulsion & exostectomy    HEMICOLECTOMY N/A 2019    ROBOTIC ASSISTED  LAPAROSCOPIC RIGHT COLECTOMY AND CHOLECYSTECTOMY performed by Preeti Goodman MD at [address] [place] [address]      multiple    LUMBAR DISCECTOMY  2012    L4-5    SHOULDER ARTHROSCOPY Right 2013    Dr. Chloe Novak - w/subacromial decompression, debridement of the SLAP tear, distal clavicle excision    SOFT TISSUE TUMOR RESECTION      retroperitoneal mass    TESTICLE REMOVAL Left     radical orchiectomy    TRANSESOPHAGEAL ECHOCARDIOGRAM  2017    during redo CABG & AVR    TUNNELED VENOUS PORT PLACEMENT  2002    portacath      Social History     Socioeconomic History    Marital status:       Spouse name: Not on file    Number of children: Not on file    Years of education: Not on file    Highest education level: Not on file   Occupational History    Not on file   Tobacco Use    Smoking status: Current Some Day Smoker     Packs/day: 0.75     Years: 40.00     Pack years: 30.00     Types: Cigarettes     Start date: 1947     Last attempt to quit: 2017     Years since quittin.1    Smokeless tobacco: Never Used    Tobacco comment: stopped 5-18   Vaping Use    Vaping Use: Never used   Substance and Sexual Activity    Alcohol use: No     Alcohol/week: 0.0 standard drinks    Drug use: No    Sexual activity: Yes     Partners: Female   Other Topics Concern    Not on file   Social History Narrative    Not on file     Social Determinants of Health     Financial Resource Strain:     Difficulty of Paying Living Expenses:    Food Insecurity:     Worried About Running Out of Food in the Last Year:     [address] in the Last Year:    Transportation Needs:     Lack of Transportation (Medical):  Lack of Transportation (Non-Medical):    Physical Activity:     Days of Exercise per Week:     Minutes of Exercise per Session:    Stress:     Feeling of Stress :    Social Connections:     Frequency of Communication with Friends and Family:     Frequency of Social Gatherings with Friends and Family:     Attends Gnosticism Services:     Active Member of Clubs or Organizations:     Attends Club or Organization Meetings:     Marital Status:    Intimate Partner Violence:     Fear of Current or Ex-Partner:     Emotionally Abused:     Physically Abused:     Sexually Abused:      FH reviewed, denies FH cardiac issues     Vitals:    07/30/21 0908   BP: (!) 146/74   Pulse: 66   SpO2: 95%         Wt 269    Review of Systems   Constitutional: Negative for activity change. Has  fatigue. Respiratory: Negative for apnea, chest tightness and shortness of breath. Cardiovascular: Negative for chest pain, palpitations and leg swelling. No PND or orthopnea. No tachycardia. Gastrointestinal: Negative for abdominal distention. Musculoskeletal: Negative for myalgias. Neurological: Negative for dizziness, syncope and light-headedness. Psychiatric/Behavioral: Negative for behavioral problems, confusion and agitation. All other systems reviewed negative as done      Objective:   Physical Exam   Constitutional: He is oriented to person, place, and time. He appears well-developed and well-nourished. No distress. HENT:   Head: Normocephalic and atraumatic. Eyes: Conjunctivae and EOM are normal. Right eye exhibits no discharge. Left eye exhibits no discharge. Neck: Normal range of motion. Neck supple. No JVD present. Cardiovascular: Normal rate, regular rhythm and prosthetic valve sounds normal .  Exam reveals no gallop. 1/6 syst murmur heard. Pulmonary/Chest: Effort normal and breath sounds normal. No respiratory distress. He has min wheezes. He has no rales. Abdominal: Soft. Bowel sounds are normal. There is no tenderness. Musculoskeletal: Normal range of motion. He exhibits no edema. Neurological: He is alert and oriented to person, place, and time. Skin: Skin is warm and dry. Psychiatric: He has a normal mood and affect. His behavior is normal. Thought content normal.       Assessment:       Diagnosis Orders   1. CAD in native artery     2. Hx of CABG     3. S/P AVR (aortic valve replacement)     4. Paroxysmal atrial fibrillation (HCC)     5. Essential hypertension     6. PVC (premature ventricular contraction)             Plan:      CV stable. Rhythm stable. No angina. BP better. Compensated. Wt down. Reviewed previous records and testing including echo 5/17 and cath 5/17. Continues to smoke. Encouraged to stop. Encouraged diet, exercise and wt. No changes. Continue to monitor. Lipids per PCP. Follow up 3 months. Echo but wishes to discuss at next visit.

## 2021-08-09 ENCOUNTER — ANTI-COAG VISIT (OUTPATIENT)
Dept: PHARMACY | Age: 56
End: 2021-08-09
Payer: MEDICARE

## 2021-08-09 DIAGNOSIS — I48.0 PAROXYSMAL ATRIAL FIBRILLATION (HCC): ICD-10-CM

## 2021-08-09 DIAGNOSIS — Z95.2 S/P AORTIC VALVE REPLACEMENT: Primary | ICD-10-CM

## 2021-08-09 LAB — INTERNATIONAL NORMALIZATION RATIO, POC: 3.3

## 2021-08-09 PROCEDURE — 99212 OFFICE O/P EST SF 10 MIN: CPT

## 2021-08-09 PROCEDURE — 85610 PROTHROMBIN TIME: CPT

## 2021-08-09 NOTE — PROGRESS NOTES
ANTICOAGULATION SERVICE    See Luther is a 60 y.o. male with PMHx significant for AVR (re-do in May, 2017), CAD s/p CABG (x3 in May, 2017), pA-fib, HLD, HTN, testicular CA who presents to clinic on 8/9/2021 for anticoagulation monitoring and adjustment.     Anticoagulation Indication(s):  Heart Valve Replacement (bovine AVR), A-fib  Referring Physician:  Dr. Rita Dalton  Goal INR Range:  2-3  Duration of Anticoagulation Therapy:  TBD  Time of day dose taken:  PM  Product patient has at home:  warfarin 5 mg (peach)    [id number] Score for Atrial Fibrillation Stroke Risk   Risk   Factors  Component Value   C CHF No 0   H HTN Yes 1   A2 Age >= 75 No,  (57 y.o.) 0   D DM Yes 1   S2 Prior Stroke/TIA No 0   V Vascular Disease Yes 1   A Age 74-69 No,  (57 y.o.) 0   Sc Sex male 0    [id number]  Score  3   Score last updated 5/30/19 1:51 PM      Lab Results   Component Value Date    RBC 3.70 (L) 07/30/2019    HGB 12.4 (L) 07/30/2019    HCT 36.5 (L) 07/30/2019    MCV 98.7 07/30/2019    MCH 33.5 07/30/2019    MPV 7.6 07/30/2019    RDW 15.6 (H) 07/30/2019     07/30/2019     INR Summary                            Warfarin regimen (mg)  Date INR   A/P    Sun Mon Tue Wed Thu Fri Sat Mg/wk  8/9 3.3 Above goal, dec  7.5 5 5 5 7.5 5 5 42.5  6/28 2.0 At goal, no change  7.5 5 5 5 5 5 7.5 40  6/11 1.5 Below goal, increase  7.5 5 5 5 5 5 7.5 40  5/24 3.6 Above goal, hold x 1  5 0/5 5 5 5 5 5 35  4/23 2.7 At goal, no change  5 5 5 5 5 5 5 35  4/5 3.6 Above goal, hold x 1  5 0/5 5 5 5 5 5 35   3/8 3.0 At goal, no change  5 5 5 5 5 5 5 35  2/19 2.0 At goal, no change  5 5 5 5 5 5 5 35  2/4 2.7 At goal, no change  5 5 5 5 5 5 5 35  1/20 2.7 At goal, no change  5 5 5 5 5 5 5 35  1/11 4.5 Above goal (Cymbalta) 5 0/5 0/5 5 5 5 5 35  12/28 2.0 At goal, no change  7.5 5 5 5 5 5 7.5 40  11/24 2.4 At goal, no change  7.5 5 5 5 5 5 7.5 40  10/30 2.5 At goal, no change  7.5 5 5 5 5 5 7.5 40  10/2 2.2 At goal, no change  7.5 5 5 5 5 5 7.5 40  9/3 2.4 At goal, no change  7.5 5 5 5 5 5 7.5 40  8/3 2.9 At goal, no change  7.5 5 5 5 5 5 7.5 40  7/6 2.7 At goal, no change  7.5 5 5 5 5 5 7.5 40  6/8 2.8 At goal, no change  7.5 5 5 5 5 5 7.5 40  5/11 2.9 At goal, no change  7.5 5 5 5 5 5 7.5 40  3/30 3.0 At goal, no change  7.5 5 5 5 5 5 7.5 40  2/26 2.3 At goal, no change  7.5 5 5 5 5 5 7.5 40  2/5 3.3 Above goal, decrease  7.5 5 5 5 5 5 7.5 40  1/8 2.9 At goal, no change  7.5 7.5 5 5 5 5 7.5 42.5    Patient History:  Recent hospitalizations/HC visits -6/18/21: PCP   Recent medication changes Buspar? metformin    6/18/21 started citalopram 20 mg QD (may increase INR)  12/28 Cymbalta 30 mg daily (may increase INR); stopped taking on 1/30; resumed ~2/4; stopped ~6/1 7/21/21: increased citalopram    Medications taken regularly that may interact with warfarin or alter INR ASA 81 mg   Warfarin dose taken as prescribed Yes  Usually compliant   Does not use pillbox  Patient has been taking warfarin since re-do AVR in May, 2017   Signs/symptoms of bleeding No h/o major bleeding   Vitamin K intake Normally has ~0 servings of green, leafy vegetables per week  States that since diabetic needs to stop sugar but not adjusting vit k intake   Recent vomiting/diarrhea/fever, changes in weight or activity level None reported   Tobacco or alcohol use -No recent changes in smoking as of 11/24/20 (~3/4 PPD)  -Patient cut back from 2 PPD to 3/4 PPD in February (2019) when he moved in with his daughter. Decrease in smoking typically increases INR gradually.   -Patient denies any alcohol or illicit drug use   Upcoming surgeries or procedures None reported  Back stimulator implant canceled due to cost     Assessment/Plan:  Patient's INR was supratherapeutic today (3.3). He denies any warfarin dosing errors, change in vitamin K intake, illness, change in smoking, change in physical activity, or bleeding since last visit.   He states that since he is diabetic is changing diet to stop eating sugars but does not plan to eat any vit k. Warfarin dose was reduced to 5 mg daily on 1/11 due to interaction with Cymbalta, and INR has been mostly therapeutic since then. Prior to starting Cymbalta on 12/28, patient's INR had been therapeutic on warfarin 40 mg/week for almost a year. Patient stopped taking Cymbalta about 2 weeks ago (early June) due to side effects and does not plan to restart. Adjusted dose back to previously therapeutic dose prior to Cymbalta initiation at that time. He increased citalopram recently, which has the potential to increase INR also. Patient was instructed to decrease weekly dose to 5 mg on Mon, Tues, Wed and Fri and 7.5 mg all other days. Repeat INR in 2 weeks. Patient was reminded to maintain consistent vitamin K intake and call with any bleeding, medication changes, or fever/vomiting/diarrhea. Patient understands dosing directions and information discussed. Dosing schedule and follow up appointment given to patient. Progress note routed to referring physician's office. Patient acknowledges working in consult agreement with pharmacist as referred by his/her physician. Next INR Check:  8/4    Please call [facility] at [phone] with any questions.         For Pharmacy Admin Tracking Only     Intervention Detail: Dose Adjustment: 1, reason: Therapy Optimization   Total # of Interventions Recommended: 1   Total # of Interventions Accepted: 1   Time Spent (min): 15

## 2021-08-23 ENCOUNTER — ANTI-COAG VISIT (OUTPATIENT)
Dept: PHARMACY | Age: 56
End: 2021-08-23
Payer: MEDICARE

## 2021-08-23 DIAGNOSIS — Z95.2 S/P AORTIC VALVE REPLACEMENT: Primary | ICD-10-CM

## 2021-08-23 DIAGNOSIS — I48.0 PAROXYSMAL ATRIAL FIBRILLATION (HCC): ICD-10-CM

## 2021-08-23 LAB — INTERNATIONAL NORMALIZATION RATIO, POC: 4.3

## 2021-08-23 PROCEDURE — 99212 OFFICE O/P EST SF 10 MIN: CPT

## 2021-08-23 PROCEDURE — 85610 PROTHROMBIN TIME: CPT

## 2021-08-23 NOTE — PROGRESS NOTES
ANTICOAGULATION SERVICE    Nabila Payan is a 60 y.o. male with PMHx significant for AVR (re-do in May, 2017), CAD s/p CABG (x3 in May, 2017), pA-fib, HLD, HTN, testicular CA who presents to clinic on 8/23/2021 for anticoagulation monitoring and adjustment.     Anticoagulation Indication(s):  Heart Valve Replacement (bovine AVR), A-fib  Referring Physician:  Dr. Cristiane Carr  Goal INR Range:  2-3  Duration of Anticoagulation Therapy:  TBD  Time of day dose taken:  PM  Product patient has at home:  warfarin 5 mg (peach)    [id number] Score for Atrial Fibrillation Stroke Risk   Risk   Factors  Component Value   C CHF No 0   H HTN Yes 1   A2 Age >= 75 No,  (57 y.o.) 0   D DM Yes 1   S2 Prior Stroke/TIA No 0   V Vascular Disease Yes 1   A Age 74-69 No,  (57 y.o.) 0   Sc Sex male 0    [id number]  Score  3   Score last updated 5/30/19 1:51 PM      Lab Results   Component Value Date    RBC 3.70 (L) 07/30/2019    HGB 12.4 (L) 07/30/2019    HCT 36.5 (L) 07/30/2019    MCV 98.7 07/30/2019    MCH 33.5 07/30/2019    MPV 7.6 07/30/2019    RDW 15.6 (H) 07/30/2019     07/30/2019     INR Summary                            Warfarin regimen (mg)  Date INR   A/P    Sun Mon Tue Wed Thu Fri Sat Mg/wk  8/23 4.3 Above goal, decrease  7.5 0/5 5 5 5 5 7.5 40  8/9 3.3 Above goal, dec  7.5 5 5 5 7.5 5 5 40  7/21 1.4 Below goal, increase  5 7.5 5 7.5 5 7.5 5 42.5  6/28 2.0 At goal, no change  7.5 5 5 5 5 5 7.5 40  6/11 1.5 Below goal, increase  7.5 5 5 5 5 5 7.5 40  5/24 3.6 Above goal, hold x 1  5 0/5 5 5 5 5 5 35  4/23 2.7 At goal, no change  5 5 5 5 5 5 5 35  4/5 3.6 Above goal, hold x 1  5 0/5 5 5 5 5 5 35   3/8 3.0 At goal, no change  5 5 5 5 5 5 5 35  2/19 2.0 At goal, no change  5 5 5 5 5 5 5 35  2/4 2.7 At goal, no change  5 5 5 5 5 5 5 35  1/20 2.7 At goal, no change  5 5 5 5 5 5 5 35  1/11 4.5 Above goal (Cymbalta) 5 0/5 0/5 5 5 5 5 35  12/28 2.0 At goal, no change  7.5 5 5 5 5 5 7.5 40  11/24 2.4 At goal, no change  7.5 5 5 5 5 5 7.5 40  10/30 2.5 At goal, no change  7.5 5 5 5 5 5 7.5 40  10/2 2.2 At goal, no change  7.5 5 5 5 5 5 7.5 40  9/3 2.4 At goal, no change  7.5 5 5 5 5 5 7.5 40  8/3 2.9 At goal, no change  7.5 5 5 5 5 5 7.5 40  7/6 2.7 At goal, no change  7.5 5 5 5 5 5 7.5 40  6/8 2.8 At goal, no change  7.5 5 5 5 5 5 7.5 40  5/11 2.9 At goal, no change  7.5 5 5 5 5 5 7.5 40  3/30 3.0 At goal, no change  7.5 5 5 5 5 5 7.5 40  2/26 2.3 At goal, no change  7.5 5 5 5 5 5 7.5 40  2/5 3.3 Above goal, decrease  7.5 5 5 5 5 5 7.5 40  1/8 2.9 At goal, no change  7.5 7.5 5 5 5 5 7.5 42.5    Patient History:  Recent hospitalizations/HC visits -6/18/21: PCP   Recent medication changes +metformin  6/18/21 started citalopram 20 mg QD (may increase INR)  12/28 Cymbalta 30 mg daily (may increase INR); stopped taking on 1/30; resumed ~2/4; stopped ~6/1 7/21/21: increased citalopram    Medications taken regularly that may interact with warfarin or alter INR ASA 81 mg   Warfarin dose taken as prescribed Yes  Usually compliant   Does not use pillbox  Patient has been taking warfarin since re-do AVR in May, 2017   Signs/symptoms of bleeding No h/o major bleeding   Vitamin K intake Normally has ~0 servings of green, leafy vegetables per week  States that since diabetic needs to stop sugar but not adjusting vit k intake   Recent vomiting/diarrhea/fever, changes in weight or activity level None reported   Tobacco or alcohol use -No recent changes in smoking as of 11/24/20 (~3/4 PPD)  -Patient cut back from 2 PPD to 3/4 PPD in February (2019) when he moved in with his daughter. Decrease in smoking typically increases INR gradually.   -Patient denies any alcohol or illicit drug use   Upcoming surgeries or procedures None reported  Back stimulator implant canceled due to cost     Assessment/Plan:  Patient's INR was supratherapeutic again today (4.3).    He denies any change in vitamin K intake, illness, change in smoking, change in physical activity, or bleeding since last visit. He states that since he is diabetic is changing diet to stop eating sugars but does not plan to eat any vit k. He has been taking 5 mg daily except 7.5 mg on Sundays, Thursdays, and Saturdays - 3 times a week still. He also states he accidentally took 7.5 mg on Tuesday last week as well, meaning he took 7.5 mg 4 times last week. Warfarin dose was reduced to 5 mg daily on 1/11 due to interaction with Cymbalta, and INR has been mostly therapeutic since then. Prior to starting Cymbalta on 12/28, patient's INR had been therapeutic on warfarin 40 mg/week for almost a year. Patient stopped taking Cymbalta about 2 weeks ago (early June) due to side effects and does not plan to restart. He increased citalopram recently, which has the potential to increase INR also. Patient was instructed to hold his dose today, then decrease weekly dose to 7.5 mg on Saturday and Sunday, and 5 mg all other days. Repeat INR in 2 weeks. Patient was reminded to maintain consistent vitamin K intake and call with any bleeding, medication changes, or fever/vomiting/diarrhea. Patient understands dosing directions and information discussed. Dosing schedule and follow up appointment given to patient. Progress note routed to referring physician's office. Patient acknowledges working in consult agreement with pharmacist as referred by his/her physician. Next INR Check: 9/9    Please call [facility] at [phone] with any questions.         For Pharmacy Admin Tracking Only     Intervention Detail: Dose Adjustment: 1, reason: Therapy Optimization   Total # of Interventions Recommended: 1   Total # of Interventions Accepted: 1   Time Spent (min): 15

## 2021-09-03 NOTE — PATIENT INSTRUCTIONS
- please start taking metformin and increase dose every 3 days:     -- start 500 mg metformin daily for 3 days     -- 500 mg metformin twice daily for 3 days     -- 500 mg metformin three times daily for 3 days     -- 1000 mg metformin twice daily after that  - please adhere to healthy lifestyle with diet low in carbs, fat and exercise daily for 30 mins  - please go to lab for blood work  - please schedule eye exam done

## 2021-09-09 ENCOUNTER — TELEPHONE (OUTPATIENT)
Dept: PHARMACY | Age: 56
End: 2021-09-09

## 2021-09-09 NOTE — TELEPHONE ENCOUNTER
Patient no showed to anticoagulation clinic today. Called patient and LVM to reschedule INR check ASAP.      Jaciel Hicks, PharmD, BCPS  [facility]  [place]: [phone]  [place]: [phone]  9/9/2021 11:40 AM

## 2021-09-17 ENCOUNTER — ANTI-COAG VISIT (OUTPATIENT)
Dept: PHARMACY | Age: 56
End: 2021-09-17
Payer: MEDICARE

## 2021-09-17 DIAGNOSIS — Z95.2 S/P AORTIC VALVE REPLACEMENT: Primary | ICD-10-CM

## 2021-09-17 DIAGNOSIS — I48.0 PAROXYSMAL ATRIAL FIBRILLATION (HCC): ICD-10-CM

## 2021-09-17 LAB — INTERNATIONAL NORMALIZATION RATIO, POC: 2.7

## 2021-09-17 PROCEDURE — 99211 OFF/OP EST MAY X REQ PHY/QHP: CPT

## 2021-09-17 PROCEDURE — 85610 PROTHROMBIN TIME: CPT

## 2021-09-17 NOTE — PROGRESS NOTES
ANTICOAGULATION SERVICE    Tho Kong is a 60 y.o. male with PMHx significant for AVR (re-do in May, 2017), CAD s/p CABG (x3 in May, 2017), pA-fib, HLD, HTN, testicular CA who presents to clinic on 9/17/2021 for anticoagulation monitoring and adjustment.     Anticoagulation Indication(s):  Heart Valve Replacement (bovine AVR), A-fib  Referring Physician:  Dr. Kya Louis  Goal INR Range:  2-3  Duration of Anticoagulation Therapy:  TBD  Time of day dose taken:  PM  Product patient has at home:  warfarin 5 mg (peach)    [id number] Score for Atrial Fibrillation Stroke Risk   Risk   Factors  Component Value   C CHF No 0   H HTN Yes 1   A2 Age >= 75 No,  (57 y.o.) 0   D DM Yes 1   S2 Prior Stroke/TIA No 0   V Vascular Disease Yes 1   A Age 74-69 No,  (57 y.o.) 0   Sc Sex male 0    [id number]  Score  3   Score last updated 5/30/19 1:51 PM      Lab Results   Component Value Date    RBC 3.70 (L) 07/30/2019    HGB 12.4 (L) 07/30/2019    HCT 36.5 (L) 07/30/2019    MCV 98.7 07/30/2019    MCH 33.5 07/30/2019    MPV 7.6 07/30/2019    RDW 15.6 (H) 07/30/2019     07/30/2019     INR Summary                            Warfarin regimen (mg)  Date INR   A/P    Sun Mon Tue Wed Thu Fri Sat Mg/wk  9/17 2.7 At goal (dosing error)  7.5 5 5 5 5 5 7.5 40  8/23 4.3 Above goal, decrease  7.5 0/5 5 5 5 5 7.5 40  8/9 3.3 Above goal, dec  7.5 5 5 5 7.5 5 5 40  7/21 1.4 Below goal, increase  5 7.5 5 7.5 5 7.5 5 42.5  6/28 2.0 At goal, no change  7.5 5 5 5 5 5 7.5 40  6/11 1.5 Below goal, increase  7.5 5 5 5 5 5 7.5 40  5/24 3.6 Above goal, hold x 1  5 0/5 5 5 5 5 5 35  4/23 2.7 At goal, no change  5 5 5 5 5 5 5 35  4/5 3.6 Above goal, hold x 1  5 0/5 5 5 5 5 5 35   3/8 3.0 At goal, no change  5 5 5 5 5 5 5 35  2/19 2.0 At goal, no change  5 5 5 5 5 5 5 35  2/4 2.7 At goal, no change  5 5 5 5 5 5 5 35  1/20 2.7 At goal, no change  5 5 5 5 5 5 5 35  1/11 4.5 Above goal (Cymbalta) 5 0/5 0/5 5 5 5 5 35  12/28 2.0 At goal, no therapeutic today (2.7), despite taking a higher warfarin dose than instructed at last visit. He forgot to adjust his dose at last visit and has still been taking 7.5 mg on . He ran out of citalopram over Labor Day weekend and has not been taking it. This may explain why INR is still therapeutic on higher warfarin dose. He is not sure whether he is going to restart citalopram.  He denies any change in vitamin K intake, illness, change in smoking, change in physical activity, or bleeding since last visit. He states that since he is diabetic now, he is trying to cut back on sugar, but does not plan to increase vitamin K intake. Warfarin dose was reduced to 5 mg daily on  due to interaction with Cymbalta. Prior to starting Cymbalta on , patient's INR had been therapeutic on warfarin 40 mg/week for almost a year. Patient stopped taking Cymbalta in early  due to side effects and does not plan to restart. He is now taking citalopram, which has the potential to increase INR. Patient was instructed to resume intended warfarin dose of 7.5 mg on Saturday and , and 5 mg all other days. Repeat INR in 2 weeks. Patient was reminded to maintain consistent vitamin K intake and call with any bleeding, medication changes, or fever/vomiting/diarrhea. Patient understands dosing directions and information discussed. Dosing schedule and follow up appointment given to patient. Progress note routed to referring physician's office. Patient acknowledges working in consult agreement with pharmacist as referred by his/her physician. Next INR Check: 10/4    Please call [facility] at [phone] with any questions.         For Pharmacy Admin Tracking Only     Intervention Detail: Adherence Monitorin   Total # of Interventions Recommended: 0   Total # of Interventions Accepted: 0   Time Spent (min): 15

## 2021-10-04 ENCOUNTER — ANTI-COAG VISIT (OUTPATIENT)
Dept: PHARMACY | Age: 56
End: 2021-10-04
Payer: MEDICARE

## 2021-10-04 DIAGNOSIS — I48.0 PAROXYSMAL ATRIAL FIBRILLATION (HCC): ICD-10-CM

## 2021-10-04 DIAGNOSIS — Z95.2 S/P AORTIC VALVE REPLACEMENT: Primary | ICD-10-CM

## 2021-10-04 LAB — INTERNATIONAL NORMALIZATION RATIO, POC: 2

## 2021-10-04 PROCEDURE — 99211 OFF/OP EST MAY X REQ PHY/QHP: CPT | Performed by: PHARMACIST

## 2021-10-04 PROCEDURE — 85610 PROTHROMBIN TIME: CPT | Performed by: PHARMACIST

## 2021-10-04 NOTE — Clinical Note
Dr. Karthik Cunningham - patient is getting spinal stimulator placed on 10/15. He is expecting a call from his surgeon this week regarding plans for his anticoagulation but in the past they had recommended holding x7 days prior to procedure. Patient has A-fib with CHADS-VASc score of 2, so I do not anticipate the need for Lovenox bridging for procedure. Please let me know if you are okay with this. Thanks.

## 2021-10-04 NOTE — PROGRESS NOTES
ANTICOAGULATION SERVICE    Jyoti Carson is a 60 y.o. male with PMHx significant for AVR (re-do in May, 2017), CAD s/p CABG (x3 in May, 2017), pA-fib, HLD, HTN, testicular CA who presents to clinic on 10/4/2021 for anticoagulation monitoring and adjustment.     Anticoagulation Indication(s):  Heart Valve Replacement (bovine AVR), A-fib  Referring Physician:  Dr. Whitney Hdz  Goal INR Range:  2-3  Duration of Anticoagulation Therapy:  TBD  Time of day dose taken:  PM  Product patient has at home:  warfarin 5 mg (peach)    [id number] Score for Atrial Fibrillation Stroke Risk   Risk   Factors  Component Value   C CHF No 0   H HTN Yes 1   A2 Age >= 75 No,  (57 y.o.) 0   D DM Yes 1   S2 Prior Stroke/TIA No 0   V Vascular Disease Yes 1   A Age 74-69 No,  (57 y.o.) 0   Sc Sex male 0    [id number]  Score  3   Score last updated 5/30/19 1:51 PM      Lab Results   Component Value Date    RBC 3.70 (L) 07/30/2019    HGB 12.4 (L) 07/30/2019    HCT 36.5 (L) 07/30/2019    MCV 98.7 07/30/2019    MCH 33.5 07/30/2019    MPV 7.6 07/30/2019    RDW 15.6 (H) 07/30/2019     07/30/2019     INR Summary                            Warfarin regimen (mg)  Date INR   A/P    Sun Mon Tue Wed Thu Fri Sat Mg/wk  10/4 2.0 At goal, continue   7.5 5 5 5 5 5 7.5 40  9/17 2.7 At goal (dosing error)  7.5 5 5 5 5 5 7.5 40  8/23 4.3 Above goal, decrease  7.5 0/5 5 5 5 5 7.5 40  8/9 3.3 Above goal, dec  7.5 5 5 5 7.5 5 5 40  7/21 1.4 Below goal, increase  5 7.5 5 7.5 5 7.5 5 42.5  6/28 2.0 At goal, no change  7.5 5 5 5 5 5 7.5 40  6/11 1.5 Below goal, increase  7.5 5 5 5 5 5 7.5 40  5/24 3.6 Above goal, hold x 1  5 0/5 5 5 5 5 5 35  4/23 2.7 At goal, no change  5 5 5 5 5 5 5 35  4/5 3.6 Above goal, hold x 1  5 0/5 5 5 5 5 5 35   3/8 3.0 At goal, no change  5 5 5 5 5 5 5 35  2/19 2.0 At goal, no change  5 5 5 5 5 5 5 35  2/4 2.7 At goal, no change  5 5 5 5 5 5 5 35  1/20 2.7 At goal, no change  5 5 5 5 5 5 5 35  1/11 4.5 Above goal (Cymbalta) 5 0/5 0/5 5 5 5 5 35  12/28 2.0 At goal, no change  7.5 5 5 5 5 5 7.5 40  11/24 2.4 At goal, no change  7.5 5 5 5 5 5 7.5 40  10/30 2.5 At goal, no change  7.5 5 5 5 5 5 7.5 40  10/2 2.2 At goal, no change  7.5 5 5 5 5 5 7.5 40  9/3 2.4 At goal, no change  7.5 5 5 5 5 5 7.5 40  8/3 2.9 At goal, no change  7.5 5 5 5 5 5 7.5 40  7/6 2.7 At goal, no change  7.5 5 5 5 5 5 7.5 40  6/8 2.8 At goal, no change  7.5 5 5 5 5 5 7.5 40  5/11 2.9 At goal, no change  7.5 5 5 5 5 5 7.5 40  3/30 3.0 At goal, no change  7.5 5 5 5 5 5 7.5 40  2/26 2.3 At goal, no change  7.5 5 5 5 5 5 7.5 40  2/5 3.3 Above goal, decrease  7.5 5 5 5 5 5 7.5 40  1/8 2.9 At goal, no change  7.5 7.5 5 5 5 5 7.5 42.5    Patient History:  Recent hospitalizations/HC visits -6/18/21: PCP   Recent medication changes 9/2021: Stopped citalopram, does not plan to continue   7/21/21: increased citalopram   6/18/21: started citalopram 20 mg QD (may increase INR)  12/28/20: Cymbalta 30 mg daily (may increase INR); stopped taking on 1/30; resumed ~2/4; stopped ~6/1   Medications taken regularly that may interact with warfarin or alter INR ASA 81 mg   Warfarin dose taken as prescribed 9/17/21 did not decrease warfarin dose as instructed at last visit  Usually compliant   Does not use pillbox  Patient has been taking warfarin since re-do AVR in May, 2017   Signs/symptoms of bleeding No h/o major bleeding   Vitamin K intake Normally has ~0 servings of green, leafy vegetables per week  States that since diabetic needs to stop sugar but not adjusting vit k intake   Recent vomiting/diarrhea/fever, changes in weight or activity level None reported   Tobacco or alcohol use -No recent changes in smoking as of 11/24/20 (~3/4 PPD)  -Patient cut back from 2 PPD to 3/4 PPD in February (2019) when he moved in with his daughter.  Decrease in smoking typically increases INR gradually.   -Patient denies any alcohol or illicit drug use   Upcoming surgeries or procedures 10/15/21: Back stimulator placement - patient to call surgeon regarding holding instructions. Message sent to cardiologist.      Assessment/Plan:  Patient's INR was therapeutic today (2). He denies any change in vitamin K intake, illness, change in smoking, change in physical activity, or bleeding since last visit. He states that since he is diabetic now, he is trying to cut back on sugar, but does not plan to increase vitamin K intake. Warfarin dose was reduced to 5 mg daily on 1/11 due to interaction with Cymbalta. Prior to starting Cymbalta on 12/28, patient's INR had been therapeutic on warfarin 40 mg/week for almost a year. Patient stopped taking Cymbalta in early June due to side effects and does not plan to restart. Patient was instructed to continue warfarin 7.5 mg on Saturday and Sunday, and 5 mg all other days. Patient will be holding warfarin for back stimulator placement surgery, he will call clinic with instructions from surgeon regarding holding time. In the past surgeon recommended 7 day warfarin hold. Will re-check INR 1 week after re-starting (3 weeks from now). Patient was reminded to maintain consistent vitamin K intake and call with any bleeding, medication changes, or fever/vomiting/diarrhea. Patient understands dosing directions and information discussed. Dosing schedule and follow up appointment given to patient. Progress note routed to referring physician's office. Patient acknowledges working in consult agreement with pharmacist as referred by his/her physician. Next INR Check: 10/25    Please call [facility] at [phone] with any questions.       For Pharmacy Admin Tracking Only     Total # of Interventions Recommended: 0   Total # of Interventions Accepted: 0   Time Spent (min): 15

## 2021-10-12 ENCOUNTER — OFFICE VISIT (OUTPATIENT)
Dept: INTERNAL MEDICINE CLINIC | Age: 56
End: 2021-10-12
Payer: MEDICARE

## 2021-10-12 ENCOUNTER — TELEPHONE (OUTPATIENT)
Dept: CARDIOLOGY CLINIC | Age: 56
End: 2021-10-12

## 2021-10-12 VITALS
TEMPERATURE: 97.8 F | HEIGHT: 72 IN | SYSTOLIC BLOOD PRESSURE: 135 MMHG | DIASTOLIC BLOOD PRESSURE: 86 MMHG | HEART RATE: 68 BPM | WEIGHT: 271.4 LBS | OXYGEN SATURATION: 98 % | BODY MASS INDEX: 36.76 KG/M2

## 2021-10-12 DIAGNOSIS — E11.9 TYPE 2 DIABETES MELLITUS WITHOUT COMPLICATION, WITHOUT LONG-TERM CURRENT USE OF INSULIN (HCC): ICD-10-CM

## 2021-10-12 DIAGNOSIS — Z01.818 PRE-OP EXAM: Primary | ICD-10-CM

## 2021-10-12 LAB — HBA1C MFR BLD: 7.6 %

## 2021-10-12 PROCEDURE — 83036 HEMOGLOBIN GLYCOSYLATED A1C: CPT | Performed by: STUDENT IN AN ORGANIZED HEALTH CARE EDUCATION/TRAINING PROGRAM

## 2021-10-12 PROCEDURE — 99213 OFFICE O/P EST LOW 20 MIN: CPT | Performed by: STUDENT IN AN ORGANIZED HEALTH CARE EDUCATION/TRAINING PROGRAM

## 2021-10-12 PROCEDURE — 93005 ELECTROCARDIOGRAM TRACING: CPT | Performed by: STUDENT IN AN ORGANIZED HEALTH CARE EDUCATION/TRAINING PROGRAM

## 2021-10-12 NOTE — TELEPHONE ENCOUNTER
Estee from [facility] called. Patient is having spinal chord stimulator implant on Friday and they need orders for patient to hold coumadin. Has form been received about this? They are faxing form over again.

## 2021-10-12 NOTE — PATIENT INSTRUCTIONS
-Follow-up in 1 month  -Do not take lisinopril the morning before surgery, but take amlodipine and metoprolol the morning before surgery.  -Do not take Metformin the evening and the morning before surgery.  -Start taking warfarin the evening after surgery.  -Check INR on Monday

## 2021-10-12 NOTE — PROGRESS NOTES
Pre-Op Examination    NAME:  Rupert Jerez                                               : 1965  Age: 60 y.o. MRN: [id number]  Date : 10/12/2021    Referring Physician: Dr. Thi Moore    Procedure: Spinal stimulator  Date of Procedure: 10/15/2021    HISTORY OF PRESENT ILLNESS:   The patient is a 60 y.o. male with past medical history of CAD status post CABG, aortic valve replacement, A. fib, hypertension, diabetes mellitus who presents for preoperative examination. Patient does not have any complaints. He denies having chest pain, palpitation, shortness of breath, abdominal pain, nausea, vomiting. He denies having urinary frequency, urinary urgency, constipation, diarrhea, numbness, tingling. Patient has history of spinal stenosis and has chronic back pain. He is planned to have spinal stimulator procedure done on 10/15/2021. Blood pressures this morning was checked in the office and it was 135/86.     Planned anesthesia:  General anesthesia  Known anesthesia problems: None  Personal or FH of DVT/PE:  None  Patient objection toreceiving blood products: No      Past Medical History:        Diagnosis Date    Abdominal hernia     s/p repair     Aortic valve prosthesis present     CAD (coronary artery disease)     Chronic back pain greater than 3 months duration     Clostridium difficile diarrhea 10/17/2016    PCR    DDD (degenerative disc disease), lumbosacral 2013    Erectile dysfunction     GERD (gastroesophageal reflux disease)     Hyperlipidemia     Hypertension     Joint pain     Left testicular cancer ([facility] [address]     s/p abdominal resection    Myalgia and myositis, unspecified 2013    Neuropathy     OA (osteoarthritis) of knee     bilateral    Obesity, Class I, BMI 30.0-34.9 (see actual BMI)     Prolonged emergence from general anesthesia     after CABG    S/P AVR 2005    tissue valve    S/P CABG x 2 2005    w/AVR & primary repair of dissected ascending aorta    Type 2 TUNNELED VENOUS PORT PLACEMENT  2002    [facility]        Family History:       Problem Relation Age of Onset    Cancer Mother     Cancer Father         lung    Alzheimer's Disease Sister     Liver Cancer Brother        Social History:   TOBACCO:   reports that he has been smoking cigarettes. He started smoking about 74 years ago. He has a 30.00 pack-year smoking history. He has never used smokeless tobacco.  ETOH:   reports no history of alcohol use. OCCUPATION:      Allergies:  Atorvastatin calcium [lipitor], Wellbutrin [bupropion], Cymbalta [duloxetine hcl], and Vicodin [hydrocodone-acetaminophen]    Current Medications:    Prior to Admission medications    Medication Sig Start Date End Date Taking? Authorizing Provider   nystatin-triamcinolone (MYCOLOG) 686305-1.3 UNIT/GM-% ointment Apply topically 2 times daily until improved.  7/27/21  Yes Alisa Gaspar MD   warfarin (COUMADIN) 5 MG tablet TAKE 1 TABLET BY MOUTH ONE TIME A DAY OR TAKE ONE AND ONE-HALF TABLETS BY MOUTH EVERY DAY AS DIRECTED BY CLINIC 7/22/21  Yes Melody Perez MD   rosuvastatin (CRESTOR) 20 MG tablet TAKE ONE TABLET BY MOUTH EVERY EVENING 7/15/21  Yes Baltazar Hoover MD   lisinopril (PRINIVIL;ZESTRIL) 10 MG tablet TAKE 1 TABLET BY MOUTH 2 TIMES A DAY 4/19/21  Yes Bunny Rasheed MD   famotidine (PEPCID) 20 MG tablet TAKE 1 TABLET BY MOUTH 2 TIMES A DAY 4/19/21  Yes Bunny Rasheed MD   metoprolol tartrate (LOPRESSOR) 50 MG tablet TAKE 1 TABLET BY MOUTH 2 TIMES A DAY FOR BLOOD PRESSURE AND HEART 4/16/21  Yes Norman Pope DO   Handicap [place] [id number] [facility] by Does not apply route Effective through 12/28/2020 through 12/27/2025 12/28/20  Yes Sanya Bailey DO   aspirin 81 MG EC tablet Take 1 tablet by mouth daily 57/28/44  Yes Nayely Martinez MD   CYCLOBENZAPRINE HCL PO Take by mouth   Yes Historical Provider, MD   amLODIPine (NORVASC) 5 MG tablet Take 1 tablet by mouth nightly as needed (If systolic blood pressure over 130, take amlodipine 5mg at night, if under 130 do not take amlopidine) 4/11/19  Yes Tiffanie Cortés MD   melatonin (RA MELATONIN) 3 MG TABS tablet Take 1 tablet by mouth nightly as needed (insomnia) 1/5/18  Yes James Brooks MD   Blood Pressure KIT 1 Unit 7/21/17  Yes James Brooks MD   oxyCODONE-acetaminophen (PERCOCET) 5-325 MG per tablet Take 1 tablet by mouth every 4 hours as needed for Pain  . Yes Historical Provider, MD   gabapentin (NEURONTIN) 300 MG capsule Take 300 mg by mouth 3 times daily    Yes Historical Provider, MD   metFORMIN (GLUCOPHAGE) 500 MG tablet Take 2 tablets by mouth 2 times daily (with meals) 7/13/21 8/12/21  Julien Sinclair MD       REVIEW OF SYSTEMS:  Review of Systems   All other systems reviewed and are negative. Physical Exam:      Vitals: /86 (Site: Right Upper Arm, Position: Sitting, Cuff Size: Large Adult)   Pulse 68   Temp 97.8 °F (36.6 °C) (Temporal)   Ht 6' (1.829 m)   Wt 271 lb 6.4 oz (123.1 kg)   SpO2 98%   BMI 36.81 kg/m²     Body mass index is 36.81 kg/m². Wt Readings from Last 3 Encounters:   10/12/21 271 lb 6.4 oz (123.1 kg)   07/30/21 269 lb (122 kg)   07/13/21 271 lb 6.4 oz (123.1 kg)     Physical Exam  Vitals reviewed. Constitutional:       Appearance: He is obese. HENT:      Nose: Nose normal.      Mouth/Throat:      Mouth: Mucous membranes are moist.   Eyes:      Extraocular Movements: Extraocular movements intact. Conjunctiva/sclera: Conjunctivae normal.      Pupils: Pupils are equal, round, and reactive to light. Cardiovascular:      Rate and Rhythm: Regular rhythm. Pulses: Normal pulses. Heart sounds: Normal heart sounds. Pulmonary:      Effort: Pulmonary effort is normal.      Breath sounds: Normal breath sounds. Abdominal:      Palpations: Abdomen is soft. Musculoskeletal:         General: Normal range of motion. Cervical back: Normal range of motion and neck supple. Neurological:      General: No focal deficit present. -Follow-up in 1 month  -Do not take lisinopril the morning before surgery, but take amlodipine and metoprolol the morning before surgery.  -Do not take Metformin the evening and the morning before surgery.  -Start taking warfarin the evening after surgery.  -Check INR on Monday      Renny Larson MD  [facility]  10/12/2021, 9:42 AM

## 2021-10-13 ENCOUNTER — TELEPHONE (OUTPATIENT)
Dept: PHARMACY | Age: 56
End: 2021-10-13

## 2021-10-13 NOTE — TELEPHONE ENCOUNTER
Patient called wanting to provide information regarding instructions for holding warfarin for procedure. Pt scheduled to have procedure on 10/15. Stopped warfarin on Sun 10/10. Was cleared to hold from MD.     Advised for patient to resume warfarin taking 7.5 mg for one day then return to normal weekly dose of warfarin.  Patient scheduled to return to clinic 10/25      For Pharmacy Admin Tracking Only     Intervention Detail: Dose Adjustment: 1, reason: Therapy Optimization   Total # of Interventions Recommended: 1   Total # of Interventions Accepted: 1   Time Spent (min): 10

## 2021-10-14 ENCOUNTER — TELEPHONE (OUTPATIENT)
Dept: CARDIOLOGY CLINIC | Age: 56
End: 2021-10-14

## 2021-10-14 NOTE — TELEPHONE ENCOUNTER
Nurse from [facility] pain management called  regarding Cardiac Clearance for pt upcoming surgery 10/15/2021 The office received the clearance for patient to hold coumadin nurse states they need a clearance also stating the risk factor with surgery  please advise clearance letter  Can be faxed to [phone] with any additional questions

## 2021-10-25 ENCOUNTER — ANTI-COAG VISIT (OUTPATIENT)
Dept: PHARMACY | Age: 56
End: 2021-10-25
Payer: MEDICARE

## 2021-10-25 DIAGNOSIS — I48.0 PAROXYSMAL ATRIAL FIBRILLATION (HCC): ICD-10-CM

## 2021-10-25 DIAGNOSIS — Z95.2 S/P AORTIC VALVE REPLACEMENT: Primary | ICD-10-CM

## 2021-10-25 LAB — INTERNATIONAL NORMALIZATION RATIO, POC: 1.6

## 2021-10-25 PROCEDURE — 85610 PROTHROMBIN TIME: CPT | Performed by: PHARMACIST

## 2021-10-25 PROCEDURE — 99212 OFFICE O/P EST SF 10 MIN: CPT | Performed by: PHARMACIST

## 2021-10-25 NOTE — PROGRESS NOTES
change  5 5 5 5 5 5 5 35  1/11 4.5 Above goal (Cymbalta) 5 0/5 0/5 5 5 5 5 35  12/28 2.0 At goal, no change  7.5 5 5 5 5 5 7.5 40  11/24 2.4 At goal, no change  7.5 5 5 5 5 5 7.5 40  10/30 2.5 At goal, no change  7.5 5 5 5 5 5 7.5 40  10/2 2.2 At goal, no change  7.5 5 5 5 5 5 7.5 40  9/3 2.4 At goal, no change  7.5 5 5 5 5 5 7.5 40  8/3 2.9 At goal, no change  7.5 5 5 5 5 5 7.5 40  7/6 2.7 At goal, no change  7.5 5 5 5 5 5 7.5 40  6/8 2.8 At goal, no change  7.5 5 5 5 5 5 7.5 40  5/11 2.9 At goal, no change  7.5 5 5 5 5 5 7.5 40  3/30 3.0 At goal, no change  7.5 5 5 5 5 5 7.5 40  2/26 2.3 At goal, no change  7.5 5 5 5 5 5 7.5 40  2/5 3.3 Above goal, decrease  7.5 5 5 5 5 5 7.5 40  1/8 2.9 At goal, no change  7.5 7.5 5 5 5 5 7.5 42.5    Patient History:  Recent hospitalizations/HC visits -6/18/21: PCP   Recent medication changes 9/2021: Stopped citalopram, does not plan to continue   7/21/21: increased citalopram   6/18/21: started citalopram 20 mg QD (may increase INR)  12/28/20: Cymbalta 30 mg daily (may increase INR); stopped taking on 1/30; resumed ~2/4; stopped ~6/1   Medications taken regularly that may interact with warfarin or alter INR ASA 81 mg   Warfarin dose taken as prescribed 9/17/21 did not decrease warfarin dose as instructed at last visit  Usually compliant   Does not use pillbox  Patient has been taking warfarin since re-do AVR in May, 2017   Signs/symptoms of bleeding No h/o major bleeding   Vitamin K intake Normally has ~0 servings of green, leafy vegetables per week  States that since diabetic needs to stop sugar but not adjusting vit k intake   Recent vomiting/diarrhea/fever, changes in weight or activity level None reported   Tobacco or alcohol use -No recent changes in smoking as of 11/24/20 (~3/4 PPD)  -Patient cut back from 2 PPD to 3/4 PPD in February (2019) when he moved in with his daughter.  Decrease in smoking typically increases INR gradually.   -Patient denies any alcohol or illicit drug use   Upcoming surgeries or procedures None       Assessment/Plan:  Patient's INR was subtherapeutic today (1.6) after recent warfarin hold for back stimulator surgery. He has had nine doses of warfarin since surgery. He denies any change in vitamin K intake, illness, change in smoking, change in physical activity, or bleeding since last visit. He states that since he is diabetic now, he is trying to cut back on sugar, but does not plan to increase vitamin K intake. Warfarin dose was reduced to 5 mg daily on 1/11 due to interaction with Cymbalta. Prior to starting Cymbalta on 12/28, patient's INR had been therapeutic on warfarin 40 mg/week for almost a year. Patient stopped taking Cymbalta in early June due to side effects and does not plan to restart. Patient was instructed to bolus with 7.5 mg tonight then continue warfarin 7.5 mg on Saturday and Sunday, and 5 mg all other days. Patient was reminded to maintain consistent vitamin K intake and call with any bleeding, medication changes, or fever/vomiting/diarrhea. Patient understands dosing directions and information discussed. Dosing schedule and follow up appointment given to patient. Progress note routed to referring physician's office. Patient acknowledges working in consult agreement with pharmacist as referred by his/her physician. Next INR Check: 11/8    Please call [facility] at [phone] with any questions.     Daisha Barakat, PharmD, [facility]  [facility]  [place]: [phone]  [facility]: [phone]  10/25/2021 9:40 AM    For Pharmacy Admin Tracking Only     Intervention Detail: Dose Adjustment: 1, reason: Therapy Optimization   Total # of Interventions Recommended: 1   Total # of Interventions Accepted: 1   Time Spent (min): 15

## 2021-11-02 ENCOUNTER — OFFICE VISIT (OUTPATIENT)
Dept: CARDIOLOGY CLINIC | Age: 56
End: 2021-11-02
Payer: MEDICARE

## 2021-11-02 VITALS
BODY MASS INDEX: 37.43 KG/M2 | WEIGHT: 276 LBS | HEART RATE: 72 BPM | SYSTOLIC BLOOD PRESSURE: 130 MMHG | DIASTOLIC BLOOD PRESSURE: 80 MMHG

## 2021-11-02 DIAGNOSIS — I10 ESSENTIAL HYPERTENSION: ICD-10-CM

## 2021-11-02 DIAGNOSIS — I25.10 CAD IN NATIVE ARTERY: Primary | ICD-10-CM

## 2021-11-02 DIAGNOSIS — I49.3 PVC (PREMATURE VENTRICULAR CONTRACTION): ICD-10-CM

## 2021-11-02 DIAGNOSIS — Z95.1 HX OF CABG: ICD-10-CM

## 2021-11-02 DIAGNOSIS — Z95.2 S/P AVR (AORTIC VALVE REPLACEMENT): ICD-10-CM

## 2021-11-02 DIAGNOSIS — I48.0 PAROXYSMAL ATRIAL FIBRILLATION (HCC): ICD-10-CM

## 2021-11-02 DIAGNOSIS — I10 ESSENTIAL HYPERTENSION: Primary | ICD-10-CM

## 2021-11-02 PROCEDURE — G8417 CALC BMI ABV UP PARAM F/U: HCPCS | Performed by: INTERNAL MEDICINE

## 2021-11-02 PROCEDURE — G8484 FLU IMMUNIZE NO ADMIN: HCPCS | Performed by: INTERNAL MEDICINE

## 2021-11-02 PROCEDURE — 4004F PT TOBACCO SCREEN RCVD TLK: CPT | Performed by: INTERNAL MEDICINE

## 2021-11-02 PROCEDURE — 3017F COLORECTAL CA SCREEN DOC REV: CPT | Performed by: INTERNAL MEDICINE

## 2021-11-02 PROCEDURE — 99214 OFFICE O/P EST MOD 30 MIN: CPT | Performed by: INTERNAL MEDICINE

## 2021-11-02 PROCEDURE — G8427 DOCREV CUR MEDS BY ELIG CLIN: HCPCS | Performed by: INTERNAL MEDICINE

## 2021-11-02 NOTE — PROGRESS NOTES
Subjective:      Patient ID: Feliciano Rayo is a 60 y.o. male. HPI  Mr. Linda Wright is here today for follow up CAD/CABG/AVR/Afib/HTN. No complaints. Remains active. Only back issues, spinal stenosis. Had spinal stimulator. No exertional chest pain. Wt up. Still smoking. No exertional sx. No sob/cp. No pnd. No orthopnea. No tachycardia. No syncope. BP good at home. Rhythm stable.          Past Medical History:   Diagnosis Date    Abdominal hernia     s/p repair 2010    Aortic valve prosthesis present     CAD (coronary artery disease)     Chronic back pain greater than 3 months duration     Clostridium difficile diarrhea 10/17/2016    PCR    DDD (degenerative disc disease), lumbosacral 8/7/2013    Erectile dysfunction     GERD (gastroesophageal reflux disease)     Hyperlipidemia     Hypertension     Joint pain     Left testicular cancer ([facility] [address] 2002    s/p abdominal resection    Myalgia and myositis, unspecified 8/26/2013    Neuropathy     OA (osteoarthritis) of knee     bilateral    Obesity, Class I, BMI 30.0-34.9 (see actual BMI)     Prolonged emergence from general anesthesia     after CABG    S/P AVR 2005    tissue valve    S/P CABG x 2 2005    w/AVR & primary repair of dissected ascending aorta    Type 2 diabetes mellitus without complication, without long-term current use of insulin ([address] 7/13/2021     Past Surgical History:   Procedure Laterality Date    AORTA SURGERY  08/27/2004    Dr. Omaira Regalado - primary repair of limited ascending aortic dissection    AORTIC VALVE REPLACEMENT  05/30/2017    Dr. Edwar Mckeon - redo w/25mm SSP [place] ThermaFix model 3300 bovine pericardial bioprosthesis    AORTIC VALVE SURGERY  08/27/2004    Dr. Omaira Regalado - 27mm Kelton-Castelan pericardial prosthesis     BACK SURGERY      L4-S1    CARDIAC CATHETERIZATION  05/09/2017    Dr. Dorian Choudhury  08/26/2004    Dr. Charlene Hill Right 2015    Dr. Anastacia Cottrell  10/10/2016    Dr. Hudson Le - w/laparascopic lysis of extensive adhesions, open transverse colon resection, excision of old abd mesh adhering to colon    CORONARY ANGIOPLASTY WITH STENT PLACEMENT  2014    CORONARY ARTERY BYPASS GRAFT  2004    Dr. Ulises Quick - x2 (LIMA-LAD, SVG sequentially to ascending aorta & D1)    CORONARY ARTERY BYPASS GRAFT  2017    Dr. Janet Escalante - redo x3 (reverse AC SVG sequentially to D1, OM1 & PDA) w/explant of prior prosthetic valve & removal of embedded sternal wires x7    EMG  2012    FOOT SURGERY Left 2012    Dr. Gema Sneed, DPM - hallux nail evulsion & exostectomy    HEMICOLECTOMY N/A 2019    ROBOTIC ASSISTED  LAPAROSCOPIC RIGHT COLECTOMY AND CHOLECYSTECTOMY performed by Merlinda Sabal, MD at [address] [place] [address]      multiple    LUMBAR DISCECTOMY  2012    L4-5    SHOULDER ARTHROSCOPY Right 2013    Dr. Stacia Hernandez - w/subacromial decompression, debridement of the SLAP tear, distal clavicle excision    SOFT TISSUE TUMOR RESECTION      retroperitoneal mass    TESTICLE REMOVAL Left     radical orchiectomy    TRANSESOPHAGEAL ECHOCARDIOGRAM  2017    during redo CABG & AVR    TUNNELED VENOUS PORT PLACEMENT  2002    portacath      Social History     Socioeconomic History    Marital status:       Spouse name: Not on file    Number of children: Not on file    Years of education: Not on file    Highest education level: Not on file   Occupational History    Not on file   Tobacco Use    Smoking status: Current Some Day Smoker     Packs/day: 0.75     Years: 40.00     Pack years: 30.00     Types: Cigarettes     Start date: 1947     Last attempt to quit: 2017     Years since quittin.4    Smokeless tobacco: Never Used    Tobacco comment: stopped 5-18   Vaping Use    Vaping Use: Never used   Substance and Sexual Activity    Alcohol use: No     Alcohol/week: 0.0 standard drinks    Drug use: No    Sexual activity: Yes     Partners: Female   Other Topics Concern    Not on file   Social History Narrative    Not on file     Social Determinants of Health     Financial Resource Strain:     Difficulty of Paying Living Expenses:    Food Insecurity:     Worried About Running Out of Food in the Last Year:     [address] in the Last Year:    Transportation Needs:     Lack of Transportation (Medical):  Lack of Transportation (Non-Medical):    Physical Activity:     Days of Exercise per Week:     Minutes of Exercise per Session:    Stress:     Feeling of Stress :    Social Connections:     Frequency of Communication with Friends and Family:     Frequency of Social Gatherings with Friends and Family:     Attends Hindu Services:     Active Member of Clubs or Organizations:     Attends Club or Organization Meetings:     Marital Status:    Intimate Partner Violence:     Fear of Current or Ex-Partner:     Emotionally Abused:     Physically Abused:     Sexually Abused:      FH reviewed, denies FH cardiac issues     Vitals:    11/02/21 0858   BP: 130/80   Pulse: 72         Wt 276    Review of Systems   Constitutional: Negative for activity change. Has  fatigue. Respiratory: Negative for apnea, chest tightness and shortness of breath. Cardiovascular: Negative for chest pain, palpitations and leg swelling. No PND or orthopnea. No tachycardia. Gastrointestinal: Negative for abdominal distention. Musculoskeletal: Negative for myalgias. Neurological: Negative for dizziness, syncope and light-headedness. Psychiatric/Behavioral: Negative for behavioral problems, confusion and agitation. All other systems reviewed negative as done      Objective:   Physical Exam   Constitutional: He is oriented to person, place, and time. He appears well-developed and well-nourished. No distress.    HENT:   Head: Normocephalic and atraumatic. Eyes: Conjunctivae and EOM are normal. Right eye exhibits no discharge. Left eye exhibits no discharge. Neck: Normal range of motion. Neck supple. No JVD present. Cardiovascular: Normal rate, regular rhythm and prosthetic valve sounds normal .  Exam reveals no gallop. 1/6 syst murmur heard. Pulmonary/Chest: Effort normal and breath sounds normal. No respiratory distress. He has min wheezes. He has no rales. Abdominal: Soft. Bowel sounds are normal. There is no tenderness. Musculoskeletal: Normal range of motion. He exhibits no edema. Neurological: He is alert and oriented to person, place, and time. Skin: Skin is warm and dry. Psychiatric: He has a normal mood and affect. His behavior is normal. Thought content normal.       Assessment:       Diagnosis Orders   1. CAD in native artery     2. Hx of CABG     3. S/P AVR (aortic valve replacement)     4. Paroxysmal atrial fibrillation (HCC)     5. Essential hypertension     6. PVC (premature ventricular contraction)             Plan:      CV stable. Rhythm stable. No angina. BP better. Compensated. Wt down. Reviewed previous records and testing including echo 5/17 and cath 5/17. Will continue asa/meoprolol/crestor for CAD/CABG. Continue same and norvasc/lisinopril for HTN. Continue coumadin for afib. Continues to smoke. Encouraged to stop. Encouraged diet, exercise and wt. No changes. Continue to monitor. Lipids per PCP. Follow up 3 months. Echo.

## 2021-11-05 DIAGNOSIS — I10 ESSENTIAL HYPERTENSION: Chronic | ICD-10-CM

## 2021-11-05 DIAGNOSIS — I25.10 CORONARY ARTERY DISEASE INVOLVING NATIVE CORONARY ARTERY OF NATIVE HEART WITHOUT ANGINA PECTORIS: ICD-10-CM

## 2021-11-05 DIAGNOSIS — Z95.2 S/P AORTIC VALVE REPLACEMENT: Chronic | ICD-10-CM

## 2021-11-05 RX ORDER — METOPROLOL TARTRATE 50 MG/1
TABLET, FILM COATED ORAL
Qty: 180 TABLET | Refills: 1 | Status: SHIPPED | OUTPATIENT
Start: 2021-11-05 | End: 2022-05-04

## 2021-11-08 ENCOUNTER — ANTI-COAG VISIT (OUTPATIENT)
Dept: PHARMACY | Age: 56
End: 2021-11-08
Payer: MEDICARE

## 2021-11-08 DIAGNOSIS — Z95.2 S/P AORTIC VALVE REPLACEMENT: Primary | ICD-10-CM

## 2021-11-08 DIAGNOSIS — I48.0 PAROXYSMAL ATRIAL FIBRILLATION (HCC): ICD-10-CM

## 2021-11-08 LAB — INTERNATIONAL NORMALIZATION RATIO, POC: 1.8

## 2021-11-08 PROCEDURE — 85610 PROTHROMBIN TIME: CPT | Performed by: PHARMACIST

## 2021-11-08 PROCEDURE — 99212 OFFICE O/P EST SF 10 MIN: CPT | Performed by: PHARMACIST

## 2021-11-08 NOTE — PROGRESS NOTES
ANTICOAGULATION SERVICE    Bertha Atkins is a 60 y.o. male with PMHx significant for AVR (re-do in May, 2017), CAD s/p CABG (x3 in May, 2017), pA-fib, HLD, HTN, testicular CA who presents to clinic on 11/8/2021 for anticoagulation monitoring and adjustment.     Anticoagulation Indication(s):  Heart Valve Replacement (bovine AVR), A-fib  Referring Physician:  Dr. Neva Kinney  Goal INR Range:  2-3  Duration of Anticoagulation Therapy:  TBD  Time of day dose taken:  PM  Product patient has at home:  warfarin 5 mg (peach)    [id number] Score for Atrial Fibrillation Stroke Risk   Risk   Factors  Component Value   C CHF No 0   H HTN Yes 1   A2 Age >= 75 No,  (57 y.o.) 0   D DM Yes 1   S2 Prior Stroke/TIA No 0   V Vascular Disease Yes 1   A Age 74-69 No,  (57 y.o.) 0   Sc Sex male 0    [id number]  Score  3   Score last updated 5/30/19 1:51 PM      Lab Results   Component Value Date    RBC 3.70 (L) 07/30/2019    HGB 12.4 (L) 07/30/2019    HCT 36.5 (L) 07/30/2019    MCV 98.7 07/30/2019    MCH 33.5 07/30/2019    MPV 7.6 07/30/2019    RDW 15.6 (H) 07/30/2019     07/30/2019     INR Summary                            Warfarin regimen (mg)  Date INR   A/P    Sun Mon Tue Wed Thu Fri Sat Mg/wk  11/8 1.8 Below goal, increase  5 7.5 5 7.5 5 7.5 5 42.5  10/25 1.6 Below goal, bolus   7.5 7.5/5 5 5 5 5 7.5 40  10/4 2.0 At goal, continue   7.5 5 5 5 5 5 7.5 40  9/17 2.7 At goal (dosing error)  7.5 5 5 5 5 5 7.5 40  8/23 4.3 Above goal, decrease  7.5 0/5 5 5 5 5 7.5 40  8/9 3.3 Above goal, dec  7.5 5 5 5 7.5 5 5 40  7/21 1.4 Below goal, increase  5 7.5 5 7.5 5 7.5 5 42.5  6/28 2.0 At goal, no change  7.5 5 5 5 5 5 7.5 40  6/11 1.5 Below goal, increase  7.5 5 5 5 5 5 7.5 40  5/24 3.6 Above goal, hold x 1  5 0/5 5 5 5 5 5 35  4/23 2.7 At goal, no change  5 5 5 5 5 5 5 35  4/5 3.6 Above goal, hold x 1  5 0/5 5 5 5 5 5 35   3/8 3.0 At goal, no change  5 5 5 5 5 5 5 35  2/19 2.0 At goal, no change  5 5 5 5 5 5 5 35  2/4 2.7 At goal, no change  5 5 5 5 5 5 5 35  1/20 2.7 At goal, no change  5 5 5 5 5 5 5 35  1/11 4.5 Above goal (Cymbalta) 5 0/5 0/5 5 5 5 5 35  12/28 2.0 At goal, no change  7.5 5 5 5 5 5 7.5 40  11/24 2.4 At goal, no change  7.5 5 5 5 5 5 7.5 40  10/30 2.5 At goal, no change  7.5 5 5 5 5 5 7.5 40  10/2 2.2 At goal, no change  7.5 5 5 5 5 5 7.5 40  9/3 2.4 At goal, no change  7.5 5 5 5 5 5 7.5 40  8/3 2.9 At goal, no change  7.5 5 5 5 5 5 7.5 40  7/6 2.7 At goal, no change  7.5 5 5 5 5 5 7.5 40  6/8 2.8 At goal, no change  7.5 5 5 5 5 5 7.5 40  5/11 2.9 At goal, no change  7.5 5 5 5 5 5 7.5 40  3/30 3.0 At goal, no change  7.5 5 5 5 5 5 7.5 40  2/26 2.3 At goal, no change  7.5 5 5 5 5 5 7.5 40  2/5 3.3 Above goal, decrease  7.5 5 5 5 5 5 7.5 40  1/8 2.9 At goal, no change  7.5 7.5 5 5 5 5 7.5 42.5    Patient History:  Recent hospitalizations/HC visits -6/18/21: PCP   Recent medication changes 9/2021: Stopped citalopram, does not plan to continue   7/21/21: increased citalopram   6/18/21: started citalopram 20 mg QD (may increase INR)  12/28/20: Cymbalta 30 mg daily (may increase INR); stopped taking on 1/30; resumed ~2/4; stopped ~6/1   Medications taken regularly that may interact with warfarin or alter INR ASA 81 mg   Warfarin dose taken as prescribed 9/17/21 did not decrease warfarin dose as instructed at last visit  Usually compliant   Does not use pillbox  Patient has been taking warfarin since re-do AVR in May, 2017   Signs/symptoms of bleeding No h/o major bleeding   Vitamin K intake Normally has ~0 servings of green, leafy vegetables per week  States that since diabetic needs to stop sugar but not adjusting vit k intake   Recent vomiting/diarrhea/fever, changes in weight or activity level None reported   Tobacco or alcohol use -No recent changes in smoking as of 11/24/20 (~3/4 PPD)  -Patient cut back from 2 PPD to 3/4 PPD in February (2019) when he moved in with his daughter.  Decrease in smoking typically increases INR gradually.   -Patient denies any alcohol or illicit drug use   Upcoming surgeries or procedures None       Assessment/Plan:  Patient's INR was subtherapeutic today (1.8) but increased from last check. INR was likely low last appointment after recent warfarin hold for back stimulator surgery. He denies any change in vitamin K intake, illness, change in smoking, change in physical activity, or bleeding since last visit. He states that since he is diabetic now, he is trying to cut back on sugar, but does not plan to increase vitamin K intake. Warfarin dose was reduced to 5 mg daily on 1/11 due to interaction with Cymbalta. Prior to starting Cymbalta on 12/28, patient's INR had been therapeutic on warfarin 40 mg/week for almost a year. Patient stopped taking Cymbalta in early June due to side effects and does not plan to restart. INR has been labile since. As INR still remains low, patient was instructed to increase warfarin to 7.5 mg on Monday, Wednesday, Friday and 5 mg all other days. Patient was reminded to maintain consistent vitamin K intake and call with any bleeding, medication changes, or fever/vomiting/diarrhea. Patient understands dosing directions and information discussed. Dosing schedule and follow up appointment given to patient. Progress note routed to referring physician's office. Patient acknowledges working in consult agreement with pharmacist as referred by his/her physician. Next INR Check: 11/22    Please call [facility] at [phone] with any questions.     Evan Perez, PharmD, BCPS  [facility]  [place]: [phone]  [place]: [phone]  11/8/2021 9:15 AM    For Pharmacy Admin Tracking Only     Intervention Detail: Dose Adjustment: 1, reason: Therapy Optimization   Total # of Interventions Recommended: 1   Total # of Interventions Accepted: 1   Time Spent (min): 15

## 2021-11-09 ENCOUNTER — OFFICE VISIT (OUTPATIENT)
Dept: INTERNAL MEDICINE CLINIC | Age: 56
End: 2021-11-09
Payer: MEDICARE

## 2021-11-09 VITALS
HEART RATE: 69 BPM | RESPIRATION RATE: 16 BRPM | WEIGHT: 274.8 LBS | DIASTOLIC BLOOD PRESSURE: 87 MMHG | OXYGEN SATURATION: 99 % | BODY MASS INDEX: 37.27 KG/M2 | SYSTOLIC BLOOD PRESSURE: 143 MMHG | TEMPERATURE: 97.3 F

## 2021-11-09 DIAGNOSIS — Z12.5 PROSTATE CANCER SCREENING: ICD-10-CM

## 2021-11-09 DIAGNOSIS — I10 ESSENTIAL HYPERTENSION: Primary | ICD-10-CM

## 2021-11-09 DIAGNOSIS — E55.9 VITAMIN D INSUFFICIENCY: ICD-10-CM

## 2021-11-09 DIAGNOSIS — E11.9 TYPE 2 DIABETES MELLITUS WITHOUT COMPLICATION, WITHOUT LONG-TERM CURRENT USE OF INSULIN (HCC): ICD-10-CM

## 2021-11-09 DIAGNOSIS — R29.898 WEAKNESS OF BOTH LOWER EXTREMITIES: ICD-10-CM

## 2021-11-09 PROCEDURE — 99213 OFFICE O/P EST LOW 20 MIN: CPT | Performed by: STUDENT IN AN ORGANIZED HEALTH CARE EDUCATION/TRAINING PROGRAM

## 2021-11-09 NOTE — PROGRESS NOTES
Outpatient Clinic Established Patient Note    Patient: Sirena Arroyo  : 1965 (30 y.o.)  Date: 2021    CC: surgery follow up    HPI:    Mr. Ivy Hernandez is a 60 y.o. M with PMH as below who presents to clinic for follow up after he had the pain stimulator placement surgery. Pt tolerated surgery very well. He has no new symptoms or concerns. He states he is feeling his legs are heavy after he had the surgery. He is able to walk with no cane or walker. He denies any numbness or pain in his legs even following walking. His back pain is now down to 6/10. Regarding his blood pressure, pt checks it at home regularly and the numbers have been in the 130's. He is compliant with his medications. For this diabetes, pt has been taking metformin 1000 daily rather than 2000 as it was originally prescribed. His HA1C is 7.6 10/21 from 9.1 in 2021. Pt doesn't check his glucose at home. He has no worsening of urinary frequency, chronic neuropathy symptoms. Denies any GI side effects after starting metformin. Hasn't seen eye doctor yet. Denies fever, night sweats, chills, chest pain, cough, dyspnea, palpitations, nausea, vomiting, diarrhea, dysuria.         Allergies:    Atorvastatin calcium [lipitor], Wellbutrin [bupropion], Cymbalta [duloxetine hcl], and Vicodin [hydrocodone-acetaminophen]    Health Maintenance Due   Topic Date Due    Hepatitis C screen  Never done    Diabetic foot exam  Never done    Diabetic retinal exam  Never done    Diabetic microalbuminuria test  Never done    Hepatitis B vaccine (1 of 3 - Risk 3-dose series) Never done    DTaP/Tdap/Td vaccine (1 - Tdap) Never done    Shingles Vaccine (1 of 2) Never done    Low dose CT lung screening  Never done    Lipid screen  2017    Annual Wellness Visit (AWV)  Never done    Potassium monitoring  2020    Creatinine monitoring  2020    COVID-19 Vaccine (2 - Booster for Javier series) 2021       Immunization History Administered Date(s) Administered    COVID-19, J&J, PF, 0.5 mL 03/13/2021    Pneumococcal Polysaccharide ([id number]) 07/26/2014       Review of Systems  A 10-organ Review Of Systems was obtained and otherwise unremarkable except as per HPI. Data: Old records have been reviewed electronically.       Past Medical History:    Past Medical History:   Diagnosis Date    Abdominal hernia     s/p repair 2010    Aortic valve prosthesis present     CAD (coronary artery disease)     Chronic back pain greater than 3 months duration     Clostridium difficile diarrhea 10/17/2016    PCR    DDD (degenerative disc disease), lumbosacral 8/7/2013    Erectile dysfunction     GERD (gastroesophageal reflux disease)     Hyperlipidemia     Hypertension     Joint pain     Left testicular cancer ([facility] [address] 2002    s/p abdominal resection    Myalgia and myositis, unspecified 8/26/2013    Neuropathy     OA (osteoarthritis) of knee     bilateral    Obesity, Class I, BMI 30.0-34.9 (see actual BMI)     Prolonged emergence from general anesthesia     after CABG    S/P AVR 2005    tissue valve    S/P CABG x 2 2005    w/AVR & primary repair of dissected ascending aorta    Type 2 diabetes mellitus without complication, without long-term current use of insulin ([facility] [address] 7/13/2021       Past Surgical History:  Past Surgical History:   Procedure Laterality Date    AORTA SURGERY  08/27/2004    Dr. Debra Ashford - primary repair of limited ascending aortic dissection    AORTIC VALVE REPLACEMENT  05/30/2017    Dr. Emma Walden - redo w/25mm Lex Panning ThermaFix model 3300 bovine pericardial bioprosthesis    AORTIC VALVE SURGERY  08/27/2004    Dr. Debra Ashford - 27mm Kelton-Castelan pericardial prosthesis     BACK SURGERY      L4-S1    CARDIAC CATHETERIZATION  05/09/2017    Dr. Phil Oscar  08/26/2004    Dr. Rocky Ashford Right 03/17/2015    Dr. Lisa Bradford 10/10/2016    Dr. Issa Lopez - w/laparascopic lysis of extensive adhesions, open transverse colon resection, excision of old abd mesh adhering to colon    CORONARY ANGIOPLASTY WITH STENT PLACEMENT  07/2014    CORONARY ARTERY BYPASS GRAFT  08/27/2004    Dr. Vaughn Guy - x2 (LIMA-LAD, SVG sequentially to ascending aorta & D1)    CORONARY ARTERY BYPASS GRAFT  05/30/2017    Dr. Uriel Conrad - redo x3 (reverse AC SVG sequentially to D1, OM1 & PDA) w/explant of prior prosthetic valve & removal of embedded sternal wires x7    EMG  04/16/2012    FOOT SURGERY Left 01/24/2012    Dr. Latia Powell, DPM - hallux nail evulsion & exostectomy    HEMICOLECTOMY N/A 7/26/2019    ROBOTIC ASSISTED  LAPAROSCOPIC RIGHT COLECTOMY AND CHOLECYSTECTOMY performed by Piotr House MD at [address] [place] [address]  2010    multiple    LUMBAR DISCECTOMY  2012    L4-5    SHOULDER ARTHROSCOPY Right 11/21/2013    Dr. Grace Cordon - w/subacromial decompression, debridement of the SLAP tear, distal clavicle excision    SOFT TISSUE TUMOR RESECTION  2001    retroperitoneal mass    TESTICLE REMOVAL Left 2001    radical orchiectomy    TRANSESOPHAGEAL ECHOCARDIOGRAM  05/30/2017    during redo CABG & AVR    TUNNELED VENOUS PORT PLACEMENT  2002    portacath        Home Meds:  Prior to Visit  Outpatient Medications Prior to Visit   Medication Sig Dispense Refill    metoprolol tartrate (LOPRESSOR) 50 MG tablet TAKE 1 TABLET BY MOUTH 2 TIMES A DAY FOR BLOOD PRESSURE AND HEART 180 tablet 1    nystatin-triamcinolone (MYCOLOG) 797100-5.1 UNIT/GM-% ointment Apply topically 2 times daily until improved.  30 g 0    warfarin (COUMADIN) 5 MG tablet TAKE 1 TABLET BY MOUTH ONE TIME A DAY OR TAKE ONE AND ONE-HALF TABLETS BY MOUTH EVERY DAY AS DIRECTED BY CLINIC 135 tablet 2    rosuvastatin (CRESTOR) 20 MG tablet TAKE ONE TABLET BY MOUTH EVERY EVENING 90 tablet 3    lisinopril (PRINIVIL;ZESTRIL) 10 MG tablet TAKE 1 TABLET BY MOUTH 2 TIMES A  tablet 2    famotidine (PEPCID) 20 MG tablet TAKE 1 TABLET BY MOUTH 2 TIMES A  tablet 2    Handicap Placard MISC by Does not apply route Effective through 12/28/2020 through 12/27/2025 1 each 0    aspirin 81 MG EC tablet Take 1 tablet by mouth daily 30 tablet 0    CYCLOBENZAPRINE HCL PO Take by mouth      amLODIPine (NORVASC) 5 MG tablet Take 1 tablet by mouth nightly as needed (If systolic blood pressure over 130, take amlodipine 5mg at night, if under 130 do not take amlopidine) 30 tablet 2    melatonin (RA MELATONIN) 3 MG TABS tablet Take 1 tablet by mouth nightly as needed (insomnia) 30 tablet 3    oxyCODONE-acetaminophen (PERCOCET) 5-325 MG per tablet Take 1 tablet by mouth every 4 hours as needed for Pain  .  gabapentin (NEURONTIN) 300 MG capsule Take 300 mg by mouth 3 times daily       metFORMIN (GLUCOPHAGE) 500 MG tablet Take 2 tablets by mouth 2 times daily (with meals) 120 tablet 0    Blood Pressure KIT 1 Unit 1 kit 0     No facility-administered medications prior to visit. After Visit:  Prior to Visit Medications    Medication Sig Taking? Authorizing Provider   metFORMIN (GLUCOPHAGE) 500 MG tablet Take 1 tablet by mouth 2 times daily (with meals) Yes Marissa Hanson MD   metoprolol tartrate (LOPRESSOR) 50 MG tablet TAKE 1 TABLET BY MOUTH 2 TIMES A DAY FOR BLOOD PRESSURE AND HEART Yes Marissa Hanson MD   nystatin-triamcinolone [facility]) 832469-5.6 UNIT/GM-% ointment Apply topically 2 times daily until improved.  Yes Calixto Warner MD   warfarin (COUMADIN) 5 MG tablet TAKE 1 TABLET BY MOUTH ONE TIME A DAY OR TAKE ONE AND ONE-HALF TABLETS BY MOUTH EVERY DAY AS DIRECTED BY CLINIC Yes Dante Green MD   rosuvastatin (CRESTOR) 20 MG tablet TAKE ONE TABLET BY MOUTH EVERY EVENING Yes Marissa Hanson MD   lisinopril (PRINIVIL;ZESTRIL) 10 MG tablet TAKE 1 TABLET BY MOUTH 2 TIMES A DAY Yes Bunny Braswell MD   famotidine (PEPCID) 20 MG tablet TAKE 1 TABLET BY MOUTH 2 TIMES A DAY Yes Polly Bunn MD   Handicap Placard MISC by Does not apply route Effective through 12/28/2020 through 12/27/2025 Yes Sanya Bailey,    aspirin 81 MG EC tablet Take 1 tablet by mouth daily Yes Evan Montalvo MD   CYCLOBENZAPRINE HCL PO Take by mouth Yes Historical Provider, MD   amLODIPine (NORVASC) 5 MG tablet Take 1 tablet by mouth nightly as needed (If systolic blood pressure over 130, take amlodipine 5mg at night, if under 130 do not take amlopidine) Yes Eun Hsu MD   melatonin (RA MELATONIN) 3 MG TABS tablet Take 1 tablet by mouth nightly as needed (insomnia) Yes Irma Chow MD   oxyCODONE-acetaminophen (PERCOCET) 5-325 MG per tablet Take 1 tablet by mouth every 4 hours as needed for Pain  . Yes Historical Provider, MD   gabapentin (NEURONTIN) 300 MG capsule Take 300 mg by mouth 3 times daily  Yes Historical Provider, MD   Blood Pressure KIT 1 Unit  Irma Chow MD       Allergies:    Atorvastatin calcium [lipitor], Wellbutrin [bupropion], Cymbalta [duloxetine hcl], and Vicodin [hydrocodone-acetaminophen]    Family History:       Problem Relation Age of Onset    Cancer Mother     Cancer Father         lung    Alzheimer's Disease Sister     Liver Cancer Brother          PHYSICAL EXAM:  BP (!) 143/87   Pulse 69   Temp 97.3 °F (36.3 °C) (Temporal)   Resp 16   Wt 274 lb 12.8 oz (124.6 kg)   SpO2 99%   BMI 37.27 kg/m²   Physical Exam  Constitutional:       Appearance: Normal appearance. Eyes:      Pupils: Pupils are equal, round, and reactive to light. Cardiovascular:      Rate and Rhythm: Normal rate and regular rhythm. Pulses: Normal pulses. Heart sounds: Normal heart sounds. Pulmonary:      Effort: Pulmonary effort is normal.      Breath sounds: Normal breath sounds. Abdominal:      General: Bowel sounds are normal. There is distension. Palpations: Abdomen is soft. Tenderness: There is no abdominal tenderness.    Musculoskeletal:         General: Normal range of motion. Cervical back: Normal range of motion and neck supple. Right lower leg: No edema. Left lower leg: No edema. Skin:     General: Skin is warm and dry. Capillary Refill: Capillary refill takes less than 2 seconds. Neurological:      Mental Status: He is alert. Sensory: No sensory deficit. Motor: No weakness. Coordination: Coordination normal.      Gait: Gait normal.         Assessment & Plan:    1. Weakness of both lower extremities  Pt has chronic back pain 2/2 lumbar stenosis. S/p pain stimulator placement. Pain is improved but now feeling legs weak when ambulating. No numbness or worsening of tingling sensation. No weakness on exam. Most likely 2/2 deconditioning. Concern symptoms are of PVD etiology given pt cardiac history, DM II, and smoking history. - counseled pt on stretching and muscle strengthening exercises  - advised pt to go to physical therapy; referral offered, pt will think about it  - if no improvement, will order ADRIANA studies and/or arterial duplex ultrasound    2. Essential hypertension  Stable. Compliant with medications. No symptoms. On lisinopril 10 mg BID, metoprolol 50 mg BID, amlodipine 5 mg if BP >140.  - continue home meds  - check CMP  - check CBC  - check lipid panel  - check TSH    3. Type 2 diabetes mellitus without complication, without long-term current use of insulin ([address]  HA1C better 7.6 10/2021 from 9.1 7/2021. Takes metformin 1000 mg daily. Hasn't seen eye doctor yet. Not following healthy diet low in carbs. - continue metformin 1000 mg daily  - will check HA1C in 1/2022  - advised pt to follow healthy lifestyle with diet low in carbs and fat and to continue to exercise  - advised pt to schedule eye exam  - check micro albumin/cr ratio      4. Vitamin D insufficiency   - VITAMIN D 25 HYDROXY; Future    5. Prostate cancer screening  - Psa screening; Future          Return in about 2 months (around 1/9/2022).     Patient Instructions   - please go to lab for blood work  - continue taking metformin 1000 mg daily  - continue doing exercises to help with your muscle strength  Schedule vascular study      Dispo: Pt has been staffed with Dr. Latricia Sethi  _______________  Yenny Hawley MD, 11/9/2021 11:32 AM   PGY-3

## 2021-11-09 NOTE — PATIENT INSTRUCTIONS
- please go to lab for blood work  - continue taking metformin 1000 mg daily  - continue doing exercises to help with your muscle strength  Schedule vascular study

## 2021-11-22 ENCOUNTER — ANTI-COAG VISIT (OUTPATIENT)
Dept: PHARMACY | Age: 56
End: 2021-11-22
Payer: MEDICARE

## 2021-11-22 DIAGNOSIS — Z95.2 S/P AORTIC VALVE REPLACEMENT: Primary | ICD-10-CM

## 2021-11-22 DIAGNOSIS — I48.0 PAROXYSMAL ATRIAL FIBRILLATION (HCC): ICD-10-CM

## 2021-11-22 LAB — INTERNATIONAL NORMALIZATION RATIO, POC: 2.1

## 2021-11-22 PROCEDURE — 85610 PROTHROMBIN TIME: CPT | Performed by: PHARMACIST

## 2021-11-22 PROCEDURE — 99211 OFF/OP EST MAY X REQ PHY/QHP: CPT | Performed by: PHARMACIST

## 2021-11-22 NOTE — PROGRESS NOTES
ANTICOAGULATION SERVICE    Lizet Hough is a 60 y.o. male with PMHx significant for AVR (re-do in May, 2017), CAD s/p CABG (x3 in May, 2017), pA-fib, HLD, HTN, testicular CA who presents to clinic on 11/22/2021 for anticoagulation monitoring and adjustment.     Anticoagulation Indication(s):  Heart Valve Replacement (bovine AVR), A-fib  Referring Physician:  Dr. Robina Wiggins  Goal INR Range:  2-3  Duration of Anticoagulation Therapy:  TBD  Time of day dose taken:  PM  Product patient has at home:  warfarin 5 mg (peach)    [id number] Score for Atrial Fibrillation Stroke Risk   Risk   Factors  Component Value   C CHF No 0   H HTN Yes 1   A2 Age >= 75 No,  (57 y.o.) 0   D DM Yes 1   S2 Prior Stroke/TIA No 0   V Vascular Disease Yes 1   A Age 74-69 No,  (57 y.o.) 0   Sc Sex male 0    [id number]  Score  3   Score last updated 5/30/19 1:51 PM      Lab Results   Component Value Date    RBC 3.70 (L) 07/30/2019    HGB 12.4 (L) 07/30/2019    HCT 36.5 (L) 07/30/2019    MCV 98.7 07/30/2019    MCH 33.5 07/30/2019    MPV 7.6 07/30/2019    RDW 15.6 (H) 07/30/2019     07/30/2019     INR Summary                            Warfarin regimen (mg)  Date INR   A/P    Sun Mon Tue Wed Thu Fri Sat Mg/wk  11/22 2.1 At goal, continue   5 7.5 5 7.5 5 7.5 5 42.5  11/8 1.8 Below goal, increase  5 7.5 5 7.5 5 7.5 5 42.5  10/25 1.6 Below goal, bolus   7.5 7.5/5 5 5 5 5 7.5 40  10/4 2.0 At goal, continue   7.5 5 5 5 5 5 7.5 40  9/17 2.7 At goal (dosing error)  7.5 5 5 5 5 5 7.5 40  8/23 4.3 Above goal, decrease  7.5 0/5 5 5 5 5 7.5 40  8/9 3.3 Above goal, dec  7.5 5 5 5 7.5 5 5 40  7/21 1.4 Below goal, increase  5 7.5 5 7.5 5 7.5 5 42.5  6/28 2.0 At goal, no change  7.5 5 5 5 5 5 7.5 40  6/11 1.5 Below goal, increase  7.5 5 5 5 5 5 7.5 40  5/24 3.6 Above goal, hold x 1  5 0/5 5 5 5 5 5 35  4/23 2.7 At goal, no change  5 5 5 5 5 5 5 35  4/5 3.6 Above goal, hold x 1  5 0/5 5 5 5 5 5 35   3/8 3.0 At goal, no change  5 5 5 5 5 5 5 35  2/19 2.0 At goal, no change  5 5 5 5 5 5 5 35  2/4 2.7 At goal, no change  5 5 5 5 5 5 5 35  1/20 2.7 At goal, no change  5 5 5 5 5 5 5 35  1/11 4.5 Above goal (Cymbalta) 5 0/5 0/5 5 5 5 5 35  12/28 2.0 At goal, no change  7.5 5 5 5 5 5 7.5 40  11/24 2.4 At goal, no change  7.5 5 5 5 5 5 7.5 40  10/30 2.5 At goal, no change  7.5 5 5 5 5 5 7.5 40  10/2 2.2 At goal, no change  7.5 5 5 5 5 5 7.5 40  9/3 2.4 At goal, no change  7.5 5 5 5 5 5 7.5 40  8/3 2.9 At goal, no change  7.5 5 5 5 5 5 7.5 40  7/6 2.7 At goal, no change  7.5 5 5 5 5 5 7.5 40  6/8 2.8 At goal, no change  7.5 5 5 5 5 5 7.5 40  5/11 2.9 At goal, no change  7.5 5 5 5 5 5 7.5 40  3/30 3.0 At goal, no change  7.5 5 5 5 5 5 7.5 40  2/26 2.3 At goal, no change  7.5 5 5 5 5 5 7.5 40  2/5 3.3 Above goal, decrease  7.5 5 5 5 5 5 7.5 40  1/8 2.9 At goal, no change  7.5 7.5 5 5 5 5 7.5 42.5    Patient History:  Recent hospitalizations/HC visits -6/18/21: PCP   Recent medication changes 9/2021: Stopped citalopram, does not plan to continue   7/21/21: increased citalopram   6/18/21: started citalopram 20 mg QD (may increase INR)  12/28/20: Cymbalta 30 mg daily (may increase INR); stopped taking on 1/30; resumed ~2/4; stopped ~6/1   Medications taken regularly that may interact with warfarin or alter INR ASA 81 mg   Warfarin dose taken as prescribed 9/17/21 did not decrease warfarin dose as instructed at last visit  Usually compliant   Does not use pillbox  Patient has been taking warfarin since re-do AVR in May, 2017   Signs/symptoms of bleeding No h/o major bleeding   Vitamin K intake Normally has ~0 servings of green, leafy vegetables per week  States that since diabetic needs to stop sugar but not adjusting vit k intake   Recent vomiting/diarrhea/fever, changes in weight or activity level None reported   Tobacco or alcohol use -No recent changes in smoking as of 11/24/20 (~3/4 PPD)  -Patient cut back from 2 PPD to 3/4 PPD in February (2019) when he moved in with his daughter. Decrease in smoking typically increases INR gradually.   -Patient denies any alcohol or illicit drug use   Upcoming surgeries or procedures None       Assessment/Plan:  Patient's INR was therapeutic today (2.1). Patient reports it is possible he only took 40 mg/wk last week instead of 42.5 mg/wk but he is not for sure. He denies any change in vitamin K intake, illness, change in smoking, change in physical activity, or bleeding since last visit. He states that since he is diabetic now, he is trying to cut back on sugar, but does not plan to increase vitamin K intake. Warfarin dose was reduced to 5 mg daily on 1/11 due to interaction with Cymbalta. Prior to starting Cymbalta on 12/28, patient's INR had been therapeutic on warfarin 40 mg/week for almost a year. Patient stopped taking Cymbalta in early June due to side effects and does not plan to restart. INR has been labile since. Patient was instructed to continue warfarin 7.5 mg on Monday, Wednesday, Friday and 5 mg all other days. Patient was reminded to maintain consistent vitamin K intake and call with any bleeding, medication changes, or fever/vomiting/diarrhea. Patient understands dosing directions and information discussed. Dosing schedule and follow up appointment given to patient. Progress note routed to referring physician's office. Patient acknowledges working in consult agreement with pharmacist as referred by his/her physician. Next INR Check: 12/10    Please call [facility] at [phone] with any questions.     Kristie Hammond, PharmD, [facility]  [facility]  [place]: [phone]  [place]: [phone]  11/22/2021 9:18 AM    For Pharmacy Admin Tracking Only  Total # of Interventions Recommended: 0  Total # of Interventions Accepted: 0  Time Spent (min): 15

## 2021-12-10 ENCOUNTER — ANTI-COAG VISIT (OUTPATIENT)
Dept: PHARMACY | Age: 56
End: 2021-12-10
Payer: MEDICARE

## 2021-12-10 DIAGNOSIS — I48.0 PAROXYSMAL ATRIAL FIBRILLATION (HCC): ICD-10-CM

## 2021-12-10 DIAGNOSIS — Z95.2 S/P AORTIC VALVE REPLACEMENT: Primary | ICD-10-CM

## 2021-12-10 LAB — INTERNATIONAL NORMALIZATION RATIO, POC: 2.2

## 2021-12-10 PROCEDURE — 85610 PROTHROMBIN TIME: CPT | Performed by: PHARMACIST

## 2021-12-10 PROCEDURE — 99211 OFF/OP EST MAY X REQ PHY/QHP: CPT | Performed by: PHARMACIST

## 2021-12-10 NOTE — PROGRESS NOTES
ANTICOAGULATION SERVICE    Akhil Engle is a 60 y.o. male with PMHx significant for AVR (re-do in May, 2017), CAD s/p CABG (x3 in May, 2017), pA-fib, HLD, HTN, testicular CA who presents to clinic on 12/10/2021 for anticoagulation monitoring and adjustment.     Anticoagulation Indication(s):  Heart Valve Replacement (bovine AVR), A-fib  Referring Physician:  Dr. Wyatt Manual  Goal INR Range:  2-3  Duration of Anticoagulation Therapy:  TBD  Time of day dose taken:  PM  Product patient has at home:  warfarin 5 mg (peach)    [id number] Score for Atrial Fibrillation Stroke Risk   Risk   Factors  Component Value   C CHF No 0   H HTN Yes 1   A2 Age >= 75 No,  (57 y.o.) 0   D DM Yes 1   S2 Prior Stroke/TIA No 0   V Vascular Disease Yes 1   A Age 74-69 No,  (57 y.o.) 0   Sc Sex male 0    [id number]  Score  3   Score last updated 5/30/19 1:51 PM      Lab Results   Component Value Date    RBC 3.70 (L) 07/30/2019    HGB 12.4 (L) 07/30/2019    HCT 36.5 (L) 07/30/2019    MCV 98.7 07/30/2019    MCH 33.5 07/30/2019    MPV 7.6 07/30/2019    RDW 15.6 (H) 07/30/2019     07/30/2019     INR Summary                            Warfarin regimen (mg)  Date INR   A/P    Sun Mon Tue Wed Thu Fri Sat Mg/wk  11/22 2.2 At goal, continue   5 7.5 5 7.5 5 7.5 5 42.5  11/22 2.1 At goal, continue   5 7.5 5 7.5 5 7.5 5 42.5  11/8 1.8 Below goal, increase  5 7.5 5 7.5 5 7.5 5 42.5  10/25 1.6 Below goal, bolus   7.5 7.5/5 5 5 5 5 7.5 40  10/4 2.0 At goal, continue   7.5 5 5 5 5 5 7.5 40  9/17 2.7 At goal (dosing error)  7.5 5 5 5 5 5 7.5 40  8/23 4.3 Above goal, decrease  7.5 0/5 5 5 5 5 7.5 40  8/9 3.3 Above goal, dec  7.5 5 5 5 7.5 5 5 40  7/21 1.4 Below goal, increase  5 7.5 5 7.5 5 7.5 5 42.5  6/28 2.0 At goal, no change  7.5 5 5 5 5 5 7.5 40  6/11 1.5 Below goal, increase  7.5 5 5 5 5 5 7.5 40  5/24 3.6 Above goal, hold x 1  5 0/5 5 5 5 5 5 35  4/23 2.7 At goal, no change  5 5 5 5 5 5 5 35  4/5 3.6 Above goal, hold x 1  5 0/5 5 5 5 5 5 35 3/8 3.0 At goal, no change  5 5 5 5 5 5 5 35  2/19 2.0 At goal, no change  5 5 5 5 5 5 5 35  2/4 2.7 At goal, no change  5 5 5 5 5 5 5 35  1/20 2.7 At goal, no change  5 5 5 5 5 5 5 35  1/11 4.5 Above goal (Cymbalta) 5 0/5 0/5 5 5 5 5 35  12/28 2.0 At goal, no change  7.5 5 5 5 5 5 7.5 40  11/24 2.4 At goal, no change  7.5 5 5 5 5 5 7.5 40  10/30 2.5 At goal, no change  7.5 5 5 5 5 5 7.5 40  10/2 2.2 At goal, no change  7.5 5 5 5 5 5 7.5 40  9/3 2.4 At goal, no change  7.5 5 5 5 5 5 7.5 40  8/3 2.9 At goal, no change  7.5 5 5 5 5 5 7.5 40  7/6 2.7 At goal, no change  7.5 5 5 5 5 5 7.5 40  6/8 2.8 At goal, no change  7.5 5 5 5 5 5 7.5 40  5/11 2.9 At goal, no change  7.5 5 5 5 5 5 7.5 40  3/30 3.0 At goal, no change  7.5 5 5 5 5 5 7.5 40  2/26 2.3 At goal, no change  7.5 5 5 5 5 5 7.5 40  2/5 3.3 Above goal, decrease  7.5 5 5 5 5 5 7.5 40  1/8 2.9 At goal, no change  7.5 7.5 5 5 5 5 7.5 42.5    Patient History:  Recent hospitalizations/HC visits -6/18/21: PCP   Recent medication changes 9/2021: Stopped citalopram, does not plan to continue   7/21/21: increased citalopram   6/18/21: started citalopram 20 mg QD (may increase INR)  12/28/20: Cymbalta 30 mg daily (may increase INR); stopped taking on 1/30; resumed ~2/4; stopped ~6/1   Medications taken regularly that may interact with warfarin or alter INR ASA 81 mg   Warfarin dose taken as prescribed 9/17/21 did not decrease warfarin dose as instructed at last visit  Usually compliant   Does not use pillbox  Patient has been taking warfarin since re-do AVR in May, 2017   Signs/symptoms of bleeding No h/o major bleeding   Vitamin K intake Normally has ~0 servings of green, leafy vegetables per week  States that since diabetic needs to stop sugar but not adjusting vit k intake   Recent vomiting/diarrhea/fever, changes in weight or activity level None reported   Tobacco or alcohol use -No recent changes in smoking as of 11/24/20 (~3/4 PPD)  -Patient cut back from 2 PPD to 3/4 PPD in February (2019) when he moved in with his daughter. Decrease in smoking typically increases INR gradually.   -Patient denies any alcohol or illicit drug use   Upcoming surgeries or procedures None       Assessment/Plan:  Patient's INR was therapeutic today (2.2). He denies any change in vitamin K intake, illness, change in smoking, change in physical activity, or bleeding since last visit. He states that since he is diabetic now, he is trying to cut back on sugar, but does not plan to increase vitamin K intake. Patient was instructed to continue warfarin 7.5 mg on Monday, Wednesday, Friday and 5 mg all other days. Patient was reminded to maintain consistent vitamin K intake and call with any bleeding, medication changes, or fever/vomiting/diarrhea. Patient understands dosing directions and information discussed. Dosing schedule and follow up appointment given to patient. Progress note routed to referring physician's office. Patient acknowledges working in consult agreement with pharmacist as referred by his/her physician. Next INR Check: 1/7    Please call [facility] at [phone] with any questions.     Davida Dang, PharmD Candidate 2022   [facility]  [place]: [address] [place] [address] Street: [phone]  12/10/2021 9:19 AM    For Pharmacy Admin Tracking Only  Total # of Interventions Recommended: 0  Total # of Interventions Accepted: 0  Time Spent (min): 15

## 2022-01-04 ENCOUNTER — OFFICE VISIT (OUTPATIENT)
Dept: INTERNAL MEDICINE CLINIC | Age: 57
End: 2022-01-04
Payer: MEDICARE

## 2022-01-04 VITALS
OXYGEN SATURATION: 97 % | TEMPERATURE: 99 F | DIASTOLIC BLOOD PRESSURE: 98 MMHG | SYSTOLIC BLOOD PRESSURE: 171 MMHG | WEIGHT: 277 LBS | HEIGHT: 72 IN | BODY MASS INDEX: 37.52 KG/M2 | HEART RATE: 80 BPM

## 2022-01-04 DIAGNOSIS — I10 ESSENTIAL HYPERTENSION: ICD-10-CM

## 2022-01-04 DIAGNOSIS — E11.9 TYPE 2 DIABETES MELLITUS WITHOUT COMPLICATION, WITHOUT LONG-TERM CURRENT USE OF INSULIN (HCC): Primary | ICD-10-CM

## 2022-01-04 DIAGNOSIS — M51.26 LUMBAR HERNIATED DISC: ICD-10-CM

## 2022-01-04 DIAGNOSIS — Z87.891 HISTORY OF SMOKING: ICD-10-CM

## 2022-01-04 DIAGNOSIS — I10 ESSENTIAL HYPERTENSION: Chronic | ICD-10-CM

## 2022-01-04 PROCEDURE — 99213 OFFICE O/P EST LOW 20 MIN: CPT | Performed by: STUDENT IN AN ORGANIZED HEALTH CARE EDUCATION/TRAINING PROGRAM

## 2022-01-04 RX ORDER — FAMOTIDINE 20 MG/1
TABLET, FILM COATED ORAL
Qty: 180 TABLET | Refills: 2 | Status: SHIPPED | OUTPATIENT
Start: 2022-01-04 | End: 2022-10-03 | Stop reason: SDUPTHER

## 2022-01-04 RX ORDER — LISINOPRIL 10 MG/1
TABLET ORAL
Qty: 180 TABLET | Refills: 3 | Status: SHIPPED | OUTPATIENT
Start: 2022-01-04

## 2022-01-04 NOTE — PROGRESS NOTES
Outpatient Clinic Established Patient Note    Patient: Merritt Mckinnon  : 1965 (86 y.o.)  Date: 2022    CC: follow up    HPI:    Mr. Rachell Logan is a 60 y.o. M with PMH as below who presents to clinic for follow up. He has no new symptoms or concerns. He states he is still having low back pain radiating to his legs. His legs heaviness hasn't improved. He saw pain specialist today adjustment was planned to be made to the pain stimulator. He denies any numbness or pain in his legs even following walking. His back pain is 7-8/10.      Regarding his blood pressure, pt checks it at home regularly and the numbers have been in the 130's. He is compliant with his medications. He doesn't remember whether he took his BP medication today or not but his BP was noted to be in the 180's today. He denies any symptoms of headache or blurry vision.     For this diabetes, pt has been taking metformin 2000 daily rather than 1000 as it was previously thought. His HA1C is 7.6 10/21 from 9.1 in 2021. Pt doesn't check his glucose at home. He has no worsening of urinary frequency, chronic neuropathy symptoms. Denies any GI side effects after starting metformin. Hasn't seen eye doctor yet.     Denies fever, night sweats, chills, chest pain, cough, dyspnea, palpitations, nausea, vomiting, diarrhea, dysuria.       Allergies:    Atorvastatin calcium [lipitor], Wellbutrin [bupropion], Cymbalta [duloxetine hcl], and Vicodin [hydrocodone-acetaminophen]    Health Maintenance Due   Topic Date Due    Hepatitis C screen  Never done    Diabetic foot exam  Never done    Diabetic microalbuminuria test  Never done    Diabetic retinal exam  Never done    Hepatitis B vaccine (1 of 3 - Risk 3-dose series) Never done    DTaP/Tdap/Td vaccine (1 - Tdap) Never done    Shingles Vaccine (1 of 2) Never done    Low dose CT lung screening  Never done    Lipid screen  2017    Annual Wellness Visit (AWV)  Never done    Potassium monitoring 07/30/2020    Creatinine monitoring  07/30/2020    COVID-19 Vaccine (2 - Booster for Javier series) 05/08/2021       Immunization History   Administered Date(s) Administered    COVID-19, J&J, PF, 0.5 mL 03/13/2021    Pneumococcal Polysaccharide ([id number]) 07/26/2014       Review of Systems  A 10-organ Review Of Systems was obtained and otherwise unremarkable except as per HPI. Data: Old records have been reviewed electronically.       Past Medical History:    Past Medical History:   Diagnosis Date    Abdominal hernia     s/p repair 2010    Aortic valve prosthesis present     CAD (coronary artery disease)     Chronic back pain greater than 3 months duration     Clostridium difficile diarrhea 10/17/2016    PCR    DDD (degenerative disc disease), lumbosacral 8/7/2013    Erectile dysfunction     GERD (gastroesophageal reflux disease)     Hyperlipidemia     Hypertension     Joint pain     Left testicular cancer ([address] 2002    s/p abdominal resection    Myalgia and myositis, unspecified 8/26/2013    Neuropathy     OA (osteoarthritis) of knee     bilateral    Obesity, Class I, BMI 30.0-34.9 (see actual BMI)     Prolonged emergence from general anesthesia     after CABG    S/P AVR 2005    tissue valve    S/P CABG x 2 2005    w/AVR & primary repair of dissected ascending aorta    Type 2 diabetes mellitus without complication, without long-term current use of insulin ([address] 7/13/2021       Past Surgical History:  Past Surgical History:   Procedure Laterality Date    AORTA SURGERY  08/27/2004    Dr. Vicenta Mejia - primary repair of limited ascending aortic dissection    AORTIC VALVE REPLACEMENT  05/30/2017    Dr. Vika Goodson - redo w/25mm [place] Child ThermaFix model 3300 bovine pericardial bioprosthesis    AORTIC VALVE SURGERY  08/27/2004    Dr. Vicenta Mejia - 27mm [place] pericardial prosthesis     BACK SURGERY      L4-S1    CARDIAC CATHETERIZATION  05/09/2017     [address] CATHETERIZATION  08/26/2004    Dr. Mell Bruno Right 03/17/2015    Dr. Montse Akhtar  10/10/2016    Dr. Evaline Closs - w/laparascopic lysis of extensive adhesions, open transverse colon resection, excision of old abd mesh adhering to colon    CORONARY ANGIOPLASTY WITH STENT PLACEMENT  07/2014    CORONARY ARTERY BYPASS GRAFT  08/27/2004    Dr. Ingrid Castle - x2 (LIMA-LAD, SVG sequentially to ascending aorta & D1)    CORONARY ARTERY BYPASS GRAFT  05/30/2017    Dr. Ashwini Vega - redo x3 (reverse AC SVG sequentially to D1, OM1 & PDA) w/explant of prior prosthetic valve & removal of embedded sternal wires x7    EMG  04/16/2012    FOOT SURGERY Left 01/24/2012    Dr. Danilo Tan, DPM - hallux nail evulsion & exostectomy    HEMICOLECTOMY N/A 7/26/2019    ROBOTIC ASSISTED  LAPAROSCOPIC RIGHT COLECTOMY AND CHOLECYSTECTOMY performed by Vince Patricia MD at [address] [place] [address]  2010    multiple    LUMBAR DISCECTOMY  2012    L4-5    SHOULDER ARTHROSCOPY Right 11/21/2013    Dr. Karl Mccoy - w/subacromial decompression, debridement of the SLAP tear, distal clavicle excision    SOFT TISSUE TUMOR RESECTION  2001    retroperitoneal mass    TESTICLE REMOVAL Left 2001    radical orchiectomy    TRANSESOPHAGEAL ECHOCARDIOGRAM  05/30/2017    during redo CABG & AVR    TUNNELED VENOUS PORT PLACEMENT  2002    portacath        Home Meds:  Prior to Visit  Outpatient Medications Prior to Visit   Medication Sig Dispense Refill    Ascorbic Acid (VITAMIN C PO) Take by mouth 2 times daily      metoprolol tartrate (LOPRESSOR) 50 MG tablet TAKE 1 TABLET BY MOUTH 2 TIMES A DAY FOR BLOOD PRESSURE AND HEART 180 tablet 1    nystatin-triamcinolone (MYCOLOG) 914154-2.1 UNIT/GM-% ointment Apply topically 2 times daily until improved.  30 g 0    warfarin (COUMADIN) 5 MG tablet TAKE 1 TABLET BY MOUTH ONE TIME A DAY OR TAKE ONE AND ONE-HALF TABLETS BY MOUTH EVERY DAY AS DIRECTED BY CLINIC 135 tablet 2    rosuvastatin (CRESTOR) 20 MG tablet TAKE ONE TABLET BY MOUTH EVERY EVENING 90 tablet 3    lisinopril (PRINIVIL;ZESTRIL) 10 MG tablet TAKE 1 TABLET BY MOUTH 2 TIMES A  tablet 2    famotidine (PEPCID) 20 MG tablet TAKE 1 TABLET BY MOUTH 2 TIMES A  tablet 2    Handicap Placard MISC by Does not apply route Effective through 12/28/2020 through 12/27/2025 1 each 0    aspirin 81 MG EC tablet Take 1 tablet by mouth daily 30 tablet 0    CYCLOBENZAPRINE HCL PO Take by mouth      amLODIPine (NORVASC) 5 MG tablet Take 1 tablet by mouth nightly as needed (If systolic blood pressure over 130, take amlodipine 5mg at night, if under 130 do not take amlopidine) 30 tablet 2    melatonin (RA MELATONIN) 3 MG TABS tablet Take 1 tablet by mouth nightly as needed (insomnia) 30 tablet 3    Blood Pressure KIT 1 Unit 1 kit 0    oxyCODONE-acetaminophen (PERCOCET) 5-325 MG per tablet Take 1 tablet by mouth every 4 hours as needed for Pain  .  gabapentin (NEURONTIN) 300 MG capsule Take 300 mg by mouth 3 times daily       metFORMIN (GLUCOPHAGE) 500 MG tablet Take 1 tablet by mouth 2 times daily (with meals) 60 tablet 0     No facility-administered medications prior to visit. After Visit:  Prior to Visit Medications    Medication Sig Taking? Authorizing Provider   Ascorbic Acid (VITAMIN C PO) Take by mouth 2 times daily Yes Historical Provider, MD   metFORMIN (GLUCOPHAGE) 500 MG tablet Take 2 tablets by mouth 2 times daily (with meals) Yes Sky Ling MD   metoprolol tartrate (LOPRESSOR) 50 MG tablet TAKE 1 TABLET BY MOUTH 2 TIMES A DAY FOR BLOOD PRESSURE AND HEART Yes Sky Ling MD   nystatin-triamcinolone [facility]) 412912-4.5 UNIT/GM-% ointment Apply topically 2 times daily until improved.  Yes Joselo Burns MD   warfarin (COUMADIN) 5 MG tablet TAKE 1 TABLET BY MOUTH ONE TIME A DAY OR TAKE ONE AND ONE-HALF TABLETS BY MOUTH EVERY DAY AS DIRECTED BY CLINIC Yes Yadira Kang Heart sounds: Normal heart sounds. Pulmonary:      Effort: Pulmonary effort is normal.      Breath sounds: Normal breath sounds. Abdominal:      General: Bowel sounds are normal. There is distension. Palpations: Abdomen is soft. Tenderness: There is no abdominal tenderness. Musculoskeletal:         General: Normal range of motion. Cervical back: Normal range of motion and neck supple. Right lower leg: No edema. Left lower leg: No edema. Skin:     General: Skin is warm and dry. Capillary Refill: Capillary refill takes less than 2 seconds. Neurological:      Mental Status: He is alert. Assessment & Plan:      1. Type 2 diabetes mellitus without complication, without long-term current use of insulin (HCC)  On metformin 1000 BID. Hasn't had eye exam yet. Doesn't check blood glucose at home. Last HA1C 7.6 10/2021 from 9.1 7/2021.   - HEMOGLOBIN A1C; Future  - metFORMIN (GLUCOPHAGE) 500 MG tablet; Take 2 tablets by mouth 2 times daily (with meals)  Dispense: 120 tablet; Refill: 0  - advised pt to follow healthy lifestyle with diet low in carbs and fat and to continue to exercise  - advised pt to schedule eye exam  - check micro albumin/cr ratio    2. Essential hypertension  BP high today 180's but pt unsure if he took AM meds or not; also having back pain which could be contributing. BP has been stable in the past 130 similar to home BP. No adjustments necessary this visit. - continue current meds; lisinopril 10 mg, lopressor 50, amlodipine 5 mg PRN  - check CMP  - check CBC  - check lipid panel  - check TSH    3. Lumbar stenosis  Chronic. Follows pain management clinic. S/p pain stimulator. Still having low back problems radiating to legs with heaviness. No numbness or worsening of tingling sensation.  No weakness on exam.  MRI 4/25/2019 broad  right paracentral disc protrusion at L4-L5, Redemonstration of broad central-left paracentral disc extrusion at L5-S1 projecting into the preforaminal space and also associated with mild caudal migration. Proximal left S1 nerve root is effaced.  - follow up with pain specialist management  - pt not interested in physical therapy as he had bad experience in the past with worsening pain following sessions    4. History of smoking  - CT Lung Screen (Initial or Annual); Future  - OR VISIT TO DISCUSS LUNG CA SCREEN W LDCT        Return in about 3 months (around 4/4/2022). Patient Instructions   - please take medications as prescribed  - please go to lab for blood work  - please schedule an appointment for CT chest and for Echo  - please wear wrist brace in your left arm night time and when working if possible    What is lung cancer screening? Lung cancer screening is a way in which doctors check the lungs for early signs of cancer in people who have no symptoms of lung cancer. A low-dose CT scan uses much less radiation than a normal CT scan and shows a more detailed image of the lungs than a standard X-ray. The goal of lung cancer screening is to find cancer early, before it has a chance to grow, spread, or cause problems. One large study found that smokers who were screened with low-dose CT scans were less likely to die of lung cancer than those who were screened with standard X-ray. Below is a summary of the things you need to know regarding screening for lung cancer with low-dose computed tomography (LDCT). This is a screening program that involves routine annual screening with LDCT studies of the lung. The LDCTs are done using low-dose radiation that is not thought to increase your cancer risk. If you have other serious medical conditions (other cancers, congestive heart failure) that limit your life expectancy to less than 10 years, you should not undergo lung cancer screening with LDCT. The chance is 20%-60% that the LDCT result will show abnormalities.   This would require additional testing which could include repeat imaging or even invasive procedures. Most (about 95%) of \"abnormal\" LDCT results are false in the sense that no lung cancer is ultimately found. Additionally, some (about 10%) of the cancers found would not affect your life expectancy, even if undetected and untreated. If you are still smoking, the single most important thing that you can do to reduce your risk of dying of lung cancer is to quit. For this screening to be covered by Medicare and most other insurers, strict criteria must be met. If you do not meet these criteria, but still wish to undergo LDCT testing, you will be required to sign a waiver indicating your willingness to pay for the scan. Dispo: Pt has been staffed with Dr. Court Rocha  _______________  Travon Tam MD, 1/4/2022 12:45 PM   PGY-3  Low Dose CT (LDCT) Lung Screening criteria met:     Age 55-77(Medicare) or 50-80 ([facility])   Pack year smoking >30 (Medicare) or >20 ([facility])   Still smoking or less than 15 year since quit   No sign or symptoms of lung cancer   > 11 months since last LDCT     Risks and benefits of lung cancer screening with LDCT scans discussed:    Significance of positive screen - False-positive LDCT results often occur. 95% of all positive results do not lead to a diagnosis of cancer. Usually further imaging can resolve most false-positive results; however, some patients may require invasive procedures. Over diagnosis risk - 10% to 12% of screen-detected lung cancer cases are over diagnosedthat is, the cancer would not have been detected in the patient's lifetime without the screening. Need for follow up screens annually to continue lung cancer screening effectiveness     Risks associated with radiation from annual LDCT- Radiation exposure is about the same as for a mammogram, which is about 1/3 of the annual background radiation exposure from everyday life. Starting screening at age 54 is not likely to increase cancer risk from radiation exposure.     Patients with comorbidities resulting in life expectancy of < 10 years, or that would preclude treatment of an abnormality identified on CT, should not be screened due to lack of benefit.     To obtain maximal benefit from this screening, smoking cessation and long-term abstinence from smoking is critical

## 2022-01-04 NOTE — PATIENT INSTRUCTIONS
- please take medications as prescribed  - please go to lab for blood work  - please schedule an appointment for CT chest and for Echo  - please wear wrist brace in your left arm night time and when working if possible    What is lung cancer screening? Lung cancer screening is a way in which doctors check the lungs for early signs of cancer in people who have no symptoms of lung cancer. A low-dose CT scan uses much less radiation than a normal CT scan and shows a more detailed image of the lungs than a standard X-ray. The goal of lung cancer screening is to find cancer early, before it has a chance to grow, spread, or cause problems. One large study found that smokers who were screened with low-dose CT scans were less likely to die of lung cancer than those who were screened with standard X-ray. Below is a summary of the things you need to know regarding screening for lung cancer with low-dose computed tomography (LDCT). This is a screening program that involves routine annual screening with LDCT studies of the lung. The LDCTs are done using low-dose radiation that is not thought to increase your cancer risk. If you have other serious medical conditions (other cancers, congestive heart failure) that limit your life expectancy to less than 10 years, you should not undergo lung cancer screening with LDCT. The chance is 20%-60% that the LDCT result will show abnormalities. This would require additional testing which could include repeat imaging or even invasive procedures. Most (about 95%) of \"abnormal\" LDCT results are false in the sense that no lung cancer is ultimately found. Additionally, some (about 10%) of the cancers found would not affect your life expectancy, even if undetected and untreated. If you are still smoking, the single most important thing that you can do to reduce your risk of dying of lung cancer is to quit.     For this screening to be covered by Medicare and most other insurers, strict criteria must be met. If you do not meet these criteria, but still wish to undergo LDCT testing, you will be required to sign a waiver indicating your willingness to pay for the scan.

## 2022-01-07 ENCOUNTER — ANTI-COAG VISIT (OUTPATIENT)
Dept: PHARMACY | Age: 57
End: 2022-01-07
Payer: MEDICARE

## 2022-01-07 DIAGNOSIS — Z95.2 S/P AORTIC VALVE REPLACEMENT: Primary | ICD-10-CM

## 2022-01-07 DIAGNOSIS — I48.0 PAROXYSMAL ATRIAL FIBRILLATION (HCC): ICD-10-CM

## 2022-01-07 LAB — INTERNATIONAL NORMALIZATION RATIO, POC: 1.9

## 2022-01-07 PROCEDURE — 99211 OFF/OP EST MAY X REQ PHY/QHP: CPT | Performed by: PHARMACIST

## 2022-01-07 PROCEDURE — 85610 PROTHROMBIN TIME: CPT | Performed by: PHARMACIST

## 2022-01-07 NOTE — PROGRESS NOTES
ANTICOAGULATION SERVICE    Kirit Ricci is a 60 y.o. male with PMHx significant for AVR (re-do in May, 2017), CAD s/p CABG (x3 in May, 2017), pA-fib, HLD, HTN, testicular CA who presents to clinic on 1/7/2022 for anticoagulation monitoring and adjustment.     Anticoagulation Indication(s):  Heart Valve Replacement (bovine AVR), A-fib  Referring Physician:  Dr. Mana Coyne  Goal INR Range:  2-3  Duration of Anticoagulation Therapy:  TBD  Time of day dose taken:  PM  Product patient has at home:  warfarin 5 mg (peach)    [id number] Score for Atrial Fibrillation Stroke Risk   Risk   Factors  Component Value   C CHF No 0   H HTN Yes 1   A2 Age >= 75 No,  (57 y.o.) 0   D DM Yes 1   S2 Prior Stroke/TIA No 0   V Vascular Disease Yes 1   A Age 74-69 No,  (57 y.o.) 0   Sc Sex male 0    [id number]  Score  3   Score last updated 5/30/19 1:51 PM      Lab Results   Component Value Date    RBC 3.70 (L) 07/30/2019    HGB 12.4 (L) 07/30/2019    HCT 36.5 (L) 07/30/2019    MCV 98.7 07/30/2019    MCH 33.5 07/30/2019    MPV 7.6 07/30/2019    RDW 15.6 (H) 07/30/2019     07/30/2019     INR Summary                            Warfarin regimen (mg)  Date INR   A/P    Sun Mon Tue Wed Thu Fri Sat Mg/wk  1/7 1.9 Below goal, continue   5 7.5 5 7.5 5 7.5 5 42.5  11/22 2.2 At goal, continue   5 7.5 5 7.5 5 7.5 5 42.5  11/22 2.1 At goal, continue   5 7.5 5 7.5 5 7.5 5 42.5  11/8 1.8 Below goal, increase  5 7.5 5 7.5 5 7.5 5 42.5  10/25 1.6 Below goal, bolus   7.5 7.5/5 5 5 5 5 7.5 40  10/4 2.0 At goal, continue   7.5 5 5 5 5 5 7.5 40  9/17 2.7 At goal (dosing error)  7.5 5 5 5 5 5 7.5 40  8/23 4.3 Above goal, decrease  7.5 0/5 5 5 5 5 7.5 40  8/9 3.3 Above goal, dec  7.5 5 5 5 7.5 5 5 40  7/21 1.4 Below goal, increase  5 7.5 5 7.5 5 7.5 5 42.5  6/28 2.0 At goal, no change  7.5 5 5 5 5 5 7.5 40  6/11 1.5 Below goal, increase  7.5 5 5 5 5 5 7.5 40  5/24 3.6 Above goal, hold x 1  5 0/5 5 5 5 5 5 35  4/23 2.7 At goal, no change  5 5 5 5 5 5 5 35  4/5 3.6 Above goal, hold x 1  5 0/5 5 5 5 5 5 35   3/8 3.0 At goal, no change  5 5 5 5 5 5 5 35  2/19 2.0 At goal, no change  5 5 5 5 5 5 5 35  2/4 2.7 At goal, no change  5 5 5 5 5 5 5 35  1/20 2.7 At goal, no change  5 5 5 5 5 5 5 35  1/11 4.5 Above goal (Cymbalta) 5 0/5 0/5 5 5 5 5 35  12/28 2.0 At goal, no change  7.5 5 5 5 5 5 7.5 40  11/24 2.4 At goal, no change  7.5 5 5 5 5 5 7.5 40  10/30 2.5 At goal, no change  7.5 5 5 5 5 5 7.5 40  10/2 2.2 At goal, no change  7.5 5 5 5 5 5 7.5 40  9/3 2.4 At goal, no change  7.5 5 5 5 5 5 7.5 40  8/3 2.9 At goal, no change  7.5 5 5 5 5 5 7.5 40  7/6 2.7 At goal, no change  7.5 5 5 5 5 5 7.5 40  6/8 2.8 At goal, no change  7.5 5 5 5 5 5 7.5 40  5/11 2.9 At goal, no change  7.5 5 5 5 5 5 7.5 40  3/30 3.0 At goal, no change  7.5 5 5 5 5 5 7.5 40  2/26 2.3 At goal, no change  7.5 5 5 5 5 5 7.5 40  2/5 3.3 Above goal, decrease  7.5 5 5 5 5 5 7.5 40  1/8 2.9 At goal, no change  7.5 7.5 5 5 5 5 7.5 42.5    Patient History:  Recent hospitalizations/HC visits -6/18/21: PCP   Recent medication changes 1/5/22: Medrol dose pack x5 days per pain management   9/2021: Stopped citalopram, does not plan to continue   7/21/21: increased citalopram   6/18/21: started citalopram 20 mg QD (may increase INR)  12/28/20: Cymbalta 30 mg daily (may increase INR); stopped taking on 1/30; resumed ~2/4; stopped ~6/1   Medications taken regularly that may interact with warfarin or alter INR ASA 81 mg   Warfarin dose taken as prescribed 9/17/21 did not decrease warfarin dose as instructed at last visit  Usually compliant   Does not use pillbox  Patient has been taking warfarin since re-do AVR in May, 2017   Signs/symptoms of bleeding No h/o major bleeding   Vitamin K intake Normally has ~0 servings of green, leafy vegetables per week  States that since diabetic needs to stop sugar but not adjusting vit k intake   Recent vomiting/diarrhea/fever, changes in weight or activity

## 2022-01-24 ENCOUNTER — TELEPHONE (OUTPATIENT)
Dept: INTERNAL MEDICINE CLINIC | Age: 57
End: 2022-01-24

## 2022-01-24 NOTE — TELEPHONE ENCOUNTER
PT NEED A NOTE/LETTER BE FAXED TO [facility] TO RELEASE HIM OF JURY DUTY. FAX TO NATE -[phone] [facility]  [phone].   PT CAN BE REACHED -[phone] IF YOUR QUESTION

## 2022-02-01 ENCOUNTER — HOSPITAL ENCOUNTER (OUTPATIENT)
Dept: CT IMAGING | Age: 57
Discharge: HOME | End: 2022-02-01
Payer: MEDICARE

## 2022-02-01 ENCOUNTER — HOSPITAL ENCOUNTER (OUTPATIENT)
Dept: NON INVASIVE DIAGNOSTICS | Age: 57
Discharge: HOME | End: 2022-02-01
Payer: MEDICARE

## 2022-02-01 ENCOUNTER — ANTI-COAG VISIT (OUTPATIENT)
Dept: PHARMACY | Age: 57
End: 2022-02-01
Payer: MEDICARE

## 2022-02-01 DIAGNOSIS — I25.10 CAD IN NATIVE ARTERY: ICD-10-CM

## 2022-02-01 DIAGNOSIS — Z87.891 HISTORY OF SMOKING: ICD-10-CM

## 2022-02-01 DIAGNOSIS — I48.0 PAROXYSMAL ATRIAL FIBRILLATION (HCC): ICD-10-CM

## 2022-02-01 DIAGNOSIS — Z95.2 S/P AORTIC VALVE REPLACEMENT: Primary | ICD-10-CM

## 2022-02-01 LAB
INTERNATIONAL NORMALIZATION RATIO, POC: 2.1
LV EF: 50 %
LVEF MODALITY: NORMAL

## 2022-02-01 PROCEDURE — 71271 CT THORAX LUNG CANCER SCR C-: CPT

## 2022-02-01 PROCEDURE — 99211 OFF/OP EST MAY X REQ PHY/QHP: CPT | Performed by: PHARMACIST

## 2022-02-01 PROCEDURE — 85610 PROTHROMBIN TIME: CPT | Performed by: PHARMACIST

## 2022-02-01 PROCEDURE — 93306 TTE W/DOPPLER COMPLETE: CPT

## 2022-02-01 NOTE — PROGRESS NOTES
ANTICOAGULATION SERVICE    Maria Luisa Tamayo is a 60 y.o. male with PMHx significant for AVR (re-do in May, 2017), CAD s/p CABG (x3 in May, 2017), pA-fib, HLD, HTN, testicular CA who presents to clinic on 2/1/2022 for anticoagulation monitoring and adjustment.     Anticoagulation Indication(s):  Heart Valve Replacement (bovine AVR), A-fib  Referring Physician:  Dr. Mitch Clark  Goal INR Range:  2-3  Duration of Anticoagulation Therapy:  TBD  Time of day dose taken:  PM  Product patient has at home:  warfarin 5 mg (peach)    [id number] Score for Atrial Fibrillation Stroke Risk   Risk   Factors  Component Value   C CHF No 0   H HTN Yes 1   A2 Age >= 75 No,  (57 y.o.) 0   D DM Yes 1   S2 Prior Stroke/TIA No 0   V Vascular Disease Yes 1   A Age 74-69 No,  (57 y.o.) 0   Sc Sex male 0    [id number]  Score  3   Score last updated 5/30/19 1:51 PM      Lab Results   Component Value Date    RBC 3.70 (L) 07/30/2019    HGB 12.4 (L) 07/30/2019    HCT 36.5 (L) 07/30/2019    MCV 98.7 07/30/2019    MCH 33.5 07/30/2019    MPV 7.6 07/30/2019    RDW 15.6 (H) 07/30/2019     07/30/2019     INR Summary                            Warfarin regimen (mg)  Date INR   A/P    Sun Mon Tue Wed Thu Fri Sat Mg/wk  2/1 2.1 At goal, continue   5 7.5 5 7.5 5 7.5 5 42.5  1/7 1.9 Below goal, continue   5 7.5 5 7.5 5 7.5 5 42.5  11/22 2.2 At goal, continue   5 7.5 5 7.5 5 7.5 5 42.5  11/22 2.1 At goal, continue   5 7.5 5 7.5 5 7.5 5 42.5  11/8 1.8 Below goal, increase  5 7.5 5 7.5 5 7.5 5 42.5  10/25 1.6 Below goal, bolus   7.5 7.5/5 5 5 5 5 7.5 40  10/4 2.0 At goal, continue   7.5 5 5 5 5 5 7.5 40  9/17 2.7 At goal (dosing error)  7.5 5 5 5 5 5 7.5 40  8/23 4.3 Above goal, decrease  7.5 0/5 5 5 5 5 7.5 40  8/9 3.3 Above goal, dec  7.5 5 5 5 7.5 5 5 40  7/21 1.4 Below goal, increase  5 7.5 5 7.5 5 7.5 5 42.5  6/28 2.0 At goal, no change  7.5 5 5 5 5 5 7.5 40  6/11 1.5 Below goal, increase  7.5 5 5 5 5 5 7.5 40  5/24 3.6 Above goal, hold x 1  5 0/5 5 5 5 5 5 35  4/23 2.7 At goal, no change  5 5 5 5 5 5 5 35  4/5 3.6 Above goal, hold x 1  5 0/5 5 5 5 5 5 35   3/8 3.0 At goal, no change  5 5 5 5 5 5 5 35  2/19 2.0 At goal, no change  5 5 5 5 5 5 5 35  2/4 2.7 At goal, no change  5 5 5 5 5 5 5 35  1/20 2.7 At goal, no change  5 5 5 5 5 5 5 35  1/11 4.5 Above goal (Cymbalta) 5 0/5 0/5 5 5 5 5 35  12/28 2.0 At goal, no change  7.5 5 5 5 5 5 7.5 40  11/24 2.4 At goal, no change  7.5 5 5 5 5 5 7.5 40  10/30 2.5 At goal, no change  7.5 5 5 5 5 5 7.5 40  10/2 2.2 At goal, no change  7.5 5 5 5 5 5 7.5 40  9/3 2.4 At goal, no change  7.5 5 5 5 5 5 7.5 40  8/3 2.9 At goal, no change  7.5 5 5 5 5 5 7.5 40  7/6 2.7 At goal, no change  7.5 5 5 5 5 5 7.5 40  6/8 2.8 At goal, no change  7.5 5 5 5 5 5 7.5 40  5/11 2.9 At goal, no change  7.5 5 5 5 5 5 7.5 40  3/30 3.0 At goal, no change  7.5 5 5 5 5 5 7.5 40  2/26 2.3 At goal, no change  7.5 5 5 5 5 5 7.5 40  2/5 3.3 Above goal, decrease  7.5 5 5 5 5 5 7.5 40  1/8 2.9 At goal, no change  7.5 7.5 5 5 5 5 7.5 42.5    Patient History:  Recent hospitalizations/HC visits -6/18/21: PCP   Recent medication changes 1/5/22: Medrol dose pack x5 days per pain management   9/2021: Stopped citalopram, does not plan to continue   7/21/21: increased citalopram   6/18/21: started citalopram 20 mg QD (may increase INR)  12/28/20: Cymbalta 30 mg daily (may increase INR); stopped taking on 1/30; resumed ~2/4; stopped ~6/1   Medications taken regularly that may interact with warfarin or alter INR ASA 81 mg   Warfarin dose taken as prescribed 9/17/21 did not decrease warfarin dose as instructed at last visit  Usually compliant   Does not use pillbox  Patient has been taking warfarin since re-do AVR in May, 2017   Signs/symptoms of bleeding No h/o major bleeding   Vitamin K intake Normally has ~0 servings of green, leafy vegetables per week  States that since diabetic needs to stop sugar but not adjusting vit k intake   Recent vomiting/diarrhea/fever, changes in weight or activity level None reported   Tobacco or alcohol use -No recent changes in smoking as of 11/24/20 (~3/4 PPD)  -Patient cut back from 2 PPD to 3/4 PPD in February (2019) when he moved in with his daughter. Decrease in smoking typically increases INR gradually.   -Patient denies any alcohol or illicit drug use   Upcoming surgeries or procedures None       Assessment/Plan:  Patient's INR was therapeutic today (2.1). He denies any change in medications, vitamin K intake, illness, smoking, physical activity, or bleeding since last visit. He states that since he is diabetic now, he is trying to cut back on sugar, but does not plan to increase vitamin K intake. Patient was instructed to continue warfarin 7.5 mg on Monday, Wednesday, Friday and 5 mg all other days. Will repeat INR in 4 weeks. Patient was reminded to maintain consistent vitamin K intake and call with any bleeding, medication changes, or fever/vomiting/diarrhea. Patient understands dosing directions and information discussed. Dosing schedule and follow up appointment given to patient. Progress note routed to referring physician's office. Patient acknowledges working in consult agreement with pharmacist as referred by his/her physician. Next INR Check: 3/4     Please call [place] at [phone] with any questions.     Radha Roberts, PharmD, [facility]  [facility]  [place]: [phone]  [place]: [phone]  2/1/2022 9:17 AM    For Pharmacy Admin Tracking Only  Total # of Interventions Recommended: 0  Total # of Interventions Accepted: 0  Time Spent (min): 15

## 2022-02-07 ENCOUNTER — TELEPHONE (OUTPATIENT)
Dept: CT IMAGING | Age: 57
End: 2022-02-07

## 2022-02-07 NOTE — TELEPHONE ENCOUNTER
Dr. Jesi Galeano,    Pt completed CT Lung Screening 2/1/2022 with the following results requiring follow-up:    Impression       1. 14 x 22 mm cystic/cavitary lesion within the left lower lobe. This could be infectious, inflammatory, or neoplastic. Recommend either further evaluation with PET/CT, or 3 month short interval follow-up study. Consider pulmonary consult.       LUNG RADS ASSESSMENTS  LUNGRADS ASSESSMENT VALUE: 4A       I just wanted to be sure you were aware and the results were communicated to pt for follow-up.       Thank you,  Anita GRANTN, RN   Lung Nodule Navigator  [facility] [employer]  [phone]

## 2022-02-08 DIAGNOSIS — R91.1 LESION OF LEFT LUNG: Primary | ICD-10-CM

## 2022-02-08 NOTE — TELEPHONE ENCOUNTER
Thank you for your message. I placed a note on the test result.  I also placed referral for pt to schedule an appointment to see pulm specialist. Thank you

## 2022-02-08 NOTE — RESULT ENCOUNTER NOTE
CT scan revealed 14 x 22 mm cystic/cavitary lesion within the left lower lobe. I placed referral for pt to see pulmonology specialist. He will likely need a repeat CT chest in 3 months. Please inform pt to call pulmonology clinic to schedule an appointment.  Thank you

## 2022-02-21 ENCOUNTER — OFFICE VISIT (OUTPATIENT)
Dept: PULMONOLOGY | Age: 57
End: 2022-02-21
Payer: MEDICARE

## 2022-02-21 VITALS
WEIGHT: 269 LBS | HEART RATE: 86 BPM | SYSTOLIC BLOOD PRESSURE: 150 MMHG | BODY MASS INDEX: 36.44 KG/M2 | RESPIRATION RATE: 16 BRPM | OXYGEN SATURATION: 97 % | TEMPERATURE: 98.7 F | HEIGHT: 72 IN | DIASTOLIC BLOOD PRESSURE: 76 MMHG

## 2022-02-21 DIAGNOSIS — Z79.01 CHRONIC ANTICOAGULATION: ICD-10-CM

## 2022-02-21 DIAGNOSIS — E66.01 SEVERE OBESITY (BMI 35.0-39.9) WITH COMORBIDITY (HCC): ICD-10-CM

## 2022-02-21 DIAGNOSIS — R91.1 PULMONARY NODULE: Primary | ICD-10-CM

## 2022-02-21 DIAGNOSIS — J44.9 COPD, SEVERITY TO BE DETERMINED (HCC): ICD-10-CM

## 2022-02-21 DIAGNOSIS — I25.10 CORONARY ARTERY DISEASE INVOLVING NATIVE HEART WITHOUT ANGINA PECTORIS, UNSPECIFIED VESSEL OR LESION TYPE: ICD-10-CM

## 2022-02-21 DIAGNOSIS — Z95.2 HISTORY OF AORTIC VALVE REPLACEMENT: ICD-10-CM

## 2022-02-21 PROCEDURE — 3023F SPIROM DOC REV: CPT | Performed by: INTERNAL MEDICINE

## 2022-02-21 PROCEDURE — 4004F PT TOBACCO SCREEN RCVD TLK: CPT | Performed by: INTERNAL MEDICINE

## 2022-02-21 PROCEDURE — G8484 FLU IMMUNIZE NO ADMIN: HCPCS | Performed by: INTERNAL MEDICINE

## 2022-02-21 PROCEDURE — 99204 OFFICE O/P NEW MOD 45 MIN: CPT | Performed by: INTERNAL MEDICINE

## 2022-02-21 PROCEDURE — G8417 CALC BMI ABV UP PARAM F/U: HCPCS | Performed by: INTERNAL MEDICINE

## 2022-02-21 PROCEDURE — 3017F COLORECTAL CA SCREEN DOC REV: CPT | Performed by: INTERNAL MEDICINE

## 2022-02-21 PROCEDURE — G8427 DOCREV CUR MEDS BY ELIG CLIN: HCPCS | Performed by: INTERNAL MEDICINE

## 2022-02-21 NOTE — PROGRESS NOTES
[facility] Pulmonary and Critical Care    Outpatient Initial Note    Subjective:   Referring Physician: Gorge Gautam / HPI:     The patient is 60 y.o. male who presents today for a new patient visit for abnormal CT chest.  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 He had an orchiectomy as well as tumor removal from his abdomen. He also has a history of coronary artery disease and problems with his aortic valve he has had 2 open heart surgeries to repair his aortic valve as well as a double and triple bypass. Despite these things patient continues to smoke. He also has history of colon cancer x2 and has had roughly 3 feet of his bowel removed per his report. His wife passed away from stage IV lung cancer a few years ago as well. Patient has some exertional dyspnea but is not here for that complaint and instead is here because he had a screening CT scan that showed an abnormality in his left lower lobe.        Past Medical History:    Past Medical History:   Diagnosis Date    Abdominal hernia     s/p repair 2010    Aortic valve prosthesis present     CAD (coronary artery disease)     Chronic back pain greater than 3 months duration     Clostridium difficile diarrhea 10/17/2016    PCR    DDD (degenerative disc disease), lumbosacral 8/7/2013    Erectile dysfunction     GERD (gastroesophageal reflux disease)     Hyperlipidemia     Hypertension     Joint pain     Left testicular cancer ([facility] [address] 2002    s/p abdominal resection    Myalgia and myositis, unspecified 8/26/2013    Neuropathy     OA (osteoarthritis) of knee     bilateral    Obesity, Class I, BMI 30.0-34.9 (see actual BMI)     Prolonged emergence from general anesthesia     after CABG    S/P AVR 2005    tissue valve    S/P CABG x 2 2005    w/AVR & primary repair of dissected ascending aorta    Type 2 diabetes mellitus without complication, without long-term current use of insulin ([facility] [address] 2021       Social History:    Social History     Tobacco Use   Smoking Status Current Some Day Smoker    Packs/day: 0.75    Years: 40.00    Pack years: 30.00    Types: Cigarettes    Start date: 1949   [facility] Last attempt to quit: 2017    Years since quittin.7   Smokeless Tobacco Never Used   Tobacco Comment    stopped 5-18       Family History:  Family History   Problem Relation Age of Onset    Cancer Mother     Cancer Father         lung    Alzheimer's Disease Sister     Liver Cancer Brother      Current Medications:  Current Outpatient Medications on File Prior to Visit   Medication Sig Dispense Refill    lisinopril (PRINIVIL;ZESTRIL) 10 MG tablet One tablet twice a day 180 tablet 3    famotidine (PEPCID) 20 MG tablet TAKE 1 TABLET BY MOUTH 2 TIMES A  tablet 2    metoprolol tartrate (LOPRESSOR) 50 MG tablet TAKE 1 TABLET BY MOUTH 2 TIMES A DAY FOR BLOOD PRESSURE AND HEART 180 tablet 1    nystatin-triamcinolone (MYCOLOG) 152541-6.1 UNIT/GM-% ointment Apply topically 2 times daily until improved.  30 g 0    warfarin (COUMADIN) 5 MG tablet TAKE 1 TABLET BY MOUTH ONE TIME A DAY OR TAKE ONE AND ONE-HALF TABLETS BY MOUTH EVERY DAY AS DIRECTED BY CLINIC 135 tablet 2    rosuvastatin (CRESTOR) 20 MG tablet TAKE ONE TABLET BY MOUTH EVERY EVENING 90 tablet 3    Handicap Placard MISC by Does not apply route Effective through 2020 through 2025 1 each 0    aspirin 81 MG EC tablet Take 1 tablet by mouth daily 30 tablet 0    CYCLOBENZAPRINE HCL PO Take by mouth      amLODIPine (NORVASC) 5 MG tablet Take 1 tablet by mouth nightly as needed (If systolic blood pressure over 130, take amlodipine 5mg at night, if under 130 do not take amlopidine) 30 tablet 2    melatonin (RA MELATONIN) 3 MG TABS tablet Take 1 tablet by mouth nightly as needed (insomnia) 30 tablet 3    Blood Pressure KIT 1 Unit 1 kit 0    oxyCODONE-acetaminophen (PERCOCET) 5-325 MG per tablet Take 1 tablet by mouth every 4 hours as needed for Pain  .  gabapentin (NEURONTIN) 300 MG capsule Take 300 mg by mouth 3 times daily       Ascorbic Acid (VITAMIN C PO) Take by mouth 2 times daily (Patient not taking: Reported on 2/21/2022)      metFORMIN (GLUCOPHAGE) 500 MG tablet Take 2 tablets by mouth 2 times daily (with meals) 120 tablet 0     No current facility-administered medications on file prior to visit. Allergies: Allergies   Allergen Reactions    Atorvastatin Calcium [Lipitor] Other (See Comments)     Severe headaches      Wellbutrin [Bupropion] Anxiety     patient complains of feeling anxious, irritable and \"hyped up\" on wellbutrin around 2011.     Cymbalta [Duloxetine Hcl]     Vicodin [Hydrocodone-Acetaminophen] Itching       REVIEW OF SYSTEMS:    CONSTITUTIONAL: Negative for fevers and chills  HEENT: Negative for oropharyngeal exudate, post nasal drip, sinus pain / pressure, nasal congestion, ear pain  RESPIRATORY:  See HPI  CARDIOVASCULAR: Negative for chest pain, palpitations, edema  GASTROINTESTINAL: Negative for nausea, vomiting, diarrhea, constipation and abdominal pain  HEMATOLOGICAL: Negative for adenopathy  SKIN: Negative for clubbing, cyanosis, skin lesions  EXTREMITIES: Negative for weakness, decreased ROM  NEUROLOGICAL: Negative for unilateral weakness, speech or gait abnormalities  PSYCH: Negative for anxiety, depression    Objective:   PHYSICAL EXAM:        VITALS:  BP (!) 150/76 (Site: Left Upper Arm, Position: Sitting, Cuff Size: Large Adult)   Pulse 86   Temp 98.7 °F (37.1 °C) (Oral)   Resp 16   Ht 6' (1.829 m)   Wt 269 lb (122 kg)   SpO2 97%   BMI 36.48 kg/m²     CONSTITUTIONAL:  Awake, alert, cooperative, no apparent distress, and appears stated age  HEENT: No oropharyngeal exudate, PERRL, no cervical adenopathy, no tracheal deviation, thyroid size normal  LUNGS:  No increased work of breathing and clear to auscultation, no crackles or wheezing   CARDIOVASCULAR:  normal S1 and S2 and no JVD  ABDOMEN:  Normal bowel sounds, non-distended and non-tender to palpation  EXT: No edema, no calf tenderness. Pulses are present bilaterally. NEUROLOGIC:  Mental Status Exam:  Level of Alertness:   awake  Orientation:   person, place, time. SKIN:  normal skin color, texture, turgor, no redness, warmth, or swelling     DATA:      Radiology Review:  Pertinent images / reports were reviewed as a part of this visit. CT chest reveals the following:  Impression       1. 14 x 22 mm cystic/cavitary lesion within the left lower lobe. This could be infectious, inflammatory, or neoplastic. Recommend either further evaluation with PET/CT, or 3 month short interval follow-up study. Consider pulmonary consult.       LUNG RADS ASSESSMENTS  LUNGRADS ASSESSMENT VALUE: 4A       Last PFTs: None on file    Immunizations:   Immunization History   Administered Date(s) Administered    COVID-19, J&J, PF, 0.5 mL 03/13/2021    COVID-19, Pfizer Purple top, DILUTE for use, 12+ yrs, 30mcg/0.3mL dose 01/06/2022    Pneumococcal Polysaccharide ([id number]) 07/26/2014       Assessment: This is a 60 y.o. male with cavitary pulmonary nodule in his left lower lobe    Plan:   -Patient has a spiculated cavitary lesion in his left lower lobe per radiology report. I reviewed the CT scan and there is definitely a solid component to this nodule and adjacent bronchiectasis implying there is some airways obstruction. I cannot tell if it is truly cavitary or if there is an airway in part of the nodule, and if there is some element of mock effect that makes the vasculature surrounding the nodule appear like a cavity. Regardless, patient is high risk I think a better quality CT is needed. He denies having any respiratory complaints at the time of his screening scan but it was done on February 1 so we are 3 weeks out from its initial identification.   I like to get another scan in a week to see if there has been any interval growth and if a finer cuts CT scan would allow for improved clarity. Location and mixed attenuation of the lesion make it difficult to biopsy percutaneously and impossible to see with fluoroscopy. He does seem like he would be a good candidate for a navigational bronchoscopy however so the scan I am going to order is a high-resolution CT scan with [facility] navigational parameters. Because of his smoking history and the potential for this being a malignancy it would also be wise to get pulmonary function testing at this time to assess his candidacy for surgical intervention. Diagnosis Orders   1. Pulmonary nodule  CT CHEST HIGH RESOLUTION    Full PFT Study With Bronchodilator    Carbon Monoxide Diffusing Capacity   2. COPD, severity to be determined ([address]  Full PFT Study With Bronchodilator    Carbon Monoxide Diffusing Capacity   3. Severe obesity (BMI 35.0-39. 9) with comorbidity ([address]        - Tobacco use: The patient is currently smoking. The risks related to smoking were reviewed with the patient: Recommend maintaining a smoke-free lifestyle. Patient counseled to quit smoking.       - RTC 3 weeks w/ MD. Call or RTC sooner if symptoms persist or worsen acutely.

## 2022-03-04 ENCOUNTER — ANTI-COAG VISIT (OUTPATIENT)
Dept: PHARMACY | Age: 57
End: 2022-03-04
Payer: MEDICARE

## 2022-03-04 DIAGNOSIS — E11.9 TYPE 2 DIABETES MELLITUS WITHOUT COMPLICATION, WITHOUT LONG-TERM CURRENT USE OF INSULIN (HCC): ICD-10-CM

## 2022-03-04 DIAGNOSIS — I48.0 PAROXYSMAL ATRIAL FIBRILLATION (HCC): ICD-10-CM

## 2022-03-04 DIAGNOSIS — Z95.2 S/P AORTIC VALVE REPLACEMENT: Primary | ICD-10-CM

## 2022-03-04 LAB — INTERNATIONAL NORMALIZATION RATIO, POC: 2.2

## 2022-03-04 PROCEDURE — 99211 OFF/OP EST MAY X REQ PHY/QHP: CPT | Performed by: PHARMACIST

## 2022-03-04 PROCEDURE — 85610 PROTHROMBIN TIME: CPT | Performed by: PHARMACIST

## 2022-03-04 NOTE — PROGRESS NOTES
ANTICOAGULATION SERVICE    Schuyler Verde is a 60 y.o. male with PMHx significant for AVR (re-do in May, 2017), CAD s/p CABG (x3 in May, 2017), pA-fib, HLD, HTN, testicular CA who presents to clinic on 3/4/2022 for anticoagulation monitoring and adjustment.     Anticoagulation Indication(s):  Heart Valve Replacement (bovine AVR), A-fib  Referring Physician:  Dr. Octavia Junior  Goal INR Range:  2-3  Duration of Anticoagulation Therapy:  TBD  Time of day dose taken:  PM  Product patient has at home:  warfarin 5 mg (peach)    [id number] Score for Atrial Fibrillation Stroke Risk   Risk   Factors  Component Value   C CHF No 0   H HTN Yes 1   A2 Age >= 75 No,  (57 y.o.) 0   D DM Yes 1   S2 Prior Stroke/TIA No 0   V Vascular Disease Yes 1   A Age 74-69 No,  (57 y.o.) 0   Sc Sex male 0    [id number]  Score  3   Score last updated 5/30/19 1:51 PM      Lab Results   Component Value Date    RBC 3.70 (L) 07/30/2019    HGB 12.4 (L) 07/30/2019    HCT 36.5 (L) 07/30/2019    MCV 98.7 07/30/2019    MCH 33.5 07/30/2019    MPV 7.6 07/30/2019    RDW 15.6 (H) 07/30/2019     07/30/2019     INR Summary                            Warfarin regimen (mg)  Date INR   A/P    Sun Mon Tue Wed Thu Fri Sat Mg/wk  3/4 2.2 At goal, continue   5 7.5 5 7.5 5 7.5 5 42.5  2/1 2.1 At goal, continue   5 7.5 5 7.5 5 7.5 5 42.5  1/7 1.9 Below goal, continue   5 7.5 5 7.5 5 7.5 5 42.5  11/22 2.2 At goal, continue   5 7.5 5 7.5 5 7.5 5 42.5  11/22 2.1 At goal, continue   5 7.5 5 7.5 5 7.5 5 42.5  11/8 1.8 Below goal, increase  5 7.5 5 7.5 5 7.5 5 42.5  10/25 1.6 Below goal, bolus   7.5 7.5/5 5 5 5 5 7.5 40  10/4 2.0 At goal, continue   7.5 5 5 5 5 5 7.5 40  9/17 2.7 At goal (dosing error)  7.5 5 5 5 5 5 7.5 40  8/23 4.3 Above goal, decrease  7.5 0/5 5 5 5 5 7.5 40  8/9 3.3 Above goal, dec  7.5 5 5 5 7.5 5 5 40  7/21 1.4 Below goal, increase  5 7.5 5 7.5 5 7.5 5 42.5  6/28 2.0 At goal, no change  7.5 5 5 5 5 5 7.5 40  6/11 1.5 Below goal, increase  7.5 5 5 5 5 5 7.5 40  5/24 3.6 Above goal, hold x 1  5 0/5 5 5 5 5 5 35  4/23 2.7 At goal, no change  5 5 5 5 5 5 5 35  4/5 3.6 Above goal, hold x 1  5 0/5 5 5 5 5 5 35   3/8 3.0 At goal, no change  5 5 5 5 5 5 5 35  2/19 2.0 At goal, no change  5 5 5 5 5 5 5 35  2/4 2.7 At goal, no change  5 5 5 5 5 5 5 35  1/20 2.7 At goal, no change  5 5 5 5 5 5 5 35  1/11 4.5 Above goal (Cymbalta) 5 0/5 0/5 5 5 5 5 35  12/28 2.0 At goal, no change  7.5 5 5 5 5 5 7.5 40  11/24 2.4 At goal, no change  7.5 5 5 5 5 5 7.5 40  10/30 2.5 At goal, no change  7.5 5 5 5 5 5 7.5 40  10/2 2.2 At goal, no change  7.5 5 5 5 5 5 7.5 40  9/3 2.4 At goal, no change  7.5 5 5 5 5 5 7.5 40  8/3 2.9 At goal, no change  7.5 5 5 5 5 5 7.5 40  7/6 2.7 At goal, no change  7.5 5 5 5 5 5 7.5 40  6/8 2.8 At goal, no change  7.5 5 5 5 5 5 7.5 40  5/11 2.9 At goal, no change  7.5 5 5 5 5 5 7.5 40  3/30 3.0 At goal, no change  7.5 5 5 5 5 5 7.5 40  2/26 2.3 At goal, no change  7.5 5 5 5 5 5 7.5 40  2/5 3.3 Above goal, decrease  7.5 5 5 5 5 5 7.5 40  1/8 2.9 At goal, no change  7.5 7.5 5 5 5 5 7.5 42.5    Patient History:  Recent hospitalizations/HC visits 2/21/22: Pulmonology, work-up underway for lung nodule    Recent medication changes 1/5/22: Medrol dose pack x5 days per pain management   9/2021: Stopped citalopram, does not plan to continue   7/21/21: increased citalopram   6/18/21: started citalopram 20 mg QD (may increase INR)  12/28/20: Cymbalta 30 mg daily (may increase INR); stopped taking on 1/30; resumed ~2/4; stopped ~6/1   Medications taken regularly that may interact with warfarin or alter INR ASA 81 mg   Warfarin dose taken as prescribed 9/17/21 did not decrease warfarin dose as instructed at last visit  Usually compliant   Does not use pillbox  Patient has been taking warfarin since re-do AVR in May, 2017   Signs/symptoms of bleeding No h/o major bleeding   Vitamin K intake Normally has ~0 servings of green, leafy vegetables per week Recent vomiting/diarrhea/fever, changes in weight or activity level None reported   Tobacco or alcohol use -No recent changes in smoking as of 11/24/20 (~3/4 PPD)  -Patient cut back from 2 PPD to 3/4 PPD in February (2019) when he moved in with his daughter. Decrease in smoking typically increases INR gradually.   -Patient denies any alcohol or illicit drug use   Upcoming surgeries or procedures None       Assessment/Plan:  Patient's INR was therapeutic today (2.2). He denies any change in medications, vitamin K intake, illness, smoking, physical activity, or bleeding since last visit. Patient was instructed to continue warfarin 7.5 mg on Monday, Wednesday, Friday and 5 mg all other days. Will repeat INR in 4 weeks. Patient was reminded to maintain consistent vitamin K intake and call with any bleeding, medication changes, or fever/vomiting/diarrhea. Patient understands dosing directions and information discussed. Dosing schedule and follow up appointment given to patient. Progress note routed to referring physician's office. Patient acknowledges working in consult agreement with pharmacist as referred by his/her physician. Next INR Check: 4/1      Please call [facility] at [phone] with any questions.     Fabricio Packer, PharmD, BCPS  [facility]  [place]: [phone]  [place]: [phone]  3/4/2022 9:19 AM    For Pharmacy Admin Tracking Only  Total # of Interventions Recommended: 0  Total # of Interventions Accepted: 0  Time Spent (min): 15

## 2022-03-09 ENCOUNTER — HOSPITAL ENCOUNTER (OUTPATIENT)
Dept: PULMONOLOGY | Age: 57
Discharge: HOME | End: 2022-03-09
Payer: MEDICARE

## 2022-03-09 DIAGNOSIS — R91.1 PULMONARY NODULE: ICD-10-CM

## 2022-03-09 DIAGNOSIS — J44.9 COPD, SEVERITY TO BE DETERMINED (HCC): ICD-10-CM

## 2022-03-09 LAB
DLCO %PRED: 76 %
DLCO PRED: NORMAL
DLCO/VA %PRED: NORMAL
DLCO/VA PRED: NORMAL
DLCO/VA: NORMAL
DLCO: NORMAL
EXPIRATORY TIME-POST: NORMAL
EXPIRATORY TIME: NORMAL
FEF 25-75% %CHNG: NORMAL
FEF 25-75% %PRED-POST: NORMAL
FEF 25-75% %PRED-PRE: NORMAL
FEF 25-75% PRED: NORMAL
FEF 25-75%-POST: NORMAL
FEF 25-75%-PRE: NORMAL
FEV1 %PRED-POST: 62 %
FEV1 %PRED-PRE: 59 %
FEV1 PRED: NORMAL
FEV1-POST: NORMAL
FEV1-PRE: NORMAL
FEV1/FVC %PRED-POST: NORMAL
FEV1/FVC %PRED-PRE: NORMAL
FEV1/FVC PRED: NORMAL
FEV1/FVC-POST: 81 %
FEV1/FVC-PRE: 78 %
FVC %PRED-POST: NORMAL
FVC %PRED-PRE: NORMAL
FVC PRED: NORMAL
FVC-POST: NORMAL
FVC-PRE: NORMAL
GAW %PRED: NORMAL
GAW PRED: NORMAL
GAW: NORMAL
IC %PRED: NORMAL
IC PRED: NORMAL
IC: NORMAL
MEP: NORMAL
MIP: NORMAL
MVV %PRED-PRE: NORMAL
MVV PRED: NORMAL
MVV-PRE: NORMAL
PEF %PRED-POST: NORMAL
PEF %PRED-PRE: NORMAL
PEF PRED: NORMAL
PEF%CHNG: NORMAL
PEF-POST: NORMAL
PEF-PRE: NORMAL
RAW %PRED: NORMAL
RAW PRED: NORMAL
RAW: NORMAL
RV %PRED: NORMAL
RV PRED: NORMAL
RV: NORMAL
SVC %PRED: NORMAL
SVC PRED: NORMAL
SVC: NORMAL
TLC %PRED: 78 %
TLC PRED: NORMAL
TLC: NORMAL
VA %PRED: NORMAL
VA PRED: NORMAL
VA: NORMAL
VTG %PRED: NORMAL
VTG PRED: NORMAL
VTG: NORMAL

## 2022-03-09 PROCEDURE — 94760 N-INVAS EAR/PLS OXIMETRY 1: CPT

## 2022-03-09 PROCEDURE — 94726 PLETHYSMOGRAPHY LUNG VOLUMES: CPT

## 2022-03-09 PROCEDURE — 6370000000 HC RX 637 (ALT 250 FOR IP): Performed by: INTERNAL MEDICINE

## 2022-03-09 PROCEDURE — 94060 EVALUATION OF WHEEZING: CPT

## 2022-03-09 PROCEDURE — 94729 DIFFUSING CAPACITY: CPT

## 2022-03-09 PROCEDURE — 94200 LUNG FUNCTION TEST (MBC/MVV): CPT

## 2022-03-09 RX ORDER — ALBUTEROL SULFATE 90 UG/1
2 AEROSOL, METERED RESPIRATORY (INHALATION) ONCE
Status: COMPLETED | OUTPATIENT
Start: 2022-03-09 | End: 2022-03-09

## 2022-03-09 RX ADMIN — Medication 2 PUFF: at 16:18

## 2022-03-09 ASSESSMENT — PULMONARY FUNCTION TESTS
FEV1_PERCENT_PREDICTED_PRE: 59
FEV1/FVC_PRE: 78
FEV1/FVC_POST: 81
FEV1_PERCENT_PREDICTED_POST: 62

## 2022-03-10 NOTE — PROCEDURES
Pulmonary Function Testing      Patient name:  Maria Luisa Tamayo     [facility] Unit #:   [id number]   Date of test: 3/9/2022  Date of interpretation:   3/10/2022    Mr. Maria Luisa Tamayo is a 60y.o. year-old non smoker. The spirometry data wereacceptable and reproducible. Spirometry:  Flow volume loops were normal. The FEV-1/FVC ratio was normal. The  post-bronchodilator FEV-1 was 2.46 liters (62% of predicted), which was moderately decreased. The FVC was 3.02 liters (58% of predicted), which was decreased. Response to inhaled bronchodilators (albuterol) was not significant. Lung volumes:  Lung volumes were tested by plethysmography. The total lung capacity was 5.62 liters (78% of predicted), which was decreased. The residual volume was 2.49 liters (100% of predicted), which was normal. The ratio of residual volume to total lung capacity (RV/TLC) was 121, which was increased. Specific airway resistance was increased. Diffusion capacity was found to be decreased. Interpretation:  Mild restrictive lung disease with air trapping and mildly reduced diffusion capacity.

## 2022-03-12 ENCOUNTER — HOSPITAL ENCOUNTER (OUTPATIENT)
Dept: CT IMAGING | Age: 57
Discharge: HOME | End: 2022-03-12
Payer: MEDICARE

## 2022-03-12 DIAGNOSIS — R91.1 PULMONARY NODULE: ICD-10-CM

## 2022-03-12 PROCEDURE — 71250 CT THORAX DX C-: CPT

## 2022-03-15 ENCOUNTER — OFFICE VISIT (OUTPATIENT)
Dept: PULMONOLOGY | Age: 57
End: 2022-03-15
Payer: MEDICARE

## 2022-03-15 VITALS
RESPIRATION RATE: 16 BRPM | SYSTOLIC BLOOD PRESSURE: 152 MMHG | HEART RATE: 70 BPM | HEIGHT: 72 IN | BODY MASS INDEX: 36.16 KG/M2 | OXYGEN SATURATION: 97 % | DIASTOLIC BLOOD PRESSURE: 87 MMHG | WEIGHT: 267 LBS | TEMPERATURE: 96.6 F

## 2022-03-15 DIAGNOSIS — R91.1 PULMONARY NODULE: ICD-10-CM

## 2022-03-15 DIAGNOSIS — E66.01 SEVERE OBESITY (BMI 35.0-39.9) WITH COMORBIDITY (HCC): ICD-10-CM

## 2022-03-15 DIAGNOSIS — C34.32 MALIGNANT NEOPLASM OF LOWER LOBE OF LEFT LUNG (HCC): Primary | ICD-10-CM

## 2022-03-15 PROCEDURE — 3017F COLORECTAL CA SCREEN DOC REV: CPT | Performed by: INTERNAL MEDICINE

## 2022-03-15 PROCEDURE — 99214 OFFICE O/P EST MOD 30 MIN: CPT | Performed by: INTERNAL MEDICINE

## 2022-03-15 PROCEDURE — G8484 FLU IMMUNIZE NO ADMIN: HCPCS | Performed by: INTERNAL MEDICINE

## 2022-03-15 PROCEDURE — G8417 CALC BMI ABV UP PARAM F/U: HCPCS | Performed by: INTERNAL MEDICINE

## 2022-03-15 PROCEDURE — 4004F PT TOBACCO SCREEN RCVD TLK: CPT | Performed by: INTERNAL MEDICINE

## 2022-03-15 PROCEDURE — G8427 DOCREV CUR MEDS BY ELIG CLIN: HCPCS | Performed by: INTERNAL MEDICINE

## 2022-03-15 NOTE — PROGRESS NOTES
[facility]    Outpatient Follow up Note    Subjective:   Referring Physician: Seb Langley / HPI:     The patient is 60 y.o. male who presents today for a visit for pulmonary nodule. Patient comes in with his brother and brother's girlfriend. No new symptoms, but he's here to go over his PFTs and follow up CT chest.      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 He had an orchiectomy as well as tumor removal from his abdomen. He also has a history of coronary artery disease and problems with his aortic valve he has had 2 open heart surgeries to repair his aortic valve as well as a double and triple bypass. Despite these things patient continues to smoke. He also has history of colon cancer x2 and has had roughly 3 feet of his bowel removed per his report. His wife passed away from stage IV lung cancer a few years ago as well. Patient has some exertional dyspnea but is not here for that complaint and instead is here because he had a screening CT scan that showed an abnormality in his left lower lobe.        Past Medical History:    Past Medical History:   Diagnosis Date    Abdominal hernia     s/p repair 2010    Aortic valve prosthesis present     CAD (coronary artery disease)     Chronic back pain greater than 3 months duration     Clostridium difficile diarrhea 10/17/2016    PCR    DDD (degenerative disc disease), lumbosacral 8/7/2013    Erectile dysfunction     GERD (gastroesophageal reflux disease)     Hyperlipidemia     Hypertension     Joint pain     Left testicular cancer ([facility] [address] 2002    s/p abdominal resection    Myalgia and myositis, unspecified 8/26/2013    Neuropathy     OA (osteoarthritis) of knee     bilateral    Obesity, Class I, BMI 30.0-34.9 (see actual BMI)     Prolonged emergence from general anesthesia     after CABG    S/P AVR     tissue valve    S/P CABG x 2     w/AVR & primary repair of dissected ascending aorta    Type 2 diabetes mellitus without complication, without long-term current use of insulin ([facility] 2021       Social History:    Social History     Tobacco Use   Smoking Status Current Some Day Smoker    Packs/day: 0.75    Years: 40.00    Pack years: 30.00    Types: Cigarettes    Start date: 1947   Arlet Hoover Last attempt to quit: 2017    Years since quittin.8   Smokeless Tobacco Never Used   Tobacco Comment    stopped        Family History:  Family History   Problem Relation Age of Onset    Cancer Mother     Cancer Father         lung    Alzheimer's Disease Sister     Liver Cancer Brother      Current Medications:  Current Outpatient Medications on File Prior to Visit   Medication Sig Dispense Refill    metFORMIN (GLUCOPHAGE) 500 MG tablet TAKE TWO TABLETS BY MOUTH TWICE A DAY WITH MEALS 120 tablet 2    METHOCARBAMOL PO Take by mouth      Ascorbic Acid (VITAMIN C PO) Take by mouth 2 times daily       lisinopril (PRINIVIL;ZESTRIL) 10 MG tablet One tablet twice a day 180 tablet 3    famotidine (PEPCID) 20 MG tablet TAKE 1 TABLET BY MOUTH 2 TIMES A  tablet 2    metoprolol tartrate (LOPRESSOR) 50 MG tablet TAKE 1 TABLET BY MOUTH 2 TIMES A DAY FOR BLOOD PRESSURE AND HEART 180 tablet 1    nystatin-triamcinolone (MYCOLOG) 581519-0.1 UNIT/GM-% ointment Apply topically 2 times daily until improved.  30 g 0    warfarin (COUMADIN) 5 MG tablet TAKE 1 TABLET BY MOUTH ONE TIME A DAY OR TAKE ONE AND ONE-HALF TABLETS BY MOUTH EVERY DAY AS DIRECTED BY CLINIC 135 tablet 2    rosuvastatin (CRESTOR) 20 MG tablet TAKE ONE TABLET BY MOUTH EVERY EVENING 90 tablet 3    Handicap Placard MISC by Does not apply route Effective through 2020 through 2025 1 each 0    aspirin 81 MG EC tablet Take 1 tablet by mouth daily 30 tablet 0    amLODIPine (NORVASC) 5 MG tablet Take 1 tablet by mouth nightly as needed (If systolic blood pressure over 130, take amlodipine 5mg at night, if under 130 do not take amlopidine) 30 tablet 2    melatonin (RA MELATONIN) 3 MG TABS tablet Take 1 tablet by mouth nightly as needed (insomnia) 30 tablet 3    Blood Pressure KIT 1 Unit 1 kit 0    oxyCODONE-acetaminophen (PERCOCET) 5-325 MG per tablet Take 1 tablet by mouth every 4 hours as needed for Pain  .  gabapentin (NEURONTIN) 300 MG capsule Take 300 mg by mouth 3 times daily        No current facility-administered medications on file prior to visit. Allergies: Allergies   Allergen Reactions    Atorvastatin Calcium [Lipitor] Other (See Comments)     Severe headaches      Wellbutrin [Bupropion] Anxiety     patient complains of feeling anxious, irritable and \"hyped up\" on wellbutrin around 2011.     Cymbalta [Duloxetine Hcl]     Vicodin [Hydrocodone-Acetaminophen] Itching       REVIEW OF SYSTEMS:    CONSTITUTIONAL: Negative for fevers and chills  HEENT: Negative for oropharyngeal exudate, post nasal drip, sinus pain / pressure, nasal congestion, ear pain  RESPIRATORY:  See HPI  CARDIOVASCULAR: Negative for chest pain, palpitations, edema  GASTROINTESTINAL: Negative for nausea, vomiting, diarrhea, constipation and abdominal pain  HEMATOLOGICAL: Negative for adenopathy  SKIN: Negative for clubbing, cyanosis, skin lesions  EXTREMITIES: Negative for weakness, decreased ROM  NEUROLOGICAL: Negative for unilateral weakness, speech or gait abnormalities  PSYCH: Negative for anxiety, depression    Objective:   PHYSICAL EXAM:        VITALS:  BP (!) 152/87 (Site: Left Upper Arm, Position: Sitting, Cuff Size: Large Adult)   Pulse 70   Temp 96.6 °F (35.9 °C)   Resp 16   Ht 6' (1.829 m)   Wt 267 lb (121.1 kg)   SpO2 97%   BMI 36.21 kg/m²     CONSTITUTIONAL:  Awake, alert, cooperative, no apparent distress, and appears stated age  HEENT: No oropharyngeal exudate, PERRL, no cervical adenopathy, no tracheal deviation, thyroid size normal  LUNGS:  No increased work of breathing and clear to auscultation, no crackles or wheezing   CARDIOVASCULAR:  normal S1 and S2 and no JVD  ABDOMEN:  Normal bowel sounds, non-distended and non-tender to palpation  EXT: No edema, no calf tenderness. Pulses are present bilaterally. NEUROLOGIC:  Mental Status Exam:  Level of Alertness:   awake  Orientation:   person, place, time. SKIN:  normal skin color, texture, turgor, no redness, warmth, or swelling     DATA:      Radiology Review:  Pertinent images / reports were reviewed as a part of this visit. CT chest reveals the following:  Impression       1. 14 x 22 mm cystic/cavitary lesion within the left lower lobe. This could be infectious, inflammatory, or neoplastic. Recommend either further evaluation with PET/CT, or 3 month short interval follow-up study. Consider pulmonary consult.       LUNG RADS ASSESSMENTS  LUNGRADS ASSESSMENT VALUE: 4A       Last PFTs: None on file    Immunizations:   Immunization History   Administered Date(s) Administered    COVID-19, J&J, PF, 0.5 mL 03/13/2021    COVID-19, Pfizer Purple top, DILUTE for use, 12+ yrs, 30mcg/0.3mL dose 01/06/2022    Pneumococcal Polysaccharide ([id number]) 07/26/2014       Assessment: This is a 60 y.o. male with cavitary pulmonary nodule in his left lower lobe    Plan:   -Patient has a spiculated cavitary lesion in his left lower lobe and it didn't resolve in a manner that would be consistent with infection. High resolution CT and disc obtained for navigational bronchoscopy. Will get a PET prior and schedule bronch. We will also have to work around his coumadin  Location and mixed attenuation of the lesion make it difficult to biopsy percutaneously and impossible to see with fluoroscopy.       If PET doesn't show other concerning locations (he has a history of colon cancer X2 and testicular cancer) then we will need to discuss if he's a better candidate for surgery or chemo/rads. Diagnosis Orders   1. Malignant neoplasm of lower lobe of left lung (HCC)  PET CT SKULL BASE TO MID THIGH      - Tobacco use: The patient is currently smoking. The risks related to smoking were reviewed with the patient: Recommend maintaining a smoke-free lifestyle. Patient counseled to quit smoking.       - RTC 4 weeks w/ MD following bronchoscopy. Call or RTC sooner if symptoms persist or worsen acutely.

## 2022-03-17 ENCOUNTER — TELEPHONE (OUTPATIENT)
Dept: PULMONOLOGY | Age: 57
End: 2022-03-17

## 2022-03-17 NOTE — TELEPHONE ENCOUNTER
Patient has been set up for a Robotic Bronchoscopy/ EBUS/Thoracentesis     Where: [facility]   Day: Fri 4/8/2022  Arrive: 11:30am  Procedure Time: 1:00pm    1. Nothing by mouth after midnight before procedure   2. Arrive 1.5 hour before Bronch   3. Or if EBUS arrive 2 hours before   4. Arrive 1 Hour before if Thoracentesis   5. Sign in with Admitting / Registration at main Entrance   6. Please have transportation arrangements from hospital after you procedure.      Called and gave patient time and date of bronch  He verbalized understanding   Written instructions given on the day of OV 3/15/22

## 2022-03-18 ENCOUNTER — OFFICE VISIT (OUTPATIENT)
Dept: CARDIOLOGY CLINIC | Age: 57
End: 2022-03-18
Payer: MEDICARE

## 2022-03-18 VITALS
WEIGHT: 271.8 LBS | HEART RATE: 78 BPM | DIASTOLIC BLOOD PRESSURE: 76 MMHG | SYSTOLIC BLOOD PRESSURE: 130 MMHG | HEIGHT: 72 IN | BODY MASS INDEX: 36.82 KG/M2

## 2022-03-18 DIAGNOSIS — Z95.2 S/P AVR (AORTIC VALVE REPLACEMENT): ICD-10-CM

## 2022-03-18 DIAGNOSIS — I10 ESSENTIAL HYPERTENSION: ICD-10-CM

## 2022-03-18 DIAGNOSIS — I48.0 PAROXYSMAL ATRIAL FIBRILLATION (HCC): ICD-10-CM

## 2022-03-18 DIAGNOSIS — Z95.1 HX OF CABG: ICD-10-CM

## 2022-03-18 DIAGNOSIS — I25.10 CAD IN NATIVE ARTERY: Primary | ICD-10-CM

## 2022-03-18 DIAGNOSIS — I49.3 PVC (PREMATURE VENTRICULAR CONTRACTION): ICD-10-CM

## 2022-03-18 PROCEDURE — 3017F COLORECTAL CA SCREEN DOC REV: CPT | Performed by: INTERNAL MEDICINE

## 2022-03-18 PROCEDURE — G8417 CALC BMI ABV UP PARAM F/U: HCPCS | Performed by: INTERNAL MEDICINE

## 2022-03-18 PROCEDURE — 4004F PT TOBACCO SCREEN RCVD TLK: CPT | Performed by: INTERNAL MEDICINE

## 2022-03-18 PROCEDURE — G8427 DOCREV CUR MEDS BY ELIG CLIN: HCPCS | Performed by: INTERNAL MEDICINE

## 2022-03-18 PROCEDURE — 99214 OFFICE O/P EST MOD 30 MIN: CPT | Performed by: INTERNAL MEDICINE

## 2022-03-18 PROCEDURE — G8484 FLU IMMUNIZE NO ADMIN: HCPCS | Performed by: INTERNAL MEDICINE

## 2022-03-18 NOTE — PROGRESS NOTES
Subjective:      Patient ID: Kelly Smith is a 60 y.o. male. HPI  Mr. Rahul Cooper is here today for follow up CAD/CABG/AVR/Afib/HTN. No complaints. Remains active. Only back issues, spinal stenosis. Had spinal stimulator. Limited by back. No exertional chest pain. Wt up. Still smoking. No exertional sx. No sob/cp. No pnd. No orthopnea. No tachycardia. No syncope. BP good at home. Rhythm stable.          Past Medical History:   Diagnosis Date    Abdominal hernia     s/p repair 2010    Aortic valve prosthesis present     CAD (coronary artery disease)     Chronic back pain greater than 3 months duration     Clostridium difficile diarrhea 10/17/2016    PCR    DDD (degenerative disc disease), lumbosacral 8/7/2013    Erectile dysfunction     GERD (gastroesophageal reflux disease)     Hyperlipidemia     Hypertension     Joint pain     Left testicular cancer ([address] 2002    s/p abdominal resection    Myalgia and myositis, unspecified 8/26/2013    Neuropathy     OA (osteoarthritis) of knee     bilateral    Obesity, Class I, BMI 30.0-34.9 (see actual BMI)     Prolonged emergence from general anesthesia     after CABG    S/P AVR 2005    tissue valve    S/P CABG x 2 2005    w/AVR & primary repair of dissected ascending aorta    Type 2 diabetes mellitus without complication, without long-term current use of insulin ([address] 7/13/2021     Past Surgical History:   Procedure Laterality Date    AORTA SURGERY  08/27/2004    Dr. Delma Carrera - primary repair of limited ascending aortic dissection    AORTIC VALVE REPLACEMENT  05/30/2017    Dr. Bettye Sagastume - redo w/25mm Eva Ren ThermaFix model 3300 bovine pericardial bioprosthesis    AORTIC VALVE SURGERY  08/27/2004    Dr. Delma Carrera - 27mm Kelton-Castelan pericardial prosthesis     BACK SURGERY      L4-S1    CARDIAC CATHETERIZATION  05/09/2017    Dr. Jessica Fam  08/26/2004    Dr. Valerie Benito RELEASE Right 2015    Dr. Clayton Eastman  10/10/2016    Dr. Wanda Rueda - w/laparascopic lysis of extensive adhesions, open transverse colon resection, excision of old abd mesh adhering to colon    CORONARY ANGIOPLASTY WITH STENT PLACEMENT  2014    CORONARY ARTERY BYPASS GRAFT  2004    Dr. Hurt Mini - x2 (LIMA-LAD, SVG sequentially to ascending aorta & D1)    CORONARY ARTERY BYPASS GRAFT  2017    Dr. Manfred Whaley - redo x3 (reverse AC SVG sequentially to D1, OM1 & PDA) w/explant of prior prosthetic valve & removal of embedded sternal wires x7    EMG  2012    FOOT SURGERY Left 2012    Dr. Anisha Ray DPM - hallux nail evulsion & exostectomy    HEMICOLECTOMY N/A 2019    ROBOTIC ASSISTED  LAPAROSCOPIC RIGHT COLECTOMY AND CHOLECYSTECTOMY performed by Елена Swanson MD at [address] [place] [address]      multiple    LUMBAR DISCECTOMY  2012    L4-5    SHOULDER ARTHROSCOPY Right 2013    Dr. Emily Hoffman - w/subacromial decompression, debridement of the SLAP tear, distal clavicle excision    SOFT TISSUE TUMOR RESECTION      retroperitoneal mass    TESTICLE REMOVAL Left     radical orchiectomy    TRANSESOPHAGEAL ECHOCARDIOGRAM  2017    during redo CABG & AVR    TUNNELED VENOUS PORT PLACEMENT  2002    portacath      Social History     Socioeconomic History    Marital status:       Spouse name: Not on file    Number of children: Not on file    Years of education: Not on file    Highest education level: Not on file   Occupational History    Not on file   Tobacco Use    Smoking status: Current Some Day Smoker     Packs/day: 0.75     Years: 40.00     Pack years: 30.00     Types: Cigarettes     Start date: 1947     Last attempt to quit: 2017     Years since quittin.8    Smokeless tobacco: Never Used    Tobacco comment: stopped 5-18   Vaping Use    Vaping Use: Never used   Substance and Sexual Activity    Alcohol use: No     Alcohol/week: 0.0 standard drinks    Drug use: No    Sexual activity: Yes     Partners: Female   Other Topics Concern    Not on file   Social History Narrative    Not on file     Social Determinants of Health     Financial Resource Strain:     Difficulty of Paying Living Expenses: Not on file   Food Insecurity:     Worried About Running Out of Food in the Last Year: Not on file    Aleida of Food in the Last Year: Not on file   Transportation Needs:     Lack of Transportation (Medical): Not on file    Lack of Transportation (Non-Medical): Not on file   Physical Activity:     Days of Exercise per Week: Not on file    Minutes of Exercise per Session: Not on file   Stress:     Feeling of Stress : Not on file   Social Connections:     Frequency of Communication with Friends and Family: Not on file    Frequency of Social Gatherings with Friends and Family: Not on file    Attends Judaism Services: Not on file    Active Member of [address] [place] [address] or Organizations: Not on file    Attends Club or Organization Meetings: Not on file    Marital Status: Not on file   Intimate Partner Violence:     Fear of Current or Ex-Partner: Not on file    Emotionally Abused: Not on file    Physically Abused: Not on file    Sexually Abused: Not on file   Housing Stability:     Unable to Pay for Housing in the Last Year: Not on file    Number of [place] in the Last Year: Not on file    Unstable Housing in the Last Year: Not on file     FH reviewed, denies FH cardiac issues     Vitals:    03/18/22 0936   BP: 130/76   Pulse: 78         Wt 271    Review of Systems   Constitutional: Negative for activity change. Has  fatigue. Respiratory: Negative for apnea, chest tightness and shortness of breath. Cardiovascular: Negative for chest pain, palpitations and leg swelling. No PND or orthopnea. No tachycardia. Gastrointestinal: Negative for abdominal distention.    Musculoskeletal: Negative for myalgias. Neurological: Negative for dizziness, syncope and light-headedness. Psychiatric/Behavioral: Negative for behavioral problems, confusion and agitation. All other systems reviewed negative as done      Objective:   Physical Exam   Constitutional: He is oriented to person, place, and time. He appears well-developed and well-nourished. No distress. HENT:   Head: Normocephalic and atraumatic. Eyes: Conjunctivae and EOM are normal. Right eye exhibits no discharge. Left eye exhibits no discharge. Neck: Normal range of motion. Neck supple. No JVD present. Cardiovascular: Normal rate, regular rhythm and prosthetic valve sounds normal .  Exam reveals no gallop. 1/6 syst murmur heard. Pulmonary/Chest: Effort normal and breath sounds normal. No respiratory distress. He has min wheezes. He has no rales. Abdominal: Soft. Bowel sounds are normal. There is no tenderness. Musculoskeletal: Normal range of motion. He exhibits no edema. Neurological: He is alert and oriented to person, place, and time. Skin: Skin is warm and dry. Psychiatric: He has a normal mood and affect. His behavior is normal. Thought content normal.       Assessment:       Diagnosis Orders   1. CAD in native artery     2. Paroxysmal atrial fibrillation (HCC)     3. Hx of CABG     4. S/P AVR (aortic valve replacement)     5. Essential hypertension     6. PVC (premature ventricular contraction)               Plan:      CV stable. Rhythm stable. No angina. BP better. Compensated. Wt down. Reviewed previous records and testing including echo 5/17 and cath 5/17. Will continue asa/meoprolol/crestor for CAD/CABG. Continue same and norvasc/lisinopril for HTN. Continue coumadin for afib. No bleeding issues. Continues to smoke. Encouraged to stop. Encouraged diet, exercise and wt. No changes. Reviewed previous records and testing including echo 2/22. Continue to monitor. Lipids per PCP. Follow up 3 months.

## 2022-04-05 ENCOUNTER — TELEPHONE (OUTPATIENT)
Dept: PULMONOLOGY | Age: 57
End: 2022-04-05

## 2022-04-05 ENCOUNTER — OFFICE VISIT (OUTPATIENT)
Dept: INTERNAL MEDICINE CLINIC | Age: 57
End: 2022-04-05
Payer: MEDICARE

## 2022-04-05 ENCOUNTER — TELEPHONE (OUTPATIENT)
Dept: PHARMACY | Age: 57
End: 2022-04-05

## 2022-04-05 VITALS
WEIGHT: 264.4 LBS | OXYGEN SATURATION: 98 % | TEMPERATURE: 96.9 F | SYSTOLIC BLOOD PRESSURE: 145 MMHG | HEART RATE: 68 BPM | BODY MASS INDEX: 35.86 KG/M2 | RESPIRATION RATE: 16 BRPM | DIASTOLIC BLOOD PRESSURE: 79 MMHG

## 2022-04-05 DIAGNOSIS — E11.9 TYPE 2 DIABETES MELLITUS WITHOUT COMPLICATION, WITHOUT LONG-TERM CURRENT USE OF INSULIN (HCC): Primary | ICD-10-CM

## 2022-04-05 LAB — HBA1C MFR BLD: 9.6 %

## 2022-04-05 PROCEDURE — 99213 OFFICE O/P EST LOW 20 MIN: CPT | Performed by: STUDENT IN AN ORGANIZED HEALTH CARE EDUCATION/TRAINING PROGRAM

## 2022-04-05 PROCEDURE — 83036 HEMOGLOBIN GLYCOSYLATED A1C: CPT | Performed by: STUDENT IN AN ORGANIZED HEALTH CARE EDUCATION/TRAINING PROGRAM

## 2022-04-05 RX ORDER — AMLODIPINE BESYLATE 5 MG/1
5 TABLET ORAL DAILY
Qty: 30 TABLET | Refills: 2
Start: 2022-04-05 | End: 2022-04-19

## 2022-04-05 NOTE — PATIENT INSTRUCTIONS
- please go to lab as soon as you can  - please start taking amlodipine 5mg daily  - please maintain healthy lifestyle with diet low in carbs, fat and salt and continue to exercise  - please call pulmonology clinic and ask about when you need to hold off taking warfarin if needed  - please check your blood pressure few times a week

## 2022-04-05 NOTE — TELEPHONE ENCOUNTER
Dr. Gonzalez Meals replied to encounter to [facility]. \"Definitely stop taking it. Im going to look into how to get him some vitamin K. Looks like its over the counter. He could call his anticoagulation clinic, they may have some. He can also eat greens (theyre full of vitamin K). We will check his INR the day of. If its too high we may need to give him FFP or reschedule. \"    This information was relayed to patient. He normally does not eat greens and so eating them now is not something would do. He can go to pharm to obtain Vit K.  He states that he has left a ms for anticog clinic to inform them that he is stopping coumadin, but he knows that his attending staff there s not available on Tuesdays. He will wait for instruction on how much vit K to take.

## 2022-04-05 NOTE — TELEPHONE ENCOUNTER
Dr. Viktor Bowers relays to office that dose of vit K is 5 mg per day. He will take until time of bronch. He will obtain today and start these tonight.

## 2022-04-05 NOTE — TELEPHONE ENCOUNTER
Allison Burchtara states that he is on Coumadin and that he had not been instructed to stop it prior to bronch scheduled for Fri., 04/08/2022. His last dose was last evening. He normally takes it every evening. Does bronch need to be rescheduled?

## 2022-04-05 NOTE — TELEPHONE ENCOUNTER
Pt scheduled for bronchoscopy on 4/8/22. He is going to stop warfarin today and take 2 OTC vit K 100 mcg tabs per day. Will check INR Thursday to ensure low enough to proceed with procedure. Can RX 2.5 mg vit K from [facility] outpatient pharmacy if INR not low enough on Thursday.     Steven Encarnacion, PharmD, [facility]  Medication Management Clinic   [address] Ph: [phone]  Jordyn Castillo Ph: [phone]  4/5/2022 6:16 PM

## 2022-04-05 NOTE — PROGRESS NOTES
Outpatient Clinic Established Patient Note    Patient: Rea Marcos  : 1965 (44 y.o.)  Date: 2022    CC: follow up    HPI:    Mr. Danielle Rivas is a 60 y.o. M with PMH as below who presents to clinic for follow up. He has no new symptoms or concerns.      Regarding his blood pressure, pt checks it at home regularly and the numbers have been in the 130's. He is compliant with his medications. He denies any symptoms of headache or blurry vision. He states that last time he took the amlodipine  5mg was 3 weeks ago.      For this diabetes, pt has been compliant with metformin 2000 daily. His HA1C is 9.6 from 7.6 10/2021. Pt doesn't check his glucose at home. He has no worsening of urinary frequency, chronic neuropathy symptoms. Denies any GI side effects after starting metformin. Hasn't seen eye doctor yet. Pt hasn't gone to lab for blood work.      Denies fever, night sweats, chills, chest pain, cough, dyspnea, palpitations, nausea, vomiting, diarrhea, dysuria.       Allergies:    Atorvastatin calcium [lipitor], Wellbutrin [bupropion], Cymbalta [duloxetine hcl], and Vicodin [hydrocodone-acetaminophen]    Health Maintenance Due   Topic Date Due    Hepatitis C screen  Never done    Diabetic foot exam  Never done    Diabetic microalbuminuria test  Never done    Diabetic retinal exam  Never done    Hepatitis B vaccine (1 of 3 - Risk 3-dose series) Never done    DTaP/Tdap/Td vaccine (1 - Tdap) Never done    Shingles Vaccine (1 of 2) Never done    Lipid screen  2017    Annual Wellness Visit (AWV)  Never done    Potassium monitoring  2020    Creatinine monitoring  2020       Immunization History   Administered Date(s) Administered    COVID-19, J&J, PF, 0.5 mL 2021    COVID-19, Pfizer Purple top, DILUTE for use, 12+ yrs, 30mcg/0.3mL dose 2022    Pneumococcal Polysaccharide ([id number]) 2014       Review of Systems  A 10-organ Review Of Systems was obtained and otherwise unremarkable except as per HPI. Data: Old records have been reviewed electronically.       Past Medical History:    Past Medical History:   Diagnosis Date    Abdominal hernia     s/p repair 2010    Aortic valve prosthesis present     CAD (coronary artery disease)     Chronic back pain greater than 3 months duration     Clostridium difficile diarrhea 10/17/2016    PCR    DDD (degenerative disc disease), lumbosacral 8/7/2013    Erectile dysfunction     GERD (gastroesophageal reflux disease)     Hyperlipidemia     Hypertension     Joint pain     Left testicular cancer ([facility] [address] 2002    s/p abdominal resection    Myalgia and myositis, unspecified 8/26/2013    Neuropathy     OA (osteoarthritis) of knee     bilateral    Obesity, Class I, BMI 30.0-34.9 (see actual BMI)     Prolonged emergence from general anesthesia     after CABG    S/P AVR 2005    tissue valve    S/P CABG x 2 2005    w/AVR & primary repair of dissected ascending aorta    Type 2 diabetes mellitus without complication, without long-term current use of insulin ([facility] [address] 7/13/2021       Past Surgical History:  Past Surgical History:   Procedure Laterality Date    AORTA SURGERY  08/27/2004    Dr. Vin Cao - primary repair of limited ascending aortic dissection    AORTIC VALVE REPLACEMENT  05/30/2017    Dr. Angelo Guerra - redo w/25mm [place]anna [place] ThermaFix model 3300 bovine pericardial bioprosthesis    AORTIC VALVE SURGERY  08/27/2004    Dr. Vin Cao - 27mm Kelton-Castelan pericardial prosthesis     BACK SURGERY      L4-S1    CARDIAC CATHETERIZATION  05/09/2017    Dr. Lucretia White  08/26/2004    Dr. Ava Mustafa Right 03/17/2015    Dr. Donny Huber  10/10/2016    Dr. Joselin Lucero - w/laparascopic lysis of extensive adhesions, open transverse colon resection, excision of old abd mesh adhering to colon    CORONARY ANGIOPLASTY WITH STENT PLACEMENT  07/2014    CORONARY ARTERY BYPASS GRAFT  08/27/2004    Dr. Earl Steinberg - x2 (LIMA-LAD, SVG sequentially to ascending aorta & D1)    CORONARY ARTERY BYPASS GRAFT  05/30/2017    Dr. Ursula Lopez - redo x3 (reverse AC SVG sequentially to D1, OM1 & PDA) w/explant of prior prosthetic valve & removal of embedded sternal wires x7    EMG  04/16/2012    FOOT SURGERY Left 01/24/2012    Dr. Sheppard Paget, DPM - hallux nail evulsion & exostectomy    HEMICOLECTOMY N/A 7/26/2019    ROBOTIC ASSISTED  LAPAROSCOPIC RIGHT COLECTOMY AND CHOLECYSTECTOMY performed by Ghislaine Gates MD at [address] [place] [address]  2010    multiple    LUMBAR DISCECTOMY  2012    L4-5    SHOULDER ARTHROSCOPY Right 11/21/2013    Dr. Kristi Aj - w/subacromial decompression, debridement of the SLAP tear, distal clavicle excision    SOFT TISSUE TUMOR RESECTION  2001    retroperitoneal mass    TESTICLE REMOVAL Left 2001    radical orchiectomy    TRANSESOPHAGEAL ECHOCARDIOGRAM  05/30/2017    during redo CABG & AVR    TUNNELED VENOUS PORT PLACEMENT  2002    portacath        Home Meds:  Prior to Visit  Outpatient Medications Prior to Visit   Medication Sig Dispense Refill    metFORMIN (GLUCOPHAGE) 500 MG tablet TAKE TWO TABLETS BY MOUTH TWICE A DAY WITH MEALS 120 tablet 2    METHOCARBAMOL PO Take by mouth      lisinopril (PRINIVIL;ZESTRIL) 10 MG tablet One tablet twice a day 180 tablet 3    famotidine (PEPCID) 20 MG tablet TAKE 1 TABLET BY MOUTH 2 TIMES A  tablet 2    metoprolol tartrate (LOPRESSOR) 50 MG tablet TAKE 1 TABLET BY MOUTH 2 TIMES A DAY FOR BLOOD PRESSURE AND HEART 180 tablet 1    nystatin-triamcinolone (MYCOLOG) 293918-0.1 UNIT/GM-% ointment Apply topically 2 times daily until improved.  30 g 0    warfarin (COUMADIN) 5 MG tablet TAKE 1 TABLET BY MOUTH ONE TIME A DAY OR TAKE ONE AND ONE-HALF TABLETS BY MOUTH EVERY DAY AS DIRECTED BY CLINIC 135 tablet 2    rosuvastatin (CRESTOR) 20 MG tablet TAKE ONE TABLET BY MOUTH EVERY EVENING 90 tablet 3    aspirin 81 MG EC tablet Take 1 tablet by mouth daily 30 tablet 0    melatonin (RA MELATONIN) 3 MG TABS tablet Take 1 tablet by mouth nightly as needed (insomnia) 30 tablet 3    oxyCODONE-acetaminophen (PERCOCET) 5-325 MG per tablet Take 1 tablet by mouth every 4 hours as needed for Pain  .  gabapentin (NEURONTIN) 300 MG capsule Take 300 mg by mouth 3 times daily       Ascorbic Acid (VITAMIN C PO) Take by mouth 2 times daily  (Patient not taking: Reported on 3/18/2022)      Handicap Placard MISC by Does not apply route Effective through 12/28/2020 through 12/27/2025 1 each 0    amLODIPine (NORVASC) 5 MG tablet Take 1 tablet by mouth nightly as needed (If systolic blood pressure over 130, take amlodipine 5mg at night, if under 130 do not take amlopidine) 30 tablet 2    Blood Pressure KIT 1 Unit 1 kit 0     No facility-administered medications prior to visit. After Visit:  Prior to Visit Medications    Medication Sig Taking? Authorizing Provider   amLODIPine (NORVASC) 5 MG tablet Take 1 tablet by mouth daily Yes Haylee Lovett MD   metFORMIN (GLUCOPHAGE) 500 MG tablet TAKE TWO TABLETS BY MOUTH TWICE A DAY WITH MEALS Yes Ngozi Meza MD   METHOCARBAMOL PO Take by mouth Yes Historical Provider, MD   lisinopril (PRINIVIL;ZESTRIL) 10 MG tablet One tablet twice a day Yes Haylee Lovett MD   famotidine (PEPCID) 20 MG tablet TAKE 1 TABLET BY MOUTH 2 TIMES A DAY Yes Haylee Lovett MD   metoprolol tartrate (LOPRESSOR) 50 MG tablet TAKE 1 TABLET BY MOUTH 2 TIMES A DAY FOR BLOOD PRESSURE AND HEART Yes Haylee Lovett MD   nystatin-triamcinolone (MYCOLOG) 514661-5.8 UNIT/GM-% ointment Apply topically 2 times daily until improved.  Yes Tani Rasheed MD   warfarin (COUMADIN) 5 MG tablet TAKE 1 TABLET BY MOUTH ONE TIME A DAY OR TAKE ONE AND ONE-HALF TABLETS BY MOUTH EVERY DAY AS DIRECTED BY CLINIC Yes Montez Rodriguez MD   rosuvastatin (CRESTOR) 20 MG tablet TAKE ONE TABLET BY MOUTH EVERY EVENING Yes Haylee Lovett MD aspirin 81 MG EC tablet Take 1 tablet by mouth daily Yes Bassem Beckwith MD   melatonin (RA MELATONIN) 3 MG TABS tablet Take 1 tablet by mouth nightly as needed (insomnia) Yes James Garcia MD   oxyCODONE-acetaminophen (PERCOCET) 5-325 MG per tablet Take 1 tablet by mouth every 4 hours as needed for Pain  . Yes Historical Provider, MD   gabapentin (NEURONTIN) 300 MG capsule Take 300 mg by mouth 3 times daily  Yes Historical Provider, MD   Ascorbic Acid (VITAMIN C PO) Take by mouth 2 times daily   Patient not taking: Reported on 3/18/2022  Historical Provider, MD   Gilda Griffin [address] [facility] by Does not apply route Effective through 12/28/2020 through 12/27/2025  Sanya Bailey,    Blood Pressure KIT 1 Unit  James Garcia MD       Allergies:    Atorvastatin calcium [lipitor], Wellbutrin [bupropion], Cymbalta [duloxetine hcl], and Vicodin [hydrocodone-acetaminophen]    Family History:       Problem Relation Age of Onset    Cancer Mother     Cancer Father         lung    Alzheimer's Disease Sister     Liver Cancer Brother          PHYSICAL EXAM:  BP (!) 145/79   Pulse 68   Temp 96.9 °F (36.1 °C) (Temporal)   Resp 16   Wt 264 lb 6.4 oz (119.9 kg)   SpO2 98%   BMI 35.86 kg/m²   Physical Exam  Constitutional:       Appearance: He is obese. HENT:      Head: Normocephalic and atraumatic. Cardiovascular:      Rate and Rhythm: Normal rate and regular rhythm. Pulses: Normal pulses. Heart sounds: Normal heart sounds. Pulmonary:      Effort: Pulmonary effort is normal.      Breath sounds: Normal breath sounds. Abdominal:      General: Bowel sounds are normal. There is distension. Palpations: Abdomen is soft. Tenderness: There is no abdominal tenderness. Musculoskeletal:         General: Normal range of motion. Cervical back: Normal range of motion and neck supple. Right lower leg: No edema. Left lower leg: No edema. Skin:     General: Skin is warm and dry.       Capillary Refill: Capillary refill takes less than 2 seconds. Neurological:      Mental Status: He is alert. Assessment & Plan:      1. Type 2 diabetes mellitus without complication, without long-term current use of insulin (HCC)  On metformin 1000 BID. Hasn't had eye exam yet. Doesn't check blood glucose at home. Today HA1C 9.6 from 7.6 10/2021.   - continue metformin 1000 BID  - will likely need to add Janumet or GLP-1; hold off until pt gets lab work. - pt not interested in insulin for now as he doesn't like injection; will reassess next visit  - advised pt to follow healthy lifestyle with diet low in carbs and fat and to continue to exercise  - advised pt to schedule eye exam  - check micro albumin/cr ratio    2. Essential hypertension  BP high today 140's. Pt has been taking amlodipine 5 mg as needed but states didn't need it for the past 3 weeks. - continue current meds; lisinopril 10 mg, lopressor 50  - advised pt to take amlodipine 5 mg daily  - check CMP  - check CBC  - check lipid panel  - check TSH    3. spiculated cavitary lesion in his left lower lobe   CT High resolution 3/12/2022 showed 2.1 cm mixed density partially cavitary partially soft tissue nodule in LLL stable since CT 1/4/2022. Follows with pulm Dr. Salvatore Aceves. Pt to undergo bronchoscopy next week. No symptoms of worsening SOB or cough. Intentional weight loss of 5lbs past few months. Hx of testicular and colon cancer.   - await pathology results after bronchoscopy  - follow up PET scan ordered by pulm  - managed by pulm pending biopsy results      Return in about 1 week (around 4/12/2022).     Patient Instructions   - please go to lab as soon as you can  - please start taking amlodipine 5mg daily  - please maintain healthy lifestyle with diet low in carbs, fat and salt and continue to exercise  - please call pulmonology clinic and ask about when you need to hold off taking warfarin if needed  - please check your blood pressure few times a week        Dispo: Pt has been staffed with Dr. Suresh Islas  _______________  Emma Amor MD, 4/5/2022 4:56 PM   PGY-3

## 2022-04-07 ENCOUNTER — ANTI-COAG VISIT (OUTPATIENT)
Dept: PHARMACY | Age: 57
End: 2022-04-07
Payer: MEDICARE

## 2022-04-07 DIAGNOSIS — Z95.2 S/P AORTIC VALVE REPLACEMENT: Primary | ICD-10-CM

## 2022-04-07 DIAGNOSIS — I48.0 PAROXYSMAL ATRIAL FIBRILLATION (HCC): ICD-10-CM

## 2022-04-07 LAB — INTERNATIONAL NORMALIZATION RATIO, POC: 1.2

## 2022-04-07 PROCEDURE — 99212 OFFICE O/P EST SF 10 MIN: CPT | Performed by: PHARMACIST

## 2022-04-07 PROCEDURE — 85610 PROTHROMBIN TIME: CPT | Performed by: PHARMACIST

## 2022-04-07 NOTE — Clinical Note
Ed Carbajal - Patient's INR in clinic today was 1.2. Just wanted to let you know for his bronch tomorrow. Thanks!

## 2022-04-07 NOTE — PROGRESS NOTES
ANTICOAGULATION SERVICE    See Luther is a 60 y.o. male with PMHx significant for AVR (re-do in May, 2017), CAD s/p CABG (x3 in May, 2017), pA-fib, HLD, HTN, testicular CA who presents to clinic on 4/7/2022 for anticoagulation monitoring and adjustment.     Anticoagulation Indication(s):  Heart Valve Replacement (bovine AVR), A-fib  Referring Physician:  Dr. Rita Dalton  Goal INR Range:  2-3  Duration of Anticoagulation Therapy:  TBD  Time of day dose taken:  PM  Product patient has at home:  warfarin 5 mg (peach)    [id number] Score for Atrial Fibrillation Stroke Risk   Risk   Factors  Component Value   C CHF No 0   H HTN Yes 1   A2 Age >= 75 No,  (57 y.o.) 0   D DM Yes 1   S2 Prior Stroke/TIA No 0   V Vascular Disease Yes 1   A Age 74-69 No,  (57 y.o.) 0   Sc Sex male 0    [id number]  Score  3   Score last updated 5/30/19 1:51 PM      Lab Results   Component Value Date    RBC 3.70 (L) 07/30/2019    HGB 12.4 (L) 07/30/2019    HCT 36.5 (L) 07/30/2019    MCV 98.7 07/30/2019    MCH 33.5 07/30/2019    MPV 7.6 07/30/2019    RDW 15.6 (H) 07/30/2019     07/30/2019     INR Summary                            Warfarin regimen (mg)  Date INR   A/P    Sun Mon Tue Wed Thu Fri Sat Mg/wk  4/7 1.2 Below goal (held) see below 5 7.5 5 7.5 5 7.5 5 42.5  3/4 2.2 At goal, continue   5 7.5 5 7.5 5 7.5 5 42.5  2/1 2.1 At goal, continue   5 7.5 5 7.5 5 7.5 5 42.5  1/7 1.9 Below goal, continue   5 7.5 5 7.5 5 7.5 5 42.5  11/22 2.2 At goal, continue   5 7.5 5 7.5 5 7.5 5 42.5  11/22 2.1 At goal, continue   5 7.5 5 7.5 5 7.5 5 42.5  11/8 1.8 Below goal, increase  5 7.5 5 7.5 5 7.5 5 42.5  10/25 1.6 Below goal, bolus   7.5 7.5/5 5 5 5 5 7.5 40  10/4 2.0 At goal, continue   7.5 5 5 5 5 5 7.5 40  9/17 2.7 At goal (dosing error)  7.5 5 5 5 5 5 7.5 40  8/23 4.3 Above goal, decrease  7.5 0/5 5 5 5 5 7.5 40  8/9 3.3 Above goal, dec  7.5 5 5 5 7.5 5 5 40  7/21 1.4 Below goal, increase  5 7.5 5 7.5 5 7.5 5 42.5  6/28 2.0 At goal, no change  7.5 5 5 5 5 5 7.5 40  6/11 1.5 Below goal, increase  7.5 5 5 5 5 5 7.5 40  5/24 3.6 Above goal, hold x 1  5 0/5 5 5 5 5 5 35  4/23 2.7 At goal, no change  5 5 5 5 5 5 5 35  4/5 3.6 Above goal, hold x 1  5 0/5 5 5 5 5 5 35   3/8 3.0 At goal, no change  5 5 5 5 5 5 5 35  2/19 2.0 At goal, no change  5 5 5 5 5 5 5 35  2/4 2.7 At goal, no change  5 5 5 5 5 5 5 35  1/20 2.7 At goal, no change  5 5 5 5 5 5 5 35  1/11 4.5 Above goal (Cymbalta) 5 0/5 0/5 5 5 5 5 35  12/28 2.0 At goal, no change  7.5 5 5 5 5 5 7.5 40  11/24 2.4 At goal, no change  7.5 5 5 5 5 5 7.5 40  10/30 2.5 At goal, no change  7.5 5 5 5 5 5 7.5 40  10/2 2.2 At goal, no change  7.5 5 5 5 5 5 7.5 40  9/3 2.4 At goal, no change  7.5 5 5 5 5 5 7.5 40  8/3 2.9 At goal, no change  7.5 5 5 5 5 5 7.5 40  7/6 2.7 At goal, no change  7.5 5 5 5 5 5 7.5 40  6/8 2.8 At goal, no change  7.5 5 5 5 5 5 7.5 40  5/11 2.9 At goal, no change  7.5 5 5 5 5 5 7.5 40  3/30 3.0 At goal, no change  7.5 5 5 5 5 5 7.5 40  2/26 2.3 At goal, no change  7.5 5 5 5 5 5 7.5 40  2/5 3.3 Above goal, decrease  7.5 5 5 5 5 5 7.5 40  1/8 2.9 At goal, no change  7.5 7.5 5 5 5 5 7.5 42.5    Patient History:  Recent hospitalizations/HC visits 2/21/22: Pulmonology, work-up underway for lung nodule    Recent medication changes 1/5/22: Medrol dose pack x5 days per pain management   9/2021: Stopped citalopram, does not plan to continue   7/21/21: increased citalopram   6/18/21: started citalopram 20 mg QD (may increase INR)  12/28/20: Cymbalta 30 mg daily (may increase INR); stopped taking on 1/30; resumed ~2/4; stopped ~6/1   Medications taken regularly that may interact with warfarin or alter INR ASA 81 mg   Warfarin dose taken as prescribed 9/17/21 did not decrease warfarin dose as instructed at last visit  Usually compliant   Does not use pillbox  Patient has been taking warfarin since re-do AVR in May, 2017   Signs/symptoms of bleeding No h/o major bleeding   Vitamin K intake Normally has ~0 servings of green, leafy vegetables per week   Recent vomiting/diarrhea/fever, changes in weight or activity level None reported   Tobacco or alcohol use -No recent changes in smoking as of 11/24/20 (~3/4 PPD)  -Patient cut back from 2 PPD to 3/4 PPD in February (2019) when he moved in with his daughter. Decrease in smoking typically increases INR gradually.   -Patient denies any alcohol or illicit drug use   Upcoming surgeries or procedures 4/8/22: bronchoscopy with Dr. Seferino Hager - held warfarin x3 days prior to procedure      Assessment/Plan:  Patient's INR was subtherapeutic today (1.2) after holding warfarin for 2 days. Patient has a bronchoscopy scheduled tomorrow and his physician would like his INR reversed prior to procedure. Patient was instructed by doctor to take vitamin K 5 mg daily until procedure tomorrow, however he was not able to find a pharmacy that carried vitamin K. He got vitamin K 100 mcg tablets from [facility] and took two tablets on Tuesday per instructions from anticoagulation clinic. Patient's INR is usually very stable so assumed that INR would be at baseline after holding for 3 days. Trying to avoid vitamin K as much as possible as INR will likely take several days to return back to therapeutic range. He denies any change in medications, vitamin K intake, illness, smoking, physical activity, or bleeding since last visit. Patient was instructed to hold warfarin again tonight in anticipation for procedure tomorrow, then resume warfarin at previously stable dose of 7.5 mg on Monday, Wednesday, Friday and 5 mg all other days tomorrow night as long as okay with pulmonologist. Will repeat INR in 2 weeks to assure INR trended up appropriately. Patient was reminded to maintain consistent vitamin K intake and call with any bleeding, medication changes, or fever/vomiting/diarrhea. Patient understands dosing directions and information discussed.   Dosing schedule and follow up appointment given to patient. Progress note routed to referring physician's office. Patient acknowledges working in consult agreement with pharmacist as referred by his/her physician. Next INR Check:      Please call [place] at [phone] with any questions.     Jose Roberto Stallworth, PharmD, [facility]  [address] [facility]  [place]: [phone]  [facility]: [phone]  2022 8:24 AM     For Pharmacy Admin Tracking Only     Intervention Detail: Adherence Monitorin and Dose Adjustment: 1, reason: Interaction   Total # of Interventions Recommended: 2   Total # of Interventions Accepted: 2   Time Spent (min): 15

## 2022-04-08 ENCOUNTER — APPOINTMENT (OUTPATIENT)
Dept: GENERAL RADIOLOGY | Age: 57
End: 2022-04-08
Attending: INTERNAL MEDICINE
Payer: MEDICARE

## 2022-04-08 ENCOUNTER — HOSPITAL ENCOUNTER (OUTPATIENT)
Age: 57
Setting detail: OUTPATIENT SURGERY
Discharge: HOME | End: 2022-04-08
Attending: INTERNAL MEDICINE | Admitting: INTERNAL MEDICINE
Payer: MEDICARE

## 2022-04-08 ENCOUNTER — ANESTHESIA (OUTPATIENT)
Dept: ENDOSCOPY | Age: 57
End: 2022-04-08
Payer: MEDICARE

## 2022-04-08 ENCOUNTER — ANESTHESIA EVENT (OUTPATIENT)
Dept: ENDOSCOPY | Age: 57
End: 2022-04-08
Payer: MEDICARE

## 2022-04-08 VITALS — OXYGEN SATURATION: 80 % | DIASTOLIC BLOOD PRESSURE: 81 MMHG | SYSTOLIC BLOOD PRESSURE: 170 MMHG

## 2022-04-08 VITALS
OXYGEN SATURATION: 95 % | DIASTOLIC BLOOD PRESSURE: 96 MMHG | TEMPERATURE: 97.2 F | SYSTOLIC BLOOD PRESSURE: 138 MMHG | BODY MASS INDEX: 35.76 KG/M2 | HEIGHT: 72 IN | HEART RATE: 68 BPM | WEIGHT: 264 LBS | RESPIRATION RATE: 16 BRPM

## 2022-04-08 DIAGNOSIS — C34.90 MALIGNANT NEOPLASM OF LUNG, UNSPECIFIED LATERALITY, UNSPECIFIED PART OF LUNG (HCC): ICD-10-CM

## 2022-04-08 LAB
APTT: 38.1 SEC (ref 26.2–38.6)
GLUCOSE BLD-MCNC: 134 MG/DL (ref 70–99)
GLUCOSE BLD-MCNC: 150 MG/DL (ref 70–99)
INR BLD: 1.07 (ref 0.88–1.12)
PERFORMED ON: ABNORMAL
PERFORMED ON: ABNORMAL
PROTHROMBIN TIME: 12.1 SEC (ref 9.9–12.7)

## 2022-04-08 PROCEDURE — 2580000003 HC RX 258: Performed by: ANESTHESIOLOGY

## 2022-04-08 PROCEDURE — 7100000011 HC PHASE II RECOVERY - ADDTL 15 MIN: Performed by: INTERNAL MEDICINE

## 2022-04-08 PROCEDURE — 3609011700 HC BRONCHOSCOPY/TRANSBRONCHIAL LUNG BIOPSY ADDL LOBE: Performed by: INTERNAL MEDICINE

## 2022-04-08 PROCEDURE — 3609010800 HC BRONCHOSCOPY ALVEOLAR LAVAGE: Performed by: INTERNAL MEDICINE

## 2022-04-08 PROCEDURE — 2709999900 HC NON-CHARGEABLE SUPPLY: Performed by: INTERNAL MEDICINE

## 2022-04-08 PROCEDURE — 88112 CYTOPATH CELL ENHANCE TECH: CPT

## 2022-04-08 PROCEDURE — 31627 NAVIGATIONAL BRONCHOSCOPY: CPT | Performed by: INTERNAL MEDICINE

## 2022-04-08 PROCEDURE — 88312 SPECIAL STAINS GROUP 1: CPT

## 2022-04-08 PROCEDURE — 31623 DX BRONCHOSCOPE/BRUSH: CPT | Performed by: INTERNAL MEDICINE

## 2022-04-08 PROCEDURE — 36415 COLL VENOUS BLD VENIPUNCTURE: CPT

## 2022-04-08 PROCEDURE — 88305 TISSUE EXAM BY PATHOLOGIST: CPT

## 2022-04-08 PROCEDURE — 85730 THROMBOPLASTIN TIME PARTIAL: CPT

## 2022-04-08 PROCEDURE — 2580000003 HC RX 258: Performed by: NURSE ANESTHETIST, CERTIFIED REGISTERED

## 2022-04-08 PROCEDURE — 3700000000 HC ANESTHESIA ATTENDED CARE: Performed by: INTERNAL MEDICINE

## 2022-04-08 PROCEDURE — 2500000003 HC RX 250 WO HCPCS

## 2022-04-08 PROCEDURE — 7100000000 HC PACU RECOVERY - FIRST 15 MIN: Performed by: INTERNAL MEDICINE

## 2022-04-08 PROCEDURE — 71045 X-RAY EXAM CHEST 1 VIEW: CPT

## 2022-04-08 PROCEDURE — 31654 BRONCH EBUS IVNTJ PERPH LES: CPT | Performed by: INTERNAL MEDICINE

## 2022-04-08 PROCEDURE — 3700000001 HC ADD 15 MINUTES (ANESTHESIA): Performed by: INTERNAL MEDICINE

## 2022-04-08 PROCEDURE — 31628 BRONCHOSCOPY/LUNG BX EACH: CPT | Performed by: INTERNAL MEDICINE

## 2022-04-08 PROCEDURE — 3209999900 FLUORO FOR SURGICAL PROCEDURES

## 2022-04-08 PROCEDURE — 31645 BRNCHSC W/THER ASPIR 1ST: CPT | Performed by: INTERNAL MEDICINE

## 2022-04-08 PROCEDURE — 7100000001 HC PACU RECOVERY - ADDTL 15 MIN: Performed by: INTERNAL MEDICINE

## 2022-04-08 PROCEDURE — 88172 CYTP DX EVAL FNA 1ST EA SITE: CPT

## 2022-04-08 PROCEDURE — 6360000002 HC RX W HCPCS

## 2022-04-08 PROCEDURE — 7100000010 HC PHASE II RECOVERY - FIRST 15 MIN: Performed by: INTERNAL MEDICINE

## 2022-04-08 PROCEDURE — 88342 IMHCHEM/IMCYTCHM 1ST ANTB: CPT

## 2022-04-08 PROCEDURE — 31624 DX BRONCHOSCOPE/LAVAGE: CPT | Performed by: INTERNAL MEDICINE

## 2022-04-08 PROCEDURE — 2720000010 HC SURG SUPPLY STERILE: Performed by: INTERNAL MEDICINE

## 2022-04-08 PROCEDURE — 6360000002 HC RX W HCPCS: Performed by: NURSE ANESTHETIST, CERTIFIED REGISTERED

## 2022-04-08 PROCEDURE — 85610 PROTHROMBIN TIME: CPT

## 2022-04-08 PROCEDURE — 88173 CYTOPATH EVAL FNA REPORT: CPT

## 2022-04-08 PROCEDURE — 88341 IMHCHEM/IMCYTCHM EA ADD ANTB: CPT

## 2022-04-08 RX ORDER — SODIUM CHLORIDE 9 MG/ML
INJECTION, SOLUTION INTRAVENOUS PRN
Status: DISCONTINUED | OUTPATIENT
Start: 2022-04-08 | End: 2022-04-08 | Stop reason: HOSPADM

## 2022-04-08 RX ORDER — SODIUM CHLORIDE 0.9 % (FLUSH) 0.9 %
5-40 SYRINGE (ML) INJECTION EVERY 12 HOURS SCHEDULED
Status: DISCONTINUED | OUTPATIENT
Start: 2022-04-08 | End: 2022-04-08 | Stop reason: HOSPADM

## 2022-04-08 RX ORDER — PREGABALIN 150 MG/1
150 CAPSULE ORAL 3 TIMES DAILY
COMMUNITY

## 2022-04-08 RX ORDER — LIDOCAINE HYDROCHLORIDE 10 MG/ML
1 INJECTION, SOLUTION EPIDURAL; INFILTRATION; INTRACAUDAL; PERINEURAL
Status: DISCONTINUED | OUTPATIENT
Start: 2022-04-08 | End: 2022-04-08 | Stop reason: HOSPADM

## 2022-04-08 RX ORDER — SUCCINYLCHOLINE CHLORIDE 20 MG/ML
INJECTION INTRAMUSCULAR; INTRAVENOUS PRN
Status: DISCONTINUED | OUTPATIENT
Start: 2022-04-08 | End: 2022-04-08 | Stop reason: SDUPTHER

## 2022-04-08 RX ORDER — PROPOFOL 10 MG/ML
INJECTION, EMULSION INTRAVENOUS PRN
Status: DISCONTINUED | OUTPATIENT
Start: 2022-04-08 | End: 2022-04-08 | Stop reason: SDUPTHER

## 2022-04-08 RX ORDER — MEPERIDINE HYDROCHLORIDE 25 MG/ML
12.5 INJECTION INTRAMUSCULAR; INTRAVENOUS; SUBCUTANEOUS EVERY 5 MIN PRN
Status: DISCONTINUED | OUTPATIENT
Start: 2022-04-08 | End: 2022-04-08 | Stop reason: HOSPADM

## 2022-04-08 RX ORDER — ROCURONIUM BROMIDE 10 MG/ML
INJECTION, SOLUTION INTRAVENOUS PRN
Status: DISCONTINUED | OUTPATIENT
Start: 2022-04-08 | End: 2022-04-08 | Stop reason: SDUPTHER

## 2022-04-08 RX ORDER — SODIUM CHLORIDE, SODIUM LACTATE, POTASSIUM CHLORIDE, CALCIUM CHLORIDE 600; 310; 30; 20 MG/100ML; MG/100ML; MG/100ML; MG/100ML
INJECTION, SOLUTION INTRAVENOUS CONTINUOUS
Status: DISCONTINUED | OUTPATIENT
Start: 2022-04-08 | End: 2022-04-08 | Stop reason: HOSPADM

## 2022-04-08 RX ORDER — FENTANYL CITRATE 50 UG/ML
INJECTION, SOLUTION INTRAMUSCULAR; INTRAVENOUS PRN
Status: DISCONTINUED | OUTPATIENT
Start: 2022-04-08 | End: 2022-04-08 | Stop reason: SDUPTHER

## 2022-04-08 RX ORDER — SODIUM CHLORIDE 0.9 % (FLUSH) 0.9 %
5-40 SYRINGE (ML) INJECTION PRN
Status: DISCONTINUED | OUTPATIENT
Start: 2022-04-08 | End: 2022-04-08 | Stop reason: HOSPADM

## 2022-04-08 RX ORDER — OXYCODONE HYDROCHLORIDE 5 MG/1
5 TABLET ORAL
Status: DISCONTINUED | OUTPATIENT
Start: 2022-04-08 | End: 2022-04-08 | Stop reason: HOSPADM

## 2022-04-08 RX ORDER — LIDOCAINE HYDROCHLORIDE 10 MG/ML
INJECTION, SOLUTION INFILTRATION; PERINEURAL PRN
Status: DISCONTINUED | OUTPATIENT
Start: 2022-04-08 | End: 2022-04-08 | Stop reason: SDUPTHER

## 2022-04-08 RX ORDER — SODIUM CHLORIDE 9 MG/ML
25 INJECTION, SOLUTION INTRAVENOUS PRN
Status: DISCONTINUED | OUTPATIENT
Start: 2022-04-08 | End: 2022-04-08 | Stop reason: HOSPADM

## 2022-04-08 RX ORDER — FENTANYL CITRATE 50 UG/ML
25 INJECTION, SOLUTION INTRAMUSCULAR; INTRAVENOUS EVERY 5 MIN PRN
Status: DISCONTINUED | OUTPATIENT
Start: 2022-04-08 | End: 2022-04-08 | Stop reason: HOSPADM

## 2022-04-08 RX ORDER — ONDANSETRON 2 MG/ML
4 INJECTION INTRAMUSCULAR; INTRAVENOUS
Status: DISCONTINUED | OUTPATIENT
Start: 2022-04-08 | End: 2022-04-08 | Stop reason: HOSPADM

## 2022-04-08 RX ADMIN — SODIUM CHLORIDE, POTASSIUM CHLORIDE, SODIUM LACTATE AND CALCIUM CHLORIDE: 600; 310; 30; 20 INJECTION, SOLUTION INTRAVENOUS at 12:50

## 2022-04-08 RX ADMIN — SUGAMMADEX 200 MG: 100 INJECTION, SOLUTION INTRAVENOUS at 15:45

## 2022-04-08 RX ADMIN — FENTANYL CITRATE 100 MCG: 50 INJECTION, SOLUTION INTRAMUSCULAR; INTRAVENOUS at 13:33

## 2022-04-08 RX ADMIN — PHENYLEPHRINE HYDROCHLORIDE 100 MCG: 10 INJECTION, SOLUTION INTRAMUSCULAR; INTRAVENOUS; SUBCUTANEOUS at 13:49

## 2022-04-08 RX ADMIN — PHENYLEPHRINE HYDROCHLORIDE 100 MCG: 10 INJECTION, SOLUTION INTRAMUSCULAR; INTRAVENOUS; SUBCUTANEOUS at 13:55

## 2022-04-08 RX ADMIN — PROPOFOL 150 MG: 10 INJECTION, EMULSION INTRAVENOUS at 13:42

## 2022-04-08 RX ADMIN — SUCCINYLCHOLINE CHLORIDE 120 MG: 20 INJECTION, SOLUTION INTRAMUSCULAR; INTRAVENOUS; PARENTERAL at 13:42

## 2022-04-08 RX ADMIN — PHENYLEPHRINE HYDROCHLORIDE 50 MCG/MIN: 10 INJECTION, SOLUTION INTRAMUSCULAR; INTRAVENOUS; SUBCUTANEOUS at 14:33

## 2022-04-08 RX ADMIN — LIDOCAINE HYDROCHLORIDE 50 MG: 10 INJECTION, SOLUTION INFILTRATION; PERINEURAL at 13:42

## 2022-04-08 RX ADMIN — PHENYLEPHRINE HYDROCHLORIDE 100 MCG: 10 INJECTION, SOLUTION INTRAMUSCULAR; INTRAVENOUS; SUBCUTANEOUS at 14:07

## 2022-04-08 RX ADMIN — SODIUM CHLORIDE, POTASSIUM CHLORIDE, SODIUM LACTATE AND CALCIUM CHLORIDE: 600; 310; 30; 20 INJECTION, SOLUTION INTRAVENOUS at 14:34

## 2022-04-08 RX ADMIN — ROCURONIUM BROMIDE 45 MG: 10 INJECTION INTRAVENOUS at 13:54

## 2022-04-08 RX ADMIN — PHENYLEPHRINE HYDROCHLORIDE 200 MCG: 10 INJECTION, SOLUTION INTRAMUSCULAR; INTRAVENOUS; SUBCUTANEOUS at 14:31

## 2022-04-08 RX ADMIN — ROCURONIUM BROMIDE 5 MG: 10 INJECTION INTRAVENOUS at 13:38

## 2022-04-08 RX ADMIN — PHENYLEPHRINE HYDROCHLORIDE 100 MCG: 10 INJECTION, SOLUTION INTRAMUSCULAR; INTRAVENOUS; SUBCUTANEOUS at 14:21

## 2022-04-08 RX ADMIN — SUGAMMADEX 200 MG: 100 INJECTION, SOLUTION INTRAVENOUS at 13:39

## 2022-04-08 ASSESSMENT — PULMONARY FUNCTION TESTS
PIF_VALUE: 24
PIF_VALUE: 29
PIF_VALUE: 22
PIF_VALUE: 0
PIF_VALUE: 29
PIF_VALUE: 30
PIF_VALUE: 44
PIF_VALUE: 24
PIF_VALUE: 29
PIF_VALUE: 25
PIF_VALUE: 29
PIF_VALUE: 21
PIF_VALUE: 29
PIF_VALUE: 1
PIF_VALUE: 29
PIF_VALUE: 29
PIF_VALUE: 24
PIF_VALUE: 33
PIF_VALUE: 29
PIF_VALUE: 24
PIF_VALUE: 23
PIF_VALUE: 24
PIF_VALUE: 22
PIF_VALUE: 1
PIF_VALUE: 29
PIF_VALUE: 0
PIF_VALUE: 30
PIF_VALUE: 27
PIF_VALUE: 29
PIF_VALUE: 28
PIF_VALUE: 22
PIF_VALUE: 29
PIF_VALUE: 28
PIF_VALUE: 29
PIF_VALUE: 4
PIF_VALUE: 28
PIF_VALUE: 1
PIF_VALUE: 0
PIF_VALUE: 29
PIF_VALUE: 29
PIF_VALUE: 22
PIF_VALUE: 29
PIF_VALUE: 29
PIF_VALUE: 1
PIF_VALUE: 2
PIF_VALUE: 40
PIF_VALUE: 29
PIF_VALUE: 1
PIF_VALUE: 29
PIF_VALUE: 28
PIF_VALUE: 27
PIF_VALUE: 29
PIF_VALUE: 19
PIF_VALUE: 1
PIF_VALUE: 1
PIF_VALUE: 22
PIF_VALUE: 21
PIF_VALUE: 27
PIF_VALUE: 1
PIF_VALUE: 29
PIF_VALUE: 29
PIF_VALUE: 2
PIF_VALUE: 7
PIF_VALUE: 31
PIF_VALUE: 36
PIF_VALUE: 30
PIF_VALUE: 29
PIF_VALUE: 22
PIF_VALUE: 30
PIF_VALUE: 29
PIF_VALUE: 35
PIF_VALUE: 29
PIF_VALUE: 1
PIF_VALUE: 22
PIF_VALUE: 29
PIF_VALUE: 29
PIF_VALUE: 25
PIF_VALUE: 29
PIF_VALUE: 26
PIF_VALUE: 29
PIF_VALUE: 34
PIF_VALUE: 2
PIF_VALUE: 31
PIF_VALUE: 30
PIF_VALUE: 23
PIF_VALUE: 28
PIF_VALUE: 29
PIF_VALUE: 29
PIF_VALUE: 1
PIF_VALUE: 26
PIF_VALUE: 29
PIF_VALUE: 29
PIF_VALUE: 1
PIF_VALUE: 29
PIF_VALUE: 28
PIF_VALUE: 29
PIF_VALUE: 29
PIF_VALUE: 1
PIF_VALUE: 29
PIF_VALUE: 31
PIF_VALUE: 29
PIF_VALUE: 26
PIF_VALUE: 0
PIF_VALUE: 19
PIF_VALUE: 22
PIF_VALUE: 27
PIF_VALUE: 29
PIF_VALUE: 33
PIF_VALUE: 29
PIF_VALUE: 3
PIF_VALUE: 1
PIF_VALUE: 30
PIF_VALUE: 25
PIF_VALUE: 7
PIF_VALUE: 22
PIF_VALUE: 1
PIF_VALUE: 41
PIF_VALUE: 7
PIF_VALUE: 29
PIF_VALUE: 0
PIF_VALUE: 28
PIF_VALUE: 37
PIF_VALUE: 29
PIF_VALUE: 29
PIF_VALUE: 2

## 2022-04-08 ASSESSMENT — PAIN SCALES - GENERAL
PAINLEVEL_OUTOF10: 0

## 2022-04-08 ASSESSMENT — PAIN DESCRIPTION - DESCRIPTORS: DESCRIPTORS: ACHING

## 2022-04-08 ASSESSMENT — PAIN - FUNCTIONAL ASSESSMENT: PAIN_FUNCTIONAL_ASSESSMENT: 0-10

## 2022-04-08 NOTE — ANESTHESIA POSTPROCEDURE EVALUATION
Department of Anesthesiology  Postprocedure Note    Patient: Eleanor Moore  MRN: [id number]  YOB: 1965  Date of evaluation: 4/8/2022  Time:  5:23 PM     Procedure Summary     Date: 04/08/22 Room / Location: [address] [place] [address] Ward Del Toro  / [facility]    Anesthesia Start: 1329 Anesthesia Stop: 1604    Procedures:       ROBOTIC NAVIGATIONAL BRONCHOSCOPY FOR TRANSBRONCHIAL LUNG BIOPSY WITH FLOURO (N/A )      BRONCHOSCOPY ALVEOLAR LAVAGE Diagnosis:       Malignant neoplasm of lung, unspecified laterality, unspecified part of lung ([address]      (Malignant neoplasm of lung, unspecified laterality, unspecified part of lung ([address] [C34.90])    Surgeons: Sayra Bourgeois MD Responsible Provider: Cathy Bumpers, MD    Anesthesia Type: general ASA Status: 4          Anesthesia Type: general    Shaheed Phase I: Shaheed Score: 10    Shaheed Phase II: Shaheed Score: 10    Last vitals: Reviewed and per EMR flowsheets.        Anesthesia Post Evaluation    Patient location during evaluation: PACU  Patient participation: complete - patient participated  Level of consciousness: awake and alert  Airway patency: patent  Nausea & Vomiting: no nausea and no vomiting  Complications: no  Cardiovascular status: hemodynamically stable  Respiratory status: acceptable  Hydration status: euvolemic  Multimodal analgesia pain management approach

## 2022-04-08 NOTE — ANESTHESIA PRE PROCEDURE
Department of Anesthesiology  Preprocedure Note       Name:  Terry Payton   Age:  60 y.o.  :  1965                                          MRN:  [id number]         Date:  2022      Surgeon: Miguel Ángel Edwards):  Zachary Khan MD    Procedure: Procedure(s):  ROBOTIC NAVIGATIONAL BRONCHOSCOPY FOR TRANSBRONCHIAL LUNG BIOPSY WITH FLOURO    Medications prior to admission:   Prior to Admission medications    Medication Sig Start Date End Date Taking? Authorizing Provider   pregabalin (LYRICA) 150 MG capsule Take 150 mg by mouth 2 times daily. Yes Historical Provider, MD   amLODIPine (NORVASC) 5 MG tablet Take 1 tablet by mouth daily 22   Geetha Woodard MD   metFORMIN (GLUCOPHAGE) 500 MG tablet TAKE TWO TABLETS BY MOUTH TWICE A DAY WITH MEALS 3/4/22   Madeline Khan MD   METHOCARBAMOL PO Take by mouth    Historical Provider, MD   Ascorbic Acid (VITAMIN C PO) Take by mouth 2 times daily   Patient not taking: Reported on 3/18/2022    Historical Provider, MD   lisinopril (PRINIVIL;ZESTRIL) 10 MG tablet One tablet twice a day 22   Geetha Woodard MD   famotidine (PEPCID) 20 MG tablet TAKE 1 TABLET BY MOUTH 2 TIMES A DAY 22   Geetha Woodrad MD   metoprolol tartrate (LOPRESSOR) 50 MG tablet TAKE 1 TABLET BY MOUTH 2 TIMES A DAY FOR BLOOD PRESSURE AND HEART 21   Geetha Woodard MD   nystatin-triamcinolone (MYCOLOG) 940259-0.3 UNIT/GM-% ointment Apply topically 2 times daily until improved.  21   Hector Ford MD   warfarin (COUMADIN) 5 MG tablet TAKE 1 TABLET BY MOUTH ONE TIME A DAY OR TAKE ONE AND ONE-HALF TABLETS BY MOUTH EVERY DAY AS DIRECTED BY CLINIC 21   Esme Montoya MD   rosuvastatin (CRESTOR) 20 MG tablet TAKE ONE TABLET BY MOUTH EVERY EVENING 7/15/21   Geetha Woodard MD   Handicakwan Placard MISC by Does not apply route Effective through 2020 through 2025   Sanya Bailey DO   aspirin 81 MG EC tablet Take 1 tablet by mouth daily    Jose Odell MD   melatonin (RA MELATONIN) 3 MG TABS tablet Take 1 tablet by mouth nightly as needed (insomnia) 1/5/18   Rene Mishra MD   Blood Pressure KIT 1 Unit 7/21/17   Rene Mishra MD   oxyCODONE-acetaminophen (PERCOCET) 5-325 MG per tablet Take 1 tablet by mouth every 4 hours as needed for Pain  . Historical Provider, MD   gabapentin (NEURONTIN) 300 MG capsule Take 300 mg by mouth 3 times daily     Historical Provider, MD       Current medications:    Current Facility-Administered Medications   Medication Dose Route Frequency Provider Last Rate Last Admin    lidocaine PF 1 % injection 1 mL  1 mL IntraDERmal Once PRN Kya Waters MD        lactated ringers infusion   IntraVENous Continuous Kya Waters MD        sodium chloride flush 0.9 % injection 5-40 mL  5-40 mL IntraVENous 2 times per day Kya Waters MD        sodium chloride flush 0.9 % injection 5-40 mL  5-40 mL IntraVENous PRN Kya Waters MD        0.9 % sodium chloride infusion  25 mL IntraVENous PRN Kya Waters MD           Allergies: Allergies   Allergen Reactions    Atorvastatin Calcium [Lipitor] Other (See Comments)     Severe headaches      Wellbutrin [Bupropion] Anxiety     patient complains of feeling anxious, irritable and \"hyped up\" on wellbutrin around 2011.     Cymbalta [Duloxetine Hcl]     Vicodin [Hydrocodone-Acetaminophen] Itching       Problem List:    Patient Active Problem List   Diagnosis Code    Essential hypertension I10    Hyperlipidemia E78.5    Generalized Arthralgias  M25.50    Lumbar spinal stenosis M48.061    Lumbar herniated disc M51.26    S/P aortic valve replacement Z95.2    Hx of CABG Z95.1    Needle phobia F40.298    CAD, multiple vessel I25.10    History of testicular cancer Z85.47    Degenerative arthritis of right knee M17.11    Exostosis of toe M89.8X7    S/P hernia repair Z98.890, Z87.19    Myofascial pain dysfunction syndrome M79.18    Postlaminectomy syndrome, lumbar region M96.1    GERD (gastroesophageal reflux disease) K21.9    Carpal tunnel syndrome G56.00    History of partial colectomy Z90.49    NSTEMI (non-ST elevated myocardial infarction) ([facility]) I21.4    Nonrheumatic aortic valve insufficiency I35.1    Obesity, Class I, BMI 30.0-34.9 (see actual BMI) E66.9    History of aortic valve replacement Z95.2    Coronary artery disease involving native coronary artery of native heart without angina pectoris I25.10    Paroxysmal atrial fibrillation ([facility]) I48.0    Pure hypercholesterolemia E78.00    Plantar fasciitis M72.2    Adjustment disorder with depressed mood F43.21    BPH associated with nocturia N40.1, R35.1    Colonic mass K63.89    Severe obesity (BMI 35.0-39. 9) with comorbidity ([address] E66.01    Type 2 diabetes mellitus without complication, without long-term current use of insulin ([facility]) E11.9    Unipolar depression ([address] F33.9    Abnormal gait R26.9       Past Medical History:        Diagnosis Date    Abdominal hernia     s/p repair 2010    Aortic valve prosthesis present     CAD (coronary artery disease)     Chronic back pain greater than 3 months duration     Clostridium difficile diarrhea 10/17/2016    PCR    DDD (degenerative disc disease), lumbosacral 8/7/2013    Erectile dysfunction     GERD (gastroesophageal reflux disease)     Hyperlipidemia     Hypertension     Joint pain     Left testicular cancer ([address] 2002    s/p abdominal resection    Myalgia and myositis, unspecified 8/26/2013    Neuropathy     OA (osteoarthritis) of knee     bilateral    Obesity, Class I, BMI 30.0-34.9 (see actual BMI)     Prolonged emergence from general anesthesia     after CABG    S/P AVR 2005    tissue valve    S/P CABG x 2 2005    w/AVR & primary repair of dissected ascending aorta    Type 2 diabetes mellitus without complication, without long-term current use of insulin ([address] 7/13/2021    Wears dentures        Past Surgical History:        Procedure Laterality Date    AORTA SURGERY  08/27/2004    Dr. Elyse Milligan - primary repair of limited ascending aortic dissection    AORTIC VALVE REPLACEMENT  05/30/2017    Dr. Nancy Bhardwaj - redo w/25mm [place] ThermaFix model 3300 bovine pericardial bioprosthesis    AORTIC VALVE SURGERY  08/27/2004    Dr. Elyse Milligan - 27mm [place]-Castel[place] pericardial prosthesis     BACK SURGERY      L4-S1    CARDIAC CATHETERIZATION  05/09/2017    Dr. Gurpreet Patton  08/26/2004    Dr. Stephanie Borja Right 03/17/2015    Dr. Sally Danielle  10/10/2016    Dr. Orville South - w/laparascopic lysis of extensive adhesions, open transverse colon resection, excision of old abd mesh adhering to colon    CORONARY ANGIOPLASTY WITH STENT PLACEMENT  07/2014    CORONARY ARTERY BYPASS GRAFT  08/27/2004    Dr. Elyse Milligan - x2 (LIMA-LAD, SVG sequentially to ascending aorta & D1)    CORONARY ARTERY BYPASS GRAFT  05/30/2017    Dr. Nancy Bhardwaj - redo x3 (reverse AC SVG sequentially to D1, OM1 & PDA) w/explant of prior prosthetic valve & removal of embedded sternal wires x7    EMG  04/16/2012    FOOT SURGERY Left 01/24/2012    Dr. Mary Ann Warner, DPM - hallux nail evulsion & exostectomy    HEMICOLECTOMY N/A 7/26/2019    ROBOTIC ASSISTED  LAPAROSCOPIC RIGHT COLECTOMY AND CHOLECYSTECTOMY performed by Tea Koch MD at [address] [place] [address]  2010    multiple    LUMBAR DISCECTOMY  2012    L4-5    SHOULDER ARTHROSCOPY Right 11/21/2013    Dr. Ciro Oakes - w/subacromial decompression, debridement of the SLAP tear, distal clavicle excision    SOFT TISSUE TUMOR RESECTION  2001    retroperitoneal mass    TESTICLE REMOVAL Left 2001    radical orchiectomy    TRANSESOPHAGEAL ECHOCARDIOGRAM  05/30/2017    during redo CABG & AVR    TUNNELED VENOUS PORT PLACEMENT  2002    portacath        Social History:    Social History     Tobacco Use    Smoking status: Current Some Day Smoker Packs/day: 0.75     Years: 40.00     Pack years: 30.00     Types: Cigarettes     Start date: 1947     Last attempt to quit: 2017     Years since quittin.8    Smokeless tobacco: Never Used    Tobacco comment: stopped    Substance Use Topics    Alcohol use: No     Alcohol/week: 0.0 standard drinks                                Ready to quit: Not Answered  Counseling given: Not Answered  Comment: stopped       Vital Signs (Current):   Vitals:    22 1204   BP: (!) 147/87   Pulse: 63   Resp: 16   Temp: 97.3 °F (36.3 °C)   TempSrc: Temporal   SpO2: 98%   Weight: 264 lb (119.7 kg)   Height: 6' (1.829 m)                                              BP Readings from Last 3 Encounters:   22 (!) 147/87   22 (!) 145/79   22 130/76       NPO Status: Time of last liquid consumption: 1000 (2 cokes)                                                 Date of last liquid consumption: 22                             BMI:   Wt Readings from Last 3 Encounters:   22 264 lb (119.7 kg)   22 264 lb 6.4 oz (119.9 kg)   22 271 lb 12.8 oz (123.3 kg)     Body mass index is 35.8 kg/m². CBC:   Lab Results   Component Value Date    WBC 5.7 2019    RBC 3.70 2019    HGB 12.4 2019    HCT 36.5 2019    MCV 98.7 2019    RDW 15.6 2019     2019       CMP:   Lab Results   Component Value Date     2019    K 3.5 2019    CL 98 2019    CO2 25 2019    BUN 13 2019    CREATININE 0.8 2019    GFRAA >60 2019    GFRAA 134 2013    AGRATIO 1.4 2019    LABGLOM >60 2019    GLUCOSE 111 2019    PROT 7.3 2019    PROT 7.6 2012    CALCIUM 8.8 2019    BILITOT 0.5 2019    ALKPHOS 58 2019    AST 36 2019    ALT 42 2019       POC Tests: No results for input(s): POCGLU, POCNA, POCK, POCCL, POCBUN, POCHEMO, POCHCT in the last 72 hours.     Coags:   Lab Results   Component Value Date    PROTIME 12.6 07/28/2019    INR 1.2 04/07/2022    INR 3.6 05/24/2021    APTT 37.8 07/26/2019       HCG (If Applicable): No results found for: PREGTESTUR, PREGSERUM, HCG, HCGQUANT     ABGs:   Lab Results   Component Value Date    PHART 7.450 05/31/2017    PO2ART 62 05/31/2017    LWX7NFS 32 05/31/2017    IMK3CES 22.4 05/31/2017    BEART -2 05/31/2017    V7OZXQEW 93 05/31/2017        Type & Screen (If Applicable):  No results found for: LABABO, LABRH    Drug/Infectious Status (If Applicable):  No results found for: HIV, HEPCAB    COVID-19 Screening (If Applicable):   Lab Results   Component Value Date    COVID19 NOT DETECTED 12/21/2020           Anesthesia Evaluation  Patient summary reviewed and Nursing notes reviewed  Airway: Mallampati: II  TM distance: >3 FB   Neck ROM: full  Mouth opening: > = 3 FB Dental:    (+) upper dentures and lower dentures      Pulmonary:Negative Pulmonary ROS and normal exam                               Cardiovascular:    (+) hypertension:, past MI:, CAD:, CABG/stent:,                   Neuro/Psych:   (+) neuromuscular disease:, psychiatric history:            GI/Hepatic/Renal:   (+) GERD:,           Endo/Other:    (+) DiabetesType II DM, , .                 Abdominal:   (+) obese,           Vascular: negative vascular ROS. Other Findings:             Anesthesia Plan      general     ASA 4       Induction: intravenous. MIPS: Postoperative opioids intended and Prophylactic antiemetics administered. Anesthetic plan and risks discussed with patient. Plan discussed with CRNA.     Attending anesthesiologist reviewed and agrees with Preprocedure content              Ra Schafer MD   4/8/2022

## 2022-04-08 NOTE — PROCEDURES
PROCEDURE: Robotic navigational bronchoscopy with fluoroscopic and radial probe ultrasound-guided biopsy of peripheral pulmonary nodule    Diagnosis: pulmonary nodule    ASA CLASS      III. Severe Systemic Disease      Mallampati score:  3    Sedation plan:  General          DESCRIPTION OF PROCEDURE: Informed consent was obtained from the patient after explaining the risks and benefits. A time out was taken. Total intravenous anesthesia was kindly provided by the anesthetist.      Using an endotracheal tube a therapeutic bronchoscope was used to perform a complete airway inspection. The trachea appeared normal.  The bronchial trees were examined to the subsegmental level. There were no masses or mucosal abnormalities seen and a moderate amount of thick clear/white secretions were cleared. Therapeutic aspiration was performed. Next the [facility] navigational bronchoscope by Nathanael Quick was used to navigate to the peripheral nodule which was in the left lower lobe superior segment. When nodule was identified as being in range of the tip of the scope I paused the robot to lock it into position. I then I used fluoroscopy to visualize my biopsy with a transbronchial needle. I then used the radial probe ultrasound to follow the same path as the needle. Radial probe ultrasound imaging showed that I was within the lesion. The lesion was concentric in the distal portion and eccentric proximally.  I then transitioned to a transbronchial brush for biopsy and alternated between transbronchial needle and forceps biopsies.    -  Transbronchial needle biopsies were obtained in the left lower lobe  -  Transbronchial brushings were obtained for biopsy in the left lower lobe  -  Transbronchial forceps biopsies were obtained from left lower lobe    I had some concern when performing the biopsies that the needle, forceps and brush or traversing down the airway instead of through it so I tried renavigating to approach the lesion from multiple angles. After I was satisfied with the number of biopsies I attempted a mini BAL where we instilled 30 mL of saline and then aspirated back by a syringe for additional sample to go and cytology. Pathology confident that the first needle pass had atypical cells that could be consistent with a diagnosis. I obtained the further biopsies for cellblock so that a diagnosis would hopefully be more definitive  EBL: Minimal    The patient tolerated the procedure well. Recovery will be per the anesthesia team.      FOLLOW UP:  is with me in approximately three to five days. Patient is instructed to call with concerns and if follow up has not already been scheduled. In the event of severe symptoms or if the patient is unable to reach my office, instructions are given to proceed to the emergency department.      Luz Mccauley MD

## 2022-04-08 NOTE — PROGRESS NOTES
ROBOTIC NAVIGATIONAL BRONCHOSCOPY FOR TRANSBRONCHIAL LUNG BIOPSY WITH Gretel Beau LAVAGE    Dr Celestine Favre    Current Allergies: Atorvastatin calcium [lipitor], Wellbutrin [bupropion], Cymbalta [duloxetine hcl], and Vicodin [hydrocodone-acetaminophen]    Recent Labs     04/08/22  1238 04/08/22  1610   POCGLU 150* 134*       Admitted to PACU bed 10 from OR. Arrived on a stretcher . Attached to PACU monitoring system. Alarms and parameters set. Report received from anesthesia personnel. OR staff did not report skin issues that were observed while in OR  No problems reported intraoperatively. Pt arrived with oxygen per none. Doctors aware of all labs before coming to recovery.

## 2022-04-08 NOTE — PROGRESS NOTES
PACU Transfer to [facility] # 5     Procedure(s):  ROBOTIC NAVIGATIONAL BRONCHOSCOPY FOR TRANSBRONCHIAL LUNG BIOPSY WITH FLOURO  BRONCHOSCOPY ALVEOLAR LAVAGE  Dr Shane Pratt    Pt's Current Allergies: Atorvastatin calcium [lipitor], Wellbutrin [bupropion], Cymbalta [duloxetine hcl], and Vicodin [hydrocodone-acetaminophen]    Pt meets criteria to transfer to next phase of care per JANNA SCORE and ASPAN standards    Recent Labs     04/08/22  1238 04/08/22  1610   POCGLU 150* 134*       Vitals:    04/08/22 1630   BP: 123/63   Pulse: 66   Resp: 14   Temp: 97.2 °F (36.2 °C)   SpO2: 91%      BP within 20% of pt's admitting BP as per Janna Score      Intake/Output Summary (Last 24 hours) at 4/8/2022 1643  Last data filed at 4/8/2022 1630  Gross per 24 hour   Intake 590 ml   Output --   Net 590 ml     Had ice chips and water  Voided per bathroom not measured     Pain assessment:  none  Pain Level: 0    Patient was assessed for unknown alterations to skin integrity. There were not unknown alterations observed. Patient transferred to care of Louis Martins RN.  Via handoff sheet  Family updated and directed to Louis Martins Via waiting room staff    4/8/2022 4:43 PM

## 2022-04-08 NOTE — PROGRESS NOTES
Ambulatory Surgery/Procedure Discharge Note    Vitals:    04/08/22 1646   BP: (!) 138/96   Pulse: 68   Resp: 16   Temp: 97.2 °F (36.2 °C)   SpO2: 95%   BP meets omari discharge criteria    In: 590 [P.O.:90; I.V.:500]  Out: -     Restroom use offered before discharge. Yes    Pain assessment:  level of pain (1-10, 10 severe)  Pain Level: 0   Pt to SDS post robotic navigational bronchoscopy for transbronchial lung biopsy with flouro. Pt denies pain at this time. Pt denies nausea at this time, pt tolerating PO fluids well. Discharge instructions given to pt's brother and he states understanding of these instructions. Pt states that he is \" ready to go. \"         Patient discharged to home/self care.  Patient discharged via wheel chair by transporter to waiting family/S.O.       4/8/2022 5:13 PM

## 2022-04-14 ENCOUNTER — OFFICE VISIT (OUTPATIENT)
Dept: PULMONOLOGY | Age: 57
End: 2022-04-14
Payer: MEDICARE

## 2022-04-14 ENCOUNTER — TELEPHONE (OUTPATIENT)
Dept: PHARMACY | Age: 57
End: 2022-04-14

## 2022-04-14 VITALS
HEIGHT: 72 IN | HEART RATE: 64 BPM | DIASTOLIC BLOOD PRESSURE: 80 MMHG | TEMPERATURE: 96.5 F | OXYGEN SATURATION: 99 % | BODY MASS INDEX: 36.03 KG/M2 | RESPIRATION RATE: 16 BRPM | WEIGHT: 266 LBS | SYSTOLIC BLOOD PRESSURE: 139 MMHG

## 2022-04-14 DIAGNOSIS — J85.2 ABSCESS OF LOWER LOBE OF LEFT LUNG WITHOUT PNEUMONIA (HCC): Primary | ICD-10-CM

## 2022-04-14 DIAGNOSIS — R91.1 PULMONARY NODULE 1 CM OR GREATER IN DIAMETER: ICD-10-CM

## 2022-04-14 PROCEDURE — 99214 OFFICE O/P EST MOD 30 MIN: CPT | Performed by: INTERNAL MEDICINE

## 2022-04-14 PROCEDURE — 4004F PT TOBACCO SCREEN RCVD TLK: CPT | Performed by: INTERNAL MEDICINE

## 2022-04-14 PROCEDURE — G8417 CALC BMI ABV UP PARAM F/U: HCPCS | Performed by: INTERNAL MEDICINE

## 2022-04-14 PROCEDURE — G8427 DOCREV CUR MEDS BY ELIG CLIN: HCPCS | Performed by: INTERNAL MEDICINE

## 2022-04-14 PROCEDURE — 3017F COLORECTAL CA SCREEN DOC REV: CPT | Performed by: INTERNAL MEDICINE

## 2022-04-14 RX ORDER — AMOXICILLIN AND CLAVULANATE POTASSIUM 875; 125 MG/1; MG/1
1 TABLET, FILM COATED ORAL 2 TIMES DAILY
Qty: 14 TABLET | Refills: 0 | Status: SHIPPED | OUTPATIENT
Start: 2022-04-14 | End: 2022-04-21

## 2022-04-14 NOTE — TELEPHONE ENCOUNTER
Patient called to notify clinic that he was started on Augmentin BID x7 days (-). He was told by the pulmonologist that the biopsy showed the spot in his lung to be an abscess. I instructed patient to continue warfarin dosing as-is. Will likely take a few more days for INR to get to goal range after holding for procedure on Friday, and I do not anticipate that Augmentin will increase his INR significantly since he is not having any fever, NVD, fatigue, etc. Will check INR in 1 week.      Tenisha Calle, PharmD, BCPS  [facility]  [place]: [phone]  [facility]: [phone]  2022 1:47 PM    For Pharmacy Admin Tracking Only     Intervention Detail: Adherence Monitorin   Total # of Interventions Recommended: 1   Total # of Interventions Accepted: 1   Time Spent (min): 10

## 2022-04-14 NOTE — PROGRESS NOTES
[facility] Pulmonary and Critical Care    Outpatient Follow up Note    Subjective:   Referring Physician: Maya Ewing / HPI:     The patient is 60 y.o. male who presents today for a follow up visit for pulmonary nodule and bronchoscopy results. Patient reports that he had a lot of pain in his chest after the bronchoscopy last Friday and couldn't go to work the following Monday. He's feeling much better now though. 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 He had an orchiectomy as well as tumor removal from his abdomen. He also has a history of coronary artery disease and problems with his aortic valve he has had 2 open heart surgeries to repair his aortic valve as well as a double and triple bypass. Despite these things patient continues to smoke. He also has history of colon cancer x2 and has had roughly 3 feet of his bowel removed per his report. His wife passed away from stage IV lung cancer a few years ago as well. Patient has some exertional dyspnea but is not here for that complaint and instead is here because he had a screening CT scan that showed an abnormality in his left lower lobe.        Past Medical History:    Past Medical History:   Diagnosis Date    Abdominal hernia     s/p repair 2010    Aortic valve prosthesis present     CAD (coronary artery disease)     Chronic back pain greater than 3 months duration     Clostridium difficile diarrhea 10/17/2016    PCR    DDD (degenerative disc disease), lumbosacral 8/7/2013    Erectile dysfunction     GERD (gastroesophageal reflux disease)     Hyperlipidemia     Hypertension     Joint pain     Left testicular cancer ([facility] [address] 2002    s/p abdominal resection    Myalgia and myositis, unspecified 8/26/2013    Neuropathy     OA (osteoarthritis) of knee     bilateral    Obesity, Class I, BMI 30.0-34.9 (see EVENING 90 tablet 3    Handicap Placard MISC by Does not apply route Effective through 12/28/2020 through 12/27/2025 1 each 0    aspirin 81 MG EC tablet Take 1 tablet by mouth daily 30 tablet 0    melatonin (RA MELATONIN) 3 MG TABS tablet Take 1 tablet by mouth nightly as needed (insomnia) 30 tablet 3    Blood Pressure KIT 1 Unit 1 kit 0    oxyCODONE-acetaminophen (PERCOCET) 5-325 MG per tablet Take 1 tablet by mouth every 4 hours as needed for Pain  .  gabapentin (NEURONTIN) 300 MG capsule Take 300 mg by mouth 3 times daily  (Patient not taking: Reported on 4/14/2022)       No current facility-administered medications on file prior to visit. Allergies: Allergies   Allergen Reactions    Atorvastatin Calcium [Lipitor] Other (See Comments)     Severe headaches      Wellbutrin [Bupropion] Anxiety     patient complains of feeling anxious, irritable and \"hyped up\" on wellbutrin around 2011.     Cymbalta [Duloxetine Hcl]     Vicodin [Hydrocodone-Acetaminophen] Itching       REVIEW OF SYSTEMS:    CONSTITUTIONAL: Negative for fevers and chills  HEENT: Negative for oropharyngeal exudate, post nasal drip, sinus pain / pressure, nasal congestion, ear pain  RESPIRATORY:  See HPI  CARDIOVASCULAR: Negative for chest pain, palpitations, edema  GASTROINTESTINAL: Negative for nausea, vomiting, diarrhea, constipation and abdominal pain  HEMATOLOGICAL: Negative for adenopathy  SKIN: Negative for clubbing, cyanosis, skin lesions  EXTREMITIES: Negative for weakness, decreased ROM  NEUROLOGICAL: Negative for unilateral weakness, speech or gait abnormalities  PSYCH: Negative for anxiety, depression    Objective:   PHYSICAL EXAM:        VITALS:  /80 (Site: Left Upper Arm, Position: Sitting, Cuff Size: Large Adult)   Pulse 64   Temp 96.5 °F (35.8 °C) (Infrared)   Resp 16   Ht 6' (1.829 m)   Wt 266 lb (120.7 kg)   SpO2 99%   BMI 36.08 kg/m²     CONSTITUTIONAL:  Awake, alert, cooperative, no apparent distress, and appears stated age  [de-identified]: No oropharyngeal exudate, PERRL, no cervical adenopathy, no tracheal deviation, thyroid size normal  LUNGS:  No increased work of breathing and clear to auscultation, no crackles or wheezing   CARDIOVASCULAR:  normal S1 and S2 and no JVD  ABDOMEN:  Normal bowel sounds, non-distended and non-tender to palpation  EXT: No edema, no calf tenderness. Pulses are present bilaterally. NEUROLOGIC:  Mental Status Exam:  Level of Alertness:   awake  Orientation:   person, place, time. SKIN:  normal skin color, texture, turgor, no redness, warmth, or swelling     DATA:      Radiology Review:  Pertinent images / reports were reviewed as a part of this visit. CT chest reveals the following:  Impression       1. 14 x 22 mm cystic/cavitary lesion within the left lower lobe. This could be infectious, inflammatory, or neoplastic. Recommend either further evaluation with PET/CT, or 3 month short interval follow-up study. Consider pulmonary consult.       LUNG RADS ASSESSMENTS  LUNGRADS ASSESSMENT VALUE: 4A       Last PFTs: None on file    Immunizations:   Immunization History   Administered Date(s) Administered    COVID-19, J&J, PF, 0.5 mL 03/13/2021    COVID-19, Pfizer Purple top, DILUTE for use, 12+ yrs, 30mcg/0.3mL dose 01/06/2022    Pneumococcal Polysaccharide ([id number]) 07/26/2014       Pathology:  FINAL DIAGNOSIS:     Core biopsy, left lower lobe lung lesion:   Lung/alveolar tissue with prominent acute and chronic inflammation,   extravasated blood and focal histiocytic aggregates with features of   early noncaseating granulomatous inflammation. An AFB stain shows no evidence of acid-fast bacilli and a GMS stain shows   no evidence of pneumocystis or fungal forms. No evidence of malignancy. Cytology:  FINAL DIAGNOSIS:     A. Lung, left lower lobe, endoscopic ultrasound guided fine needle   aspiration:   Atypical cells present.    Atypical cell population identified against the background of reactive   bronchial cells, prominent acute inflammation and occasional histiocytes. See case comment. B. Lung, left lower lobe, brushing:    No malignant cells identified. Hypocellular specimen. C. Lung, left lower lobe, bronchial alveolar lavage:   Atypical cell population present. Occasional atypical cell clusters seen against the background reactive   bronchial cells, macrophages and nonspecific inflammation. GMS stain with no evidence of pneumocystis or fungal forms. Assessment: This is a 60 y.o. male with cavitary pulmonary nodule in his left lower lobe    Plan:   -PET scan had SUV of 2.5 in the nodule only.  -biopsy consistent with inflammation and possible granulomatous formation that could be seen cancer adjacent or infection adjacent. Repeat biopsy may be needed, but in the interim, especially with the lower SUV I'd like to treat this like an abscess, and then repeat imaging. Wrote for augmentin 10 day course and instructed him to contact his anticoag clinic to closely monitor INR. Fortunately interaction should be minimal.    Orders Placed This Encounter   Medications    amoxicillin-clavulanate (AUGMENTIN) 875-125 MG per tablet     Sig: Take 1 tablet by mouth 2 times daily for 7 days     Dispense:  14 tablet     Refill:  0         Diagnosis Orders   1. Abscess of lower lobe of left lung without pneumonia ([facility] [address]  CT CHEST WO CONTRAST   2. Pulmonary nodule 1 cm or greater in diameter           - Tobacco use: The patient is currently smoking. The risks related to smoking were reviewed with the patient: Patient counseled to quit smoking.     - RTC 6-8 weeks w/ MD following CT scan. Call or RTC sooner if symptoms persist or worsen acutely.

## 2022-04-19 ENCOUNTER — OFFICE VISIT (OUTPATIENT)
Dept: INTERNAL MEDICINE CLINIC | Age: 57
End: 2022-04-19
Payer: MEDICARE

## 2022-04-19 VITALS
OXYGEN SATURATION: 97 % | TEMPERATURE: 97 F | HEIGHT: 72 IN | BODY MASS INDEX: 35.97 KG/M2 | HEART RATE: 59 BPM | SYSTOLIC BLOOD PRESSURE: 118 MMHG | DIASTOLIC BLOOD PRESSURE: 68 MMHG | WEIGHT: 265.6 LBS

## 2022-04-19 DIAGNOSIS — E11.9 TYPE 2 DIABETES MELLITUS WITHOUT COMPLICATION, WITHOUT LONG-TERM CURRENT USE OF INSULIN (HCC): Primary | ICD-10-CM

## 2022-04-19 DIAGNOSIS — R91.1 LESION OF LEFT LUNG: ICD-10-CM

## 2022-04-19 DIAGNOSIS — R13.10 DYSPHAGIA, UNSPECIFIED TYPE: ICD-10-CM

## 2022-04-19 DIAGNOSIS — I10 ESSENTIAL HYPERTENSION: ICD-10-CM

## 2022-04-19 PROCEDURE — 99213 OFFICE O/P EST LOW 20 MIN: CPT | Performed by: STUDENT IN AN ORGANIZED HEALTH CARE EDUCATION/TRAINING PROGRAM

## 2022-04-19 RX ORDER — AMLODIPINE BESYLATE 5 MG/1
5 TABLET ORAL DAILY
Qty: 90 TABLET | Refills: 2 | Status: SHIPPED | OUTPATIENT
Start: 2022-04-19 | End: 2022-08-05

## 2022-04-19 NOTE — PROGRESS NOTES
Outpatient Clinic Established Patient Note    Patient: Kalpesh Moon  : 1965 (54 y.o.)  Date: 2022    CC: follow up    HPI:    Mr. Suleiman Jones is a 60 y.o. M with PMH as below who presents to clinic for follow up. He has no new symptoms or concerns.      Pt has no new complaints or concerns. He had the bronchoscopy and results were suggestive of abscess. He was started on Augmentin for 10 days. He was supposed to follow up with pulmonology clinic. Pt hasn't gotten to lab for blood work yet. He states he will go this week. He is compliant with his medications. For this diabetes, pt has been compliant with metformin 2000 daily. Pt has difficulty swallowing pills and hence he takes 2 tablets of metformin 500 mg twice daily. He states his dysphagia problems started after he was intubated years ago. He was seen by ENT Dr. Manjula Vázquez and pt was diagnosed with left vocal cord paralysis. Pt doesn't check his glucose at home. He states he is working on improving his diet and he has been cutting down carbs, and fat. He has no worsening of urinary frequency, chronic neuropathy symptoms. Pt saw eye doctor and has new glasses.      Denies fever, night sweats, chills, chest pain, cough, dyspnea, palpitations, nausea, vomiting, diarrhea, dysuria.       Allergies:    Atorvastatin calcium [lipitor], Wellbutrin [bupropion], Cymbalta [duloxetine hcl], and Vicodin [hydrocodone-acetaminophen]    Health Maintenance Due   Topic Date Due    Hepatitis C screen  Never done    Diabetic foot exam  Never done    Diabetic microalbuminuria test  Never done    Diabetic retinal exam  Never done    Hepatitis B vaccine (1 of 3 - Risk 3-dose series) Never done    DTaP/Tdap/Td vaccine (1 - Tdap) Never done    Shingles Vaccine (1 of 2) Never done    Pneumococcal 0-64 years Vaccine (2 - PCV) 2015    Lipid screen  2017    Annual Wellness Visit (AWV)  Never done    Potassium monitoring  2020    Creatinine monitoring  07/30/2020       Immunization History   Administered Date(s) Administered    COVID-19, J&J, PF, 0.5 mL 03/13/2021    COVID-19, Pfizer Purple top, DILUTE for use, 12+ yrs, 30mcg/0.3mL dose 01/06/2022    Pneumococcal Polysaccharide ([id number]) 07/26/2014       Review of Systems  A 10-organ Review Of Systems was obtained and otherwise unremarkable except as per HPI. Data: Old records have been reviewed electronically.       Past Medical History:    Past Medical History:   Diagnosis Date    Abdominal hernia     s/p repair 2010    Aortic valve prosthesis present     CAD (coronary artery disease)     Chronic back pain greater than 3 months duration     Clostridium difficile diarrhea 10/17/2016    PCR    DDD (degenerative disc disease), lumbosacral 8/7/2013    Erectile dysfunction     GERD (gastroesophageal reflux disease)     Hyperlipidemia     Hypertension     Joint pain     Left testicular cancer ([address] 2002    s/p abdominal resection    Myalgia and myositis, unspecified 8/26/2013    Neuropathy     OA (osteoarthritis) of knee     bilateral    Obesity, Class I, BMI 30.0-34.9 (see actual BMI)     Prolonged emergence from general anesthesia     after CABG    S/P AVR 2005    tissue valve    S/P CABG x 2 2005    w/AVR & primary repair of dissected ascending aorta    Type 2 diabetes mellitus without complication, without long-term current use of insulin ([address] 7/13/2021    Wears dentures        Past Surgical History:  Past Surgical History:   Procedure Laterality Date    AORTA SURGERY  08/27/2004    Dr. Yvon Galvez - primary repair of limited ascending aortic dissection    AORTIC VALVE REPLACEMENT  05/30/2017    Dr. Marzena Eastman - redo w/25mm Cletis Cordial ThermaFix model 3300 bovine pericardial bioprosthesis    AORTIC VALVE SURGERY  08/27/2004    Dr. Yvon Galvez - 27mm Kelton-Castelan pericardial prosthesis     BACK SURGERY      L4-S1    BRONCHOSCOPY N/A 4/8/2022    ROBOTIC Take 150 mg by mouth 2 times daily.  amLODIPine (NORVASC) 5 MG tablet Take 1 tablet by mouth daily 30 tablet 2    metFORMIN (GLUCOPHAGE) 500 MG tablet TAKE TWO TABLETS BY MOUTH TWICE A DAY WITH MEALS 120 tablet 2    METHOCARBAMOL PO Take by mouth      Ascorbic Acid (VITAMIN C PO) Take by mouth 2 times daily       lisinopril (PRINIVIL;ZESTRIL) 10 MG tablet One tablet twice a day 180 tablet 3    famotidine (PEPCID) 20 MG tablet TAKE 1 TABLET BY MOUTH 2 TIMES A  tablet 2    metoprolol tartrate (LOPRESSOR) 50 MG tablet TAKE 1 TABLET BY MOUTH 2 TIMES A DAY FOR BLOOD PRESSURE AND HEART 180 tablet 1    nystatin-triamcinolone (MYCOLOG) 377092-4.1 UNIT/GM-% ointment Apply topically 2 times daily until improved. 30 g 0    warfarin (COUMADIN) 5 MG tablet TAKE 1 TABLET BY MOUTH ONE TIME A DAY OR TAKE ONE AND ONE-HALF TABLETS BY MOUTH EVERY DAY AS DIRECTED BY CLINIC (Patient taking differently: TAKE 1 TABLET BY MOUTH Tues/Thurs/Sat/Sun and TAKE ONE AND ONE-HALF TABLETS BY MOUTH ON Mon/Wed/Fri) 135 tablet 2    rosuvastatin (CRESTOR) 20 MG tablet TAKE ONE TABLET BY MOUTH EVERY EVENING 90 tablet 3    Handicap Placard MISC by Does not apply route Effective through 12/28/2020 through 12/27/2025 1 each 0    aspirin 81 MG EC tablet Take 1 tablet by mouth daily 30 tablet 0    melatonin (RA MELATONIN) 3 MG TABS tablet Take 1 tablet by mouth nightly as needed (insomnia) 30 tablet 3    Blood Pressure KIT 1 Unit 1 kit 0    oxyCODONE-acetaminophen (PERCOCET) 5-325 MG per tablet Take 1 tablet by mouth every 4 hours as needed for Pain  .  gabapentin (NEURONTIN) 300 MG capsule Take 300 mg by mouth 3 times daily  (Patient not taking: Reported on 4/14/2022)       No facility-administered medications prior to visit. After Visit:  Prior to Visit Medications    Medication Sig Taking?  Authorizing Provider   amoxicillin-clavulanate (AUGMENTIN) 875-125 MG per tablet Take 1 tablet by mouth 2 times daily for 7 days Yes Baljinder Saucedo MD   pregabalin (LYRICA) 150 MG capsule Take 150 mg by mouth 2 times daily. Yes Historical Provider, MD   amLODIPine (NORVASC) 5 MG tablet Take 1 tablet by mouth daily Yes Arlen Holter, MD   metFORMIN (GLUCOPHAGE) 500 MG tablet TAKE TWO TABLETS BY MOUTH TWICE A DAY WITH MEALS Yes Clemente Moritz, MD   METHOCARBAMOL PO Take by mouth Yes Historical Provider, MD   Ascorbic Acid (VITAMIN C PO) Take by mouth 2 times daily  Yes Historical Provider, MD   lisinopril (PRINIVIL;ZESTRIL) 10 MG tablet One tablet twice a day Yes Arlen Holter, MD   famotidine (PEPCID) 20 MG tablet TAKE 1 TABLET BY MOUTH 2 TIMES A DAY Yes Arlen Holter, MD   metoprolol tartrate (LOPRESSOR) 50 MG tablet TAKE 1 TABLET BY MOUTH 2 TIMES A DAY FOR BLOOD PRESSURE AND HEART Yes Arlen Holter, MD   nystatin-triamcinolone (MYCOLOG) 780710-5.0 UNIT/GM-% ointment Apply topically 2 times daily until improved. Yes Mulu Camargo MD   warfarin (COUMADIN) 5 MG tablet TAKE 1 TABLET BY MOUTH ONE TIME A DAY OR TAKE ONE AND ONE-HALF TABLETS BY MOUTH EVERY DAY AS DIRECTED BY CLINIC  Patient taking differently: TAKE 1 TABLET BY MOUTH Tues/Thurs/Sat/Sun and TAKE ONE AND ONE-HALF TABLETS BY MOUTH ON Mon/Wed/Fri Yes Gris Easley MD   rosuvastatin (CRESTOR) 20 MG tablet TAKE ONE TABLET BY MOUTH EVERY EVENING Yes Arlen Holter, MD   Handicap [facility] [address] [facility] by Does not apply route Effective through 12/28/2020 through 12/27/2025 Yes Sanya Bailey DO   aspirin 81 MG EC tablet Take 1 tablet by mouth daily Yes El Woods MD   melatonin (RA MELATONIN) 3 MG TABS tablet Take 1 tablet by mouth nightly as needed (insomnia) Yes Vu Schultz MD   Blood Pressure KIT 1 Unit Yes Vu Schultz MD   oxyCODONE-acetaminophen (PERCOCET) 5-325 MG per tablet Take 1 tablet by mouth every 4 hours as needed for Pain  .  Yes Historical Provider, MD   gabapentin (NEURONTIN) 300 MG capsule Take 300 mg by mouth 3 times daily   Patient not taking: Reported on 4/14/2022  Historical Provider, MD       Allergies:    Atorvastatin calcium [lipitor], Wellbutrin [bupropion], Cymbalta [duloxetine hcl], and Vicodin [hydrocodone-acetaminophen]    Family History:       Problem Relation Age of Onset    Cancer Mother     Cancer Father         lung    Alzheimer's Disease Sister     Liver Cancer Brother          PHYSICAL EXAM:  There were no vitals taken for this visit. Physical Exam  Constitutional:       Appearance: He is obese. HENT:      Head: Normocephalic and atraumatic. Cardiovascular:      Rate and Rhythm: Normal rate and regular rhythm. Pulses: Normal pulses. Heart sounds: Normal heart sounds. Pulmonary:      Effort: Pulmonary effort is normal.      Breath sounds: Normal breath sounds. Abdominal:      General: Bowel sounds are normal. There is distension. Palpations: Abdomen is soft. Tenderness: There is no abdominal tenderness. Musculoskeletal:         General: Normal range of motion. Cervical back: Normal range of motion and neck supple. Right lower leg: No edema. Left lower leg: No edema. Skin:     General: Skin is warm and dry. Capillary Refill: Capillary refill takes less than 2 seconds. Neurological:      Mental Status: He is alert. Assessment & Plan:      1. Type 2 diabetes mellitus without complication, without long-term current use of insulin (HCC)  On metformin 1000 BID. Doesn't check blood glucose at home. HA1C 4/2022 9.6 from 7.6 10/2021.   - continue metformin 1000 BID  - Await lab work, will add Januvia 50 mg daily  - pt not interested in insulin for now as he doesn't like needle injections;   - advised pt to follow healthy lifestyle with diet low in carbs and fat and to continue to exercise  - check micro albumin/cr ratio    2. Essential hypertension  BP improved today 110's. Pt has been taking amlodipine 5 mg daily as instructed.     - continue current meds; lisinopril 10 mg, lopressor 50, amlodipine 5 mg   - check CMP  - check CBC  - check lipid panel  - check TSH    3. Dysphagia  Has been having difficulty swallowing especially large pills since 2015 after he got intubated/extubated. Was seen by ENT and was found to have left vocal cord paralysis. Questionnable abscess finding. Concern for silent aspiration 2/2 dysphagia  - discussed with pt modified barium swallow study; pt decided to defer   - will reassess next visit    4. spiculated cavitary lesion in his left lower lobe   CT High resolution 3/12/2022 showed 2.1 cm mixed density partially cavitary partially soft tissue nodule in LLL stable since CT 1/4/2022. Follows with pulm Dr. Josselin Smith. Had bronchoscopy pathology revealing inflammation and possible granulomatous formation, PET with low SUV, being treated as abscess. No symptoms of worsening SOB or cough. Intentional weight loss of 5lbs past few months. Hx of testicular and colon cancer.   - managed by pulm; may need repeat biopsy  - continue augmentin as instructed by pulmonolgy      No follow-ups on file. There are no Patient Instructions on file for this visit.     Dispo: Pt has been staffed with Dr. Valenzuela Loud  _______________  Jose Eduardo Leonardo MD, 4/19/2022 9:58 AM   PGY-3

## 2022-04-19 NOTE — PATIENT INSTRUCTIONS
- please go to lab as soon as you can  - please continue taking medications as prescribed  - please maintain healthy lifestyle with diet low in carbs, fat and salt and continue to exercise  - please check your blood pressure few times a week

## 2022-04-21 ENCOUNTER — ANTI-COAG VISIT (OUTPATIENT)
Dept: PHARMACY | Age: 57
End: 2022-04-21
Payer: MEDICARE

## 2022-04-21 DIAGNOSIS — Z95.2 S/P AORTIC VALVE REPLACEMENT: Primary | ICD-10-CM

## 2022-04-21 DIAGNOSIS — I48.0 PAROXYSMAL ATRIAL FIBRILLATION (HCC): ICD-10-CM

## 2022-04-21 LAB — INTERNATIONAL NORMALIZATION RATIO, POC: 1.5

## 2022-04-21 PROCEDURE — 99212 OFFICE O/P EST SF 10 MIN: CPT | Performed by: PHARMACIST

## 2022-04-21 PROCEDURE — 85610 PROTHROMBIN TIME: CPT | Performed by: PHARMACIST

## 2022-04-21 NOTE — PROGRESS NOTES
ANTICOAGULATION SERVICE    David Main is a 60 y.o. male with PMHx significant for AVR (re-do in May, 2017), CAD s/p CABG (x3 in May, 2017), pA-fib, HLD, HTN, testicular CA who presents to clinic on 4/21/2022 for anticoagulation monitoring and adjustment.     Anticoagulation Indication(s):  Heart Valve Replacement (bovine AVR), A-fib  Referring Physician:  Dr. Daron Tilley  Goal INR Range:  2-3  Duration of Anticoagulation Therapy:  TBD  Time of day dose taken:  PM  Product patient has at home:  warfarin 5 mg (peach)    [id number] Score for Atrial Fibrillation Stroke Risk   Risk   Factors  Component Value   C CHF No 0   H HTN Yes 1   A2 Age >= 75 No,  (57 y.o.) 0   D DM Yes 1   S2 Prior Stroke/TIA No 0   V Vascular Disease Yes 1   A Age 74-69 No,  (57 y.o.) 0   Sc Sex male 0    [id number]  Score  3   Score last updated 5/30/19 1:51 PM      Lab Results   Component Value Date    RBC 3.70 (L) 07/30/2019    HGB 12.4 (L) 07/30/2019    HCT 36.5 (L) 07/30/2019    MCV 98.7 07/30/2019    MCH 33.5 07/30/2019    MPV 7.6 07/30/2019    RDW 15.6 (H) 07/30/2019     07/30/2019     INR Summary                            Warfarin regimen (mg)  Date INR   A/P    Sun Mon Tue Wed Thu Fri Sat Mg/wk  4/21 1.5 Below goal (held), bolus 5 7.5 5 7.5 7.5/5 7.5 5 42.5  4/7 1.2 Below goal (held) see below 5 7.5 5 7.5 5 7.5 5 42.5  3/4 2.2 At goal, continue   5 7.5 5 7.5 5 7.5 5 42.5  2/1 2.1 At goal, continue   5 7.5 5 7.5 5 7.5 5 42.5  1/7 1.9 Below goal, continue   5 7.5 5 7.5 5 7.5 5 42.5  11/22 2.2 At goal, continue   5 7.5 5 7.5 5 7.5 5 42.5  11/22 2.1 At goal, continue   5 7.5 5 7.5 5 7.5 5 42.5  11/8 1.8 Below goal, increase  5 7.5 5 7.5 5 7.5 5 42.5  10/25 1.6 Below goal, bolus   7.5 7.5/5 5 5 5 5 7.5 40  10/4 2.0 At goal, continue   7.5 5 5 5 5 5 7.5 40  9/17 2.7 At goal (dosing error)  7.5 5 5 5 5 5 7.5 40  8/23 4.3 Above goal, decrease  7.5 0/5 5 5 5 5 7.5 40  8/9 3.3 Above goal, dec  7.5 5 5 5 7.5 5 5 40  7/21 1.4 Below goal, increase  5 7.5 5 7.5 5 7.5 5 42.5  6/28 2.0 At goal, no change  7.5 5 5 5 5 5 7.5 40  6/11 1.5 Below goal, increase  7.5 5 5 5 5 5 7.5 40  5/24 3.6 Above goal, hold x 1  5 0/5 5 5 5 5 5 35  4/23 2.7 At goal, no change  5 5 5 5 5 5 5 35  4/5 3.6 Above goal, hold x 1  5 0/5 5 5 5 5 5 35   3/8 3.0 At goal, no change  5 5 5 5 5 5 5 35  2/19 2.0 At goal, no change  5 5 5 5 5 5 5 35  2/4 2.7 At goal, no change  5 5 5 5 5 5 5 35  1/20 2.7 At goal, no change  5 5 5 5 5 5 5 35  1/11 4.5 Above goal (Cymbalta) 5 0/5 0/5 5 5 5 5 35  12/28 2.0 At goal, no change  7.5 5 5 5 5 5 7.5 40  11/24 2.4 At goal, no change  7.5 5 5 5 5 5 7.5 40  10/30 2.5 At goal, no change  7.5 5 5 5 5 5 7.5 40  10/2 2.2 At goal, no change  7.5 5 5 5 5 5 7.5 40  9/3 2.4 At goal, no change  7.5 5 5 5 5 5 7.5 40  8/3 2.9 At goal, no change  7.5 5 5 5 5 5 7.5 40  7/6 2.7 At goal, no change  7.5 5 5 5 5 5 7.5 40  6/8 2.8 At goal, no change  7.5 5 5 5 5 5 7.5 40  5/11 2.9 At goal, no change  7.5 5 5 5 5 5 7.5 40  3/30 3.0 At goal, no change  7.5 5 5 5 5 5 7.5 40  2/26 2.3 At goal, no change  7.5 5 5 5 5 5 7.5 40  2/5 3.3 Above goal, decrease  7.5 5 5 5 5 5 7.5 40  1/8 2.9 At goal, no change  7.5 7.5 5 5 5 5 7.5 42.5    Patient History:  Recent hospitalizations/HC visits 4/8/22: bronchoscopy per Dr. Leanne Todd, warfarin held x3 days (no bridge). Findings were likely infectious process, prescribed Augmentin. 2/21/22: Pulmonology, work-up underway for lung nodule    Recent medication changes 4/14-4/21/22:  Augmentin BID x7 days   1/5/22: Medrol dose pack x5 days per pain management   9/2021: Stopped citalopram, does not plan to continue   7/21/21: increased citalopram   6/18/21: started citalopram 20 mg QD (may increase INR)  12/28/20: Cymbalta 30 mg daily (may increase INR); stopped taking on 1/30; resumed ~2/4; stopped ~6/1   Medications taken regularly that may interact with warfarin or alter INR ASA 81 mg   Warfarin dose taken as prescribed 9/17/21 did not decrease warfarin dose as instructed at last visit  Usually compliant   Does not use pillbox  Patient has been taking warfarin since re-do AVR in May, 2017   Signs/symptoms of bleeding No h/o major bleeding   Vitamin K intake Normally has ~0 servings of green, leafy vegetables per week   Recent vomiting/diarrhea/fever, changes in weight or activity level None reported   Tobacco or alcohol use -No recent changes in smoking as of 11/24/20 (~3/4 PPD)  -Patient cut back from 2 PPD to 3/4 PPD in February (2019) when he moved in with his daughter. Decrease in smoking typically increases INR gradually.   -Patient denies any alcohol or illicit drug use   Upcoming surgeries or procedures None      Assessment/Plan:  Patient's INR was subtherapeutic today (1.5) and trending up slowing after holding warfarin for bronchoscopy on 4/8/22. Bronchoscopy results were consistent with inflammation likely due to infection adjacent. He was prescribed Augmentin, which he completed yesterday. He denies any other change in medications, vitamin K intake, illness, smoking, physical activity, or bleeding since last visit. Patient was instructed to bolus with 7.5 mg tonight then continue warfarin 7.5 mg on Monday, Wednesday, Friday and 5 mg all other days. Will repeat INR in 2 weeks. Patient was reminded to maintain consistent vitamin K intake and call with any bleeding, medication changes, or fever/vomiting/diarrhea. Patient understands dosing directions and information discussed. Dosing schedule and follow up appointment given to patient. Progress note routed to referring physician's office. Patient acknowledges working in consult agreement with pharmacist as referred by his/her physician. Next INR Check: 5/6     Please call [facility] at [phone] with any questions.     Jaciel Hicks, PharmD, BCPS  [facility]  [place]: [phone]  [place]: [phone]  4/21/2022 9:25 AM     For Pharmacy Admin Tracking Only     Intervention Detail: Dose Adjustment: 1, reason: Therapy Optimization   Total # of Interventions Recommended: 1   Total # of Interventions Accepted: 1   Time Spent (min): 15

## 2022-04-22 DIAGNOSIS — E11.9 TYPE 2 DIABETES MELLITUS WITHOUT COMPLICATION, WITHOUT LONG-TERM CURRENT USE OF INSULIN (HCC): ICD-10-CM

## 2022-04-22 DIAGNOSIS — I10 ESSENTIAL HYPERTENSION: ICD-10-CM

## 2022-04-22 DIAGNOSIS — E55.9 VITAMIN D INSUFFICIENCY: ICD-10-CM

## 2022-04-22 DIAGNOSIS — Z12.5 PROSTATE CANCER SCREENING: ICD-10-CM

## 2022-04-22 LAB
A/G RATIO: 1.8 (ref 1.1–2.2)
ALBUMIN SERPL-MCNC: 4.4 G/DL (ref 3.4–5)
ALP BLD-CCNC: 80 U/L (ref 40–129)
ALT SERPL-CCNC: 41 U/L (ref 10–40)
ANION GAP SERPL CALCULATED.3IONS-SCNC: 13 MMOL/L (ref 3–16)
AST SERPL-CCNC: 31 U/L (ref 15–37)
BASOPHILS ABSOLUTE: 0.1 K/UL (ref 0–0.2)
BASOPHILS RELATIVE PERCENT: 0.7 %
BILIRUB SERPL-MCNC: <0.2 MG/DL (ref 0–1)
BILIRUBIN URINE: NEGATIVE
BLOOD, URINE: NEGATIVE
BUN BLDV-MCNC: 13 MG/DL (ref 7–20)
CALCIUM SERPL-MCNC: 9.3 MG/DL (ref 8.3–10.6)
CHLORIDE BLD-SCNC: 99 MMOL/L (ref 99–110)
CHOLESTEROL, TOTAL: 111 MG/DL (ref 0–199)
CLARITY: CLEAR
CO2: 22 MMOL/L (ref 21–32)
COLOR: ABNORMAL
CREAT SERPL-MCNC: 0.8 MG/DL (ref 0.9–1.3)
CREATININE URINE: 28.6 MG/DL (ref 39–259)
EOSINOPHILS ABSOLUTE: 0.1 K/UL (ref 0–0.6)
EOSINOPHILS RELATIVE PERCENT: 1.9 %
GFR AFRICAN AMERICAN: >60
GFR NON-AFRICAN AMERICAN: >60
GLUCOSE BLD-MCNC: 264 MG/DL (ref 70–99)
GLUCOSE URINE: 500 MG/DL
HCT VFR BLD CALC: 42.2 % (ref 40.5–52.5)
HDLC SERPL-MCNC: 34 MG/DL (ref 40–60)
HEMOGLOBIN: 14 G/DL (ref 13.5–17.5)
HEPATITIS C ANTIBODY INTERPRETATION: NORMAL
KETONES, URINE: NEGATIVE MG/DL
LDL CHOLESTEROL CALCULATED: 39 MG/DL
LEUKOCYTE ESTERASE, URINE: NEGATIVE
LYMPHOCYTES ABSOLUTE: 2.8 K/UL (ref 1–5.1)
LYMPHOCYTES RELATIVE PERCENT: 39.9 %
MCH RBC QN AUTO: 32.4 PG (ref 26–34)
MCHC RBC AUTO-ENTMCNC: 33.2 G/DL (ref 31–36)
MCV RBC AUTO: 97.6 FL (ref 80–100)
MICROALBUMIN UR-MCNC: <1.2 MG/DL
MICROALBUMIN/CREAT UR-RTO: ABNORMAL MG/G (ref 0–30)
MICROSCOPIC EXAMINATION: ABNORMAL
MONOCYTES ABSOLUTE: 0.5 K/UL (ref 0–1.3)
MONOCYTES RELATIVE PERCENT: 7.3 %
NEUTROPHILS ABSOLUTE: 3.5 K/UL (ref 1.7–7.7)
NEUTROPHILS RELATIVE PERCENT: 50.2 %
NITRITE, URINE: NEGATIVE
PDW BLD-RTO: 14.6 % (ref 12.4–15.4)
PH UA: 6 (ref 5–8)
PLATELET # BLD: 111 K/UL (ref 135–450)
PMV BLD AUTO: 9.6 FL (ref 5–10.5)
POTASSIUM SERPL-SCNC: 4.1 MMOL/L (ref 3.5–5.1)
PROSTATE SPECIFIC ANTIGEN: 0.21 NG/ML (ref 0–4)
PROTEIN UA: NEGATIVE MG/DL
RBC # BLD: 4.32 M/UL (ref 4.2–5.9)
SODIUM BLD-SCNC: 134 MMOL/L (ref 136–145)
SPECIFIC GRAVITY UA: 1.01 (ref 1–1.03)
TOTAL PROTEIN: 6.9 G/DL (ref 6.4–8.2)
TRIGL SERPL-MCNC: 192 MG/DL (ref 0–150)
TSH REFLEX: 2.92 UIU/ML (ref 0.27–4.2)
URINE TYPE: ABNORMAL
UROBILINOGEN, URINE: 0.2 E.U./DL
VITAMIN D 25-HYDROXY: 33.4 NG/ML
VLDLC SERPL CALC-MCNC: 38 MG/DL
WBC # BLD: 7 K/UL (ref 4–11)

## 2022-04-26 ENCOUNTER — TELEPHONE (OUTPATIENT)
Dept: INTERNAL MEDICINE CLINIC | Age: 57
End: 2022-04-26

## 2022-04-26 NOTE — RESULT ENCOUNTER NOTE
Please call pt and inform him all of his labs are within normal range. This includes CBC, liver, vitamin D, thyroid, PSA and kidney function. His triglyceride number is high which probably reflects his diet. Please ask pt to adhere to healthy lifestyle with diet low in carbs and fat. I also sent a prescription for Januvia 50 mg daily. Please ask pt to start taking it in addition to the other medications he is on for diabetes. Thank you.

## 2022-04-26 NOTE — TELEPHONE ENCOUNTER
----- Message from Nehemias Clark MD sent at 4/26/2022  1:24 PM EDT -----  Please call pt and inform him all of his labs are within normal range. This includes CBC, liver, vitamin D, thyroid, PSA and kidney function. His triglyceride number is high which probably reflects his diet. Please ask pt to adhere to healthy lifestyle with diet low in carbs and fat. I also sent a prescription for Januvia 50 mg daily. Please ask pt to start taking it in addition to the other medications he is on for diabetes. Thank you.

## 2022-04-26 NOTE — TELEPHONE ENCOUNTER
Patient  Informed labs normal, but Dr. Sharon Sam wants him on Januvia for his diabetes . One pill once a day. Patient repeated back to me correctly and verbalized understanding .  Prescription sent to [facility] per patient request.

## 2022-05-02 DIAGNOSIS — I25.10 CORONARY ARTERY DISEASE INVOLVING NATIVE CORONARY ARTERY OF NATIVE HEART WITHOUT ANGINA PECTORIS: ICD-10-CM

## 2022-05-02 DIAGNOSIS — Z95.2 S/P AORTIC VALVE REPLACEMENT: Chronic | ICD-10-CM

## 2022-05-02 DIAGNOSIS — I10 ESSENTIAL HYPERTENSION: Chronic | ICD-10-CM

## 2022-05-04 RX ORDER — METOPROLOL TARTRATE 50 MG/1
TABLET, FILM COATED ORAL
Qty: 180 TABLET | Refills: 1 | Status: SHIPPED | OUTPATIENT
Start: 2022-05-04

## 2022-05-06 ENCOUNTER — ANTI-COAG VISIT (OUTPATIENT)
Dept: PHARMACY | Age: 57
End: 2022-05-06
Payer: MEDICARE

## 2022-05-06 DIAGNOSIS — I48.0 PAROXYSMAL ATRIAL FIBRILLATION (HCC): ICD-10-CM

## 2022-05-06 DIAGNOSIS — Z95.2 S/P AORTIC VALVE REPLACEMENT: Primary | ICD-10-CM

## 2022-05-06 LAB — INTERNATIONAL NORMALIZATION RATIO, POC: 1.3

## 2022-05-06 PROCEDURE — 99212 OFFICE O/P EST SF 10 MIN: CPT | Performed by: PHARMACIST

## 2022-05-06 PROCEDURE — 85610 PROTHROMBIN TIME: CPT | Performed by: PHARMACIST

## 2022-05-06 NOTE — PROGRESS NOTES
ANTICOAGULATION SERVICE    Debo Altamirano is a 60 y.o. male with PMHx significant for AVR (re-do in May, 2017), CAD s/p CABG (x3 in May, 2017), pA-fib, HLD, HTN, testicular CA who presents to clinic on 5/6/2022 for anticoagulation monitoring and adjustment.     Anticoagulation Indication(s):  Heart Valve Replacement (bovine AVR), A-fib  Referring Physician:  Dr. Cristin Crenshaw  Goal INR Range:  2-3  Duration of Anticoagulation Therapy:  TBD  Time of day dose taken:  PM  Product patient has at home:  warfarin 5 mg (peach)    [id number] Score for Atrial Fibrillation Stroke Risk   Risk   Factors  Component Value   C CHF No 0   H HTN Yes 1   A2 Age >= 75 No,  (57 y.o.) 0   D DM Yes 1   S2 Prior Stroke/TIA No 0   V Vascular Disease Yes 1   A Age 74-69 No,  (57 y.o.) 0   Sc Sex male 0    [id number]  Score  3   Score last updated 5/30/19 1:51 PM      Lab Results   Component Value Date    RBC 4.32 04/22/2022    HGB 14.0 04/22/2022    HCT 42.2 04/22/2022    MCV 97.6 04/22/2022    MCH 32.4 04/22/2022    MPV 9.6 04/22/2022    RDW 14.6 04/22/2022     (L) 04/22/2022     INR Summary                            Warfarin regimen (mg)  Date INR   A/P    Sun Mon Tue Wed Thu Fri Sat Mg/wk  5/6 1.3 Below goal (held), bolus 5 7.5 5 7.5 5 10/7.5 7.5 45  4/21 1.5 Below goal (held), bolus 5 7.5 5 7.5 7.5/5 7.5 5 42.5  4/7 1.2 Below goal (held) see below 5 7.5 5 7.5 5 7.5 5 42.5  3/4 2.2 At goal, continue   5 7.5 5 7.5 5 7.5 5 42.5  2/1 2.1 At goal, continue   5 7.5 5 7.5 5 7.5 5 42.5  1/7 1.9 Below goal, continue   5 7.5 5 7.5 5 7.5 5 42.5  11/22 2.2 At goal, continue   5 7.5 5 7.5 5 7.5 5 42.5  11/22 2.1 At goal, continue   5 7.5 5 7.5 5 7.5 5 42.5  11/8 1.8 Below goal, increase  5 7.5 5 7.5 5 7.5 5 42.5  10/25 1.6 Below goal, bolus   7.5 7.5/5 5 5 5 5 7.5 40  10/4 2.0 At goal, continue   7.5 5 5 5 5 5 7.5 40  9/17 2.7 At goal (dosing error)  7.5 5 5 5 5 5 7.5 40  8/23 4.3 Above goal, decrease  7.5 0/5 5 5 5 5 7.5 40  8/9 3.3 Above goal, dec  7.5 5 5 5 7.5 5 5 40  7/21 1.4 Below goal, increase  5 7.5 5 7.5 5 7.5 5 42.5  6/28 2.0 At goal, no change  7.5 5 5 5 5 5 7.5 40  6/11 1.5 Below goal, increase  7.5 5 5 5 5 5 7.5 40  5/24 3.6 Above goal, hold x 1  5 0/5 5 5 5 5 5 35  4/23 2.7 At goal, no change  5 5 5 5 5 5 5 35  4/5 3.6 Above goal, hold x 1  5 0/5 5 5 5 5 5 35   3/8 3.0 At goal, no change  5 5 5 5 5 5 5 35  2/19 2.0 At goal, no change  5 5 5 5 5 5 5 35  2/4 2.7 At goal, no change  5 5 5 5 5 5 5 35  1/20 2.7 At goal, no change  5 5 5 5 5 5 5 35  1/11 4.5 Above goal (Cymbalta) 5 0/5 0/5 5 5 5 5 35  12/28 2.0 At goal, no change  7.5 5 5 5 5 5 7.5 40  11/24 2.4 At goal, no change  7.5 5 5 5 5 5 7.5 40  10/30 2.5 At goal, no change  7.5 5 5 5 5 5 7.5 40  10/2 2.2 At goal, no change  7.5 5 5 5 5 5 7.5 40  9/3 2.4 At goal, no change  7.5 5 5 5 5 5 7.5 40  8/3 2.9 At goal, no change  7.5 5 5 5 5 5 7.5 40  7/6 2.7 At goal, no change  7.5 5 5 5 5 5 7.5 40  6/8 2.8 At goal, no change  7.5 5 5 5 5 5 7.5 40  5/11 2.9 At goal, no change  7.5 5 5 5 5 5 7.5 40  3/30 3.0 At goal, no change  7.5 5 5 5 5 5 7.5 40  2/26 2.3 At goal, no change  7.5 5 5 5 5 5 7.5 40  2/5 3.3 Above goal, decrease  7.5 5 5 5 5 5 7.5 40  1/8 2.9 At goal, no change  7.5 7.5 5 5 5 5 7.5 42.5    Patient History:  Recent hospitalizations/HC visits 4/8/22: bronchoscopy per Dr. David Hidalgo, warfarin held x3 days (no bridge). Findings were likely infectious process, prescribed Augmentin. 2/21/22: Pulmonology, work-up underway for lung nodule    Recent medication changes 3/2022: Centrum Silver Mens 50+   4/14-4/21/22:  Augmentin BID x7 days   1/5/22: Medrol dose pack x5 days per pain management   9/2021: Stopped citalopram, does not plan to continue   7/21/21: increased citalopram   6/18/21: started citalopram 20 mg QD (may increase INR)  12/28/20: Cymbalta 30 mg daily (may increase INR); stopped taking on 1/30; resumed ~2/4; stopped ~6/1   Medications taken regularly that may interact with warfarin or alter INR ASA 81 mg  Centrum Silver Mens 50+ - contains 60 mcg of vitamin K    Warfarin dose taken as prescribed Usually compliant   Does not use pillbox  Patient has been taking warfarin since re-do AVR in May, 2017   Signs/symptoms of bleeding No h/o major bleeding   Vitamin K intake Normally has ~0 servings of green, leafy vegetables per week   Recent vomiting/diarrhea/fever, changes in weight or activity level None reported   Tobacco or alcohol use -No recent changes in smoking as of 11/24/20 (~3/4 PPD)  -Patient cut back from 2 PPD to 3/4 PPD in February (2019) when he moved in with his daughter. Decrease in smoking typically increases INR gradually.   -Patient denies any alcohol or illicit drug use   Upcoming surgeries or procedures None      Assessment/Plan:   Patient's INR was subtherapeutic again today (1.3). Patient recently had a bronchoscopy on 4/8/22 and INR has not trended up since re-starting warfarin. Bronchoscopy results were consistent with inflammation likely due to infection adjacent. He was prescribed Augmentin, which he has completed. He is getting a repeat CT next week. Today patient notified me that he started taking Ilichova 50 50+ about two months ago, which contains 60 mcg of vitamin K. He denies any other changes in medications, vitamin K intake, illness, smoking, physical activity, or bleeding since last visit. Patient was instructed to bolus with 10 mg tonight then increase warfarin to 7.5 mg daily except 5 mg on Sunday, Tuesday, and Thursday. Will repeat INR in 2 weeks. Patient was reminded to maintain consistent vitamin K intake and call with any bleeding, medication changes, or fever/vomiting/diarrhea. Patient understands dosing directions and information discussed. Dosing schedule and follow up appointment given to patient. Progress note routed to referring physician's office.  Patient acknowledges working in consult agreement with pharmacist as referred by his/her physician. Next INR Check: 5/20     Please call [facility] at [phone] with any questions.     Melinda Nieto, PharmD, [facility]  [facility]  [place]: [phone]  [facility]: [phone]  5/6/2022 9:12 AM     For Pharmacy Admin Tracking Only     Intervention Detail: Dose Adjustment: 1, reason: Therapy Optimization   Total # of Interventions Recommended: 1   Total # of Interventions Accepted: 1   Time Spent (min): 15

## 2022-05-12 ENCOUNTER — HOSPITAL ENCOUNTER (OUTPATIENT)
Dept: CT IMAGING | Age: 57
Discharge: HOME | End: 2022-05-12
Payer: MEDICARE

## 2022-05-12 DIAGNOSIS — J85.2 ABSCESS OF LOWER LOBE OF LEFT LUNG WITHOUT PNEUMONIA (HCC): ICD-10-CM

## 2022-05-12 PROCEDURE — 71250 CT THORAX DX C-: CPT

## 2022-05-16 DIAGNOSIS — C34.32 MALIGNANT NEOPLASM OF LOWER LOBE OF LEFT LUNG (HCC): ICD-10-CM

## 2022-05-20 ENCOUNTER — ANTI-COAG VISIT (OUTPATIENT)
Dept: PHARMACY | Age: 57
End: 2022-05-20
Payer: MEDICARE

## 2022-05-20 DIAGNOSIS — Z95.2 S/P AORTIC VALVE REPLACEMENT: Primary | ICD-10-CM

## 2022-05-20 DIAGNOSIS — I48.0 PAROXYSMAL ATRIAL FIBRILLATION (HCC): ICD-10-CM

## 2022-05-20 LAB — INTERNATIONAL NORMALIZATION RATIO, POC: 1.9

## 2022-05-20 PROCEDURE — 85610 PROTHROMBIN TIME: CPT

## 2022-05-20 PROCEDURE — 99211 OFF/OP EST MAY X REQ PHY/QHP: CPT

## 2022-05-20 NOTE — PROGRESS NOTES
ANTICOAGULATION SERVICE    Iza Davidson is a 60 y.o. male with PMHx significant for AVR (re-do in May, 2017), CAD s/p CABG (x3 in May, 2017), pA-fib, HLD, HTN, testicular CA who presents to clinic on 5/20/2022 for anticoagulation monitoring and adjustment.     Anticoagulation Indication(s):  Heart Valve Replacement (bovine AVR), A-fib  Referring Physician:  Dr. Teressa Manzo  Goal INR Range:  2-3  Duration of Anticoagulation Therapy:  TBD  Time of day dose taken:  PM  Product patient has at home:  warfarin 5 mg (peach)    [id number] Score for Atrial Fibrillation Stroke Risk   Risk   Factors  Component Value   C CHF No 0   H HTN Yes 1   A2 Age >= 75 No,  (57 y.o.) 0   D DM Yes 1   S2 Prior Stroke/TIA No 0   V Vascular Disease Yes 1   A Age 74-69 No,  (57 y.o.) 0   Sc Sex male 0    [id number]  Score  3   Score last updated 5/30/19 1:51 PM      Lab Results   Component Value Date    RBC 4.32 04/22/2022    HGB 14.0 04/22/2022    HCT 42.2 04/22/2022    MCV 97.6 04/22/2022    MCH 32.4 04/22/2022    MPV 9.6 04/22/2022    RDW 14.6 04/22/2022     (L) 04/22/2022     INR Summary                            Warfarin regimen (mg)  Date INR   A/P    Sun Mon Tue Wed Thu Fri Sat Mg/wk  5/20 1.9 Below goal, continue  5 7.5 5 7.5 5 7.5 7.5 45  5/6 1.3 Below goal, bolus + incr 5 7.5 5 7.5 5 10/7.5 7.5 45  4/21 1.5 Below goal, bolus  5 7.5 5 7.5 7.5/5 7.5 5 42.5  4/7 1.2 Below goal (held) see below 5 7.5 5 7.5 5 7.5 5 42.5  3/4 2.2 At goal, continue   5 7.5 5 7.5 5 7.5 5 42.5  2/1 2.1 At goal, continue   5 7.5 5 7.5 5 7.5 5 42.5  1/7 1.9 Below goal, continue   5 7.5 5 7.5 5 7.5 5 42.5  11/22 2.2 At goal, continue   5 7.5 5 7.5 5 7.5 5 42.5  11/22 2.1 At goal, continue   5 7.5 5 7.5 5 7.5 5 42.5  11/8 1.8 Below goal, increase  5 7.5 5 7.5 5 7.5 5 42.5  10/25 1.6 Below goal, bolus   7.5 7.5/5 5 5 5 5 7.5 40  10/4 2.0 At goal, continue   7.5 5 5 5 5 5 7.5 40  9/17 2.7 At goal (dosing error)  7.5 5 5 5 5 5 7.5 40  8/23 4.3 Above goal, decrease  7.5 0/5 5 5 5 5 7.5 40  8/9 3.3 Above goal, dec  7.5 5 5 5 7.5 5 5 40  7/21 1.4 Below goal, increase  5 7.5 5 7.5 5 7.5 5 42.5  6/28 2.0 At goal, no change  7.5 5 5 5 5 5 7.5 40  6/11 1.5 Below goal, increase  7.5 5 5 5 5 5 7.5 40  5/24 3.6 Above goal, hold x 1  5 0/5 5 5 5 5 5 35  4/23 2.7 At goal, no change  5 5 5 5 5 5 5 35  4/5 3.6 Above goal, hold x 1  5 0/5 5 5 5 5 5 35   3/8 3.0 At goal, no change  5 5 5 5 5 5 5 35  2/19 2.0 At goal, no change  5 5 5 5 5 5 5 35  2/4 2.7 At goal, no change  5 5 5 5 5 5 5 35  1/20 2.7 At goal, no change  5 5 5 5 5 5 5 35  1/11 4.5 Above goal (Cymbalta) 5 0/5 0/5 5 5 5 5 35  12/28 2.0 At goal, no change  7.5 5 5 5 5 5 7.5 40  11/24 2.4 At goal, no change  7.5 5 5 5 5 5 7.5 40  10/30 2.5 At goal, no change  7.5 5 5 5 5 5 7.5 40  10/2 2.2 At goal, no change  7.5 5 5 5 5 5 7.5 40  9/3 2.4 At goal, no change  7.5 5 5 5 5 5 7.5 40  8/3 2.9 At goal, no change  7.5 5 5 5 5 5 7.5 40  7/6 2.7 At goal, no change  7.5 5 5 5 5 5 7.5 40  6/8 2.8 At goal, no change  7.5 5 5 5 5 5 7.5 40  5/11 2.9 At goal, no change  7.5 5 5 5 5 5 7.5 40  3/30 3.0 At goal, no change  7.5 5 5 5 5 5 7.5 40  2/26 2.3 At goal, no change  7.5 5 5 5 5 5 7.5 40  2/5 3.3 Above goal, decrease  7.5 5 5 5 5 5 7.5 40  1/8 2.9 At goal, no change  7.5 7.5 5 5 5 5 7.5 42.5    Patient History:  Recent hospitalizations/HC visits 4/8/22: bronchoscopy per Dr. Rocio Israel, warfarin held x3 days (no bridge). Findings were likely infectious process, prescribed Augmentin. 2/21/22: Pulmonology, work-up underway for lung nodule    Recent medication changes 3/2022: Centrum Silver Mens 50+   4/14-4/21/22:  Augmentin BID x7 days   1/5/22: Medrol dose pack x5 days per pain management   9/2021: Stopped citalopram, does not plan to continue   7/21/21: increased citalopram   6/18/21: started citalopram 20 mg QD (may increase INR)  12/28/20: Cymbalta 30 mg daily (may increase INR); stopped taking on 1/30; resumed ~2/4; stopped ~6/1 Medications taken regularly that may interact with warfarin or alter INR ASA 81 mg  Centrum Silver Mens 50+ - contains 60 mcg of vitamin K    Warfarin dose taken as prescribed Usually compliant   Does not use pillbox  Patient has been taking warfarin since re-do AVR in May, 2017   Signs/symptoms of bleeding No h/o major bleeding   Vitamin K intake Normally has ~0 servings of green, leafy vegetables per week   Recent vomiting/diarrhea/fever, changes in weight or activity level None reported   Tobacco or alcohol use -No recent changes in smoking as of 11/24/20 (~3/4 PPD)  -Patient cut back from 2 PPD to 3/4 PPD in February (2019) when he moved in with his daughter. Decrease in smoking typically increases INR gradually.   -Patient denies any alcohol or illicit drug use   Upcoming surgeries or procedures None      Assessment/Plan:   Patient's INR was subtherapeutic today (1.9), but trending up from 1.3 at last visit. Patient held warfarin for a bronchoscopy on 4/8, and INR has been low since then. Bronchoscopy results were consistent with inflammation likely due to infection adjacent. He was prescribed Augmentin, which he has completed. Repeat CT on 5/12 showed that the nodule was unchanged; notes indicate plan to repeat CT in 3-6 mos. Patient started taking Centrum Silver Mens 50+ in March, which contains 60 mcg of vitamin K. As clinic was unaware of this medication change until May, it likely explains why patient's INR wasn't trending back up. Today, patient reports taking warfarin as instructed by clinic with no missed doses, medication changes, diet changes, illness, smoking changes, or bleeding since last visit. He continues to take Centrum MVI. As INR is trending up and almost therapeutic, patient was instructed to continue warfarin 7.5 mg daily except 5 mg on Sunday, Tuesday, and Thursday. Repeat INR in 2 weeks. If INR remains subtherapeutic, plan to increase warfarin dose further.  Patient was reminded to

## 2022-06-02 DIAGNOSIS — E11.9 TYPE 2 DIABETES MELLITUS WITHOUT COMPLICATION, WITHOUT LONG-TERM CURRENT USE OF INSULIN (HCC): ICD-10-CM

## 2022-06-03 ENCOUNTER — ANTI-COAG VISIT (OUTPATIENT)
Dept: PHARMACY | Age: 57
End: 2022-06-03
Payer: MEDICARE

## 2022-06-03 DIAGNOSIS — I48.0 PAROXYSMAL ATRIAL FIBRILLATION (HCC): ICD-10-CM

## 2022-06-03 DIAGNOSIS — Z95.2 S/P AORTIC VALVE REPLACEMENT: Primary | ICD-10-CM

## 2022-06-03 LAB
INR BLD: 1.7
INTERNATIONAL NORMALIZATION RATIO, POC: 1.7

## 2022-06-03 PROCEDURE — 85610 PROTHROMBIN TIME: CPT | Performed by: PHARMACIST

## 2022-06-03 PROCEDURE — 99212 OFFICE O/P EST SF 10 MIN: CPT | Performed by: PHARMACIST

## 2022-06-03 RX ORDER — WARFARIN SODIUM 5 MG/1
TABLET ORAL
Qty: 135 TABLET | Refills: 3 | Status: SHIPPED | OUTPATIENT
Start: 2022-06-03

## 2022-06-03 NOTE — PROGRESS NOTES
ANTICOAGULATION SERVICE    Cary Martinez is a 60 y.o. male with PMHx significant for AVR (re-do in May, 2017), CAD s/p CABG (x3 in May, 2017), pA-fib, HLD, HTN, testicular CA who presents to clinic on 6/3/2022 for anticoagulation monitoring and adjustment.     Anticoagulation Indication(s):  Heart Valve Replacement (bovine AVR), A-fib  Referring Physician:  Dr. Jimmy Jimenez  Goal INR Range:  2-3  Duration of Anticoagulation Therapy:  TBD  Time of day dose taken:  PM  Product patient has at home:  warfarin 5 mg (peach)    [id number] Score for Atrial Fibrillation Stroke Risk   Risk   Factors  Component Value   C CHF No 0   H HTN Yes 1   A2 Age >= 75 No,  (57 y.o.) 0   D DM Yes 1   S2 Prior Stroke/TIA No 0   V Vascular Disease Yes 1   A Age 74-69 No,  (57 y.o.) 0   Sc Sex male 0    [id number]  Score  3   Score last updated 5/30/19 1:51 PM      Lab Results   Component Value Date    RBC 4.32 04/22/2022    HGB 14.0 04/22/2022    HCT 42.2 04/22/2022    MCV 97.6 04/22/2022    MCH 32.4 04/22/2022    MPV 9.6 04/22/2022    RDW 14.6 04/22/2022     (L) 04/22/2022     INR Summary                            Warfarin regimen (mg)  Date INR   A/P    Sun Mon Tue Wed Thu Fri Sat Mg/wk  6/3 1.7 Below goal, bolus + incr 5 7.5 7.5 7.5 5 10/7.5 7.5 47.5  5/20 1.9 Below goal, continue  5 7.5 5 7.5 5 7.5 7.5 45  5/6 1.3 Below goal, bolus + incr 5 7.5 5 7.5 5 10/7.5 7.5 45  4/21 1.5 Below goal, bolus  5 7.5 5 7.5 7.5/5 7.5 5 42.5  4/7 1.2 Below goal (held) see below 5 7.5 5 7.5 5 7.5 5 42.5  3/4 2.2 At goal, continue   5 7.5 5 7.5 5 7.5 5 42.5  2/1 2.1 At goal, continue   5 7.5 5 7.5 5 7.5 5 42.5  1/7 1.9 Below goal, continue   5 7.5 5 7.5 5 7.5 5 42.5  11/22 2.2 At goal, continue   5 7.5 5 7.5 5 7.5 5 42.5  11/22 2.1 At goal, continue   5 7.5 5 7.5 5 7.5 5 42.5  11/8 1.8 Below goal, increase  5 7.5 5 7.5 5 7.5 5 42.5  10/25 1.6 Below goal, bolus   7.5 7.5/5 5 5 5 5 7.5 40  10/4 2.0 At goal, continue   7.5 5 5 5 5 5 7.5 40  9/17 2.7 At goal (dosing error)  7.5 5 5 5 5 5 7.5 40  8/23 4.3 Above goal, decrease  7.5 0/5 5 5 5 5 7.5 40  8/9 3.3 Above goal, dec  7.5 5 5 5 7.5 5 5 40  7/21 1.4 Below goal, increase  5 7.5 5 7.5 5 7.5 5 42.5  6/28 2.0 At goal, no change  7.5 5 5 5 5 5 7.5 40  6/11 1.5 Below goal, increase  7.5 5 5 5 5 5 7.5 40  5/24 3.6 Above goal, hold x 1  5 0/5 5 5 5 5 5 35  4/23 2.7 At goal, no change  5 5 5 5 5 5 5 35  4/5 3.6 Above goal, hold x 1  5 0/5 5 5 5 5 5 35   3/8 3.0 At goal, no change  5 5 5 5 5 5 5 35  2/19 2.0 At goal, no change  5 5 5 5 5 5 5 35  2/4 2.7 At goal, no change  5 5 5 5 5 5 5 35  1/20 2.7 At goal, no change  5 5 5 5 5 5 5 35  1/11 4.5 Above goal (Cymbalta) 5 0/5 0/5 5 5 5 5 35  12/28 2.0 At goal, no change  7.5 5 5 5 5 5 7.5 40  11/24 2.4 At goal, no change  7.5 5 5 5 5 5 7.5 40  10/30 2.5 At goal, no change  7.5 5 5 5 5 5 7.5 40  10/2 2.2 At goal, no change  7.5 5 5 5 5 5 7.5 40  9/3 2.4 At goal, no change  7.5 5 5 5 5 5 7.5 40  8/3 2.9 At goal, no change  7.5 5 5 5 5 5 7.5 40  7/6 2.7 At goal, no change  7.5 5 5 5 5 5 7.5 40  6/8 2.8 At goal, no change  7.5 5 5 5 5 5 7.5 40  5/11 2.9 At goal, no change  7.5 5 5 5 5 5 7.5 40  3/30 3.0 At goal, no change  7.5 5 5 5 5 5 7.5 40  2/26 2.3 At goal, no change  7.5 5 5 5 5 5 7.5 40  2/5 3.3 Above goal, decrease  7.5 5 5 5 5 5 7.5 40  1/8 2.9 At goal, no change  7.5 7.5 5 5 5 5 7.5 42.5    Patient History:  Recent hospitalizations/HC visits 4/8/22: bronchoscopy per Dr. Rocio Israel, warfarin held x3 days (no bridge). Findings were likely infectious process, prescribed Augmentin. 2/21/22: Pulmonology, work-up underway for lung nodule    Recent medication changes 3/2022: Centrum Silver Mens 50+   4/14-4/21/22:  Augmentin BID x7 days   1/5/22: Medrol dose pack x5 days per pain management   9/2021: Stopped citalopram, does not plan to continue   7/21/21: increased citalopram   6/18/21: started citalopram 20 mg QD (may increase INR)  12/28/20: Cymbalta 30 mg daily (may increase INR); stopped taking on 1/30; resumed ~2/4; stopped ~6/1   Medications taken regularly that may interact with warfarin or alter INR ASA 81 mg  Centrum Silver Mens 50+ - contains 60 mcg of vitamin K    Warfarin dose taken as prescribed Usually compliant   Does not use pillbox  Patient has been taking warfarin since re-do AVR in May, 2017   Signs/symptoms of bleeding No h/o major bleeding   Vitamin K intake Normally has ~0 servings of green, leafy vegetables per week   Recent vomiting/diarrhea/fever, changes in weight or activity level None reported   Tobacco or alcohol use -No recent changes in smoking as of 11/24/20 (~3/4 PPD)  -Patient cut back from 2 PPD to 3/4 PPD in February (2019) when he moved in with his daughter. Decrease in smoking typically increases INR gradually.   -Patient denies any alcohol or illicit drug use   Upcoming surgeries or procedures None      Assessment/Plan:   Patient's INR was subtherapeutic today (1.7). Patient held warfarin for a bronchoscopy on 4/8, and INR has been low since then. Bronchoscopy results were consistent with inflammation likely due to infection adjacent. He was prescribed Augmentin, which he has completed. Repeat CT on 5/12 showed that the nodule was unchanged; notes indicate plan to repeat CT in 3-6 mos. Patient started taking Centrum Silver Mens 50+ in March, which contains 60 mcg of vitamin K. As clinic was unaware of this medication change until May, it likely explains why patient's INR wasn't trending back up. Today, patient reports taking warfarin as instructed by clinic with no missed doses, medication changes, diet changes, illness, smoking changes, or bleeding since last visit. He continues to take Centrum MVI. Patient was instructed to take 10 mg tonight then increase warfarin to 7.5 mg daily except 5 mg on Sunday and Thursday. Repeat INR in 3 weeks.  Patient was reminded to maintain consistent vitamin K intake and call with any bleeding, medication changes, or fever/vomiting/diarrhea. Patient understands dosing directions and information discussed. Dosing schedule and follow up appointment given to patient. Progress note routed to referring physician's office. Patient acknowledges working in consult agreement with pharmacist as referred by his/her physician. Next INR Check: 6/24    Please call [facility] at [phone] with any questions. Thanks!     Tayo Fabian, Pharmacy Intern  [facility]  Ph: [phone]  6/3/2022 9:19 AM     For Pharmacy Admin Tracking Only     Intervention Detail: Dose Adjustment: 1, reason: Therapy Optimization   Total # of Interventions Recommended: 1   Total # of Interventions Accepted: 1   Time Spent (min): 15

## 2022-06-13 ENCOUNTER — SCHEDULED TELEPHONE ENCOUNTER (OUTPATIENT)
Dept: PULMONOLOGY | Age: 57
End: 2022-06-13
Payer: MEDICARE

## 2022-06-13 DIAGNOSIS — R91.1 PULMONARY NODULE 1 CM OR GREATER IN DIAMETER: Primary | ICD-10-CM

## 2022-06-13 PROCEDURE — 99441 PR PHYS/QHP TELEPHONE EVALUATION 5-10 MIN: CPT | Performed by: INTERNAL MEDICINE

## 2022-06-13 NOTE — PROGRESS NOTES
Pulmonology Telephone Visit    Pursuant to the emergency declaration under the [address] [address] waiver authority and the [employer] Act this Telephone Visit was insisted, with patient's consent, to reduce the patient's risk of exposure to COVID-19 and provide continuity of care for an established patient. The patient was at home, while the provider was at the clinic. Services were provided through a synchronous discussion through a Telephone Call to substitute for in-person clinic visit, and coded as such. Total time spent with patient was 10 minutes. [facility]    Outpatient Follow up Note    Subjective:   Referring Physician: Jaya Lara / HPI:     The patient is 60 y.o. male who presents today for a follow up visit for pulmonary nodule. Patient came in today, or called in with his dogs because I have COVID, to talk about the results of his most recent chest CT. He is not having any new symptoms, and his CT in May was stable. 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 He had an orchiectomy as well as tumor removal from his abdomen. He also has a history of coronary artery disease and problems with his aortic valve he has had 2 open heart surgeries to repair his aortic valve as well as a double and triple bypass. Despite these things patient continues to smoke. He also has history of colon cancer x2 and has had roughly 3 feet of his bowel removed per his report. His wife passed away from stage IV lung cancer a few years ago as well. Patient has some exertional dyspnea but is not here for that complaint and instead is here because he had a screening CT scan that showed an abnormality in his left lower lobe.        Past Medical History: Past Medical History:   Diagnosis Date    Abdominal hernia     s/p repair     Aortic valve prosthesis present     CAD (coronary artery disease)     Chronic back pain greater than 3 months duration     Clostridium difficile diarrhea 10/17/2016    PCR    DDD (degenerative disc disease), lumbosacral 2013    Erectile dysfunction     GERD (gastroesophageal reflux disease)     Hyperlipidemia     Hypertension     Joint pain     Left testicular cancer ([facility] [address]     s/p abdominal resection    Myalgia and myositis, unspecified 2013    Neuropathy     OA (osteoarthritis) of knee     bilateral    Obesity, Class I, BMI 30.0-34.9 (see actual BMI)     Prolonged emergence from general anesthesia     after CABG    S/P AVR     tissue valve    S/P CABG x 2     w/AVR & primary repair of dissected ascending aorta    Type 2 diabetes mellitus without complication, without long-term current use of insulin ([facility] [address] 2021    Wears dentures        Social History:    Social History     Tobacco Use   Smoking Status Current Some Day Smoker    Packs/day: 0.75    Years: 40.00    Pack years: 30.00    Types: Cigarettes    Start date: 1949   [facility] Last attempt to quit: 2017    Years since quittin.0   Smokeless Tobacco Never Used   Tobacco Comment    stopped 5-18       Family History:  Family History   Problem Relation Age of Onset    Cancer Mother     Cancer Father         lung    Alzheimer's Disease Sister     Liver Cancer Brother      Current Medications:  Current Outpatient Medications on File Prior to Visit   Medication Sig Dispense Refill    metFORMIN (GLUCOPHAGE) 500 MG tablet TAKE TWO TABLETS BY MOUTH TWICE A DAY WITH MEALS 180 tablet 1    warfarin (COUMADIN) 5 MG tablet Take 1-1.5 tablets by mouth daily as directed by anticoagulation clinic.  135 tablet 3    metoprolol tartrate (LOPRESSOR) 50 MG tablet TAKE 1 TABLET BY MOUTH 2 TIMES A DAY FOR BLOOD PRESSURE AND HEART 180 tablet 1    SITagliptin (JANUVIA) 50 MG tablet Take 1 tablet by mouth daily 90 tablet 1    amLODIPine (NORVASC) 5 MG tablet Take 1 tablet by mouth daily 90 tablet 2    pregabalin (LYRICA) 150 MG capsule Take 150 mg by mouth in the morning, at noon, and at bedtime.  METHOCARBAMOL PO Take by mouth      Ascorbic Acid (VITAMIN C PO) Take by mouth 2 times daily       lisinopril (PRINIVIL;ZESTRIL) 10 MG tablet One tablet twice a day 180 tablet 3    famotidine (PEPCID) 20 MG tablet TAKE 1 TABLET BY MOUTH 2 TIMES A  tablet 2    nystatin-triamcinolone (MYCOLOG) 534012-4.1 UNIT/GM-% ointment Apply topically 2 times daily until improved. 30 g 0    rosuvastatin (CRESTOR) 20 MG tablet TAKE ONE TABLET BY MOUTH EVERY EVENING 90 tablet 3    Handicap Placard MISC by Does not apply route Effective through 12/28/2020 through 12/27/2025 1 each 0    aspirin 81 MG EC tablet Take 1 tablet by mouth daily 30 tablet 0    melatonin (RA MELATONIN) 3 MG TABS tablet Take 1 tablet by mouth nightly as needed (insomnia) 30 tablet 3    Blood Pressure KIT 1 Unit 1 kit 0    oxyCODONE-acetaminophen (PERCOCET) 5-325 MG per tablet Take 1 tablet by mouth every 4 hours as needed for Pain  . No current facility-administered medications on file prior to visit. Allergies: Allergies   Allergen Reactions    Atorvastatin Calcium [Lipitor] Other (See Comments)     Severe headaches      Wellbutrin [Bupropion] Anxiety     patient complains of feeling anxious, irritable and \"hyped up\" on wellbutrin around 2011.     Cymbalta [Duloxetine Hcl]     Vicodin [Hydrocodone-Acetaminophen] Itching       REVIEW OF SYSTEMS:    CONSTITUTIONAL: Negative for fevers and chills  HEENT: Negative for oropharyngeal exudate, post nasal drip, sinus pain / pressure, nasal congestion, ear pain  RESPIRATORY:  See HPI  CARDIOVASCULAR: Negative for chest pain, palpitations, edema  GASTROINTESTINAL: Negative for nausea, vomiting, diarrhea, constipation and abdominal pain  HEMATOLOGICAL: Negative for adenopathy  SKIN: Negative for clubbing, cyanosis, skin lesions  EXTREMITIES: Negative for weakness, decreased ROM  NEUROLOGICAL: Negative for unilateral weakness, speech or gait abnormalities  PSYCH: Negative for anxiety, depression    Objective:   PHYSICAL EXAM:        VITALS:  There were no vitals taken for this visit. No exam as this was a telephone visit. DATA:      Radiology Review:  Pertinent images / reports were reviewed as a part of this visit. CT chest reveals the following:  Impression       1. 14 x 22 mm cystic/cavitary lesion within the left lower lobe. This could be infectious, inflammatory, or neoplastic. Recommend either further evaluation with PET/CT, or 3 month short interval follow-up study. Consider pulmonary consult.       LUNG RADS ASSESSMENTS  LUNGRADS ASSESSMENT VALUE: 4A       Last PFTs: None on file    Immunizations:   Immunization History   Administered Date(s) Administered    COVID-19, J&J, PF, 0.5 mL 03/13/2021    COVID-19, Pfizer Purple top, DILUTE for use, 12+ yrs, 30mcg/0.3mL dose 01/06/2022    Pneumococcal Polysaccharide ([id number]) 07/26/2014       Pathology:  FINAL DIAGNOSIS:     Core biopsy, left lower lobe lung lesion:   Lung/alveolar tissue with prominent acute and chronic inflammation,   extravasated blood and focal histiocytic aggregates with features of   early noncaseating granulomatous inflammation. An AFB stain shows no evidence of acid-fast bacilli and a GMS stain shows   no evidence of pneumocystis or fungal forms. No evidence of malignancy. Cytology:  FINAL DIAGNOSIS:     A. Lung, left lower lobe, endoscopic ultrasound guided fine needle   aspiration:   Atypical cells present. Atypical cell population identified against the background of reactive   bronchial cells, prominent acute inflammation and occasional histiocytes. See case comment.      B. Lung, left lower lobe, brushing:    No malignant cells identified. Hypocellular specimen. C. Lung, left lower lobe, bronchial alveolar lavage:   Atypical cell population present. Occasional atypical cell clusters seen against the background reactive   bronchial cells, macrophages and nonspecific inflammation. GMS stain with no evidence of pneumocystis or fungal forms. Assessment: This is a 60 y.o. male with cavitary pulmonary nodule in his left lower lobe    Plan:   -PET scan had SUV of 2.5 in the nodule only.  -biopsy consistent with inflammation and possible granulomatous formation that could be seen cancer adjacent or infection adjacent. Repeat biopsy may be needed. I previously treated this like an abscess and wrote for augmentin, 10 day course. Repeat CT scan 3 months after the original showed no change in the nodule. Repeat scan in August which would be a month interval from initial.  If it stable again then we will get another scan and February 2023 and we will pursuing additional biopsy. Diagnosis Orders   1. Pulmonary nodule 1 cm or greater in diameter  CT CHEST WO CONTRAST         - Tobacco use: The patient is currently smoking. The risks related to smoking were reviewed with the patient: Patient counseled to quit smoking.     - RTC 2 months w/ MD following CT scan. Call or RTC sooner if symptoms persist or worsen acutely.

## 2022-06-24 ENCOUNTER — ANTI-COAG VISIT (OUTPATIENT)
Dept: PHARMACY | Age: 57
End: 2022-06-24
Payer: MEDICARE

## 2022-06-24 DIAGNOSIS — I48.0 PAROXYSMAL ATRIAL FIBRILLATION (HCC): ICD-10-CM

## 2022-06-24 DIAGNOSIS — Z95.2 S/P AORTIC VALVE REPLACEMENT: Primary | ICD-10-CM

## 2022-06-24 LAB — INTERNATIONAL NORMALIZATION RATIO, POC: 1.4

## 2022-06-24 PROCEDURE — 85610 PROTHROMBIN TIME: CPT

## 2022-06-24 PROCEDURE — 99212 OFFICE O/P EST SF 10 MIN: CPT

## 2022-06-24 NOTE — PROGRESS NOTES
ANTICOAGULATION SERVICE    Rea Marcos is a 60 y.o. male with PMHx significant for AVR (re-do in May, 2017), CAD s/p CABG (x3 in May, 2017), pA-fib, HLD, HTN, testicular CA who presents to clinic on 6/24/2022 for anticoagulation monitoring and adjustment.     Anticoagulation Indication(s):  Heart Valve Replacement (bovine AVR), A-fib  Referring Physician:  Dr. Sarika Smith  Goal INR Range:  2-3  Duration of Anticoagulation Therapy:  TBD  Time of day dose taken:  PM  Product patient has at home:  warfarin 5 mg (peach)    [id number] Score for Atrial Fibrillation Stroke Risk   Risk   Factors  Component Value   C CHF No 0   H HTN Yes 1   A2 Age >= 75 No,  (57 y.o.) 0   D DM Yes 1   S2 Prior Stroke/TIA No 0   V Vascular Disease Yes 1   A Age 74-69 No,  (57 y.o.) 0   Sc Sex male 0    [id number]  Score  3   Score last updated 5/30/19 1:51 PM      Lab Results   Component Value Date    RBC 4.32 04/22/2022    HGB 14.0 04/22/2022    HCT 42.2 04/22/2022    MCV 97.6 04/22/2022    MCH 32.4 04/22/2022    MPV 9.6 04/22/2022    RDW 14.6 04/22/2022     (L) 04/22/2022     INR Summary                            Warfarin regimen (mg)  Date INR   A/P    Sun Mon Tue Wed Thu Fri Sat Mg/wk  6/24 1.4 Below goal, bolus +inc 5 7.5 7.5 7.5 5 10/7.5 7.5 50  6/3 1.7 Below goal, bolus + incr 5 7.5 7.5 7.5 5 10/7.5 7.5 47.5  5/20 1.9 Below goal, continue  5 7.5 5 7.5 5 7.5 7.5 45  5/6 1.3 Below goal, bolus + incr 5 7.5 5 7.5 5 10/7.5 7.5 45  4/21 1.5 Below goal, bolus  5 7.5 5 7.5 7.5/5 7.5 5 42.5  4/7 1.2 Below goal (held) see below 5 7.5 5 7.5 5 7.5 5 42.5  3/4 2.2 At goal, continue   5 7.5 5 7.5 5 7.5 5 42.5  2/1 2.1 At goal, continue   5 7.5 5 7.5 5 7.5 5 42.5  1/7 1.9 Below goal, continue   5 7.5 5 7.5 5 7.5 5 42.5  11/22 2.2 At goal, continue   5 7.5 5 7.5 5 7.5 5 42.5  11/22 2.1 At goal, continue   5 7.5 5 7.5 5 7.5 5 42.5  11/8 1.8 Below goal, increase  5 7.5 5 7.5 5 7.5 5 42.5  10/25 1.6 Below goal, bolus 7.5 7.5/5 5 5 5 5 7.5 40  10/4 2.0 At goal, continue   7.5 5 5 5 5 5 7.5 40  9/17 2.7 At goal (dosing error)  7.5 5 5 5 5 5 7.5 40  8/23 4.3 Above goal, decrease  7.5 0/5 5 5 5 5 7.5 40  8/9 3.3 Above goal, dec  7.5 5 5 5 7.5 5 5 40  7/21 1.4 Below goal, increase  5 7.5 5 7.5 5 7.5 5 42.5  6/28 2.0 At goal, no change  7.5 5 5 5 5 5 7.5 40  6/11 1.5 Below goal, increase  7.5 5 5 5 5 5 7.5 40  5/24 3.6 Above goal, hold x 1  5 0/5 5 5 5 5 5 35  4/23 2.7 At goal, no change  5 5 5 5 5 5 5 35  4/5 3.6 Above goal, hold x 1  5 0/5 5 5 5 5 5 35   3/8 3.0 At goal, no change  5 5 5 5 5 5 5 35  2/19 2.0 At goal, no change  5 5 5 5 5 5 5 35  2/4 2.7 At goal, no change  5 5 5 5 5 5 5 35  1/20 2.7 At goal, no change  5 5 5 5 5 5 5 35  1/11 4.5 Above goal (Cymbalta) 5 0/5 0/5 5 5 5 5 35  12/28 2.0 At goal, no change  7.5 5 5 5 5 5 7.5 40  11/24 2.4 At goal, no change  7.5 5 5 5 5 5 7.5 40  10/30 2.5 At goal, no change  7.5 5 5 5 5 5 7.5 40  10/2 2.2 At goal, no change  7.5 5 5 5 5 5 7.5 40  9/3 2.4 At goal, no change  7.5 5 5 5 5 5 7.5 40  8/3 2.9 At goal, no change  7.5 5 5 5 5 5 7.5 40  7/6 2.7 At goal, no change  7.5 5 5 5 5 5 7.5 40  6/8 2.8 At goal, no change  7.5 5 5 5 5 5 7.5 40  5/11 2.9 At goal, no change  7.5 5 5 5 5 5 7.5 40  3/30 3.0 At goal, no change  7.5 5 5 5 5 5 7.5 40  2/26 2.3 At goal, no change  7.5 5 5 5 5 5 7.5 40  2/5 3.3 Above goal, decrease  7.5 5 5 5 5 5 7.5 40  1/8 2.9 At goal, no change  7.5 7.5 5 5 5 5 7.5 42.5    Patient History:  Recent hospitalizations/HC visits 4/8/22: bronchoscopy per Dr. Baron Urena, warfarin held x3 days (no bridge). Findings were likely infectious process, prescribed Augmentin. 2/21/22: Pulmonology, work-up underway for lung nodule    Recent medication changes 6/24/22: [facility]  3/2022: Centrum Silver Mens 50+   4/14-4/21/22:  Augmentin BID x7 days   1/5/22: Medrol dose pack x5 days per pain management   9/2021: Stopped citalopram, does not plan to continue   7/21/21: increased citalopram   6/18/21: started citalopram 20 mg QD (may increase INR)  12/28/20: Cymbalta 30 mg daily (may increase INR); stopped taking on 1/30; resumed ~2/4; stopped ~6/1   Medications taken regularly that may interact with warfarin or alter INR ASA 81 mg  Centrum Silver Mens 50+ - contains 60 mcg of vitamin K    Warfarin dose taken as prescribed Usually compliant   Does not use pillbox  Patient has been taking warfarin since re-do AVR in May, 2017   Signs/symptoms of bleeding No h/o major bleeding   Vitamin K intake Normally has ~0 servings of green, leafy vegetables per week   Recent vomiting/diarrhea/fever, changes in weight or activity level None reported   Tobacco or alcohol use -No recent changes in smoking as of 11/24/20 (~3/4 PPD)  -Patient cut back from 2 PPD to 3/4 PPD in February (2019) when he moved in with his daughter. Decrease in smoking typically increases INR gradually.   -Patient denies any alcohol or illicit drug use   Upcoming surgeries or procedures None      Assessment/Plan:   Patient's INR was subtherapeutic today (1.4). Patient held warfarin for a bronchoscopy on 4/8, and INR has been low since then. Bronchoscopy results were consistent with inflammation likely due to infection adjacent. Today, patient reports taking warfarin as instructed by clinic with no missed doses, medication changes, diet changes, illness, smoking changes, or bleeding since last visit. He continues to take Centrum MVI. Pt has also started taking OM brand [employer]. There is limited information on this product seen on literature search. Pt is taking 1 tab TID. He started this a \"few weeks ago\". From what I found on the product ([place] is the latin name) it may inhibit plt aggegation. But no information on vitamin K content or how it will impact INR. Pt wishes to continue taking the [facility], WATERVLIET. Pt is also taking his MVI until he runs out.     Patient was instructed to take 10 mg tonight then increase warfarin to 7.5 mg daily except 5 mg on  Thursday. Repeat INR in 2 weeks. Patient was reminded to maintain consistent vitamin K intake and call with any bleeding, medication changes, or fever/vomiting/diarrhea. Patient understands dosing directions and information discussed. Dosing schedule and follow up appointment given to patient. Progress note routed to referring physician's office. Patient acknowledges working in consult agreement with pharmacist as referred by his/her physician. Next INR Check: 7/8    Please call [facility] at [phone] with any questions. Thanks!     Juliet Montero, PharmD  PGY-1 Pharmacy Resident  [id number]  6/24/2022 9:59 AM

## 2022-07-05 DIAGNOSIS — I25.10 CORONARY ARTERY DISEASE INVOLVING NATIVE CORONARY ARTERY OF NATIVE HEART WITHOUT ANGINA PECTORIS: ICD-10-CM

## 2022-07-05 RX ORDER — ROSUVASTATIN CALCIUM 20 MG/1
TABLET, COATED ORAL
Qty: 90 TABLET | Refills: 3 | Status: SHIPPED | OUTPATIENT
Start: 2022-07-05

## 2022-07-08 ENCOUNTER — ANTI-COAG VISIT (OUTPATIENT)
Dept: PHARMACY | Age: 57
End: 2022-07-08
Payer: MEDICARE

## 2022-07-08 DIAGNOSIS — Z95.2 S/P AORTIC VALVE REPLACEMENT: Primary | ICD-10-CM

## 2022-07-08 DIAGNOSIS — I48.0 PAROXYSMAL ATRIAL FIBRILLATION (HCC): ICD-10-CM

## 2022-07-08 LAB — INTERNATIONAL NORMALIZATION RATIO, POC: 2.1

## 2022-07-08 PROCEDURE — 85610 PROTHROMBIN TIME: CPT

## 2022-07-08 PROCEDURE — 99211 OFF/OP EST MAY X REQ PHY/QHP: CPT

## 2022-07-08 NOTE — PROGRESS NOTES
ANTICOAGULATION SERVICE    Romeo Bumpers is a 60 y.o. male with PMHx significant for AVR (re-do in May, 2017), CAD s/p CABG (x3 in May, 2017), pA-fib, HLD, HTN, testicular CA who presents to clinic on 7/8/2022 for anticoagulation monitoring and adjustment.     Anticoagulation Indication(s):  Heart Valve Replacement (bovine AVR), A-fib  Referring Physician:  Dr. Leopoldo Gunn  Goal INR Range:  2-3  Duration of Anticoagulation Therapy:  TBD  Time of day dose taken:  PM  Product patient has at home:  warfarin 5 mg (peach)    [id number] Score for Atrial Fibrillation Stroke Risk   Risk   Factors  Component Value   C CHF No 0   H HTN Yes 1   A2 Age >= 75 No,  (57 y.o.) 0   D DM Yes 1   S2 Prior Stroke/TIA No 0   V Vascular Disease Yes 1   A Age 74-69 No,  (57 y.o.) 0   Sc Sex male 0    [id number]  Score  3   Score last updated 5/30/19 1:51 PM      Lab Results   Component Value Date    RBC 4.32 04/22/2022    HGB 14.0 04/22/2022    HCT 42.2 04/22/2022    MCV 97.6 04/22/2022    MCH 32.4 04/22/2022    MPV 9.6 04/22/2022    RDW 14.6 04/22/2022     (L) 04/22/2022     INR Summary                            Warfarin regimen (mg)  Date INR   A/P    Sun Mon Tue Wed Thu Fri Sat Mg/wk  7/8 2.1 At goal, continue  7.5 7.5 7.5 7.5 5 7.5 7.5 50  6/24 1.4 Below goal, bolus +inc 7.5 7.5 7.5 7.5 5 10/7.5 7.5 50  6/3 1.7 Below goal, bolus + incr 5 7.5 7.5 7.5 5 10/7.5 7.5 47.5  5/20 1.9 Below goal, continue  5 7.5 5 7.5 5 7.5 7.5 45  5/6 1.3 Below goal, bolus + incr 5 7.5 5 7.5 5 10/7.5 7.5 45  4/21 1.5 Below goal, bolus  5 7.5 5 7.5 7.5/5 7.5 5 42.5  4/7 1.2 Below goal (held) see below 5 7.5 5 7.5 5 7.5 5 42.5  3/4 2.2 At goal, continue   5 7.5 5 7.5 5 7.5 5 42.5  2/1 2.1 At goal, continue   5 7.5 5 7.5 5 7.5 5 42.5  1/7 1.9 Below goal, continue   5 7.5 5 7.5 5 7.5 5 42.5  11/22 2.2 At goal, continue   5 7.5 5 7.5 5 7.5 5 42.5  11/22 2.1 At goal, continue   5 7.5 5 7.5 5 7.5 5 42.5  11/8 1.8 Below goal, increase  5 7.5 5 7.5 5 7.5 5 42.5  10/25 1.6 Below goal, bolus   7.5 7.5/5 5 5 5 5 7.5 40  10/4 2.0 At goal, continue   7.5 5 5 5 5 5 7.5 40  9/17 2.7 At goal (dosing error)  7.5 5 5 5 5 5 7.5 40  8/23 4.3 Above goal, decrease  7.5 0/5 5 5 5 5 7.5 40  8/9 3.3 Above goal, dec  7.5 5 5 5 7.5 5 5 40  7/21 1.4 Below goal, increase  5 7.5 5 7.5 5 7.5 5 42.5  6/28 2.0 At goal, no change  7.5 5 5 5 5 5 7.5 40  6/11 1.5 Below goal, increase  7.5 5 5 5 5 5 7.5 40  5/24 3.6 Above goal, hold x 1  5 0/5 5 5 5 5 5 35  4/23 2.7 At goal, no change  5 5 5 5 5 5 5 35  4/5 3.6 Above goal, hold x 1  5 0/5 5 5 5 5 5 35   3/8 3.0 At goal, no change  5 5 5 5 5 5 5 35  2/19 2.0 At goal, no change  5 5 5 5 5 5 5 35  2/4 2.7 At goal, no change  5 5 5 5 5 5 5 35  1/20 2.7 At goal, no change  5 5 5 5 5 5 5 35  1/11 4.5 Above goal (Cymbalta) 5 0/5 0/5 5 5 5 5 35  12/28 2.0 At goal, no change  7.5 5 5 5 5 5 7.5 40  11/24 2.4 At goal, no change  7.5 5 5 5 5 5 7.5 40  10/30 2.5 At goal, no change  7.5 5 5 5 5 5 7.5 40  10/2 2.2 At goal, no change  7.5 5 5 5 5 5 7.5 40  9/3 2.4 At goal, no change  7.5 5 5 5 5 5 7.5 40  8/3 2.9 At goal, no change  7.5 5 5 5 5 5 7.5 40  7/6 2.7 At goal, no change  7.5 5 5 5 5 5 7.5 40  6/8 2.8 At goal, no change  7.5 5 5 5 5 5 7.5 40  5/11 2.9 At goal, no change  7.5 5 5 5 5 5 7.5 40  3/30 3.0 At goal, no change  7.5 5 5 5 5 5 7.5 40  2/26 2.3 At goal, no change  7.5 5 5 5 5 5 7.5 40  2/5 3.3 Above goal, decrease  7.5 5 5 5 5 5 7.5 40  1/8 2.9 At goal, no change  7.5 7.5 5 5 5 5 7.5 42.5    Patient History:  Recent hospitalizations/HC visits 4/8/22: bronchoscopy per Dr. Erick Rice, warfarin held x3 days (no bridge). Findings were likely infectious process, prescribed Augmentin. 2/21/22: Pulmonology, work-up underway for lung nodule    Recent medication changes 6/24/22: [facility]  3/2022: Centrum Silver Mens 50+ (planning to stop when he runs out around 7/15)  4/14-4/21/22:  Augmentin BID x7 days   1/5/22: Medrol dose pack x5 days per pain management   9/2021: Stopped citalopram, does not plan to continue   7/21/21: increased citalopram   6/18/21: started citalopram 20 mg QD (may increase INR)  12/28/20: Cymbalta 30 mg daily (may increase INR); stopped taking on 1/30; resumed ~2/4; stopped ~6/1   Medications taken regularly that may interact with warfarin or alter INR ASA 81 mg  Centrum Silver Mens 50+ - contains 60 mcg of vitamin K    Warfarin dose taken as prescribed Usually compliant   Does not use pillbox  Patient has been taking warfarin since re-do AVR in May, 2017   Signs/symptoms of bleeding No h/o major bleeding   Vitamin K intake Normally has ~0 servings of green, leafy vegetables per week   Recent vomiting/diarrhea/fever, changes in weight or activity level None reported   Tobacco or alcohol use -No recent changes in smoking as of 11/24/20 (~3/4 PPD)  -Patient cut back from 2 PPD to 3/4 PPD in February (2019) when he moved in with his daughter. Decrease in smoking typically increases INR gradually.   -Patient denies any alcohol or illicit drug use   Upcoming surgeries or procedures None      Assessment/Plan:   Patient's INR was therapeutic today (2.1). Patient held warfarin for a bronchoscopy on 4/8, and INR has been low since then. Bronchoscopy results were consistent with inflammation likely due to infection adjacent. Today, patient reports taking warfarin as instructed by clinic with no missed doses, medication changes, diet changes, illness, smoking changes, or bleeding since last visit. He continues to take Centrum MVI. Pt has also started taking  brand [employer]. There is limited information on this product seen on literature search. Pt is taking 1 tab TID. He started this a \"few weeks ago\". From what I found on the product (Adithya Meyer is the latin name) it may inhibit plt aggegation. But no information on vitamin K content or how it will impact INR. Pt wishes to continue taking the [facility], WATERVLIET.  Pt is also taking his MVI until he runs out. He will run out of his MVI on 7/15, will have to monitor INR increase. Patient was instructed to continue warfarin to 7.5 mg daily except 5 mg on  Thursday. Repeat INR in 2 weeks. Patient was reminded to maintain consistent vitamin K intake and call with any bleeding, medication changes, or fever/vomiting/diarrhea. Patient understands dosing directions and information discussed. Dosing schedule and follow up appointment given to patient. Progress note routed to referring physician's office. Patient acknowledges working in consult agreement with pharmacist as referred by his/her physician. Next INR Check:     Please call [facility] at [phone] with any questions. Thanks! Ottoniel Boudreaux, PharmD Candidate [id number]  [facility]  [place].  [phone]  2022 9:15 AM    For Pharmacy Admin Tracking Only     Intervention Detail: Adherence Monitorin   Total # of Interventions Recommended: 0   Total # of Interventions Accepted: 0   Time Spent (min): 15

## 2022-07-22 ENCOUNTER — ANTI-COAG VISIT (OUTPATIENT)
Dept: PHARMACY | Age: 57
End: 2022-07-22
Payer: MEDICARE

## 2022-07-22 DIAGNOSIS — Z95.2 S/P AORTIC VALVE REPLACEMENT: Primary | ICD-10-CM

## 2022-07-22 DIAGNOSIS — I48.0 PAROXYSMAL ATRIAL FIBRILLATION (HCC): ICD-10-CM

## 2022-07-22 LAB — INTERNATIONAL NORMALIZATION RATIO, POC: 2

## 2022-07-22 PROCEDURE — 99211 OFF/OP EST MAY X REQ PHY/QHP: CPT

## 2022-07-22 PROCEDURE — 85610 PROTHROMBIN TIME: CPT

## 2022-07-22 NOTE — PROGRESS NOTES
ANTICOAGULATION SERVICE    Sirena Arroyo is a 60 y.o. male with PMHx significant for AVR (re-do in May, 2017), CAD s/p CABG (x3 in May, 2017), pA-fib, HLD, HTN, testicular CA who presents to clinic on 7/22/2022 for anticoagulation monitoring and adjustment.     Anticoagulation Indication(s):  Heart Valve Replacement (bovine AVR), A-fib  Referring Physician:  Dr. Glenn Lira  Goal INR Range:  2-3  Duration of Anticoagulation Therapy:  TBD  Time of day dose taken:  PM  Product patient has at home:  warfarin 5 mg (peach)    [id number] Score for Atrial Fibrillation Stroke Risk   Risk   Factors  Component Value   C CHF No 0   H HTN Yes 1   A2 Age >= 75 No,  (57 y.o.) 0   D DM Yes 1   S2 Prior Stroke/TIA No 0   V Vascular Disease Yes 1   A Age 74-69 No,  (57 y.o.) 0   Sc Sex male 0    [id number]  Score  3   Score last updated 5/30/19 1:51 PM      Lab Results   Component Value Date    RBC 4.32 04/22/2022    HGB 14.0 04/22/2022    HCT 42.2 04/22/2022    MCV 97.6 04/22/2022    MCH 32.4 04/22/2022    MPV 9.6 04/22/2022    RDW 14.6 04/22/2022     (L) 04/22/2022     INR Summary                            Warfarin regimen (mg)  Date INR   A/P    Sun Mon Tue Wed Thu Fri Sat Mg/wk  7/22 2.0 At goal, continue  7.5 7.5 7.5 7.5 5 7.5 7.5 50  7/8 2.1 At goal, continue  7.5 7.5 7.5 7.5 5 7.5 7.5 50  6/24 1.4 Below goal, bolus +inc 7.5 7.5 7.5 7.5 5 10/7.5 7.5 50  6/3 1.7 Below goal, bolus + incr 5 7.5 7.5 7.5 5 10/7.5 7.5 47.5  5/20 1.9 Below goal, continue  5 7.5 5 7.5 5 7.5 7.5 45  5/6 1.3 Below goal, bolus + incr 5 7.5 5 7.5 5 10/7.5 7.5 45  4/21 1.5 Below goal, bolus  5 7.5 5 7.5 7.5/5 7.5 5 42.5  4/7 1.2 Below goal (held) see below 5 7.5 5 7.5 5 7.5 5 42.5  3/4 2.2 At goal, continue   5 7.5 5 7.5 5 7.5 5 42.5  2/1 2.1 At goal, continue   5 7.5 5 7.5 5 7.5 5 42.5  1/7 1.9 Below goal, continue   5 7.5 5 7.5 5 7.5 5 42.5  11/22 2.2 At goal, continue   5 7.5 5 7.5 5 7.5 5 42.5  11/22 2.1 At goal, continue 5 7.5 5 7.5 5 7.5 5 42.5  11/8 1.8 Below goal, increase  5 7.5 5 7.5 5 7.5 5 42.5  10/25 1.6 Below goal, bolus   7.5 7.5/5 5 5 5 5 7.5 40  10/4 2.0 At goal, continue   7.5 5 5 5 5 5 7.5 40  9/17 2.7 At goal (dosing error)  7.5 5 5 5 5 5 7.5 40  8/23 4.3 Above goal, decrease  7.5 0/5 5 5 5 5 7.5 40  8/9 3.3 Above goal, dec  7.5 5 5 5 7.5 5 5 40  7/21 1.4 Below goal, increase  5 7.5 5 7.5 5 7.5 5 42.5  6/28 2.0 At goal, no change  7.5 5 5 5 5 5 7.5 40  6/11 1.5 Below goal, increase  7.5 5 5 5 5 5 7.5 40  5/24 3.6 Above goal, hold x 1  5 0/5 5 5 5 5 5 35  4/23 2.7 At goal, no change  5 5 5 5 5 5 5 35  4/5 3.6 Above goal, hold x 1  5 0/5 5 5 5 5 5 35   3/8 3.0 At goal, no change  5 5 5 5 5 5 5 35  2/19 2.0 At goal, no change  5 5 5 5 5 5 5 35  2/4 2.7 At goal, no change  5 5 5 5 5 5 5 35  1/20 2.7 At goal, no change  5 5 5 5 5 5 5 35  1/11 4.5 Above goal (Cymbalta) 5 0/5 0/5 5 5 5 5 35  12/28 2.0 At goal, no change  7.5 5 5 5 5 5 7.5 40  11/24 2.4 At goal, no change  7.5 5 5 5 5 5 7.5 40  10/30 2.5 At goal, no change  7.5 5 5 5 5 5 7.5 40  10/2 2.2 At goal, no change  7.5 5 5 5 5 5 7.5 40  9/3 2.4 At goal, no change  7.5 5 5 5 5 5 7.5 40  8/3 2.9 At goal, no change  7.5 5 5 5 5 5 7.5 40  7/6 2.7 At goal, no change  7.5 5 5 5 5 5 7.5 40  6/8 2.8 At goal, no change  7.5 5 5 5 5 5 7.5 40  5/11 2.9 At goal, no change  7.5 5 5 5 5 5 7.5 40  3/30 3.0 At goal, no change  7.5 5 5 5 5 5 7.5 40  2/26 2.3 At goal, no change  7.5 5 5 5 5 5 7.5 40  2/5 3.3 Above goal, decrease  7.5 5 5 5 5 5 7.5 40  1/8 2.9 At goal, no change  7.5 7.5 5 5 5 5 7.5 42.5    Patient History:  Recent hospitalizations/HC visits 4/8/22: bronchoscopy per Dr. Lyric Ross, warfarin held x3 days (no bridge). Findings were likely infectious process, prescribed Augmentin. 2/21/22: Pulmonology, work-up underway for lung nodule    Recent medication changes 6/24/22: [facility]  3/2022: Centrum Silver Mens 50+ (stopped 7/15)  4/14-4/21/22:  Augmentin BID x7 days continue warfarin to 7.5 mg daily except 5 mg on  Thursday. Repeat INR in 4 weeks. Patient was reminded to maintain consistent vitamin K intake and call with any bleeding, medication changes, or fever/vomiting/diarrhea. Patient understands dosing directions and information discussed. Dosing schedule and follow up appointment given to patient. Progress note routed to referring physician's office. Patient acknowledges working in consult agreement with pharmacist as referred by his/her physician. Next INR Check: 8/19    Please call [facility] at [phone] with any questions. Thanks!     Amber Vasques, PharmD  PGY-1 Pharmacy Resident  [facility]  [id number]  7/22/2022   11:03 AM    For Pharmacy Admin Tracking Only    Total # of Interventions Recommended: 0  Total # of Interventions Accepted: 0  Time Spent (min): 15

## 2022-08-02 ENCOUNTER — OFFICE VISIT (OUTPATIENT)
Dept: INTERNAL MEDICINE CLINIC | Age: 57
End: 2022-08-02
Payer: MEDICARE

## 2022-08-02 VITALS
TEMPERATURE: 97.1 F | HEIGHT: 72 IN | HEART RATE: 64 BPM | WEIGHT: 271 LBS | BODY MASS INDEX: 36.7 KG/M2 | SYSTOLIC BLOOD PRESSURE: 111 MMHG | OXYGEN SATURATION: 94 % | DIASTOLIC BLOOD PRESSURE: 77 MMHG

## 2022-08-02 DIAGNOSIS — E11.9 TYPE 2 DIABETES MELLITUS WITHOUT COMPLICATION, WITHOUT LONG-TERM CURRENT USE OF INSULIN (HCC): Primary | Chronic | ICD-10-CM

## 2022-08-02 DIAGNOSIS — Z95.2 S/P AORTIC VALVE REPLACEMENT: Chronic | ICD-10-CM

## 2022-08-02 DIAGNOSIS — Z95.2 HISTORY OF AORTIC VALVE REPLACEMENT: Chronic | ICD-10-CM

## 2022-08-02 DIAGNOSIS — R13.10 DYSPHAGIA, UNSPECIFIED TYPE: ICD-10-CM

## 2022-08-02 DIAGNOSIS — E78.5 HYPERLIPIDEMIA, UNSPECIFIED HYPERLIPIDEMIA TYPE: Chronic | ICD-10-CM

## 2022-08-02 DIAGNOSIS — F33.9 UNIPOLAR DEPRESSION (HCC): ICD-10-CM

## 2022-08-02 DIAGNOSIS — R91.1 PULMONARY NODULE, LEFT: ICD-10-CM

## 2022-08-02 DIAGNOSIS — I10 ESSENTIAL HYPERTENSION: Chronic | ICD-10-CM

## 2022-08-02 DIAGNOSIS — G56.02 CARPAL TUNNEL SYNDROME ON LEFT: ICD-10-CM

## 2022-08-02 DIAGNOSIS — I25.10 CAD, MULTIPLE VESSEL: Chronic | ICD-10-CM

## 2022-08-02 PROBLEM — E66.01 SEVERE OBESITY (BMI 35.0-39.9) WITH COMORBIDITY (HCC): Chronic | Status: ACTIVE | Noted: 2020-10-01

## 2022-08-02 PROBLEM — F32.9 UNIPOLAR DEPRESSION: Chronic | Status: ACTIVE | Noted: 2021-07-13

## 2022-08-02 LAB — HBA1C MFR BLD: 7.3 %

## 2022-08-02 PROCEDURE — 99213 OFFICE O/P EST LOW 20 MIN: CPT

## 2022-08-02 PROCEDURE — 83036 HEMOGLOBIN GLYCOSYLATED A1C: CPT

## 2022-08-02 ASSESSMENT — ENCOUNTER SYMPTOMS
BACK PAIN: 1
TROUBLE SWALLOWING: 1

## 2022-08-02 NOTE — PROGRESS NOTES
Outpatient Clinic Established Patient Note    Patient: Lizet Hough  : 1965 (26 y.o.)  Date: 2022      Routine Visit w/  CC: Left wrist pain    HPI:      Lizet Houhg is a 60 y.o. male w/PMH below who presented today for a routine visit. Patient was last seen on 2022. Today he reports having pain at his left arm w/hx carpal tunnel syndrome. He wears a brace but has sustained pain at his arm w/pins and needles sensation. He reports that his pain has been keeping him from sleeping and that his symptoms have been ongoing for years. Patient additionally reports having difficulty swallowing w/difficulty drinking soda w/a sensation he reports has being hard to describe. Patient reports that his back pain is ongoing and that he is chronically in pain at his back and b/l lower extremities. He reports visiting a pain management clinic and is compliant with his medications. Patient reports that he is compliant with all of his medications but that he is overwhelmed w/all of his medical conditions. He additionally reports that he is a current smoker.            Past Medical History:   Diagnosis Date    Abdominal hernia     s/p repair     Aortic valve prosthesis present     CAD (coronary artery disease)     Chronic back pain greater than 3 months duration     Clostridium difficile diarrhea 10/17/2016    PCR    DDD (degenerative disc disease), lumbosacral 2013    Erectile dysfunction     GERD (gastroesophageal reflux disease)     Hyperlipidemia     Hypertension     Joint pain     Left testicular cancer ([facility] [address]     s/p abdominal resection    Myalgia and myositis, unspecified 2013    Neuropathy     OA (osteoarthritis) of knee     bilateral    Obesity, Class I, BMI 30.0-34.9 (see actual BMI)     Prolonged emergence from general anesthesia     after CABG    S/P AVR     tissue valve    S/P CABG x 2     w/AVR & primary repair of dissected ascending aorta    Type 2 diabetes mellitus without complication, without long-term current use of insulin ([facility] [address] 7/13/2021    Wears dentures        Home Meds:  Prior to Visit Medications    Medication Sig Taking? Authorizing Provider   rosuvastatin (CRESTOR) 20 MG tablet - Take one tablets nightly Yes Huey Delgado MD   metFORMIN (GLUCOPHAGE) 500 MG tablet TAKE TWO TABLETS BY MOUTH TWICE A DAY WITH MEALS Yes Katie Cloey MD   warfarin (COUMADIN) 5 MG tablet Take 1-1.5 tablets by mouth daily as directed by anticoagulation clinic. Yes Michela Wilson MD   metoprolol tartrate (LOPRESSOR) 50 MG tablet TAKE 1 TABLET BY MOUTH 2 TIMES A DAY FOR BLOOD PRESSURE AND HEART Yes Margarette Barba MD   SITagliptin (JANUVIA) 50 MG tablet Take 1 tablet by mouth daily Yes Katie Coley MD   pregabalin (LYRICA) 150 MG capsule Take 150 mg by mouth in the morning, at noon, and at bedtime. Yes Historical Provider, MD   METHOCARBAMOL PO Take by mouth Yes Historical Provider, MD   lisinopril (PRINIVIL;ZESTRIL) 10 MG tablet One tablet twice a day Yes Katie Coley MD   famotidine (PEPCID) 20 MG tablet TAKE 1 TABLET BY MOUTH 2 TIMES A DAY Yes Katie Coley MD   nystatin-triamcinolone [facility]) 702482-1.0 UNIT/GM-% ointment Apply topically 2 times daily until improved. Patient taking differently: PRN chapped lips, Apply topically 2 times daily until improved. Yes Patti Abad MD   Handicap Placard MISC by Does not apply route Effective through 12/28/2020 through 12/27/2025 Yes Sanya Bailey,    aspirin 81 MG EC tablet Take 1 tablet by mouth daily Yes Primo Mathew MD   melatonin (RA MELATONIN) 3 MG TABS tablet Take 1 tablet by mouth nightly as needed (insomnia) Yes Ailin Gatica MD   oxyCODONE-acetaminophen (PERCOCET) 5-325 MG per tablet Take 1 tablet by mouth every 4 hours as needed for Pain  .  Yes Historical Provider, MD   amLODIPine (NORVASC) 5 MG tablet Take 1 tablet by mouth daily  Katie Coley MD   Ascorbic Acid (VITAMIN C PO) Take by mouth 2 times daily   Patient not taking: Reported on 8/2/2022  Historical Provider, MD   Blood Pressure KIT 1 Unit  Patient not taking: Reported on 8/2/2022  Blanca Jc MD       Allergies:    Atorvastatin calcium [lipitor], Wellbutrin [bupropion], Cymbalta [duloxetine hcl], and Vicodin [hydrocodone-acetaminophen]    Health Maintenance Due   Topic Date Due    Annual Wellness Visit (AWV)  Never done    Diabetic foot exam  Never done    Diabetic retinal exam  Never done    Hepatitis B vaccine (1 of 3 - Risk 3-dose series) Never done    DTaP/Tdap/Td vaccine (1 - Tdap) Never done    Shingles vaccine (1 of 2) Never done    Pneumococcal 0-64 years Vaccine (2 - PCV) 07/26/2015    COVID-19 Vaccine (3 - Booster for Javier series) 05/06/2022    Depression Monitoring  06/18/2022       Immunization History   Administered Date(s) Administered    COVID-19, J&J, (age 18y+), IM, 0.5 mL 03/13/2021    COVID-19, PFIZER PURPLE top, DILUTE for use, (age 15 y+), 30mcg/0.3mL 01/06/2022    Pneumococcal Polysaccharide ([id number]) 07/26/2014       Review of Systems   HENT:  Positive for trouble swallowing. Musculoskeletal:  Positive for back pain. Pain at b/l lower limbs   Neurological:         Pins and needles at left arm    Psychiatric/Behavioral:  Negative for suicidal ideas. Data: Old records have been reviewed electronically. PHYSICAL EXAM:  /77 (Site: Right Upper Arm, Position: Sitting, Cuff Size: Large Adult)   Pulse 64   Temp 97.1 °F (36.2 °C) (Temporal)   Ht 6' (1.829 m)   Wt 271 lb (122.9 kg)   SpO2 94%   BMI 36.75 kg/m²   Physical Exam  Constitutional:       General: He is not in acute distress. Cardiovascular:      Rate and Rhythm: Normal rate. Pulmonary:      Effort: Pulmonary effort is normal.      Breath sounds: No rhonchi or rales. Comments: Mild wheezing  Abdominal:      General: Bowel sounds are normal. There is no distension. Palpations: Abdomen is soft. Tenderness: There is no abdominal tenderness. There is no guarding. Hernia: A hernia is present. Musculoskeletal:      Comments: 1+ b/l pitting edema   Skin:     General: Skin is warm and dry. Neurological:      Mental Status: He is alert and oriented to person, place, and time. Comments: 5/5 motor strength b/l lower extremities    Positive Phalen and Tinel signs       Assessment & Plan:      ASSESSMENT and PLAN ;      Diagnosis Orders   1. Type 2 diabetes mellitus without complication, without long-term current use of insulin (HCC)  -Receiving Metformin 1000 mg BID  -last visit Januvia 50mg daily was added.  -Hba1c: 7.3 today, last a1c: 9.6  -lifestyle w/low carb and fat diet, exercise discussed and counseled. 2. Pulmonary nodule, left  -Patient visits pulmonology outpatient w/review of CT imaging of nodule  -patient reports current smoking and is not interested in quitting/cutting down today.  -no changes made today. 3. Unipolar depression ([facility] [address]  -patient previously received medication which were d/c (2021). -At today's visit patient reports he is not interested in any medication management or psychiatry outpatient referral.  -patient denies any suicidal or homicidal ideation. 4. Hyperlipidemia, unspecified hyperlipidemia type  -Receiving rosuvastatin 20 mg  -Lipid Panel after last visit reviewed today  -no changes to current regimen made. 5. Essential hypertension  -Receiving Lisinopril 10 mg, Lopressor 50mg, Amlodipine 5 mg  -CMP, CBC TSH completed after last visit and reviewed. -Patient reports compliance with medications w/BP at today's visit at 111/77.  -no changes to current regimen made. 7. CAD, multiple vessel  -patient compliant w/medications, w/improvement of HbA1c, and counseled on lifestyle modifications. 8. History of aortic valve replacement  -sees outpatient Anti-Coagulation Visits   -on warfarin  -no assessment made      9.  Dysphagia, unspecified type  Hx difficulty of swallowing large pills (Began 2015 after intubation/extubation). -Left vocal cord paralysis (ENT visit)  -Questionable abscess w/silent aspiration concern on last visit (patient received Augmentin)  -Modified Barium Swallow discussed on last visit w/deferral by patient  -today patient wants to further defer Modified Barium Swallow study.   -Patient w/symptoms of dysphagia to liquids (soda)  -Patient is interested in ENT referral.  -Referred to ENT for further evaluation. 10.      Carpal Tunnel Syndrome Left Wrist                    -w/positive Phalen and Tinel signs, worsening of                                                                                          Symptoms w/use of brace. Referral made                                                                                           To orthopaedic surgery for further evaluation                                                                                           And possible procedure. Return in about 3 months (around 11/2/2022). Dispo: Pt has been staffed with Dr. Guerline Bagley.   _______________  May Mahajan MD, 8/2/2022 11:07 AM   PGY-1

## 2022-08-02 NOTE — PATIENT INSTRUCTIONS
Please visit the orthopaedic surgeon for Carpal Tunnel Syndrome at your Left Wrist.  Please visit the ENT ([facility] [place], and Throat) Doctor for your difficulty swallowing. Please continue to take all your medications as prescribed. If you experience any major difficulty please visit the emergency room. Please come back for an appointment in 3 months.

## 2022-08-05 ENCOUNTER — OFFICE VISIT (OUTPATIENT)
Dept: CARDIOLOGY CLINIC | Age: 57
End: 2022-08-05
Payer: MEDICARE

## 2022-08-05 VITALS
WEIGHT: 272 LBS | SYSTOLIC BLOOD PRESSURE: 128 MMHG | HEART RATE: 70 BPM | OXYGEN SATURATION: 95 % | DIASTOLIC BLOOD PRESSURE: 70 MMHG | BODY MASS INDEX: 36.89 KG/M2

## 2022-08-05 DIAGNOSIS — I10 ESSENTIAL HYPERTENSION: ICD-10-CM

## 2022-08-05 DIAGNOSIS — Z95.2 S/P AVR (AORTIC VALVE REPLACEMENT): ICD-10-CM

## 2022-08-05 DIAGNOSIS — I48.0 PAROXYSMAL ATRIAL FIBRILLATION (HCC): ICD-10-CM

## 2022-08-05 DIAGNOSIS — I25.10 CAD IN NATIVE ARTERY: Primary | ICD-10-CM

## 2022-08-05 DIAGNOSIS — I49.3 PVC (PREMATURE VENTRICULAR CONTRACTION): ICD-10-CM

## 2022-08-05 DIAGNOSIS — Z95.1 HX OF CABG: ICD-10-CM

## 2022-08-05 PROCEDURE — 4004F PT TOBACCO SCREEN RCVD TLK: CPT | Performed by: INTERNAL MEDICINE

## 2022-08-05 PROCEDURE — 3017F COLORECTAL CA SCREEN DOC REV: CPT | Performed by: INTERNAL MEDICINE

## 2022-08-05 PROCEDURE — G8417 CALC BMI ABV UP PARAM F/U: HCPCS | Performed by: INTERNAL MEDICINE

## 2022-08-05 PROCEDURE — G8427 DOCREV CUR MEDS BY ELIG CLIN: HCPCS | Performed by: INTERNAL MEDICINE

## 2022-08-05 PROCEDURE — 99214 OFFICE O/P EST MOD 30 MIN: CPT | Performed by: INTERNAL MEDICINE

## 2022-08-05 NOTE — PROGRESS NOTES
Subjective:      Patient ID: Soniya Lee is a 60 y.o. male. HPI  Mr. Anat Tellez is here today for follow up CAD/CABG/AVR/Afib/HTN. No complaints. Remains active. Only back issues, spinal stenosis. Limited by back. No exertional chest pain. Wt up. Still smoking. No sob/cp. No pnd. No orthopnea. No tachycardia. No syncope. BP good at home. Rhythm stable.          Past Medical History:   Diagnosis Date    Abdominal hernia     s/p repair 2010    Aortic valve prosthesis present     CAD (coronary artery disease)     Chronic back pain greater than 3 months duration     Clostridium difficile diarrhea 10/17/2016    PCR    DDD (degenerative disc disease), lumbosacral 8/7/2013    Erectile dysfunction     GERD (gastroesophageal reflux disease)     Hyperlipidemia     Hypertension     Joint pain     Left testicular cancer ([facility] [address] 2002    s/p abdominal resection    Myalgia and myositis, unspecified 8/26/2013    Neuropathy     OA (osteoarthritis) of knee     bilateral    Obesity, Class I, BMI 30.0-34.9 (see actual BMI)     Prolonged emergence from general anesthesia     after CABG    S/P AVR 2005    tissue valve    S/P CABG x 2 2005    w/AVR & primary repair of dissected ascending aorta    Type 2 diabetes mellitus without complication, without long-term current use of insulin ([address] 7/13/2021    Wears dentures      Past Surgical History:   Procedure Laterality Date    AORTA SURGERY  08/27/2004    Dr. Debra Ashford - primary repair of limited ascending aortic dissection    AORTIC VALVE REPLACEMENT  05/30/2017    Dr. Emma Walden - redo w/25mm Lex Panning ThermaFix model 3300 bovine pericardial bioprosthesis    AORTIC VALVE SURGERY  08/27/2004    Dr. Debra Ashford - 27mm Kelton-Castelan pericardial prosthesis     BACK SURGERY      L4-S1    BRONCHOSCOPY N/A 4/8/2022    ROBOTIC NAVIGATIONAL BRONCHOSCOPY FOR TRANSBRONCHIAL LUNG BIOPSY WITH FLOURO performed by Trina Lamas MD at [address] 4/8/2022    BRONCHOSCOPY ALVEOLAR LAVAGE performed by Jeb Ronquillo MD at [address]  05/09/2017    Dr. Kait Ibarra  08/26/2004    Dr. Shasta Arnold Right 03/17/2015    Dr. Brinda Lucas  10/10/2016    Dr. Vaughn Summers - w/laparascopic lysis of extensive adhesions, open transverse colon resection, excision of old abd mesh adhering to colon    CORONARY ANGIOPLASTY WITH STENT PLACEMENT  07/2014    CORONARY ARTERY BYPASS GRAFT  08/27/2004    Dr. Matt Tavares - x2 (LIMA-LAD, SVG sequentially to ascending aorta & D1)    CORONARY ARTERY BYPASS GRAFT  05/30/2017    Dr. Sagrario Roca - redo x3 (reverse AC SVG sequentially to D1, OM1 & PDA) w/explant of prior prosthetic valve & removal of embedded sternal wires x7    EMG  04/16/2012    FOOT SURGERY Left 01/24/2012    Dr. Xochitl Carpenter, DPM - hallux nail evulsion & exostectomy    HEMICOLECTOMY N/A 7/26/2019    ROBOTIC ASSISTED  LAPAROSCOPIC RIGHT COLECTOMY AND CHOLECYSTECTOMY performed by Nicole Cunha MD at [address] [place] [address]  2010    multiple    LUMBAR DISCECTOMY  2012    L4-5    SHOULDER ARTHROSCOPY Right 11/21/2013    Dr. Sharona Soares - w/subacromial decompression, debridement of the SLAP tear, distal clavicle excision    SOFT TISSUE TUMOR RESECTION  2001    retroperitoneal mass    TESTICLE REMOVAL Left 2001    radical orchiectomy    TRANSESOPHAGEAL ECHOCARDIOGRAM  05/30/2017    during redo CABG & AVR    TUNNELED VENOUS PORT PLACEMENT  2002    portacath      Social History     Socioeconomic History    Marital status:       Spouse name: Not on file    Number of children: Not on file    Years of education: Not on file    Highest education level: Not on file   Occupational History    Not on file   Tobacco Use    Smoking status: Some Days     Packs/day: 0.75     Years: 40.00     Pack years: 30.00     Types: Cigarettes     Last attempt to quit: 5/20/2017     Years has min wheezes. He has no rales. Abdominal: Soft. Bowel sounds are normal. There is no tenderness. Musculoskeletal: Normal range of motion. He exhibits no edema. Neurological: He is alert and oriented to person, place, and time. Skin: Skin is warm and dry. Psychiatric: He has a normal mood and affect. His behavior is normal. Thought content normal.       Assessment:         Diagnosis Orders   1. CAD in native artery        2. Hx of CABG        3. Paroxysmal atrial fibrillation (HCC)        4. S/P AVR (aortic valve replacement)        5. Essential hypertension        6. PVC (premature ventricular contraction)                  Plan:      CV stable. Rhythm stable. No angina. BP good. Compensated. Wt stable. Reviewed previous records and testing including echo 5/17 and cath 5/17. Will continue asa/meoprolol/crestor for CAD/CABG. Continue same and norvasc/lisinopril for HTN. Continue coumadin for afib. No bleeding issues. Continues to smoke. Encouraged to stop. Encouraged diet, exercise and wt. No changes. Reviewed previous records and testing including echo 2/22. Continue to monitor. Lipids per PCP. Follow up 3 months.

## 2022-08-11 ENCOUNTER — HOSPITAL ENCOUNTER (OUTPATIENT)
Dept: CT IMAGING | Age: 57
Discharge: HOME | End: 2022-08-11
Payer: MEDICARE

## 2022-08-11 DIAGNOSIS — R91.1 PULMONARY NODULE 1 CM OR GREATER IN DIAMETER: ICD-10-CM

## 2022-08-11 PROCEDURE — 71250 CT THORAX DX C-: CPT

## 2022-08-19 ENCOUNTER — ANTI-COAG VISIT (OUTPATIENT)
Dept: PHARMACY | Age: 57
End: 2022-08-19
Payer: MEDICARE

## 2022-08-19 DIAGNOSIS — Z95.2 S/P AORTIC VALVE REPLACEMENT: Primary | ICD-10-CM

## 2022-08-19 DIAGNOSIS — I48.0 PAROXYSMAL ATRIAL FIBRILLATION (HCC): ICD-10-CM

## 2022-08-19 LAB — INR BLD: 3.2

## 2022-08-19 PROCEDURE — 85610 PROTHROMBIN TIME: CPT | Performed by: PHARMACIST

## 2022-08-19 PROCEDURE — 99212 OFFICE O/P EST SF 10 MIN: CPT | Performed by: PHARMACIST

## 2022-08-19 NOTE — PROGRESS NOTES
ANTICOAGULATION SERVICE    Yessica Horowitz is a 59 y.o. male with PMHx significant for AVR (re-do in May, 2017), CAD s/p CABG (x3 in May, 2017), pA-fib, HLD, HTN, testicular CA who presents to clinic on 8/19/2022 for anticoagulation monitoring and adjustment.     Anticoagulation Indication(s):  Heart Valve Replacement (bovine AVR), A-fib  Referring Physician:  Dr. Ezra Valdez  Goal INR Range:  2-3  Duration of Anticoagulation Therapy:  TBD  Time of day dose taken:  PM  Product patient has at home:  warfarin 5 mg (peach)    [id number] Score for Atrial Fibrillation Stroke Risk   Risk   Factors  Component Value   C CHF No 0   H HTN Yes 1   A2 Age >= 75 No,  (62 y.o.) 0   D DM Yes 1   S2 Prior Stroke/TIA No 0   V Vascular Disease Yes 1   A Age 74-69 No,  (62 y.o.) 0   Sc Sex male 0    [id number]  Score  3   Score last updated 5/30/19 1:51 PM      Lab Results   Component Value Date    RBC 4.32 04/22/2022    HGB 14.0 04/22/2022    HCT 42.2 04/22/2022    MCV 97.6 04/22/2022    MCH 32.4 04/22/2022    MPV 9.6 04/22/2022    RDW 14.6 04/22/2022     (L) 04/22/2022     INR Summary                            Warfarin regimen (mg)  Date INR   A/P    Sun Mon Tue Wed Thu Fri Sat Mg/wk  8/19 3.2 Above goal, decrease  7.5 7.5 5 7.5 5 7.5 7.5 47.5  7/22 2.0 At goal, continue  7.5 7.5 7.5 7.5 5 7.5 7.5 50  7/8 2.1 At goal, continue  7.5 7.5 7.5 7.5 5 7.5 7.5 50  6/24 1.4 Below goal, bolus +inc 7.5 7.5 7.5 7.5 5 10/7.5 7.5 50  6/3 1.7 Below goal, bolus + incr 5 7.5 7.5 7.5 5 10/7.5 7.5 47.5  5/20 1.9 Below goal, continue  5 7.5 5 7.5 5 7.5 7.5 45  5/6 1.3 Below goal, bolus + incr 5 7.5 5 7.5 5 10/7.5 7.5 45  4/21 1.5 Below goal, bolus  5 7.5 5 7.5 7.5/5 7.5 5 42.5  4/7 1.2 Below goal (held) see below 5 7.5 5 7.5 5 7.5 5 42.5  3/4 2.2 At goal, continue   5 7.5 5 7.5 5 7.5 5 42.5  2/1 2.1 At goal, continue   5 7.5 5 7.5 5 7.5 5 42.5  1/7 1.9 Below goal, continue   5 7.5 5 7.5 5 7.5 5 42.5  11/22 2.2 At goal, continue 5 7.5 5 7.5 5 7.5 5 42.5  11/22 2.1 At goal, continue   5 7.5 5 7.5 5 7.5 5 42.5  11/8 1.8 Below goal, increase  5 7.5 5 7.5 5 7.5 5 42.5  10/25 1.6 Below goal, bolus   7.5 7.5/5 5 5 5 5 7.5 40  10/4 2.0 At goal, continue   7.5 5 5 5 5 5 7.5 40  9/17 2.7 At goal (dosing error)  7.5 5 5 5 5 5 7.5 40  8/23 4.3 Above goal, decrease  7.5 0/5 5 5 5 5 7.5 40  8/9 3.3 Above goal, dec  7.5 5 5 5 7.5 5 5 40  7/21 1.4 Below goal, increase  5 7.5 5 7.5 5 7.5 5 42.5  6/28 2.0 At goal, no change  7.5 5 5 5 5 5 7.5 40  6/11 1.5 Below goal, increase  7.5 5 5 5 5 5 7.5 40  5/24 3.6 Above goal, hold x 1  5 0/5 5 5 5 5 5 35  4/23 2.7 At goal, no change  5 5 5 5 5 5 5 35  4/5 3.6 Above goal, hold x 1  5 0/5 5 5 5 5 5 35   3/8 3.0 At goal, no change  5 5 5 5 5 5 5 35  2/19 2.0 At goal, no change  5 5 5 5 5 5 5 35  2/4 2.7 At goal, no change  5 5 5 5 5 5 5 35  1/20 2.7 At goal, no change  5 5 5 5 5 5 5 35  1/11 4.5 Above goal (Cymbalta) 5 0/5 0/5 5 5 5 5 35  12/28 2.0 At goal, no change  7.5 5 5 5 5 5 7.5 40  11/24 2.4 At goal, no change  7.5 5 5 5 5 5 7.5 40  10/30 2.5 At goal, no change  7.5 5 5 5 5 5 7.5 40  10/2 2.2 At goal, no change  7.5 5 5 5 5 5 7.5 40  9/3 2.4 At goal, no change  7.5 5 5 5 5 5 7.5 40  8/3 2.9 At goal, no change  7.5 5 5 5 5 5 7.5 40  7/6 2.7 At goal, no change  7.5 5 5 5 5 5 7.5 40  6/8 2.8 At goal, no change  7.5 5 5 5 5 5 7.5 40  5/11 2.9 At goal, no change  7.5 5 5 5 5 5 7.5 40  3/30 3.0 At goal, no change  7.5 5 5 5 5 5 7.5 40  2/26 2.3 At goal, no change  7.5 5 5 5 5 5 7.5 40  2/5 3.3 Above goal, decrease  7.5 5 5 5 5 5 7.5 40  1/8 2.9 At goal, no change  7.5 7.5 5 5 5 5 7.5 42.5    Patient History:  Recent hospitalizations/HC visits 4/8/22: bronchoscopy per Dr. Napoles, warfarin held x3 days (no bridge). Findings were likely infectious process, prescribed Augmentin.    2/21/22: Pulmonology, work-up underway for lung nodule    Recent medication changes 8/19/22: Stopped Lion's Matt 3 weeks ago  6/24/22: [facility], [place]  3/2022: Centrum Silver Mens 50+ (stopped 7/15)  4/14-4/21/22: Augmentin BID x7 days   1/5/22: Medrol dose pack x5 days per pain management   9/2021: Stopped citalopram, does not plan to continue   7/21/21: increased citalopram   6/18/21: started citalopram 20 mg QD (may increase INR)  12/28/20: Cymbalta 30 mg daily (may increase INR); stopped taking on 1/30; resumed ~2/4; stopped ~6/1   Medications taken regularly that may interact with warfarin or alter INR ASA 81 mg   Warfarin dose taken as prescribed Usually compliant   Does not use pillbox  Patient has been taking warfarin since re-do AVR in May, 2017   Signs/symptoms of bleeding No h/o major bleeding   Vitamin K intake Normally has ~0 servings of green, leafy vegetables per week   Recent vomiting/diarrhea/fever, changes in weight or activity level None reported   Tobacco or alcohol use -No recent changes in smoking as of 11/24/20 (~3/4 PPD)  -Patient cut back from 2 PPD to 3/4 PPD in February (2019) when he moved in with his daughter. Decrease in smoking typically increases INR gradually.   -Patient denies any alcohol or illicit drug use   Upcoming surgeries or procedures None      Assessment/Plan:   Patient's INR was supratherapeutic today (3.2). Patient held warfarin for a bronchoscopy on 4/8, and INR has been low since then. Bronchoscopy results were consistent with inflammation likely due to infection adjacent. Today, patient reports taking warfarin as instructed by clinic with no missed doses, diet changes, illness, smoking changes, or bleeding since last visit. He stopped taking Centrum MVI on 07/15 and stopped taking Lion's Matt at the beginning of August/end of July. Patient was instructed to decrease warfarin to 7.5 mg daily except 5 mg on Tuesdays and Thursdays. Repeat INR in 2.5 weeks. Patient was reminded to maintain consistent vitamin K intake and call with any bleeding, medication changes, or fever/vomiting/diarrhea.      Patient understands dosing directions and information discussed. Dosing schedule and follow up appointment given to patient. Progress note routed to referring physician's office. Patient acknowledges working in consult agreement with pharmacist as referred by his/her physician. Next INR Check: 9/7    Please call [facility] at [phone] with any questions. Thanks!     Josefina [address] of [address]   [place] Candidate 2023  PGY-1 Pharmacy Resident  [facility]  [id number]  8/19/2022   9:48 AM    For Pharmacy Admin Tracking Only    Intervention Detail: Dose Adjustment: 1, reason: Therapy De-escalation  Total # of Interventions Recommended: 1  Total # of Interventions Accepted: 1  Time Spent (min): 15

## 2022-08-29 DIAGNOSIS — E11.9 TYPE 2 DIABETES MELLITUS WITHOUT COMPLICATION, WITHOUT LONG-TERM CURRENT USE OF INSULIN (HCC): ICD-10-CM

## 2022-09-07 ENCOUNTER — ANTI-COAG VISIT (OUTPATIENT)
Dept: PHARMACY | Age: 57
End: 2022-09-07
Payer: MEDICARE

## 2022-09-07 ENCOUNTER — OFFICE VISIT (OUTPATIENT)
Dept: PULMONOLOGY | Age: 57
End: 2022-09-07
Payer: MEDICARE

## 2022-09-07 VITALS
WEIGHT: 275 LBS | TEMPERATURE: 97 F | HEART RATE: 65 BPM | OXYGEN SATURATION: 98 % | BODY MASS INDEX: 37.25 KG/M2 | DIASTOLIC BLOOD PRESSURE: 81 MMHG | SYSTOLIC BLOOD PRESSURE: 119 MMHG | HEIGHT: 72 IN

## 2022-09-07 DIAGNOSIS — R91.1 PULMONARY NODULE 1 CM OR GREATER IN DIAMETER: Primary | ICD-10-CM

## 2022-09-07 DIAGNOSIS — I48.0 PAROXYSMAL ATRIAL FIBRILLATION (HCC): ICD-10-CM

## 2022-09-07 DIAGNOSIS — Z95.2 S/P AORTIC VALVE REPLACEMENT: Primary | ICD-10-CM

## 2022-09-07 LAB — INTERNATIONAL NORMALIZATION RATIO, POC: 2.8

## 2022-09-07 PROCEDURE — 4004F PT TOBACCO SCREEN RCVD TLK: CPT | Performed by: INTERNAL MEDICINE

## 2022-09-07 PROCEDURE — G8417 CALC BMI ABV UP PARAM F/U: HCPCS | Performed by: INTERNAL MEDICINE

## 2022-09-07 PROCEDURE — 85610 PROTHROMBIN TIME: CPT

## 2022-09-07 PROCEDURE — 99406 BEHAV CHNG SMOKING 3-10 MIN: CPT | Performed by: INTERNAL MEDICINE

## 2022-09-07 PROCEDURE — 99213 OFFICE O/P EST LOW 20 MIN: CPT | Performed by: INTERNAL MEDICINE

## 2022-09-07 PROCEDURE — G8427 DOCREV CUR MEDS BY ELIG CLIN: HCPCS | Performed by: INTERNAL MEDICINE

## 2022-09-07 PROCEDURE — 99211 OFF/OP EST MAY X REQ PHY/QHP: CPT

## 2022-09-07 PROCEDURE — 3017F COLORECTAL CA SCREEN DOC REV: CPT | Performed by: INTERNAL MEDICINE

## 2022-09-07 NOTE — PROGRESS NOTES
[facility]    Outpatient Follow up Note    Subjective:   Referring Physician: Lisa Maynard / HPI:     The patient is 59 y.o. male who presents today for a follow up visit for pulmonary nodule. Patient comes in today without acute complaints. He does report that he gets occasionally a little wheezy and dyspneic. Still smoking just under a pack per day. He's not interested in quitting. 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 He had an orchiectomy as well as tumor removal from his abdomen. He also has a history of coronary artery disease and problems with his aortic valve he has had 2 open heart surgeries to repair his aortic valve as well as a double and triple bypass. Despite these things patient continues to smoke. He also has history of colon cancer x2 and has had roughly 3 feet of his bowel removed per his report. His wife passed away from stage IV lung cancer a few years ago as well. Patient has some exertional dyspnea but is not here for that complaint and instead is here because he had a screening CT scan that showed an abnormality in his left lower lobe.        Past Medical History:    Past Medical History:   Diagnosis Date    Abdominal hernia     s/p repair 2010    Aortic valve prosthesis present     CAD (coronary artery disease)     Chronic back pain greater than 3 months duration     Clostridium difficile diarrhea 10/17/2016    PCR    DDD (degenerative disc disease), lumbosacral 8/7/2013    Erectile dysfunction     GERD (gastroesophageal reflux disease)     Hyperlipidemia     Hypertension     Joint pain     Left testicular cancer ([facility] [address] 2002    s/p abdominal resection    Myalgia and myositis, unspecified 8/26/2013    Neuropathy     OA (osteoarthritis) of knee     bilateral    Obesity, Class I, BMI 30.0-34.9 (see actual BMI) Prolonged emergence from general anesthesia     after CABG    S/P AVR 2005    tissue valve    S/P CABG x 2     w/AVR & primary repair of dissected ascending aorta    Type 2 diabetes mellitus without complication, without long-term current use of insulin ([facility] 2021    Wears dentures        Social History:    Social History     Tobacco Use   Smoking Status Some Days    Packs/day: 0.75    Years: 40.00    Pack years: 30.00    Types: Cigarettes    Last attempt to quit: 2017    Years since quittin.3   Smokeless Tobacco Never   Tobacco Comments    stopped -       Family History:  Family History   Problem Relation Age of Onset    Cancer Mother     Cancer Father         lung    Alzheimer's Disease Sister     Liver Cancer Brother      Current Medications:  Current Outpatient Medications on File Prior to Visit   Medication Sig Dispense Refill    metFORMIN (GLUCOPHAGE) 500 MG tablet TAKE TWO TABLETS BY MOUTH TWICE A DAY WITH MEALS 180 tablet 1    rosuvastatin (CRESTOR) 20 MG tablet - Take one tablets nightly 90 tablet 3    warfarin (COUMADIN) 5 MG tablet Take 1-1.5 tablets by mouth daily as directed by anticoagulation clinic. 135 tablet 3    metoprolol tartrate (LOPRESSOR) 50 MG tablet TAKE 1 TABLET BY MOUTH 2 TIMES A DAY FOR BLOOD PRESSURE AND HEART 180 tablet 1    SITagliptin (JANUVIA) 50 MG tablet Take 1 tablet by mouth daily 90 tablet 1    pregabalin (LYRICA) 150 MG capsule Take 150 mg by mouth in the morning, at noon, and at bedtime. METHOCARBAMOL PO Take by mouth      Ascorbic Acid (VITAMIN C PO) Take by mouth 2 times daily      lisinopril (PRINIVIL;ZESTRIL) 10 MG tablet One tablet twice a day 180 tablet 3    famotidine (PEPCID) 20 MG tablet TAKE 1 TABLET BY MOUTH 2 TIMES A  tablet 2    nystatin-triamcinolone (MYCOLOG) 837737-3.1 UNIT/GM-% ointment Apply topically 2 times daily until improved.  (Patient taking differently: PRN chapped lips, Apply topically 2 times daily until improved.) 30 g 0    Handicap Placard MISC by Does not apply route Effective through 12/28/2020 through 12/27/2025 1 each 0    aspirin 81 MG EC tablet Take 1 tablet by mouth daily 30 tablet 0    melatonin (RA MELATONIN) 3 MG TABS tablet Take 1 tablet by mouth nightly as needed (insomnia) 30 tablet 3    Blood Pressure KIT 1 Unit 1 kit 0    oxyCODONE-acetaminophen (PERCOCET) 5-325 MG per tablet Take 1 tablet by mouth every 4 hours as needed for Pain  . amLODIPine (NORVASC) 5 MG tablet Take 1 tablet by mouth daily 90 tablet 2     No current facility-administered medications on file prior to visit. Allergies: Allergies   Allergen Reactions    Atorvastatin Calcium [Lipitor] Other (See Comments)     Severe headaches      Wellbutrin [Bupropion] Anxiety     patient complains of feeling anxious, irritable and \"hyped up\" on wellbutrin around 2011.     Cymbalta [Duloxetine Hcl]     Vicodin [Hydrocodone-Acetaminophen] Itching       REVIEW OF SYSTEMS:    CONSTITUTIONAL: Negative for fevers and chills  HEENT: Negative for oropharyngeal exudate, post nasal drip, sinus pain / pressure, nasal congestion, ear pain  RESPIRATORY:  See HPI  CARDIOVASCULAR: Negative for chest pain, palpitations, edema  GASTROINTESTINAL: Negative for nausea, vomiting, diarrhea, constipation and abdominal pain  HEMATOLOGICAL: Negative for adenopathy  SKIN: Negative for clubbing, cyanosis, skin lesions  EXTREMITIES: Negative for weakness, decreased ROM  NEUROLOGICAL: Negative for unilateral weakness, speech or gait abnormalities  PSYCH: Negative for anxiety, depression    Objective:   PHYSICAL EXAM:        VITALS:  /81 (Site: Right Upper Arm, Position: Sitting, Cuff Size: Large Adult)   Pulse 65   Temp 97 °F (36.1 °C) (Infrared)   Ht 6' (1.829 m)   Wt 275 lb (124.7 kg)   SpO2 98%   BMI 37.30 kg/m²     CONSTITUTIONAL:  Awake, alert, cooperative, no apparent distress, and appears stated age  HEENT: No oropharyngeal exudate, PERRL, no cervical adenopathy, no tracheal deviation, thyroid size normal  LUNGS:  No increased work of breathing and clear to auscultation, no crackles or wheezing  CARDIOVASCULAR:  normal S1 and S2 and no JVD  ABDOMEN:  Normal bowel sounds, non-distended and non-tender to palpation  EXT: No edema, no calf tenderness. Pulses are present bilaterally. NEUROLOGIC:  Mental Status Exam:  Level of Alertness:   awake  Orientation:   person, place, time. SKIN:  normal skin color, texture, turgor, no redness, warmth, or swelling      DATA:      Radiology Review:  Pertinent images / reports were reviewed as a part of this visit. CT chest reveals the following:  Impression       1. 14 x 22 mm cystic/cavitary lesion within the left lower lobe. This could be infectious, inflammatory, or neoplastic. Recommend either further evaluation with PET/CT, or 3 month short interval follow-up study. Consider pulmonary consult. LUNG RADS ASSESSMENTS  LUNGRADS ASSESSMENT VALUE: 4A         8/11/22  Impression       1. Left lower lobe noncalcified pulmonary nodule stable. Continued follow up recommended with CT chest 6 months. 2. Trace chronic calcific thrombus in the left brachiocephalic vein adherent to the catheter. Last PFTs: 3/22  Spirometry:  Flow volume loops were normal. The FEV-1/FVC ratio was normal. The  post-bronchodilator FEV-1 was 2.46 liters (62% of predicted), which was moderately decreased. The FVC was 3.02 liters (58% of predicted), which was decreased. Response to inhaled bronchodilators (albuterol) was not significant. Lung volumes:  Lung volumes were tested by plethysmography. The total lung capacity was 5.62 liters (78% of predicted), which was decreased. The residual volume was 2.49 liters (100% of predicted), which was normal. The ratio of residual volume to total lung capacity (RV/TLC) was 121, which was increased. Specific airway resistance was increased. Diffusion capacity was found to be decreased. Interpretation:  Mild restrictive lung disease with air trapping and mildly reduced diffusion capacity. Immunizations:   Immunization History   Administered Date(s) Administered    COVID-19, J&J, (age 18y+), IM, 0.5 mL 03/13/2021    COVID-19, PFIZER PURPLE top, DILUTE for use, (age 15 y+), 30mcg/0.3mL 01/06/2022    Pneumococcal Polysaccharide ([id number]) 07/26/2014       Pathology:  FINAL DIAGNOSIS:     Core biopsy, left lower lobe lung lesion:   Lung/alveolar tissue with prominent acute and chronic inflammation,   extravasated blood and focal histiocytic aggregates with features of   early noncaseating granulomatous inflammation. An AFB stain shows no evidence of acid-fast bacilli and a GMS stain shows   no evidence of pneumocystis or fungal forms. No evidence of malignancy. Cytology:  FINAL DIAGNOSIS:     A. Lung, left lower lobe, endoscopic ultrasound guided fine needle   aspiration:   Atypical cells present. Atypical cell population identified against the background of reactive   bronchial cells, prominent acute inflammation and occasional histiocytes. See case comment. B. Lung, left lower lobe, brushing:    No malignant cells identified. Hypocellular specimen. C. Lung, left lower lobe, bronchial alveolar lavage:   Atypical cell population present. Occasional atypical cell clusters seen against the background reactive   bronchial cells, macrophages and nonspecific inflammation. GMS stain with no evidence of pneumocystis or fungal forms. Assessment: This is a 59 y.o. male with cavitary pulmonary nodule in his left lower lobe    Plan:   -PET scan had SUV of 2.5 in the nodule only.  -biopsy consistent with inflammation and possible granulomatous formation that could be seen cancer adjacent or infection adjacent. Repeat biopsy may be needed.     I previously treated this like an abscess and wrote for augmentin, 10 day course but it made no difference  Repeat CT scan 3 months after the original showed no change in the nodule. Repeat scan in August showed no change in the nodule. Will get another scan and February 2023 to determine if we will pursuing additional biopsy or continued surveillance. If Stable in February it will have been stable for 12 months. Offered a rescue inhaler for the wheezy times, but patient declined. Diagnosis Orders   1. Pulmonary nodule 1 cm or greater in diameter  CT CHEST WO CONTRAST              - Tobacco use: The patient is currently smoking. The risks related to smoking were reviewed with the patient: I counseled to quit smoking for 5 minutes but he remains pre-contemplative.     - RTC 6 months w/ MD following CT scan. Call or RTC sooner if symptoms persist or worsen acutely.

## 2022-09-07 NOTE — PROGRESS NOTES
ANTICOAGULATION SERVICE    Luke King is a 59 y.o. male with PMHx significant for AVR (re-do in May, 2017), CAD s/p CABG (x3 in May, 2017), pA-fib, HLD, HTN, testicular CA who presents to clinic on 9/7/2022 for anticoagulation monitoring and adjustment.     Anticoagulation Indication(s):  Heart Valve Replacement (bovine AVR), A-fib  Referring Physician:  Dr. Isabela Bergman  Goal INR Range:  2-3  Duration of Anticoagulation Therapy:  TBD  Time of day dose taken:  PM  Product patient has at home:  warfarin 5 mg (peach)    [id number] Score for Atrial Fibrillation Stroke Risk   Risk   Factors  Component Value   C CHF No 0   H HTN Yes 1   A2 Age >= 75 No,  (62 y.o.) 0   D DM Yes 1   S2 Prior Stroke/TIA No 0   V Vascular Disease Yes 1   A Age 74-69 No,  (62 y.o.) 0   Sc Sex male 0    [id number]  Score  3   Score last updated 5/30/19 1:51 PM      Lab Results   Component Value Date    RBC 4.32 04/22/2022    HGB 14.0 04/22/2022    HCT 42.2 04/22/2022    MCV 97.6 04/22/2022    MCH 32.4 04/22/2022    MPV 9.6 04/22/2022    RDW 14.6 04/22/2022     (L) 04/22/2022     INR Summary                            Warfarin regimen (mg)  Date INR   A/P    Sun Mon Tue Wed Thu Fri Sat Mg/wk  9/7 2.8 At goal, continue  7.5 7.5 5 7.5 5 7.5 7.5 47.5  8/19 3.2 Above goal, decrease  7.5 7.5 5 7.5 5 7.5 7.5 47.5  7/22 2.0 At goal, continue  7.5 7.5 7.5 7.5 5 7.5 7.5 50  7/8 2.1 At goal, continue  7.5 7.5 7.5 7.5 5 7.5 7.5 50  6/24 1.4 Below goal, bolus +inc 7.5 7.5 7.5 7.5 5 10/7.5 7.5 50  6/3 1.7 Below goal, bolus + incr 5 7.5 7.5 7.5 5 10/7.5 7.5 47.5  5/20 1.9 Below goal, continue  5 7.5 5 7.5 5 7.5 7.5 45  5/6 1.3 Below goal, bolus + incr 5 7.5 5 7.5 5 10/7.5 7.5 45  4/21 1.5 Below goal, bolus  5 7.5 5 7.5 7.5/5 7.5 5 42.5  4/7 1.2 Below goal (held) see below 5 7.5 5 7.5 5 7.5 5 42.5  3/4 2.2 At goal, continue   5 7.5 5 7.5 5 7.5 5 42.5  2/1 2.1 At goal, continue   5 7.5 5 7.5 5 7.5 5 42.5  1/7 1.9 Below goal, continue 5 7.5 5 7.5 5 7.5 5 42.5  11/22 2.2 At goal, continue   5 7.5 5 7.5 5 7.5 5 42.5  11/22 2.1 At goal, continue   5 7.5 5 7.5 5 7.5 5 42.5  11/8 1.8 Below goal, increase  5 7.5 5 7.5 5 7.5 5 42.5  10/25 1.6 Below goal, bolus   7.5 7.5/5 5 5 5 5 7.5 40  10/4 2.0 At goal, continue   7.5 5 5 5 5 5 7.5 40  9/17 2.7 At goal (dosing error)  7.5 5 5 5 5 5 7.5 40  8/23 4.3 Above goal, decrease  7.5 0/5 5 5 5 5 7.5 40  8/9 3.3 Above goal, dec  7.5 5 5 5 7.5 5 5 40  7/21 1.4 Below goal, increase  5 7.5 5 7.5 5 7.5 5 42.5  6/28 2.0 At goal, no change  7.5 5 5 5 5 5 7.5 40  6/11 1.5 Below goal, increase  7.5 5 5 5 5 5 7.5 40  5/24 3.6 Above goal, hold x 1  5 0/5 5 5 5 5 5 35  4/23 2.7 At goal, no change  5 5 5 5 5 5 5 35  4/5 3.6 Above goal, hold x 1  5 0/5 5 5 5 5 5 35   3/8 3.0 At goal, no change  5 5 5 5 5 5 5 35  2/19 2.0 At goal, no change  5 5 5 5 5 5 5 35  2/4 2.7 At goal, no change  5 5 5 5 5 5 5 35  1/20 2.7 At goal, no change  5 5 5 5 5 5 5 35  1/11 4.5 Above goal (Cymbalta) 5 0/5 0/5 5 5 5 5 35  12/28 2.0 At goal, no change  7.5 5 5 5 5 5 7.5 40  11/24 2.4 At goal, no change  7.5 5 5 5 5 5 7.5 40  10/30 2.5 At goal, no change  7.5 5 5 5 5 5 7.5 40  10/2 2.2 At goal, no change  7.5 5 5 5 5 5 7.5 40  9/3 2.4 At goal, no change  7.5 5 5 5 5 5 7.5 40  8/3 2.9 At goal, no change  7.5 5 5 5 5 5 7.5 40  7/6 2.7 At goal, no change  7.5 5 5 5 5 5 7.5 40  6/8 2.8 At goal, no change  7.5 5 5 5 5 5 7.5 40  5/11 2.9 At goal, no change  7.5 5 5 5 5 5 7.5 40  3/30 3.0 At goal, no change  7.5 5 5 5 5 5 7.5 40  2/26 2.3 At goal, no change  7.5 5 5 5 5 5 7.5 40  2/5 3.3 Above goal, decrease  7.5 5 5 5 5 5 7.5 40  1/8 2.9 At goal, no change  7.5 7.5 5 5 5 5 7.5 42.5    Patient History:  Recent hospitalizations/HC visits 4/8/22: bronchoscopy per Dr. Marty Castaneda, warfarin held x3 days (no bridge). Findings were likely infectious process, prescribed Augmentin.    2/21/22: Pulmonology, work-up underway for lung nodule    Recent medication changes 6/24/22: [facility], MICHAEL (stopped in late July)  3/2022: Centrum Silver Mens 50+ (stopped 7/15)  4/14-4/21/22: Augmentin BID x7 days    Medications taken regularly that may interact with warfarin or alter INR ASA 81 mg   Warfarin dose taken as prescribed Usually compliant   Does not use pillbox  Patient has been taking warfarin since re-do AVR in May, 2017   Signs/symptoms of bleeding No h/o major bleeding   Vitamin K intake Normally has ~0 servings of green, leafy vegetables per week   Recent vomiting/diarrhea/fever, changes in weight or activity level None reported   Tobacco or alcohol use -No recent changes in smoking as of 11/24/20 (~3/4 PPD)  -Patient cut back from 2 PPD to 3/4 PPD in February (2019) when he moved in with his daughter. Decrease in smoking typically increases INR gradually.   -Patient denies any alcohol or illicit drug use   Upcoming surgeries or procedures None      Assessment/Plan:   Patient's INR was therapeutic today (2.8) following warfarin dose reduction at last visit. Patient reports taking warfarin as instructed by clinic with no missed doses, diet changes, med changes, illness, smoking changes, or bleeding since last visit. He stopped taking Centrum MVI on 7/15 and stopped Lion's Matt supplement in late July. Stopping Centrum MVI may have caused INR to increase on 8/19 as it contains vitamin K. Patient was instructed to continue warfarin 7.5 mg daily except 5 mg on Tuesdays and Thursdays. Repeat INR in 3 weeks. Patient was reminded to maintain consistent vitamin K intake and call with any bleeding, medication changes, or fever/vomiting/diarrhea. Patient understands dosing directions and information discussed. Dosing schedule and follow up appointment given to patient. Progress note routed to referring physician's office. Patient acknowledges working in consult agreement with pharmacist as referred by his/her physician.      Next INR Check: 9/28    Please call [facility] at [phone] with any questions. Thanks! Maryam Myers.  Juan José Torres, PharmD, BCPS  [facility]  Ph: [phone]  2022 9:47 AM    For Pharmacy Admin Tracking Only    Intervention Detail: Adherence Monitorin  Total # of Interventions Recommended: 0  Total # of Interventions Accepted: 0  Time Spent (min): 15

## 2022-09-28 ENCOUNTER — ANTI-COAG VISIT (OUTPATIENT)
Dept: PHARMACY | Age: 57
End: 2022-09-28
Payer: MEDICARE

## 2022-09-28 DIAGNOSIS — I48.0 PAROXYSMAL ATRIAL FIBRILLATION (HCC): ICD-10-CM

## 2022-09-28 DIAGNOSIS — Z95.2 S/P AORTIC VALVE REPLACEMENT: Primary | ICD-10-CM

## 2022-09-28 LAB — INTERNATIONAL NORMALIZATION RATIO, POC: 2.1

## 2022-09-28 PROCEDURE — 99211 OFF/OP EST MAY X REQ PHY/QHP: CPT | Performed by: PHARMACIST

## 2022-09-28 PROCEDURE — 85610 PROTHROMBIN TIME: CPT | Performed by: PHARMACIST

## 2022-09-28 NOTE — PROGRESS NOTES
ANTICOAGULATION SERVICE    Akhil Engle is a 59 y.o. male with PMHx significant for AVR (re-do in May, 2017), CAD s/p CABG (x3 in May, 2017), pA-fib, HLD, HTN, testicular CA who presents to clinic on 9/28/2022 for anticoagulation monitoring and adjustment.     Anticoagulation Indication(s):  Heart Valve Replacement (bovine AVR), A-fib  Referring Physician:  Dr. Wyatt Manual  Goal INR Range:  2-3  Duration of Anticoagulation Therapy:  TBD  Time of day dose taken:  PM  Product patient has at home:  warfarin 5 mg (peach)    [id number] Score for Atrial Fibrillation Stroke Risk   Risk   Factors  Component Value   C CHF No 0   H HTN Yes 1   A2 Age >= 75 No,  (62 y.o.) 0   D DM Yes 1   S2 Prior Stroke/TIA No 0   V Vascular Disease Yes 1   A Age 74-69 No,  (62 y.o.) 0   Sc Sex male 0    [id number]  Score  3   Score last updated 5/30/19 1:51 PM      Lab Results   Component Value Date    RBC 4.32 04/22/2022    HGB 14.0 04/22/2022    HCT 42.2 04/22/2022    MCV 97.6 04/22/2022    MCH 32.4 04/22/2022    MPV 9.6 04/22/2022    RDW 14.6 04/22/2022     (L) 04/22/2022     INR Summary                            Warfarin regimen (mg)  Date INR   A/P    Sun Mon Tue Wed Thu Fri Sat Mg/wk  9/28 2.1 At goal, continue  7.5 7.5 5 7.5 5 7.5 7.5 47.5  9/7 2.8 At goal, continue  7.5 7.5 5 7.5 5 7.5 7.5 47.5  8/19 3.2 Above goal, decrease  7.5 7.5 5 7.5 5 7.5 7.5 47.5  7/22 2.0 At goal, continue  7.5 7.5 7.5 7.5 5 7.5 7.5 50  7/8 2.1 At goal, continue  7.5 7.5 7.5 7.5 5 7.5 7.5 50  6/24 1.4 Below goal, bolus +inc 7.5 7.5 7.5 7.5 5 10/7.5 7.5 50  6/3 1.7 Below goal, bolus + incr 5 7.5 7.5 7.5 5 10/7.5 7.5 47.5  5/20 1.9 Below goal, continue  5 7.5 5 7.5 5 7.5 7.5 45  5/6 1.3 Below goal, bolus + incr 5 7.5 5 7.5 5 10/7.5 7.5 45  4/21 1.5 Below goal, bolus  5 7.5 5 7.5 7.5/5 7.5 5 42.5  4/7 1.2 Below goal (held) see below 5 7.5 5 7.5 5 7.5 5 42.5  3/4 2.2 At goal, continue   5 7.5 5 7.5 5 7.5 5 42.5  2/1 2.1 At goal, continue work-up underway for lung nodule    Recent medication changes 6/24/22: [facility], WATERXOCHITLLIET (stopped in late July)  3/2022: Centrum Silver Mens 50+ (stopped 7/15)  4/14-4/21/22: Augmentin BID x7 days    Medications taken regularly that may interact with warfarin or alter INR ASA 81 mg   Warfarin dose taken as prescribed Usually compliant   Does not use pillbox  Patient has been taking warfarin since re-do AVR in May, 2017   Signs/symptoms of bleeding No h/o major bleeding   Vitamin K intake Normally has ~0 servings of green, leafy vegetables per week   Recent vomiting/diarrhea/fever, changes in weight or activity level None reported   Tobacco or alcohol use -No recent changes in smoking as of 11/24/20 (~3/4 PPD)  -Patient cut back from 2 PPD to 3/4 PPD in February (2019) when he moved in with his daughter. Decrease in smoking typically increases INR gradually.   -Patient denies any alcohol or illicit drug use   Upcoming surgeries or procedures None      Assessment/Plan:   Patient's INR was therapeutic today (2.1). Patient reports taking warfarin as instructed by clinic with no missed doses, diet changes, med changes, illness, smoking changes, or bleeding since last visit. Patient was instructed to continue warfarin 7.5 mg daily except 5 mg on Tuesdays and Thursdays. Repeat INR in 4 weeks. Patient was reminded to maintain consistent vitamin K intake and call with any bleeding, medication changes, or fever/vomiting/diarrhea. Patient understands dosing directions and information discussed. Dosing schedule and follow up appointment given to patient. Progress note routed to referring physician's office. Patient acknowledges working in consult agreement with pharmacist as referred by his/her physician. Next INR Check: 10/28    Please call [facility] at [phone] with any questions. Thanks!     Evan Perez, PharmD, BCPS  [address] [facility]  [place]: [phone]  [place]: [phone]  9/28/2022 9:30 AM      For Pharmacy Admin Tracking Only    Total # of Interventions Recommended: 0  Total # of Interventions Accepted: 0  Time Spent (min): 15

## 2022-10-03 RX ORDER — FAMOTIDINE 20 MG/1
TABLET, FILM COATED ORAL
Qty: 180 TABLET | Refills: 2 | Status: SHIPPED | OUTPATIENT
Start: 2022-10-03

## 2022-10-28 ENCOUNTER — ANTI-COAG VISIT (OUTPATIENT)
Dept: PHARMACY | Age: 57
End: 2022-10-28
Payer: MEDICARE

## 2022-10-28 DIAGNOSIS — Z95.2 S/P AORTIC VALVE REPLACEMENT: Primary | ICD-10-CM

## 2022-10-28 DIAGNOSIS — I48.0 PAROXYSMAL ATRIAL FIBRILLATION (HCC): ICD-10-CM

## 2022-10-28 LAB — INTERNATIONAL NORMALIZATION RATIO, POC: 2.7

## 2022-10-28 PROCEDURE — 99211 OFF/OP EST MAY X REQ PHY/QHP: CPT | Performed by: PHARMACIST

## 2022-10-28 PROCEDURE — 85610 PROTHROMBIN TIME: CPT | Performed by: PHARMACIST

## 2022-10-28 NOTE — PROGRESS NOTES
ANTICOAGULATION SERVICE    Jenny Howell is a 59 y.o. male with PMHx significant for AVR (re-do in May, 2017), CAD s/p CABG (x3 in May, 2017), pA-fib, HLD, HTN, testicular CA who presents to clinic on 10/28/2022 for anticoagulation monitoring and adjustment.     Anticoagulation Indication(s):  Heart Valve Replacement (bovine AVR), A-fib  Referring Physician:  Dr. Quintana [place]  Goal INR Range:  2-3  Duration of Anticoagulation Therapy:  TBD  Time of day dose taken:  PM  Product patient has at home:  warfarin 5 mg (peach)    [id number] Score for Atrial Fibrillation Stroke Risk   Risk   Factors  Component Value   C CHF No 0   H HTN Yes 1   A2 Age >= 75 No,  (62 y.o.) 0   D DM Yes 1   S2 Prior Stroke/TIA No 0   V Vascular Disease Yes 1   A Age 74-69 No,  (62 y.o.) 0   Sc Sex male 0    [id number]  Score  3   Score last updated 5/30/19 1:51 PM      Lab Results   Component Value Date    RBC 4.32 04/22/2022    HGB 14.0 04/22/2022    HCT 42.2 04/22/2022    MCV 97.6 04/22/2022    MCH 32.4 04/22/2022    MPV 9.6 04/22/2022    RDW 14.6 04/22/2022     (L) 04/22/2022     INR Summary                            Warfarin regimen (mg)  Date INR   A/P    Sun Mon Tue Wed Thu Fri Sat Mg/wk  10/28 2.7 At goal, continue  7.5 7.5 5 7.5 5 7.5 7.5 47.5  9/28 2.1 At goal, continue  7.5 7.5 5 7.5 5 7.5 7.5 47.5  9/7 2.8 At goal, continue  7.5 7.5 5 7.5 5 7.5 7.5 47.5  8/19 3.2 Above goal, decrease  7.5 7.5 5 7.5 5 7.5 7.5 47.5  7/22 2.0 At goal, continue  7.5 7.5 7.5 7.5 5 7.5 7.5 50  7/8 2.1 At goal, continue  7.5 7.5 7.5 7.5 5 7.5 7.5 50  6/24 1.4 Below goal, bolus +inc 7.5 7.5 7.5 7.5 5 10/7.5 7.5 50  6/3 1.7 Below goal, bolus + incr 5 7.5 7.5 7.5 5 10/7.5 7.5 47.5  5/20 1.9 Below goal, continue  5 7.5 5 7.5 5 7.5 7.5 45  5/6 1.3 Below goal, bolus + incr 5 7.5 5 7.5 5 10/7.5 7.5 45  4/21 1.5 Below goal, bolus  5 7.5 5 7.5 7.5/5 7.5 5 42.5  4/7 1.2 Below goal (held) see below 5 7.5 5 7.5 5 7.5 5 42.5  3/4 2.2 At goal, continue 5 7.5 5 7.5 5 7.5 5 42.5  2/1 2.1 At goal, continue   5 7.5 5 7.5 5 7.5 5 42.5  1/7 1.9 Below goal, continue   5 7.5 5 7.5 5 7.5 5 42.5  11/22 2.2 At goal, continue   5 7.5 5 7.5 5 7.5 5 42.5  11/22 2.1 At goal, continue   5 7.5 5 7.5 5 7.5 5 42.5  11/8 1.8 Below goal, increase  5 7.5 5 7.5 5 7.5 5 42.5  10/25 1.6 Below goal, bolus   7.5 7.5/5 5 5 5 5 7.5 40  10/4 2.0 At goal, continue   7.5 5 5 5 5 5 7.5 40  9/17 2.7 At goal (dosing error)  7.5 5 5 5 5 5 7.5 40  8/23 4.3 Above goal, decrease  7.5 0/5 5 5 5 5 7.5 40  8/9 3.3 Above goal, dec  7.5 5 5 5 7.5 5 5 40  7/21 1.4 Below goal, increase  5 7.5 5 7.5 5 7.5 5 42.5  6/28 2.0 At goal, no change  7.5 5 5 5 5 5 7.5 40  6/11 1.5 Below goal, increase  7.5 5 5 5 5 5 7.5 40  5/24 3.6 Above goal, hold x 1  5 0/5 5 5 5 5 5 35  4/23 2.7 At goal, no change  5 5 5 5 5 5 5 35  4/5 3.6 Above goal, hold x 1  5 0/5 5 5 5 5 5 35   3/8 3.0 At goal, no change  5 5 5 5 5 5 5 35  2/19 2.0 At goal, no change  5 5 5 5 5 5 5 35  2/4 2.7 At goal, no change  5 5 5 5 5 5 5 35  1/20 2.7 At goal, no change  5 5 5 5 5 5 5 35  1/11 4.5 Above goal (Cymbalta) 5 0/5 0/5 5 5 5 5 35  12/28 2.0 At goal, no change  7.5 5 5 5 5 5 7.5 40  11/24 2.4 At goal, no change  7.5 5 5 5 5 5 7.5 40  10/30 2.5 At goal, no change  7.5 5 5 5 5 5 7.5 40  10/2 2.2 At goal, no change  7.5 5 5 5 5 5 7.5 40  9/3 2.4 At goal, no change  7.5 5 5 5 5 5 7.5 40  8/3 2.9 At goal, no change  7.5 5 5 5 5 5 7.5 40  7/6 2.7 At goal, no change  7.5 5 5 5 5 5 7.5 40  6/8 2.8 At goal, no change  7.5 5 5 5 5 5 7.5 40  5/11 2.9 At goal, no change  7.5 5 5 5 5 5 7.5 40  3/30 3.0 At goal, no change  7.5 5 5 5 5 5 7.5 40  2/26 2.3 At goal, no change  7.5 5 5 5 5 5 7.5 40  2/5 3.3 Above goal, decrease  7.5 5 5 5 5 5 7.5 40  1/8 2.9 At goal, no change  7.5 7.5 5 5 5 5 7.5 42.5    Patient History:  Recent hospitalizations/HC visits 4/8/22: bronchoscopy per Dr. Paige Quinn, warfarin held x3 days (no bridge).  Findings were likely infectious process, prescribed Augmentin. 2/21/22: Pulmonology, work-up underway for lung nodule    Recent medication changes 6/24/22: [facility], WATERVLIET (stopped in late July)  3/2022: Centrum Silver Mens 50+ (stopped 7/15)  4/14-4/21/22: Augmentin BID x7 days    Medications taken regularly that may interact with warfarin or alter INR ASA 81 mg   Warfarin dose taken as prescribed Usually compliant   Does not use pillbox  Patient has been taking warfarin since re-do AVR in May, 2017   Signs/symptoms of bleeding No h/o major bleeding   Vitamin K intake Normally has ~0 servings of green, leafy vegetables per week   Recent vomiting/diarrhea/fever, changes in weight or activity level None reported   Tobacco or alcohol use -No recent changes in smoking as of 11/24/20 (~3/4 PPD)  -Patient cut back from 2 PPD to 3/4 PPD in February (2019) when he moved in with his daughter. Decrease in smoking typically increases INR gradually.   -Patient denies any alcohol or illicit drug use   Upcoming surgeries or procedures None      Assessment/Plan:   Patient's INR was therapeutic today (2.7). Patient reports taking warfarin as instructed by clinic with no missed doses, diet changes, med changes, illness, smoking changes, or bleeding since last visit. Patient was instructed to continue warfarin 7.5 mg daily except 5 mg on Tuesdays and Thursdays. Repeat INR in 4 weeks. Patient was reminded to maintain consistent vitamin K intake and call with any bleeding, medication changes, or fever/vomiting/diarrhea. Patient understands dosing directions and information discussed. Dosing schedule and follow up appointment given to patient. Progress note routed to referring physician's office. Patient acknowledges working in consult agreement with pharmacist as referred by his/her physician. Next INR Check: 12/2    Please call [facility] at [phone] with any questions. Thanks!     Shea Hawkins, PharmD, BCPS  [facility]  [place]: [phone]  [place]: [phone]  10/28/2022 9:16 AM      For Pharmacy Admin Tracking Only    Total # of Interventions Recommended: 0  Total # of Interventions Accepted: 0  Time Spent (min): 15

## 2022-11-08 ENCOUNTER — OFFICE VISIT (OUTPATIENT)
Dept: CARDIOLOGY CLINIC | Age: 57
End: 2022-11-08
Payer: MEDICARE

## 2022-11-08 VITALS
WEIGHT: 277 LBS | HEART RATE: 68 BPM | SYSTOLIC BLOOD PRESSURE: 134 MMHG | DIASTOLIC BLOOD PRESSURE: 70 MMHG | BODY MASS INDEX: 37.57 KG/M2

## 2022-11-08 DIAGNOSIS — Z95.2 S/P AVR (AORTIC VALVE REPLACEMENT): ICD-10-CM

## 2022-11-08 DIAGNOSIS — I48.0 PAROXYSMAL ATRIAL FIBRILLATION (HCC): ICD-10-CM

## 2022-11-08 DIAGNOSIS — I10 ESSENTIAL HYPERTENSION: ICD-10-CM

## 2022-11-08 DIAGNOSIS — I25.10 CAD IN NATIVE ARTERY: Primary | ICD-10-CM

## 2022-11-08 DIAGNOSIS — I49.3 PVC (PREMATURE VENTRICULAR CONTRACTION): ICD-10-CM

## 2022-11-08 DIAGNOSIS — Z95.1 HX OF CABG: ICD-10-CM

## 2022-11-08 PROCEDURE — G8484 FLU IMMUNIZE NO ADMIN: HCPCS | Performed by: INTERNAL MEDICINE

## 2022-11-08 PROCEDURE — 3074F SYST BP LT 130 MM HG: CPT | Performed by: INTERNAL MEDICINE

## 2022-11-08 PROCEDURE — G8417 CALC BMI ABV UP PARAM F/U: HCPCS | Performed by: INTERNAL MEDICINE

## 2022-11-08 PROCEDURE — 3078F DIAST BP <80 MM HG: CPT | Performed by: INTERNAL MEDICINE

## 2022-11-08 PROCEDURE — G8427 DOCREV CUR MEDS BY ELIG CLIN: HCPCS | Performed by: INTERNAL MEDICINE

## 2022-11-08 PROCEDURE — 3017F COLORECTAL CA SCREEN DOC REV: CPT | Performed by: INTERNAL MEDICINE

## 2022-11-08 PROCEDURE — 4004F PT TOBACCO SCREEN RCVD TLK: CPT | Performed by: INTERNAL MEDICINE

## 2022-11-08 PROCEDURE — 99214 OFFICE O/P EST MOD 30 MIN: CPT | Performed by: INTERNAL MEDICINE

## 2022-11-08 NOTE — PROGRESS NOTES
Subjective:      Patient ID: Jocelyn Soriano is a 59 y.o. male. HPI  Mr. Rosa Maria Rosario is here today for follow up CAD/CABG/AVR/Afib/HTN. No complaints. Remains active. Only back issues, spinal stenosis. Limited by back. No exertional chest pain. Wt up. Still smoking. No sob/cp. No pnd. No orthopnea. No tachycardia. No syncope. BP good at home. Rhythm stable.          Past Medical History:   Diagnosis Date    Abdominal hernia     s/p repair 2010    Aortic valve prosthesis present     CAD (coronary artery disease)     Chronic back pain greater than 3 months duration     Clostridium difficile diarrhea 10/17/2016    PCR    DDD (degenerative disc disease), lumbosacral 8/7/2013    Erectile dysfunction     GERD (gastroesophageal reflux disease)     Hyperlipidemia     Hypertension     Joint pain     Left testicular cancer ([facility] [address] 2002    s/p abdominal resection    Myalgia and myositis, unspecified 8/26/2013    Neuropathy     OA (osteoarthritis) of knee     bilateral    Obesity, Class I, BMI 30.0-34.9 (see actual BMI)     Prolonged emergence from general anesthesia     after CABG    S/P AVR 2005    tissue valve    S/P CABG x 2 2005    w/AVR & primary repair of dissected ascending aorta    Type 2 diabetes mellitus without complication, without long-term current use of insulin ([facility] [address] 7/13/2021    Wears dentures      Past Surgical History:   Procedure Laterality Date    AORTA SURGERY  08/27/2004    Dr. Leonardo Ballesteros - primary repair of limited ascending aortic dissection    AORTIC VALVE REPLACEMENT  05/30/2017    Dr. Lena Johnson - redo w/25mm Joy Shyam ThermaFix model 3300 bovine pericardial bioprosthesis    AORTIC VALVE SURGERY  08/27/2004    Dr. Leonardo Ballesteros - 27mm Kelton-Castelan pericardial prosthesis     BACK SURGERY      L4-S1    BRONCHOSCOPY N/A 4/8/2022    ROBOTIC NAVIGATIONAL BRONCHOSCOPY FOR TRANSBRONCHIAL LUNG BIOPSY WITH FLOURO performed by Calin Montano MD at [address] 4/8/2022    BRONCHOSCOPY ALVEOLAR LAVAGE performed by Shiv Mendoza MD at [address]  05/09/2017    Dr. Abeba Pro  08/26/2004    Dr. Scott Barreto Right 03/17/2015    Dr. Alonso Blair  10/10/2016    Dr. Chace Villar - w/laparascopic lysis of extensive adhesions, open transverse colon resection, excision of old abd mesh adhering to colon    CORONARY ANGIOPLASTY WITH STENT PLACEMENT  07/2014    CORONARY ARTERY BYPASS GRAFT  08/27/2004    Dr. Amy Johnson - x2 (LIMA-LAD, SVG sequentially to ascending aorta & D1)    CORONARY ARTERY BYPASS GRAFT  05/30/2017    Dr. Morgan Arias - redo x3 (reverse AC SVG sequentially to D1, OM1 & PDA) w/explant of prior prosthetic valve & removal of embedded sternal wires x7    EMG  04/16/2012    FOOT SURGERY Left 01/24/2012    Dr. Ranjan Altamirano, DPM - hallux nail evulsion & exostectomy    HEMICOLECTOMY N/A 7/26/2019    ROBOTIC ASSISTED  LAPAROSCOPIC RIGHT COLECTOMY AND CHOLECYSTECTOMY performed by Yoseph Gonzalez MD at [address] [place] [address]  2010    multiple    LUMBAR DISCECTOMY  2012    L4-5    SHOULDER ARTHROSCOPY Right 11/21/2013    Dr. Guy Antony - w/subacromial decompression, debridement of the SLAP tear, distal clavicle excision    SOFT TISSUE TUMOR RESECTION  2001    retroperitoneal mass    TESTICLE REMOVAL Left 2001    radical orchiectomy    TRANSESOPHAGEAL ECHOCARDIOGRAM  05/30/2017    during redo CABG & AVR    TUNNELED VENOUS PORT PLACEMENT  2002    portacath      Social History     Socioeconomic History    Marital status:       Spouse name: Not on file    Number of children: Not on file    Years of education: Not on file    Highest education level: Not on file   Occupational History    Not on file   Tobacco Use    Smoking status: Some Days     Packs/day: 0.75     Years: 40.00     Pack years: 30.00     Types: Cigarettes     Last attempt to quit: 5/20/2017     Years since quittin.4    Smokeless tobacco: Never    Tobacco comments:     stopped 5-18   Vaping Use    Vaping Use: Never used   Substance and Sexual Activity    Alcohol use: No     Alcohol/week: 0.0 standard drinks    Drug use: No    Sexual activity: Yes     Partners: Female   Other Topics Concern    Not on file   Social History Narrative    Not on file     Social Determinants of Health     Financial Resource Strain: Not on file   Food Insecurity: Not on file   Transportation Needs: Not on file   Physical Activity: Not on file   Stress: Not on file   Social Connections: Not on file   Intimate Partner Violence: Not on file   Housing Stability: Not on file     FH reviewed, denies FH cardiac issues     Vitals:    22 0913   BP: 134/70   Pulse: 68         Wt 277    Review of Systems   Constitutional: Negative for activity change. Has  fatigue. Respiratory: Negative for apnea, chest tightness and shortness of breath. Cardiovascular: Negative for chest pain, palpitations and leg swelling. No PND or orthopnea. No tachycardia. Gastrointestinal: Negative for abdominal distention. Musculoskeletal: Negative for myalgias. Neurological: Negative for dizziness, syncope and light-headedness. Psychiatric/Behavioral: Negative for behavioral problems, confusion and agitation. All other systems reviewed negative as done      Objective:   Physical Exam   Constitutional: He is oriented to person, place, and time. He appears well-developed and well-nourished. No distress. HENT:   Head: Normocephalic and atraumatic. Eyes: Conjunctivae and EOM are normal. Right eye exhibits no discharge. Left eye exhibits no discharge. Neck: Normal range of motion. Neck supple. No JVD present. Cardiovascular: Normal rate, regular rhythm and prosthetic valve sounds normal .  Exam reveals no gallop. 1/6 syst murmur heard. Pulmonary/Chest: Effort normal and breath sounds normal. No respiratory distress.  He has min wheezes. He has no rales. Abdominal: Soft. Bowel sounds are normal. There is no tenderness. Musculoskeletal: Normal range of motion. He exhibits no edema. Neurological: He is alert and oriented to person, place, and time. Skin: Skin is warm and dry. Psychiatric: He has a normal mood and affect. His behavior is normal. Thought content normal.       Assessment:         Diagnosis Orders   1. CAD in native artery        2. Hx of CABG        3. S/P AVR (aortic valve replacement)        4. Paroxysmal atrial fibrillation (HCC)        5. Essential hypertension        6. PVC (premature ventricular contraction)                Plan:      CV stable. Rhythm stable. No angina. BP good. Compensated. Wt stable. Reviewed previous records and testing including echo 5/17 and cath 5/17. Will continue asa/meoprolol/crestor for CAD/CABG. Continue same and norvasc/lisinopril for HTN. Continue coumadin for afib. No bleeding issues. Continues to smoke. Encouraged to stop. Encouraged diet, exercise and wt. No changes. Reviewed previous records and testing including echo 2/22. Continue to monitor. Lipids per PCP. Follow up 3 months.

## 2022-11-16 ENCOUNTER — OFFICE VISIT (OUTPATIENT)
Dept: INTERNAL MEDICINE CLINIC | Age: 57
End: 2022-11-16
Payer: MEDICARE

## 2022-11-16 VITALS
RESPIRATION RATE: 18 BRPM | DIASTOLIC BLOOD PRESSURE: 77 MMHG | WEIGHT: 276.3 LBS | TEMPERATURE: 97 F | OXYGEN SATURATION: 97 % | BODY MASS INDEX: 37.47 KG/M2 | HEART RATE: 69 BPM | SYSTOLIC BLOOD PRESSURE: 143 MMHG

## 2022-11-16 DIAGNOSIS — E11.9 TYPE 2 DIABETES MELLITUS WITHOUT COMPLICATION, WITHOUT LONG-TERM CURRENT USE OF INSULIN (HCC): Primary | ICD-10-CM

## 2022-11-16 LAB — HBA1C MFR BLD: 7.4 %

## 2022-11-16 PROCEDURE — 83036 HEMOGLOBIN GLYCOSYLATED A1C: CPT

## 2022-11-16 PROCEDURE — 99213 OFFICE O/P EST LOW 20 MIN: CPT

## 2022-11-16 NOTE — PROGRESS NOTES
Outpatient Clinic Established Patient Note    Patient: Armani Conway  : 1965 (67 y.o.)  Date: 2022    CC: follow up office visit    HPI:    58 yo male with PMH of HTN, HLD, DM2, CAD sp stent, CABG, testicular ca., abdominal surgery, spinal stenosis came into the office for his follow up visit. Pt reports no new symptoms apart from pain and says he is compliant with meds. Pt reports back pain, BL hip and knee pain, mentions that sometimes pain from his lower back travels down his lower legs, also reported associated pins and needle sensations in BL feet. Pt attributes these symptoms to his spinal stenosis and says that he does follow with pain management and been following with them for at least a decade. He's on percocet, pregabalin and methocarbamol for these symptoms. Pt is also on warfarin as he has a bioprosthetic aortic valve and follows with cardiology, had a recent visit with no new changes, says they want his INR to be between 2 and 3. Pt mentions that he still smokes cigarettes, roughly a pack every 2 days. He says that he has been keeping an eye on his diet due to his recent elevation in HbA1c and tries to avoid sweets like cookies, cakes and candy. Pt had no other concerns or complaints. Home Meds:  Prior to Visit Medications    Medication Sig Taking? Authorizing Provider   famotidine (PEPCID) 20 MG tablet TAKE 1 TABLET BY MOUTH 2 TIMES A DAY Yes Jo Ann Melo MD   SITagliptin (JANUVIA) 50 MG tablet Take 1 tablet by mouth daily Yes Mary Jane Brown MD   metFORMIN (GLUCOPHAGE) 500 MG tablet TAKE TWO TABLETS BY MOUTH TWICE A DAY WITH MEALS Yes Rajesh Calderon MD   rosuvastatin (CRESTOR) 20 MG tablet - Take one tablets nightly Yes Dallin Jones MD   warfarin (COUMADIN) 5 MG tablet Take 1-1.5 tablets by mouth daily as directed by anticoagulation clinic.  Yes Vasile Thompson MD   metoprolol tartrate (LOPRESSOR) 50 MG tablet TAKE 1 TABLET BY MOUTH 2 TIMES A DAY FOR BLOOD PRESSURE AND HEART Yes Omid Weiss MD   pregabalin (LYRICA) 150 MG capsule Take 150 mg by mouth in the morning, at noon, and at bedtime. Yes Historical Provider, MD   METHOCARBAMOL PO Take by mouth Yes Historical Provider, MD   Ascorbic Acid (VITAMIN C PO) Take by mouth 2 times daily Yes Historical Provider, MD   lisinopril (PRINIVIL;ZESTRIL) 10 MG tablet One tablet twice a day Yes Anny Harris MD   nystatin-triamcinolone (MYCOLOG) 459534-8.1 UNIT/GM-% ointment Apply topically 2 times daily until improved. Patient taking differently: PRN chapped lips, Apply topically 2 times daily until improved. Yes Remberto Lindsey MD   Handicap Placard MISC by Does not apply route Effective through 12/28/2020 through 12/27/2025 Yes Sanya Bailey,    aspirin 81 MG EC tablet Take 1 tablet by mouth daily Yes Frederick Napier MD   melatonin (RA MELATONIN) 3 MG TABS tablet Take 1 tablet by mouth nightly as needed (insomnia) Yes Ld Hernandez MD   Blood Pressure KIT 1 Unit Yes Ld Hernandez MD   oxyCODONE-acetaminophen (PERCOCET) 5-325 MG per tablet Take 1 tablet by mouth every 4 hours as needed for Pain  .  Yes Historical Provider, MD   amLODIPine (NORVASC) 5 MG tablet Take 1 tablet by mouth daily  Anny Harris MD       Allergies:    Atorvastatin calcium [lipitor], Wellbutrin [bupropion], Cymbalta [duloxetine hcl], and Vicodin [hydrocodone-acetaminophen]    Health Maintenance Due   Topic Date Due    Diabetic foot exam  Never done    Diabetic retinal exam  Never done    Hepatitis B vaccine (1 of 3 - Risk 3-dose series) Never done    DTaP/Tdap/Td vaccine (1 - Tdap) Never done    Shingles vaccine (1 of 2) Never done    Annual Wellness Visit (AWV)  Never done    COVID-19 Vaccine (3 - Booster for Javier series) 03/03/2022    Depression Monitoring  06/18/2022       Immunization History   Administered Date(s) Administered    COVID-19, J&J, (age 18y+), IM, 0.5 mL 03/13/2021    COVID-19, PFIZER PURPLE top, DILUTE for use, (age 15 y+), 30mcg/0.3mL 01/06/2022    Pneumococcal Polysaccharide ([id number]) 07/26/2014       Review of Systems  A 10-organ Review Of Systems was obtained and otherwise unremarkable except as per HPI. Data: Old records have been reviewed electronically. PHYSICAL EXAM:  BP (!) 143/77 (Site: Left Upper Arm, Position: Sitting, Cuff Size: Large Adult)   Pulse 69   Temp 97 °F (36.1 °C) (Temporal)   Resp 18   Wt 276 lb 4.8 oz (125.3 kg)   SpO2 97%   BMI 37.47 kg/m²   Physical Exam  Constitutional:       General: He is not in acute distress. Appearance: He is obese. He is not ill-appearing, toxic-appearing or diaphoretic. Comments: Leaning forward, likely due to spinal stenosis pain   HENT:      Head: Normocephalic and atraumatic. Right Ear: External ear normal.      Left Ear: External ear normal.      Nose: No rhinorrhea. Eyes:      General:         Right eye: No discharge. Left eye: No discharge. Extraocular Movements: Extraocular movements intact. Conjunctiva/sclera: Conjunctivae normal.   Cardiovascular:      Rate and Rhythm: Normal rate and regular rhythm. Heart sounds: Normal heart sounds. Pulmonary:      Effort: Pulmonary effort is normal. No respiratory distress. Breath sounds: Normal breath sounds. Abdominal:      General: Bowel sounds are normal. There is distension. Tenderness: There is no abdominal tenderness. There is no guarding or rebound. Musculoskeletal:      Cervical back: Normal range of motion and neck supple. No rigidity. Right lower leg: No edema. Left lower leg: No edema. Comments: +straight leg raise BL   Skin:     General: Skin is warm and dry. Coloration: Skin is not pale. Neurological:      Mental Status: He is alert and oriented to person, place, and time.    Psychiatric:         Behavior: Behavior normal.       Assessment & Plan:      Type 2 diabetes mellitus without complication, without long-term current use of insulin ([facility].)  11/16/22 HbA1c 7.4  8/2/22 HbA1c 7.3  4/5/22 HbA1c 9.6  Pt mentions he is watching his diet to control his diabetes, mentioned that he tries to avoid \"anything sweet\" for example candies, cakes, cookies and pie and last 2 HbA1cs have been relatively much better, reports compliance with meds  - POCT glycosylated hemoglobin (Hb A1C)  - continue januva, metformin as prescribed    Spinal stenosis  2019 MRI Lumbar showed BL L3-L4 foraminal stenosis as well as L5-L5 disc protrusion and L5-S1 disc extrusion and some other changes. Details can be found in chart  Pt was leaning forward on presentation, had difficulty laying flat on the bed  +BL straight leg raise  Rated his pain 9/10  Endorsed L back [with radiation down BL legs at times], BL hip and BL knee pain with associated pins and needle sensation in BL feet. Mentioned that he has \"severe\" spinal stenosis  - on percocet, pregabalin, and methocarbamol  - follows with pain management [since over a decade]    CAD sp stent  Hx of CABG  Hx of Afib  Bioprosthetic aortic valve  Reports no recent chest pain, palpitations. Rate controlled, regular rhythm  Reports compliance with meds  On warfarin [bioprosthetic valve, hx of afib], goal INR is 2 - 3 [as per pt]  9/7 INR 2.8  9/28 INR 2.1  10/28 INR 2.7  INR within range, pt gets regular INR checks  - on aspirin, statin  - follows with cardiology  - continue aspirin, statin, warfarin as prescribed    HTN  BP in office today 143/77  Does not report headache, blurry vision  - continue norvasc, lopressor, lisinopril as prescribed  - monitor      Return in about 3 months (around 2/16/2023).     Dispo: Pt has been staffed with Dr. Gayathri Garcia  _______________  Trung Lopez MD, 11/16/2022 10:57 AM   PGY-1

## 2022-11-23 DIAGNOSIS — I25.10 CORONARY ARTERY DISEASE INVOLVING NATIVE CORONARY ARTERY OF NATIVE HEART WITHOUT ANGINA PECTORIS: ICD-10-CM

## 2022-11-23 DIAGNOSIS — E11.9 TYPE 2 DIABETES MELLITUS WITHOUT COMPLICATION, WITHOUT LONG-TERM CURRENT USE OF INSULIN (HCC): ICD-10-CM

## 2022-11-23 DIAGNOSIS — Z95.2 S/P AORTIC VALVE REPLACEMENT: Chronic | ICD-10-CM

## 2022-11-23 DIAGNOSIS — I10 ESSENTIAL HYPERTENSION: Chronic | ICD-10-CM

## 2022-11-23 RX ORDER — METOPROLOL TARTRATE 50 MG/1
TABLET, FILM COATED ORAL
Qty: 180 TABLET | Refills: 1 | Status: SHIPPED | OUTPATIENT
Start: 2022-11-23

## 2022-11-23 NOTE — TELEPHONE ENCOUNTER
Requested Prescriptions     Pending Prescriptions Disp Refills    metoprolol tartrate (LOPRESSOR) 50 MG tablet [Pharmacy Med Name: METOPROLOL TARTRATE 50MG TABS] 180 tablet 1     Sig: TAKE 1 TABLET BY MOUTH 2 TIMES A DAY FOR BLOOD PRESSURE AND HEART       Last Clinic Visit:  11/16/2022     Next Clinic Appointment: 2/15/2023

## 2022-11-23 NOTE — TELEPHONE ENCOUNTER
Requested Prescriptions     Pending Prescriptions Disp Refills    metFORMIN (GLUCOPHAGE) 500 MG tablet [Pharmacy Med Name: METFORMIN HCL 500MG TABS] 180 tablet 1     Sig: TAKE TWO TABLETS BY MOUTH TWICE A DAY WITH MEALS       Last Clinic Visit:  11/16/2022     Next Clinic Appointment:  2/15/2023

## 2022-12-02 ENCOUNTER — ANTI-COAG VISIT (OUTPATIENT)
Dept: PHARMACY | Age: 57
End: 2022-12-02
Payer: MEDICARE

## 2022-12-02 DIAGNOSIS — Z95.2 S/P AORTIC VALVE REPLACEMENT: Primary | ICD-10-CM

## 2022-12-02 DIAGNOSIS — I48.0 PAROXYSMAL ATRIAL FIBRILLATION (HCC): ICD-10-CM

## 2022-12-02 LAB — INTERNATIONAL NORMALIZATION RATIO, POC: 2.4

## 2022-12-02 PROCEDURE — 99211 OFF/OP EST MAY X REQ PHY/QHP: CPT | Performed by: PHARMACIST

## 2022-12-02 PROCEDURE — 85610 PROTHROMBIN TIME: CPT | Performed by: PHARMACIST

## 2022-12-02 NOTE — PROGRESS NOTES
ANTICOAGULATION SERVICE    Brenden Aguilar is a 59 y.o. male with PMHx significant for AVR (re-do in May, 2017), CAD s/p CABG (x3 in May, 2017), pA-fib, HLD, HTN, testicular CA who presents to clinic on 12/2/2022 for anticoagulation monitoring and adjustment.     Anticoagulation Indication(s):  Heart Valve Replacement (bovine AVR), A-fib  Referring Physician:  Dr. Marielena Huertas  Goal INR Range:  2-3  Duration of Anticoagulation Therapy:  TBD  Time of day dose taken:  PM  Product patient has at home:  warfarin 5 mg (peach)    [id number] Score for Atrial Fibrillation Stroke Risk   Risk   Factors  Component Value   C CHF No 0   H HTN Yes 1   A2 Age >= 75 No,  (62 y.o.) 0   D DM Yes 1   S2 Prior Stroke/TIA No 0   V Vascular Disease Yes 1   A Age 74-69 No,  (62 y.o.) 0   Sc Sex male 0    [id number]  Score  3   Score last updated 5/30/19 1:51 PM      Lab Results   Component Value Date    RBC 4.32 04/22/2022    HGB 14.0 04/22/2022    HCT 42.2 04/22/2022    MCV 97.6 04/22/2022    MCH 32.4 04/22/2022    MPV 9.6 04/22/2022    RDW 14.6 04/22/2022     (L) 04/22/2022     INR Summary                            Warfarin regimen (mg)  Date INR   A/P    Sun Mon Tue Wed Thu Fri Sat Mg/wk  12/2 2.4 At goal, continue  7.5 7.5 5 7.5 5 7.5 7.5 47.5  10/28 2.7 At goal, continue  7.5 7.5 5 7.5 5 7.5 7.5 47.5  9/28 2.1 At goal, continue  7.5 7.5 5 7.5 5 7.5 7.5 47.5  9/7 2.8 At goal, continue  7.5 7.5 5 7.5 5 7.5 7.5 47.5  8/19 3.2 Above goal, decrease  7.5 7.5 5 7.5 5 7.5 7.5 47.5  7/22 2.0 At goal, continue  7.5 7.5 7.5 7.5 5 7.5 7.5 50  7/8 2.1 At goal, continue  7.5 7.5 7.5 7.5 5 7.5 7.5 50  6/24 1.4 Below goal, bolus +inc 7.5 7.5 7.5 7.5 5 10/7.5 7.5 50  6/3 1.7 Below goal, bolus + incr 5 7.5 7.5 7.5 5 10/7.5 7.5 47.5  5/20 1.9 Below goal, continue  5 7.5 5 7.5 5 7.5 7.5 45  5/6 1.3 Below goal, bolus + incr 5 7.5 5 7.5 5 10/7.5 7.5 45  4/21 1.5 Below goal, bolus  5 7.5 5 7.5 7.5/5 7.5 5 42.5  4/7 1.2 Below goal (held) see below 5 7.5 5 7.5 5 7.5 5 42.5  3/4 2.2 At goal, continue   5 7.5 5 7.5 5 7.5 5 42.5  2/1 2.1 At goal, continue   5 7.5 5 7.5 5 7.5 5 42.5  1/7 1.9 Below goal, continue   5 7.5 5 7.5 5 7.5 5 42.5  11/22 2.2 At goal, continue   5 7.5 5 7.5 5 7.5 5 42.5  11/22 2.1 At goal, continue   5 7.5 5 7.5 5 7.5 5 42.5  11/8 1.8 Below goal, increase  5 7.5 5 7.5 5 7.5 5 42.5  10/25 1.6 Below goal, bolus   7.5 7.5/5 5 5 5 5 7.5 40  10/4 2.0 At goal, continue   7.5 5 5 5 5 5 7.5 40  9/17 2.7 At goal (dosing error)  7.5 5 5 5 5 5 7.5 40  8/23 4.3 Above goal, decrease  7.5 0/5 5 5 5 5 7.5 40  8/9 3.3 Above goal, dec  7.5 5 5 5 7.5 5 5 40  7/21 1.4 Below goal, increase  5 7.5 5 7.5 5 7.5 5 42.5  6/28 2.0 At goal, no change  7.5 5 5 5 5 5 7.5 40  6/11 1.5 Below goal, increase  7.5 5 5 5 5 5 7.5 40  5/24 3.6 Above goal, hold x 1  5 0/5 5 5 5 5 5 35  4/23 2.7 At goal, no change  5 5 5 5 5 5 5 35  4/5 3.6 Above goal, hold x 1  5 0/5 5 5 5 5 5 35   3/8 3.0 At goal, no change  5 5 5 5 5 5 5 35  2/19 2.0 At goal, no change  5 5 5 5 5 5 5 35  2/4 2.7 At goal, no change  5 5 5 5 5 5 5 35  1/20 2.7 At goal, no change  5 5 5 5 5 5 5 35  1/11 4.5 Above goal (Cymbalta) 5 0/5 0/5 5 5 5 5 35  12/28 2.0 At goal, no change  7.5 5 5 5 5 5 7.5 40  11/24 2.4 At goal, no change  7.5 5 5 5 5 5 7.5 40  10/30 2.5 At goal, no change  7.5 5 5 5 5 5 7.5 40  10/2 2.2 At goal, no change  7.5 5 5 5 5 5 7.5 40  9/3 2.4 At goal, no change  7.5 5 5 5 5 5 7.5 40  8/3 2.9 At goal, no change  7.5 5 5 5 5 5 7.5 40  7/6 2.7 At goal, no change  7.5 5 5 5 5 5 7.5 40  6/8 2.8 At goal, no change  7.5 5 5 5 5 5 7.5 40  5/11 2.9 At goal, no change  7.5 5 5 5 5 5 7.5 40  3/30 3.0 At goal, no change  7.5 5 5 5 5 5 7.5 40  2/26 2.3 At goal, no change  7.5 5 5 5 5 5 7.5 40  2/5 3.3 Above goal, decrease  7.5 5 5 5 5 5 7.5 40  1/8 2.9 At goal, no change  7.5 7.5 5 5 5 5 7.5 42.5    Patient History:  Recent hospitalizations/HC visits 4/8/22: bronchoscopy per Dr. Ashlie Espinoza, warfarin held x3 days (no bridge). Findings were likely infectious process, prescribed Augmentin. 2/21/22: Pulmonology, work-up underway for lung nodule    Recent medication changes 6/24/22: [facility], WATERVLIET (stopped in late July)  3/2022: Centrum Silver Mens 50+ (stopped 7/15)  4/14-4/21/22: Augmentin BID x7 days    Medications taken regularly that may interact with warfarin or alter INR ASA 81 mg   Warfarin dose taken as prescribed Usually compliant   Does not use pillbox  Patient has been taking warfarin since re-do AVR in May, 2017   Signs/symptoms of bleeding No h/o major bleeding   Vitamin K intake Normally has ~0 servings of green, leafy vegetables per week   Recent vomiting/diarrhea/fever, changes in weight or activity level None reported   Tobacco or alcohol use -No recent changes in smoking as of 11/24/20 (~3/4 PPD)  -Patient cut back from 2 PPD to 3/4 PPD in February (2019) when he moved in with his daughter. Decrease in smoking typically increases INR gradually.   -Patient denies any alcohol or illicit drug use   Upcoming surgeries or procedures None      Assessment/Plan:   Patient's INR was therapeutic today (2.4). Patient reports taking warfarin as instructed by clinic with no missed doses, diet changes, med changes, illness, smoking changes, or bleeding since last visit. Patient was instructed to continue warfarin 7.5 mg daily except 5 mg on Tuesdays and Thursdays. Repeat INR in 4 weeks. Patient was reminded to maintain consistent vitamin K intake and call with any bleeding, medication changes, or fever/vomiting/diarrhea. Patient understands dosing directions and information discussed. Dosing schedule and follow up appointment given to patient. Progress note routed to referring physician's office. Patient acknowledges working in consult agreement with pharmacist as referred by his/her physician. Next INR Check: 12/30     Please call [place] at [phone] with any questions. Thanks! Amrit Izaguirre, PharmD, BCPS  [facility]  [place]: [phone]  [place]: [phone]  12/2/2022 9:13 AM      For Pharmacy Admin Tracking Only    Total # of Interventions Recommended: 0  Total # of Interventions Accepted: 0  Time Spent (min): 15

## 2022-12-22 DIAGNOSIS — I10 ESSENTIAL HYPERTENSION: Chronic | ICD-10-CM

## 2022-12-22 RX ORDER — LISINOPRIL 10 MG/1
TABLET ORAL
Qty: 180 TABLET | Refills: 3 | Status: SHIPPED | OUTPATIENT
Start: 2022-12-22

## 2022-12-22 NOTE — TELEPHONE ENCOUNTER
Requested Prescriptions     Pending Prescriptions Disp Refills    lisinopril (PRINIVIL;ZESTRIL) 10 MG tablet 180 tablet 3     Sig: One tablet twice a day       Last Clinic Visit:  11/16/2022     Next Clinic Appointment:  2/15/2023

## 2022-12-30 ENCOUNTER — ANTI-COAG VISIT (OUTPATIENT)
Dept: PHARMACY | Age: 57
End: 2022-12-30
Payer: MEDICARE

## 2022-12-30 DIAGNOSIS — I48.0 PAROXYSMAL ATRIAL FIBRILLATION (HCC): ICD-10-CM

## 2022-12-30 DIAGNOSIS — Z95.2 S/P AORTIC VALVE REPLACEMENT: Primary | ICD-10-CM

## 2022-12-30 LAB — INTERNATIONAL NORMALIZATION RATIO, POC: 2.1

## 2022-12-30 PROCEDURE — 99211 OFF/OP EST MAY X REQ PHY/QHP: CPT | Performed by: PHARMACIST

## 2022-12-30 PROCEDURE — 85610 PROTHROMBIN TIME: CPT | Performed by: PHARMACIST

## 2022-12-30 NOTE — PROGRESS NOTES
ANTICOAGULATION SERVICE    Kym Jackson is a 59 y.o. male with PMHx significant for AVR (re-do in May, 2017), CAD s/p CABG (x3 in May, 2017), pA-fib, HLD, HTN, testicular CA who presents to clinic on 12/30/2022 for anticoagulation monitoring and adjustment.     Anticoagulation Indication(s):  Heart Valve Replacement (bovine AVR), A-fib  Referring Physician:  Dr. Isela Jamison  Goal INR Range:  2-3  Duration of Anticoagulation Therapy:  TBD  Time of day dose taken:  PM  Product patient has at home:  warfarin 5 mg (peach)    [id number] Score for Atrial Fibrillation Stroke Risk   Risk   Factors  Component Value   C CHF No 0   H HTN Yes 1   A2 Age >= 75 No,  (62 y.o.) 0   D DM Yes 1   S2 Prior Stroke/TIA No 0   V Vascular Disease Yes 1   A Age 74-69 No,  (62 y.o.) 0   Sc Sex male 0    [id number]  Score  3   Score last updated 5/30/19 1:51 PM      Lab Results   Component Value Date    RBC 4.32 04/22/2022    HGB 14.0 04/22/2022    HCT 42.2 04/22/2022    MCV 97.6 04/22/2022    MCH 32.4 04/22/2022    MPV 9.6 04/22/2022    RDW 14.6 04/22/2022     (L) 04/22/2022     INR Summary                            Warfarin regimen (mg)  Date INR   A/P    Sun Mon Tue Wed Thu Fri Sat Mg/wk  12/30 2.1 At goal, continue  7.5 7.5 5 7.5 5 7.5 7.5 47.5  12/2 2.4 At goal, continue  7.5 7.5 5 7.5 5 7.5 7.5 47.5  10/28 2.7 At goal, continue  7.5 7.5 5 7.5 5 7.5 7.5 47.5  9/28 2.1 At goal, continue  7.5 7.5 5 7.5 5 7.5 7.5 47.5  9/7 2.8 At goal, continue  7.5 7.5 5 7.5 5 7.5 7.5 47.5  8/19 3.2 Above goal, decrease  7.5 7.5 5 7.5 5 7.5 7.5 47.5  7/22 2.0 At goal, continue  7.5 7.5 7.5 7.5 5 7.5 7.5 50  7/8 2.1 At goal, continue  7.5 7.5 7.5 7.5 5 7.5 7.5 50  6/24 1.4 Below goal, bolus +inc 7.5 7.5 7.5 7.5 5 10/7.5 7.5 50  6/3 1.7 Below goal, bolus + incr 5 7.5 7.5 7.5 5 10/7.5 7.5 47.5  5/20 1.9 Below goal, continue  5 7.5 5 7.5 5 7.5 7.5 45  5/6 1.3 Below goal, bolus + incr 5 7.5 5 7.5 5 10/7.5 7.5 45  4/21 1.5 Below goal, bolus  5 7.5 5 7.5 7.5/5 7.5 5 42.5  4/7 1.2 Below goal (held) see below 5 7.5 5 7.5 5 7.5 5 42.5  3/4 2.2 At goal, continue   5 7.5 5 7.5 5 7.5 5 42.5  2/1 2.1 At goal, continue   5 7.5 5 7.5 5 7.5 5 42.5  1/7 1.9 Below goal, continue   5 7.5 5 7.5 5 7.5 5 42.5  11/22 2.2 At goal, continue   5 7.5 5 7.5 5 7.5 5 42.5  11/22 2.1 At goal, continue   5 7.5 5 7.5 5 7.5 5 42.5  11/8 1.8 Below goal, increase  5 7.5 5 7.5 5 7.5 5 42.5  10/25 1.6 Below goal, bolus   7.5 7.5/5 5 5 5 5 7.5 40  10/4 2.0 At goal, continue   7.5 5 5 5 5 5 7.5 40  9/17 2.7 At goal (dosing error)  7.5 5 5 5 5 5 7.5 40  8/23 4.3 Above goal, decrease  7.5 0/5 5 5 5 5 7.5 40  8/9 3.3 Above goal, dec  7.5 5 5 5 7.5 5 5 40  7/21 1.4 Below goal, increase  5 7.5 5 7.5 5 7.5 5 42.5  6/28 2.0 At goal, no change  7.5 5 5 5 5 5 7.5 40  6/11 1.5 Below goal, increase  7.5 5 5 5 5 5 7.5 40  5/24 3.6 Above goal, hold x 1  5 0/5 5 5 5 5 5 35  4/23 2.7 At goal, no change  5 5 5 5 5 5 5 35  4/5 3.6 Above goal, hold x 1  5 0/5 5 5 5 5 5 35   3/8 3.0 At goal, no change  5 5 5 5 5 5 5 35  2/19 2.0 At goal, no change  5 5 5 5 5 5 5 35  2/4 2.7 At goal, no change  5 5 5 5 5 5 5 35  1/20 2.7 At goal, no change  5 5 5 5 5 5 5 35  1/11 4.5 Above goal (Cymbalta) 5 0/5 0/5 5 5 5 5 35  12/28 2.0 At goal, no change  7.5 5 5 5 5 5 7.5 40  11/24 2.4 At goal, no change  7.5 5 5 5 5 5 7.5 40  10/30 2.5 At goal, no change  7.5 5 5 5 5 5 7.5 40  10/2 2.2 At goal, no change  7.5 5 5 5 5 5 7.5 40  9/3 2.4 At goal, no change  7.5 5 5 5 5 5 7.5 40  8/3 2.9 At goal, no change  7.5 5 5 5 5 5 7.5 40  7/6 2.7 At goal, no change  7.5 5 5 5 5 5 7.5 40  6/8 2.8 At goal, no change  7.5 5 5 5 5 5 7.5 40  5/11 2.9 At goal, no change  7.5 5 5 5 5 5 7.5 40  3/30 3.0 At goal, no change  7.5 5 5 5 5 5 7.5 40  2/26 2.3 At goal, no change  7.5 5 5 5 5 5 7.5 40  2/5 3.3 Above goal, decrease  7.5 5 5 5 5 5 7.5 40  1/8 2.9 At goal, no change  7.5 7.5 5 5 5 5 7.5 42.5    Patient History:  Recent hospitalizations/HC visits 4/8/22: bronchoscopy per Dr. Shaneka Hathaway, warfarin held x3 days (no bridge). Findings were likely infectious process, prescribed Augmentin. 2/21/22: Pulmonology, work-up underway for lung nodule    Recent medication changes 6/24/22: [facility], WATERVLIET (stopped in late July)  3/2022: Centrum Silver Mens 50+ (stopped 7/15)  4/14-4/21/22: Augmentin BID x7 days    Medications taken regularly that may interact with warfarin or alter INR ASA 81 mg   Warfarin dose taken as prescribed Usually compliant   Does not use pillbox  Patient has been taking warfarin since re-do AVR in May, 2017   Signs/symptoms of bleeding No h/o major bleeding   Vitamin K intake Normally has ~0 servings of green, leafy vegetables per week   Recent vomiting/diarrhea/fever, changes in weight or activity level None reported   Tobacco or alcohol use -No recent changes in smoking as of 11/24/20 (~3/4 PPD)  -Patient cut back from 2 PPD to 3/4 PPD in February (2019) when he moved in with his daughter. Decrease in smoking typically increases INR gradually.   -Patient denies any alcohol or illicit drug use   Upcoming surgeries or procedures None      Assessment/Plan:   Patient's INR was therapeutic today (2.1). Patient reports taking warfarin as instructed by clinic with no missed doses, diet changes, med changes, illness, smoking changes, or bleeding since last visit. Patient was instructed to continue warfarin 7.5 mg daily except 5 mg on Tuesdays and Thursdays. Repeat INR in 4 weeks. Patient was reminded to maintain consistent vitamin K intake and call with any bleeding, medication changes, or fever/vomiting/diarrhea. Patient understands dosing directions and information discussed. Dosing schedule and follow up appointment given to patient. Progress note routed to referring physician's office. Patient acknowledges working in consult agreement with pharmacist as referred by his/her physician.      Next INR Check: 1/26     Please call [place] at [phone] with any questions. Thanks!     Giuseppe Hernandez, PharmD, BCPS  [facility]  [place]: [phone]  [place]: [phone]  12/30/2022 9:04 AM      For Pharmacy Admin Tracking Only    Total # of Interventions Recommended: 0  Total # of Interventions Accepted: 0  Time Spent (min): 15

## 2023-01-09 NOTE — PROGRESS NOTES
ANTICOAGULATION SERVICE    Adrienne Ward is a 62 y.o. male with PMHx significant for AVR (re-do in May, 2017), CAD s/p CABG (x3 in May, 2017), pA-fib, HLD, HTN, testicular CA who presents to clinic 9/27/2019 for anticoagulation monitoring and adjustment.     Anticoagulation Indication(s):  Heart Valve Replacement - bovine AVR  Referring Physician:  Dr. Cass Bell  Goal INR Range:  2-3  Duration of Anticoagulation Therapy:  TBD  Time of day dose taken:  PM  Product patient has at home:  warfarin 5 mg (peach)    [id number] Score for Atrial Fibrillation Stroke Risk   Risk   Factors  Component Value   C CHF No 0   H HTN Yes 1   A2 Age >= 75 No,  (58 y.o.) 0   D DM No 0   S2 Prior Stroke/TIA No 0   V Vascular Disease Yes 1   A Age 74-69 No,  (58 y.o.) 0   Sc Sex male 0    [id number]  Score  2   Score last updated 5/30/19 1:51 PM      Lab Results   Component Value Date    RBC 3.70 (L) 07/30/2019    HGB 12.4 (L) 07/30/2019    HCT 36.5 (L) 07/30/2019    MCV 98.7 07/30/2019    MCH 33.5 07/30/2019    MPV 7.6 07/30/2019    RDW 15.6 (H) 07/30/2019     07/30/2019     INR Summary                            Warfarin regimen (mg)  Date INR   A/P    Sun Mon Tue Wed Thu Fri Sat Mg/wk  9/27 2.9 At goal, no change  7.5 7.5 5 5 5 5 7.5 42.5   8/30 2.8 At goal, no change  7.5 7.5 5 5 5 5 7.5 42.5  8/15 3.1 Above goal, continue  7.5 7.5 5 5 5 5 7.5 42.5  8/7 1.8 Below goal (held x 10 d) 7.5 7.5 5 5 5 5 7.5 42.5  7/17 2.6 At goal, no change  7.5 7.5 5 5 5 5 7.5 42.5  6/13 2.2 At goal, no change  7.5 7.5 5 5 5 5 7.5 42.5  5/16 2.3 At goal, no change  7.5 7.5 5 5 5 5 7.5 42.5  4/24 2.9 At goal, no change  7.5 7.5 5 5 5 5 7.5 42.5  4/10 3.6 Above goal, hold x 1  7.5 7.5 5 0/5 5 5 7.5 42.5   3/27 3.6 Above goal, decrease  7.5 7.5 5 5 5 5 7.5 42.5  3/13 3.0 At goal, no change   7.5 7.5 7.5 5 5 7.5 7.5 47.5  2/27 2.5 At goal, no change  7.5 7.5 7.5 5 5 7.5 7.5 47.5  2/13 3.1 Above goal, continue  7.5 7.5 7.5 5 5 7.5 7.5 47.5  1/7 2.4 At Erythromycin Pregnancy And Lactation Text: This medication is Pregnancy Category B and is considered safe during pregnancy. It is also excreted in breast milk.

## 2023-01-12 RX ORDER — AMLODIPINE BESYLATE 5 MG/1
5 TABLET ORAL DAILY
Qty: 90 TABLET | Refills: 2 | Status: SHIPPED | OUTPATIENT
Start: 2023-01-12 | End: 2023-04-12

## 2023-01-12 NOTE — TELEPHONE ENCOUNTER
Requested Prescriptions     Pending Prescriptions Disp Refills    amLODIPine (NORVASC) 5 MG tablet 90 tablet 2     Sig: Take 1 tablet by mouth daily       Last Clinic Visit:  11/16/2022     Next Clinic Appointment:  2/15/2023

## 2023-01-26 ENCOUNTER — ANTI-COAG VISIT (OUTPATIENT)
Dept: PHARMACY | Age: 58
End: 2023-01-26
Payer: MEDICARE

## 2023-01-26 ENCOUNTER — OFFICE VISIT (OUTPATIENT)
Dept: DERMATOLOGY | Age: 58
End: 2023-01-26

## 2023-01-26 DIAGNOSIS — K13.0 CHEILITIS: Primary | ICD-10-CM

## 2023-01-26 DIAGNOSIS — L82.0 INFLAMED SEBORRHEIC KERATOSIS: ICD-10-CM

## 2023-01-26 DIAGNOSIS — L57.0 AK (ACTINIC KERATOSIS): ICD-10-CM

## 2023-01-26 DIAGNOSIS — I48.0 PAROXYSMAL ATRIAL FIBRILLATION (HCC): ICD-10-CM

## 2023-01-26 DIAGNOSIS — Z95.2 S/P AORTIC VALVE REPLACEMENT: Primary | ICD-10-CM

## 2023-01-26 LAB — INTERNATIONAL NORMALIZATION RATIO, POC: 2.3

## 2023-01-26 PROCEDURE — 99211 OFF/OP EST MAY X REQ PHY/QHP: CPT | Performed by: PHARMACIST

## 2023-01-26 PROCEDURE — 85610 PROTHROMBIN TIME: CPT | Performed by: PHARMACIST

## 2023-01-26 RX ORDER — NYSTATIN AND TRIAMCINOLONE ACETONIDE 100000; 1 [USP'U]/G; MG/G
OINTMENT TOPICAL
Qty: 30 G | Refills: 0 | Status: SHIPPED | OUTPATIENT
Start: 2023-01-26

## 2023-01-26 RX ORDER — OXYCODONE AND ACETAMINOPHEN 7.5; 325 MG/1; MG/1
4 TABLET ORAL EVERY 6 HOURS PRN
COMMUNITY

## 2023-01-26 NOTE — PROGRESS NOTES
ANTICOAGULATION SERVICE    Cristal Collier is a 59 y.o. male with PMHx significant for AVR (re-do in May, 2017), CAD s/p CABG (x3 in May, 2017), pA-fib, HLD, HTN, testicular CA who presents to clinic on 1/26/2023 for anticoagulation monitoring and adjustment.     Anticoagulation Indication(s):  Heart Valve Replacement (bovine AVR), A-fib  Referring Physician:  Dr. Keturah Coles  Goal INR Range:  2-3  Duration of Anticoagulation Therapy:  TBD  Time of day dose taken:  PM  Product patient has at home:  warfarin 5 mg (peach)    [id number] Score for Atrial Fibrillation Stroke Risk   Risk   Factors  Component Value   C CHF No 0   H HTN Yes 1   A2 Age >= 75 No,  (62 y.o.) 0   D DM Yes 1   S2 Prior Stroke/TIA No 0   V Vascular Disease Yes 1   A Age 74-69 No,  (62 y.o.) 0   Sc Sex male 0    [id number]  Score  3   Score last updated 5/30/19 1:51 PM      Lab Results   Component Value Date    RBC 4.32 04/22/2022    HGB 14.0 04/22/2022    HCT 42.2 04/22/2022    MCV 97.6 04/22/2022    MCH 32.4 04/22/2022    MPV 9.6 04/22/2022    RDW 14.6 04/22/2022     (L) 04/22/2022     INR Summary                            Warfarin regimen (mg)  Date INR   A/P    Sun Mon Tue Wed Thu Fri Sat Mg/wk  1/26 2.3 At goal, continue  7.5 7.5 5 7.5 5 7.5 7.5 47.5  12/30 2.1 At goal, continue  7.5 7.5 5 7.5 5 7.5 7.5 47.5  12/2 2.4 At goal, continue  7.5 7.5 5 7.5 5 7.5 7.5 47.5  10/28 2.7 At goal, continue  7.5 7.5 5 7.5 5 7.5 7.5 47.5  9/28 2.1 At goal, continue  7.5 7.5 5 7.5 5 7.5 7.5 47.5  9/7 2.8 At goal, continue  7.5 7.5 5 7.5 5 7.5 7.5 47.5  8/19 3.2 Above goal, decrease  7.5 7.5 5 7.5 5 7.5 7.5 47.5  7/22 2.0 At goal, continue  7.5 7.5 7.5 7.5 5 7.5 7.5 50  7/8 2.1 At goal, continue  7.5 7.5 7.5 7.5 5 7.5 7.5 50  6/24 1.4 Below goal, bolus +inc 7.5 7.5 7.5 7.5 5 10/7.5 7.5 50  6/3 1.7 Below goal, bolus + incr 5 7.5 7.5 7.5 5 10/7.5 7.5 47.5  5/20 1.9 Below goal, continue  5 7.5 5 7.5 5 7.5 7.5 45  5/6 1.3 Below goal, bolus + incr 5 7.5 5 7.5 5 10/7.5 7.5 45  4/21 1.5 Below goal, bolus  5 7.5 5 7.5 7.5/5 7.5 5 42.5  4/7 1.2 Below goal (held) see below 5 7.5 5 7.5 5 7.5 5 42.5  3/4 2.2 At goal, continue   5 7.5 5 7.5 5 7.5 5 42.5  2/1 2.1 At goal, continue   5 7.5 5 7.5 5 7.5 5 42.5  1/7 1.9 Below goal, continue   5 7.5 5 7.5 5 7.5 5 42.5  11/22 2.2 At goal, continue   5 7.5 5 7.5 5 7.5 5 42.5  11/22 2.1 At goal, continue   5 7.5 5 7.5 5 7.5 5 42.5  11/8 1.8 Below goal, increase  5 7.5 5 7.5 5 7.5 5 42.5  10/25 1.6 Below goal, bolus   7.5 7.5/5 5 5 5 5 7.5 40  10/4 2.0 At goal, continue   7.5 5 5 5 5 5 7.5 40  9/17 2.7 At goal (dosing error)  7.5 5 5 5 5 5 7.5 40  8/23 4.3 Above goal, decrease  7.5 0/5 5 5 5 5 7.5 40  8/9 3.3 Above goal, dec  7.5 5 5 5 7.5 5 5 40  7/21 1.4 Below goal, increase  5 7.5 5 7.5 5 7.5 5 42.5  6/28 2.0 At goal, no change  7.5 5 5 5 5 5 7.5 40  6/11 1.5 Below goal, increase  7.5 5 5 5 5 5 7.5 40  5/24 3.6 Above goal, hold x 1  5 0/5 5 5 5 5 5 35  4/23 2.7 At goal, no change  5 5 5 5 5 5 5 35  4/5 3.6 Above goal, hold x 1  5 0/5 5 5 5 5 5 35   3/8 3.0 At goal, no change  5 5 5 5 5 5 5 35  2/19 2.0 At goal, no change  5 5 5 5 5 5 5 35  2/4 2.7 At goal, no change  5 5 5 5 5 5 5 35  1/20 2.7 At goal, no change  5 5 5 5 5 5 5 35  1/11 4.5 Above goal (Cymbalta) 5 0/5 0/5 5 5 5 5 35  12/28 2.0 At goal, no change  7.5 5 5 5 5 5 7.5 40  11/24 2.4 At goal, no change  7.5 5 5 5 5 5 7.5 40  10/30 2.5 At goal, no change  7.5 5 5 5 5 5 7.5 40  10/2 2.2 At goal, no change  7.5 5 5 5 5 5 7.5 40  9/3 2.4 At goal, no change  7.5 5 5 5 5 5 7.5 40  8/3 2.9 At goal, no change  7.5 5 5 5 5 5 7.5 40  7/6 2.7 At goal, no change  7.5 5 5 5 5 5 7.5 40  6/8 2.8 At goal, no change  7.5 5 5 5 5 5 7.5 40  5/11 2.9 At goal, no change  7.5 5 5 5 5 5 7.5 40  3/30 3.0 At goal, no change  7.5 5 5 5 5 5 7.5 40  2/26 2.3 At goal, no change  7.5 5 5 5 5 5 7.5 40  2/5 3.3 Above goal, decrease  7.5 5 5 5 5 5 7.5 40  1/8 2.9 At goal, no change  7.5 7.5 5 5 5 5 7.5 42.5    Patient History:  Recent hospitalizations/HC visits 4/8/22: bronchoscopy per Dr. Cherry, warfarin held x3 days (no bridge). Findings were likely infectious process, prescribed Augmentin.   2/21/22: Pulmonology, work-up underway for lung nodule    Recent medication changes 6/24/22: Omer Gutierrez (stopped in late July)  3/2022: Centrum Silver Mens 50+ (stopped 7/15)  4/14-4/21/22: Augmentin BID x7 days    Medications taken regularly that may interact with warfarin or alter INR ASA 81 mg   Warfarin dose taken as prescribed Usually compliant   Does not use pillbox  Patient has been taking warfarin since re-do AVR in May, 2017   Signs/symptoms of bleeding No h/o major bleeding   Vitamin K intake Normally has ~0 servings of green, leafy vegetables per week   Recent vomiting/diarrhea/fever, changes in weight or activity level None reported   Tobacco or alcohol use -No recent changes in smoking as of 11/24/20 (~3/4 PPD)  -Patient cut back from 2 PPD to 3/4 PPD in February (2019) when he moved in with his daughter. Decrease in smoking typically increases INR gradually.   -Patient denies any alcohol or illicit drug use   Upcoming surgeries or procedures None      Assessment/Plan:   Patient's INR was therapeutic today (2.3). Patient reports taking warfarin as instructed by clinic with no missed doses, diet changes, med changes, illness, smoking changes, or bleeding since last visit.     Patient was instructed to continue warfarin 7.5 mg daily except 5 mg on Tuesdays and Thursdays. Repeat INR in 4 weeks. Patient was reminded to maintain consistent vitamin K intake and call with any bleeding, medication changes, or fever/vomiting/diarrhea.     Patient understands dosing directions and information discussed.  Dosing schedule and follow up appointment given to patient.  Progress note routed to referring physician's office. Patient acknowledges working in consult agreement with pharmacist as  referred by his/her physician. Next INR Check: 2/23      Please call [place] at [phone] with any questions. Thanks!     Kristie Hammond, PharmD, BCPS  [facility] [address]: [phone]  [place]: [phone]  1/26/2023 10:32 AM    For Pharmacy Admin Tracking Only  Time Spent (min): 15

## 2023-01-26 NOTE — PROGRESS NOTES
[facility]  Saloni Artis MD  [phone]      Yessica Horowitz  1965    59 y.o. male     Date of Visit: 1/26/2023    Chief Complaint: cheilitis, skin lesion    History of Present Illness:    1. He returns today to follow up for a history of chronic cheilitis. Flares improve with nystatin triamcinolone ointment. Uses ointment based emollient frequently throughout the day. 2.  He also reports a recurrently inflamed and irritated lesion on the left upper forehead. 3.  He reports few tender lesions on the vertex scalp. He is on warfarin for history of an aortic valve replacement. Review of Systems:  Gen: Feels well, good sense of health. Skin: No new or changing moles. Past Medical History, Family History, Surgical History, Medications and Allergies reviewed.     Past Medical History:   Diagnosis Date    Abdominal hernia     s/p repair 2010    Aortic valve prosthesis present     CAD (coronary artery disease)     Chronic back pain greater than 3 months duration     Clostridium difficile diarrhea 10/17/2016    PCR    DDD (degenerative disc disease), lumbosacral 8/7/2013    Erectile dysfunction     GERD (gastroesophageal reflux disease)     Hyperlipidemia     Hypertension     Joint pain     Left testicular cancer ([address] 2002    s/p abdominal resection    Myalgia and myositis, unspecified 8/26/2013    Neuropathy     OA (osteoarthritis) of knee     bilateral    Obesity, Class I, BMI 30.0-34.9 (see actual BMI)     Prolonged emergence from general anesthesia     after CABG    S/P AVR 2005    tissue valve    S/P CABG x 2 2005    w/AVR & primary repair of dissected ascending aorta    Type 2 diabetes mellitus without complication, without long-term current use of insulin ([address] 7/13/2021    Wears dentures      Past Surgical History:   Procedure Laterality Date    AORTA SURGERY  08/27/2004    Dr. Prudencio Mccabe - primary repair of limited ascending aortic dissection    AORTIC VALVE REPLACEMENT  05/30/2017    Dr. Jack Vegas - redo w/25mm Thaddeus Quarto ThermaFix model 3300 bovine pericardial bioprosthesis    AORTIC VALVE SURGERY  08/27/2004    Dr. Tate Spring - 27mm Kelton-Castelan pericardial prosthesis     BACK SURGERY      L4-S1    BRONCHOSCOPY N/A 4/8/2022    ROBOTIC NAVIGATIONAL BRONCHOSCOPY FOR TRANSBRONCHIAL LUNG BIOPSY WITH FLOURO performed by Jean Marie De La Rosa MD at [address]  4/8/2022    BRONCHOSCOPY ALVEOLAR LAVAGE [facility]  05/09/2017    Dr. Muir Levels  08/26/2004    Dr. Melo Donahue Right 03/17/2015    Dr. Bishop Co  10/10/2016    Dr. Sebastian Mota - w/laparascopic lysis of extensive adhesions, open transverse colon resection, excision of old abd mesh adhering to colon    CORONARY ANGIOPLASTY WITH STENT PLACEMENT  07/2014    CORONARY ARTERY BYPASS GRAFT  08/27/2004    Dr. Tate Spring - x2 (VELEZ-LAD, SVG sequentially to ascending aorta & D1)    CORONARY ARTERY BYPASS GRAFT  05/30/2017    Dr. Jack Vegas - redo x3 (reverse AC SVG sequentially to D1, OM1 & PDA) w/explant of prior prosthetic valve & removal of embedded sternal wires x7    EMG  04/16/2012    FOOT SURGERY Left 01/24/2012    Dr. Kimmy Rocha, DPM - hallux nail evulsion & exostectomy    HEMICOLECTOMY N/A 7/26/2019    ROBOTIC ASSISTED  LAPAROSCOPIC RIGHT COLECTOMY AND CHOLECYSTECTOMY performed by Delmi Mi MD at [address] [place] [address]  2010    multiple    LUMBAR DISCECTOMY  2012    L4-5    SHOULDER ARTHROSCOPY Right 11/21/2013    Dr. Rahul Hester - w/subacromial decompression, debridement of the SLAP tear, distal clavicle excision    SOFT TISSUE TUMOR RESECTION  2001    retroperitoneal mass    TESTICLE REMOVAL Left 2001    radical orchiectomy    TRANSESOPHAGEAL ECHOCARDIOGRAM  05/30/2017    during redo CABG & AVR    TUNNELED VENOUS PORT PLACEMENT 2002    portacath        Allergies   Allergen Reactions    Atorvastatin Calcium [Lipitor] Other (See Comments)     Severe headaches      Wellbutrin [Bupropion] Anxiety     patient complains of feeling anxious, irritable and \"hyped up\" on wellbutrin around 2011. Cymbalta [Duloxetine Hcl]     Vicodin [Hydrocodone-Acetaminophen] Itching     Outpatient Medications Marked as Taking for the 1/26/23 encounter (Office Visit) with Ana Laura Castillo MD   Medication Sig Dispense Refill    oxyCODONE-acetaminophen (PERCOCET) 7.5-325 MG per tablet Take 4 tablets by mouth every 6 hours as needed for Pain. amLODIPine (NORVASC) 5 MG tablet Take 1 tablet by mouth daily 90 tablet 2    lisinopril (PRINIVIL;ZESTRIL) 10 MG tablet One tablet twice a day 180 tablet 3    metoprolol tartrate (LOPRESSOR) 50 MG tablet TAKE 1 TABLET BY MOUTH 2 TIMES A DAY FOR BLOOD PRESSURE AND HEART 180 tablet 1    metFORMIN (GLUCOPHAGE) 500 MG tablet TAKE TWO TABLETS BY MOUTH TWICE A DAY WITH MEALS 180 tablet 1    famotidine (PEPCID) 20 MG tablet TAKE 1 TABLET BY MOUTH 2 TIMES A  tablet 2    SITagliptin (JANUVIA) 50 MG tablet Take 1 tablet by mouth daily 90 tablet 1    rosuvastatin (CRESTOR) 20 MG tablet - Take one tablets nightly 90 tablet 3    warfarin (COUMADIN) 5 MG tablet Take 1-1.5 tablets by mouth daily as directed by anticoagulation clinic. 135 tablet 3    pregabalin (LYRICA) 150 MG capsule Take 150 mg by mouth in the morning, at noon, and at bedtime. METHOCARBAMOL PO Take by mouth      nystatin-triamcinolone (MYCOLOG) 627812-4.1 UNIT/GM-% ointment Apply topically 2 times daily until improved.  (Patient taking differently: PRN chapped lips, Apply topically 2 times daily until improved.) 30 g 0    Handicap Placard MISC by Does not apply route Effective through 12/28/2020 through 12/27/2025 1 each 0    aspirin 81 MG EC tablet Take 1 tablet by mouth daily 30 tablet 0    melatonin (RA MELATONIN) 3 MG TABS tablet Take 1 tablet by mouth nightly as needed (insomnia) 30 tablet 3    Blood Pressure KIT 1 Unit 1 kit 0           Physical Examination       The following were examined and determined to be normal: Psych/Neuro, Conjunctivae/eyelids, Gums/teeth/lips, and Neck. The following were examined and determined to be abnormal: Scalp/hair and Head/face. Well-appearing. 1.  Lips unremarkable today. 2.  Left upper forehead with a crusted verrucous pink-brown papule. 3.  Vertex scalp with 3 keratotic erythematous macules. Assessment and Plan     1. Cheilitis, chronic - under good control    Continue frequent use of ointment based emollients throughout the day. Nystatin triamcinolone ointment twice daily as needed for flares. 2. Inflamed seborrheic keratosis - 1    Cryotherapy was discussed and patient agreed to proceed. Consent was obtained. 1 lesions were treated cryotherapy: left upper forehead. 2 cycles of liquid nitrogen applied to each lesion for 5 seconds using a Cry-Ac cryo spray gun. Patient was educated regarding the potential risks of blister formation and discomfort. Wound care was discussed. The patient tolerated the procedure well and there were no immediate complications. 3. AK (actinic keratosis) - 3    Cryotherapy was discussed and patient agreed to proceed. Consent was obtained. 3 lesions were treated cryotherapy: vertex scalp. 2 cycles of liquid nitrogen applied to each lesion for 5 seconds using a Cry-Ac cryo spray gun. Patient was educated regarding the potential risks of blister formation and discomfort. Wound care was discussed. The patient tolerated the procedure well and there were no immediate complications. Return in about 1 year (around 1/26/2024).     --Kyra Quintanilla MD

## 2023-01-26 NOTE — PATIENT INSTRUCTIONS
Cryosurgery (Freezing) Wound Care Instructions    AFTER THE PROCEDURE:   You will notice swelling and redness around the site. This is normal.   You may experience a sharp or sore feeling for the next several days. For this discomfort, you may take acetaminophen (Tylenol©).   A blister may develop at the treated area, sometimes as soon as by the end of the day. After several days, the blister will subside and a scab will form.   If the area is bumped or traumatized during the first few days following freezing, you may develop bleeding into the blister, forming a blood blister. This is nothing to be alarmed about.  If the blister is tense, uncomfortable, or much larger than the site that was frozen, you may pop the blister along its edge with a sterile needle (boiled, heated under a flame, or cleaned with alcohol) to allow the fluid to drain out. If the blister does not bother you, no treatment is needed.   Do NOT peel off the top of the blister roof. It will act as a dressing on top of your wound.   WOUND CARE:   You may shower or bathe as usual, but avoid scrubbing the areas that have been frozen.    Cleanse the site twice a day with mild soapy water, and then apply a thin film of white petrolatum (Vaseline©).   You do not need to cover the area, but can if you prefer.   Do NOT allow the site to become dry or crusted, or attempt to dry it out with rubbing alcohol or hydrogen peroxide.   Continue this regimen until the area is pink and healed. Depending on the size and location of your cryosurgery site, healing may take 2 to 4 weeks.   The area may continue to be pink for several weeks, and over the next few months may become darker or lighter than the surrounding skin. This may be a permanent change.

## 2023-02-15 ENCOUNTER — OFFICE VISIT (OUTPATIENT)
Dept: INTERNAL MEDICINE CLINIC | Age: 58
End: 2023-02-15
Payer: MEDICARE

## 2023-02-15 VITALS
SYSTOLIC BLOOD PRESSURE: 123 MMHG | OXYGEN SATURATION: 96 % | WEIGHT: 276.3 LBS | TEMPERATURE: 99.4 F | BODY MASS INDEX: 37.42 KG/M2 | HEART RATE: 60 BPM | HEIGHT: 72 IN | DIASTOLIC BLOOD PRESSURE: 73 MMHG

## 2023-02-15 DIAGNOSIS — E11.9 TYPE 2 DIABETES MELLITUS WITHOUT COMPLICATION, WITHOUT LONG-TERM CURRENT USE OF INSULIN (HCC): Primary | ICD-10-CM

## 2023-02-15 DIAGNOSIS — E55.9 VITAMIN D INSUFFICIENCY: ICD-10-CM

## 2023-02-15 DIAGNOSIS — I10 ESSENTIAL HYPERTENSION: ICD-10-CM

## 2023-02-15 DIAGNOSIS — I25.10 CAD, MULTIPLE VESSEL: ICD-10-CM

## 2023-02-15 LAB — HBA1C MFR BLD: 8 %

## 2023-02-15 PROCEDURE — 83036 HEMOGLOBIN GLYCOSYLATED A1C: CPT | Performed by: STUDENT IN AN ORGANIZED HEALTH CARE EDUCATION/TRAINING PROGRAM

## 2023-02-15 PROCEDURE — 99213 OFFICE O/P EST LOW 20 MIN: CPT | Performed by: STUDENT IN AN ORGANIZED HEALTH CARE EDUCATION/TRAINING PROGRAM

## 2023-02-15 ASSESSMENT — ENCOUNTER SYMPTOMS
BACK PAIN: 0
COUGH: 0
SHORTNESS OF BREATH: 0
DIARRHEA: 0
ALLERGIC/IMMUNOLOGIC NEGATIVE: 1
EYES NEGATIVE: 1
ABDOMINAL PAIN: 0
CONSTIPATION: 0

## 2023-02-15 NOTE — PROGRESS NOTES
[facility]    Sirena Arroyo is a 59 y.o. male, here for evaluation of the following concerns:    3 month follow up. He has no complaints and states he is doing well. He takes all his medications as prescribed. He states he has been trying to adhere to a low carbohydrate diet but was difficult during the holidays. He denies any polyuria, polyphagia or polydipsia. He denies any chest pain, shortness of breath, arm pain, vision changes, headaches, syncope or palpitations. He continues to smoke about a pack a day and has no plan to cutting down. Review of Systems   Constitutional:  Negative for chills and fever. Eyes: Negative. Respiratory:  Negative for cough and shortness of breath. Cardiovascular:  Negative for chest pain, palpitations and leg swelling. Gastrointestinal:  Negative for abdominal pain, constipation and diarrhea. Endocrine: Negative. Genitourinary: Negative. Musculoskeletal:  Negative for back pain. Allergic/Immunologic: Negative. Neurological: Negative. Hematological: Negative. Psychiatric/Behavioral: Negative. MEDICATIONS:  Prior to Visit Medications    Medication Sig Taking? Authorizing Provider   oxyCODONE-acetaminophen (PERCOCET) 7.5-325 MG per tablet Take 4 tablets by mouth every 6 hours as needed for Pain. Yes Historical Provider, MD   nystatin-triamcinolone (MYCOLOG) 697296-1.1 UNIT/GM-% ointment PRN chapped lips, Apply topically 2 times daily until improved.  Yes Carlos Werner MD   amLODIPine (NORVASC) 5 MG tablet Take 1 tablet by mouth daily Yes Christine Kan MD   lisinopril (PRINIVIL;ZESTRIL) 10 MG tablet One tablet twice a day Yes Christine Kan MD   metoprolol tartrate (LOPRESSOR) 50 MG tablet TAKE 1 TABLET BY MOUTH 2 TIMES A DAY FOR BLOOD PRESSURE AND HEART Yes Vamsi Fagan MD   metFORMIN (GLUCOPHAGE) 500 MG tablet [employer] MEALS Yes Vamsi Fagan MD   famotidine (PEPCID) 20 MG tablet TAKE 1 TABLET BY MOUTH 2 TIMES A DAY Yes Lizette Edmond MD   SITagliptin (JANUVIA) 50 MG tablet Take 1 tablet by mouth daily Yes Hosea Mckee MD   rosuvastatin (CRESTOR) 20 MG tablet - Take one tablets nightly Yes Dyana Song MD   warfarin (COUMADIN) 5 MG tablet Take 1-1.5 tablets by mouth daily as directed by anticoagulation clinic. Yes Alexandr Davies MD   pregabalin (LYRICA) 150 MG capsule Take 150 mg by mouth in the morning, at noon, and at bedtime.  Yes Historical Provider, MD   METHOCARBAMOL PO Take by mouth Yes Historical Provider, MD   Handicap Placard MISC by Does not apply route Effective through 12/28/2020 through 12/27/2025 Yes Sanya Bailey,    aspirin 81 MG EC tablet Take 1 tablet by mouth daily Yes Teodora Gee MD   melatonin (RA MELATONIN) 3 MG TABS tablet Take 1 tablet by mouth nightly as needed (insomnia) Yes Oneal Bryan MD   Blood Pressure KIT 1 Unit Yes Oneal Bryan MD        Vitals:    02/15/23 0910   BP: 123/73   Site: Left Upper Arm   Position: Sitting   Cuff Size: Large Adult   Pulse: 60   Temp: 99.4 °F (37.4 °C)   TempSrc: Oral   SpO2: 96%   Weight: 276 lb 4.8 oz (125.3 kg)   Height: 6' (1.829 m)      Estimated body mass index is 37.47 kg/m² as calculated from the following:    Height as of this encounter: 6' (1.829 m).    Weight as of this encounter: 276 lb 4.8 oz (125.3 kg).  Physical Exam  Constitutional:       Appearance: Normal appearance. He is obese.   HENT:      Head: Normocephalic and atraumatic.      Mouth/Throat:      Mouth: Mucous membranes are moist.      Pharynx: Oropharynx is clear.   Eyes:      Conjunctiva/sclera: Conjunctivae normal.      Pupils: Pupils are equal, round, and reactive to light.   Cardiovascular:      Rate and Rhythm: Normal rate and regular rhythm.      Pulses: Normal pulses.      Heart sounds: Normal heart sounds.   Pulmonary:      Effort: Pulmonary effort is normal.      Breath sounds: Normal breath  sounds. Abdominal:      General: Abdomen is flat. Bowel sounds are normal.      Palpations: Abdomen is soft. Musculoskeletal:      Cervical back: Normal range of motion and neck supple. Skin:     General: Skin is warm and dry. Capillary Refill: Capillary refill takes less than 2 seconds. Neurological:      General: No focal deficit present. Mental Status: He is alert and oriented to person, place, and time. Mental status is at baseline. ASSESSMENT/PLAN:     1. Type 2 diabetes mellitus without complication, without long-term current use of insulin ([facility])  Assessment & Plan:  A1c 8.0%  Encouraged to control diet and increase exercise regimen. Continue Metformin. Orders:  -     CBC with Auto Differential; Future  -     Basic Metabolic Panel; Future  -     Lipid, Fasting; Future  2. Essential hypertension  Assessment & Plan:   Well-controlled, continue current medications  Orders:  -     CBC with Auto Differential; Future  -     Basic Metabolic Panel; Future  -     Lipid, Fasting; Future  3. CAD, multiple vessel  Assessment & Plan:   Asymptomatic, continue current medications, medication adherence emphasized and lifestyle modifications recommended  Orders:  -     CBC with Auto Differential; Future  -     Basic Metabolic Panel; Future  -     Lipid, Fasting; Future  4. Vitamin D insufficiency  -     Vitamin D 25 Hydroxy; Future    Return in about 3 months (around 5/15/2023). The patient was staffed with the teaching attending: Dr. Freeman Michele. An electronic signature was used to authenticate this note.     --Lanny Harris MD

## 2023-02-15 NOTE — PATIENT INSTRUCTIONS
Please get labs done 1 week prior to next visit/  Continue to take all medications as directed. Watch carbohydrate intake and try to limit processed foods, sugars and fast foods. Please return to clinic in 3 months.

## 2023-02-24 ENCOUNTER — ANTI-COAG VISIT (OUTPATIENT)
Dept: PHARMACY | Age: 58
End: 2023-02-24
Payer: MEDICARE

## 2023-02-24 DIAGNOSIS — I48.0 PAROXYSMAL ATRIAL FIBRILLATION (HCC): ICD-10-CM

## 2023-02-24 DIAGNOSIS — Z95.2 S/P AORTIC VALVE REPLACEMENT: Primary | ICD-10-CM

## 2023-02-24 LAB — INTERNATIONAL NORMALIZATION RATIO, POC: 2.2

## 2023-02-24 PROCEDURE — 85610 PROTHROMBIN TIME: CPT | Performed by: PHARMACIST

## 2023-02-24 PROCEDURE — 99211 OFF/OP EST MAY X REQ PHY/QHP: CPT | Performed by: PHARMACIST

## 2023-02-24 NOTE — PROGRESS NOTES
ANTICOAGULATION SERVICE    Nuria Robin is a 59 y.o. male with PMHx significant for AVR (re-do in May, 2017), CAD s/p CABG (x3 in May, 2017), pA-fib, HLD, HTN, testicular CA who presents to clinic on 2/24/2023 for anticoagulation monitoring and adjustment.     Anticoagulation Indication(s):  Heart Valve Replacement (bovine AVR), A-fib  Referring Physician:  Dr. Rolanda Cobos  Goal INR Range:  2-3  Duration of Anticoagulation Therapy:  TBD  Time of day dose taken:  PM  Product patient has at home:  warfarin 5 mg (peach)    [id number] Score for Atrial Fibrillation Stroke Risk   Risk   Factors  Component Value   C CHF No 0   H HTN Yes 1   A2 Age >= 75 No,  (62 y.o.) 0   D DM Yes 1   S2 Prior Stroke/TIA No 0   V Vascular Disease Yes 1   A Age 74-69 No,  (62 y.o.) 0   Sc Sex male 0    [id number]  Score  3   Score last updated 5/30/19 1:51 PM      Lab Results   Component Value Date    RBC 4.32 04/22/2022    HGB 14.0 04/22/2022    HCT 42.2 04/22/2022    MCV 97.6 04/22/2022    MCH 32.4 04/22/2022    MPV 9.6 04/22/2022    RDW 14.6 04/22/2022     (L) 04/22/2022     INR Summary                            Warfarin regimen (mg)  Date INR   A/P    Sun Mon Tue Wed Thu Fri Sat Mg/wk  2/24 2.2 At goal, continue  7.5 7.5 5 7.5 5 7.5 7.5 47.5  1/26 2.3 At goal, continue  7.5 7.5 5 7.5 5 7.5 7.5 47.5  12/30 2.1 At goal, continue  7.5 7.5 5 7.5 5 7.5 7.5 47.5  12/2 2.4 At goal, continue  7.5 7.5 5 7.5 5 7.5 7.5 47.5  10/28 2.7 At goal, continue  7.5 7.5 5 7.5 5 7.5 7.5 47.5  9/28 2.1 At goal, continue  7.5 7.5 5 7.5 5 7.5 7.5 47.5  9/7 2.8 At goal, continue  7.5 7.5 5 7.5 5 7.5 7.5 47.5  8/19 3.2 Above goal, decrease  7.5 7.5 5 7.5 5 7.5 7.5 47.5  7/22 2.0 At goal, continue  7.5 7.5 7.5 7.5 5 7.5 7.5 50  7/8 2.1 At goal, continue  7.5 7.5 7.5 7.5 5 7.5 7.5 50  6/24 1.4 Below goal, bolus +inc 7.5 7.5 7.5 7.5 5 10/7.5 7.5 50  6/3 1.7 Below goal, bolus + incr 5 7.5 7.5 7.5 5 10/7.5 7.5 47.5  5/20 1.9 Below goal, continue  5 7.5 5 7.5 5 7.5 7.5 45  5/6 1.3 Below goal, bolus + incr 5 7.5 5 7.5 5 10/7.5 7.5 45  4/21 1.5 Below goal, bolus  5 7.5 5 7.5 7.5/5 7.5 5 42.5  4/7 1.2 Below goal (held) see below 5 7.5 5 7.5 5 7.5 5 42.5  3/4 2.2 At goal, continue   5 7.5 5 7.5 5 7.5 5 42.5  2/1 2.1 At goal, continue   5 7.5 5 7.5 5 7.5 5 42.5  1/7 1.9 Below goal, continue   5 7.5 5 7.5 5 7.5 5 42.5  11/22 2.2 At goal, continue   5 7.5 5 7.5 5 7.5 5 42.5  11/22 2.1 At goal, continue   5 7.5 5 7.5 5 7.5 5 42.5  11/8 1.8 Below goal, increase  5 7.5 5 7.5 5 7.5 5 42.5  10/25 1.6 Below goal, bolus   7.5 7.5/5 5 5 5 5 7.5 40  10/4 2.0 At goal, continue   7.5 5 5 5 5 5 7.5 40  9/17 2.7 At goal (dosing error)  7.5 5 5 5 5 5 7.5 40  8/23 4.3 Above goal, decrease  7.5 0/5 5 5 5 5 7.5 40  8/9 3.3 Above goal, dec  7.5 5 5 5 7.5 5 5 40  7/21 1.4 Below goal, increase  5 7.5 5 7.5 5 7.5 5 42.5  6/28 2.0 At goal, no change  7.5 5 5 5 5 5 7.5 40  6/11 1.5 Below goal, increase  7.5 5 5 5 5 5 7.5 40  5/24 3.6 Above goal, hold x 1  5 0/5 5 5 5 5 5 35  4/23 2.7 At goal, no change  5 5 5 5 5 5 5 35  4/5 3.6 Above goal, hold x 1  5 0/5 5 5 5 5 5 35   3/8 3.0 At goal, no change  5 5 5 5 5 5 5 35  2/19 2.0 At goal, no change  5 5 5 5 5 5 5 35  2/4 2.7 At goal, no change  5 5 5 5 5 5 5 35  1/20 2.7 At goal, no change  5 5 5 5 5 5 5 35  1/11 4.5 Above goal (Cymbalta) 5 0/5 0/5 5 5 5 5 35  12/28 2.0 At goal, no change  7.5 5 5 5 5 5 7.5 40  11/24 2.4 At goal, no change  7.5 5 5 5 5 5 7.5 40  10/30 2.5 At goal, no change  7.5 5 5 5 5 5 7.5 40  10/2 2.2 At goal, no change  7.5 5 5 5 5 5 7.5 40  9/3 2.4 At goal, no change  7.5 5 5 5 5 5 7.5 40  8/3 2.9 At goal, no change  7.5 5 5 5 5 5 7.5 40  7/6 2.7 At goal, no change  7.5 5 5 5 5 5 7.5 40  6/8 2.8 At goal, no change  7.5 5 5 5 5 5 7.5 40  5/11 2.9 At goal, no change  7.5 5 5 5 5 5 7.5 40  3/30 3.0 At goal, no change  7.5 5 5 5 5 5 7.5 40  2/26 2.3 At goal, no change  7.5 5 5 5 5 5 7.5 40  2/5 3.3 Above goal, decrease  7.5 5 5 5 5 5 7.5 40  1/8 2.9 At goal, no change  7.5 7.5 5 5 5 5 7.5 42.5    Patient History:  Recent hospitalizations/HC visits 4/8/22: bronchoscopy per Dr. Noa Ron, warfarin held x3 days (no bridge). Findings were likely infectious process, prescribed Augmentin. 2/21/22: Pulmonology, work-up underway for lung nodule    Recent medication changes 6/24/22: [facility], WATERVLIET (stopped in late July)  3/2022: Centrum Silver Mens 50+ (stopped 7/15)  4/14-4/21/22: Augmentin BID x7 days    Medications taken regularly that may interact with warfarin or alter INR ASA 81 mg   Warfarin dose taken as prescribed Usually compliant   Does not use pillbox  Patient has been taking warfarin since re-do AVR in May, 2017   Signs/symptoms of bleeding No h/o major bleeding   Vitamin K intake Normally has ~0 servings of green, leafy vegetables per week   Recent vomiting/diarrhea/fever, changes in weight or activity level None reported   Tobacco or alcohol use -No recent changes in smoking as of 11/24/20 (~3/4 PPD)  -Patient cut back from 2 PPD to 3/4 PPD in February (2019) when he moved in with his daughter. Decrease in smoking typically increases INR gradually.   -Patient denies any alcohol or illicit drug use   Upcoming surgeries or procedures None      Assessment/Plan:   Patient's INR was therapeutic today (2.2). Patient reports taking warfarin as instructed by clinic with no missed doses, diet changes, med changes, illness, smoking changes, or bleeding since last visit. Patient was instructed to continue warfarin 7.5 mg daily except 5 mg on Tuesdays and Thursdays. Repeat INR in 5 weeks to coordinate with cardiology appointment. Patient was reminded to maintain consistent vitamin K intake and call with any bleeding, medication changes, or fever/vomiting/diarrhea. Patient understands dosing directions and information discussed. Dosing schedule and follow up appointment given to patient.   Progress note routed to referring physician's office. Patient acknowledges working in consult agreement with pharmacist as referred by his/her physician. Next INR Check: 3/24      Please call [place] at [phone] with any questions. Thanks!     Larissa Turner, PharmD, [facility]  [facility] Medication Management [address]: [phone]  [place]: [phone]  2/24/2023 9:09 AM    For Pharmacy Admin Tracking Only  Time Spent (min): 15

## 2023-02-28 DIAGNOSIS — E11.9 TYPE 2 DIABETES MELLITUS WITHOUT COMPLICATION, WITHOUT LONG-TERM CURRENT USE OF INSULIN (HCC): ICD-10-CM

## 2023-03-01 NOTE — TELEPHONE ENCOUNTER
Requested Prescriptions     Pending Prescriptions Disp Refills    metFORMIN (GLUCOPHAGE) 500 MG tablet [Pharmacy Med Name: METFORMIN HCL 500MG TABS] 360 tablet 1     Sig: TAKE TWO TABLETS BY MOUTH TWICE A DAY WITH MEALS       Last Clinic Visit:  2/15/2023     Next Clinic Appointment:  5/16/2023

## 2023-03-10 ENCOUNTER — TELEPHONE (OUTPATIENT)
Dept: CASE MANAGEMENT | Age: 58
End: 2023-03-10

## 2023-03-10 NOTE — TELEPHONE ENCOUNTER
Patient due for follow-up Chest CT. Reminder letter mailed.       Beverley GRANTN, RN   Lung Nodule Navigator  [facility]  [phone]

## 2023-03-31 ENCOUNTER — HOSPITAL ENCOUNTER (OUTPATIENT)
Dept: CT IMAGING | Age: 58
Discharge: HOME | End: 2023-03-31
Payer: MEDICARE

## 2023-03-31 ENCOUNTER — ANTI-COAG VISIT (OUTPATIENT)
Dept: PHARMACY | Age: 58
End: 2023-03-31

## 2023-03-31 ENCOUNTER — OFFICE VISIT (OUTPATIENT)
Dept: CARDIOLOGY CLINIC | Age: 58
End: 2023-03-31
Payer: MEDICARE

## 2023-03-31 VITALS
SYSTOLIC BLOOD PRESSURE: 122 MMHG | DIASTOLIC BLOOD PRESSURE: 70 MMHG | WEIGHT: 273.6 LBS | BODY MASS INDEX: 37.06 KG/M2 | HEART RATE: 60 BPM | HEIGHT: 72 IN

## 2023-03-31 DIAGNOSIS — I48.0 PAROXYSMAL ATRIAL FIBRILLATION (HCC): ICD-10-CM

## 2023-03-31 DIAGNOSIS — I10 ESSENTIAL HYPERTENSION: ICD-10-CM

## 2023-03-31 DIAGNOSIS — I25.10 CAD IN NATIVE ARTERY: Primary | ICD-10-CM

## 2023-03-31 DIAGNOSIS — Z95.2 S/P AORTIC VALVE REPLACEMENT: Primary | ICD-10-CM

## 2023-03-31 DIAGNOSIS — R91.1 PULMONARY NODULE 1 CM OR GREATER IN DIAMETER: ICD-10-CM

## 2023-03-31 DIAGNOSIS — I49.3 PVC (PREMATURE VENTRICULAR CONTRACTION): ICD-10-CM

## 2023-03-31 DIAGNOSIS — Z95.1 HX OF CABG: ICD-10-CM

## 2023-03-31 DIAGNOSIS — Z95.2 S/P AVR (AORTIC VALVE REPLACEMENT): ICD-10-CM

## 2023-03-31 LAB — INR BLD: 3.5

## 2023-03-31 PROCEDURE — 3017F COLORECTAL CA SCREEN DOC REV: CPT | Performed by: INTERNAL MEDICINE

## 2023-03-31 PROCEDURE — G8417 CALC BMI ABV UP PARAM F/U: HCPCS | Performed by: INTERNAL MEDICINE

## 2023-03-31 PROCEDURE — 3078F DIAST BP <80 MM HG: CPT | Performed by: INTERNAL MEDICINE

## 2023-03-31 PROCEDURE — G8427 DOCREV CUR MEDS BY ELIG CLIN: HCPCS | Performed by: INTERNAL MEDICINE

## 2023-03-31 PROCEDURE — 3074F SYST BP LT 130 MM HG: CPT | Performed by: INTERNAL MEDICINE

## 2023-03-31 PROCEDURE — 99212 OFFICE O/P EST SF 10 MIN: CPT | Performed by: PHARMACIST

## 2023-03-31 PROCEDURE — 99214 OFFICE O/P EST MOD 30 MIN: CPT | Performed by: INTERNAL MEDICINE

## 2023-03-31 PROCEDURE — 71250 CT THORAX DX C-: CPT

## 2023-03-31 PROCEDURE — 4004F PT TOBACCO SCREEN RCVD TLK: CPT | Performed by: INTERNAL MEDICINE

## 2023-03-31 PROCEDURE — 85610 PROTHROMBIN TIME: CPT | Performed by: PHARMACIST

## 2023-03-31 PROCEDURE — G8484 FLU IMMUNIZE NO ADMIN: HCPCS | Performed by: INTERNAL MEDICINE

## 2023-03-31 NOTE — PROGRESS NOTES
4/8/2022    BRONCHOSCOPY ALVEOLAR LAVAGE performed by Norman Santo MD at [address]  05/09/2017    Dr. Nomi Engle  08/26/2004    Dr. Jose Ramon Varela Right 03/17/2015    Dr. Kojo Carranza  10/10/2016    Dr. Zenobia Post - w/laparascopic lysis of extensive adhesions, open transverse colon resection, excision of old abd mesh adhering to colon    CORONARY ANGIOPLASTY WITH STENT PLACEMENT  07/2014    CORONARY ARTERY BYPASS GRAFT  08/27/2004    Dr. Camille Sultana - x2 (LIMA-LAD, SVG sequentially to ascending aorta & D1)    CORONARY ARTERY BYPASS GRAFT  05/30/2017    Dr. Aurea Enamorado - redo x3 (reverse AC SVG sequentially to D1, OM1 & PDA) w/explant of prior prosthetic valve & removal of embedded sternal wires x7    EMG  04/16/2012    FOOT SURGERY Left 01/24/2012    Dr. Sully Jamison, DPM - hallux nail evulsion & exostectomy    HEMICOLECTOMY N/A 7/26/2019    ROBOTIC ASSISTED  LAPAROSCOPIC RIGHT COLECTOMY AND CHOLECYSTECTOMY performed by Fahad Cobb MD at [address] [place] [address]  2010    multiple    LUMBAR DISCECTOMY  2012    L4-5    SHOULDER ARTHROSCOPY Right 11/21/2013    Dr. Jossue Price - w/subacromial decompression, debridement of the SLAP tear, distal clavicle excision    SOFT TISSUE TUMOR RESECTION  2001    retroperitoneal mass    TESTICLE REMOVAL Left 2001    radical orchiectomy    TRANSESOPHAGEAL ECHOCARDIOGRAM  05/30/2017    during redo CABG & AVR    TUNNELED VENOUS PORT PLACEMENT  2002    portacath      Social History     Socioeconomic History    Marital status:       Spouse name: Not on file    Number of children: Not on file    Years of education: Not on file    Highest education level: Not on file   Occupational History    Not on file   Tobacco Use    Smoking status: Some Days     Packs/day: 0.75     Years: 40.00     Pack years: 30.00     Types: Cigarettes     Last attempt to quit: 5/20/2017     Years

## 2023-03-31 NOTE — PROGRESS NOTES
understands dosing directions and information discussed. Dosing schedule and follow up appointment given to patient. Progress note routed to referring physician's office. Patient acknowledges working in consult agreement with pharmacist as referred by his/her physician. Next INR Check: 4/14/23    Please call [place] at [phone] with any questions. Thanks!     Crystal Amin, PharmD Candidate [address] [place] [address] Med[facility] Management Clinic  [place]: [address] [place] [address] Street: [phone]  3/31/2023 8:37 AM    For Pharmacy Admin Tracking Only    Intervention Detail: Dose Adjustment: 1, reason: Therapy De-escalation  Total # of Interventions Recommended: 1  Total # of Interventions Accepted: 1  Time Spent (min): 15

## 2023-05-05 ENCOUNTER — ANTI-COAG VISIT (OUTPATIENT)
Dept: PHARMACY | Age: 58
End: 2023-05-05
Payer: MEDICARE

## 2023-05-05 DIAGNOSIS — Z95.2 S/P AORTIC VALVE REPLACEMENT: Primary | Chronic | ICD-10-CM

## 2023-05-05 DIAGNOSIS — I48.0 PAROXYSMAL ATRIAL FIBRILLATION (HCC): ICD-10-CM

## 2023-05-05 LAB — INTERNATIONAL NORMALIZATION RATIO, POC: 3

## 2023-05-05 PROCEDURE — 85610 PROTHROMBIN TIME: CPT | Performed by: PHARMACIST

## 2023-05-05 PROCEDURE — 99211 OFF/OP EST MAY X REQ PHY/QHP: CPT | Performed by: PHARMACIST

## 2023-05-05 NOTE — PROGRESS NOTES
and call with any bleeding, medication changes, or fever/vomiting/diarrhea. Patient understands dosing directions and information discussed. Dosing schedule and follow up appointment given to patient. Progress note routed to referring physician's office. Patient acknowledges working in consult agreement with pharmacist as referred by his/her physician. Next INR Check: 6/2    Please call [place] at [phone] with any questions. Thanks!     Betty Gomes, PharmD, [facility]  [facility] Medication Management [address] [place] [address]: [phone]  [facility]: [phone]  5/5/2023 9:30 AM    For Pharmacy Admin Tracking Only  Time Spent (min): 15

## 2023-05-10 ENCOUNTER — OFFICE VISIT (OUTPATIENT)
Dept: PULMONOLOGY | Age: 58
End: 2023-05-10
Payer: MEDICARE

## 2023-05-10 VITALS
BODY MASS INDEX: 36.57 KG/M2 | HEART RATE: 91 BPM | RESPIRATION RATE: 16 BRPM | WEIGHT: 270 LBS | DIASTOLIC BLOOD PRESSURE: 72 MMHG | SYSTOLIC BLOOD PRESSURE: 116 MMHG | OXYGEN SATURATION: 96 % | HEIGHT: 72 IN

## 2023-05-10 DIAGNOSIS — C34.32 MALIGNANT NEOPLASM OF LOWER LOBE OF LEFT LUNG (HCC): Primary | ICD-10-CM

## 2023-05-10 DIAGNOSIS — J44.9 COPD, SEVERITY TO BE DETERMINED (HCC): ICD-10-CM

## 2023-05-10 DIAGNOSIS — I25.10 CORONARY ARTERY DISEASE INVOLVING NATIVE CORONARY ARTERY OF NATIVE HEART WITHOUT ANGINA PECTORIS: ICD-10-CM

## 2023-05-10 DIAGNOSIS — Z95.2 S/P AORTIC VALVE REPLACEMENT: Chronic | ICD-10-CM

## 2023-05-10 DIAGNOSIS — R91.1 PULMONARY NODULE 1 CM OR GREATER IN DIAMETER: ICD-10-CM

## 2023-05-10 DIAGNOSIS — I10 ESSENTIAL HYPERTENSION: Chronic | ICD-10-CM

## 2023-05-10 PROCEDURE — G8427 DOCREV CUR MEDS BY ELIG CLIN: HCPCS | Performed by: INTERNAL MEDICINE

## 2023-05-10 PROCEDURE — 3023F SPIROM DOC REV: CPT | Performed by: INTERNAL MEDICINE

## 2023-05-10 PROCEDURE — 3017F COLORECTAL CA SCREEN DOC REV: CPT | Performed by: INTERNAL MEDICINE

## 2023-05-10 PROCEDURE — 3074F SYST BP LT 130 MM HG: CPT | Performed by: INTERNAL MEDICINE

## 2023-05-10 PROCEDURE — G8417 CALC BMI ABV UP PARAM F/U: HCPCS | Performed by: INTERNAL MEDICINE

## 2023-05-10 PROCEDURE — 4004F PT TOBACCO SCREEN RCVD TLK: CPT | Performed by: INTERNAL MEDICINE

## 2023-05-10 PROCEDURE — 99214 OFFICE O/P EST MOD 30 MIN: CPT | Performed by: INTERNAL MEDICINE

## 2023-05-10 PROCEDURE — 3078F DIAST BP <80 MM HG: CPT | Performed by: INTERNAL MEDICINE

## 2023-05-10 RX ORDER — METOPROLOL TARTRATE 50 MG/1
TABLET, FILM COATED ORAL
Qty: 180 TABLET | Refills: 1 | Status: SHIPPED | OUTPATIENT
Start: 2023-05-10

## 2023-05-10 NOTE — TELEPHONE ENCOUNTER
Requested Prescriptions     Pending Prescriptions Disp Refills    metoprolol tartrate (LOPRESSOR) 50 MG tablet [Pharmacy Med Name: METOPROLOL TARTRATE 50MG TABS] 180 tablet 1     Sig: TAKE 1 TABLET BY MOUTH 2 TIMES A DAY       Last Clinic Visit:  2/15/2023     Next Clinic Appointment:  5/16/2023

## 2023-05-10 NOTE — PROGRESS NOTES
[facility] Pulmonary and Critical Care    Outpatient Follow up Note    Subjective:   Referring Physician: Inna Ochoa / HPI:     The patient is 59 y.o. male who presents today for a follow up visit for pulmonary nodule. Patient comes in today without acute complaints. He does report that he gets occasionally a little wheezy and dyspneic. Still smoking just under a pack per day. He's not interested in quitting. He had his follow-up CT scan in March where her to do on the results. 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 He had an orchiectomy as well as tumor removal from his abdomen. He also has a history of coronary artery disease and problems with his aortic valve he has had 2 open heart surgeries to repair his aortic valve as well as a double and triple bypass. Despite these things patient continues to smoke. He also has history of colon cancer x2 and has had roughly 3 feet of his bowel removed per his report. His wife passed away from stage IV lung cancer a few years ago as well. Patient has some exertional dyspnea but is not here for that complaint and instead is here because he had a screening CT scan that showed an abnormality in his left lower lobe.        Past Medical History:    Past Medical History:   Diagnosis Date    Abdominal hernia     s/p repair 2010    Aortic valve prosthesis present     CAD (coronary artery disease)     Chronic back pain greater than 3 months duration     Clostridium difficile diarrhea 10/17/2016    PCR    COPD, severity to be determined ([address] 5/10/2023    DDD (degenerative disc disease), lumbosacral 8/7/2013    Erectile dysfunction     GERD (gastroesophageal reflux disease)     Hyperlipidemia     Hypertension     Joint pain     Left testicular cancer ([address] 2002    s/p abdominal resection    Myalgia and myositis, unspecified

## 2023-05-22 DIAGNOSIS — C34.32 MALIGNANT NEOPLASM OF LOWER LOBE OF LEFT LUNG (HCC): ICD-10-CM

## 2023-05-22 DIAGNOSIS — R91.1 PULMONARY NODULE 1 CM OR GREATER IN DIAMETER: ICD-10-CM

## 2023-05-23 ENCOUNTER — OFFICE VISIT (OUTPATIENT)
Dept: INTERNAL MEDICINE CLINIC | Age: 58
End: 2023-05-23
Payer: MEDICARE

## 2023-05-23 VITALS
DIASTOLIC BLOOD PRESSURE: 82 MMHG | HEIGHT: 72 IN | HEART RATE: 64 BPM | BODY MASS INDEX: 36.07 KG/M2 | RESPIRATION RATE: 18 BRPM | SYSTOLIC BLOOD PRESSURE: 119 MMHG | WEIGHT: 266.3 LBS | TEMPERATURE: 97 F | OXYGEN SATURATION: 97 %

## 2023-05-23 DIAGNOSIS — I25.10 CAD, MULTIPLE VESSEL: ICD-10-CM

## 2023-05-23 DIAGNOSIS — M48.07 SPINAL STENOSIS OF LUMBOSACRAL REGION: ICD-10-CM

## 2023-05-23 DIAGNOSIS — E11.9 TYPE 2 DIABETES MELLITUS WITHOUT COMPLICATION, WITHOUT LONG-TERM CURRENT USE OF INSULIN (HCC): Primary | ICD-10-CM

## 2023-05-23 DIAGNOSIS — R91.1 PULMONARY NODULE, LEFT: ICD-10-CM

## 2023-05-23 DIAGNOSIS — I10 ESSENTIAL HYPERTENSION: ICD-10-CM

## 2023-05-23 DIAGNOSIS — E78.5 HYPERLIPIDEMIA, UNSPECIFIED HYPERLIPIDEMIA TYPE: ICD-10-CM

## 2023-05-23 LAB — HBA1C MFR BLD: NORMAL %

## 2023-05-23 PROCEDURE — 99213 OFFICE O/P EST LOW 20 MIN: CPT | Performed by: STUDENT IN AN ORGANIZED HEALTH CARE EDUCATION/TRAINING PROGRAM

## 2023-05-23 PROCEDURE — 83036 HEMOGLOBIN GLYCOSYLATED A1C: CPT | Performed by: STUDENT IN AN ORGANIZED HEALTH CARE EDUCATION/TRAINING PROGRAM

## 2023-05-23 RX ORDER — PIOGLITAZONEHYDROCHLORIDE 15 MG/1
15 TABLET ORAL DAILY
Qty: 30 TABLET | Refills: 3 | Status: SHIPPED | OUTPATIENT
Start: 2023-05-23

## 2023-05-23 ASSESSMENT — ENCOUNTER SYMPTOMS
CONSTIPATION: 0
SHORTNESS OF BREATH: 0
BACK PAIN: 0
VOMITING: 0
COUGH: 0
DIARRHEA: 0
PHOTOPHOBIA: 0
WHEEZING: 0
NAUSEA: 0
ABDOMINAL PAIN: 0

## 2023-05-23 NOTE — PATIENT INSTRUCTIONS
Please come back in 1 month. Please do your lab work  Please do a CT of your lower back. Please visit with Dr. Tyler Valiente for your low back pain. Please stop taking Januvia. Please start taking Actos 15 mg. Please call Dr. Pedro Yeh for discussion about your lung nodule in the next few days.

## 2023-05-23 NOTE — PROGRESS NOTES
Exam  Constitutional:       Appearance: Normal appearance. HENT:      Head: Normocephalic and atraumatic. Eyes:      Extraocular Movements: Extraocular movements intact. Conjunctiva/sclera: Conjunctivae normal.   Cardiovascular:      Rate and Rhythm: Normal rate and regular rhythm. Pulses: Normal pulses. Heart sounds: Normal heart sounds. Pulmonary:      Effort: Pulmonary effort is normal.      Breath sounds: Normal breath sounds. Abdominal:      General: Abdomen is flat. Bowel sounds are normal.      Palpations: Abdomen is soft. Musculoskeletal:         General: Normal range of motion. Skin:     General: Skin is warm. Capillary Refill: Capillary refill takes less than 2 seconds. Neurological:      General: No focal deficit present. Mental Status: He is alert and oriented to person, place, and time. Psychiatric:         Mood and Affect: Mood normal.         Behavior: Behavior normal.         Thought Content: Thought content normal.       ASSESSMENT/PLAN:     Left lung nodule  Recent PET scan, ordered by Dr. Dev Lama, showed larger and more metabolically active nodule. - Appointment aet on 05/30 at 2:40    T2DM  Hemoglobin A1c today 7.6%. Patient is compliant with his home medications Sitagliptin 50 mg daily, metformin 1 g twice daily.    - Adjusted medication based on [address] [place] [place] [address] Avenue: discontinued Sitagliptin and started Actos 15 mg daily  - Continue home Metformin 1g BID    Spinal stenosis  Back pain chronic. Describes it today as 9/10 in intensity. He is following pain specialist clinic that prescribe him with Percocet PRN. - Follows pain clinic  - CT lumbar spine ordered  - Dr. Malathi Gaspar referral given    Controlled Substances Monitoring: The prescription drug monitoring program report for this patient was reviewed 5/23/23. No signs of potential drug abuse or diversion identified. HTN  BP today 119/82.  Patient is compliant with home Lopressor 50 mg BID,

## 2023-05-30 ENCOUNTER — OFFICE VISIT (OUTPATIENT)
Dept: PULMONOLOGY | Age: 58
End: 2023-05-30
Payer: MEDICARE

## 2023-05-30 VITALS
SYSTOLIC BLOOD PRESSURE: 130 MMHG | DIASTOLIC BLOOD PRESSURE: 70 MMHG | OXYGEN SATURATION: 97 % | RESPIRATION RATE: 16 BRPM | HEART RATE: 58 BPM | BODY MASS INDEX: 36.16 KG/M2 | WEIGHT: 267 LBS | HEIGHT: 72 IN

## 2023-05-30 DIAGNOSIS — R91.1 PULMONARY NODULE 1 CM OR GREATER IN DIAMETER: Primary | ICD-10-CM

## 2023-05-30 PROCEDURE — 3017F COLORECTAL CA SCREEN DOC REV: CPT | Performed by: INTERNAL MEDICINE

## 2023-05-30 PROCEDURE — 4004F PT TOBACCO SCREEN RCVD TLK: CPT | Performed by: INTERNAL MEDICINE

## 2023-05-30 PROCEDURE — G8417 CALC BMI ABV UP PARAM F/U: HCPCS | Performed by: INTERNAL MEDICINE

## 2023-05-30 PROCEDURE — 99213 OFFICE O/P EST LOW 20 MIN: CPT | Performed by: INTERNAL MEDICINE

## 2023-05-30 PROCEDURE — 3078F DIAST BP <80 MM HG: CPT | Performed by: INTERNAL MEDICINE

## 2023-05-30 PROCEDURE — 3075F SYST BP GE 130 - 139MM HG: CPT | Performed by: INTERNAL MEDICINE

## 2023-05-30 PROCEDURE — G8427 DOCREV CUR MEDS BY ELIG CLIN: HCPCS | Performed by: INTERNAL MEDICINE

## 2023-05-30 NOTE — PROGRESS NOTES
[facility] Pulmonary and Critical Care    Outpatient Follow up Note    Subjective:   Referring Physician: Abdulaziz Pierre / HPI:     The patient is 59 y.o. male who presents today for a follow up visit for pulmonary nodule. I tried calling Maki Tatum last week to give him the results of his PET scan and discussed what to do next but we could not contact each other. We made this appointment to talk face-to-face and review the images together. Last visit:  Patient comes in today without acute complaints. He does report that he gets occasionally a little wheezy and dyspneic. Still smoking just under a pack per day. He's not interested in quitting. He had his follow-up CT scan in March where her to do on the results. 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 He had an orchiectomy as well as tumor removal from his abdomen. He also has a history of coronary artery disease and problems with his aortic valve he has had 2 open heart surgeries to repair his aortic valve as well as a double and triple bypass. Despite these things patient continues to smoke. He also has history of colon cancer x2 and has had roughly 3 feet of his bowel removed per his report. His wife passed away from stage IV lung cancer a few years ago as well. Patient has some exertional dyspnea but is not here for that complaint and instead is here because he had a screening CT scan that showed an abnormality in his left lower lobe.        Past Medical History:    Past Medical History:   Diagnosis Date    Abdominal hernia     s/p repair 2010    Aortic valve prosthesis present     CAD (coronary artery disease)     Chronic back pain greater than 3 months duration     Clostridium difficile diarrhea 10/17/2016    PCR    COPD, severity to be determined ([facility] 5/10/2023    DDD (degenerative disc disease),

## 2023-06-02 ENCOUNTER — ANTI-COAG VISIT (OUTPATIENT)
Dept: PHARMACY | Age: 58
End: 2023-06-02
Payer: MEDICARE

## 2023-06-02 DIAGNOSIS — I48.0 PAROXYSMAL ATRIAL FIBRILLATION (HCC): ICD-10-CM

## 2023-06-02 DIAGNOSIS — Z95.2 S/P AORTIC VALVE REPLACEMENT: Primary | Chronic | ICD-10-CM

## 2023-06-02 LAB — INTERNATIONAL NORMALIZATION RATIO, POC: 3.7

## 2023-06-02 PROCEDURE — 99212 OFFICE O/P EST SF 10 MIN: CPT | Performed by: PHARMACIST

## 2023-06-02 PROCEDURE — 85610 PROTHROMBIN TIME: CPT | Performed by: PHARMACIST

## 2023-06-02 NOTE — PROGRESS NOTES
ANTICOAGULATION SERVICE    Heron Yi is a 59 y.o. male with PMHx significant for AVR (re-do in May, 2017), CAD s/p CABG (x3 in May, 2017), pA-fib, HLD, HTN, testicular CA who presents to clinic on 6/2/2023 for anticoagulation monitoring and adjustment.     Anticoagulation Indication(s):  Heart Valve Replacement (bovine AVR), A-fib  Referring Physician:  Dr. Nayely Gipson  Goal INR Range:  2-3  Duration of Anticoagulation Therapy:  TBD  Time of day dose taken:  PM  Product patient has at home:  warfarin 5 mg (peach)    [id number] Score for Atrial Fibrillation Stroke Risk   Risk   Factors  Component Value   C CHF No 0   H HTN Yes 1   A2 Age >= 75 No,  (62 y.o.) 0   D DM Yes 1   S2 Prior Stroke/TIA No 0   V Vascular Disease Yes 1   A Age 74-69 No,  (62 y.o.) 0   Sc Sex male 0    [id number]  Score  3   Score last updated 5/30/19 1:51 PM      Lab Results   Component Value Date    RBC 4.32 04/22/2022    HGB 14.0 04/22/2022    HCT 42.2 04/22/2022    MCV 97.6 04/22/2022    MCH 32.4 04/22/2022    MPV 9.6 04/22/2022    RDW 14.6 04/22/2022     (L) 04/22/2022     INR Summary                            Warfarin regimen (mg)  Date INR   A/P    Sun Mon Tue Wed Thu Fri Sat Mg/wk  6/2 3.7 Above goal, hold+dec  5 7.5 5 7.5 5 0/7.5   5 42.5  5/5 3.0 At goal, continue  7.5 7.5 5 7.5 5 7.5   5 45  4/14 3.1 Above goal, continue  7.5 7.5 5 7.5 5 7.5   5 45  3/31 3.5 Above goal, dec  7.5 7.5 5 7.5 5 2.5/7.5   5 45  2/24 2.2 At goal, continue  7.5 7.5 5 7.5 5 7.5 7.5 47.5  1/26 2.3 At goal, continue  7.5 7.5 5 7.5 5 7.5 7.5 47.5  12/30 2.1 At goal, continue  7.5 7.5 5 7.5 5 7.5 7.5 47.5  12/2 2.4 At goal, continue  7.5 7.5 5 7.5 5 7.5 7.5 47.5  10/28 2.7 At goal, continue  7.5 7.5 5 7.5 5 7.5 7.5 47.5  9/28 2.1 At goal, continue  7.5 7.5 5 7.5 5 7.5 7.5 47.5  9/7 2.8 At goal, continue  7.5 7.5 5 7.5 5 7.5 7.5 47.5  8/19 3.2 Above goal, decrease  7.5 7.5 5 7.5 5 7.5 7.5 47.5  7/22 2.0 At goal,

## 2023-06-04 ENCOUNTER — HOSPITAL ENCOUNTER (OUTPATIENT)
Dept: CT IMAGING | Age: 58
Discharge: HOME | End: 2023-06-04
Attending: INTERNAL MEDICINE

## 2023-06-04 DIAGNOSIS — C34.90 MALIGNANT NEOPLASM OF LUNG, UNSPECIFIED LATERALITY, UNSPECIFIED PART OF LUNG (HCC): ICD-10-CM

## 2023-06-04 PROCEDURE — 3209999900 PET INTERPRETATION OF OUTSIDE IMAGES

## 2023-06-08 ENCOUNTER — TELEPHONE (OUTPATIENT)
Dept: PHARMACY | Age: 58
End: 2023-06-08

## 2023-06-08 DIAGNOSIS — I48.0 PAROXYSMAL ATRIAL FIBRILLATION (HCC): ICD-10-CM

## 2023-06-08 DIAGNOSIS — Z95.2 S/P AORTIC VALVE REPLACEMENT: Primary | ICD-10-CM

## 2023-06-13 ENCOUNTER — TELEPHONE (OUTPATIENT)
Dept: PULMONOLOGY | Age: 58
End: 2023-06-13

## 2023-06-16 ENCOUNTER — TELEPHONE (OUTPATIENT)
Dept: PHARMACY | Age: 58
End: 2023-06-16

## 2023-06-16 DIAGNOSIS — Z72.0 TOBACCO ABUSE DISORDER: Primary | ICD-10-CM

## 2023-06-19 ENCOUNTER — ANTI-COAG VISIT (OUTPATIENT)
Dept: PHARMACY | Age: 58
End: 2023-06-19
Payer: MEDICARE

## 2023-06-19 DIAGNOSIS — I48.0 PAROXYSMAL ATRIAL FIBRILLATION (HCC): ICD-10-CM

## 2023-06-19 DIAGNOSIS — Z95.2 S/P AORTIC VALVE REPLACEMENT: Primary | Chronic | ICD-10-CM

## 2023-06-19 LAB — INTERNATIONAL NORMALIZATION RATIO, POC: 4.1

## 2023-06-19 PROCEDURE — 85610 PROTHROMBIN TIME: CPT

## 2023-06-19 PROCEDURE — 99212 OFFICE O/P EST SF 10 MIN: CPT

## 2023-06-19 NOTE — PROGRESS NOTES
ANTICOAGULATION SERVICE    Jade Paez is a 59 y.o. male with PMHx significant for AVR (re-do in May, 2017), CAD s/p CABG (x3 in May, 2017), pA-fib, HLD, HTN, testicular CA who presents to clinic on 6/19/2023 for anticoagulation monitoring and adjustment.     Anticoagulation Indication(s):  Heart Valve Replacement (bovine AVR), A-fib  Referring Physician:  Dr. Jia Cerna  Goal INR Range:  2-3  Duration of Anticoagulation Therapy:  TBD  Time of day dose taken:  PM  Product patient has at home:  warfarin 5 mg (peach)    [id number] Score for Atrial Fibrillation Stroke Risk   Risk   Factors  Component Value   C CHF No 0   H HTN Yes 1   A2 Age >= 75 No,  (62 y.o.) 0   D DM Yes 1   S2 Prior Stroke/TIA No 0   V Vascular Disease Yes 1   A Age 74-69 No,  (62 y.o.) 0   Sc Sex male 0    [id number]  Score  3   Score last updated 5/30/19 1:51 PM      Lab Results   Component Value Date    RBC 4.32 04/22/2022    HGB 14.0 04/22/2022    HCT 42.2 04/22/2022    MCV 97.6 04/22/2022    MCH 32.4 04/22/2022    MPV 9.6 04/22/2022    RDW 14.6 04/22/2022     (L) 04/22/2022     INR Summary                            Warfarin regimen (mg)  Date INR   A/P    Sun Mon Tue Wed Thu Fri Sat Mg/wk  6/19 4.1 Above goal, hold+dec  5 0/5 5 5 5 0/7.5   5 37.5  6/2 3.7 Above goal, hold+dec  5 7.5 5 7.5 5 0/7.5   5 42.5  5/5 3.0 At goal, continue  7.5 7.5 5 7.5 5 7.5   5 45  4/14 3.1 Above goal, continue  7.5 7.5 5 7.5 5 7.5   5 45  3/31 3.5 Above goal, dec  7.5 7.5 5 7.5 5 2.5/7.5   5 45  2/24 2.2 At goal, continue  7.5 7.5 5 7.5 5 7.5 7.5 47.5  1/26 2.3 At goal, continue  7.5 7.5 5 7.5 5 7.5 7.5 47.5  12/30 2.1 At goal, continue  7.5 7.5 5 7.5 5 7.5 7.5 47.5  12/2 2.4 At goal, continue  7.5 7.5 5 7.5 5 7.5 7.5 47.5  10/28 2.7 At goal, continue  7.5 7.5 5 7.5 5 7.5 7.5 47.5  9/28 2.1 At goal, continue  7.5 7.5 5 7.5 5 7.5 7.5 47.5  9/7 2.8 At goal, continue  7.5 7.5 5 7.5 5 7.5 7.5 47.5  8/19 3.2 Above goal,

## 2023-06-21 ENCOUNTER — OFFICE VISIT (OUTPATIENT)
Dept: CARDIOTHORACIC SURGERY | Age: 58
End: 2023-06-21
Payer: MEDICARE

## 2023-06-21 VITALS
SYSTOLIC BLOOD PRESSURE: 166 MMHG | DIASTOLIC BLOOD PRESSURE: 78 MMHG | HEIGHT: 72 IN | OXYGEN SATURATION: 97 % | HEART RATE: 66 BPM | TEMPERATURE: 97.9 F | BODY MASS INDEX: 35.76 KG/M2 | WEIGHT: 264 LBS

## 2023-06-21 DIAGNOSIS — R91.1 NODULE OF LOWER LOBE OF LEFT LUNG: Primary | ICD-10-CM

## 2023-06-21 PROCEDURE — 99204 OFFICE O/P NEW MOD 45 MIN: CPT | Performed by: THORACIC SURGERY (CARDIOTHORACIC VASCULAR SURGERY)

## 2023-06-21 PROCEDURE — G8417 CALC BMI ABV UP PARAM F/U: HCPCS | Performed by: THORACIC SURGERY (CARDIOTHORACIC VASCULAR SURGERY)

## 2023-06-21 PROCEDURE — 4004F PT TOBACCO SCREEN RCVD TLK: CPT | Performed by: THORACIC SURGERY (CARDIOTHORACIC VASCULAR SURGERY)

## 2023-06-21 PROCEDURE — 3078F DIAST BP <80 MM HG: CPT | Performed by: THORACIC SURGERY (CARDIOTHORACIC VASCULAR SURGERY)

## 2023-06-21 PROCEDURE — 3077F SYST BP >= 140 MM HG: CPT | Performed by: THORACIC SURGERY (CARDIOTHORACIC VASCULAR SURGERY)

## 2023-06-21 PROCEDURE — 3017F COLORECTAL CA SCREEN DOC REV: CPT | Performed by: THORACIC SURGERY (CARDIOTHORACIC VASCULAR SURGERY)

## 2023-06-21 PROCEDURE — G8427 DOCREV CUR MEDS BY ELIG CLIN: HCPCS | Performed by: THORACIC SURGERY (CARDIOTHORACIC VASCULAR SURGERY)

## 2023-06-21 ASSESSMENT — ENCOUNTER SYMPTOMS
BLOOD IN STOOL: 0
WHEEZING: 0
EYES NEGATIVE: 1
TROUBLE SWALLOWING: 1
ABDOMINAL PAIN: 0
CHEST TIGHTNESS: 0
COUGH: 1
CONSTIPATION: 0
ALLERGIC/IMMUNOLOGIC NEGATIVE: 1
DIARRHEA: 1
BACK PAIN: 1
SHORTNESS OF BREATH: 1

## 2023-06-21 NOTE — PROGRESS NOTES
2020 through 2025 1 each 0    aspirin 81 MG EC tablet Take 1 tablet by mouth daily 30 tablet 0    Blood Pressure KIT 1 Unit 1 kit 0    amLODIPine (NORVASC) 5 MG tablet Take 1 tablet by mouth daily 90 tablet 2     No current facility-administered medications for this visit. Social History     Socioeconomic History    Marital status:       Spouse name: Not on file    Number of children: Not on file    Years of education: Not on file    Highest education level: Not on file   Occupational History    Not on file   Tobacco Use    Smoking status: Some Days     Packs/day: 0.75     Years: 40.00     Pack years: 30.00     Types: Cigarettes     Last attempt to quit: 2017     Years since quittin.0    Smokeless tobacco: Never    Tobacco comments:     stopped 5-18   Vaping Use    Vaping Use: Never used   Substance and Sexual Activity    Alcohol use: No     Alcohol/week: 0.0 standard drinks    Drug use: No    Sexual activity: Yes     Partners: Female   Other Topics Concern    Not on file   Social History Narrative    Not on file     Social Determinants of Health     Financial Resource Strain: Not on file   Food Insecurity: Not on file   Transportation Needs: Not on file   Physical Activity: Not on file   Stress: Not on file   Social Connections: Not on file   Intimate Partner Violence: Not on file   Housing Stability: Not on file     Family History   Problem Relation Age of Onset    Cancer Mother     Cancer Father         lung    Alzheimer's Disease Sister     Liver Cancer Brother       Objective:   Physical Exam  Vitals:    23 1419 23 1429   BP: (!) 172/78 (!) 166/78   Site: Left Upper Arm Left Upper Arm   Position: Sitting Sitting   Pulse: 66    Temp: 97.9 °F (36.6 °C)    TempSrc: Infrared    SpO2: 97%    Weight: 264 lb (119.7 kg)    Height: 6' (1.829 m)       Assessment:      ***      Plan:      ***
[phone]  06/21/23 3:28 PM      I saw and evaluated the patient. I agree with the findings/plan of care in the fellow's note.       Yordy Pickard MD  6/21/2023  3:48 PM

## 2023-06-30 ENCOUNTER — OFFICE VISIT (OUTPATIENT)
Dept: CARDIOLOGY CLINIC | Age: 58
End: 2023-06-30

## 2023-06-30 VITALS
HEART RATE: 60 BPM | BODY MASS INDEX: 35.61 KG/M2 | DIASTOLIC BLOOD PRESSURE: 70 MMHG | WEIGHT: 262.6 LBS | SYSTOLIC BLOOD PRESSURE: 118 MMHG

## 2023-06-30 DIAGNOSIS — Z95.2 S/P AVR (AORTIC VALVE REPLACEMENT): ICD-10-CM

## 2023-06-30 DIAGNOSIS — Z95.1 HX OF CABG: ICD-10-CM

## 2023-06-30 DIAGNOSIS — I21.4 NSTEMI (NON-ST ELEVATED MYOCARDIAL INFARCTION) (HCC): ICD-10-CM

## 2023-06-30 DIAGNOSIS — I10 ESSENTIAL HYPERTENSION: ICD-10-CM

## 2023-06-30 DIAGNOSIS — Z01.810 PREOP CARDIOVASCULAR EXAM: Primary | ICD-10-CM

## 2023-06-30 DIAGNOSIS — I48.0 PAROXYSMAL ATRIAL FIBRILLATION (HCC): ICD-10-CM

## 2023-06-30 DIAGNOSIS — I25.10 CAD IN NATIVE ARTERY: ICD-10-CM

## 2023-07-05 ENCOUNTER — OFFICE VISIT (OUTPATIENT)
Dept: PULMONOLOGY | Age: 58
End: 2023-07-05
Payer: MEDICARE

## 2023-07-05 ENCOUNTER — OFFICE VISIT (OUTPATIENT)
Dept: PHARMACY | Age: 58
End: 2023-07-05
Payer: MEDICARE

## 2023-07-05 VITALS
HEIGHT: 72 IN | DIASTOLIC BLOOD PRESSURE: 70 MMHG | HEART RATE: 51 BPM | WEIGHT: 265 LBS | SYSTOLIC BLOOD PRESSURE: 114 MMHG | BODY MASS INDEX: 35.89 KG/M2 | OXYGEN SATURATION: 96 % | RESPIRATION RATE: 16 BRPM

## 2023-07-05 DIAGNOSIS — Z72.0 TOBACCO ABUSE DISORDER: ICD-10-CM

## 2023-07-05 DIAGNOSIS — R91.1 PULMONARY NODULE 1 CM OR GREATER IN DIAMETER: Primary | ICD-10-CM

## 2023-07-05 DIAGNOSIS — J44.9 COPD, SEVERITY TO BE DETERMINED (HCC): ICD-10-CM

## 2023-07-05 DIAGNOSIS — R91.1 PULMONARY NODULE: ICD-10-CM

## 2023-07-05 DIAGNOSIS — I48.0 PAROXYSMAL ATRIAL FIBRILLATION (HCC): ICD-10-CM

## 2023-07-05 DIAGNOSIS — Z95.2 S/P AORTIC VALVE REPLACEMENT: Primary | Chronic | ICD-10-CM

## 2023-07-05 DIAGNOSIS — Z95.2 HISTORY OF AORTIC VALVE REPLACEMENT: ICD-10-CM

## 2023-07-05 LAB — INTERNATIONAL NORMALIZATION RATIO, POC: 2.4

## 2023-07-05 PROCEDURE — 3017F COLORECTAL CA SCREEN DOC REV: CPT | Performed by: INTERNAL MEDICINE

## 2023-07-05 PROCEDURE — 85610 PROTHROMBIN TIME: CPT | Performed by: PHARMACIST

## 2023-07-05 PROCEDURE — G8417 CALC BMI ABV UP PARAM F/U: HCPCS | Performed by: INTERNAL MEDICINE

## 2023-07-05 PROCEDURE — 3074F SYST BP LT 130 MM HG: CPT | Performed by: INTERNAL MEDICINE

## 2023-07-05 PROCEDURE — 99407 BEHAV CHNG SMOKING > 10 MIN: CPT | Performed by: INTERNAL MEDICINE

## 2023-07-05 PROCEDURE — 99211 OFF/OP EST MAY X REQ PHY/QHP: CPT | Performed by: PHARMACIST

## 2023-07-05 PROCEDURE — 3023F SPIROM DOC REV: CPT | Performed by: INTERNAL MEDICINE

## 2023-07-05 PROCEDURE — 99213 OFFICE O/P EST LOW 20 MIN: CPT | Performed by: INTERNAL MEDICINE

## 2023-07-05 PROCEDURE — G8427 DOCREV CUR MEDS BY ELIG CLIN: HCPCS | Performed by: INTERNAL MEDICINE

## 2023-07-05 PROCEDURE — 3078F DIAST BP <80 MM HG: CPT | Performed by: INTERNAL MEDICINE

## 2023-07-05 PROCEDURE — 4004F PT TOBACCO SCREEN RCVD TLK: CPT | Performed by: INTERNAL MEDICINE

## 2023-07-05 NOTE — PROGRESS NOTES
ANTICOAGULATION SERVICE    Jennifer Christina is a 59 y.o. male with PMHx significant for AVR (re-do in May, 2017), CAD s/p CABG (x3 in May, 2017), pA-fib, HLD, HTN, testicular CA who presents to clinic on 7/5/2023 for anticoagulation monitoring and adjustment. Received referral from Dr. Tanya Roberts for smoking cessation. Patient was not feeling well today and therefore asked to push this appointment back to next visit.      Anticoagulation Indication(s):  Heart Valve Replacement (bovine AVR), A-fib  Referring Physician:  Dr. Kandice Uribe  Goal INR Range:  2-3  Duration of Anticoagulation Therapy:  TBD  Time of day dose taken:  PM  Product patient has at home:  warfarin 5 mg (peach)    [id number] Score for Atrial Fibrillation Stroke Risk   Risk   Factors  Component Value   C CHF No 0   H HTN Yes 1   A2 Age >= 75 No,  (62 y.o.) 0   D DM Yes 1   S2 Prior Stroke/TIA No 0   V Vascular Disease Yes 1   A Age 77-78 No,  (62 y.o.) 0   Sc Sex male 0    [id number]  Score  3   Score last updated 5/30/19 1:51 PM      Lab Results   Component Value Date    RBC 4.32 04/22/2022    HGB 14.0 04/22/2022    HCT 42.2 04/22/2022    MCV 97.6 04/22/2022    MCH 32.4 04/22/2022    MPV 9.6 04/22/2022    RDW 14.6 04/22/2022     (L) 04/22/2022     INR Summary                            Warfarin regimen (mg)  Date INR   A/P    Sun Mon Tue Wed Thu Fri Sat Mg/wk  7/5 2.4 At goal, continue   5 5 5 5 5 7.5   5 37.5  6/19 4.1 Above goal, hold+dec  5 5 5 5 5 0/7.5   5 37.5  6/2 3.7 Above goal, hold+dec  5 7.5 5 7.5 5 0/7.5   5 42.5  5/5 3.0 At goal, continue  7.5 7.5 5 7.5 5 7.5   5 45  4/14 3.1 Above goal, continue  7.5 7.5 5 7.5 5 7.5   5 45  3/31 3.5 Above goal, dec  7.5 7.5 5 7.5 5 2.5/7.5   5 45  2/24 2.2 At goal, continue  7.5 7.5 5 7.5 5 7.5 7.5 47.5  1/26 2.3 At goal, continue  7.5 7.5 5 7.5 5 7.5 7.5 47.5  12/30 2.1 At goal, continue  7.5 7.5 5 7.5 5 7.5 7.5 47.5  12/2 2.4 At goal, continue  7.5 7.5 5 7.5 5 7.5 7.5 47.5  10/28 2.7 At goal,

## 2023-07-11 ENCOUNTER — PROCEDURE VISIT (OUTPATIENT)
Dept: CARDIOLOGY CLINIC | Age: 58
End: 2023-07-11
Payer: MEDICARE

## 2023-07-11 DIAGNOSIS — I21.4 NSTEMI (NON-ST ELEVATED MYOCARDIAL INFARCTION) (HCC): ICD-10-CM

## 2023-07-11 DIAGNOSIS — Z01.810 PREOP CARDIOVASCULAR EXAM: Primary | ICD-10-CM

## 2023-07-11 LAB
LV EF: 53 %
LVEF MODALITY: NORMAL

## 2023-07-11 PROCEDURE — 93306 TTE W/DOPPLER COMPLETE: CPT | Performed by: INTERNAL MEDICINE

## 2023-07-14 RX ORDER — FAMOTIDINE 20 MG/1
TABLET, FILM COATED ORAL
Qty: 180 TABLET | Refills: 2 | Status: SHIPPED | OUTPATIENT
Start: 2023-07-14

## 2023-07-14 NOTE — TELEPHONE ENCOUNTER
Requested Prescriptions     Pending Prescriptions Disp Refills    famotidine (PEPCID) 20 MG tablet [Pharmacy Med Name: FAMOTIDINE 20MG TABS] 180 tablet 2     Sig: TAKE 1 TABLET BY MOUTH 2 TIMES A DAY       Last Clinic Visit:  5/23/2023     Next Clinic Appointment:  Visit date not found

## 2023-07-18 DIAGNOSIS — I25.10 CORONARY ARTERY DISEASE INVOLVING NATIVE CORONARY ARTERY OF NATIVE HEART WITHOUT ANGINA PECTORIS: ICD-10-CM

## 2023-07-18 RX ORDER — ROSUVASTATIN CALCIUM 20 MG/1
TABLET, COATED ORAL
Qty: 90 TABLET | Refills: 3 | Status: SHIPPED | OUTPATIENT
Start: 2023-07-18

## 2023-07-18 NOTE — TELEPHONE ENCOUNTER
Requested Prescriptions     Pending Prescriptions Disp Refills    rosuvastatin (CRESTOR) 20 MG tablet 90 tablet 3     Sig: - Take one tablets nightly       Last Clinic Visit:  5/23/2023     Next Clinic Appointment:  Visit date not found

## 2023-07-19 ENCOUNTER — OFFICE VISIT (OUTPATIENT)
Dept: PHARMACY | Age: 58
End: 2023-07-19
Payer: MEDICARE

## 2023-07-19 DIAGNOSIS — Z95.2 S/P AORTIC VALVE REPLACEMENT: Primary | Chronic | ICD-10-CM

## 2023-07-19 DIAGNOSIS — I48.0 PAROXYSMAL ATRIAL FIBRILLATION (HCC): ICD-10-CM

## 2023-07-19 LAB — INTERNATIONAL NORMALIZATION RATIO, POC: 2

## 2023-07-19 PROCEDURE — 99212 OFFICE O/P EST SF 10 MIN: CPT | Performed by: PHARMACIST

## 2023-07-19 PROCEDURE — 85610 PROTHROMBIN TIME: CPT | Performed by: PHARMACIST

## 2023-07-19 RX ORDER — POLYETHYLENE GLYCOL 3350 17 G
POWDER IN PACKET (EA) ORAL
Qty: 108 EACH | Refills: 3 | Status: SHIPPED | OUTPATIENT
Start: 2023-07-19

## 2023-07-19 RX ORDER — NICOTINE 21 MG/24HR
1 PATCH, TRANSDERMAL 24 HOURS TRANSDERMAL DAILY
Qty: 28 PATCH | Refills: 5 | Status: SHIPPED | OUTPATIENT
Start: 2023-07-19

## 2023-07-19 NOTE — PROGRESS NOTES
more questions, consider using NRT    Reasons for addiction:  habit, addiction  Triggers: routine, boredom, smokes while driving    Barriers: support, having someone hold him accountable   Motivation for quitting: health, family, money      Personal Goals:  Long term- Remain smoke free   Short term- Quit by 8/823 - 58th birthday     Pharmacy: [facility]  Wt Readings from Last 3 Encounters:   07/05/23 265 lb (120.2 kg)   06/30/23 262 lb 9.6 oz (119.1 kg)   06/21/23 264 lb (119.7 kg)      BP Readings from Last 3 Encounters:   07/05/23 114/70   06/30/23 118/70   06/21/23 (!) 166/78     Follow healthy diet, moderate exercise, and limit alcohol  Referred pt to [employer] hotline.   Referred pt to [email or web address]  Referred to [employer] on their smartphone device    Discussed the following  Health risks of smoking  Physical and psychological dependence associated with smoking and potential withdrawal symptoms  Benefits to quitting: health, time, financial  Tobacco cessation quit tips  Trigger avoidance and coping with the urge to quit   Nicotine replacement and pharmacological options     Standard written patient education provided:  Medication Management Tobacco Cessation Program packet  Stopping smoking: Care Instructions  Deciding About Using Medicines To Quit Smoking  Learning About Benefits From Quitting Smoking  10 Things You Should Know About Quitting Smoking     If not ready to quit: Reviewed 5Rs- Relevance, Risk, Rewards, Roadblocks, Repetition    Nupur Bello, PharmD, BCPS  [facility] Medication Management [place]: [address]: [phone]  7/19/2023 5:23 PM    For Pharmacy Admin Tracking Only    Program: Medication Management  CPA in place:  Yes  Recommendation Provided To: Provider: 2 via Note to Provider and Patient/Caregiver: 3 via In person  Intervention Detail: New Rx: 2, reason: Needs Additional Therapy and Scheduled

## 2023-07-19 NOTE — PATIENT INSTRUCTIONS
- Try to  some more days at work  - Try to start reading instead of watching TV   - Get rid of colten trays in house. Only smoke outside   - Limit smoking in the car to just one cigarette   - Call your girls and grandkids more often. See if your girls have Snapchat so you can video call your grandkids   - Cut back by 1 cigarette per day. Count out cigarettes you are allowed to smoke before each day     - Start OTC Nicotine replacement patches - 21 mg patch, remove at bedtime     - Start Nicotine Lozenges  Dissolve 1 lozenge every 1-2 hours when urge to smoke occurs; Max 20/day   Do not chew or swallow; allow to dissolve slowly (~20 to 30 minutes); minimize swallowing and occasionally move lozenge from one side of the mouth to the other until completely dissolved. Do not eat or drink 15 minutes before using or while lozenge is in mouth.

## 2023-07-20 ENCOUNTER — TELEPHONE (OUTPATIENT)
Dept: PHARMACY | Age: 58
End: 2023-07-20

## 2023-07-20 NOTE — TELEPHONE ENCOUNTER
Patient called to report NRT was $80 through [facility]. Provided him phone number for 0-958-GBHDMBL where he should qualify for free patches. Spoke with Lidia Alcantara at [address] who reports they are not able to take GoodRx and the copay is $30 (unable to dispense smaller quantity). Will send Rx to [facility]  or [facility] for 81 lozenges as it appears he should be able to get these with GoodRx coupon for ~$15. Patient will call  back with preferred pharmacy so Rx can be sent through.      Chico Peña, PharmD, BCPS  [facility] Medication Management [place]: [phone]  [place]: [phone]  7/20/2023 3:43 PM    For Pharmacy Admin Tracking Only  Time Spent (min): 10

## 2023-08-03 ENCOUNTER — TELEPHONE (OUTPATIENT)
Dept: PHARMACY | Age: 58
End: 2023-08-03

## 2023-08-03 ENCOUNTER — OFFICE VISIT (OUTPATIENT)
Dept: PHARMACY | Age: 58
End: 2023-08-03
Payer: MEDICARE

## 2023-08-03 DIAGNOSIS — I48.0 PAROXYSMAL ATRIAL FIBRILLATION (HCC): ICD-10-CM

## 2023-08-03 DIAGNOSIS — Z95.2 S/P AORTIC VALVE REPLACEMENT: Primary | Chronic | ICD-10-CM

## 2023-08-03 LAB — INTERNATIONAL NORMALIZATION RATIO, POC: 1.6

## 2023-08-03 PROCEDURE — 99212 OFFICE O/P EST SF 10 MIN: CPT | Performed by: PHARMACIST

## 2023-08-03 PROCEDURE — 85610 PROTHROMBIN TIME: CPT | Performed by: PHARMACIST

## 2023-08-03 NOTE — TELEPHONE ENCOUNTER
Hi Dr. Michael Yates,     Patient is scheduled for lung resection on 8/31. He is currently on warfarin. How many days would you like him to be off warfarin for prior to this procedure?      Thanks,   Caroline Chong, PharmD, [facility]  [facility] Medication Management [place]: [phone]  [place]: [phone]  8/3/2023 11:26 AM

## 2023-08-03 NOTE — PROGRESS NOTES
CLINICAL PHARMACY NOTE--Smoking Cessation and Anticoagulation     Giorgio Billingsley is a 59 y.o. male with PMHx significant for AVR (re-do in May, 2017), CAD s/p CABG (x3 in May, 2017), pA-fib, HLD, HTN, testicular CA who presents to clinic on 8/3/2023 for anticoagulation monitoring and smoking cessation counseling. Interval update:   8/3/23: Patient has not gotten nicotine patches or lozenges due to being too expensive. Advised patient to call [phone]-[phone] for free patches and let me know what pharmacy he would like the lozenges to go to as you can use a [employer] coupon and get them for $15. Patient did not do either of these things and is still smoking the same amount of cigarettes. 7/19/23: Patient overall depressed lately since lung nodule diagnosis and with chronic pain. Does not endorse feeling of harming himself but wants to start making changes to improve his overall health. Smoking 20 cigarettes per day. Goal to quit by his 58th birthday (8/823). Cut back by 1-2 cigarettes per day. Will use nicotine patches + nicotine lozenges to help with cravings. Recommended patient schedule an appointment with PCP to get started on an antidepressant and or see a therapist.     Assessment/Plan:     Anticoagulation   Patient's INR was subtherapeutic today (1.6) today. Would expect INR to increase as he decreases smoking. Patient reports taking warfarin as instructed by clinic with no missed doses, med changes, illness, smoking changes, or bleeding since last visit. He is scheduled to have the lung resection on 8/31. Will notify Dr. Lara Valdez and see how long patient needs to be off warfarin for procedure. He recently had a PET scan which showed increased size and SUV of his lung nodule. Referral was sent to Dr. Dipika Vincent for surgical resection and planning on left lower lobe wedge resection with possibly lobectomy and lymph node dissection.  Patient will call when procedure is scheduled as he will likely need to be off

## 2023-08-03 NOTE — PATIENT INSTRUCTIONS
- Try to  some more days at work  - Try to start reading instead of watching TV   - Get rid of colten trays in house. Only smoke outside.   - Limit smoking in the car to just one cigarette   - Call your girls and grandkids more often. See if your girls have Snapchat so you can video call your grandkids   - Cut back by 1 cigarette per day.  Count out cigarettes you are allowed to smoke before each day   - Call 3-053-[phone] to get nicotine patches  - Call me with what pharmacy you want me to send the nicotine lozenges to

## 2023-08-18 ENCOUNTER — OFFICE VISIT (OUTPATIENT)
Dept: PHARMACY | Age: 58
End: 2023-08-18
Payer: MEDICARE

## 2023-08-18 DIAGNOSIS — Z95.2 S/P AORTIC VALVE REPLACEMENT: Primary | Chronic | ICD-10-CM

## 2023-08-18 DIAGNOSIS — I48.0 PAROXYSMAL ATRIAL FIBRILLATION (HCC): ICD-10-CM

## 2023-08-18 LAB — INTERNATIONAL NORMALIZATION RATIO, POC: 1.6

## 2023-08-18 PROCEDURE — 85610 PROTHROMBIN TIME: CPT | Performed by: PHARMACIST

## 2023-08-18 PROCEDURE — 99212 OFFICE O/P EST SF 10 MIN: CPT | Performed by: PHARMACIST

## 2023-08-18 NOTE — PROGRESS NOTES
CLINICAL PHARMACY NOTE--Smoking Cessation and Anticoagulation     Ansley Saenz is a 59 y.o. male with PMHx significant for AVR (re-do in May, 2017), CAD s/p CABG (x3 in May, 2017), pA-fib, HLD, HTN, testicular CA who presents to clinic on 8/18/2023 for anticoagulation monitoring and smoking cessation counseling. Interval update:   8/18/23: Patient has not called [phone] for patches as he \"doesn't want to seem like he's asking for free stuff\". Advised patient that this is not the point and urged him to call them asap. He is also going to look into getting the nicotine lozenges (will likely get through [facility] with goodrx coupon). He is motivated to quit but doesn't think he's in the right state of mind currently given stress of upcoming surgery. He does believe he will be able to make it through hospitalization without smoking. Asked him to clean out of his house of cigarettes / triggers so  he can continue to be smoke free upon returning home. 8/3/23: Patient has not gotten nicotine patches or lozenges due to being too expensive. Advised patient to call 2-367-[phone] for free patches and let me know what pharmacy he would like the lozenges to go to as you can use a GoodRx coupon and get them for $15. Patient did not do either of these things and is still smoking the same amount of cigarettes. 7/19/23: Patient overall depressed lately since lung nodule diagnosis and with chronic pain. Does not endorse feeling of harming himself but wants to start making changes to improve his overall health. Smoking 20 cigarettes per day. Goal to quit by his 58th birthday (8/823). Cut back by 1-2 cigarettes per day. Will use nicotine patches + nicotine lozenges to help with cravings.  Recommended patient schedule an appointment with PCP to get started on an antidepressant and or see a therapist.     Assessment/Plan:     Anticoagulation   Patient's INR was subtherapeutic (1.6) again today despite increased dose last

## 2023-08-21 DIAGNOSIS — E11.9 TYPE 2 DIABETES MELLITUS WITHOUT COMPLICATION, WITHOUT LONG-TERM CURRENT USE OF INSULIN (HCC): ICD-10-CM

## 2023-08-21 NOTE — TELEPHONE ENCOUNTER
Requested Prescriptions     Pending Prescriptions Disp Refills    metFORMIN (GLUCOPHAGE) 500 MG tablet 360 tablet 1     Sig: Take 2 tablets by mouth with breakfast and with evening meal       Last Clinic Visit:  5/23/2023     Next Clinic Appointment:  Visit date not found

## 2023-08-22 ENCOUNTER — TELEPHONE (OUTPATIENT)
Dept: INTERNAL MEDICINE CLINIC | Age: 58
End: 2023-08-22

## 2023-08-22 NOTE — TELEPHONE ENCOUNTER
Placed a call to this patient to schedule an appointment. He stated he is going to have surgery on the 31st. He said he has been dealing with a lot with the lung Cancer. He said he will call to schedule after surgery and recovery is done.

## 2023-08-28 ENCOUNTER — HOSPITAL ENCOUNTER (OUTPATIENT)
Dept: PREADMISSION TESTING | Age: 58
Discharge: HOME | End: 2023-09-01
Payer: MEDICARE

## 2023-08-28 ENCOUNTER — HOSPITAL ENCOUNTER (OUTPATIENT)
Dept: GENERAL RADIOLOGY | Age: 58
Discharge: HOME | End: 2023-08-28
Payer: MEDICARE

## 2023-08-28 VITALS — OXYGEN SATURATION: 94 % | RESPIRATION RATE: 22 BRPM | HEART RATE: 54 BPM

## 2023-08-28 LAB
ABO + RH BLD: NORMAL
ALBUMIN SERPL-MCNC: 4.6 G/DL (ref 3.4–5)
ALP SERPL-CCNC: 60 U/L (ref 40–129)
ALT SERPL-CCNC: 25 U/L (ref 10–40)
ANION GAP SERPL CALCULATED.3IONS-SCNC: 14 MMOL/L (ref 3–16)
APTT BLD: 41.8 SEC (ref 22.7–35.9)
AST SERPL-CCNC: 24 U/L (ref 15–37)
BILIRUB DIRECT SERPL-MCNC: <0.2 MG/DL (ref 0–0.3)
BILIRUB INDIRECT SERPL-MCNC: NORMAL MG/DL (ref 0–1)
BILIRUB SERPL-MCNC: 0.3 MG/DL (ref 0–1)
BILIRUB UR QL STRIP.AUTO: NEGATIVE
BLD GP AB SCN SERPL QL: NORMAL
BUN SERPL-MCNC: 8 MG/DL (ref 7–20)
CALCIUM SERPL-MCNC: 9.3 MG/DL (ref 8.3–10.6)
CHLORIDE SERPL-SCNC: 96 MMOL/L (ref 99–110)
CLARITY UR: CLEAR
CO2 SERPL-SCNC: 22 MMOL/L (ref 21–32)
COLOR UR: YELLOW
CREAT SERPL-MCNC: 0.9 MG/DL (ref 0.8–1.3)
DEPRECATED RDW RBC AUTO: 14.5 % (ref 12.4–15.4)
EKG ATRIAL RATE: 53 BPM
EKG DIAGNOSIS: NORMAL
EKG P AXIS: 45 DEGREES
EKG P-R INTERVAL: 206 MS
EKG Q-T INTERVAL: 432 MS
EKG QRS DURATION: 114 MS
EKG QTC CALCULATION (BAZETT): 405 MS
EKG R AXIS: 32 DEGREES
EKG T AXIS: 47 DEGREES
EKG VENTRICULAR RATE: 53 BPM
GFR SERPLBLD CREATININE-BSD FMLA CKD-EPI: >60 ML/MIN/{1.73_M2}
GLUCOSE SERPL-MCNC: 91 MG/DL (ref 70–99)
GLUCOSE UR STRIP.AUTO-MCNC: NEGATIVE MG/DL
HCT VFR BLD AUTO: 46.7 % (ref 40.5–52.5)
HGB BLD-MCNC: 16 G/DL (ref 13.5–17.5)
HGB UR QL STRIP.AUTO: ABNORMAL
INR PPP: 1.46 (ref 0.84–1.16)
KETONES UR STRIP.AUTO-MCNC: NEGATIVE MG/DL
LEUKOCYTE ESTERASE UR QL STRIP.AUTO: NEGATIVE
MAGNESIUM SERPL-MCNC: 2.1 MG/DL (ref 1.8–2.4)
MCH RBC QN AUTO: 32.3 PG (ref 26–34)
MCHC RBC AUTO-ENTMCNC: 34.2 G/DL (ref 31–36)
MCV RBC AUTO: 94.4 FL (ref 80–100)
NITRITE UR QL STRIP.AUTO: NEGATIVE
PH UR STRIP.AUTO: 6 [PH] (ref 5–8)
PLATELET # BLD AUTO: 159 K/UL (ref 135–450)
PMV BLD AUTO: 8.7 FL (ref 5–10.5)
POTASSIUM SERPL-SCNC: 4.4 MMOL/L (ref 3.5–5.1)
PROT SERPL-MCNC: 7.8 G/DL (ref 6.4–8.2)
PROT UR STRIP.AUTO-MCNC: NEGATIVE MG/DL
PROTHROMBIN TIME: 17.7 SEC (ref 11.5–14.8)
RBC # BLD AUTO: 4.95 M/UL (ref 4.2–5.9)
RBC #/AREA URNS HPF: NORMAL /HPF (ref 0–4)
SODIUM SERPL-SCNC: 132 MMOL/L (ref 136–145)
SP GR UR STRIP.AUTO: 1.01 (ref 1–1.03)
UA DIPSTICK W REFLEX MICRO PNL UR: YES
URN SPEC COLLECT METH UR: ABNORMAL
UROBILINOGEN UR STRIP-ACNC: 0.2 E.U./DL
WBC # BLD AUTO: 9.6 K/UL (ref 4–11)
WBC #/AREA URNS HPF: NORMAL /HPF (ref 0–5)

## 2023-08-28 PROCEDURE — 80076 HEPATIC FUNCTION PANEL: CPT

## 2023-08-28 PROCEDURE — 86900 BLOOD TYPING SEROLOGIC ABO: CPT

## 2023-08-28 PROCEDURE — 71046 X-RAY EXAM CHEST 2 VIEWS: CPT

## 2023-08-28 PROCEDURE — 85027 COMPLETE CBC AUTOMATED: CPT

## 2023-08-28 PROCEDURE — 83735 ASSAY OF MAGNESIUM: CPT

## 2023-08-28 PROCEDURE — 93010 ELECTROCARDIOGRAM REPORT: CPT | Performed by: INTERNAL MEDICINE

## 2023-08-28 PROCEDURE — 86901 BLOOD TYPING SEROLOGIC RH(D): CPT

## 2023-08-28 PROCEDURE — 86850 RBC ANTIBODY SCREEN: CPT

## 2023-08-28 PROCEDURE — 93005 ELECTROCARDIOGRAM TRACING: CPT | Performed by: THORACIC SURGERY (CARDIOTHORACIC VASCULAR SURGERY)

## 2023-08-28 PROCEDURE — 80048 BASIC METABOLIC PNL TOTAL CA: CPT

## 2023-08-28 PROCEDURE — 81001 URINALYSIS AUTO W/SCOPE: CPT

## 2023-08-28 PROCEDURE — 85730 THROMBOPLASTIN TIME PARTIAL: CPT

## 2023-08-28 PROCEDURE — 85610 PROTHROMBIN TIME: CPT

## 2023-08-28 PROCEDURE — 87086 URINE CULTURE/COLONY COUNT: CPT

## 2023-08-28 RX ORDER — METHOCARBAMOL 750 MG/1
TABLET, FILM COATED ORAL
Status: ON HOLD | COMMUNITY
Start: 2023-08-15

## 2023-08-28 NOTE — PROGRESS NOTES
[facility] PRE-SURGICAL TESTING INSTRUCTIONS                      PRIOR TO PROCEDURE DATE:    1. PLEASE FOLLOW ANY INSTRUCTIONS GIVEN TO YOU PER YOUR SURGEON. 2. Arrange for someone to drive you home and be with you for the first 24 hours after discharge for your safety after your procedure for which you received sedation. Ensure it is someone we can share information with regarding your discharge. NOTE: At this time ONLY 2 ADULTS may accompany you. MASKS are no longer required but they are encouraged. 3. You must contact your surgeon for instructions IF:  You are taking any blood thinners, aspirin, anti-inflammatory or vitamins. There is a change in your physical condition such as a cold, fever, rash, cuts, sores, or any other infection, especially near your surgical site. 4. Do not drink alcohol the day before or day of your procedure. Do not use any recreational marijuana at least 24 hours or street drugs (heroin, cocaine) at minimum 5 days prior to your procedure. 5. A Pre-Surgical History and Physical MUST be completed WITHIN 30 DAYS OR LESS prior to your procedure. by your Physician or an Urgent Care        THE DAY OF YOUR PROCEDURE:  1. Follow instructions for ARRIVAL TIME as DIRECTED BY YOUR SURGEON. 2. Enter the MAIN entrance from [address] and follow the signs to the free [place]. 3. Enter the Main Entrance of the hospital (do not enter from the lower level of the parking garage). Upon entrance, check in with the  at the surgical information desk on your LEFT. Bring your insurance card and photo ID to register      4. DO NOT EAT ANYTHING 8 hours prior to arrival for surgery. You may have up to 6 ounces of water 4 hours prior to your arrival for surgery in small sips.   NOTE: ALL Gastric, Bariatric & Bowel surgery patients - you MUST follow your surgeon's instructions regarding eating/ drinking as you will have very specific instructions to

## 2023-08-28 NOTE — PROGRESS NOTES
Pt arrived for his PAT visit. Ambulatory. States he is nervous \"I do not like needles\". Weight obtained 248 pounds. Urine sample for UA and C&S clean catch obtained and sent to lab/ds    0910  EKG completed. Lab called to come draw patient's blood work. 2307 Dr Nilson East called to ask if patient is allowed to take his Lyrica, Robaxin and Percocet DOS. Pt states he gets ill if he misses any doses. Dr Nilson East approved patient to take these. These medications were listed in patient's DOS instructions along with metoprolol and amlodipine./ds    0920  Call placed to [facility] RN to ask about patient's daily ASA and if needs to discontinue/ds    1000 Called for lab to come draw blood - second call. Patient intermittently standing due to back pain. Pt began complaining of left shoulder pain suddenly. Pt states \"this shoulder pops out at times. \"  Patient moved left shoulder in a backward rotating fashion and an audible pop was heard by me. Pt states this has been going on for a few months but has not seen anyone about this./ds    [phone] lab again as no one has arrived to draw patient's blood. Pt is becoming increasingly anxious and his chronic back pain is \"getting worse. \" Pt pacing in room/ds    1024  Phlebotomist  here. /DS    1044  Pt taken to radiology for pre op CXR. Pt given a bag with the CHG soap with written instructions, IS instructions with IS, surgical site infection FAQ, pain control booklet and instructions for DOS/ds    1202  Received return call from [employer] [employer]. Pt may continue the baby ASA and does not need to stop before surgery.   Also updated her to the patient having the left shoulder instability as I observed./ds

## 2023-08-30 ENCOUNTER — ANESTHESIA EVENT (OUTPATIENT)
Dept: OPERATING ROOM | Age: 58
End: 2023-08-30
Payer: MEDICARE

## 2023-08-31 ENCOUNTER — APPOINTMENT (OUTPATIENT)
Dept: GENERAL RADIOLOGY | Age: 58
DRG: 163 | End: 2023-08-31
Attending: THORACIC SURGERY (CARDIOTHORACIC VASCULAR SURGERY)
Payer: MEDICARE

## 2023-08-31 ENCOUNTER — ANESTHESIA (OUTPATIENT)
Dept: OPERATING ROOM | Age: 58
End: 2023-08-31
Payer: MEDICARE

## 2023-08-31 ENCOUNTER — HOSPITAL ENCOUNTER (INPATIENT)
Age: 58
LOS: 17 days | Discharge: HOME | DRG: 163 | End: 2023-09-17
Attending: THORACIC SURGERY (CARDIOTHORACIC VASCULAR SURGERY) | Admitting: THORACIC SURGERY (CARDIOTHORACIC VASCULAR SURGERY)
Payer: MEDICARE

## 2023-08-31 DIAGNOSIS — J81.0 ACUTE PULMONARY EDEMA (HCC): Primary | ICD-10-CM

## 2023-08-31 DIAGNOSIS — R91.1 NODULE OF LOWER LOBE OF LEFT LUNG: ICD-10-CM

## 2023-08-31 DIAGNOSIS — J44.9 COPD, SEVERITY TO BE DETERMINED (HCC): ICD-10-CM

## 2023-08-31 DIAGNOSIS — C34.32 PRIMARY MALIGNANT NEOPLASM OF LEFT LOWER LOBE OF LUNG (HCC): ICD-10-CM

## 2023-08-31 LAB
ABO + RH BLD: NORMAL
APTT BLD: 32 SEC (ref 22.7–35.9)
BACTERIA UR CULT: NORMAL
BLD GP AB SCN SERPL QL: NORMAL
GLUCOSE BLD-MCNC: 119 MG/DL (ref 70–99)
GLUCOSE BLD-MCNC: 144 MG/DL (ref 70–99)
INR PPP: 1.03 (ref 0.84–1.16)
PERFORMED ON: ABNORMAL
PERFORMED ON: ABNORMAL
PROTHROMBIN TIME: 13.5 SEC (ref 11.5–14.8)

## 2023-08-31 PROCEDURE — C9399 UNCLASSIFIED DRUGS OR BIOLOG: HCPCS | Performed by: ANESTHESIOLOGY

## 2023-08-31 PROCEDURE — 2580000003 HC RX 258: Performed by: THORACIC SURGERY (CARDIOTHORACIC VASCULAR SURGERY)

## 2023-08-31 PROCEDURE — 6360000002 HC RX W HCPCS: Performed by: THORACIC SURGERY (CARDIOTHORACIC VASCULAR SURGERY)

## 2023-08-31 PROCEDURE — 3700000000 HC ANESTHESIA ATTENDED CARE: Performed by: THORACIC SURGERY (CARDIOTHORACIC VASCULAR SURGERY)

## 2023-08-31 PROCEDURE — 6360000002 HC RX W HCPCS: Performed by: ANESTHESIOLOGY

## 2023-08-31 PROCEDURE — 6360000002 HC RX W HCPCS

## 2023-08-31 PROCEDURE — 86900 BLOOD TYPING SEROLOGIC ABO: CPT

## 2023-08-31 PROCEDURE — 2500000003 HC RX 250 WO HCPCS: Performed by: NURSE ANESTHETIST, CERTIFIED REGISTERED

## 2023-08-31 PROCEDURE — 2720000010 HC SURG SUPPLY STERILE: Performed by: THORACIC SURGERY (CARDIOTHORACIC VASCULAR SURGERY)

## 2023-08-31 PROCEDURE — 3E0T3BZ INTRODUCTION OF ANESTHETIC AGENT INTO PERIPHERAL NERVES AND PLEXI, PERCUTANEOUS APPROACH: ICD-10-PCS | Performed by: THORACIC SURGERY (CARDIOTHORACIC VASCULAR SURGERY)

## 2023-08-31 PROCEDURE — 6370000000 HC RX 637 (ALT 250 FOR IP)

## 2023-08-31 PROCEDURE — 71045 X-RAY EXAM CHEST 1 VIEW: CPT

## 2023-08-31 PROCEDURE — 2580000003 HC RX 258

## 2023-08-31 PROCEDURE — 6360000002 HC RX W HCPCS: Performed by: NURSE ANESTHETIST, CERTIFIED REGISTERED

## 2023-08-31 PROCEDURE — 88307 TISSUE EXAM BY PATHOLOGIST: CPT

## 2023-08-31 PROCEDURE — 88342 IMHCHEM/IMCYTCHM 1ST ANTB: CPT

## 2023-08-31 PROCEDURE — 88331 PATH CONSLTJ SURG 1 BLK 1SPC: CPT

## 2023-08-31 PROCEDURE — 3600000004 HC SURGERY LEVEL 4 BASE: Performed by: THORACIC SURGERY (CARDIOTHORACIC VASCULAR SURGERY)

## 2023-08-31 PROCEDURE — 85730 THROMBOPLASTIN TIME PARTIAL: CPT

## 2023-08-31 PROCEDURE — C1725 CATH, TRANSLUMIN NON-LASER: HCPCS | Performed by: THORACIC SURGERY (CARDIOTHORACIC VASCULAR SURGERY)

## 2023-08-31 PROCEDURE — 2709999900 HC NON-CHARGEABLE SUPPLY: Performed by: THORACIC SURGERY (CARDIOTHORACIC VASCULAR SURGERY)

## 2023-08-31 PROCEDURE — 7100000000 HC PACU RECOVERY - FIRST 15 MIN: Performed by: THORACIC SURGERY (CARDIOTHORACIC VASCULAR SURGERY)

## 2023-08-31 PROCEDURE — 1200000000 HC SEMI PRIVATE

## 2023-08-31 PROCEDURE — 2580000003 HC RX 258: Performed by: NURSE ANESTHETIST, CERTIFIED REGISTERED

## 2023-08-31 PROCEDURE — 7100000001 HC PACU RECOVERY - ADDTL 15 MIN: Performed by: THORACIC SURGERY (CARDIOTHORACIC VASCULAR SURGERY)

## 2023-08-31 PROCEDURE — 0BNP0ZZ RELEASE LEFT PLEURA, OPEN APPROACH: ICD-10-PCS | Performed by: THORACIC SURGERY (CARDIOTHORACIC VASCULAR SURGERY)

## 2023-08-31 PROCEDURE — 2500000003 HC RX 250 WO HCPCS: Performed by: ANESTHESIOLOGY

## 2023-08-31 PROCEDURE — 86850 RBC ANTIBODY SCREEN: CPT

## 2023-08-31 PROCEDURE — 86901 BLOOD TYPING SEROLOGIC RH(D): CPT

## 2023-08-31 PROCEDURE — 2060000000 HC ICU INTERMEDIATE R&B

## 2023-08-31 PROCEDURE — C9290 INJ, BUPIVACAINE LIPOSOME: HCPCS | Performed by: THORACIC SURGERY (CARDIOTHORACIC VASCULAR SURGERY)

## 2023-08-31 PROCEDURE — 85610 PROTHROMBIN TIME: CPT

## 2023-08-31 PROCEDURE — 88341 IMHCHEM/IMCYTCHM EA ADD ANTB: CPT

## 2023-08-31 PROCEDURE — C1729 CATH, DRAINAGE: HCPCS | Performed by: THORACIC SURGERY (CARDIOTHORACIC VASCULAR SURGERY)

## 2023-08-31 PROCEDURE — 3700000001 HC ADD 15 MINUTES (ANESTHESIA): Performed by: THORACIC SURGERY (CARDIOTHORACIC VASCULAR SURGERY)

## 2023-08-31 PROCEDURE — 3600000014 HC SURGERY LEVEL 4 ADDTL 15MIN: Performed by: THORACIC SURGERY (CARDIOTHORACIC VASCULAR SURGERY)

## 2023-08-31 PROCEDURE — 0BBJ0ZX EXCISION OF LEFT LOWER LUNG LOBE, OPEN APPROACH, DIAGNOSTIC: ICD-10-PCS | Performed by: THORACIC SURGERY (CARDIOTHORACIC VASCULAR SURGERY)

## 2023-08-31 RX ORDER — KETOROLAC TROMETHAMINE 15 MG/ML
15 INJECTION, SOLUTION INTRAMUSCULAR; INTRAVENOUS EVERY 6 HOURS
Status: DISCONTINUED | OUTPATIENT
Start: 2023-08-31 | End: 2023-09-01

## 2023-08-31 RX ORDER — FENTANYL CITRATE 50 UG/ML
INJECTION, SOLUTION INTRAMUSCULAR; INTRAVENOUS PRN
Status: DISCONTINUED | OUTPATIENT
Start: 2023-08-31 | End: 2023-08-31 | Stop reason: SDUPTHER

## 2023-08-31 RX ORDER — ROSUVASTATIN CALCIUM 20 MG/1
20 TABLET, COATED ORAL NIGHTLY
Status: DISCONTINUED | OUTPATIENT
Start: 2023-08-31 | End: 2023-09-17 | Stop reason: HOSPADM

## 2023-08-31 RX ORDER — OXYCODONE AND ACETAMINOPHEN 7.5; 325 MG/1; MG/1
1 TABLET ORAL EVERY 6 HOURS PRN
Status: DISCONTINUED | OUTPATIENT
Start: 2023-08-31 | End: 2023-09-01

## 2023-08-31 RX ORDER — LABETALOL HYDROCHLORIDE 5 MG/ML
10 INJECTION, SOLUTION INTRAVENOUS
Status: DISCONTINUED | OUTPATIENT
Start: 2023-08-31 | End: 2023-08-31 | Stop reason: HOSPADM

## 2023-08-31 RX ORDER — SODIUM CHLORIDE 0.9 % (FLUSH) 0.9 %
5-40 SYRINGE (ML) INJECTION EVERY 12 HOURS SCHEDULED
Status: DISCONTINUED | OUTPATIENT
Start: 2023-08-31 | End: 2023-09-17 | Stop reason: HOSPADM

## 2023-08-31 RX ORDER — ONDANSETRON 4 MG/1
4 TABLET, ORALLY DISINTEGRATING ORAL EVERY 8 HOURS PRN
Status: DISCONTINUED | OUTPATIENT
Start: 2023-08-31 | End: 2023-08-31 | Stop reason: SDUPTHER

## 2023-08-31 RX ORDER — HYDROMORPHONE HYDROCHLORIDE 1 MG/ML
0.25 INJECTION, SOLUTION INTRAMUSCULAR; INTRAVENOUS; SUBCUTANEOUS
Status: DISCONTINUED | OUTPATIENT
Start: 2023-08-31 | End: 2023-09-01

## 2023-08-31 RX ORDER — HYDROMORPHONE HYDROCHLORIDE 1 MG/ML
0.5 INJECTION, SOLUTION INTRAMUSCULAR; INTRAVENOUS; SUBCUTANEOUS
Status: DISCONTINUED | OUTPATIENT
Start: 2023-08-31 | End: 2023-09-02

## 2023-08-31 RX ORDER — SODIUM CHLORIDE, SODIUM LACTATE, POTASSIUM CHLORIDE, CALCIUM CHLORIDE 600; 310; 30; 20 MG/100ML; MG/100ML; MG/100ML; MG/100ML
INJECTION, SOLUTION INTRAVENOUS CONTINUOUS PRN
Status: DISCONTINUED | OUTPATIENT
Start: 2023-08-31 | End: 2023-08-31 | Stop reason: SDUPTHER

## 2023-08-31 RX ORDER — METOPROLOL TARTRATE 50 MG/1
50 TABLET, FILM COATED ORAL 2 TIMES DAILY
Status: DISCONTINUED | OUTPATIENT
Start: 2023-08-31 | End: 2023-09-17 | Stop reason: HOSPADM

## 2023-08-31 RX ORDER — SODIUM CHLORIDE, SODIUM LACTATE, POTASSIUM CHLORIDE, CALCIUM CHLORIDE 600; 310; 30; 20 MG/100ML; MG/100ML; MG/100ML; MG/100ML
INJECTION, SOLUTION INTRAVENOUS CONTINUOUS
Status: DISCONTINUED | OUTPATIENT
Start: 2023-08-31 | End: 2023-08-31 | Stop reason: HOSPADM

## 2023-08-31 RX ORDER — PREGABALIN 150 MG/1
150 CAPSULE ORAL 3 TIMES DAILY
Status: DISCONTINUED | OUTPATIENT
Start: 2023-08-31 | End: 2023-08-31

## 2023-08-31 RX ORDER — ONDANSETRON 2 MG/ML
4 INJECTION INTRAMUSCULAR; INTRAVENOUS EVERY 6 HOURS PRN
Status: DISCONTINUED | OUTPATIENT
Start: 2023-08-31 | End: 2023-08-31 | Stop reason: SDUPTHER

## 2023-08-31 RX ORDER — ONDANSETRON 2 MG/ML
INJECTION INTRAMUSCULAR; INTRAVENOUS PRN
Status: DISCONTINUED | OUTPATIENT
Start: 2023-08-31 | End: 2023-08-31 | Stop reason: SDUPTHER

## 2023-08-31 RX ORDER — SODIUM CHLORIDE 0.9 % (FLUSH) 0.9 %
5-40 SYRINGE (ML) INJECTION PRN
Status: DISCONTINUED | OUTPATIENT
Start: 2023-08-31 | End: 2023-08-31 | Stop reason: HOSPADM

## 2023-08-31 RX ORDER — SODIUM CHLORIDE 0.9 % (FLUSH) 0.9 %
5-40 SYRINGE (ML) INJECTION PRN
Status: DISCONTINUED | OUTPATIENT
Start: 2023-08-31 | End: 2023-09-17 | Stop reason: HOSPADM

## 2023-08-31 RX ORDER — SODIUM CHLORIDE 9 MG/ML
INJECTION, SOLUTION INTRAVENOUS PRN
Status: DISCONTINUED | OUTPATIENT
Start: 2023-08-31 | End: 2023-09-17 | Stop reason: HOSPADM

## 2023-08-31 RX ORDER — ALBUTEROL SULFATE 2.5 MG/3ML
2.5 SOLUTION RESPIRATORY (INHALATION) EVERY 6 HOURS PRN
Status: DISCONTINUED | OUTPATIENT
Start: 2023-08-31 | End: 2023-09-08

## 2023-08-31 RX ORDER — DIPHENHYDRAMINE HYDROCHLORIDE 50 MG/ML
10 INJECTION INTRAMUSCULAR; INTRAVENOUS ONCE
Status: COMPLETED | OUTPATIENT
Start: 2023-08-31 | End: 2023-08-31

## 2023-08-31 RX ORDER — METHOCARBAMOL 750 MG/1
750 TABLET, FILM COATED ORAL 3 TIMES DAILY
Status: DISCONTINUED | OUTPATIENT
Start: 2023-08-31 | End: 2023-09-01

## 2023-08-31 RX ORDER — ROCURONIUM BROMIDE 10 MG/ML
INJECTION, SOLUTION INTRAVENOUS PRN
Status: DISCONTINUED | OUTPATIENT
Start: 2023-08-31 | End: 2023-08-31 | Stop reason: SDUPTHER

## 2023-08-31 RX ORDER — MIDAZOLAM HYDROCHLORIDE 1 MG/ML
2 INJECTION INTRAMUSCULAR; INTRAVENOUS
Status: COMPLETED | OUTPATIENT
Start: 2023-08-31 | End: 2023-08-31

## 2023-08-31 RX ORDER — SODIUM CHLORIDE 9 MG/ML
INJECTION, SOLUTION INTRAVENOUS PRN
Status: DISCONTINUED | OUTPATIENT
Start: 2023-08-31 | End: 2023-08-31 | Stop reason: HOSPADM

## 2023-08-31 RX ORDER — ONDANSETRON 4 MG/1
4 TABLET, ORALLY DISINTEGRATING ORAL EVERY 8 HOURS PRN
Status: DISCONTINUED | OUTPATIENT
Start: 2023-08-31 | End: 2023-09-17 | Stop reason: HOSPADM

## 2023-08-31 RX ORDER — EPHEDRINE SULFATE 50 MG/ML
INJECTION INTRAVENOUS PRN
Status: DISCONTINUED | OUTPATIENT
Start: 2023-08-31 | End: 2023-08-31 | Stop reason: SDUPTHER

## 2023-08-31 RX ORDER — FAMOTIDINE 20 MG/1
20 TABLET, FILM COATED ORAL 2 TIMES DAILY
Status: DISCONTINUED | OUTPATIENT
Start: 2023-08-31 | End: 2023-09-17 | Stop reason: HOSPADM

## 2023-08-31 RX ORDER — SODIUM CHLORIDE, SODIUM LACTATE, POTASSIUM CHLORIDE, CALCIUM CHLORIDE 600; 310; 30; 20 MG/100ML; MG/100ML; MG/100ML; MG/100ML
INJECTION, SOLUTION INTRAVENOUS CONTINUOUS
Status: DISCONTINUED | OUTPATIENT
Start: 2023-08-31 | End: 2023-09-01

## 2023-08-31 RX ORDER — ONDANSETRON 2 MG/ML
4 INJECTION INTRAMUSCULAR; INTRAVENOUS
Status: DISCONTINUED | OUTPATIENT
Start: 2023-08-31 | End: 2023-08-31 | Stop reason: HOSPADM

## 2023-08-31 RX ORDER — PREGABALIN 150 MG/1
150 CAPSULE ORAL 3 TIMES DAILY
Status: DISCONTINUED | OUTPATIENT
Start: 2023-08-31 | End: 2023-09-17 | Stop reason: HOSPADM

## 2023-08-31 RX ORDER — SODIUM CHLORIDE 0.9 % (FLUSH) 0.9 %
5-40 SYRINGE (ML) INJECTION EVERY 12 HOURS SCHEDULED
Status: DISCONTINUED | OUTPATIENT
Start: 2023-08-31 | End: 2023-08-31 | Stop reason: HOSPADM

## 2023-08-31 RX ORDER — LIDOCAINE 4 G/G
1 PATCH TOPICAL DAILY
Status: DISCONTINUED | OUTPATIENT
Start: 2023-08-31 | End: 2023-08-31 | Stop reason: SDUPTHER

## 2023-08-31 RX ORDER — ASPIRIN 81 MG/1
81 TABLET ORAL DAILY
Status: DISCONTINUED | OUTPATIENT
Start: 2023-09-02 | End: 2023-09-17 | Stop reason: HOSPADM

## 2023-08-31 RX ORDER — HYDROMORPHONE HYDROCHLORIDE 1 MG/ML
0.5 INJECTION, SOLUTION INTRAMUSCULAR; INTRAVENOUS; SUBCUTANEOUS
Status: DISCONTINUED | OUTPATIENT
Start: 2023-08-31 | End: 2023-08-31 | Stop reason: HOSPADM

## 2023-08-31 RX ORDER — MIDAZOLAM HYDROCHLORIDE 1 MG/ML
INJECTION INTRAMUSCULAR; INTRAVENOUS PRN
Status: DISCONTINUED | OUTPATIENT
Start: 2023-08-31 | End: 2023-08-31 | Stop reason: SDUPTHER

## 2023-08-31 RX ORDER — WARFARIN SODIUM 7.5 MG/1
7.5 TABLET ORAL
Status: COMPLETED | OUTPATIENT
Start: 2023-08-31 | End: 2023-08-31

## 2023-08-31 RX ORDER — FENTANYL CITRATE 50 UG/ML
50 INJECTION, SOLUTION INTRAMUSCULAR; INTRAVENOUS EVERY 5 MIN PRN
Status: COMPLETED | OUTPATIENT
Start: 2023-08-31 | End: 2023-08-31

## 2023-08-31 RX ORDER — PROPOFOL 10 MG/ML
INJECTION, EMULSION INTRAVENOUS PRN
Status: DISCONTINUED | OUTPATIENT
Start: 2023-08-31 | End: 2023-08-31 | Stop reason: SDUPTHER

## 2023-08-31 RX ORDER — METOCLOPRAMIDE HYDROCHLORIDE 5 MG/ML
10 INJECTION INTRAMUSCULAR; INTRAVENOUS
Status: DISCONTINUED | OUTPATIENT
Start: 2023-08-31 | End: 2023-08-31 | Stop reason: HOSPADM

## 2023-08-31 RX ORDER — LIDOCAINE 4 G/G
1 PATCH TOPICAL DAILY
Status: DISCONTINUED | OUTPATIENT
Start: 2023-08-31 | End: 2023-09-17 | Stop reason: HOSPADM

## 2023-08-31 RX ORDER — ESMOLOL HYDROCHLORIDE 10 MG/ML
INJECTION INTRAVENOUS PRN
Status: DISCONTINUED | OUTPATIENT
Start: 2023-08-31 | End: 2023-08-31 | Stop reason: SDUPTHER

## 2023-08-31 RX ORDER — ONDANSETRON 2 MG/ML
4 INJECTION INTRAMUSCULAR; INTRAVENOUS EVERY 6 HOURS PRN
Status: DISCONTINUED | OUTPATIENT
Start: 2023-08-31 | End: 2023-09-17 | Stop reason: HOSPADM

## 2023-08-31 RX ORDER — ENOXAPARIN SODIUM 100 MG/ML
30 INJECTION SUBCUTANEOUS 2 TIMES DAILY
Status: DISCONTINUED | OUTPATIENT
Start: 2023-08-31 | End: 2023-09-03 | Stop reason: ALTCHOICE

## 2023-08-31 RX ORDER — WARFARIN SODIUM 5 MG/1
5 TABLET ORAL DAILY
Status: DISCONTINUED | OUTPATIENT
Start: 2023-08-31 | End: 2023-08-31

## 2023-08-31 RX ORDER — DEXTROSE, SODIUM CHLORIDE, SODIUM LACTATE, POTASSIUM CHLORIDE, AND CALCIUM CHLORIDE 5; .6; .31; .03; .02 G/100ML; G/100ML; G/100ML; G/100ML; G/100ML
INJECTION, SOLUTION INTRAVENOUS CONTINUOUS
Status: DISCONTINUED | OUTPATIENT
Start: 2023-08-31 | End: 2023-08-31

## 2023-08-31 RX ORDER — SODIUM CHLORIDE 9 MG/ML
INJECTION, SOLUTION INTRAVENOUS CONTINUOUS
Status: DISCONTINUED | OUTPATIENT
Start: 2023-08-31 | End: 2023-08-31 | Stop reason: HOSPADM

## 2023-08-31 RX ORDER — AMLODIPINE BESYLATE 5 MG/1
5 TABLET ORAL DAILY
Status: DISCONTINUED | OUTPATIENT
Start: 2023-09-01 | End: 2023-09-17 | Stop reason: HOSPADM

## 2023-08-31 RX ADMIN — ROCURONIUM BROMIDE 50 MG: 10 INJECTION, SOLUTION INTRAVENOUS at 15:25

## 2023-08-31 RX ADMIN — FENTANYL CITRATE 50 MCG: 50 INJECTION, SOLUTION INTRAMUSCULAR; INTRAVENOUS at 16:40

## 2023-08-31 RX ADMIN — WARFARIN SODIUM 7.5 MG: 7.5 TABLET ORAL at 20:36

## 2023-08-31 RX ADMIN — SODIUM CHLORIDE, SODIUM LACTATE, POTASSIUM CHLORIDE, AND CALCIUM CHLORIDE: .6; .31; .03; .02 INJECTION, SOLUTION INTRAVENOUS at 14:44

## 2023-08-31 RX ADMIN — HYDROMORPHONE HYDROCHLORIDE 0.5 MG: 1 INJECTION, SOLUTION INTRAMUSCULAR; INTRAVENOUS; SUBCUTANEOUS at 18:00

## 2023-08-31 RX ADMIN — HYDROMORPHONE HYDROCHLORIDE 0.5 MG: 1 INJECTION, SOLUTION INTRAMUSCULAR; INTRAVENOUS; SUBCUTANEOUS at 17:45

## 2023-08-31 RX ADMIN — FENTANYL CITRATE 50 MCG: 50 INJECTION, SOLUTION INTRAMUSCULAR; INTRAVENOUS at 15:00

## 2023-08-31 RX ADMIN — PROPOFOL 200 MG: 10 INJECTION, EMULSION INTRAVENOUS at 14:44

## 2023-08-31 RX ADMIN — PHENYLEPHRINE HYDROCHLORIDE 50 MCG: 10 INJECTION, SOLUTION INTRAMUSCULAR; INTRAVENOUS; SUBCUTANEOUS at 15:41

## 2023-08-31 RX ADMIN — HYDROMORPHONE HYDROCHLORIDE 0.5 MG: 1 INJECTION, SOLUTION INTRAMUSCULAR; INTRAVENOUS; SUBCUTANEOUS at 15:09

## 2023-08-31 RX ADMIN — METOPROLOL TARTRATE 50 MG: 50 TABLET, FILM COATED ORAL at 20:36

## 2023-08-31 RX ADMIN — SUGAMMADEX 200 MG: 100 INJECTION, SOLUTION INTRAVENOUS at 16:08

## 2023-08-31 RX ADMIN — KETOROLAC TROMETHAMINE 15 MG: 15 INJECTION, SOLUTION INTRAMUSCULAR; INTRAVENOUS at 18:46

## 2023-08-31 RX ADMIN — OXYCODONE AND ACETAMINOPHEN 1 TABLET: 7.5; 325 TABLET ORAL at 20:28

## 2023-08-31 RX ADMIN — PHENYLEPHRINE HYDROCHLORIDE 150 MCG: 10 INJECTION, SOLUTION INTRAMUSCULAR; INTRAVENOUS; SUBCUTANEOUS at 15:44

## 2023-08-31 RX ADMIN — HYDROMORPHONE HYDROCHLORIDE 0.5 MG: 1 INJECTION, SOLUTION INTRAMUSCULAR; INTRAVENOUS; SUBCUTANEOUS at 21:49

## 2023-08-31 RX ADMIN — MIDAZOLAM HYDROCHLORIDE 2 MG: 2 INJECTION, SOLUTION INTRAMUSCULAR; INTRAVENOUS at 14:41

## 2023-08-31 RX ADMIN — EPHEDRINE SULFATE 5 MG: 50 INJECTION INTRAVENOUS at 15:52

## 2023-08-31 RX ADMIN — FENTANYL CITRATE 50 MCG: 50 INJECTION, SOLUTION INTRAMUSCULAR; INTRAVENOUS at 14:44

## 2023-08-31 RX ADMIN — ROCURONIUM BROMIDE 50 MG: 10 INJECTION, SOLUTION INTRAVENOUS at 14:44

## 2023-08-31 RX ADMIN — SODIUM CHLORIDE, PRESERVATIVE FREE 10 ML: 5 INJECTION INTRAVENOUS at 20:38

## 2023-08-31 RX ADMIN — PREGABALIN 150 MG: 150 CAPSULE ORAL at 20:36

## 2023-08-31 RX ADMIN — HYDROMORPHONE HYDROCHLORIDE 1 MG: 1 INJECTION, SOLUTION INTRAMUSCULAR; INTRAVENOUS; SUBCUTANEOUS at 15:25

## 2023-08-31 RX ADMIN — PROPOFOL 100 MG: 10 INJECTION, EMULSION INTRAVENOUS at 14:48

## 2023-08-31 RX ADMIN — SODIUM CHLORIDE, SODIUM LACTATE, POTASSIUM CHLORIDE, AND CALCIUM CHLORIDE: .6; .31; .03; .02 INJECTION, SOLUTION INTRAVENOUS at 15:30

## 2023-08-31 RX ADMIN — SODIUM CHLORIDE, POTASSIUM CHLORIDE, SODIUM LACTATE AND CALCIUM CHLORIDE: 600; 310; 30; 20 INJECTION, SOLUTION INTRAVENOUS at 18:51

## 2023-08-31 RX ADMIN — ONDANSETRON 4 MG: 2 INJECTION INTRAMUSCULAR; INTRAVENOUS at 15:41

## 2023-08-31 RX ADMIN — FAMOTIDINE 20 MG: 20 TABLET ORAL at 20:36

## 2023-08-31 RX ADMIN — MIDAZOLAM 2 MG: 1 INJECTION INTRAMUSCULAR; INTRAVENOUS at 16:50

## 2023-08-31 RX ADMIN — PROPOFOL 50 MG: 10 INJECTION, EMULSION INTRAVENOUS at 15:25

## 2023-08-31 RX ADMIN — PHENYLEPHRINE HYDROCHLORIDE 150 MCG: 10 INJECTION, SOLUTION INTRAMUSCULAR; INTRAVENOUS; SUBCUTANEOUS at 15:47

## 2023-08-31 RX ADMIN — HYDROMORPHONE HYDROCHLORIDE 0.5 MG: 1 INJECTION, SOLUTION INTRAMUSCULAR; INTRAVENOUS; SUBCUTANEOUS at 18:47

## 2023-08-31 RX ADMIN — ROSUVASTATIN CALCIUM 20 MG: 20 TABLET, FILM COATED ORAL at 20:36

## 2023-08-31 RX ADMIN — ENOXAPARIN SODIUM 30 MG: 100 INJECTION SUBCUTANEOUS at 20:37

## 2023-08-31 RX ADMIN — ESMOLOL HYDROCHLORIDE 30 MG: 10 INJECTION, SOLUTION INTRAVENOUS at 16:09

## 2023-08-31 RX ADMIN — HYDROMORPHONE HYDROCHLORIDE 0.5 MG: 1 INJECTION, SOLUTION INTRAMUSCULAR; INTRAVENOUS; SUBCUTANEOUS at 15:11

## 2023-08-31 RX ADMIN — FENTANYL CITRATE 50 MCG: 50 INJECTION, SOLUTION INTRAMUSCULAR; INTRAVENOUS at 16:30

## 2023-08-31 RX ADMIN — METHOCARBAMOL 750 MG: 750 TABLET ORAL at 20:37

## 2023-08-31 RX ADMIN — DIPHENHYDRAMINE HYDROCHLORIDE 10 MG: 50 INJECTION, SOLUTION INTRAMUSCULAR; INTRAVENOUS at 21:20

## 2023-08-31 ASSESSMENT — PAIN DESCRIPTION - ORIENTATION
ORIENTATION: LEFT

## 2023-08-31 ASSESSMENT — PAIN DESCRIPTION - DESCRIPTORS
DESCRIPTORS: ACHING
DESCRIPTORS: SHARP;SHOOTING;STABBING
DESCRIPTORS: SHARP;SHOOTING
DESCRIPTORS: ACHING

## 2023-08-31 ASSESSMENT — PAIN DESCRIPTION - ONSET
ONSET: ON-GOING

## 2023-08-31 ASSESSMENT — PAIN SCALES - GENERAL
PAINLEVEL_OUTOF10: 10
PAINLEVEL_OUTOF10: 6
PAINLEVEL_OUTOF10: 10

## 2023-08-31 ASSESSMENT — PAIN DESCRIPTION - PAIN TYPE
TYPE: SURGICAL PAIN

## 2023-08-31 ASSESSMENT — PAIN DESCRIPTION - FREQUENCY
FREQUENCY: CONTINUOUS

## 2023-08-31 ASSESSMENT — PAIN DESCRIPTION - LOCATION
LOCATION: CHEST

## 2023-08-31 ASSESSMENT — PAIN - FUNCTIONAL ASSESSMENT
PAIN_FUNCTIONAL_ASSESSMENT: 0-10
PAIN_FUNCTIONAL_ASSESSMENT: PREVENTS OR INTERFERES SOME ACTIVE ACTIVITIES AND ADLS
PAIN_FUNCTIONAL_ASSESSMENT: PREVENTS OR INTERFERES SOME ACTIVE ACTIVITIES AND ADLS

## 2023-08-31 ASSESSMENT — LIFESTYLE VARIABLES: SMOKING_STATUS: 1

## 2023-08-31 NOTE — H&P
Update History & Physical    The patient's History and Physical of July 5, 2023 was reviewed with the patient and I examined the patient. There was no change. The surgical site was confirmed by the patient and me. Plan: The risks, benefits, expected outcome, and alternative to the recommended procedure have been discussed with the patient. Patient understands and wants to proceed with the procedure.      Electronically signed by Delbert Hanson DO on 8/31/2023 at 1:13 PM

## 2023-08-31 NOTE — CONSULTS
Clinical Pharmacy Progress Note    Warfarin - Management by Pharmacy    Consult Date(s): 8/31/23   Consulting Provider(s): Dr Bowser Roots / Plan  AVR- Warfarin  Goal INR: 2 - 3  Concurrent Anticoagulants / Antiplatelets: Prophylactic Lovenox,  ASA  Interactions: No significant interactions noted. Current Regimen / Plan:   INR today = 1.03 after last home dose taken 8/26. Will order 7.5 mg x1 tonight  Warfarin dosing placeholder is ordered for now. Can likely resume home regiment of 7.5 mg MWF and 5 mg all other days . Started today on prophylactic Lovenox (30 mg BID); could consider increase to therapeutic dosing; recommend continuing Lovenox until INR > 2 and stable  Will monitor pt's clinical status and INR daily, and make dose adjustments as needed. Thank you for consulting pharmacy,    Please call with any questions    Jake BERG [facility]  [phone] (Main Pharmacy)        Interval update:  S/p L mini thoracotomy w mass resection today. Subjective/Objective:   Kiana Odonnell is a 59 y.o. male with a PMHx significant for  bovine AVR (re-do 5/2017), CAD s/p CABG (x3 in May, 2017), pA-fib, HLD, HTN, testicular CA, lung nodule. Admitted 8/31 for scheduled thoracotomy for lung nodule. Pharmacy is consulted to dose warfarin.     Ht Readings from Last 1 Encounters:   08/31/23 6' (1.829 m)     Wt Readings from Last 1 Encounters:   08/31/23 240 lb 3.2 oz (109 kg)     Prior / Home Warfarin Regimen:  Warfarin dosing managed at [address] [facility] - medication management clinic  Most recent visit 8/18- INR was 1.6 - dose increased to 7.5 mg MWF and 5 mg all other days    Date INR Warfarin   8/31 1.03 7.5 mg   ordered                      Recent Labs     08/31/23  1310   INR 1.03

## 2023-08-31 NOTE — ANESTHESIA POSTPROCEDURE EVALUATION
Department of Anesthesiology  Postprocedure Note    Patient: Robby Paz  MRN: [id number]  YOB: 1965  Date of evaluation: 8/31/2023      Procedure Summary     Date: 08/31/23 Room / Location: [address]    Anesthesia Start: 5717 Anesthesia Stop: 2041    Procedure: L mini thoracotomy; wide local resection of LLL mass; lysis of multiple adhesions; ICNB x 4 levels (4 through 8) (Left: Chest) Diagnosis:       Nodule of lower lobe of left lung      (Nodule of lower lobe of left lung [R91.1])    Surgeons: Grant Pak MD Responsible Provider: Cari Leventhal, MD    Anesthesia Type: general ASA Status: 4          Anesthesia Type: No value filed.     Shaheed Phase I: Shaheed Score: 8    Shaheed Phase II:        Anesthesia Post Evaluation    Patient location during evaluation: PACU  Patient participation: complete - patient participated  Level of consciousness: awake and alert  Pain score: 0  Airway patency: patent  Nausea & Vomiting: no nausea and no vomiting  Complications: no  Cardiovascular status: hemodynamically stable  Respiratory status: acceptable  Hydration status: euvolemic  Pain management: adequate

## 2023-08-31 NOTE — BRIEF OP NOTE
Pre-op Dx:L lower lobe nodule w/ granulomas and PET +; long smoking hx; hx of colon Ca; s/p CABG/AVR; obesity      Post-op Dx:same; non-small cell lung cancer      Procedure:L mini thoracotomy; wide local resection of LLL mass; lysis of multiple adhesions; ICNB x 4 levels (4 through 8)       Anesthesia:General endotracheal anesthesia      Surgeon/Assistants:Scooter      Specimens:L lung superior segment      EBL: ~ 20 cc's      Complications: none      Findings:multiple adhesions from prior surgeries precluding lymph node resection or more aggressive lung surgery; frozen section + for non-small cell lung Ca favoring squamous cell

## 2023-08-31 NOTE — PERIOP NOTE
Patient states that he smoker cigarettes yesterday.   OR RN, and anesthesia Dr Talon Aden made aware

## 2023-09-01 ENCOUNTER — APPOINTMENT (OUTPATIENT)
Dept: GENERAL RADIOLOGY | Age: 58
DRG: 163 | End: 2023-09-01
Attending: THORACIC SURGERY (CARDIOTHORACIC VASCULAR SURGERY)
Payer: MEDICARE

## 2023-09-01 LAB
ALBUMIN SERPL-MCNC: 3.8 G/DL (ref 3.4–5)
ANION GAP SERPL CALCULATED.3IONS-SCNC: 10 MMOL/L (ref 3–16)
BASOPHILS # BLD: 0 K/UL (ref 0–0.2)
BASOPHILS NFR BLD: 0.3 %
BUN SERPL-MCNC: 6 MG/DL (ref 7–20)
CALCIUM SERPL-MCNC: 8.9 MG/DL (ref 8.3–10.6)
CHLORIDE SERPL-SCNC: 96 MMOL/L (ref 99–110)
CO2 SERPL-SCNC: 23 MMOL/L (ref 21–32)
CREAT SERPL-MCNC: 0.6 MG/DL (ref 0.8–1.3)
DEPRECATED RDW RBC AUTO: 14.6 % (ref 12.4–15.4)
EOSINOPHIL # BLD: 0.1 K/UL (ref 0–0.6)
EOSINOPHIL NFR BLD: 1 %
GFR SERPLBLD CREATININE-BSD FMLA CKD-EPI: >60 ML/MIN/{1.73_M2}
GLUCOSE BLD-MCNC: 155 MG/DL (ref 70–99)
GLUCOSE SERPL-MCNC: 155 MG/DL (ref 70–99)
HCT VFR BLD AUTO: 43.3 % (ref 40.5–52.5)
HGB BLD-MCNC: 14.1 G/DL (ref 13.5–17.5)
LYMPHOCYTES # BLD: 1 K/UL (ref 1–5.1)
LYMPHOCYTES NFR BLD: 8.5 %
MAGNESIUM SERPL-MCNC: 2 MG/DL (ref 1.8–2.4)
MCH RBC QN AUTO: 31.5 PG (ref 26–34)
MCHC RBC AUTO-ENTMCNC: 32.4 G/DL (ref 31–36)
MCV RBC AUTO: 97.1 FL (ref 80–100)
MONOCYTES # BLD: 0.8 K/UL (ref 0–1.3)
MONOCYTES NFR BLD: 6.5 %
NEUTROPHILS # BLD: 9.9 K/UL (ref 1.7–7.7)
NEUTROPHILS NFR BLD: 83.7 %
PERFORMED ON: ABNORMAL
PHOSPHATE SERPL-MCNC: 3 MG/DL (ref 2.5–4.9)
PLATELET # BLD AUTO: 117 K/UL (ref 135–450)
PMV BLD AUTO: 8.2 FL (ref 5–10.5)
POTASSIUM SERPL-SCNC: 4.2 MMOL/L (ref 3.5–5.1)
RBC # BLD AUTO: 4.46 M/UL (ref 4.2–5.9)
SODIUM SERPL-SCNC: 129 MMOL/L (ref 136–145)
WBC # BLD AUTO: 11.8 K/UL (ref 4–11)

## 2023-09-01 PROCEDURE — 6370000000 HC RX 637 (ALT 250 FOR IP): Performed by: THORACIC SURGERY (CARDIOTHORACIC VASCULAR SURGERY)

## 2023-09-01 PROCEDURE — 2700000000 HC OXYGEN THERAPY PER DAY

## 2023-09-01 PROCEDURE — 6370000000 HC RX 637 (ALT 250 FOR IP)

## 2023-09-01 PROCEDURE — 83735 ASSAY OF MAGNESIUM: CPT

## 2023-09-01 PROCEDURE — 6360000002 HC RX W HCPCS

## 2023-09-01 PROCEDURE — 1200000000 HC SEMI PRIVATE

## 2023-09-01 PROCEDURE — 2580000003 HC RX 258: Performed by: THORACIC SURGERY (CARDIOTHORACIC VASCULAR SURGERY)

## 2023-09-01 PROCEDURE — 36415 COLL VENOUS BLD VENIPUNCTURE: CPT

## 2023-09-01 PROCEDURE — 94761 N-INVAS EAR/PLS OXIMETRY MLT: CPT

## 2023-09-01 PROCEDURE — 2580000003 HC RX 258

## 2023-09-01 PROCEDURE — 85025 COMPLETE CBC W/AUTO DIFF WBC: CPT

## 2023-09-01 PROCEDURE — 80069 RENAL FUNCTION PANEL: CPT

## 2023-09-01 PROCEDURE — 71045 X-RAY EXAM CHEST 1 VIEW: CPT

## 2023-09-01 PROCEDURE — 6360000002 HC RX W HCPCS: Performed by: THORACIC SURGERY (CARDIOTHORACIC VASCULAR SURGERY)

## 2023-09-01 RX ORDER — WARFARIN SODIUM 5 MG/1
5 TABLET ORAL
Status: DISCONTINUED | OUTPATIENT
Start: 2023-09-02 | End: 2023-09-05

## 2023-09-01 RX ORDER — HYDRALAZINE HYDROCHLORIDE 20 MG/ML
5 INJECTION INTRAMUSCULAR; INTRAVENOUS EVERY 6 HOURS PRN
Status: DISCONTINUED | OUTPATIENT
Start: 2023-09-01 | End: 2023-09-17 | Stop reason: HOSPADM

## 2023-09-01 RX ORDER — ACETAMINOPHEN 500 MG
1000 TABLET ORAL EVERY 4 HOURS PRN
Status: DISCONTINUED | OUTPATIENT
Start: 2023-09-01 | End: 2023-09-01

## 2023-09-01 RX ORDER — ACETAMINOPHEN 500 MG
1000 TABLET ORAL EVERY 6 HOURS SCHEDULED
Status: DISCONTINUED | OUTPATIENT
Start: 2023-09-01 | End: 2023-09-17 | Stop reason: HOSPADM

## 2023-09-01 RX ORDER — OXYCODONE HYDROCHLORIDE 5 MG/1
10 TABLET ORAL EVERY 4 HOURS PRN
Status: DISCONTINUED | OUTPATIENT
Start: 2023-09-01 | End: 2023-09-02

## 2023-09-01 RX ORDER — METHOCARBAMOL 500 MG/1
1000 TABLET, FILM COATED ORAL 3 TIMES DAILY
Status: DISCONTINUED | OUTPATIENT
Start: 2023-09-01 | End: 2023-09-03

## 2023-09-01 RX ORDER — WARFARIN SODIUM 7.5 MG/1
7.5 TABLET ORAL
Status: DISCONTINUED | OUTPATIENT
Start: 2023-09-01 | End: 2023-09-05

## 2023-09-01 RX ORDER — KETOROLAC TROMETHAMINE 15 MG/ML
15 INJECTION, SOLUTION INTRAMUSCULAR; INTRAVENOUS EVERY 6 HOURS SCHEDULED
Status: COMPLETED | OUTPATIENT
Start: 2023-09-01 | End: 2023-09-02

## 2023-09-01 RX ORDER — OXYCODONE HYDROCHLORIDE 5 MG/1
5 TABLET ORAL EVERY 4 HOURS PRN
Status: DISCONTINUED | OUTPATIENT
Start: 2023-09-01 | End: 2023-09-01

## 2023-09-01 RX ORDER — KETOROLAC TROMETHAMINE 30 MG/ML
30 INJECTION, SOLUTION INTRAMUSCULAR; INTRAVENOUS ONCE
Status: COMPLETED | OUTPATIENT
Start: 2023-09-01 | End: 2023-09-01

## 2023-09-01 RX ORDER — KETOROLAC TROMETHAMINE 15 MG/ML
15 INJECTION, SOLUTION INTRAMUSCULAR; INTRAVENOUS EVERY 6 HOURS PRN
Status: DISCONTINUED | OUTPATIENT
Start: 2023-09-01 | End: 2023-09-01

## 2023-09-01 RX ADMIN — ACETAMINOPHEN 1000 MG: 500 TABLET ORAL at 05:39

## 2023-09-01 RX ADMIN — PREGABALIN 150 MG: 150 CAPSULE ORAL at 15:48

## 2023-09-01 RX ADMIN — OXYCODONE HYDROCHLORIDE 10 MG: 5 TABLET ORAL at 05:39

## 2023-09-01 RX ADMIN — PREGABALIN 150 MG: 150 CAPSULE ORAL at 09:03

## 2023-09-01 RX ADMIN — METOPROLOL TARTRATE 50 MG: 50 TABLET, FILM COATED ORAL at 09:02

## 2023-09-01 RX ADMIN — WARFARIN SODIUM 7.5 MG: 7.5 TABLET ORAL at 17:36

## 2023-09-01 RX ADMIN — ENOXAPARIN SODIUM 30 MG: 100 INJECTION SUBCUTANEOUS at 20:09

## 2023-09-01 RX ADMIN — HYDROMORPHONE HYDROCHLORIDE 0.5 MG: 1 INJECTION, SOLUTION INTRAMUSCULAR; INTRAVENOUS; SUBCUTANEOUS at 20:10

## 2023-09-01 RX ADMIN — ACETAMINOPHEN 1000 MG: 500 TABLET ORAL at 13:03

## 2023-09-01 RX ADMIN — HYDROMORPHONE HYDROCHLORIDE 0.5 MG: 1 INJECTION, SOLUTION INTRAMUSCULAR; INTRAVENOUS; SUBCUTANEOUS at 09:37

## 2023-09-01 RX ADMIN — KETOROLAC TROMETHAMINE 15 MG: 15 INJECTION, SOLUTION INTRAMUSCULAR; INTRAVENOUS at 17:44

## 2023-09-01 RX ADMIN — METHOCARBAMOL 1000 MG: 500 TABLET ORAL at 15:54

## 2023-09-01 RX ADMIN — METHOCARBAMOL 1000 MG: 500 TABLET ORAL at 09:02

## 2023-09-01 RX ADMIN — FAMOTIDINE 20 MG: 20 TABLET ORAL at 09:01

## 2023-09-01 RX ADMIN — HYDROMORPHONE HYDROCHLORIDE 0.5 MG: 1 INJECTION, SOLUTION INTRAMUSCULAR; INTRAVENOUS; SUBCUTANEOUS at 23:21

## 2023-09-01 RX ADMIN — HYDROMORPHONE HYDROCHLORIDE 0.5 MG: 1 INJECTION, SOLUTION INTRAMUSCULAR; INTRAVENOUS; SUBCUTANEOUS at 00:43

## 2023-09-01 RX ADMIN — ROSUVASTATIN CALCIUM 20 MG: 20 TABLET, FILM COATED ORAL at 20:09

## 2023-09-01 RX ADMIN — KETOROLAC TROMETHAMINE 15 MG: 15 INJECTION, SOLUTION INTRAMUSCULAR; INTRAVENOUS at 23:21

## 2023-09-01 RX ADMIN — ACETAMINOPHEN 1000 MG: 500 TABLET ORAL at 17:36

## 2023-09-01 RX ADMIN — METHOCARBAMOL 1000 MG: 500 TABLET ORAL at 20:09

## 2023-09-01 RX ADMIN — SODIUM CHLORIDE, PRESERVATIVE FREE 10 ML: 5 INJECTION INTRAVENOUS at 13:05

## 2023-09-01 RX ADMIN — PREGABALIN 150 MG: 150 CAPSULE ORAL at 20:09

## 2023-09-01 RX ADMIN — OXYCODONE AND ACETAMINOPHEN 1 TABLET: 7.5; 325 TABLET ORAL at 04:24

## 2023-09-01 RX ADMIN — OXYCODONE HYDROCHLORIDE 10 MG: 5 TABLET ORAL at 15:49

## 2023-09-01 RX ADMIN — KETOROLAC TROMETHAMINE 15 MG: 15 INJECTION, SOLUTION INTRAMUSCULAR; INTRAVENOUS at 00:43

## 2023-09-01 RX ADMIN — KETOROLAC TROMETHAMINE 15 MG: 15 INJECTION, SOLUTION INTRAMUSCULAR; INTRAVENOUS at 05:15

## 2023-09-01 RX ADMIN — HYDROMORPHONE HYDROCHLORIDE 0.5 MG: 1 INJECTION, SOLUTION INTRAMUSCULAR; INTRAVENOUS; SUBCUTANEOUS at 03:44

## 2023-09-01 RX ADMIN — OXYCODONE HYDROCHLORIDE 10 MG: 5 TABLET ORAL at 10:06

## 2023-09-01 RX ADMIN — HYDRALAZINE HYDROCHLORIDE 5 MG: 20 INJECTION INTRAMUSCULAR; INTRAVENOUS at 17:47

## 2023-09-01 RX ADMIN — FAMOTIDINE 20 MG: 20 TABLET ORAL at 20:09

## 2023-09-01 RX ADMIN — HYDROMORPHONE HYDROCHLORIDE 0.5 MG: 1 INJECTION, SOLUTION INTRAMUSCULAR; INTRAVENOUS; SUBCUTANEOUS at 16:01

## 2023-09-01 RX ADMIN — AMLODIPINE BESYLATE 5 MG: 5 TABLET ORAL at 09:02

## 2023-09-01 RX ADMIN — KETOROLAC TROMETHAMINE 30 MG: 30 INJECTION, SOLUTION INTRAMUSCULAR; INTRAVENOUS at 10:47

## 2023-09-01 RX ADMIN — HYDROMORPHONE HYDROCHLORIDE 0.5 MG: 1 INJECTION, SOLUTION INTRAMUSCULAR; INTRAVENOUS; SUBCUTANEOUS at 13:03

## 2023-09-01 RX ADMIN — METOPROLOL TARTRATE 50 MG: 50 TABLET, FILM COATED ORAL at 20:09

## 2023-09-01 RX ADMIN — SODIUM CHLORIDE, PRESERVATIVE FREE 20 ML: 5 INJECTION INTRAVENOUS at 20:17

## 2023-09-01 RX ADMIN — HYDROMORPHONE HYDROCHLORIDE 0.5 MG: 1 INJECTION, SOLUTION INTRAMUSCULAR; INTRAVENOUS; SUBCUTANEOUS at 06:40

## 2023-09-01 ASSESSMENT — PAIN DESCRIPTION - PAIN TYPE
TYPE: SURGICAL PAIN
TYPE: SURGICAL PAIN;CHRONIC PAIN
TYPE: SURGICAL PAIN

## 2023-09-01 ASSESSMENT — PAIN DESCRIPTION - ONSET
ONSET: ON-GOING
ONSET: GRADUAL
ONSET: ON-GOING
ONSET: ON-GOING

## 2023-09-01 ASSESSMENT — PAIN DESCRIPTION - LOCATION
LOCATION: CHEST;RIB CAGE
LOCATION: CHEST
LOCATION: CHEST;BACK;SHOULDER
LOCATION: BACK;SHOULDER;CHEST
LOCATION: BACK;CHEST;SHOULDER
LOCATION: FLANK
LOCATION: BACK;SHOULDER;CHEST
LOCATION: CHEST;BACK;SHOULDER
LOCATION: GENERALIZED
LOCATION: FLANK
LOCATION: CHEST

## 2023-09-01 ASSESSMENT — PAIN - FUNCTIONAL ASSESSMENT
PAIN_FUNCTIONAL_ASSESSMENT: PREVENTS OR INTERFERES SOME ACTIVE ACTIVITIES AND ADLS

## 2023-09-01 ASSESSMENT — PAIN SCALES - GENERAL
PAINLEVEL_OUTOF10: 6
PAINLEVEL_OUTOF10: 9
PAINLEVEL_OUTOF10: 0
PAINLEVEL_OUTOF10: 10
PAINLEVEL_OUTOF10: 10
PAINLEVEL_OUTOF10: 9
PAINLEVEL_OUTOF10: 10
PAINLEVEL_OUTOF10: 8
PAINLEVEL_OUTOF10: 7
PAINLEVEL_OUTOF10: 0
PAINLEVEL_OUTOF10: 9
PAINLEVEL_OUTOF10: 9
PAINLEVEL_OUTOF10: 10
PAINLEVEL_OUTOF10: 9
PAINLEVEL_OUTOF10: 10
PAINLEVEL_OUTOF10: 7
PAINLEVEL_OUTOF10: 10
PAINLEVEL_OUTOF10: 9
PAINLEVEL_OUTOF10: 3
PAINLEVEL_OUTOF10: 9
PAINLEVEL_OUTOF10: 8
PAINLEVEL_OUTOF10: 10
PAINLEVEL_OUTOF10: 10

## 2023-09-01 ASSESSMENT — PAIN DESCRIPTION - DESCRIPTORS
DESCRIPTORS: SHOOTING;SHARP
DESCRIPTORS: ACHING
DESCRIPTORS: SHARP;SHOOTING
DESCRIPTORS: SHARP;STABBING
DESCRIPTORS: ACHING
DESCRIPTORS: SHARP
DESCRIPTORS: SHARP
DESCRIPTORS: ACHING;SHARP
DESCRIPTORS: SHARP;STABBING
DESCRIPTORS: SHARP;STABBING
DESCRIPTORS: SHOOTING;SHARP

## 2023-09-01 ASSESSMENT — PAIN DESCRIPTION - ORIENTATION
ORIENTATION: LEFT
ORIENTATION: LEFT;ANTERIOR;POSTERIOR
ORIENTATION: LEFT

## 2023-09-01 ASSESSMENT — PAIN DESCRIPTION - FREQUENCY
FREQUENCY: CONTINUOUS

## 2023-09-02 ENCOUNTER — APPOINTMENT (OUTPATIENT)
Dept: GENERAL RADIOLOGY | Age: 58
DRG: 163 | End: 2023-09-02
Attending: THORACIC SURGERY (CARDIOTHORACIC VASCULAR SURGERY)
Payer: MEDICARE

## 2023-09-02 LAB
INR PPP: 1.43 (ref 0.84–1.16)
PROTHROMBIN TIME: 17.4 SEC (ref 11.5–14.8)

## 2023-09-02 PROCEDURE — 94761 N-INVAS EAR/PLS OXIMETRY MLT: CPT

## 2023-09-02 PROCEDURE — 6360000002 HC RX W HCPCS

## 2023-09-02 PROCEDURE — 2580000003 HC RX 258

## 2023-09-02 PROCEDURE — 94640 AIRWAY INHALATION TREATMENT: CPT

## 2023-09-02 PROCEDURE — 94669 MECHANICAL CHEST WALL OSCILL: CPT

## 2023-09-02 PROCEDURE — 6370000000 HC RX 637 (ALT 250 FOR IP)

## 2023-09-02 PROCEDURE — 2700000000 HC OXYGEN THERAPY PER DAY

## 2023-09-02 PROCEDURE — 36592 COLLECT BLOOD FROM PICC: CPT

## 2023-09-02 PROCEDURE — 94150 VITAL CAPACITY TEST: CPT

## 2023-09-02 PROCEDURE — 36415 COLL VENOUS BLD VENIPUNCTURE: CPT

## 2023-09-02 PROCEDURE — 6370000000 HC RX 637 (ALT 250 FOR IP): Performed by: THORACIC SURGERY (CARDIOTHORACIC VASCULAR SURGERY)

## 2023-09-02 PROCEDURE — 6360000002 HC RX W HCPCS: Performed by: THORACIC SURGERY (CARDIOTHORACIC VASCULAR SURGERY)

## 2023-09-02 PROCEDURE — 71045 X-RAY EXAM CHEST 1 VIEW: CPT

## 2023-09-02 PROCEDURE — 6360000002 HC RX W HCPCS: Performed by: STUDENT IN AN ORGANIZED HEALTH CARE EDUCATION/TRAINING PROGRAM

## 2023-09-02 PROCEDURE — 85610 PROTHROMBIN TIME: CPT

## 2023-09-02 PROCEDURE — 2580000003 HC RX 258: Performed by: THORACIC SURGERY (CARDIOTHORACIC VASCULAR SURGERY)

## 2023-09-02 PROCEDURE — 1200000000 HC SEMI PRIVATE

## 2023-09-02 RX ORDER — OXYCODONE HYDROCHLORIDE 15 MG/1
15 TABLET ORAL EVERY 4 HOURS PRN
Status: DISCONTINUED | OUTPATIENT
Start: 2023-09-02 | End: 2023-09-17 | Stop reason: HOSPADM

## 2023-09-02 RX ORDER — HYDROMORPHONE HYDROCHLORIDE 1 MG/ML
0.5 INJECTION, SOLUTION INTRAMUSCULAR; INTRAVENOUS; SUBCUTANEOUS EVERY 6 HOURS PRN
Status: DISCONTINUED | OUTPATIENT
Start: 2023-09-02 | End: 2023-09-05

## 2023-09-02 RX ORDER — HYDROMORPHONE HYDROCHLORIDE 1 MG/ML
0.25 INJECTION, SOLUTION INTRAMUSCULAR; INTRAVENOUS; SUBCUTANEOUS EVERY 6 HOURS PRN
Status: DISCONTINUED | OUTPATIENT
Start: 2023-09-02 | End: 2023-09-02

## 2023-09-02 RX ORDER — LISINOPRIL 10 MG/1
10 TABLET ORAL 2 TIMES DAILY
Status: DISCONTINUED | OUTPATIENT
Start: 2023-09-02 | End: 2023-09-17 | Stop reason: HOSPADM

## 2023-09-02 RX ORDER — MECOBALAMIN 5000 MCG
5 TABLET,DISINTEGRATING ORAL NIGHTLY
Status: DISCONTINUED | OUTPATIENT
Start: 2023-09-02 | End: 2023-09-17 | Stop reason: HOSPADM

## 2023-09-02 RX ORDER — LABETALOL HYDROCHLORIDE 5 MG/ML
10 INJECTION, SOLUTION INTRAVENOUS ONCE
Status: COMPLETED | OUTPATIENT
Start: 2023-09-02 | End: 2023-09-02

## 2023-09-02 RX ORDER — NICOTINE 21 MG/24HR
1 PATCH, TRANSDERMAL 24 HOURS TRANSDERMAL DAILY
Status: DISCONTINUED | OUTPATIENT
Start: 2023-09-02 | End: 2023-09-15

## 2023-09-02 RX ADMIN — LABETALOL HYDROCHLORIDE 10 MG: 5 INJECTION, SOLUTION INTRAVENOUS at 20:18

## 2023-09-02 RX ADMIN — AMLODIPINE BESYLATE 5 MG: 5 TABLET ORAL at 10:52

## 2023-09-02 RX ADMIN — METOPROLOL TARTRATE 50 MG: 50 TABLET, FILM COATED ORAL at 18:09

## 2023-09-02 RX ADMIN — FAMOTIDINE 20 MG: 20 TABLET ORAL at 10:53

## 2023-09-02 RX ADMIN — ENOXAPARIN SODIUM 30 MG: 100 INJECTION SUBCUTANEOUS at 20:19

## 2023-09-02 RX ADMIN — Medication 5 MG: at 21:34

## 2023-09-02 RX ADMIN — ACETAMINOPHEN 1000 MG: 500 TABLET ORAL at 18:08

## 2023-09-02 RX ADMIN — OXYCODONE HYDROCHLORIDE 15 MG: 15 TABLET ORAL at 13:43

## 2023-09-02 RX ADMIN — LISINOPRIL 10 MG: 10 TABLET ORAL at 21:33

## 2023-09-02 RX ADMIN — WARFARIN SODIUM 5 MG: 5 TABLET ORAL at 18:09

## 2023-09-02 RX ADMIN — METHOCARBAMOL 1000 MG: 500 TABLET ORAL at 12:19

## 2023-09-02 RX ADMIN — OXYCODONE HYDROCHLORIDE 10 MG: 5 TABLET ORAL at 00:21

## 2023-09-02 RX ADMIN — ALBUTEROL SULFATE 2.5 MG: 2.5 SOLUTION RESPIRATORY (INHALATION) at 04:46

## 2023-09-02 RX ADMIN — HYDROMORPHONE HYDROCHLORIDE 0.5 MG: 1 INJECTION, SOLUTION INTRAMUSCULAR; INTRAVENOUS; SUBCUTANEOUS at 05:40

## 2023-09-02 RX ADMIN — HYDROMORPHONE HYDROCHLORIDE 0.5 MG: 1 INJECTION, SOLUTION INTRAMUSCULAR; INTRAVENOUS; SUBCUTANEOUS at 02:30

## 2023-09-02 RX ADMIN — PREGABALIN 150 MG: 150 CAPSULE ORAL at 15:27

## 2023-09-02 RX ADMIN — PREGABALIN 150 MG: 150 CAPSULE ORAL at 20:17

## 2023-09-02 RX ADMIN — METOPROLOL TARTRATE 50 MG: 50 TABLET, FILM COATED ORAL at 10:52

## 2023-09-02 RX ADMIN — ONDANSETRON 4 MG: 2 INJECTION INTRAMUSCULAR; INTRAVENOUS at 08:22

## 2023-09-02 RX ADMIN — SODIUM CHLORIDE, PRESERVATIVE FREE 10 ML: 5 INJECTION INTRAVENOUS at 20:24

## 2023-09-02 RX ADMIN — ACETAMINOPHEN 1000 MG: 500 TABLET ORAL at 12:22

## 2023-09-02 RX ADMIN — METHOCARBAMOL 1000 MG: 500 TABLET ORAL at 20:17

## 2023-09-02 RX ADMIN — KETOROLAC TROMETHAMINE 15 MG: 15 INJECTION, SOLUTION INTRAMUSCULAR; INTRAVENOUS at 12:20

## 2023-09-02 RX ADMIN — PREGABALIN 150 MG: 150 CAPSULE ORAL at 10:52

## 2023-09-02 RX ADMIN — ACETAMINOPHEN 1000 MG: 500 TABLET ORAL at 05:40

## 2023-09-02 RX ADMIN — METHOCARBAMOL 1000 MG: 500 TABLET ORAL at 07:44

## 2023-09-02 RX ADMIN — SODIUM CHLORIDE, PRESERVATIVE FREE 10 ML: 5 INJECTION INTRAVENOUS at 12:29

## 2023-09-02 RX ADMIN — HYDRALAZINE HYDROCHLORIDE 5 MG: 20 INJECTION INTRAMUSCULAR; INTRAVENOUS at 00:17

## 2023-09-02 RX ADMIN — HYDROMORPHONE HYDROCHLORIDE 0.5 MG: 1 INJECTION, SOLUTION INTRAMUSCULAR; INTRAVENOUS; SUBCUTANEOUS at 15:27

## 2023-09-02 RX ADMIN — KETOROLAC TROMETHAMINE 15 MG: 15 INJECTION, SOLUTION INTRAMUSCULAR; INTRAVENOUS at 05:40

## 2023-09-02 RX ADMIN — ASPIRIN 81 MG: 81 TABLET, COATED ORAL at 10:52

## 2023-09-02 RX ADMIN — SODIUM CHLORIDE, PRESERVATIVE FREE 10 ML: 5 INJECTION INTRAVENOUS at 16:52

## 2023-09-02 RX ADMIN — SODIUM CHLORIDE, PRESERVATIVE FREE 10 ML: 5 INJECTION INTRAVENOUS at 12:09

## 2023-09-02 RX ADMIN — OXYCODONE HYDROCHLORIDE 15 MG: 15 TABLET ORAL at 08:21

## 2023-09-02 RX ADMIN — SODIUM CHLORIDE, PRESERVATIVE FREE 10 ML: 5 INJECTION INTRAVENOUS at 13:44

## 2023-09-02 RX ADMIN — ONDANSETRON 4 MG: 2 INJECTION INTRAMUSCULAR; INTRAVENOUS at 21:32

## 2023-09-02 RX ADMIN — HYDRALAZINE HYDROCHLORIDE 5 MG: 20 INJECTION INTRAMUSCULAR; INTRAVENOUS at 16:52

## 2023-09-02 RX ADMIN — OXYCODONE HYDROCHLORIDE 15 MG: 15 TABLET ORAL at 22:08

## 2023-09-02 RX ADMIN — ROSUVASTATIN CALCIUM 20 MG: 20 TABLET, FILM COATED ORAL at 20:18

## 2023-09-02 RX ADMIN — OXYCODONE HYDROCHLORIDE 10 MG: 5 TABLET ORAL at 04:23

## 2023-09-02 RX ADMIN — SODIUM CHLORIDE, PRESERVATIVE FREE 10 ML: 5 INJECTION INTRAVENOUS at 15:28

## 2023-09-02 RX ADMIN — OXYCODONE HYDROCHLORIDE 15 MG: 15 TABLET ORAL at 18:09

## 2023-09-02 RX ADMIN — FAMOTIDINE 20 MG: 20 TABLET ORAL at 20:17

## 2023-09-02 RX ADMIN — ENOXAPARIN SODIUM 30 MG: 100 INJECTION SUBCUTANEOUS at 10:53

## 2023-09-02 ASSESSMENT — PAIN DESCRIPTION - LOCATION
LOCATION: CHEST;BACK
LOCATION: BACK
LOCATION: BACK
LOCATION: BACK;HEAD;FLANK
LOCATION: CHEST;BACK
LOCATION: BACK
LOCATION: BACK;HEAD;FLANK
LOCATION: FLANK
LOCATION: FLANK;BACK
LOCATION: BACK
LOCATION: FLANK;BACK
LOCATION: BACK;HEAD;FLANK
LOCATION: CHEST;BACK
LOCATION: CHEST;BACK
LOCATION: FLANK
LOCATION: CHEST;BACK

## 2023-09-02 ASSESSMENT — PAIN DESCRIPTION - DESCRIPTORS
DESCRIPTORS: ACHING
DESCRIPTORS: ACHING
DESCRIPTORS: ACHING;DISCOMFORT
DESCRIPTORS: ACHING;SHARP
DESCRIPTORS: ACHING;SHARP
DESCRIPTORS: ACHING
DESCRIPTORS: ACHING;SHARP
DESCRIPTORS: ACHING
DESCRIPTORS: ACHING;DISCOMFORT
DESCRIPTORS: ACHING
DESCRIPTORS: ACHING
DESCRIPTORS: ACHING;SHARP
DESCRIPTORS: SHARP
DESCRIPTORS: ACHING;SHARP
DESCRIPTORS: ACHING

## 2023-09-02 ASSESSMENT — PAIN DESCRIPTION - ORIENTATION
ORIENTATION: LEFT

## 2023-09-02 ASSESSMENT — PAIN DESCRIPTION - PAIN TYPE
TYPE: SURGICAL PAIN
TYPE: SURGICAL PAIN;ACUTE PAIN
TYPE: SURGICAL PAIN

## 2023-09-02 ASSESSMENT — PAIN DESCRIPTION - ONSET
ONSET: ON-GOING

## 2023-09-02 ASSESSMENT — PAIN - FUNCTIONAL ASSESSMENT
PAIN_FUNCTIONAL_ASSESSMENT: PREVENTS OR INTERFERES SOME ACTIVE ACTIVITIES AND ADLS

## 2023-09-02 ASSESSMENT — PAIN SCALES - GENERAL
PAINLEVEL_OUTOF10: 8
PAINLEVEL_OUTOF10: 7
PAINLEVEL_OUTOF10: 0
PAINLEVEL_OUTOF10: 7
PAINLEVEL_OUTOF10: 0
PAINLEVEL_OUTOF10: 7
PAINLEVEL_OUTOF10: 10
PAINLEVEL_OUTOF10: 8
PAINLEVEL_OUTOF10: 7
PAINLEVEL_OUTOF10: 8
PAINLEVEL_OUTOF10: 8
PAINLEVEL_OUTOF10: 7
PAINLEVEL_OUTOF10: 10
PAINLEVEL_OUTOF10: 8
PAINLEVEL_OUTOF10: 7
PAINLEVEL_OUTOF10: 8
PAINLEVEL_OUTOF10: 9
PAINLEVEL_OUTOF10: 7

## 2023-09-02 ASSESSMENT — PAIN DESCRIPTION - FREQUENCY
FREQUENCY: CONTINUOUS

## 2023-09-03 ENCOUNTER — APPOINTMENT (OUTPATIENT)
Dept: GENERAL RADIOLOGY | Age: 58
DRG: 163 | End: 2023-09-03
Attending: THORACIC SURGERY (CARDIOTHORACIC VASCULAR SURGERY)
Payer: MEDICARE

## 2023-09-03 LAB
ALBUMIN SERPL-MCNC: 4 G/DL (ref 3.4–5)
ANION GAP SERPL CALCULATED.3IONS-SCNC: 15 MMOL/L (ref 3–16)
BASOPHILS # BLD: 0 K/UL (ref 0–0.2)
BASOPHILS NFR BLD: 0.2 %
BUN SERPL-MCNC: 7 MG/DL (ref 7–20)
CALCIUM SERPL-MCNC: 8.7 MG/DL (ref 8.3–10.6)
CHLORIDE SERPL-SCNC: 87 MMOL/L (ref 99–110)
CO2 SERPL-SCNC: 23 MMOL/L (ref 21–32)
CREAT SERPL-MCNC: 0.6 MG/DL (ref 0.8–1.3)
DEPRECATED RDW RBC AUTO: 14.4 % (ref 12.4–15.4)
EOSINOPHIL # BLD: 0.1 K/UL (ref 0–0.6)
EOSINOPHIL NFR BLD: 0.9 %
GFR SERPLBLD CREATININE-BSD FMLA CKD-EPI: >60 ML/MIN/{1.73_M2}
GLUCOSE SERPL-MCNC: 167 MG/DL (ref 70–99)
HCT VFR BLD AUTO: 41.1 % (ref 40.5–52.5)
HGB BLD-MCNC: 14 G/DL (ref 13.5–17.5)
INR PPP: 2.12 (ref 0.84–1.16)
LYMPHOCYTES # BLD: 1.1 K/UL (ref 1–5.1)
LYMPHOCYTES NFR BLD: 8.6 %
MAGNESIUM SERPL-MCNC: 1.9 MG/DL (ref 1.8–2.4)
MCH RBC QN AUTO: 31.6 PG (ref 26–34)
MCHC RBC AUTO-ENTMCNC: 34 G/DL (ref 31–36)
MCV RBC AUTO: 93 FL (ref 80–100)
MONOCYTES # BLD: 0.8 K/UL (ref 0–1.3)
MONOCYTES NFR BLD: 6.6 %
NEUTROPHILS # BLD: 10.7 K/UL (ref 1.7–7.7)
NEUTROPHILS NFR BLD: 83.7 %
PHOSPHATE SERPL-MCNC: 2.4 MG/DL (ref 2.5–4.9)
PLATELET # BLD AUTO: 124 K/UL (ref 135–450)
PMV BLD AUTO: 8.4 FL (ref 5–10.5)
POTASSIUM SERPL-SCNC: 4 MMOL/L (ref 3.5–5.1)
PROTHROMBIN TIME: 23.6 SEC (ref 11.5–14.8)
RBC # BLD AUTO: 4.43 M/UL (ref 4.2–5.9)
SODIUM SERPL-SCNC: 125 MMOL/L (ref 136–145)
WBC # BLD AUTO: 12.7 K/UL (ref 4–11)

## 2023-09-03 PROCEDURE — 2580000003 HC RX 258

## 2023-09-03 PROCEDURE — 1200000000 HC SEMI PRIVATE

## 2023-09-03 PROCEDURE — 6370000000 HC RX 637 (ALT 250 FOR IP)

## 2023-09-03 PROCEDURE — 85025 COMPLETE CBC W/AUTO DIFF WBC: CPT

## 2023-09-03 PROCEDURE — 94660 CPAP INITIATION&MGMT: CPT

## 2023-09-03 PROCEDURE — 94669 MECHANICAL CHEST WALL OSCILL: CPT

## 2023-09-03 PROCEDURE — 80069 RENAL FUNCTION PANEL: CPT

## 2023-09-03 PROCEDURE — 94150 VITAL CAPACITY TEST: CPT

## 2023-09-03 PROCEDURE — 2500000003 HC RX 250 WO HCPCS

## 2023-09-03 PROCEDURE — 94761 N-INVAS EAR/PLS OXIMETRY MLT: CPT

## 2023-09-03 PROCEDURE — 6360000002 HC RX W HCPCS: Performed by: THORACIC SURGERY (CARDIOTHORACIC VASCULAR SURGERY)

## 2023-09-03 PROCEDURE — 2580000003 HC RX 258: Performed by: THORACIC SURGERY (CARDIOTHORACIC VASCULAR SURGERY)

## 2023-09-03 PROCEDURE — 85610 PROTHROMBIN TIME: CPT

## 2023-09-03 PROCEDURE — 6370000000 HC RX 637 (ALT 250 FOR IP): Performed by: STUDENT IN AN ORGANIZED HEALTH CARE EDUCATION/TRAINING PROGRAM

## 2023-09-03 PROCEDURE — 83735 ASSAY OF MAGNESIUM: CPT

## 2023-09-03 PROCEDURE — 71045 X-RAY EXAM CHEST 1 VIEW: CPT

## 2023-09-03 PROCEDURE — 94640 AIRWAY INHALATION TREATMENT: CPT

## 2023-09-03 PROCEDURE — 2700000000 HC OXYGEN THERAPY PER DAY

## 2023-09-03 PROCEDURE — 36415 COLL VENOUS BLD VENIPUNCTURE: CPT

## 2023-09-03 PROCEDURE — 6360000002 HC RX W HCPCS

## 2023-09-03 RX ORDER — ALBUTEROL SULFATE 2.5 MG/3ML
2.5 SOLUTION RESPIRATORY (INHALATION) 3 TIMES DAILY
Status: DISCONTINUED | OUTPATIENT
Start: 2023-09-03 | End: 2023-09-08

## 2023-09-03 RX ORDER — METHOCARBAMOL 500 MG/1
1000 TABLET, FILM COATED ORAL 4 TIMES DAILY
Status: DISCONTINUED | OUTPATIENT
Start: 2023-09-03 | End: 2023-09-17 | Stop reason: HOSPADM

## 2023-09-03 RX ORDER — SODIUM CHLORIDE FOR INHALATION 3 %
4 VIAL, NEBULIZER (ML) INHALATION PRN
Status: DISCONTINUED | OUTPATIENT
Start: 2023-09-03 | End: 2023-09-09

## 2023-09-03 RX ADMIN — ACETAMINOPHEN 1000 MG: 500 TABLET ORAL at 12:36

## 2023-09-03 RX ADMIN — METHOCARBAMOL TABLETS 1000 MG: 500 TABLET, COATED ORAL at 17:35

## 2023-09-03 RX ADMIN — ASPIRIN 81 MG: 81 TABLET, COATED ORAL at 08:26

## 2023-09-03 RX ADMIN — METOPROLOL TARTRATE 50 MG: 50 TABLET, FILM COATED ORAL at 08:26

## 2023-09-03 RX ADMIN — PREGABALIN 150 MG: 150 CAPSULE ORAL at 21:23

## 2023-09-03 RX ADMIN — OXYCODONE HYDROCHLORIDE 15 MG: 15 TABLET ORAL at 02:13

## 2023-09-03 RX ADMIN — AMLODIPINE BESYLATE 5 MG: 5 TABLET ORAL at 08:26

## 2023-09-03 RX ADMIN — ENOXAPARIN SODIUM 30 MG: 100 INJECTION SUBCUTANEOUS at 08:25

## 2023-09-03 RX ADMIN — SODIUM PHOSPHATE, MONOBASIC, MONOHYDRATE AND SODIUM PHOSPHATE, DIBASIC, ANHYDROUS 30 MMOL: 142; 276 INJECTION, SOLUTION INTRAVENOUS at 11:32

## 2023-09-03 RX ADMIN — METHOCARBAMOL TABLETS 1000 MG: 500 TABLET, COATED ORAL at 12:37

## 2023-09-03 RX ADMIN — SODIUM CHLORIDE, PRESERVATIVE FREE 10 ML: 5 INJECTION INTRAVENOUS at 21:24

## 2023-09-03 RX ADMIN — ONDANSETRON 4 MG: 2 INJECTION INTRAMUSCULAR; INTRAVENOUS at 12:56

## 2023-09-03 RX ADMIN — SODIUM CHLORIDE, PRESERVATIVE FREE 10 ML: 5 INJECTION INTRAVENOUS at 11:12

## 2023-09-03 RX ADMIN — OXYCODONE HYDROCHLORIDE 15 MG: 15 TABLET ORAL at 08:26

## 2023-09-03 RX ADMIN — ALBUTEROL SULFATE 2.5 MG: 2.5 SOLUTION RESPIRATORY (INHALATION) at 20:31

## 2023-09-03 RX ADMIN — ALBUTEROL SULFATE 2.5 MG: 2.5 SOLUTION RESPIRATORY (INHALATION) at 08:35

## 2023-09-03 RX ADMIN — LISINOPRIL 10 MG: 10 TABLET ORAL at 08:25

## 2023-09-03 RX ADMIN — SODIUM CHLORIDE, PRESERVATIVE FREE 10 ML: 5 INJECTION INTRAVENOUS at 08:29

## 2023-09-03 RX ADMIN — WARFARIN SODIUM 5 MG: 5 TABLET ORAL at 17:35

## 2023-09-03 RX ADMIN — ACETAMINOPHEN 1000 MG: 500 TABLET ORAL at 00:19

## 2023-09-03 RX ADMIN — OXYCODONE HYDROCHLORIDE 15 MG: 15 TABLET ORAL at 21:23

## 2023-09-03 RX ADMIN — Medication 5 MG: at 21:23

## 2023-09-03 RX ADMIN — OXYCODONE HYDROCHLORIDE 15 MG: 15 TABLET ORAL at 17:35

## 2023-09-03 RX ADMIN — METHOCARBAMOL TABLETS 1000 MG: 500 TABLET, COATED ORAL at 21:23

## 2023-09-03 RX ADMIN — OXYCODONE HYDROCHLORIDE 15 MG: 15 TABLET ORAL at 12:37

## 2023-09-03 RX ADMIN — HYDROMORPHONE HYDROCHLORIDE 0.5 MG: 1 INJECTION, SOLUTION INTRAMUSCULAR; INTRAVENOUS; SUBCUTANEOUS at 18:33

## 2023-09-03 RX ADMIN — ROSUVASTATIN CALCIUM 20 MG: 20 TABLET, FILM COATED ORAL at 21:23

## 2023-09-03 RX ADMIN — FAMOTIDINE 20 MG: 20 TABLET ORAL at 08:25

## 2023-09-03 RX ADMIN — ACETAMINOPHEN 1000 MG: 500 TABLET ORAL at 18:23

## 2023-09-03 RX ADMIN — METOPROLOL TARTRATE 50 MG: 50 TABLET, FILM COATED ORAL at 21:23

## 2023-09-03 RX ADMIN — PREGABALIN 150 MG: 150 CAPSULE ORAL at 16:24

## 2023-09-03 RX ADMIN — METHOCARBAMOL TABLETS 1000 MG: 500 TABLET, COATED ORAL at 08:25

## 2023-09-03 RX ADMIN — SODIUM CHLORIDE, PRESERVATIVE FREE 10 ML: 5 INJECTION INTRAVENOUS at 12:58

## 2023-09-03 RX ADMIN — FAMOTIDINE 20 MG: 20 TABLET ORAL at 21:23

## 2023-09-03 RX ADMIN — LISINOPRIL 10 MG: 10 TABLET ORAL at 21:23

## 2023-09-03 RX ADMIN — PREGABALIN 150 MG: 150 CAPSULE ORAL at 08:26

## 2023-09-03 RX ADMIN — HYDROMORPHONE HYDROCHLORIDE 0.5 MG: 1 INJECTION, SOLUTION INTRAMUSCULAR; INTRAVENOUS; SUBCUTANEOUS at 11:13

## 2023-09-03 RX ADMIN — ONDANSETRON 4 MG: 4 TABLET, ORALLY DISINTEGRATING ORAL at 02:13

## 2023-09-03 RX ADMIN — HYDROMORPHONE HYDROCHLORIDE 0.5 MG: 1 INJECTION, SOLUTION INTRAMUSCULAR; INTRAVENOUS; SUBCUTANEOUS at 04:32

## 2023-09-03 RX ADMIN — SODIUM CHLORIDE 10 ML: 9 INJECTION, SOLUTION INTRAVENOUS at 11:29

## 2023-09-03 RX ADMIN — ALBUTEROL SULFATE 2.5 MG: 2.5 SOLUTION RESPIRATORY (INHALATION) at 14:58

## 2023-09-03 RX ADMIN — ACETAMINOPHEN 1000 MG: 500 TABLET ORAL at 06:34

## 2023-09-03 ASSESSMENT — PAIN DESCRIPTION - DESCRIPTORS
DESCRIPTORS: SHARP;ACHING
DESCRIPTORS: ACHING;SHARP
DESCRIPTORS: SHARP;ACHING
DESCRIPTORS: ACHING;SHARP
DESCRIPTORS: ACHING;SHARP
DESCRIPTORS: THROBBING
DESCRIPTORS: ACHING;SHARP

## 2023-09-03 ASSESSMENT — PAIN SCALES - GENERAL
PAINLEVEL_OUTOF10: 8
PAINLEVEL_OUTOF10: 9
PAINLEVEL_OUTOF10: 7
PAINLEVEL_OUTOF10: 9
PAINLEVEL_OUTOF10: 8
PAINLEVEL_OUTOF10: 9
PAINLEVEL_OUTOF10: 7
PAINLEVEL_OUTOF10: 8
PAINLEVEL_OUTOF10: 7
PAINLEVEL_OUTOF10: 8
PAINLEVEL_OUTOF10: 8
PAINLEVEL_OUTOF10: 9
PAINLEVEL_OUTOF10: 6
PAINLEVEL_OUTOF10: 7
PAINLEVEL_OUTOF10: 9
PAINLEVEL_OUTOF10: 8
PAINLEVEL_OUTOF10: 7
PAINLEVEL_OUTOF10: 7
PAINLEVEL_OUTOF10: 8

## 2023-09-03 ASSESSMENT — PAIN DESCRIPTION - LOCATION
LOCATION: BACK;ABDOMEN;CHEST
LOCATION: BACK;CHEST
LOCATION: BACK;CHEST;FLANK
LOCATION: CHEST;BACK
LOCATION: BACK;CHEST

## 2023-09-03 ASSESSMENT — PAIN DESCRIPTION - PAIN TYPE
TYPE: SURGICAL PAIN;ACUTE PAIN
TYPE: ACUTE PAIN;SURGICAL PAIN
TYPE: ACUTE PAIN;SURGICAL PAIN
TYPE: CHRONIC PAIN;ACUTE PAIN
TYPE: ACUTE PAIN;CHRONIC PAIN
TYPE: ACUTE PAIN;SURGICAL PAIN
TYPE: SURGICAL PAIN;ACUTE PAIN
TYPE: ACUTE PAIN;SURGICAL PAIN
TYPE: ACUTE PAIN;SURGICAL PAIN;CHRONIC PAIN
TYPE: SURGICAL PAIN;ACUTE PAIN
TYPE: ACUTE PAIN;SURGICAL PAIN

## 2023-09-03 ASSESSMENT — PAIN DESCRIPTION - ORIENTATION
ORIENTATION: OTHER (COMMENT)
ORIENTATION: LEFT
ORIENTATION: LEFT
ORIENTATION: OTHER (COMMENT)
ORIENTATION: LEFT
ORIENTATION: LEFT;ANTERIOR
ORIENTATION: LEFT;ANTERIOR
ORIENTATION: LEFT
ORIENTATION: OTHER (COMMENT)
ORIENTATION: LEFT;ANTERIOR
ORIENTATION: LEFT
ORIENTATION: OTHER (COMMENT)

## 2023-09-03 ASSESSMENT — PAIN DESCRIPTION - ONSET
ONSET: ON-GOING

## 2023-09-03 ASSESSMENT — PAIN DESCRIPTION - FREQUENCY
FREQUENCY: CONTINUOUS

## 2023-09-03 ASSESSMENT — PAIN - FUNCTIONAL ASSESSMENT
PAIN_FUNCTIONAL_ASSESSMENT: PREVENTS OR INTERFERES SOME ACTIVE ACTIVITIES AND ADLS

## 2023-09-04 ENCOUNTER — APPOINTMENT (OUTPATIENT)
Dept: GENERAL RADIOLOGY | Age: 58
DRG: 163 | End: 2023-09-04
Attending: THORACIC SURGERY (CARDIOTHORACIC VASCULAR SURGERY)
Payer: MEDICARE

## 2023-09-04 LAB
ALBUMIN SERPL-MCNC: 3 G/DL (ref 3.4–5)
ALBUMIN SERPL-MCNC: 3.7 G/DL (ref 3.4–5)
ANION GAP SERPL CALCULATED.3IONS-SCNC: 11 MMOL/L (ref 3–16)
ANION GAP SERPL CALCULATED.3IONS-SCNC: 14 MMOL/L (ref 3–16)
BASE EXCESS BLDA CALC-SCNC: -0.5 MMOL/L (ref -3–3)
BASE EXCESS BLDA CALC-SCNC: -0.9 MMOL/L (ref -3–3)
BASE EXCESS BLDA CALC-SCNC: 1.5 MMOL/L (ref -3–3)
BASOPHILS # BLD: 0 K/UL (ref 0–0.2)
BASOPHILS # BLD: 0.1 K/UL (ref 0–0.2)
BASOPHILS NFR BLD: 0.4 %
BASOPHILS NFR BLD: 0.5 %
BUN SERPL-MCNC: 15 MG/DL (ref 7–20)
BUN SERPL-MCNC: 24 MG/DL (ref 7–20)
CALCIUM SERPL-MCNC: 8.4 MG/DL (ref 8.3–10.6)
CALCIUM SERPL-MCNC: 8.6 MG/DL (ref 8.3–10.6)
CHLORIDE SERPL-SCNC: 86 MMOL/L (ref 99–110)
CHLORIDE SERPL-SCNC: 89 MMOL/L (ref 99–110)
CO2 BLDA-SCNC: 21 MMOL/L
CO2 BLDA-SCNC: 28 MMOL/L
CO2 BLDA-SCNC: 29 MMOL/L
CO2 SERPL-SCNC: 23 MMOL/L (ref 21–32)
CO2 SERPL-SCNC: 24 MMOL/L (ref 21–32)
COHGB MFR BLDA: 1.3 % (ref 0–1.5)
COHGB MFR BLDA: 1.3 % (ref 0–1.5)
COHGB MFR BLDA: 2.7 % (ref 0–1.5)
CREAT SERPL-MCNC: 0.9 MG/DL (ref 0.8–1.3)
CREAT SERPL-MCNC: 1.4 MG/DL (ref 0.8–1.3)
DEPRECATED RDW RBC AUTO: 14.4 % (ref 12.4–15.4)
DEPRECATED RDW RBC AUTO: 14.5 % (ref 12.4–15.4)
EOSINOPHIL # BLD: 0.2 K/UL (ref 0–0.6)
EOSINOPHIL # BLD: 0.3 K/UL (ref 0–0.6)
EOSINOPHIL NFR BLD: 2 %
EOSINOPHIL NFR BLD: 2.9 %
GFR SERPLBLD CREATININE-BSD FMLA CKD-EPI: 58 ML/MIN/{1.73_M2}
GFR SERPLBLD CREATININE-BSD FMLA CKD-EPI: >60 ML/MIN/{1.73_M2}
GLUCOSE BLD-MCNC: 181 MG/DL (ref 70–99)
GLUCOSE SERPL-MCNC: 152 MG/DL (ref 70–99)
GLUCOSE SERPL-MCNC: 155 MG/DL (ref 70–99)
HCO3 BLDA-SCNC: 20 MMOL/L (ref 21–29)
HCO3 BLDA-SCNC: 26 MMOL/L (ref 21–29)
HCO3 BLDA-SCNC: 28 MMOL/L (ref 21–29)
HCT VFR BLD AUTO: 37.7 % (ref 40.5–52.5)
HCT VFR BLD AUTO: 39.3 % (ref 40.5–52.5)
HGB BLD-MCNC: 12.9 G/DL (ref 13.5–17.5)
HGB BLD-MCNC: 13.2 G/DL (ref 13.5–17.5)
HGB BLDA-MCNC: 13 G/DL
HGB BLDA-MCNC: 13.7 G/DL
HGB BLDA-MCNC: 14.1 G/DL
INR PPP: 2.83 (ref 0.84–1.16)
LYMPHOCYTES # BLD: 1.2 K/UL (ref 1–5.1)
LYMPHOCYTES # BLD: 1.3 K/UL (ref 1–5.1)
LYMPHOCYTES NFR BLD: 11.3 %
LYMPHOCYTES NFR BLD: 12.1 %
MAGNESIUM SERPL-MCNC: 2.1 MG/DL (ref 1.8–2.4)
MAGNESIUM SERPL-MCNC: 2.1 MG/DL (ref 1.8–2.4)
MCH RBC QN AUTO: 31.6 PG (ref 26–34)
MCH RBC QN AUTO: 32 PG (ref 26–34)
MCHC RBC AUTO-ENTMCNC: 33.6 G/DL (ref 31–36)
MCHC RBC AUTO-ENTMCNC: 34.3 G/DL (ref 31–36)
MCV RBC AUTO: 93.4 FL (ref 80–100)
MCV RBC AUTO: 94.1 FL (ref 80–100)
METHGB MFR BLDA: 0 % (ref 0–1.4)
METHGB MFR BLDA: 0 % (ref 0–1.4)
METHGB MFR BLDA: 0.2 % (ref 0–1.4)
MONOCYTES # BLD: 1 K/UL (ref 0–1.3)
MONOCYTES # BLD: 1.2 K/UL (ref 0–1.3)
MONOCYTES NFR BLD: 10.5 %
MONOCYTES NFR BLD: 9.6 %
NEUTROPHILS # BLD: 7.6 K/UL (ref 1.7–7.7)
NEUTROPHILS # BLD: 8.4 K/UL (ref 1.7–7.7)
NEUTROPHILS NFR BLD: 74.8 %
NEUTROPHILS NFR BLD: 75.9 %
PCO2 BLDA: 25.1 MMHG (ref 35–45)
PCO2 BLDA: 48.7 MMHG (ref 35–45)
PCO2 BLDA: 51.4 MMHG (ref 35–45)
PERFORMED ON: ABNORMAL
PH BLDA: 7.32 [PH] (ref 7.35–7.45)
PH BLDA: 7.36 [PH] (ref 7.35–7.45)
PH BLDA: 7.52 [PH] (ref 7.35–7.45)
PHOSPHATE SERPL-MCNC: 3 MG/DL (ref 2.5–4.9)
PHOSPHATE SERPL-MCNC: 3.6 MG/DL (ref 2.5–4.9)
PLATELET # BLD AUTO: 117 K/UL (ref 135–450)
PLATELET # BLD AUTO: 138 K/UL (ref 135–450)
PMV BLD AUTO: 8.3 FL (ref 5–10.5)
PMV BLD AUTO: 9.4 FL (ref 5–10.5)
PO2 BLDA: 204 MMHG (ref 75–108)
PO2 BLDA: 57.4 MMHG (ref 75–108)
PO2 BLDA: 70.7 MMHG (ref 75–108)
POTASSIUM SERPL-SCNC: 4 MMOL/L (ref 3.5–5.1)
POTASSIUM SERPL-SCNC: 4.1 MMOL/L (ref 3.5–5.1)
PROTHROMBIN TIME: 29.6 SEC (ref 11.5–14.8)
RBC # BLD AUTO: 4.04 M/UL (ref 4.2–5.9)
RBC # BLD AUTO: 4.17 M/UL (ref 4.2–5.9)
SAO2 % BLDA: 90 % (ref 93–100)
SAO2 % BLDA: 94 % (ref 93–100)
SAO2 % BLDA: 99 % (ref 93–100)
SODIUM SERPL-SCNC: 123 MMOL/L (ref 136–145)
SODIUM SERPL-SCNC: 124 MMOL/L (ref 136–145)
WBC # BLD AUTO: 11.2 K/UL (ref 4–11)
WBC # BLD AUTO: 9.9 K/UL (ref 4–11)

## 2023-09-04 PROCEDURE — 6360000002 HC RX W HCPCS

## 2023-09-04 PROCEDURE — 85610 PROTHROMBIN TIME: CPT

## 2023-09-04 PROCEDURE — 94640 AIRWAY INHALATION TREATMENT: CPT

## 2023-09-04 PROCEDURE — 80069 RENAL FUNCTION PANEL: CPT

## 2023-09-04 PROCEDURE — 2580000003 HC RX 258: Performed by: THORACIC SURGERY (CARDIOTHORACIC VASCULAR SURGERY)

## 2023-09-04 PROCEDURE — 6360000002 HC RX W HCPCS: Performed by: THORACIC SURGERY (CARDIOTHORACIC VASCULAR SURGERY)

## 2023-09-04 PROCEDURE — 94660 CPAP INITIATION&MGMT: CPT

## 2023-09-04 PROCEDURE — 6370000000 HC RX 637 (ALT 250 FOR IP)

## 2023-09-04 PROCEDURE — 85025 COMPLETE CBC W/AUTO DIFF WBC: CPT

## 2023-09-04 PROCEDURE — 82803 BLOOD GASES ANY COMBINATION: CPT

## 2023-09-04 PROCEDURE — 71045 X-RAY EXAM CHEST 1 VIEW: CPT

## 2023-09-04 PROCEDURE — 2580000003 HC RX 258

## 2023-09-04 PROCEDURE — 2700000000 HC OXYGEN THERAPY PER DAY

## 2023-09-04 PROCEDURE — 6370000000 HC RX 637 (ALT 250 FOR IP): Performed by: STUDENT IN AN ORGANIZED HEALTH CARE EDUCATION/TRAINING PROGRAM

## 2023-09-04 PROCEDURE — 83735 ASSAY OF MAGNESIUM: CPT

## 2023-09-04 PROCEDURE — 36600 WITHDRAWAL OF ARTERIAL BLOOD: CPT

## 2023-09-04 PROCEDURE — 36415 COLL VENOUS BLD VENIPUNCTURE: CPT

## 2023-09-04 PROCEDURE — 1200000000 HC SEMI PRIVATE

## 2023-09-04 PROCEDURE — 94761 N-INVAS EAR/PLS OXIMETRY MLT: CPT

## 2023-09-04 RX ORDER — 0.9 % SODIUM CHLORIDE 0.9 %
500 INTRAVENOUS SOLUTION INTRAVENOUS ONCE
Status: COMPLETED | OUTPATIENT
Start: 2023-09-04 | End: 2023-09-05

## 2023-09-04 RX ORDER — LIDOCAINE 4 G/G
1 PATCH TOPICAL DAILY
Status: DISCONTINUED | OUTPATIENT
Start: 2023-09-04 | End: 2023-09-15

## 2023-09-04 RX ORDER — SODIUM CHLORIDE FOR INHALATION 3 %
4 VIAL, NEBULIZER (ML) INHALATION PRN
Status: DISCONTINUED | OUTPATIENT
Start: 2023-09-04 | End: 2023-09-04

## 2023-09-04 RX ORDER — SODIUM CHLORIDE, SODIUM LACTATE, POTASSIUM CHLORIDE, CALCIUM CHLORIDE 600; 310; 30; 20 MG/100ML; MG/100ML; MG/100ML; MG/100ML
INJECTION, SOLUTION INTRAVENOUS CONTINUOUS
Status: DISCONTINUED | OUTPATIENT
Start: 2023-09-04 | End: 2023-09-05

## 2023-09-04 RX ORDER — SODIUM CHLORIDE, SODIUM LACTATE, POTASSIUM CHLORIDE, AND CALCIUM CHLORIDE .6; .31; .03; .02 G/100ML; G/100ML; G/100ML; G/100ML
500 INJECTION, SOLUTION INTRAVENOUS ONCE
Status: COMPLETED | OUTPATIENT
Start: 2023-09-04 | End: 2023-09-04

## 2023-09-04 RX ADMIN — ACETAMINOPHEN 1000 MG: 500 TABLET ORAL at 05:07

## 2023-09-04 RX ADMIN — SODIUM CHLORIDE, POTASSIUM CHLORIDE, SODIUM LACTATE AND CALCIUM CHLORIDE 500 ML: 600; 310; 30; 20 INJECTION, SOLUTION INTRAVENOUS at 20:00

## 2023-09-04 RX ADMIN — LISINOPRIL 10 MG: 10 TABLET ORAL at 09:47

## 2023-09-04 RX ADMIN — ALBUTEROL SULFATE 2.5 MG: 2.5 SOLUTION RESPIRATORY (INHALATION) at 21:15

## 2023-09-04 RX ADMIN — AMLODIPINE BESYLATE 5 MG: 5 TABLET ORAL at 09:47

## 2023-09-04 RX ADMIN — SODIUM CHLORIDE, PRESERVATIVE FREE 10 ML: 5 INJECTION INTRAVENOUS at 21:00

## 2023-09-04 RX ADMIN — FAMOTIDINE 20 MG: 20 TABLET ORAL at 09:47

## 2023-09-04 RX ADMIN — METHOCARBAMOL TABLETS 1000 MG: 500 TABLET, COATED ORAL at 18:34

## 2023-09-04 RX ADMIN — ALBUTEROL SULFATE 2.5 MG: 2.5 SOLUTION RESPIRATORY (INHALATION) at 07:54

## 2023-09-04 RX ADMIN — OXYCODONE HYDROCHLORIDE 15 MG: 15 TABLET ORAL at 09:48

## 2023-09-04 RX ADMIN — SODIUM CHLORIDE 500 ML: 9 INJECTION, SOLUTION INTRAVENOUS at 22:41

## 2023-09-04 RX ADMIN — ACETAMINOPHEN 1000 MG: 500 TABLET ORAL at 18:34

## 2023-09-04 RX ADMIN — HYDROMORPHONE HYDROCHLORIDE 0.5 MG: 1 INJECTION, SOLUTION INTRAMUSCULAR; INTRAVENOUS; SUBCUTANEOUS at 01:20

## 2023-09-04 RX ADMIN — ACETAMINOPHEN 1000 MG: 500 TABLET ORAL at 11:49

## 2023-09-04 RX ADMIN — WARFARIN SODIUM 7.5 MG: 7.5 TABLET ORAL at 17:39

## 2023-09-04 RX ADMIN — ASPIRIN 81 MG: 81 TABLET, COATED ORAL at 09:47

## 2023-09-04 RX ADMIN — ALBUTEROL SULFATE 2.5 MG: 2.5 SOLUTION RESPIRATORY (INHALATION) at 14:22

## 2023-09-04 RX ADMIN — SODIUM CHLORIDE, POTASSIUM CHLORIDE, SODIUM LACTATE AND CALCIUM CHLORIDE: 600; 310; 30; 20 INJECTION, SOLUTION INTRAVENOUS at 21:34

## 2023-09-04 RX ADMIN — METHOCARBAMOL TABLETS 1000 MG: 500 TABLET, COATED ORAL at 09:47

## 2023-09-04 RX ADMIN — PREGABALIN 150 MG: 150 CAPSULE ORAL at 09:47

## 2023-09-04 RX ADMIN — ONDANSETRON 4 MG: 2 INJECTION INTRAMUSCULAR; INTRAVENOUS at 11:25

## 2023-09-04 RX ADMIN — SODIUM CHLORIDE, PRESERVATIVE FREE 10 ML: 5 INJECTION INTRAVENOUS at 09:59

## 2023-09-04 RX ADMIN — METHOCARBAMOL TABLETS 1000 MG: 500 TABLET, COATED ORAL at 11:50

## 2023-09-04 RX ADMIN — OXYCODONE HYDROCHLORIDE 15 MG: 15 TABLET ORAL at 05:07

## 2023-09-04 ASSESSMENT — PAIN SCALES - GENERAL
PAINLEVEL_OUTOF10: 6
PAINLEVEL_OUTOF10: 10
PAINLEVEL_OUTOF10: 0
PAINLEVEL_OUTOF10: 10
PAINLEVEL_OUTOF10: 4
PAINLEVEL_OUTOF10: 10
PAINLEVEL_OUTOF10: 9
PAINLEVEL_OUTOF10: 10

## 2023-09-04 ASSESSMENT — PAIN SCALES - WONG BAKER: WONGBAKER_NUMERICALRESPONSE: 0

## 2023-09-04 ASSESSMENT — PAIN DESCRIPTION - FREQUENCY
FREQUENCY: CONTINUOUS

## 2023-09-04 ASSESSMENT — PAIN DESCRIPTION - PAIN TYPE
TYPE: ACUTE PAIN
TYPE: CHRONIC PAIN;ACUTE PAIN
TYPE: ACUTE PAIN

## 2023-09-04 ASSESSMENT — PAIN DESCRIPTION - ONSET
ONSET: ON-GOING

## 2023-09-04 ASSESSMENT — PAIN DESCRIPTION - ORIENTATION
ORIENTATION: LEFT

## 2023-09-04 ASSESSMENT — PAIN DESCRIPTION - DESCRIPTORS
DESCRIPTORS: ACHING;THROBBING
DESCRIPTORS: ACHING;THROBBING
DESCRIPTORS: THROBBING;ACHING
DESCRIPTORS: ACHING;THROBBING
DESCRIPTORS: THROBBING
DESCRIPTORS: THROBBING

## 2023-09-04 ASSESSMENT — PAIN DESCRIPTION - LOCATION
LOCATION: BACK;CHEST
LOCATION: BACK;CHEST
LOCATION: CHEST;BACK

## 2023-09-05 ENCOUNTER — APPOINTMENT (OUTPATIENT)
Dept: CT IMAGING | Age: 58
DRG: 163 | End: 2023-09-05
Attending: THORACIC SURGERY (CARDIOTHORACIC VASCULAR SURGERY)
Payer: MEDICARE

## 2023-09-05 LAB
ALBUMIN SERPL-MCNC: 3.1 G/DL (ref 3.4–5)
ANION GAP SERPL CALCULATED.3IONS-SCNC: 13 MMOL/L (ref 3–16)
BUN SERPL-MCNC: 17 MG/DL (ref 7–20)
CALCIUM SERPL-MCNC: 8.8 MG/DL (ref 8.3–10.6)
CHLORIDE SERPL-SCNC: 91 MMOL/L (ref 99–110)
CO2 SERPL-SCNC: 24 MMOL/L (ref 21–32)
CREAT SERPL-MCNC: 0.9 MG/DL (ref 0.8–1.3)
GFR SERPLBLD CREATININE-BSD FMLA CKD-EPI: >60 ML/MIN/{1.73_M2}
GLUCOSE SERPL-MCNC: 190 MG/DL (ref 70–99)
INR PPP: 3.77 (ref 0.84–1.16)
NT-PROBNP SERPL-MCNC: 1267 PG/ML (ref 0–124)
PHOSPHATE SERPL-MCNC: 2.4 MG/DL (ref 2.5–4.9)
POTASSIUM SERPL-SCNC: 4.1 MMOL/L (ref 3.5–5.1)
PROTHROMBIN TIME: 37 SEC (ref 11.5–14.8)
SODIUM SERPL-SCNC: 128 MMOL/L (ref 136–145)

## 2023-09-05 PROCEDURE — 71250 CT THORAX DX C-: CPT

## 2023-09-05 PROCEDURE — 94761 N-INVAS EAR/PLS OXIMETRY MLT: CPT

## 2023-09-05 PROCEDURE — 6370000000 HC RX 637 (ALT 250 FOR IP)

## 2023-09-05 PROCEDURE — 2700000000 HC OXYGEN THERAPY PER DAY

## 2023-09-05 PROCEDURE — 6360000002 HC RX W HCPCS

## 2023-09-05 PROCEDURE — 6370000000 HC RX 637 (ALT 250 FOR IP): Performed by: STUDENT IN AN ORGANIZED HEALTH CARE EDUCATION/TRAINING PROGRAM

## 2023-09-05 PROCEDURE — 83880 ASSAY OF NATRIURETIC PEPTIDE: CPT

## 2023-09-05 PROCEDURE — 94640 AIRWAY INHALATION TREATMENT: CPT

## 2023-09-05 PROCEDURE — 99223 1ST HOSP IP/OBS HIGH 75: CPT | Performed by: INTERNAL MEDICINE

## 2023-09-05 PROCEDURE — 2580000003 HC RX 258

## 2023-09-05 PROCEDURE — 85610 PROTHROMBIN TIME: CPT

## 2023-09-05 PROCEDURE — 1200000000 HC SEMI PRIVATE

## 2023-09-05 PROCEDURE — 36415 COLL VENOUS BLD VENIPUNCTURE: CPT

## 2023-09-05 PROCEDURE — 6360000002 HC RX W HCPCS: Performed by: THORACIC SURGERY (CARDIOTHORACIC VASCULAR SURGERY)

## 2023-09-05 PROCEDURE — 6360000002 HC RX W HCPCS: Performed by: STUDENT IN AN ORGANIZED HEALTH CARE EDUCATION/TRAINING PROGRAM

## 2023-09-05 PROCEDURE — 93005 ELECTROCARDIOGRAM TRACING: CPT | Performed by: THORACIC SURGERY (CARDIOTHORACIC VASCULAR SURGERY)

## 2023-09-05 PROCEDURE — 94660 CPAP INITIATION&MGMT: CPT

## 2023-09-05 PROCEDURE — 80069 RENAL FUNCTION PANEL: CPT

## 2023-09-05 PROCEDURE — 2580000003 HC RX 258: Performed by: THORACIC SURGERY (CARDIOTHORACIC VASCULAR SURGERY)

## 2023-09-05 RX ORDER — HYDROMORPHONE HYDROCHLORIDE 1 MG/ML
0.25 INJECTION, SOLUTION INTRAMUSCULAR; INTRAVENOUS; SUBCUTANEOUS EVERY 4 HOURS PRN
Status: DISCONTINUED | OUTPATIENT
Start: 2023-09-05 | End: 2023-09-14

## 2023-09-05 RX ORDER — HYDROMORPHONE HYDROCHLORIDE 1 MG/ML
0.5 INJECTION, SOLUTION INTRAMUSCULAR; INTRAVENOUS; SUBCUTANEOUS EVERY 4 HOURS PRN
Status: DISCONTINUED | OUTPATIENT
Start: 2023-09-05 | End: 2023-09-05

## 2023-09-05 RX ORDER — PHYTONADIONE 5 MG/1
5 TABLET ORAL ONCE
Status: COMPLETED | OUTPATIENT
Start: 2023-09-05 | End: 2023-09-05

## 2023-09-05 RX ORDER — SODIUM CHLORIDE 9 MG/ML
INJECTION, SOLUTION INTRAVENOUS CONTINUOUS
Status: DISCONTINUED | OUTPATIENT
Start: 2023-09-05 | End: 2023-09-05

## 2023-09-05 RX ORDER — SODIUM CHLORIDE 9 MG/ML
INJECTION, SOLUTION INTRAVENOUS CONTINUOUS
Status: DISCONTINUED | OUTPATIENT
Start: 2023-09-05 | End: 2023-09-10

## 2023-09-05 RX ADMIN — FAMOTIDINE 20 MG: 20 TABLET ORAL at 21:22

## 2023-09-05 RX ADMIN — ACETAMINOPHEN 1000 MG: 500 TABLET ORAL at 12:40

## 2023-09-05 RX ADMIN — METHOCARBAMOL TABLETS 1000 MG: 500 TABLET, COATED ORAL at 21:23

## 2023-09-05 RX ADMIN — PHYTONADIONE 5 MG: 5 TABLET ORAL at 12:42

## 2023-09-05 RX ADMIN — ALBUTEROL SULFATE 2.5 MG: 2.5 SOLUTION RESPIRATORY (INHALATION) at 17:02

## 2023-09-05 RX ADMIN — SODIUM CHLORIDE, PRESERVATIVE FREE 10 ML: 5 INJECTION INTRAVENOUS at 08:12

## 2023-09-05 RX ADMIN — METHOCARBAMOL TABLETS 1000 MG: 500 TABLET, COATED ORAL at 12:40

## 2023-09-05 RX ADMIN — ROSUVASTATIN CALCIUM 20 MG: 20 TABLET, FILM COATED ORAL at 21:22

## 2023-09-05 RX ADMIN — METOPROLOL TARTRATE 50 MG: 50 TABLET, FILM COATED ORAL at 21:46

## 2023-09-05 RX ADMIN — FAMOTIDINE 20 MG: 20 TABLET ORAL at 08:12

## 2023-09-05 RX ADMIN — SODIUM CHLORIDE: 9 INJECTION, SOLUTION INTRAVENOUS at 18:51

## 2023-09-05 RX ADMIN — ACETAMINOPHEN 1000 MG: 500 TABLET ORAL at 18:19

## 2023-09-05 RX ADMIN — SODIUM CHLORIDE, POTASSIUM CHLORIDE, SODIUM LACTATE AND CALCIUM CHLORIDE: 600; 310; 30; 20 INJECTION, SOLUTION INTRAVENOUS at 04:52

## 2023-09-05 RX ADMIN — PREGABALIN 150 MG: 150 CAPSULE ORAL at 21:22

## 2023-09-05 RX ADMIN — METHOCARBAMOL TABLETS 1000 MG: 500 TABLET, COATED ORAL at 18:19

## 2023-09-05 RX ADMIN — HYDROMORPHONE HYDROCHLORIDE 0.25 MG: 1 INJECTION, SOLUTION INTRAMUSCULAR; INTRAVENOUS; SUBCUTANEOUS at 16:05

## 2023-09-05 RX ADMIN — SODIUM CHLORIDE, POTASSIUM CHLORIDE, SODIUM LACTATE AND CALCIUM CHLORIDE: 600; 310; 30; 20 INJECTION, SOLUTION INTRAVENOUS at 00:44

## 2023-09-05 RX ADMIN — PREGABALIN 150 MG: 150 CAPSULE ORAL at 16:51

## 2023-09-05 RX ADMIN — AMLODIPINE BESYLATE 5 MG: 5 TABLET ORAL at 08:11

## 2023-09-05 RX ADMIN — HYDROMORPHONE HYDROCHLORIDE 0.5 MG: 1 INJECTION, SOLUTION INTRAMUSCULAR; INTRAVENOUS; SUBCUTANEOUS at 08:02

## 2023-09-05 RX ADMIN — HYDROMORPHONE HYDROCHLORIDE 0.5 MG: 1 INJECTION, SOLUTION INTRAMUSCULAR; INTRAVENOUS; SUBCUTANEOUS at 03:41

## 2023-09-05 RX ADMIN — METHOCARBAMOL TABLETS 1000 MG: 500 TABLET, COATED ORAL at 08:10

## 2023-09-05 RX ADMIN — METOPROLOL TARTRATE 50 MG: 50 TABLET, FILM COATED ORAL at 08:11

## 2023-09-05 RX ADMIN — Medication 5 MG: at 21:22

## 2023-09-05 RX ADMIN — ALBUTEROL SULFATE 2.5 MG: 2.5 SOLUTION RESPIRATORY (INHALATION) at 20:51

## 2023-09-05 RX ADMIN — PREGABALIN 150 MG: 150 CAPSULE ORAL at 08:11

## 2023-09-05 RX ADMIN — SODIUM CHLORIDE: 9 INJECTION, SOLUTION INTRAVENOUS at 08:27

## 2023-09-05 RX ADMIN — ALBUTEROL SULFATE 2.5 MG: 2.5 SOLUTION RESPIRATORY (INHALATION) at 08:05

## 2023-09-05 RX ADMIN — SODIUM CHLORIDE: 9 INJECTION, SOLUTION INTRAVENOUS at 22:00

## 2023-09-05 RX ADMIN — ASPIRIN 81 MG: 81 TABLET, COATED ORAL at 08:12

## 2023-09-05 ASSESSMENT — PAIN DESCRIPTION - FREQUENCY
FREQUENCY: CONTINUOUS

## 2023-09-05 ASSESSMENT — PAIN - FUNCTIONAL ASSESSMENT
PAIN_FUNCTIONAL_ASSESSMENT: PREVENTS OR INTERFERES SOME ACTIVE ACTIVITIES AND ADLS

## 2023-09-05 ASSESSMENT — PAIN DESCRIPTION - LOCATION
LOCATION: CHEST
LOCATION: BACK;GENERALIZED
LOCATION: BACK;CHEST
LOCATION: BACK;GENERALIZED
LOCATION: CHEST;NECK;BACK
LOCATION: OTHER (COMMENT)
LOCATION: BACK

## 2023-09-05 ASSESSMENT — PAIN DESCRIPTION - DESCRIPTORS
DESCRIPTORS: ACHING
DESCRIPTORS: ACHING
DESCRIPTORS: ACHING;SHARP
DESCRIPTORS: ACHING
DESCRIPTORS: ACHING;SHARP
DESCRIPTORS: ACHING
DESCRIPTORS: ACHING

## 2023-09-05 ASSESSMENT — PAIN DESCRIPTION - ORIENTATION
ORIENTATION: LEFT;RIGHT
ORIENTATION: RIGHT;LEFT;LOWER
ORIENTATION: LEFT;RIGHT
ORIENTATION: OTHER (COMMENT)
ORIENTATION: RIGHT;LEFT
ORIENTATION: RIGHT;LEFT
ORIENTATION: RIGHT;LEFT;LOWER

## 2023-09-05 ASSESSMENT — PAIN DESCRIPTION - PAIN TYPE
TYPE: ACUTE PAIN

## 2023-09-05 ASSESSMENT — PAIN SCALES - GENERAL
PAINLEVEL_OUTOF10: 7
PAINLEVEL_OUTOF10: 10
PAINLEVEL_OUTOF10: 5
PAINLEVEL_OUTOF10: 8
PAINLEVEL_OUTOF10: 9
PAINLEVEL_OUTOF10: 4
PAINLEVEL_OUTOF10: 10
PAINLEVEL_OUTOF10: 7
PAINLEVEL_OUTOF10: 7
PAINLEVEL_OUTOF10: 9

## 2023-09-05 ASSESSMENT — PAIN SCALES - WONG BAKER: WONGBAKER_NUMERICALRESPONSE: 0

## 2023-09-05 ASSESSMENT — PAIN DESCRIPTION - ONSET
ONSET: ON-GOING

## 2023-09-05 NOTE — CARE COORDINATION
Cm attempted to speak with pt at bedside. Pt SOB and stated he was confused. CM stated we would reach out to family to complete assessment. Cm left message for pt's daughter, Fransisco Patel.

## 2023-09-05 NOTE — CONSULTS
Initial Pulmonary & Critical Care Consult Note      Reason for Consult: Hypoxia   Requesting Physician: Nayely Reynoso MD     Subjective:   CHIEF COMPLAINT / HPI:                The patient is a 59 y.o. male with significant past medical history of CAD, AV prosthesis, severe COPD, 40 pack year smoking hx, and DM who presented for scheduled  L mini thoracotomy of enlarging left lower lobe mass. Following the procedure pt found to be very lethargic with oxygen off, his SpO2 was 67 %, oxygen placed back on and SpO2 returned to 92%. Pt was on Vapotherm 40 L, had saturations in 88 to 93%, ABG at that time displayed a pH 7.362, pCO2 48.7, and pO2 57.4. Per noted by nursing staff to be more hypoxic after receiving Oxy-IR 15 mg yesterday. Pt was placed on BiPAP with a Tv 550. Pt unable to transition off of BiPAP to Vapotherm due to his hypoxia.      Past Medical History:      Diagnosis Date    Abdominal hernia     s/p repair 2010- HAS RETURNED    Aortic valve prosthesis present     CAD (coronary artery disease)     Chronic back pain greater than 3 months duration     Clostridium difficile diarrhea 10/17/2016    PCR    COPD, severity to be determined ([address]) 05/10/2023    Current every day smoker     DDD (degenerative disc disease), lumbosacral 08/07/2013    Erectile dysfunction     GERD (gastroesophageal reflux disease)     Hyperlipidemia     Hypertension     Joint pain     Left testicular cancer ([address]) 2002    s/p abdominal resection    Myalgia and myositis, unspecified 08/26/2013    Neuropathy     OA (osteoarthritis) of knee     bilateral    Obesity, Class I, BMI 30.0-34.9 (see actual BMI)     Prolonged emergence from general anesthesia     after CABG    S/P AVR 2005    tissue valve    S/P CABG x 2 2005    w/AVR & primary repair of dissected ascending aorta    Shoulder instability-LEFT 08/28/2023    \"POPS OUT ABOUT 10 TIMES A DAY\"    Type 2 diabetes mellitus without complication, without long-term current use of insulin ([address])

## 2023-09-05 NOTE — OP NOTE
[address] [facility]               [address] [place] [address], [address] [place] [address]                                OPERATIVE REPORT    PATIENT NAME: Luz Maria Bolanos                      :        1965  MED REC NO:   [id number]                          ROOM:       5552  ACCOUNT NO:   [de-identified]                           ADMIT DATE: 2023  PROVIDER:     Liberty Ferris MD    DATE OF PROCEDURE:  2023    PREOPERATIVE DIAGNOSES:  Enlarging left lower lobe nodule with prior  diagnosis of caseating granulomas; nodules PET positive; long smoking  history; history of colon cancer; status post prior CABG and aortic  valve replacement; chronic obesity. POSTOPERATIVE DIAGNOSES:  Enlarging left lower lobe nodule with prior  diagnosis of caseating granulomas; nodules PET positive; long smoking  history; history of colon cancer; status post prior CABG and aortic  valve replacement; chronic obesity; non-small-cell cancer of the left  lower lobe. PROCEDURES:  Left mini thoracotomy with VATS and wide local resection of  superior segment of left lung inclusive of the left lower lobe mass;  lysis of multiple adhesions; intercostal nerve block x4 levels (ribs 4  through 8). ANESTHESIA:  Double-lumen general endotracheal.    SURGEON:  Liberty Ferris MD    ASSISTANT:  _____    SPECIMENS:  Superior segment of left lower lobe. ESTIMATED BLOOD LOSS:  Less than 20 mL. INDICATIONS:  The patient a 57-year-old male who has had several prior  cancer diagnoses. He has a long smoking history. I come to know him a  couple of years ago when he needed an aortic valve replacement and  coronary bypass surgery. Since that time, he has been followed for left  lower lobe nodule that has shown some cavitation and enlargement over  time. It was notably PET positive. Recent biopsy of this has shown  caseating granuloma suggestive of histoplasmosis as the origin.    However, in deference to the

## 2023-09-06 ENCOUNTER — APPOINTMENT (OUTPATIENT)
Dept: GENERAL RADIOLOGY | Age: 58
DRG: 163 | End: 2023-09-06
Attending: THORACIC SURGERY (CARDIOTHORACIC VASCULAR SURGERY)
Payer: MEDICARE

## 2023-09-06 ENCOUNTER — PROCEDURE VISIT (OUTPATIENT)
Dept: ANESTHESIOLOGY | Age: 58
End: 2023-09-06

## 2023-09-06 DIAGNOSIS — S22.32XD CLOSED FRACTURE OF ONE RIB OF LEFT SIDE WITH ROUTINE HEALING, SUBSEQUENT ENCOUNTER: Primary | ICD-10-CM

## 2023-09-06 PROBLEM — J81.0 ACUTE PULMONARY EDEMA (HCC): Status: ACTIVE | Noted: 2023-09-06

## 2023-09-06 PROBLEM — C34.32 PRIMARY MALIGNANT NEOPLASM OF LEFT LOWER LOBE OF LUNG (HCC): Status: ACTIVE | Noted: 2023-09-06

## 2023-09-06 LAB
ALBUMIN SERPL-MCNC: 2.8 G/DL (ref 3.4–5)
ANION GAP SERPL CALCULATED.3IONS-SCNC: 12 MMOL/L (ref 3–16)
BASE EXCESS BLDA CALC-SCNC: 3 MMOL/L (ref -3–3)
BASE EXCESS BLDA CALC-SCNC: 3.3 MMOL/L (ref -3–3)
BASE EXCESS BLDA CALC-SCNC: 4 MMOL/L (ref -3–3)
BASOPHILS # BLD: 0 K/UL (ref 0–0.2)
BASOPHILS NFR BLD: 0.4 %
BUN SERPL-MCNC: 15 MG/DL (ref 7–20)
CA-I BLD-SCNC: 1.15 MMOL/L (ref 1.12–1.32)
CA-I BLD-SCNC: 1.16 MMOL/L (ref 1.12–1.32)
CALCIUM SERPL-MCNC: 9 MG/DL (ref 8.3–10.6)
CHLORIDE SERPL-SCNC: 94 MMOL/L (ref 99–110)
CO2 BLDA-SCNC: 28 MMOL/L
CO2 BLDA-SCNC: 29 MMOL/L
CO2 BLDA-SCNC: 30 MMOL/L
CO2 SERPL-SCNC: 26 MMOL/L (ref 21–32)
COHGB MFR BLDA: 1.6 % (ref 0–1.5)
CREAT SERPL-MCNC: 0.7 MG/DL (ref 0.8–1.3)
DEPRECATED RDW RBC AUTO: 14.4 % (ref 12.4–15.4)
EKG ATRIAL RATE: 144 BPM
EKG ATRIAL RATE: 308 BPM
EKG DIAGNOSIS: NORMAL
EKG DIAGNOSIS: NORMAL
EKG Q-T INTERVAL: 334 MS
EKG Q-T INTERVAL: 350 MS
EKG QRS DURATION: 104 MS
EKG QRS DURATION: 108 MS
EKG QTC CALCULATION (BAZETT): 442 MS
EKG QTC CALCULATION (BAZETT): 479 MS
EKG R AXIS: 29 DEGREES
EKG R AXIS: 34 DEGREES
EKG T AXIS: -66 DEGREES
EKG T AXIS: 269 DEGREES
EKG VENTRICULAR RATE: 124 BPM
EKG VENTRICULAR RATE: 96 BPM
EOSINOPHIL # BLD: 0.2 K/UL (ref 0–0.6)
EOSINOPHIL NFR BLD: 2.1 %
GFR SERPLBLD CREATININE-BSD FMLA CKD-EPI: >60 ML/MIN/{1.73_M2}
GLUCOSE BLD-MCNC: 161 MG/DL (ref 70–99)
GLUCOSE BLD-MCNC: 176 MG/DL (ref 70–99)
GLUCOSE SERPL-MCNC: 161 MG/DL (ref 70–99)
HCO3 BLDA-SCNC: 26.8 MMOL/L (ref 21–29)
HCO3 BLDA-SCNC: 27.6 MMOL/L (ref 21–29)
HCO3 BLDA-SCNC: 28 MMOL/L (ref 21–29)
HCT VFR BLD AUTO: 37.6 % (ref 40.5–52.5)
HGB BLD-MCNC: 12.7 G/DL (ref 13.5–17.5)
HGB BLDA-MCNC: 12.6 G/DL
INR PPP: 1.62 (ref 0.84–1.16)
LACTATE BLD-SCNC: 0.86 MMOL/L (ref 0.4–2)
LACTATE BLD-SCNC: 0.88 MMOL/L (ref 0.4–2)
LYMPHOCYTES # BLD: 0.6 K/UL (ref 1–5.1)
LYMPHOCYTES NFR BLD: 7.2 %
MAGNESIUM SERPL-MCNC: 2.3 MG/DL (ref 1.8–2.4)
MCH RBC QN AUTO: 31.7 PG (ref 26–34)
MCHC RBC AUTO-ENTMCNC: 33.9 G/DL (ref 31–36)
MCV RBC AUTO: 93.5 FL (ref 80–100)
METHGB MFR BLDA: 0.1 % (ref 0–1.4)
MONOCYTES # BLD: 0.8 K/UL (ref 0–1.3)
MONOCYTES NFR BLD: 10.2 %
NEUTROPHILS # BLD: 6.3 K/UL (ref 1.7–7.7)
NEUTROPHILS NFR BLD: 80.1 %
PCO2 BLDA: 38.4 MM HG (ref 35–45)
PCO2 BLDA: 40.6 MM HG (ref 35–45)
PCO2 BLDA: 43.6 MMHG (ref 35–45)
PERFORMED ON: ABNORMAL
PERFORMED ON: ABNORMAL
PH BLDA: 7.42 [PH] (ref 7.35–7.45)
PH BLDA: 7.44 [PH] (ref 7.35–7.45)
PH BLDA: 7.45 [PH] (ref 7.35–7.45)
PHOSPHATE SERPL-MCNC: 1.8 MG/DL (ref 2.5–4.9)
PLATELET # BLD AUTO: 137 K/UL (ref 135–450)
PMV BLD AUTO: 8.9 FL (ref 5–10.5)
PO2 BLDA: 48.4 MM HG (ref 75–108)
PO2 BLDA: 49.8 MMHG (ref 75–108)
PO2 BLDA: 77.6 MM HG (ref 75–108)
POC SAMPLE TYPE: ABNORMAL
POC SAMPLE TYPE: ABNORMAL
POTASSIUM BLD-SCNC: 3.7 MMOL/L (ref 3.5–5.1)
POTASSIUM BLD-SCNC: 3.8 MMOL/L (ref 3.5–5.1)
POTASSIUM SERPL-SCNC: 3.9 MMOL/L (ref 3.5–5.1)
PROTHROMBIN TIME: 19.2 SEC (ref 11.5–14.8)
RBC # BLD AUTO: 4.02 M/UL (ref 4.2–5.9)
SAO2 % BLDA: 86 % (ref 93–100)
SAO2 % BLDA: 88 % (ref 93–100)
SAO2 % BLDA: 96 % (ref 93–100)
SARS-COV-2 RDRP RESP QL NAA+PROBE: NOT DETECTED
SODIUM BLD-SCNC: 133 MMOL/L (ref 136–145)
SODIUM BLD-SCNC: 135 MMOL/L (ref 136–145)
SODIUM SERPL-SCNC: 132 MMOL/L (ref 136–145)
WBC # BLD AUTO: 7.8 K/UL (ref 4–11)

## 2023-09-06 PROCEDURE — 82947 ASSAY GLUCOSE BLOOD QUANT: CPT

## 2023-09-06 PROCEDURE — 99233 SBSQ HOSP IP/OBS HIGH 50: CPT | Performed by: INTERNAL MEDICINE

## 2023-09-06 PROCEDURE — 36415 COLL VENOUS BLD VENIPUNCTURE: CPT

## 2023-09-06 PROCEDURE — 94640 AIRWAY INHALATION TREATMENT: CPT

## 2023-09-06 PROCEDURE — 6370000000 HC RX 637 (ALT 250 FOR IP): Performed by: STUDENT IN AN ORGANIZED HEALTH CARE EDUCATION/TRAINING PROGRAM

## 2023-09-06 PROCEDURE — 82803 BLOOD GASES ANY COMBINATION: CPT

## 2023-09-06 PROCEDURE — 87635 SARS-COV-2 COVID-19 AMP PRB: CPT

## 2023-09-06 PROCEDURE — 84132 ASSAY OF SERUM POTASSIUM: CPT

## 2023-09-06 PROCEDURE — 93005 ELECTROCARDIOGRAM TRACING: CPT

## 2023-09-06 PROCEDURE — 36600 WITHDRAWAL OF ARTERIAL BLOOD: CPT

## 2023-09-06 PROCEDURE — 94660 CPAP INITIATION&MGMT: CPT

## 2023-09-06 PROCEDURE — 93010 ELECTROCARDIOGRAM REPORT: CPT | Performed by: INTERNAL MEDICINE

## 2023-09-06 PROCEDURE — 2580000003 HC RX 258: Performed by: THORACIC SURGERY (CARDIOTHORACIC VASCULAR SURGERY)

## 2023-09-06 PROCEDURE — 2500000003 HC RX 250 WO HCPCS

## 2023-09-06 PROCEDURE — 94669 MECHANICAL CHEST WALL OSCILL: CPT

## 2023-09-06 PROCEDURE — 85610 PROTHROMBIN TIME: CPT

## 2023-09-06 PROCEDURE — 6360000002 HC RX W HCPCS

## 2023-09-06 PROCEDURE — 82330 ASSAY OF CALCIUM: CPT

## 2023-09-06 PROCEDURE — 6370000000 HC RX 637 (ALT 250 FOR IP)

## 2023-09-06 PROCEDURE — 83605 ASSAY OF LACTIC ACID: CPT

## 2023-09-06 PROCEDURE — 2580000003 HC RX 258

## 2023-09-06 PROCEDURE — 99223 1ST HOSP IP/OBS HIGH 75: CPT | Performed by: INTERNAL MEDICINE

## 2023-09-06 PROCEDURE — 85025 COMPLETE CBC W/AUTO DIFF WBC: CPT

## 2023-09-06 PROCEDURE — 94150 VITAL CAPACITY TEST: CPT

## 2023-09-06 PROCEDURE — 2000000000 HC ICU R&B

## 2023-09-06 PROCEDURE — 84295 ASSAY OF SERUM SODIUM: CPT

## 2023-09-06 PROCEDURE — 71045 X-RAY EXAM CHEST 1 VIEW: CPT

## 2023-09-06 PROCEDURE — 6360000002 HC RX W HCPCS: Performed by: THORACIC SURGERY (CARDIOTHORACIC VASCULAR SURGERY)

## 2023-09-06 PROCEDURE — 94761 N-INVAS EAR/PLS OXIMETRY MLT: CPT

## 2023-09-06 PROCEDURE — 83735 ASSAY OF MAGNESIUM: CPT

## 2023-09-06 PROCEDURE — 80069 RENAL FUNCTION PANEL: CPT

## 2023-09-06 PROCEDURE — 2700000000 HC OXYGEN THERAPY PER DAY

## 2023-09-06 PROCEDURE — 6360000002 HC RX W HCPCS: Performed by: STUDENT IN AN ORGANIZED HEALTH CARE EDUCATION/TRAINING PROGRAM

## 2023-09-06 RX ORDER — DIGOXIN 0.25 MG/ML
250 INJECTION INTRAMUSCULAR; INTRAVENOUS ONCE
Status: COMPLETED | OUTPATIENT
Start: 2023-09-06 | End: 2023-09-06

## 2023-09-06 RX ORDER — DIGOXIN 0.25 MG/ML
250 INJECTION INTRAMUSCULAR; INTRAVENOUS DAILY
Status: DISCONTINUED | OUTPATIENT
Start: 2023-09-06 | End: 2023-09-06

## 2023-09-06 RX ORDER — FUROSEMIDE 10 MG/ML
20 INJECTION INTRAMUSCULAR; INTRAVENOUS ONCE
Status: COMPLETED | OUTPATIENT
Start: 2023-09-06 | End: 2023-09-06

## 2023-09-06 RX ORDER — FUROSEMIDE 10 MG/ML
20 INJECTION INTRAMUSCULAR; INTRAVENOUS ONCE
Status: DISCONTINUED | OUTPATIENT
Start: 2023-09-06 | End: 2023-09-06

## 2023-09-06 RX ORDER — FUROSEMIDE 10 MG/ML
INJECTION INTRAMUSCULAR; INTRAVENOUS
Status: DISPENSED
Start: 2023-09-06 | End: 2023-09-06

## 2023-09-06 RX ORDER — PHYTONADIONE 5 MG/1
5 TABLET ORAL ONCE
Status: DISCONTINUED | OUTPATIENT
Start: 2023-09-06 | End: 2023-09-06

## 2023-09-06 RX ORDER — METOPROLOL TARTRATE 5 MG/5ML
5 INJECTION INTRAVENOUS ONCE
Status: COMPLETED | OUTPATIENT
Start: 2023-09-06 | End: 2023-09-06

## 2023-09-06 RX ORDER — FUROSEMIDE 10 MG/ML
5 INJECTION INTRAMUSCULAR; INTRAVENOUS ONCE
Status: COMPLETED | OUTPATIENT
Start: 2023-09-06 | End: 2023-09-06

## 2023-09-06 RX ORDER — DIGOXIN 0.25 MG/ML
250 INJECTION INTRAMUSCULAR; INTRAVENOUS DAILY
Status: DISCONTINUED | OUTPATIENT
Start: 2023-09-06 | End: 2023-09-06 | Stop reason: ALTCHOICE

## 2023-09-06 RX ORDER — PROPOFOL 10 MG/ML
INJECTION, EMULSION INTRAVENOUS
Status: DISCONTINUED
Start: 2023-09-06 | End: 2023-09-06 | Stop reason: WASHOUT

## 2023-09-06 RX ORDER — ETOMIDATE 2 MG/ML
INJECTION INTRAVENOUS
Status: DISCONTINUED
Start: 2023-09-06 | End: 2023-09-06 | Stop reason: WASHOUT

## 2023-09-06 RX ORDER — ENOXAPARIN SODIUM 150 MG/ML
1 INJECTION SUBCUTANEOUS 2 TIMES DAILY
Status: DISCONTINUED | OUTPATIENT
Start: 2023-09-06 | End: 2023-09-08 | Stop reason: ALTCHOICE

## 2023-09-06 RX ADMIN — ENOXAPARIN SODIUM 105 MG: 150 INJECTION SUBCUTANEOUS at 22:33

## 2023-09-06 RX ADMIN — DEXMEDETOMIDINE 0.2 MCG/KG/HR: 100 INJECTION, SOLUTION INTRAVENOUS at 03:13

## 2023-09-06 RX ADMIN — DIGOXIN 250 MCG: 0.25 INJECTION INTRAMUSCULAR; INTRAVENOUS at 20:31

## 2023-09-06 RX ADMIN — METHOCARBAMOL TABLETS 1000 MG: 500 TABLET, COATED ORAL at 19:56

## 2023-09-06 RX ADMIN — FAMOTIDINE 20 MG: 20 TABLET ORAL at 09:53

## 2023-09-06 RX ADMIN — METOPROLOL TARTRATE 5 MG: 1 INJECTION, SOLUTION INTRAVENOUS at 03:22

## 2023-09-06 RX ADMIN — METOPROLOL TARTRATE 5 MG: 1 INJECTION, SOLUTION INTRAVENOUS at 01:16

## 2023-09-06 RX ADMIN — FAMOTIDINE 20 MG: 20 TABLET ORAL at 19:57

## 2023-09-06 RX ADMIN — PREGABALIN 150 MG: 150 CAPSULE ORAL at 14:32

## 2023-09-06 RX ADMIN — DIGOXIN 250 MCG: 0.25 INJECTION INTRAMUSCULAR; INTRAVENOUS at 11:24

## 2023-09-06 RX ADMIN — ALBUTEROL SULFATE 2.5 MG: 2.5 SOLUTION RESPIRATORY (INHALATION) at 08:56

## 2023-09-06 RX ADMIN — WARFARIN SODIUM 7.5 MG: 2.5 TABLET ORAL at 17:08

## 2023-09-06 RX ADMIN — METOPROLOL TARTRATE 5 MG: 1 INJECTION, SOLUTION INTRAVENOUS at 01:49

## 2023-09-06 RX ADMIN — METHOCARBAMOL TABLETS 1000 MG: 500 TABLET, COATED ORAL at 14:32

## 2023-09-06 RX ADMIN — FUROSEMIDE 20 MG: 10 INJECTION, SOLUTION INTRAMUSCULAR; INTRAVENOUS at 08:30

## 2023-09-06 RX ADMIN — SODIUM CHLORIDE, PRESERVATIVE FREE 10 ML: 5 INJECTION INTRAVENOUS at 09:49

## 2023-09-06 RX ADMIN — SODIUM CHLORIDE, PRESERVATIVE FREE 10 ML: 5 INJECTION INTRAVENOUS at 20:00

## 2023-09-06 RX ADMIN — ASPIRIN 81 MG: 81 TABLET, COATED ORAL at 09:53

## 2023-09-06 RX ADMIN — FUROSEMIDE 5 MG: 10 INJECTION, SOLUTION INTRAMUSCULAR; INTRAVENOUS at 01:16

## 2023-09-06 RX ADMIN — SODIUM PHOSPHATE, MONOBASIC, MONOHYDRATE AND SODIUM PHOSPHATE, DIBASIC, ANHYDROUS 30 MMOL: 142; 276 INJECTION, SOLUTION INTRAVENOUS at 06:22

## 2023-09-06 RX ADMIN — METHOCARBAMOL TABLETS 1000 MG: 500 TABLET, COATED ORAL at 09:53

## 2023-09-06 RX ADMIN — ACETAMINOPHEN 1000 MG: 500 TABLET ORAL at 11:25

## 2023-09-06 RX ADMIN — METHOCARBAMOL TABLETS 1000 MG: 500 TABLET, COATED ORAL at 17:08

## 2023-09-06 RX ADMIN — Medication 5 MG: at 19:58

## 2023-09-06 RX ADMIN — ALBUTEROL SULFATE 2.5 MG: 2.5 SOLUTION RESPIRATORY (INHALATION) at 15:06

## 2023-09-06 RX ADMIN — PREGABALIN 150 MG: 150 CAPSULE ORAL at 09:53

## 2023-09-06 RX ADMIN — PREGABALIN 150 MG: 150 CAPSULE ORAL at 19:57

## 2023-09-06 RX ADMIN — ALBUTEROL SULFATE 2.5 MG: 2.5 SOLUTION RESPIRATORY (INHALATION) at 20:47

## 2023-09-06 RX ADMIN — METOPROLOL TARTRATE 50 MG: 50 TABLET, FILM COATED ORAL at 20:02

## 2023-09-06 RX ADMIN — ACETAMINOPHEN 1000 MG: 500 TABLET ORAL at 17:08

## 2023-09-06 RX ADMIN — FUROSEMIDE 20 MG: 10 INJECTION, SOLUTION INTRAMUSCULAR; INTRAVENOUS at 18:53

## 2023-09-06 RX ADMIN — DIGOXIN 250 MCG: 0.25 INJECTION INTRAMUSCULAR; INTRAVENOUS at 15:25

## 2023-09-06 RX ADMIN — ROSUVASTATIN CALCIUM 20 MG: 20 TABLET, FILM COATED ORAL at 19:57

## 2023-09-06 ASSESSMENT — PAIN SCALES - GENERAL
PAINLEVEL_OUTOF10: 0

## 2023-09-06 ASSESSMENT — PAIN SCALES - WONG BAKER
WONGBAKER_NUMERICALRESPONSE: 0

## 2023-09-06 NOTE — CARE COORDINATION
Case Management Assessment  Initial Evaluation    Date/Time of Evaluation: 9/6/2023 9:40 AM  Assessment Completed by: Cleopatra Hudson RN    If patient is discharged prior to next notation, then this note serves as note for discharge by case management. Patient Name: Carrington Fagan                   YOB: 1965  Diagnosis: Nodule of lower lobe of left lung [R91.1]                   Date / Time: 8/31/2023 12:08 PM    Patient Admission Status: Inpatient   Readmission Risk (Low < 19, Mod (19-27), High > 27): Readmission Risk Score: 11.7    Current PCP: Jackie Rowe MD  PCP verified by CM? Yes    Chart Reviewed: Yes      History Provided by: Medical Record  Patient Orientation: Person    Patient Cognition: Alert    Hospitalization in the last 30 days (Readmission):  No    If yes, Readmission Assessment in  Navigator will be completed. Advance Directives:      Code Status: Full Code   Patient's Primary Decision Maker is: Legal Next of Kin      Discharge Planning:    Patient lives with: Alone Type of Home: House  Primary Care Giver: Other (Comment)  Patient Support Systems include: Family Members   Current Financial resources: Medicare  Current community resources: None  Current services prior to admission: Other (Comment) (op medicine clinic and smoking cessation)            Current DME:              Type of Home Care services:  PT, OT, Nursing Services    ADLS  Prior functional level: Independent in ADLs/IADLs  Current functional level: Independent in ADLs/IADLs    PT AM-PAC:   /24  OT AM-PAC:   /24    Family can provide assistance at DC: Other (comment) (tbd pending extent of needs)  Would you like Case Management to discuss the discharge plan with any other family members/significant others, and if so, who?  Yes (daughter, Shabbir Melissa)  Plans to Return to Present Housing: Yes  Potential Assistance needed at discharge: [address] Extension DME:  deferred  Patient expects to discharge to:

## 2023-09-06 NOTE — PROGRESS NOTES
Consulted by primary team for L chest pain s/p VATS with 4th rib fx. Advised to give Vit K to lower INR to acceptable range. Performed erector spinae plane block with 20 ml 0.5% bupivacaine + 20 ml exparel, injected in 5 ml increments with negative aspiration between. No complications. Picture stored on ultrasound. No sedation given.

## 2023-09-06 NOTE — CONSULTS
The [address] [place] [address]    Cardiology Consult/H&P  Consulting Cardiologist Harleen Greene MD , [facility]  Referring Provider:  Sheri Warner MD    9/6/2023, 11:44 AM    No chief complaint on file. Primary Cardiologist:  Asked by Belinda Santamaria MD/Hosea Novak MD to evaluate his patient    Reason for consult atrial fibrillation with rapid ventricular response. Elevated troponin levels. History of Present Illness:   Wayne Prado is a 59 y.o. male is being seen in the IVU because of elevated heart rate atrial fibrillation. There was some report earlier that he may have been in atrial flutter. He is postthoracotomy has a chest tube that was removed 2 days ago. Had left lower lobe removed. He also has CAD with previous previous open heart surgeries in 2005 and in 2017 with bypass surgery CAD and aortic valve replacement. Also has a history of colon cancer lung cancer and continues to smoke. Beatties mellitus COPD    At this time he is sitting in chair diaphoretic. Blood pressure is soft at about 104/76 heart rate 133 atrial fibrillation. Lungs have coarse rhonchi. He is coughing up green liquid and yellow looking phlegm. He thinks all of his chest pain at this time is postsurgical pain from the thoracotomy last week.        Past Medical History:   has a past medical history of Abdominal hernia, Aortic valve prosthesis present, CAD (coronary artery disease), Chronic back pain greater than 3 months duration, Clostridium difficile diarrhea, COPD, severity to be determined ([address]), Current every day smoker, DDD (degenerative disc disease), lumbosacral, Erectile dysfunction, GERD (gastroesophageal reflux disease), Hyperlipidemia, Hypertension, Joint pain, Left testicular cancer ([address]), Myalgia and myositis, unspecified, Neuropathy, OA (osteoarthritis) of knee, Obesity, Class I, BMI 30.0-34.9 (see actual BMI), Prolonged emergence from general anesthesia, S/P AVR, S/P CABG x 2,

## 2023-09-07 LAB
ALBUMIN SERPL-MCNC: 2.7 G/DL (ref 3.4–5)
ANION GAP SERPL CALCULATED.3IONS-SCNC: 15 MMOL/L (ref 3–16)
BASE EXCESS BLDA CALC-SCNC: 1.6 MMOL/L (ref -3–3)
BASOPHILS # BLD: 0 K/UL (ref 0–0.2)
BASOPHILS NFR BLD: 0 %
BUN SERPL-MCNC: 24 MG/DL (ref 7–20)
CALCIUM SERPL-MCNC: 9.3 MG/DL (ref 8.3–10.6)
CHLORIDE SERPL-SCNC: 95 MMOL/L (ref 99–110)
CO2 BLDA-SCNC: 27 MMOL/L
CO2 SERPL-SCNC: 23 MMOL/L (ref 21–32)
COHGB MFR BLDA: 1.4 % (ref 0–1.5)
CREAT SERPL-MCNC: 0.8 MG/DL (ref 0.8–1.3)
DEPRECATED RDW RBC AUTO: 14.7 % (ref 12.4–15.4)
DIGOXIN SERPL-MCNC: 0.5 NG/ML (ref 0.8–2)
EKG ATRIAL RATE: 91 BPM
EKG DIAGNOSIS: NORMAL
EKG P AXIS: 40 DEGREES
EKG P-R INTERVAL: 182 MS
EKG Q-T INTERVAL: 354 MS
EKG QRS DURATION: 112 MS
EKG QTC CALCULATION (BAZETT): 435 MS
EKG R AXIS: 30 DEGREES
EKG T AXIS: 23 DEGREES
EKG VENTRICULAR RATE: 91 BPM
EOSINOPHIL # BLD: 0 K/UL (ref 0–0.6)
EOSINOPHIL NFR BLD: 0 %
GFR SERPLBLD CREATININE-BSD FMLA CKD-EPI: >60 ML/MIN/{1.73_M2}
GLUCOSE SERPL-MCNC: 168 MG/DL (ref 70–99)
HCO3 BLDA-SCNC: 26 MMOL/L (ref 21–29)
HCT VFR BLD AUTO: 35 % (ref 40.5–52.5)
HGB BLD-MCNC: 12.4 G/DL (ref 13.5–17.5)
HGB BLDA-MCNC: 10.6 G/DL
INR PPP: 1.6 (ref 0.84–1.16)
LYMPHOCYTES # BLD: 0.7 K/UL (ref 1–5.1)
LYMPHOCYTES NFR BLD: 9 %
MAGNESIUM SERPL-MCNC: 2.4 MG/DL (ref 1.8–2.4)
MCH RBC QN AUTO: 32.8 PG (ref 26–34)
MCHC RBC AUTO-ENTMCNC: 35.5 G/DL (ref 31–36)
MCV RBC AUTO: 92.4 FL (ref 80–100)
METHGB MFR BLDA: 0.1 % (ref 0–1.4)
MONOCYTES # BLD: 0.9 K/UL (ref 0–1.3)
MONOCYTES NFR BLD: 12 %
NEUTROPHILS # BLD: 6.1 K/UL (ref 1.7–7.7)
NEUTROPHILS NFR BLD: 79 %
PCO2 BLDA: 39.5 MMHG (ref 35–45)
PH BLDA: 7.43 [PH] (ref 7.35–7.45)
PHOSPHATE SERPL-MCNC: 1.8 MG/DL (ref 2.5–4.9)
PLATELET # BLD AUTO: 169 K/UL (ref 135–450)
PMV BLD AUTO: 8.8 FL (ref 5–10.5)
PO2 BLDA: 125 MMHG (ref 75–108)
POTASSIUM SERPL-SCNC: 3.7 MMOL/L (ref 3.5–5.1)
PROTHROMBIN TIME: 19 SEC (ref 11.5–14.8)
RBC # BLD AUTO: 3.79 M/UL (ref 4.2–5.9)
SAO2 % BLDA: 100 % (ref 93–100)
SODIUM SERPL-SCNC: 133 MMOL/L (ref 136–145)
WBC # BLD AUTO: 7.7 K/UL (ref 4–11)

## 2023-09-07 PROCEDURE — 94660 CPAP INITIATION&MGMT: CPT

## 2023-09-07 PROCEDURE — 36415 COLL VENOUS BLD VENIPUNCTURE: CPT

## 2023-09-07 PROCEDURE — 99233 SBSQ HOSP IP/OBS HIGH 50: CPT | Performed by: INTERNAL MEDICINE

## 2023-09-07 PROCEDURE — 94761 N-INVAS EAR/PLS OXIMETRY MLT: CPT

## 2023-09-07 PROCEDURE — 6370000000 HC RX 637 (ALT 250 FOR IP)

## 2023-09-07 PROCEDURE — 6360000002 HC RX W HCPCS

## 2023-09-07 PROCEDURE — 80069 RENAL FUNCTION PANEL: CPT

## 2023-09-07 PROCEDURE — 94640 AIRWAY INHALATION TREATMENT: CPT

## 2023-09-07 PROCEDURE — 36620 INSERTION CATHETER ARTERY: CPT

## 2023-09-07 PROCEDURE — 2700000000 HC OXYGEN THERAPY PER DAY

## 2023-09-07 PROCEDURE — 2500000003 HC RX 250 WO HCPCS: Performed by: NURSE PRACTITIONER

## 2023-09-07 PROCEDURE — 85025 COMPLETE CBC W/AUTO DIFF WBC: CPT

## 2023-09-07 PROCEDURE — 6360000002 HC RX W HCPCS: Performed by: STUDENT IN AN ORGANIZED HEALTH CARE EDUCATION/TRAINING PROGRAM

## 2023-09-07 PROCEDURE — 2580000003 HC RX 258: Performed by: THORACIC SURGERY (CARDIOTHORACIC VASCULAR SURGERY)

## 2023-09-07 PROCEDURE — 03HY32Z INSERTION OF MONITORING DEVICE INTO UPPER ARTERY, PERCUTANEOUS APPROACH: ICD-10-PCS | Performed by: STUDENT IN AN ORGANIZED HEALTH CARE EDUCATION/TRAINING PROGRAM

## 2023-09-07 PROCEDURE — 2580000003 HC RX 258: Performed by: NURSE PRACTITIONER

## 2023-09-07 PROCEDURE — 2580000003 HC RX 258

## 2023-09-07 PROCEDURE — 82803 BLOOD GASES ANY COMBINATION: CPT

## 2023-09-07 PROCEDURE — 93010 ELECTROCARDIOGRAM REPORT: CPT | Performed by: INTERNAL MEDICINE

## 2023-09-07 PROCEDURE — 6360000002 HC RX W HCPCS: Performed by: THORACIC SURGERY (CARDIOTHORACIC VASCULAR SURGERY)

## 2023-09-07 PROCEDURE — 80162 ASSAY OF DIGOXIN TOTAL: CPT

## 2023-09-07 PROCEDURE — 83735 ASSAY OF MAGNESIUM: CPT

## 2023-09-07 PROCEDURE — 94150 VITAL CAPACITY TEST: CPT

## 2023-09-07 PROCEDURE — 94669 MECHANICAL CHEST WALL OSCILL: CPT

## 2023-09-07 PROCEDURE — 6370000000 HC RX 637 (ALT 250 FOR IP): Performed by: STUDENT IN AN ORGANIZED HEALTH CARE EDUCATION/TRAINING PROGRAM

## 2023-09-07 PROCEDURE — 85610 PROTHROMBIN TIME: CPT

## 2023-09-07 PROCEDURE — 37799 UNLISTED PX VASCULAR SURGERY: CPT

## 2023-09-07 PROCEDURE — 2000000000 HC ICU R&B

## 2023-09-07 RX ORDER — WARFARIN SODIUM 5 MG/1
5 TABLET ORAL
Status: DISCONTINUED | OUTPATIENT
Start: 2023-09-07 | End: 2023-09-08 | Stop reason: DRUGHIGH

## 2023-09-07 RX ORDER — DIGOXIN 125 MCG
250 TABLET ORAL DAILY
Status: DISCONTINUED | OUTPATIENT
Start: 2023-09-08 | End: 2023-09-17 | Stop reason: HOSPADM

## 2023-09-07 RX ORDER — DIGOXIN 0.25 MG/ML
250 INJECTION INTRAMUSCULAR; INTRAVENOUS ONCE
Status: COMPLETED | OUTPATIENT
Start: 2023-09-07 | End: 2023-09-07

## 2023-09-07 RX ORDER — FUROSEMIDE 10 MG/ML
20 INJECTION INTRAMUSCULAR; INTRAVENOUS ONCE
Status: COMPLETED | OUTPATIENT
Start: 2023-09-07 | End: 2023-09-07

## 2023-09-07 RX ORDER — POTASSIUM CHLORIDE 20 MEQ/1
40 TABLET, EXTENDED RELEASE ORAL ONCE
Status: COMPLETED | OUTPATIENT
Start: 2023-09-07 | End: 2023-09-07

## 2023-09-07 RX ADMIN — ENOXAPARIN SODIUM 105 MG: 150 INJECTION SUBCUTANEOUS at 10:22

## 2023-09-07 RX ADMIN — METOPROLOL TARTRATE 50 MG: 50 TABLET, FILM COATED ORAL at 10:08

## 2023-09-07 RX ADMIN — ALBUTEROL SULFATE 2.5 MG: 2.5 SOLUTION RESPIRATORY (INHALATION) at 21:17

## 2023-09-07 RX ADMIN — METHOCARBAMOL TABLETS 1000 MG: 500 TABLET, COATED ORAL at 10:08

## 2023-09-07 RX ADMIN — ENOXAPARIN SODIUM 105 MG: 150 INJECTION SUBCUTANEOUS at 22:24

## 2023-09-07 RX ADMIN — METOPROLOL TARTRATE 50 MG: 50 TABLET, FILM COATED ORAL at 20:37

## 2023-09-07 RX ADMIN — ROSUVASTATIN CALCIUM 20 MG: 20 TABLET, FILM COATED ORAL at 20:38

## 2023-09-07 RX ADMIN — POTASSIUM CHLORIDE 40 MEQ: 1500 TABLET, EXTENDED RELEASE ORAL at 14:32

## 2023-09-07 RX ADMIN — SODIUM PHOSPHATE, MONOBASIC, MONOHYDRATE AND SODIUM PHOSPHATE, DIBASIC, ANHYDROUS 30 MMOL: 142; 276 INJECTION, SOLUTION INTRAVENOUS at 11:00

## 2023-09-07 RX ADMIN — METHOCARBAMOL TABLETS 1000 MG: 500 TABLET, COATED ORAL at 17:08

## 2023-09-07 RX ADMIN — PREGABALIN 150 MG: 150 CAPSULE ORAL at 14:32

## 2023-09-07 RX ADMIN — Medication 5 MG: at 20:37

## 2023-09-07 RX ADMIN — SODIUM CHLORIDE, PRESERVATIVE FREE 10 ML: 5 INJECTION INTRAVENOUS at 10:07

## 2023-09-07 RX ADMIN — METHOCARBAMOL TABLETS 1000 MG: 500 TABLET, COATED ORAL at 20:37

## 2023-09-07 RX ADMIN — DIGOXIN 250 MCG: 0.25 INJECTION INTRAMUSCULAR; INTRAVENOUS at 10:08

## 2023-09-07 RX ADMIN — FAMOTIDINE 20 MG: 20 TABLET ORAL at 20:37

## 2023-09-07 RX ADMIN — ACETAMINOPHEN 1000 MG: 500 TABLET ORAL at 17:07

## 2023-09-07 RX ADMIN — FUROSEMIDE 20 MG: 10 INJECTION, SOLUTION INTRAMUSCULAR; INTRAVENOUS at 10:55

## 2023-09-07 RX ADMIN — ASPIRIN 81 MG: 81 TABLET, COATED ORAL at 10:08

## 2023-09-07 RX ADMIN — ACETAMINOPHEN 1000 MG: 500 TABLET ORAL at 14:32

## 2023-09-07 RX ADMIN — OXYCODONE HYDROCHLORIDE 15 MG: 15 TABLET ORAL at 10:37

## 2023-09-07 RX ADMIN — HYDRALAZINE HYDROCHLORIDE 5 MG: 20 INJECTION INTRAMUSCULAR; INTRAVENOUS at 10:21

## 2023-09-07 RX ADMIN — SODIUM CHLORIDE, PRESERVATIVE FREE 10 ML: 5 INJECTION INTRAVENOUS at 20:57

## 2023-09-07 RX ADMIN — PREGABALIN 150 MG: 150 CAPSULE ORAL at 10:09

## 2023-09-07 RX ADMIN — HYDROMORPHONE HYDROCHLORIDE 0.25 MG: 1 INJECTION, SOLUTION INTRAMUSCULAR; INTRAVENOUS; SUBCUTANEOUS at 02:23

## 2023-09-07 RX ADMIN — ALBUTEROL SULFATE 2.5 MG: 2.5 SOLUTION RESPIRATORY (INHALATION) at 15:35

## 2023-09-07 RX ADMIN — PREGABALIN 150 MG: 150 CAPSULE ORAL at 20:37

## 2023-09-07 RX ADMIN — METHOCARBAMOL TABLETS 1000 MG: 500 TABLET, COATED ORAL at 14:32

## 2023-09-07 RX ADMIN — FAMOTIDINE 20 MG: 20 TABLET ORAL at 10:08

## 2023-09-07 RX ADMIN — WARFARIN SODIUM 5 MG: 5 TABLET ORAL at 17:08

## 2023-09-07 ASSESSMENT — PAIN SCALES - GENERAL
PAINLEVEL_OUTOF10: 0
PAINLEVEL_OUTOF10: 10
PAINLEVEL_OUTOF10: 5
PAINLEVEL_OUTOF10: 0
PAINLEVEL_OUTOF10: 8
PAINLEVEL_OUTOF10: 3
PAINLEVEL_OUTOF10: 2
PAINLEVEL_OUTOF10: 2

## 2023-09-07 ASSESSMENT — PAIN DESCRIPTION - FREQUENCY: FREQUENCY: CONTINUOUS

## 2023-09-07 ASSESSMENT — PAIN - FUNCTIONAL ASSESSMENT
PAIN_FUNCTIONAL_ASSESSMENT: ACTIVITIES ARE NOT PREVENTED

## 2023-09-07 ASSESSMENT — ENCOUNTER SYMPTOMS
COUGH: 1
EYES NEGATIVE: 1
SHORTNESS OF BREATH: 1
GASTROINTESTINAL NEGATIVE: 1

## 2023-09-07 ASSESSMENT — PAIN SCALES - WONG BAKER
WONGBAKER_NUMERICALRESPONSE: 0

## 2023-09-07 ASSESSMENT — PAIN DESCRIPTION - DESCRIPTORS
DESCRIPTORS: ACHING
DESCRIPTORS: ACHING
DESCRIPTORS: ACHING;CRAMPING;DISCOMFORT
DESCRIPTORS: ACHING

## 2023-09-07 ASSESSMENT — PAIN DESCRIPTION - ORIENTATION: ORIENTATION: MID

## 2023-09-07 ASSESSMENT — PAIN DESCRIPTION - LOCATION: LOCATION: BACK;SHOULDER

## 2023-09-08 ENCOUNTER — APPOINTMENT (OUTPATIENT)
Dept: GENERAL RADIOLOGY | Age: 58
DRG: 163 | End: 2023-09-08
Attending: THORACIC SURGERY (CARDIOTHORACIC VASCULAR SURGERY)
Payer: MEDICARE

## 2023-09-08 LAB
ALBUMIN SERPL-MCNC: 3 G/DL (ref 3.4–5)
ANION GAP SERPL CALCULATED.3IONS-SCNC: 10 MMOL/L (ref 3–16)
BASOPHILS # BLD: 0 K/UL (ref 0–0.2)
BASOPHILS NFR BLD: 0.7 %
BUN SERPL-MCNC: 26 MG/DL (ref 7–20)
CALCIUM SERPL-MCNC: 9 MG/DL (ref 8.3–10.6)
CHLORIDE SERPL-SCNC: 97 MMOL/L (ref 99–110)
CO2 SERPL-SCNC: 29 MMOL/L (ref 21–32)
CREAT SERPL-MCNC: 0.7 MG/DL (ref 0.8–1.3)
DEPRECATED RDW RBC AUTO: 15.1 % (ref 12.4–15.4)
EOSINOPHIL # BLD: 0.3 K/UL (ref 0–0.6)
EOSINOPHIL NFR BLD: 4.6 %
GFR SERPLBLD CREATININE-BSD FMLA CKD-EPI: >60 ML/MIN/{1.73_M2}
GLUCOSE SERPL-MCNC: 132 MG/DL (ref 70–99)
HCT VFR BLD AUTO: 35.7 % (ref 40.5–52.5)
HGB BLD-MCNC: 12 G/DL (ref 13.5–17.5)
INR PPP: 2.7 (ref 0.84–1.16)
LYMPHOCYTES # BLD: 1.1 K/UL (ref 1–5.1)
LYMPHOCYTES NFR BLD: 19.6 %
MAGNESIUM SERPL-MCNC: 2.2 MG/DL (ref 1.8–2.4)
MCH RBC QN AUTO: 31.7 PG (ref 26–34)
MCHC RBC AUTO-ENTMCNC: 33.6 G/DL (ref 31–36)
MCV RBC AUTO: 94.3 FL (ref 80–100)
MONOCYTES # BLD: 0.6 K/UL (ref 0–1.3)
MONOCYTES NFR BLD: 11.3 %
NEUTROPHILS # BLD: 3.6 K/UL (ref 1.7–7.7)
NEUTROPHILS NFR BLD: 63.8 %
PHOSPHATE SERPL-MCNC: 2.5 MG/DL (ref 2.5–4.9)
PLATELET # BLD AUTO: 167 K/UL (ref 135–450)
PMV BLD AUTO: 7.9 FL (ref 5–10.5)
POTASSIUM SERPL-SCNC: 3.1 MMOL/L (ref 3.5–5.1)
POTASSIUM SERPL-SCNC: 3.6 MMOL/L (ref 3.5–5.1)
PROTHROMBIN TIME: 28.5 SEC (ref 11.5–14.8)
RBC # BLD AUTO: 3.78 M/UL (ref 4.2–5.9)
SODIUM SERPL-SCNC: 136 MMOL/L (ref 136–145)
WBC # BLD AUTO: 5.7 K/UL (ref 4–11)

## 2023-09-08 PROCEDURE — 97163 PT EVAL HIGH COMPLEX 45 MIN: CPT

## 2023-09-08 PROCEDURE — 85610 PROTHROMBIN TIME: CPT

## 2023-09-08 PROCEDURE — 6370000000 HC RX 637 (ALT 250 FOR IP)

## 2023-09-08 PROCEDURE — 6360000002 HC RX W HCPCS: Performed by: STUDENT IN AN ORGANIZED HEALTH CARE EDUCATION/TRAINING PROGRAM

## 2023-09-08 PROCEDURE — 93005 ELECTROCARDIOGRAM TRACING: CPT

## 2023-09-08 PROCEDURE — 99233 SBSQ HOSP IP/OBS HIGH 50: CPT | Performed by: INTERNAL MEDICINE

## 2023-09-08 PROCEDURE — 94669 MECHANICAL CHEST WALL OSCILL: CPT

## 2023-09-08 PROCEDURE — 36415 COLL VENOUS BLD VENIPUNCTURE: CPT

## 2023-09-08 PROCEDURE — 6360000002 HC RX W HCPCS

## 2023-09-08 PROCEDURE — 94150 VITAL CAPACITY TEST: CPT

## 2023-09-08 PROCEDURE — 2580000003 HC RX 258

## 2023-09-08 PROCEDURE — 84132 ASSAY OF SERUM POTASSIUM: CPT

## 2023-09-08 PROCEDURE — 97535 SELF CARE MNGMENT TRAINING: CPT

## 2023-09-08 PROCEDURE — 2580000003 HC RX 258: Performed by: THORACIC SURGERY (CARDIOTHORACIC VASCULAR SURGERY)

## 2023-09-08 PROCEDURE — 85025 COMPLETE CBC W/AUTO DIFF WBC: CPT

## 2023-09-08 PROCEDURE — 71045 X-RAY EXAM CHEST 1 VIEW: CPT

## 2023-09-08 PROCEDURE — 94640 AIRWAY INHALATION TREATMENT: CPT

## 2023-09-08 PROCEDURE — 97530 THERAPEUTIC ACTIVITIES: CPT

## 2023-09-08 PROCEDURE — 80069 RENAL FUNCTION PANEL: CPT

## 2023-09-08 PROCEDURE — 83735 ASSAY OF MAGNESIUM: CPT

## 2023-09-08 PROCEDURE — 6370000000 HC RX 637 (ALT 250 FOR IP): Performed by: STUDENT IN AN ORGANIZED HEALTH CARE EDUCATION/TRAINING PROGRAM

## 2023-09-08 PROCEDURE — 97116 GAIT TRAINING THERAPY: CPT

## 2023-09-08 PROCEDURE — 97166 OT EVAL MOD COMPLEX 45 MIN: CPT

## 2023-09-08 PROCEDURE — 2500000003 HC RX 250 WO HCPCS

## 2023-09-08 PROCEDURE — 94660 CPAP INITIATION&MGMT: CPT

## 2023-09-08 PROCEDURE — 6360000002 HC RX W HCPCS: Performed by: THORACIC SURGERY (CARDIOTHORACIC VASCULAR SURGERY)

## 2023-09-08 PROCEDURE — 2000000000 HC ICU R&B

## 2023-09-08 PROCEDURE — 2700000000 HC OXYGEN THERAPY PER DAY

## 2023-09-08 PROCEDURE — 94761 N-INVAS EAR/PLS OXIMETRY MLT: CPT

## 2023-09-08 RX ORDER — LORAZEPAM 2 MG/ML
0.5 INJECTION INTRAMUSCULAR EVERY 6 HOURS PRN
Status: DISCONTINUED | OUTPATIENT
Start: 2023-09-08 | End: 2023-09-15

## 2023-09-08 RX ORDER — FUROSEMIDE 10 MG/ML
20 INJECTION INTRAMUSCULAR; INTRAVENOUS ONCE
Status: COMPLETED | OUTPATIENT
Start: 2023-09-08 | End: 2023-09-08

## 2023-09-08 RX ORDER — DOCUSATE SODIUM 100 MG/1
100 CAPSULE, LIQUID FILLED ORAL DAILY
Status: DISCONTINUED | OUTPATIENT
Start: 2023-09-08 | End: 2023-09-17 | Stop reason: HOSPADM

## 2023-09-08 RX ORDER — POTASSIUM CHLORIDE 20 MEQ/1
40 TABLET, EXTENDED RELEASE ORAL ONCE
Status: COMPLETED | OUTPATIENT
Start: 2023-09-08 | End: 2023-09-08

## 2023-09-08 RX ORDER — FUROSEMIDE 20 MG/1
20 TABLET ORAL DAILY
Status: DISCONTINUED | OUTPATIENT
Start: 2023-09-09 | End: 2023-09-14

## 2023-09-08 RX ORDER — ALBUTEROL SULFATE 2.5 MG/3ML
2.5 SOLUTION RESPIRATORY (INHALATION)
Status: DISCONTINUED | OUTPATIENT
Start: 2023-09-08 | End: 2023-09-13

## 2023-09-08 RX ORDER — IPRATROPIUM BROMIDE AND ALBUTEROL SULFATE 2.5; .5 MG/3ML; MG/3ML
1 SOLUTION RESPIRATORY (INHALATION)
Status: DISCONTINUED | OUTPATIENT
Start: 2023-09-08 | End: 2023-09-17 | Stop reason: HOSPADM

## 2023-09-08 RX ORDER — POLYETHYLENE GLYCOL 3350 17 G/17G
17 POWDER, FOR SOLUTION ORAL DAILY
Status: DISCONTINUED | OUTPATIENT
Start: 2023-09-08 | End: 2023-09-17 | Stop reason: HOSPADM

## 2023-09-08 RX ORDER — KETOROLAC TROMETHAMINE 15 MG/ML
15 INJECTION, SOLUTION INTRAMUSCULAR; INTRAVENOUS ONCE
Status: COMPLETED | OUTPATIENT
Start: 2023-09-08 | End: 2023-09-08

## 2023-09-08 RX ADMIN — METHOCARBAMOL TABLETS 1000 MG: 500 TABLET, COATED ORAL at 12:13

## 2023-09-08 RX ADMIN — DOCUSATE SODIUM 100 MG: 100 CAPSULE, LIQUID FILLED ORAL at 16:48

## 2023-09-08 RX ADMIN — IPRATROPIUM BROMIDE AND ALBUTEROL SULFATE 1 DOSE: 2.5; .5 SOLUTION RESPIRATORY (INHALATION) at 20:03

## 2023-09-08 RX ADMIN — OXYCODONE HYDROCHLORIDE 15 MG: 15 TABLET ORAL at 23:32

## 2023-09-08 RX ADMIN — OXYCODONE HYDROCHLORIDE 15 MG: 15 TABLET ORAL at 07:21

## 2023-09-08 RX ADMIN — HYDROMORPHONE HYDROCHLORIDE 0.25 MG: 1 INJECTION, SOLUTION INTRAMUSCULAR; INTRAVENOUS; SUBCUTANEOUS at 21:52

## 2023-09-08 RX ADMIN — ROSUVASTATIN CALCIUM 20 MG: 20 TABLET, FILM COATED ORAL at 20:35

## 2023-09-08 RX ADMIN — METOPROLOL TARTRATE 50 MG: 50 TABLET, FILM COATED ORAL at 09:43

## 2023-09-08 RX ADMIN — POTASSIUM CHLORIDE 40 MEQ: 1500 TABLET, EXTENDED RELEASE ORAL at 12:12

## 2023-09-08 RX ADMIN — ENOXAPARIN SODIUM 105 MG: 150 INJECTION SUBCUTANEOUS at 09:41

## 2023-09-08 RX ADMIN — FUROSEMIDE 20 MG: 10 INJECTION, SOLUTION INTRAMUSCULAR; INTRAVENOUS at 16:25

## 2023-09-08 RX ADMIN — ACETAMINOPHEN 1000 MG: 500 TABLET ORAL at 02:24

## 2023-09-08 RX ADMIN — ASPIRIN 81 MG: 81 TABLET, COATED ORAL at 09:40

## 2023-09-08 RX ADMIN — POTASSIUM CHLORIDE 40 MEQ: 20 TABLET, EXTENDED RELEASE ORAL at 09:44

## 2023-09-08 RX ADMIN — ALBUTEROL SULFATE 2.5 MG: 2.5 SOLUTION RESPIRATORY (INHALATION) at 15:58

## 2023-09-08 RX ADMIN — PREGABALIN 150 MG: 150 CAPSULE ORAL at 20:35

## 2023-09-08 RX ADMIN — POLYETHYLENE GLYCOL 3350 17 G: 17 POWDER, FOR SOLUTION ORAL at 16:48

## 2023-09-08 RX ADMIN — KETOROLAC TROMETHAMINE 15 MG: 15 INJECTION, SOLUTION INTRAMUSCULAR; INTRAVENOUS at 15:40

## 2023-09-08 RX ADMIN — SODIUM CHLORIDE, PRESERVATIVE FREE 10 ML: 5 INJECTION INTRAVENOUS at 09:44

## 2023-09-08 RX ADMIN — PREGABALIN 150 MG: 150 CAPSULE ORAL at 15:52

## 2023-09-08 RX ADMIN — POTASSIUM PHOSPHATE, MONOBASIC POTASSIUM PHOSPHATE, DIBASIC 10 MMOL: 224; 236 INJECTION, SOLUTION, CONCENTRATE INTRAVENOUS at 11:38

## 2023-09-08 RX ADMIN — DIGOXIN 250 MCG: 125 TABLET ORAL at 09:41

## 2023-09-08 RX ADMIN — POTASSIUM CHLORIDE 40 MEQ: 1500 TABLET, EXTENDED RELEASE ORAL at 23:32

## 2023-09-08 RX ADMIN — ACETAMINOPHEN 1000 MG: 500 TABLET ORAL at 09:40

## 2023-09-08 RX ADMIN — METHOCARBAMOL TABLETS 1000 MG: 500 TABLET, COATED ORAL at 20:35

## 2023-09-08 RX ADMIN — LISINOPRIL 10 MG: 10 TABLET ORAL at 20:35

## 2023-09-08 RX ADMIN — FAMOTIDINE 20 MG: 20 TABLET ORAL at 09:42

## 2023-09-08 RX ADMIN — PREGABALIN 150 MG: 150 CAPSULE ORAL at 09:44

## 2023-09-08 RX ADMIN — ALBUTEROL SULFATE 2.5 MG: 2.5 SOLUTION RESPIRATORY (INHALATION) at 08:20

## 2023-09-08 RX ADMIN — ALBUTEROL SULFATE 2.5 MG: 2.5 SOLUTION RESPIRATORY (INHALATION) at 13:24

## 2023-09-08 RX ADMIN — OXYCODONE HYDROCHLORIDE 15 MG: 15 TABLET ORAL at 12:12

## 2023-09-08 RX ADMIN — METHOCARBAMOL TABLETS 1000 MG: 500 TABLET, COATED ORAL at 16:48

## 2023-09-08 RX ADMIN — SODIUM CHLORIDE, PRESERVATIVE FREE 10 ML: 5 INJECTION INTRAVENOUS at 20:36

## 2023-09-08 RX ADMIN — Medication 5 MG: at 20:35

## 2023-09-08 RX ADMIN — METHOCARBAMOL TABLETS 1000 MG: 500 TABLET, COATED ORAL at 09:43

## 2023-09-08 RX ADMIN — FAMOTIDINE 20 MG: 20 TABLET ORAL at 20:35

## 2023-09-08 RX ADMIN — FUROSEMIDE 20 MG: 10 INJECTION, SOLUTION INTRAMUSCULAR; INTRAVENOUS at 09:54

## 2023-09-08 RX ADMIN — OXYCODONE HYDROCHLORIDE 15 MG: 15 TABLET ORAL at 19:40

## 2023-09-08 ASSESSMENT — PAIN SCALES - GENERAL
PAINLEVEL_OUTOF10: 5
PAINLEVEL_OUTOF10: 7
PAINLEVEL_OUTOF10: 7
PAINLEVEL_OUTOF10: 10
PAINLEVEL_OUTOF10: 8
PAINLEVEL_OUTOF10: 0
PAINLEVEL_OUTOF10: 8
PAINLEVEL_OUTOF10: 0

## 2023-09-08 ASSESSMENT — ENCOUNTER SYMPTOMS
EYES NEGATIVE: 1
GASTROINTESTINAL NEGATIVE: 1
SHORTNESS OF BREATH: 1
COUGH: 1

## 2023-09-08 ASSESSMENT — PAIN DESCRIPTION - FREQUENCY: FREQUENCY: CONTINUOUS

## 2023-09-08 ASSESSMENT — PAIN DESCRIPTION - DESCRIPTORS
DESCRIPTORS: ACHING;DISCOMFORT
DESCRIPTORS: ACHING;DISCOMFORT

## 2023-09-08 ASSESSMENT — PAIN DESCRIPTION - LOCATION
LOCATION: BACK
LOCATION: CHEST
LOCATION: BACK
LOCATION: BACK
LOCATION: BACK;CHEST

## 2023-09-08 ASSESSMENT — PAIN DESCRIPTION - ONSET: ONSET: ON-GOING

## 2023-09-08 ASSESSMENT — PAIN DESCRIPTION - ORIENTATION
ORIENTATION: MID
ORIENTATION: MID

## 2023-09-08 ASSESSMENT — PAIN DESCRIPTION - PAIN TYPE: TYPE: ACUTE PAIN;CHRONIC PAIN

## 2023-09-08 NOTE — CARE COORDINATION
Case management is following for discharge planning. The chart was reviewed. Mr. Venu Bell worked with [address] today. Home with [address] [address] [address] recommended. He is still on 6LO2/NC. PT AM-PAC: 25 / 24 per last evaluation on: 9/8    OT AM-PAC: 19 / 24 per last evaluation on: 9/8    Discharge Plan: Home with [facility] referral     Roya Trinidad and his family were provided with choice of provider; he and his family are in agreement with the discharge plan.     Emergency Contacts:  Extended Emergency Contact Information  Primary Emergency Contact: [address] [facility] Phone: [phone]  Mobile Phone: [phone]  Relation: Child  Secondary Emergency Contact: shola rowley Phone: [phone]  Mobile Phone: [phone]  Relation: Brother/Sister    Batool Johnson RN  [facility]  Case Management Department  [phone]

## 2023-09-08 NOTE — DISCHARGE INSTR - COC
Continuity of Care Form    Patient Name: Kiana Odonnell   :  1965  MRN:  [id number]    Admit date:  2023  Discharge date:  ***    Code Status Order: Full Code   Advance Directives:   Advance Care Flowsheet Documentation       Date/Time Healthcare Directive Type of Healthcare Directive Copy in [address] Agent's Name Healthcare Agent's Phone Number    23 1311 No, patient does not have an advance directive for healthcare treatment -- -- -- -- --            Admitting Physician:  Romero Chow MD  PCP: Lida Blackwell MD    Discharging Nurse: [facility] Unit/Room#: 3593/1009-88  Discharging Unit Phone Number: ***    Emergency Contact:   Extended Emergency Contact Information  Primary Emergency Contact: [place] Phone: [phone]  Mobile Phone: [phone]  Relation: Child  Secondary Emergency Contact: shola rowley  [place] Phone: [phone]  Mobile Phone: [phone]  Relation: Brother/Sister    Past Surgical History:  Past Surgical History:   Procedure Laterality Date    AORTA SURGERY  2004    Dr. Delmer Gauthier - primary repair of limited ascending aortic dissection    AORTIC VALVE REPLACEMENT  2017    Dr. Juventino Dance - redo w/25mm Joni Idris ThermaFix model 3300 bovine pericardial bioprosthesis    AORTIC VALVE SURGERY  2004    Dr. Delmer Gauthier - 27mm Kelton-Castelan pericardial prosthesis     BACK SURGERY      L4-S1    BRONCHOSCOPY N/A 2022    ROBOTIC NAVIGATIONAL BRONCHOSCOPY FOR TRANSBRONCHIAL LUNG BIOPSY WITH FLOURO performed by Kaitlin Dalal MD at [address]  2022    BRONCHOSCOPY ALVEOLAR LAVAGE performed by Kaitlin Dalal MD at [address] [place] [address]  2017    Dr. Coni Gallegos  2004    Dr. Mehreen Jon 2015    Dr. Ned Lee  10/10/2016    Dr. Nik Dewitt - w/laparascopic

## 2023-09-09 LAB
ALBUMIN SERPL-MCNC: 3.4 G/DL (ref 3.4–5)
ANION GAP SERPL CALCULATED.3IONS-SCNC: 13 MMOL/L (ref 3–16)
BASOPHILS # BLD: 0 K/UL (ref 0–0.2)
BASOPHILS NFR BLD: 0.3 %
BUN SERPL-MCNC: 16 MG/DL (ref 7–20)
CALCIUM SERPL-MCNC: 8.9 MG/DL (ref 8.3–10.6)
CHLORIDE SERPL-SCNC: 94 MMOL/L (ref 99–110)
CO2 SERPL-SCNC: 27 MMOL/L (ref 21–32)
CREAT SERPL-MCNC: 0.8 MG/DL (ref 0.8–1.3)
DEPRECATED RDW RBC AUTO: 15.2 % (ref 12.4–15.4)
EKG ATRIAL RATE: 97 BPM
EKG DIAGNOSIS: NORMAL
EKG P AXIS: 51 DEGREES
EKG P-R INTERVAL: 156 MS
EKG Q-T INTERVAL: 372 MS
EKG QRS DURATION: 106 MS
EKG QTC CALCULATION (BAZETT): 472 MS
EKG R AXIS: 42 DEGREES
EKG T AXIS: 23 DEGREES
EKG VENTRICULAR RATE: 97 BPM
EOSINOPHIL # BLD: 0.4 K/UL (ref 0–0.6)
EOSINOPHIL NFR BLD: 4.1 %
GFR SERPLBLD CREATININE-BSD FMLA CKD-EPI: >60 ML/MIN/{1.73_M2}
GLUCOSE SERPL-MCNC: 157 MG/DL (ref 70–99)
HCT VFR BLD AUTO: 38.1 % (ref 40.5–52.5)
HGB BLD-MCNC: 12.5 G/DL (ref 13.5–17.5)
INR PPP: 2.82 (ref 0.84–1.16)
LYMPHOCYTES # BLD: 0.9 K/UL (ref 1–5.1)
LYMPHOCYTES NFR BLD: 9.8 %
MAGNESIUM SERPL-MCNC: 2 MG/DL (ref 1.8–2.4)
MCH RBC QN AUTO: 31.1 PG (ref 26–34)
MCHC RBC AUTO-ENTMCNC: 32.9 G/DL (ref 31–36)
MCV RBC AUTO: 94.6 FL (ref 80–100)
MONOCYTES # BLD: 1 K/UL (ref 0–1.3)
MONOCYTES NFR BLD: 10.7 %
NEUTROPHILS # BLD: 7 K/UL (ref 1.7–7.7)
NEUTROPHILS NFR BLD: 75.1 %
PHOSPHATE SERPL-MCNC: 3.4 MG/DL (ref 2.5–4.9)
PLATELET # BLD AUTO: 184 K/UL (ref 135–450)
PMV BLD AUTO: 8.2 FL (ref 5–10.5)
POTASSIUM SERPL-SCNC: 4.2 MMOL/L (ref 3.5–5.1)
PROTHROMBIN TIME: 29.5 SEC (ref 11.5–14.8)
RBC # BLD AUTO: 4.03 M/UL (ref 4.2–5.9)
SODIUM SERPL-SCNC: 134 MMOL/L (ref 136–145)
WBC # BLD AUTO: 9.3 K/UL (ref 4–11)

## 2023-09-09 PROCEDURE — 6370000000 HC RX 637 (ALT 250 FOR IP)

## 2023-09-09 PROCEDURE — 99233 SBSQ HOSP IP/OBS HIGH 50: CPT | Performed by: NURSE PRACTITIONER

## 2023-09-09 PROCEDURE — 85610 PROTHROMBIN TIME: CPT

## 2023-09-09 PROCEDURE — 84439 ASSAY OF FREE THYROXINE: CPT

## 2023-09-09 PROCEDURE — 94640 AIRWAY INHALATION TREATMENT: CPT

## 2023-09-09 PROCEDURE — 6360000002 HC RX W HCPCS: Performed by: THORACIC SURGERY (CARDIOTHORACIC VASCULAR SURGERY)

## 2023-09-09 PROCEDURE — 94150 VITAL CAPACITY TEST: CPT

## 2023-09-09 PROCEDURE — 2580000003 HC RX 258: Performed by: THORACIC SURGERY (CARDIOTHORACIC VASCULAR SURGERY)

## 2023-09-09 PROCEDURE — 93010 ELECTROCARDIOGRAM REPORT: CPT | Performed by: INTERNAL MEDICINE

## 2023-09-09 PROCEDURE — 99233 SBSQ HOSP IP/OBS HIGH 50: CPT | Performed by: INTERNAL MEDICINE

## 2023-09-09 PROCEDURE — 83735 ASSAY OF MAGNESIUM: CPT

## 2023-09-09 PROCEDURE — 85025 COMPLETE CBC W/AUTO DIFF WBC: CPT

## 2023-09-09 PROCEDURE — 6360000002 HC RX W HCPCS: Performed by: STUDENT IN AN ORGANIZED HEALTH CARE EDUCATION/TRAINING PROGRAM

## 2023-09-09 PROCEDURE — 6370000000 HC RX 637 (ALT 250 FOR IP): Performed by: STUDENT IN AN ORGANIZED HEALTH CARE EDUCATION/TRAINING PROGRAM

## 2023-09-09 PROCEDURE — 2580000003 HC RX 258

## 2023-09-09 PROCEDURE — 84443 ASSAY THYROID STIM HORMONE: CPT

## 2023-09-09 PROCEDURE — 2700000000 HC OXYGEN THERAPY PER DAY

## 2023-09-09 PROCEDURE — 94761 N-INVAS EAR/PLS OXIMETRY MLT: CPT

## 2023-09-09 PROCEDURE — 80069 RENAL FUNCTION PANEL: CPT

## 2023-09-09 PROCEDURE — 2000000000 HC ICU R&B

## 2023-09-09 PROCEDURE — 94669 MECHANICAL CHEST WALL OSCILL: CPT

## 2023-09-09 PROCEDURE — 36415 COLL VENOUS BLD VENIPUNCTURE: CPT

## 2023-09-09 RX ORDER — DEXAMETHASONE SODIUM PHOSPHATE 10 MG/ML
INJECTION, SOLUTION INTRAMUSCULAR; INTRAVENOUS
Status: DISCONTINUED
Start: 2023-09-09 | End: 2023-09-10

## 2023-09-09 RX ORDER — ROPIVACAINE HYDROCHLORIDE 5 MG/ML
INJECTION, SOLUTION EPIDURAL; INFILTRATION; PERINEURAL
Status: DISCONTINUED
Start: 2023-09-09 | End: 2023-09-10

## 2023-09-09 RX ORDER — WARFARIN SODIUM 5 MG/1
5 TABLET ORAL
Status: DISCONTINUED | OUTPATIENT
Start: 2023-09-09 | End: 2023-09-10

## 2023-09-09 RX ORDER — METOPROLOL TARTRATE 50 MG/1
50 TABLET, FILM COATED ORAL ONCE
Status: COMPLETED | OUTPATIENT
Start: 2023-09-09 | End: 2023-09-09

## 2023-09-09 RX ORDER — SODIUM CHLORIDE FOR INHALATION 3 %
4 VIAL, NEBULIZER (ML) INHALATION 2 TIMES DAILY
Status: DISCONTINUED | OUTPATIENT
Start: 2023-09-09 | End: 2023-09-17 | Stop reason: HOSPADM

## 2023-09-09 RX ADMIN — METOPROLOL TARTRATE 50 MG: 50 TABLET, FILM COATED ORAL at 01:30

## 2023-09-09 RX ADMIN — PREGABALIN 150 MG: 150 CAPSULE ORAL at 21:45

## 2023-09-09 RX ADMIN — ROSUVASTATIN CALCIUM 20 MG: 20 TABLET, FILM COATED ORAL at 21:45

## 2023-09-09 RX ADMIN — METHOCARBAMOL TABLETS 1000 MG: 500 TABLET, COATED ORAL at 08:35

## 2023-09-09 RX ADMIN — IPRATROPIUM BROMIDE AND ALBUTEROL SULFATE 1 DOSE: 2.5; .5 SOLUTION RESPIRATORY (INHALATION) at 12:00

## 2023-09-09 RX ADMIN — SODIUM CHLORIDE 30 MG/ML INHALATION SOLUTION 4 ML: 30 SOLUTION INHALANT at 12:00

## 2023-09-09 RX ADMIN — METHOCARBAMOL TABLETS 1000 MG: 500 TABLET, COATED ORAL at 13:00

## 2023-09-09 RX ADMIN — FUROSEMIDE 20 MG: 20 TABLET ORAL at 08:35

## 2023-09-09 RX ADMIN — FAMOTIDINE 20 MG: 20 TABLET ORAL at 08:34

## 2023-09-09 RX ADMIN — OXYCODONE HYDROCHLORIDE 15 MG: 15 TABLET ORAL at 11:37

## 2023-09-09 RX ADMIN — WARFARIN SODIUM 5 MG: 5 TABLET ORAL at 18:05

## 2023-09-09 RX ADMIN — FAMOTIDINE 20 MG: 20 TABLET ORAL at 21:46

## 2023-09-09 RX ADMIN — HYDROMORPHONE HYDROCHLORIDE 0.25 MG: 1 INJECTION, SOLUTION INTRAMUSCULAR; INTRAVENOUS; SUBCUTANEOUS at 21:47

## 2023-09-09 RX ADMIN — SODIUM CHLORIDE 30 MG/ML INHALATION SOLUTION 4 ML: 30 SOLUTION INHALANT at 20:11

## 2023-09-09 RX ADMIN — PREGABALIN 150 MG: 150 CAPSULE ORAL at 16:32

## 2023-09-09 RX ADMIN — POLYETHYLENE GLYCOL 3350 17 G: 17 POWDER, FOR SOLUTION ORAL at 08:35

## 2023-09-09 RX ADMIN — METHOCARBAMOL TABLETS 1000 MG: 500 TABLET, COATED ORAL at 18:04

## 2023-09-09 RX ADMIN — PREGABALIN 150 MG: 150 CAPSULE ORAL at 08:35

## 2023-09-09 RX ADMIN — ALBUTEROL SULFATE 2.5 MG: 2.5 SOLUTION RESPIRATORY (INHALATION) at 03:52

## 2023-09-09 RX ADMIN — SODIUM CHLORIDE, PRESERVATIVE FREE 10 ML: 5 INJECTION INTRAVENOUS at 08:37

## 2023-09-09 RX ADMIN — SODIUM CHLORIDE, PRESERVATIVE FREE 10 ML: 5 INJECTION INTRAVENOUS at 21:47

## 2023-09-09 RX ADMIN — IPRATROPIUM BROMIDE AND ALBUTEROL SULFATE 1 DOSE: 2.5; .5 SOLUTION RESPIRATORY (INHALATION) at 17:00

## 2023-09-09 RX ADMIN — OXYCODONE HYDROCHLORIDE 15 MG: 15 TABLET ORAL at 18:04

## 2023-09-09 RX ADMIN — HYDROMORPHONE HYDROCHLORIDE 0.25 MG: 1 INJECTION, SOLUTION INTRAMUSCULAR; INTRAVENOUS; SUBCUTANEOUS at 02:00

## 2023-09-09 RX ADMIN — ACETAMINOPHEN 1000 MG: 500 TABLET ORAL at 18:04

## 2023-09-09 RX ADMIN — IPRATROPIUM BROMIDE AND ALBUTEROL SULFATE 1 DOSE: 2.5; .5 SOLUTION RESPIRATORY (INHALATION) at 07:50

## 2023-09-09 RX ADMIN — OXYCODONE HYDROCHLORIDE 15 MG: 15 TABLET ORAL at 05:49

## 2023-09-09 RX ADMIN — Medication 5 MG: at 21:45

## 2023-09-09 RX ADMIN — ACETAMINOPHEN 1000 MG: 500 TABLET ORAL at 11:37

## 2023-09-09 RX ADMIN — ASPIRIN 81 MG: 81 TABLET, COATED ORAL at 08:34

## 2023-09-09 RX ADMIN — METHOCARBAMOL TABLETS 1000 MG: 500 TABLET, COATED ORAL at 21:45

## 2023-09-09 RX ADMIN — DIGOXIN 250 MCG: 125 TABLET ORAL at 08:35

## 2023-09-09 RX ADMIN — DOCUSATE SODIUM 100 MG: 100 CAPSULE, LIQUID FILLED ORAL at 08:35

## 2023-09-09 RX ADMIN — IPRATROPIUM BROMIDE AND ALBUTEROL SULFATE 1 DOSE: 2.5; .5 SOLUTION RESPIRATORY (INHALATION) at 20:11

## 2023-09-09 ASSESSMENT — PAIN - FUNCTIONAL ASSESSMENT
PAIN_FUNCTIONAL_ASSESSMENT: PREVENTS OR INTERFERES SOME ACTIVE ACTIVITIES AND ADLS
PAIN_FUNCTIONAL_ASSESSMENT: ACTIVITIES ARE NOT PREVENTED

## 2023-09-09 ASSESSMENT — PAIN SCALES - GENERAL
PAINLEVEL_OUTOF10: 8
PAINLEVEL_OUTOF10: 6
PAINLEVEL_OUTOF10: 8
PAINLEVEL_OUTOF10: 8
PAINLEVEL_OUTOF10: 5
PAINLEVEL_OUTOF10: 8
PAINLEVEL_OUTOF10: 8
PAINLEVEL_OUTOF10: 5
PAINLEVEL_OUTOF10: 4
PAINLEVEL_OUTOF10: 9

## 2023-09-09 ASSESSMENT — PAIN DESCRIPTION - ORIENTATION
ORIENTATION: MID

## 2023-09-09 ASSESSMENT — PAIN DESCRIPTION - PAIN TYPE
TYPE: ACUTE PAIN;CHRONIC PAIN

## 2023-09-09 ASSESSMENT — PAIN DESCRIPTION - ONSET
ONSET: ON-GOING

## 2023-09-09 ASSESSMENT — PAIN DESCRIPTION - FREQUENCY
FREQUENCY: CONTINUOUS

## 2023-09-09 ASSESSMENT — PAIN DESCRIPTION - DESCRIPTORS
DESCRIPTORS: ACHING;DISCOMFORT

## 2023-09-09 ASSESSMENT — PAIN DESCRIPTION - LOCATION
LOCATION: CHEST

## 2023-09-09 NOTE — PROCEDURES
Asked by service for L chest pain management  s/p VATS and 4th rib fx 8/31/23. Informed consent obtained  for erector spinae plane block left side T 7 level:      Time out at 1511,  pt sitting / prep/  us guided   20 G stimuplex advanced   negative test dose 2 cc     using 0.5% ropivacaine with 6 mg of pf decadron 20 cc and 10 cc of saline divided doses of 5 cc increments   neg aspiration  pt.  Tolerated well vss  us image captured

## 2023-09-10 ENCOUNTER — APPOINTMENT (OUTPATIENT)
Dept: GENERAL RADIOLOGY | Age: 58
DRG: 163 | End: 2023-09-10
Attending: THORACIC SURGERY (CARDIOTHORACIC VASCULAR SURGERY)
Payer: MEDICARE

## 2023-09-10 LAB
ALBUMIN SERPL-MCNC: 3.4 G/DL (ref 3.4–5)
ANION GAP SERPL CALCULATED.3IONS-SCNC: 13 MMOL/L (ref 3–16)
BASE EXCESS BLDA CALC-SCNC: 7 MMOL/L (ref -3–3)
BASOPHILS # BLD: 0 K/UL (ref 0–0.2)
BASOPHILS NFR BLD: 0.3 %
BUN SERPL-MCNC: 15 MG/DL (ref 7–20)
CALCIUM SERPL-MCNC: 9.1 MG/DL (ref 8.3–10.6)
CHLORIDE SERPL-SCNC: 94 MMOL/L (ref 99–110)
CO2 BLDA-SCNC: 33 MMOL/L
CO2 SERPL-SCNC: 29 MMOL/L (ref 21–32)
CREAT SERPL-MCNC: 0.6 MG/DL (ref 0.8–1.3)
DEPRECATED RDW RBC AUTO: 14.9 % (ref 12.4–15.4)
EOSINOPHIL # BLD: 0.1 K/UL (ref 0–0.6)
EOSINOPHIL NFR BLD: 0.7 %
GFR SERPLBLD CREATININE-BSD FMLA CKD-EPI: >60 ML/MIN/{1.73_M2}
GLUCOSE SERPL-MCNC: 161 MG/DL (ref 70–99)
HCO3 BLDA-SCNC: 31.7 MMOL/L (ref 21–29)
HCT VFR BLD AUTO: 38.4 % (ref 40.5–52.5)
HGB BLD-MCNC: 12.7 G/DL (ref 13.5–17.5)
INR PPP: 3.64 (ref 0.84–1.16)
LYMPHOCYTES # BLD: 0.8 K/UL (ref 1–5.1)
LYMPHOCYTES NFR BLD: 7.7 %
MAGNESIUM SERPL-MCNC: 2.3 MG/DL (ref 1.8–2.4)
MCH RBC QN AUTO: 31.3 PG (ref 26–34)
MCHC RBC AUTO-ENTMCNC: 33.1 G/DL (ref 31–36)
MCV RBC AUTO: 94.7 FL (ref 80–100)
MONOCYTES # BLD: 0.6 K/UL (ref 0–1.3)
MONOCYTES NFR BLD: 6 %
NEUTROPHILS # BLD: 8.8 K/UL (ref 1.7–7.7)
NEUTROPHILS NFR BLD: 85.3 %
PCO2 BLDA: 47.9 MM HG (ref 35–45)
PERFORMED ON: ABNORMAL
PH BLDA: 7.43 [PH] (ref 7.35–7.45)
PHOSPHATE SERPL-MCNC: 3.6 MG/DL (ref 2.5–4.9)
PLATELET # BLD AUTO: 182 K/UL (ref 135–450)
PMV BLD AUTO: 8.7 FL (ref 5–10.5)
PO2 BLDA: 71.3 MM HG (ref 75–108)
POC SAMPLE TYPE: ABNORMAL
POTASSIUM SERPL-SCNC: 4 MMOL/L (ref 3.5–5.1)
PROTHROMBIN TIME: 35.9 SEC (ref 11.5–14.8)
RBC # BLD AUTO: 4.05 M/UL (ref 4.2–5.9)
SAO2 % BLDA: 94 % (ref 93–100)
SODIUM SERPL-SCNC: 136 MMOL/L (ref 136–145)
T4 FREE SERPL-MCNC: 1.4 NG/DL (ref 0.9–1.8)
TSH SERPL DL<=0.005 MIU/L-ACNC: 4.34 UIU/ML (ref 0.27–4.2)
WBC # BLD AUTO: 10.3 K/UL (ref 4–11)

## 2023-09-10 PROCEDURE — 6370000000 HC RX 637 (ALT 250 FOR IP)

## 2023-09-10 PROCEDURE — 6370000000 HC RX 637 (ALT 250 FOR IP): Performed by: STUDENT IN AN ORGANIZED HEALTH CARE EDUCATION/TRAINING PROGRAM

## 2023-09-10 PROCEDURE — 85025 COMPLETE CBC W/AUTO DIFF WBC: CPT

## 2023-09-10 PROCEDURE — 83735 ASSAY OF MAGNESIUM: CPT

## 2023-09-10 PROCEDURE — 6360000002 HC RX W HCPCS: Performed by: STUDENT IN AN ORGANIZED HEALTH CARE EDUCATION/TRAINING PROGRAM

## 2023-09-10 PROCEDURE — 85610 PROTHROMBIN TIME: CPT

## 2023-09-10 PROCEDURE — 94640 AIRWAY INHALATION TREATMENT: CPT

## 2023-09-10 PROCEDURE — 94669 MECHANICAL CHEST WALL OSCILL: CPT

## 2023-09-10 PROCEDURE — 2580000003 HC RX 258

## 2023-09-10 PROCEDURE — 71045 X-RAY EXAM CHEST 1 VIEW: CPT

## 2023-09-10 PROCEDURE — 82803 BLOOD GASES ANY COMBINATION: CPT

## 2023-09-10 PROCEDURE — 80069 RENAL FUNCTION PANEL: CPT

## 2023-09-10 PROCEDURE — 99233 SBSQ HOSP IP/OBS HIGH 50: CPT | Performed by: INTERNAL MEDICINE

## 2023-09-10 PROCEDURE — 2700000000 HC OXYGEN THERAPY PER DAY

## 2023-09-10 PROCEDURE — 2000000000 HC ICU R&B

## 2023-09-10 PROCEDURE — 6360000002 HC RX W HCPCS

## 2023-09-10 PROCEDURE — 94761 N-INVAS EAR/PLS OXIMETRY MLT: CPT

## 2023-09-10 PROCEDURE — 94150 VITAL CAPACITY TEST: CPT

## 2023-09-10 PROCEDURE — 99233 SBSQ HOSP IP/OBS HIGH 50: CPT | Performed by: NURSE PRACTITIONER

## 2023-09-10 RX ORDER — METHYLPREDNISOLONE SODIUM SUCCINATE 40 MG/ML
INJECTION, POWDER, LYOPHILIZED, FOR SOLUTION INTRAMUSCULAR; INTRAVENOUS
Status: DISPENSED
Start: 2023-09-10 | End: 2023-09-11

## 2023-09-10 RX ADMIN — SODIUM CHLORIDE, PRESERVATIVE FREE 10 ML: 5 INJECTION INTRAVENOUS at 09:00

## 2023-09-10 RX ADMIN — DOCUSATE SODIUM 100 MG: 100 CAPSULE, LIQUID FILLED ORAL at 08:41

## 2023-09-10 RX ADMIN — IPRATROPIUM BROMIDE AND ALBUTEROL SULFATE 1 DOSE: 2.5; .5 SOLUTION RESPIRATORY (INHALATION) at 20:21

## 2023-09-10 RX ADMIN — FAMOTIDINE 20 MG: 20 TABLET ORAL at 08:41

## 2023-09-10 RX ADMIN — SODIUM CHLORIDE, PRESERVATIVE FREE 5 ML: 5 INJECTION INTRAVENOUS at 20:31

## 2023-09-10 RX ADMIN — SODIUM CHLORIDE 30 MG/ML INHALATION SOLUTION 4 ML: 30 SOLUTION INHALANT at 20:21

## 2023-09-10 RX ADMIN — AMLODIPINE BESYLATE 5 MG: 5 TABLET ORAL at 08:41

## 2023-09-10 RX ADMIN — POLYETHYLENE GLYCOL 3350 17 G: 17 POWDER, FOR SOLUTION ORAL at 08:32

## 2023-09-10 RX ADMIN — METOPROLOL TARTRATE 50 MG: 50 TABLET, FILM COATED ORAL at 08:41

## 2023-09-10 RX ADMIN — OXYCODONE HYDROCHLORIDE 15 MG: 15 TABLET ORAL at 00:02

## 2023-09-10 RX ADMIN — LISINOPRIL 10 MG: 10 TABLET ORAL at 08:41

## 2023-09-10 RX ADMIN — OXYCODONE HYDROCHLORIDE 15 MG: 15 TABLET ORAL at 12:57

## 2023-09-10 RX ADMIN — ACETAMINOPHEN 1000 MG: 500 TABLET ORAL at 12:57

## 2023-09-10 RX ADMIN — PREGABALIN 150 MG: 150 CAPSULE ORAL at 08:41

## 2023-09-10 RX ADMIN — DIGOXIN 250 MCG: 125 TABLET ORAL at 08:41

## 2023-09-10 RX ADMIN — PREGABALIN 150 MG: 150 CAPSULE ORAL at 19:34

## 2023-09-10 RX ADMIN — OXYCODONE HYDROCHLORIDE 15 MG: 15 TABLET ORAL at 08:39

## 2023-09-10 RX ADMIN — ASPIRIN 81 MG: 81 TABLET, COATED ORAL at 08:41

## 2023-09-10 RX ADMIN — OXYCODONE HYDROCHLORIDE 15 MG: 15 TABLET ORAL at 17:52

## 2023-09-10 RX ADMIN — IPRATROPIUM BROMIDE AND ALBUTEROL SULFATE 1 DOSE: 2.5; .5 SOLUTION RESPIRATORY (INHALATION) at 16:10

## 2023-09-10 RX ADMIN — ROSUVASTATIN CALCIUM 20 MG: 20 TABLET, FILM COATED ORAL at 19:34

## 2023-09-10 RX ADMIN — IPRATROPIUM BROMIDE AND ALBUTEROL SULFATE 1 DOSE: 2.5; .5 SOLUTION RESPIRATORY (INHALATION) at 11:33

## 2023-09-10 RX ADMIN — SODIUM CHLORIDE 30 MG/ML INHALATION SOLUTION 4 ML: 30 SOLUTION INHALANT at 08:15

## 2023-09-10 RX ADMIN — ACETAMINOPHEN 1000 MG: 500 TABLET ORAL at 06:26

## 2023-09-10 RX ADMIN — METHOCARBAMOL TABLETS 1000 MG: 500 TABLET, COATED ORAL at 15:55

## 2023-09-10 RX ADMIN — FUROSEMIDE 20 MG: 20 TABLET ORAL at 08:41

## 2023-09-10 RX ADMIN — Medication 5 MG: at 19:34

## 2023-09-10 RX ADMIN — SODIUM CHLORIDE, PRESERVATIVE FREE 10 ML: 5 INJECTION INTRAVENOUS at 14:48

## 2023-09-10 RX ADMIN — IPRATROPIUM BROMIDE AND ALBUTEROL SULFATE 1 DOSE: 2.5; .5 SOLUTION RESPIRATORY (INHALATION) at 08:15

## 2023-09-10 RX ADMIN — HYDROMORPHONE HYDROCHLORIDE 0.25 MG: 1 INJECTION, SOLUTION INTRAMUSCULAR; INTRAVENOUS; SUBCUTANEOUS at 19:54

## 2023-09-10 RX ADMIN — FAMOTIDINE 20 MG: 20 TABLET ORAL at 19:34

## 2023-09-10 RX ADMIN — HYDROMORPHONE HYDROCHLORIDE 0.25 MG: 1 INJECTION, SOLUTION INTRAMUSCULAR; INTRAVENOUS; SUBCUTANEOUS at 03:17

## 2023-09-10 RX ADMIN — METHOCARBAMOL TABLETS 1000 MG: 500 TABLET, COATED ORAL at 12:58

## 2023-09-10 RX ADMIN — METHOCARBAMOL TABLETS 1000 MG: 500 TABLET, COATED ORAL at 19:43

## 2023-09-10 RX ADMIN — WATER 40 MG: 1 INJECTION INTRAMUSCULAR; INTRAVENOUS; SUBCUTANEOUS at 14:48

## 2023-09-10 RX ADMIN — ACETAMINOPHEN 1000 MG: 500 TABLET ORAL at 00:01

## 2023-09-10 RX ADMIN — METHOCARBAMOL TABLETS 1000 MG: 500 TABLET, COATED ORAL at 08:41

## 2023-09-10 RX ADMIN — PREGABALIN 150 MG: 150 CAPSULE ORAL at 15:55

## 2023-09-10 ASSESSMENT — PAIN SCALES - GENERAL
PAINLEVEL_OUTOF10: 7
PAINLEVEL_OUTOF10: 4
PAINLEVEL_OUTOF10: 0
PAINLEVEL_OUTOF10: 7
PAINLEVEL_OUTOF10: 0
PAINLEVEL_OUTOF10: 0
PAINLEVEL_OUTOF10: 5
PAINLEVEL_OUTOF10: 8

## 2023-09-10 ASSESSMENT — PAIN DESCRIPTION - LOCATION
LOCATION: BACK;LEG
LOCATION: GENERALIZED

## 2023-09-10 ASSESSMENT — PAIN DESCRIPTION - DESCRIPTORS: DESCRIPTORS: ACHING;DISCOMFORT

## 2023-09-10 ASSESSMENT — PAIN DESCRIPTION - ORIENTATION: ORIENTATION: MID;RIGHT;LEFT

## 2023-09-10 ASSESSMENT — PAIN DESCRIPTION - PAIN TYPE: TYPE: CHRONIC PAIN

## 2023-09-10 ASSESSMENT — PAIN DESCRIPTION - FREQUENCY: FREQUENCY: CONTINUOUS

## 2023-09-10 ASSESSMENT — PAIN - FUNCTIONAL ASSESSMENT: PAIN_FUNCTIONAL_ASSESSMENT: PREVENTS OR INTERFERES SOME ACTIVE ACTIVITIES AND ADLS

## 2023-09-10 ASSESSMENT — PAIN DESCRIPTION - ONSET: ONSET: ON-GOING

## 2023-09-11 ENCOUNTER — APPOINTMENT (OUTPATIENT)
Dept: GENERAL RADIOLOGY | Age: 58
DRG: 163 | End: 2023-09-11
Attending: THORACIC SURGERY (CARDIOTHORACIC VASCULAR SURGERY)
Payer: MEDICARE

## 2023-09-11 LAB
ALBUMIN SERPL-MCNC: 3.6 G/DL (ref 3.4–5)
ANION GAP SERPL CALCULATED.3IONS-SCNC: 9 MMOL/L (ref 3–16)
BASOPHILS # BLD: 0 K/UL (ref 0–0.2)
BASOPHILS NFR BLD: 0 %
BUN SERPL-MCNC: 16 MG/DL (ref 7–20)
CALCIUM SERPL-MCNC: 9.7 MG/DL (ref 8.3–10.6)
CHLORIDE SERPL-SCNC: 91 MMOL/L (ref 99–110)
CO2 SERPL-SCNC: 33 MMOL/L (ref 21–32)
CREAT SERPL-MCNC: 0.8 MG/DL (ref 0.8–1.3)
DEPRECATED RDW RBC AUTO: 15 % (ref 12.4–15.4)
DIGOXIN SERPL-MCNC: 0.9 NG/ML (ref 0.8–2)
EOSINOPHIL # BLD: 0.6 K/UL (ref 0–0.6)
EOSINOPHIL NFR BLD: 4 %
GFR SERPLBLD CREATININE-BSD FMLA CKD-EPI: >60 ML/MIN/{1.73_M2}
GLUCOSE SERPL-MCNC: 120 MG/DL (ref 70–99)
HCT VFR BLD AUTO: 40.8 % (ref 40.5–52.5)
HGB BLD-MCNC: 13.6 G/DL (ref 13.5–17.5)
INR PPP: 4.29 (ref 0.84–1.16)
LYMPHOCYTES # BLD: 2.2 K/UL (ref 1–5.1)
LYMPHOCYTES NFR BLD: 14 %
MAGNESIUM SERPL-MCNC: 2.3 MG/DL (ref 1.8–2.4)
MCH RBC QN AUTO: 31 PG (ref 26–34)
MCHC RBC AUTO-ENTMCNC: 33.2 G/DL (ref 31–36)
MCV RBC AUTO: 93.1 FL (ref 80–100)
METAMYELOCYTES NFR BLD MANUAL: 1 %
MONOCYTES # BLD: 0.8 K/UL (ref 0–1.3)
MONOCYTES NFR BLD: 5 %
NEUTROPHILS # BLD: 11.9 K/UL (ref 1.7–7.7)
NEUTROPHILS NFR BLD: 76 %
PATH INTERP BLD-IMP: YES
PHOSPHATE SERPL-MCNC: 3.6 MG/DL (ref 2.5–4.9)
PLATELET # BLD AUTO: 213 K/UL (ref 135–450)
PLATELET BLD QL SMEAR: ADEQUATE
PMV BLD AUTO: 8.7 FL (ref 5–10.5)
POTASSIUM SERPL-SCNC: 4 MMOL/L (ref 3.5–5.1)
PROTHROMBIN TIME: 40.8 SEC (ref 11.5–14.8)
RBC # BLD AUTO: 4.38 M/UL (ref 4.2–5.9)
RBC MORPH BLD: NORMAL
SLIDE REVIEW: ABNORMAL
SODIUM SERPL-SCNC: 133 MMOL/L (ref 136–145)
WBC # BLD AUTO: 15.4 K/UL (ref 4–11)

## 2023-09-11 PROCEDURE — 97530 THERAPEUTIC ACTIVITIES: CPT

## 2023-09-11 PROCEDURE — 6360000002 HC RX W HCPCS

## 2023-09-11 PROCEDURE — 6370000000 HC RX 637 (ALT 250 FOR IP)

## 2023-09-11 PROCEDURE — 80069 RENAL FUNCTION PANEL: CPT

## 2023-09-11 PROCEDURE — 2580000003 HC RX 258: Performed by: THORACIC SURGERY (CARDIOTHORACIC VASCULAR SURGERY)

## 2023-09-11 PROCEDURE — 80162 ASSAY OF DIGOXIN TOTAL: CPT

## 2023-09-11 PROCEDURE — 94669 MECHANICAL CHEST WALL OSCILL: CPT

## 2023-09-11 PROCEDURE — 83735 ASSAY OF MAGNESIUM: CPT

## 2023-09-11 PROCEDURE — 2580000003 HC RX 258

## 2023-09-11 PROCEDURE — 85025 COMPLETE CBC W/AUTO DIFF WBC: CPT

## 2023-09-11 PROCEDURE — 85610 PROTHROMBIN TIME: CPT

## 2023-09-11 PROCEDURE — 99233 SBSQ HOSP IP/OBS HIGH 50: CPT | Performed by: NURSE PRACTITIONER

## 2023-09-11 PROCEDURE — 97116 GAIT TRAINING THERAPY: CPT

## 2023-09-11 PROCEDURE — 71045 X-RAY EXAM CHEST 1 VIEW: CPT

## 2023-09-11 PROCEDURE — 2000000000 HC ICU R&B

## 2023-09-11 PROCEDURE — 6370000000 HC RX 637 (ALT 250 FOR IP): Performed by: STUDENT IN AN ORGANIZED HEALTH CARE EDUCATION/TRAINING PROGRAM

## 2023-09-11 PROCEDURE — 2700000000 HC OXYGEN THERAPY PER DAY

## 2023-09-11 PROCEDURE — 36415 COLL VENOUS BLD VENIPUNCTURE: CPT

## 2023-09-11 PROCEDURE — 99233 SBSQ HOSP IP/OBS HIGH 50: CPT | Performed by: INTERNAL MEDICINE

## 2023-09-11 PROCEDURE — 94761 N-INVAS EAR/PLS OXIMETRY MLT: CPT

## 2023-09-11 PROCEDURE — 94640 AIRWAY INHALATION TREATMENT: CPT

## 2023-09-11 RX ADMIN — METOPROLOL TARTRATE 50 MG: 50 TABLET, FILM COATED ORAL at 21:41

## 2023-09-11 RX ADMIN — Medication 5 MG: at 21:42

## 2023-09-11 RX ADMIN — ASPIRIN 81 MG: 81 TABLET, COATED ORAL at 08:11

## 2023-09-11 RX ADMIN — METHOCARBAMOL TABLETS 1000 MG: 500 TABLET, COATED ORAL at 17:31

## 2023-09-11 RX ADMIN — SODIUM CHLORIDE, PRESERVATIVE FREE 10 ML: 5 INJECTION INTRAVENOUS at 20:00

## 2023-09-11 RX ADMIN — SODIUM CHLORIDE 30 MG/ML INHALATION SOLUTION 4 ML: 30 SOLUTION INHALANT at 09:26

## 2023-09-11 RX ADMIN — IPRATROPIUM BROMIDE AND ALBUTEROL SULFATE 1 DOSE: 2.5; .5 SOLUTION RESPIRATORY (INHALATION) at 16:19

## 2023-09-11 RX ADMIN — SODIUM CHLORIDE, PRESERVATIVE FREE 10 ML: 5 INJECTION INTRAVENOUS at 08:13

## 2023-09-11 RX ADMIN — ACETAMINOPHEN 1000 MG: 500 TABLET ORAL at 06:05

## 2023-09-11 RX ADMIN — METOPROLOL TARTRATE 50 MG: 50 TABLET, FILM COATED ORAL at 08:11

## 2023-09-11 RX ADMIN — PREGABALIN 150 MG: 150 CAPSULE ORAL at 21:37

## 2023-09-11 RX ADMIN — METHOCARBAMOL TABLETS 1000 MG: 500 TABLET, COATED ORAL at 08:11

## 2023-09-11 RX ADMIN — IPRATROPIUM BROMIDE AND ALBUTEROL SULFATE 1 DOSE: 2.5; .5 SOLUTION RESPIRATORY (INHALATION) at 04:06

## 2023-09-11 RX ADMIN — WATER 40 MG: 1 INJECTION INTRAMUSCULAR; INTRAVENOUS; SUBCUTANEOUS at 08:12

## 2023-09-11 RX ADMIN — SODIUM CHLORIDE 30 MG/ML INHALATION SOLUTION 4 ML: 30 SOLUTION INHALANT at 19:34

## 2023-09-11 RX ADMIN — FUROSEMIDE 20 MG: 20 TABLET ORAL at 08:11

## 2023-09-11 RX ADMIN — IPRATROPIUM BROMIDE AND ALBUTEROL SULFATE 1 DOSE: 2.5; .5 SOLUTION RESPIRATORY (INHALATION) at 09:26

## 2023-09-11 RX ADMIN — PREGABALIN 150 MG: 150 CAPSULE ORAL at 08:11

## 2023-09-11 RX ADMIN — IPRATROPIUM BROMIDE AND ALBUTEROL SULFATE 1 DOSE: 2.5; .5 SOLUTION RESPIRATORY (INHALATION) at 13:16

## 2023-09-11 RX ADMIN — METHOCARBAMOL TABLETS 1000 MG: 500 TABLET, COATED ORAL at 13:01

## 2023-09-11 RX ADMIN — PREGABALIN 150 MG: 150 CAPSULE ORAL at 14:50

## 2023-09-11 RX ADMIN — OXYCODONE HYDROCHLORIDE 15 MG: 15 TABLET ORAL at 01:19

## 2023-09-11 RX ADMIN — FAMOTIDINE 20 MG: 20 TABLET ORAL at 08:11

## 2023-09-11 RX ADMIN — ACETAMINOPHEN 1000 MG: 500 TABLET ORAL at 17:31

## 2023-09-11 RX ADMIN — OXYCODONE HYDROCHLORIDE 15 MG: 15 TABLET ORAL at 14:49

## 2023-09-11 RX ADMIN — ACETAMINOPHEN 1000 MG: 500 TABLET ORAL at 13:01

## 2023-09-11 RX ADMIN — IPRATROPIUM BROMIDE AND ALBUTEROL SULFATE 1 DOSE: 2.5; .5 SOLUTION RESPIRATORY (INHALATION) at 19:34

## 2023-09-11 RX ADMIN — DOCUSATE SODIUM 100 MG: 100 CAPSULE, LIQUID FILLED ORAL at 08:11

## 2023-09-11 RX ADMIN — DIGOXIN 250 MCG: 125 TABLET ORAL at 08:11

## 2023-09-11 RX ADMIN — ROSUVASTATIN CALCIUM 20 MG: 20 TABLET, FILM COATED ORAL at 21:41

## 2023-09-11 RX ADMIN — METHOCARBAMOL TABLETS 1000 MG: 500 TABLET, COATED ORAL at 21:37

## 2023-09-11 RX ADMIN — LISINOPRIL 10 MG: 10 TABLET ORAL at 08:11

## 2023-09-11 RX ADMIN — AMLODIPINE BESYLATE 5 MG: 5 TABLET ORAL at 08:11

## 2023-09-11 RX ADMIN — BENZOCAINE 6 MG-MENTHOL 10 MG LOZENGES 1 LOZENGE: at 06:05

## 2023-09-11 RX ADMIN — FAMOTIDINE 20 MG: 20 TABLET ORAL at 21:42

## 2023-09-11 ASSESSMENT — PAIN SCALES - GENERAL
PAINLEVEL_OUTOF10: 8
PAINLEVEL_OUTOF10: 5
PAINLEVEL_OUTOF10: 5

## 2023-09-11 ASSESSMENT — PAIN DESCRIPTION - LOCATION
LOCATION: BACK

## 2023-09-11 ASSESSMENT — PAIN DESCRIPTION - DESCRIPTORS
DESCRIPTORS: DISCOMFORT
DESCRIPTORS: ACHING

## 2023-09-11 ASSESSMENT — PAIN DESCRIPTION - ORIENTATION: ORIENTATION: MID

## 2023-09-12 LAB
ALBUMIN SERPL-MCNC: 3.2 G/DL (ref 3.4–5)
ANION GAP SERPL CALCULATED.3IONS-SCNC: 13 MMOL/L (ref 3–16)
BASOPHILS # BLD: 0 K/UL (ref 0–0.2)
BASOPHILS NFR BLD: 0.3 %
BUN SERPL-MCNC: 16 MG/DL (ref 7–20)
CALCIUM SERPL-MCNC: 9.3 MG/DL (ref 8.3–10.6)
CHLORIDE SERPL-SCNC: 92 MMOL/L (ref 99–110)
CO2 SERPL-SCNC: 30 MMOL/L (ref 21–32)
CREAT SERPL-MCNC: 0.7 MG/DL (ref 0.8–1.3)
DEPRECATED RDW RBC AUTO: 14.9 % (ref 12.4–15.4)
EOSINOPHIL # BLD: 0.5 K/UL (ref 0–0.6)
EOSINOPHIL NFR BLD: 3.1 %
GFR SERPLBLD CREATININE-BSD FMLA CKD-EPI: >60 ML/MIN/{1.73_M2}
GLUCOSE SERPL-MCNC: 147 MG/DL (ref 70–99)
HCT VFR BLD AUTO: 40.3 % (ref 40.5–52.5)
HGB BLD-MCNC: 13.4 G/DL (ref 13.5–17.5)
INR PPP: 3.65 (ref 0.84–1.16)
LYMPHOCYTES # BLD: 2.1 K/UL (ref 1–5.1)
LYMPHOCYTES NFR BLD: 13.5 %
MAGNESIUM SERPL-MCNC: 2.4 MG/DL (ref 1.8–2.4)
MCH RBC QN AUTO: 31.1 PG (ref 26–34)
MCHC RBC AUTO-ENTMCNC: 33.3 G/DL (ref 31–36)
MCV RBC AUTO: 93.5 FL (ref 80–100)
MONOCYTES # BLD: 1.2 K/UL (ref 0–1.3)
MONOCYTES NFR BLD: 8 %
NEUTROPHILS # BLD: 11.5 K/UL (ref 1.7–7.7)
NEUTROPHILS NFR BLD: 75.1 %
PHOSPHATE SERPL-MCNC: 3.6 MG/DL (ref 2.5–4.9)
PLATELET # BLD AUTO: 226 K/UL (ref 135–450)
PMV BLD AUTO: 8.9 FL (ref 5–10.5)
POTASSIUM SERPL-SCNC: 3.9 MMOL/L (ref 3.5–5.1)
PROTHROMBIN TIME: 36 SEC (ref 11.5–14.8)
RBC # BLD AUTO: 4.31 M/UL (ref 4.2–5.9)
SODIUM SERPL-SCNC: 135 MMOL/L (ref 136–145)
WBC # BLD AUTO: 15.3 K/UL (ref 4–11)

## 2023-09-12 PROCEDURE — 6370000000 HC RX 637 (ALT 250 FOR IP)

## 2023-09-12 PROCEDURE — 2580000003 HC RX 258: Performed by: THORACIC SURGERY (CARDIOTHORACIC VASCULAR SURGERY)

## 2023-09-12 PROCEDURE — 94640 AIRWAY INHALATION TREATMENT: CPT

## 2023-09-12 PROCEDURE — 6370000000 HC RX 637 (ALT 250 FOR IP): Performed by: STUDENT IN AN ORGANIZED HEALTH CARE EDUCATION/TRAINING PROGRAM

## 2023-09-12 PROCEDURE — 6360000002 HC RX W HCPCS

## 2023-09-12 PROCEDURE — 36415 COLL VENOUS BLD VENIPUNCTURE: CPT

## 2023-09-12 PROCEDURE — 94761 N-INVAS EAR/PLS OXIMETRY MLT: CPT

## 2023-09-12 PROCEDURE — 6360000002 HC RX W HCPCS: Performed by: STUDENT IN AN ORGANIZED HEALTH CARE EDUCATION/TRAINING PROGRAM

## 2023-09-12 PROCEDURE — 85025 COMPLETE CBC W/AUTO DIFF WBC: CPT

## 2023-09-12 PROCEDURE — 2580000003 HC RX 258

## 2023-09-12 PROCEDURE — 94669 MECHANICAL CHEST WALL OSCILL: CPT

## 2023-09-12 PROCEDURE — 85610 PROTHROMBIN TIME: CPT

## 2023-09-12 PROCEDURE — 2700000000 HC OXYGEN THERAPY PER DAY

## 2023-09-12 PROCEDURE — 83735 ASSAY OF MAGNESIUM: CPT

## 2023-09-12 PROCEDURE — 80069 RENAL FUNCTION PANEL: CPT

## 2023-09-12 PROCEDURE — 94150 VITAL CAPACITY TEST: CPT

## 2023-09-12 PROCEDURE — 2060000000 HC ICU INTERMEDIATE R&B

## 2023-09-12 PROCEDURE — 99233 SBSQ HOSP IP/OBS HIGH 50: CPT | Performed by: NURSE PRACTITIONER

## 2023-09-12 PROCEDURE — 99232 SBSQ HOSP IP/OBS MODERATE 35: CPT | Performed by: INTERNAL MEDICINE

## 2023-09-12 RX ORDER — FUROSEMIDE 10 MG/ML
20 INJECTION INTRAMUSCULAR; INTRAVENOUS ONCE
Status: COMPLETED | OUTPATIENT
Start: 2023-09-12 | End: 2023-09-12

## 2023-09-12 RX ORDER — DIMETHICONE, CAMPHOR (SYNTHETIC), MENTHOL, AND PHENOL 1.1; .5; .625; .5 G/100G; G/100G; G/100G; G/100G
OINTMENT TOPICAL PRN
Status: DISCONTINUED | OUTPATIENT
Start: 2023-09-12 | End: 2023-09-17 | Stop reason: HOSPADM

## 2023-09-12 RX ORDER — POTASSIUM CHLORIDE 20 MEQ/1
20 TABLET, EXTENDED RELEASE ORAL ONCE
Status: COMPLETED | OUTPATIENT
Start: 2023-09-12 | End: 2023-09-12

## 2023-09-12 RX ORDER — PREDNISONE 20 MG/1
40 TABLET ORAL DAILY
Status: COMPLETED | OUTPATIENT
Start: 2023-09-13 | End: 2023-09-14

## 2023-09-12 RX ADMIN — METHOCARBAMOL TABLETS 1000 MG: 500 TABLET, COATED ORAL at 08:53

## 2023-09-12 RX ADMIN — WATER 40 MG: 1 INJECTION INTRAMUSCULAR; INTRAVENOUS; SUBCUTANEOUS at 08:54

## 2023-09-12 RX ADMIN — SODIUM CHLORIDE, PRESERVATIVE FREE 10 ML: 5 INJECTION INTRAVENOUS at 08:54

## 2023-09-12 RX ADMIN — PREGABALIN 150 MG: 150 CAPSULE ORAL at 15:40

## 2023-09-12 RX ADMIN — LISINOPRIL 10 MG: 10 TABLET ORAL at 08:54

## 2023-09-12 RX ADMIN — ACETAMINOPHEN 1000 MG: 500 TABLET ORAL at 13:56

## 2023-09-12 RX ADMIN — OXYCODONE HYDROCHLORIDE 15 MG: 15 TABLET ORAL at 01:45

## 2023-09-12 RX ADMIN — OXYCODONE HYDROCHLORIDE 15 MG: 15 TABLET ORAL at 10:58

## 2023-09-12 RX ADMIN — IPRATROPIUM BROMIDE AND ALBUTEROL SULFATE 1 DOSE: 2.5; .5 SOLUTION RESPIRATORY (INHALATION) at 16:31

## 2023-09-12 RX ADMIN — FAMOTIDINE 20 MG: 20 TABLET ORAL at 21:22

## 2023-09-12 RX ADMIN — FUROSEMIDE 20 MG: 20 TABLET ORAL at 08:53

## 2023-09-12 RX ADMIN — OXYCODONE HYDROCHLORIDE 15 MG: 15 TABLET ORAL at 18:21

## 2023-09-12 RX ADMIN — AMLODIPINE BESYLATE 5 MG: 5 TABLET ORAL at 08:54

## 2023-09-12 RX ADMIN — ACETAMINOPHEN 1000 MG: 500 TABLET ORAL at 00:34

## 2023-09-12 RX ADMIN — IPRATROPIUM BROMIDE AND ALBUTEROL SULFATE 1 DOSE: 2.5; .5 SOLUTION RESPIRATORY (INHALATION) at 20:29

## 2023-09-12 RX ADMIN — METOPROLOL TARTRATE 50 MG: 50 TABLET, FILM COATED ORAL at 08:54

## 2023-09-12 RX ADMIN — SODIUM CHLORIDE, PRESERVATIVE FREE 10 ML: 5 INJECTION INTRAVENOUS at 21:27

## 2023-09-12 RX ADMIN — DIGOXIN 250 MCG: 125 TABLET ORAL at 08:54

## 2023-09-12 RX ADMIN — PREGABALIN 150 MG: 150 CAPSULE ORAL at 08:53

## 2023-09-12 RX ADMIN — DOCUSATE SODIUM 100 MG: 100 CAPSULE, LIQUID FILLED ORAL at 08:54

## 2023-09-12 RX ADMIN — OXYCODONE HYDROCHLORIDE 15 MG: 15 TABLET ORAL at 23:45

## 2023-09-12 RX ADMIN — PREGABALIN 150 MG: 150 CAPSULE ORAL at 21:22

## 2023-09-12 RX ADMIN — METHOCARBAMOL TABLETS 1000 MG: 500 TABLET, COATED ORAL at 21:22

## 2023-09-12 RX ADMIN — IPRATROPIUM BROMIDE AND ALBUTEROL SULFATE 1 DOSE: 2.5; .5 SOLUTION RESPIRATORY (INHALATION) at 13:06

## 2023-09-12 RX ADMIN — POTASSIUM CHLORIDE 20 MEQ: 1500 TABLET, EXTENDED RELEASE ORAL at 10:58

## 2023-09-12 RX ADMIN — METHOCARBAMOL TABLETS 1000 MG: 500 TABLET, COATED ORAL at 13:56

## 2023-09-12 RX ADMIN — ACETAMINOPHEN 1000 MG: 500 TABLET ORAL at 18:21

## 2023-09-12 RX ADMIN — ACETAMINOPHEN 1000 MG: 500 TABLET ORAL at 06:56

## 2023-09-12 RX ADMIN — Medication 5 MG: at 21:22

## 2023-09-12 RX ADMIN — METHOCARBAMOL TABLETS 1000 MG: 500 TABLET, COATED ORAL at 18:20

## 2023-09-12 RX ADMIN — ROSUVASTATIN CALCIUM 20 MG: 20 TABLET, FILM COATED ORAL at 21:22

## 2023-09-12 RX ADMIN — IPRATROPIUM BROMIDE AND ALBUTEROL SULFATE 1 DOSE: 2.5; .5 SOLUTION RESPIRATORY (INHALATION) at 07:59

## 2023-09-12 RX ADMIN — FAMOTIDINE 20 MG: 20 TABLET ORAL at 08:54

## 2023-09-12 RX ADMIN — SODIUM CHLORIDE 30 MG/ML INHALATION SOLUTION 4 ML: 30 SOLUTION INHALANT at 07:59

## 2023-09-12 RX ADMIN — SODIUM CHLORIDE 30 MG/ML INHALATION SOLUTION 4 ML: 30 SOLUTION INHALANT at 20:31

## 2023-09-12 RX ADMIN — ASPIRIN 81 MG: 81 TABLET, COATED ORAL at 08:53

## 2023-09-12 RX ADMIN — FUROSEMIDE 20 MG: 10 INJECTION, SOLUTION INTRAMUSCULAR; INTRAVENOUS at 18:20

## 2023-09-12 ASSESSMENT — PAIN DESCRIPTION - ORIENTATION
ORIENTATION: LOWER
ORIENTATION: LEFT
ORIENTATION: LEFT
ORIENTATION: MID
ORIENTATION: LOWER

## 2023-09-12 ASSESSMENT — PAIN DESCRIPTION - LOCATION
LOCATION: BACK
LOCATION: CHEST
LOCATION: CHEST;RIB CAGE
LOCATION: NECK;BACK
LOCATION: NECK;BACK
LOCATION: BACK

## 2023-09-12 ASSESSMENT — PAIN DESCRIPTION - FREQUENCY
FREQUENCY: CONTINUOUS

## 2023-09-12 ASSESSMENT — PAIN DESCRIPTION - DESCRIPTORS
DESCRIPTORS: ACHING
DESCRIPTORS: STABBING
DESCRIPTORS: DISCOMFORT
DESCRIPTORS: SHARP
DESCRIPTORS: STABBING
DESCRIPTORS: DISCOMFORT

## 2023-09-12 ASSESSMENT — PAIN DESCRIPTION - ONSET
ONSET: ON-GOING
ONSET: ON-GOING

## 2023-09-12 ASSESSMENT — PAIN DESCRIPTION - PAIN TYPE
TYPE: ACUTE PAIN;CHRONIC PAIN
TYPE: ACUTE PAIN
TYPE: ACUTE PAIN;CHRONIC PAIN

## 2023-09-12 ASSESSMENT — PAIN - FUNCTIONAL ASSESSMENT
PAIN_FUNCTIONAL_ASSESSMENT: PREVENTS OR INTERFERES SOME ACTIVE ACTIVITIES AND ADLS
PAIN_FUNCTIONAL_ASSESSMENT: PREVENTS OR INTERFERES SOME ACTIVE ACTIVITIES AND ADLS
PAIN_FUNCTIONAL_ASSESSMENT: ACTIVITIES ARE NOT PREVENTED
PAIN_FUNCTIONAL_ASSESSMENT: PREVENTS OR INTERFERES SOME ACTIVE ACTIVITIES AND ADLS
PAIN_FUNCTIONAL_ASSESSMENT: ACTIVITIES ARE NOT PREVENTED

## 2023-09-12 ASSESSMENT — PAIN SCALES - GENERAL
PAINLEVEL_OUTOF10: 8
PAINLEVEL_OUTOF10: 7
PAINLEVEL_OUTOF10: 5
PAINLEVEL_OUTOF10: 2
PAINLEVEL_OUTOF10: 7
PAINLEVEL_OUTOF10: 8
PAINLEVEL_OUTOF10: 3
PAINLEVEL_OUTOF10: 2
PAINLEVEL_OUTOF10: 10

## 2023-09-12 ASSESSMENT — PAIN SCALES - WONG BAKER: WONGBAKER_NUMERICALRESPONSE: 0

## 2023-09-12 NOTE — CARE COORDINATION
Case management continues to follow for discharge planning. The chart was reviewed. Mr. Braden Serrano worked with therapy. HHC recommended at discharge. [facility] referral.    PT AM-PAC: 25 / 24 per last evaluation on: 9/11    OT AM-PAC: 19 / 24 per last evaluation on: 9/11    DME Needs for discharge: walker    Discharge Plan: The plan is to return home with [facility]. COVID Result:    Lab Results   Component Value Date/Time    COVID19 Not Detected 09/06/2023 09:00 AM    COVID19 NOT DETECTED 12/21/2020 08:35 AM       Transportation PLAN for discharge: family    Potential assistance Purchasing Medications: Potential Assistance Purchasing Medications: No  Does Patient want to participate in local refill/ meds to beds program?: Yes    Current barriers to discharge: O2 requirements fluctuate. 6-10L    Britt Brownlee and his family were provided with choice of provider; he and his family are in agreement with the discharge plan.     Emergency Contacts:  Extended Emergency Contact Information  Primary Emergency Contact: [address] [place] [address] Phone: [phone]  Mobile Phone: [phone]  Relation: Child  Secondary Emergency Contact: shola rowley Phone: [phone]  Mobile Phone: [phone]  Relation: Brother/Sister      Nella Vera RN  [facility]  Case Management Department

## 2023-09-13 PROBLEM — E44.1 MILD MALNUTRITION (HCC): Status: ACTIVE | Noted: 2023-09-13

## 2023-09-13 LAB
ALBUMIN SERPL-MCNC: 3.2 G/DL (ref 3.4–5)
ANION GAP SERPL CALCULATED.3IONS-SCNC: 10 MMOL/L (ref 3–16)
BASOPHILS # BLD: 0.1 K/UL (ref 0–0.2)
BASOPHILS NFR BLD: 1 %
BUN SERPL-MCNC: 16 MG/DL (ref 7–20)
CALCIUM SERPL-MCNC: 9 MG/DL (ref 8.3–10.6)
CHLORIDE SERPL-SCNC: 90 MMOL/L (ref 99–110)
CO2 SERPL-SCNC: 33 MMOL/L (ref 21–32)
CREAT SERPL-MCNC: 0.7 MG/DL (ref 0.8–1.3)
DEPRECATED RDW RBC AUTO: 15.2 % (ref 12.4–15.4)
EOSINOPHIL # BLD: 0.2 K/UL (ref 0–0.6)
EOSINOPHIL NFR BLD: 1.8 %
GFR SERPLBLD CREATININE-BSD FMLA CKD-EPI: >60 ML/MIN/{1.73_M2}
GLUCOSE BLD-MCNC: 193 MG/DL (ref 70–99)
GLUCOSE SERPL-MCNC: 164 MG/DL (ref 70–99)
HCT VFR BLD AUTO: 39.4 % (ref 40.5–52.5)
HGB BLD-MCNC: 13 G/DL (ref 13.5–17.5)
INR PPP: 3.43 (ref 0.84–1.16)
LYMPHOCYTES # BLD: 2 K/UL (ref 1–5.1)
LYMPHOCYTES NFR BLD: 14.6 %
MAGNESIUM SERPL-MCNC: 2.2 MG/DL (ref 1.8–2.4)
MCH RBC QN AUTO: 30.6 PG (ref 26–34)
MCHC RBC AUTO-ENTMCNC: 32.9 G/DL (ref 31–36)
MCV RBC AUTO: 93 FL (ref 80–100)
MONOCYTES # BLD: 1.1 K/UL (ref 0–1.3)
MONOCYTES NFR BLD: 7.9 %
NEUTROPHILS # BLD: 10 K/UL (ref 1.7–7.7)
NEUTROPHILS NFR BLD: 74.7 %
PERFORMED ON: ABNORMAL
PHOSPHATE SERPL-MCNC: 3.4 MG/DL (ref 2.5–4.9)
PLATELET # BLD AUTO: 245 K/UL (ref 135–450)
PMV BLD AUTO: 8.7 FL (ref 5–10.5)
POTASSIUM SERPL-SCNC: 3.9 MMOL/L (ref 3.5–5.1)
PROTHROMBIN TIME: 34.3 SEC (ref 11.5–14.8)
RBC # BLD AUTO: 4.24 M/UL (ref 4.2–5.9)
SODIUM SERPL-SCNC: 133 MMOL/L (ref 136–145)
WBC # BLD AUTO: 13.4 K/UL (ref 4–11)

## 2023-09-13 PROCEDURE — 97116 GAIT TRAINING THERAPY: CPT

## 2023-09-13 PROCEDURE — 2580000003 HC RX 258: Performed by: THORACIC SURGERY (CARDIOTHORACIC VASCULAR SURGERY)

## 2023-09-13 PROCEDURE — 97530 THERAPEUTIC ACTIVITIES: CPT

## 2023-09-13 PROCEDURE — 6370000000 HC RX 637 (ALT 250 FOR IP)

## 2023-09-13 PROCEDURE — 6370000000 HC RX 637 (ALT 250 FOR IP): Performed by: STUDENT IN AN ORGANIZED HEALTH CARE EDUCATION/TRAINING PROGRAM

## 2023-09-13 PROCEDURE — 94761 N-INVAS EAR/PLS OXIMETRY MLT: CPT

## 2023-09-13 PROCEDURE — 85025 COMPLETE CBC W/AUTO DIFF WBC: CPT

## 2023-09-13 PROCEDURE — 2700000000 HC OXYGEN THERAPY PER DAY

## 2023-09-13 PROCEDURE — 99233 SBSQ HOSP IP/OBS HIGH 50: CPT | Performed by: INTERNAL MEDICINE

## 2023-09-13 PROCEDURE — 83735 ASSAY OF MAGNESIUM: CPT

## 2023-09-13 PROCEDURE — 94669 MECHANICAL CHEST WALL OSCILL: CPT

## 2023-09-13 PROCEDURE — 97535 SELF CARE MNGMENT TRAINING: CPT

## 2023-09-13 PROCEDURE — 80069 RENAL FUNCTION PANEL: CPT

## 2023-09-13 PROCEDURE — 36415 COLL VENOUS BLD VENIPUNCTURE: CPT

## 2023-09-13 PROCEDURE — 94150 VITAL CAPACITY TEST: CPT

## 2023-09-13 PROCEDURE — 94640 AIRWAY INHALATION TREATMENT: CPT

## 2023-09-13 PROCEDURE — 94799 UNLISTED PULMONARY SVC/PX: CPT

## 2023-09-13 PROCEDURE — 85610 PROTHROMBIN TIME: CPT

## 2023-09-13 PROCEDURE — 6370000000 HC RX 637 (ALT 250 FOR IP): Performed by: INTERNAL MEDICINE

## 2023-09-13 PROCEDURE — 2060000000 HC ICU INTERMEDIATE R&B

## 2023-09-13 PROCEDURE — 99233 SBSQ HOSP IP/OBS HIGH 50: CPT | Performed by: NURSE PRACTITIONER

## 2023-09-13 RX ORDER — ALBUTEROL SULFATE 2.5 MG/3ML
2.5 SOLUTION RESPIRATORY (INHALATION) EVERY 4 HOURS PRN
Status: DISCONTINUED | OUTPATIENT
Start: 2023-09-13 | End: 2023-09-17 | Stop reason: HOSPADM

## 2023-09-13 RX ADMIN — ACETAMINOPHEN 1000 MG: 500 TABLET ORAL at 12:46

## 2023-09-13 RX ADMIN — METHOCARBAMOL TABLETS 1000 MG: 500 TABLET, COATED ORAL at 19:43

## 2023-09-13 RX ADMIN — LISINOPRIL 10 MG: 10 TABLET ORAL at 19:42

## 2023-09-13 RX ADMIN — IPRATROPIUM BROMIDE AND ALBUTEROL SULFATE 1 DOSE: 2.5; .5 SOLUTION RESPIRATORY (INHALATION) at 11:14

## 2023-09-13 RX ADMIN — Medication 5 MG: at 19:42

## 2023-09-13 RX ADMIN — DIGOXIN 250 MCG: 125 TABLET ORAL at 09:10

## 2023-09-13 RX ADMIN — SODIUM CHLORIDE 30 MG/ML INHALATION SOLUTION 4 ML: 30 SOLUTION INHALANT at 07:52

## 2023-09-13 RX ADMIN — ACETAMINOPHEN 1000 MG: 500 TABLET ORAL at 23:05

## 2023-09-13 RX ADMIN — ACETAMINOPHEN 1000 MG: 500 TABLET ORAL at 16:44

## 2023-09-13 RX ADMIN — SODIUM CHLORIDE, PRESERVATIVE FREE 10 ML: 5 INJECTION INTRAVENOUS at 19:45

## 2023-09-13 RX ADMIN — METHOCARBAMOL TABLETS 1000 MG: 500 TABLET, COATED ORAL at 09:10

## 2023-09-13 RX ADMIN — DOCUSATE SODIUM 100 MG: 100 CAPSULE, LIQUID FILLED ORAL at 09:11

## 2023-09-13 RX ADMIN — PREDNISONE 40 MG: 20 TABLET ORAL at 09:10

## 2023-09-13 RX ADMIN — ROSUVASTATIN CALCIUM 20 MG: 20 TABLET, FILM COATED ORAL at 19:42

## 2023-09-13 RX ADMIN — SODIUM CHLORIDE, PRESERVATIVE FREE 10 ML: 5 INJECTION INTRAVENOUS at 09:12

## 2023-09-13 RX ADMIN — METHOCARBAMOL TABLETS 1000 MG: 500 TABLET, COATED ORAL at 16:44

## 2023-09-13 RX ADMIN — IPRATROPIUM BROMIDE AND ALBUTEROL SULFATE 1 DOSE: 2.5; .5 SOLUTION RESPIRATORY (INHALATION) at 07:51

## 2023-09-13 RX ADMIN — PREGABALIN 150 MG: 150 CAPSULE ORAL at 09:10

## 2023-09-13 RX ADMIN — FUROSEMIDE 20 MG: 20 TABLET ORAL at 09:10

## 2023-09-13 RX ADMIN — PREGABALIN 150 MG: 150 CAPSULE ORAL at 16:44

## 2023-09-13 RX ADMIN — SODIUM CHLORIDE 30 MG/ML INHALATION SOLUTION 4 ML: 30 SOLUTION INHALANT at 20:40

## 2023-09-13 RX ADMIN — METOPROLOL TARTRATE 50 MG: 50 TABLET, FILM COATED ORAL at 19:42

## 2023-09-13 RX ADMIN — METHOCARBAMOL TABLETS 1000 MG: 500 TABLET, COATED ORAL at 12:45

## 2023-09-13 RX ADMIN — OXYCODONE HYDROCHLORIDE 15 MG: 15 TABLET ORAL at 09:10

## 2023-09-13 RX ADMIN — IPRATROPIUM BROMIDE AND ALBUTEROL SULFATE 1 DOSE: 2.5; .5 SOLUTION RESPIRATORY (INHALATION) at 15:21

## 2023-09-13 RX ADMIN — FAMOTIDINE 20 MG: 20 TABLET ORAL at 19:43

## 2023-09-13 RX ADMIN — FAMOTIDINE 20 MG: 20 TABLET ORAL at 09:10

## 2023-09-13 RX ADMIN — IPRATROPIUM BROMIDE AND ALBUTEROL SULFATE 1 DOSE: 2.5; .5 SOLUTION RESPIRATORY (INHALATION) at 20:40

## 2023-09-13 RX ADMIN — ASPIRIN 81 MG: 81 TABLET, COATED ORAL at 09:10

## 2023-09-13 RX ADMIN — PREGABALIN 150 MG: 150 CAPSULE ORAL at 21:23

## 2023-09-13 ASSESSMENT — PAIN DESCRIPTION - LOCATION
LOCATION: BACK;NECK
LOCATION: GENERALIZED

## 2023-09-13 ASSESSMENT — PAIN SCALES - GENERAL
PAINLEVEL_OUTOF10: 0
PAINLEVEL_OUTOF10: 8
PAINLEVEL_OUTOF10: 5
PAINLEVEL_OUTOF10: 2
PAINLEVEL_OUTOF10: 8
PAINLEVEL_OUTOF10: 8
PAINLEVEL_OUTOF10: 5

## 2023-09-13 ASSESSMENT — PAIN DESCRIPTION - FREQUENCY: FREQUENCY: CONTINUOUS

## 2023-09-13 ASSESSMENT — PAIN DESCRIPTION - DESCRIPTORS: DESCRIPTORS: STABBING

## 2023-09-13 ASSESSMENT — PAIN DESCRIPTION - ORIENTATION: ORIENTATION: LOWER

## 2023-09-13 ASSESSMENT — PAIN DESCRIPTION - ONSET: ONSET: ON-GOING

## 2023-09-13 ASSESSMENT — PAIN DESCRIPTION - PAIN TYPE: TYPE: ACUTE PAIN

## 2023-09-13 NOTE — CARE COORDINATION
CM following for discharge planning. Pt is from home with wife, Sue Miranda. Pt is post op day 15 for L mini thoracotomy; wide local resection of LLL mass; lysis of multiple adhesions. Pt on 10 L high flow O2, will continue to wean. If unable to wean prior to discharge, pt will need home O2 eval. Pt has worked with [facility], [address] [place] [address] recommended this time. CM to continue to follow.      Ellen Cook RN, BSN, [address] [place] [address]  Case Management Department  [phone]

## 2023-09-14 ENCOUNTER — TELEPHONE (OUTPATIENT)
Dept: PHARMACY | Age: 58
End: 2023-09-14

## 2023-09-14 DIAGNOSIS — Z95.2 S/P AORTIC VALVE REPLACEMENT: Primary | ICD-10-CM

## 2023-09-14 DIAGNOSIS — I48.0 PAROXYSMAL ATRIAL FIBRILLATION (HCC): ICD-10-CM

## 2023-09-14 LAB
INR PPP: 2.81 (ref 0.84–1.16)
NT-PROBNP SERPL-MCNC: 682 PG/ML (ref 0–124)
PROTHROMBIN TIME: 29.4 SEC (ref 11.5–14.8)

## 2023-09-14 PROCEDURE — 94640 AIRWAY INHALATION TREATMENT: CPT

## 2023-09-14 PROCEDURE — 83880 ASSAY OF NATRIURETIC PEPTIDE: CPT

## 2023-09-14 PROCEDURE — 6370000000 HC RX 637 (ALT 250 FOR IP)

## 2023-09-14 PROCEDURE — 99232 SBSQ HOSP IP/OBS MODERATE 35: CPT | Performed by: INTERNAL MEDICINE

## 2023-09-14 PROCEDURE — 85610 PROTHROMBIN TIME: CPT

## 2023-09-14 PROCEDURE — 6360000002 HC RX W HCPCS: Performed by: STUDENT IN AN ORGANIZED HEALTH CARE EDUCATION/TRAINING PROGRAM

## 2023-09-14 PROCEDURE — 94761 N-INVAS EAR/PLS OXIMETRY MLT: CPT

## 2023-09-14 PROCEDURE — 2060000000 HC ICU INTERMEDIATE R&B

## 2023-09-14 PROCEDURE — 6370000000 HC RX 637 (ALT 250 FOR IP): Performed by: STUDENT IN AN ORGANIZED HEALTH CARE EDUCATION/TRAINING PROGRAM

## 2023-09-14 PROCEDURE — 2580000003 HC RX 258

## 2023-09-14 PROCEDURE — 36415 COLL VENOUS BLD VENIPUNCTURE: CPT

## 2023-09-14 PROCEDURE — 2580000003 HC RX 258: Performed by: THORACIC SURGERY (CARDIOTHORACIC VASCULAR SURGERY)

## 2023-09-14 PROCEDURE — 99232 SBSQ HOSP IP/OBS MODERATE 35: CPT | Performed by: NURSE PRACTITIONER

## 2023-09-14 PROCEDURE — 2700000000 HC OXYGEN THERAPY PER DAY

## 2023-09-14 PROCEDURE — 6370000000 HC RX 637 (ALT 250 FOR IP): Performed by: INTERNAL MEDICINE

## 2023-09-14 RX ORDER — WARFARIN SODIUM 5 MG/1
5 TABLET ORAL
Status: DISCONTINUED | OUTPATIENT
Start: 2023-09-14 | End: 2023-09-14

## 2023-09-14 RX ORDER — WARFARIN SODIUM 5 MG/1
5 TABLET ORAL
Status: DISCONTINUED | OUTPATIENT
Start: 2023-09-17 | End: 2023-09-17 | Stop reason: HOSPADM

## 2023-09-14 RX ORDER — WARFARIN SODIUM 2.5 MG/1
2.5 TABLET ORAL
Status: DISCONTINUED | OUTPATIENT
Start: 2023-09-14 | End: 2023-09-14

## 2023-09-14 RX ORDER — FUROSEMIDE 20 MG/1
20 TABLET ORAL ONCE
Status: COMPLETED | OUTPATIENT
Start: 2023-09-14 | End: 2023-09-14

## 2023-09-14 RX ORDER — FUROSEMIDE 40 MG/1
40 TABLET ORAL DAILY
Status: DISCONTINUED | OUTPATIENT
Start: 2023-09-15 | End: 2023-09-17 | Stop reason: HOSPADM

## 2023-09-14 RX ORDER — WARFARIN SODIUM 2.5 MG/1
2.5 TABLET ORAL
Status: DISCONTINUED | OUTPATIENT
Start: 2023-09-14 | End: 2023-09-17 | Stop reason: HOSPADM

## 2023-09-14 RX ADMIN — HYDROMORPHONE HYDROCHLORIDE 0.25 MG: 1 INJECTION, SOLUTION INTRAMUSCULAR; INTRAVENOUS; SUBCUTANEOUS at 06:05

## 2023-09-14 RX ADMIN — ACETAMINOPHEN 1000 MG: 500 TABLET ORAL at 05:04

## 2023-09-14 RX ADMIN — PREGABALIN 150 MG: 150 CAPSULE ORAL at 08:05

## 2023-09-14 RX ADMIN — SODIUM CHLORIDE 30 MG/ML INHALATION SOLUTION 4 ML: 30 SOLUTION INHALANT at 20:16

## 2023-09-14 RX ADMIN — METOPROLOL TARTRATE 50 MG: 50 TABLET, FILM COATED ORAL at 20:08

## 2023-09-14 RX ADMIN — SODIUM CHLORIDE, PRESERVATIVE FREE 10 ML: 5 INJECTION INTRAVENOUS at 17:32

## 2023-09-14 RX ADMIN — IPRATROPIUM BROMIDE AND ALBUTEROL SULFATE 1 DOSE: 2.5; .5 SOLUTION RESPIRATORY (INHALATION) at 10:48

## 2023-09-14 RX ADMIN — ACETAMINOPHEN 1000 MG: 500 TABLET ORAL at 13:11

## 2023-09-14 RX ADMIN — ASPIRIN 81 MG: 81 TABLET, COATED ORAL at 08:05

## 2023-09-14 RX ADMIN — OXYCODONE HYDROCHLORIDE 15 MG: 15 TABLET ORAL at 08:04

## 2023-09-14 RX ADMIN — OXYCODONE HYDROCHLORIDE 15 MG: 15 TABLET ORAL at 17:23

## 2023-09-14 RX ADMIN — FUROSEMIDE 20 MG: 20 TABLET ORAL at 08:05

## 2023-09-14 RX ADMIN — METHOCARBAMOL TABLETS 1000 MG: 500 TABLET, COATED ORAL at 13:11

## 2023-09-14 RX ADMIN — FUROSEMIDE 20 MG: 20 TABLET ORAL at 10:56

## 2023-09-14 RX ADMIN — METHOCARBAMOL TABLETS 1000 MG: 500 TABLET, COATED ORAL at 08:06

## 2023-09-14 RX ADMIN — ROSUVASTATIN CALCIUM 20 MG: 20 TABLET, FILM COATED ORAL at 20:09

## 2023-09-14 RX ADMIN — IPRATROPIUM BROMIDE AND ALBUTEROL SULFATE 1 DOSE: 2.5; .5 SOLUTION RESPIRATORY (INHALATION) at 20:16

## 2023-09-14 RX ADMIN — Medication 5 MG: at 20:09

## 2023-09-14 RX ADMIN — FAMOTIDINE 20 MG: 20 TABLET ORAL at 08:05

## 2023-09-14 RX ADMIN — LISINOPRIL 10 MG: 10 TABLET ORAL at 20:08

## 2023-09-14 RX ADMIN — ACETAMINOPHEN 1000 MG: 500 TABLET ORAL at 17:23

## 2023-09-14 RX ADMIN — SODIUM CHLORIDE, PRESERVATIVE FREE 10 ML: 5 INJECTION INTRAVENOUS at 08:06

## 2023-09-14 RX ADMIN — SODIUM CHLORIDE, PRESERVATIVE FREE 10 ML: 5 INJECTION INTRAVENOUS at 20:09

## 2023-09-14 RX ADMIN — DOCUSATE SODIUM 100 MG: 100 CAPSULE, LIQUID FILLED ORAL at 08:05

## 2023-09-14 RX ADMIN — POLYETHYLENE GLYCOL 3350 17 G: 17 POWDER, FOR SOLUTION ORAL at 08:06

## 2023-09-14 RX ADMIN — IPRATROPIUM BROMIDE AND ALBUTEROL SULFATE 1 DOSE: 2.5; .5 SOLUTION RESPIRATORY (INHALATION) at 15:21

## 2023-09-14 RX ADMIN — PREGABALIN 150 MG: 150 CAPSULE ORAL at 20:09

## 2023-09-14 RX ADMIN — SODIUM CHLORIDE 30 MG/ML INHALATION SOLUTION 4 ML: 30 SOLUTION INHALANT at 10:48

## 2023-09-14 RX ADMIN — METHOCARBAMOL TABLETS 1000 MG: 500 TABLET, COATED ORAL at 17:22

## 2023-09-14 RX ADMIN — OXYCODONE HYDROCHLORIDE 15 MG: 15 TABLET ORAL at 13:14

## 2023-09-14 RX ADMIN — FAMOTIDINE 20 MG: 20 TABLET ORAL at 20:09

## 2023-09-14 RX ADMIN — SODIUM CHLORIDE, PRESERVATIVE FREE 10 ML: 5 INJECTION INTRAVENOUS at 20:10

## 2023-09-14 RX ADMIN — PREDNISONE 40 MG: 20 TABLET ORAL at 08:05

## 2023-09-14 RX ADMIN — IPRATROPIUM BROMIDE AND ALBUTEROL SULFATE 1 DOSE: 2.5; .5 SOLUTION RESPIRATORY (INHALATION) at 12:40

## 2023-09-14 RX ADMIN — WARFARIN SODIUM 2.5 MG: 2.5 TABLET ORAL at 17:23

## 2023-09-14 RX ADMIN — PREGABALIN 150 MG: 150 CAPSULE ORAL at 16:29

## 2023-09-14 RX ADMIN — HYDROMORPHONE HYDROCHLORIDE 0.25 MG: 1 INJECTION, SOLUTION INTRAMUSCULAR; INTRAVENOUS; SUBCUTANEOUS at 10:56

## 2023-09-14 RX ADMIN — DIGOXIN 250 MCG: 125 TABLET ORAL at 08:06

## 2023-09-14 RX ADMIN — METHOCARBAMOL TABLETS 1000 MG: 500 TABLET, COATED ORAL at 20:09

## 2023-09-14 ASSESSMENT — PAIN DESCRIPTION - ONSET
ONSET: ON-GOING

## 2023-09-14 ASSESSMENT — PAIN DESCRIPTION - DESCRIPTORS
DESCRIPTORS: STABBING
DESCRIPTORS: SORE
DESCRIPTORS: SORE
DESCRIPTORS: STABBING
DESCRIPTORS: STABBING
DESCRIPTORS: SORE

## 2023-09-14 ASSESSMENT — PAIN - FUNCTIONAL ASSESSMENT
PAIN_FUNCTIONAL_ASSESSMENT: ACTIVITIES ARE NOT PREVENTED

## 2023-09-14 ASSESSMENT — PAIN SCALES - GENERAL
PAINLEVEL_OUTOF10: 10
PAINLEVEL_OUTOF10: 7
PAINLEVEL_OUTOF10: 9
PAINLEVEL_OUTOF10: 0
PAINLEVEL_OUTOF10: 5
PAINLEVEL_OUTOF10: 10
PAINLEVEL_OUTOF10: 8
PAINLEVEL_OUTOF10: 0
PAINLEVEL_OUTOF10: 7
PAINLEVEL_OUTOF10: 9
PAINLEVEL_OUTOF10: 0

## 2023-09-14 ASSESSMENT — PAIN DESCRIPTION - LOCATION
LOCATION: BACK
LOCATION: BACK
LOCATION: BACK;NECK
LOCATION: BACK
LOCATION: BACK;NECK
LOCATION: BACK

## 2023-09-14 ASSESSMENT — PAIN DESCRIPTION - ORIENTATION
ORIENTATION: LOWER

## 2023-09-14 ASSESSMENT — PAIN DESCRIPTION - PAIN TYPE
TYPE: ACUTE PAIN
TYPE: CHRONIC PAIN;ACUTE PAIN
TYPE: CHRONIC PAIN
TYPE: ACUTE PAIN

## 2023-09-14 ASSESSMENT — PAIN DESCRIPTION - FREQUENCY
FREQUENCY: CONTINUOUS

## 2023-09-14 ASSESSMENT — PAIN SCALES - WONG BAKER
WONGBAKER_NUMERICALRESPONSE: 0
WONGBAKER_NUMERICALRESPONSE: 0

## 2023-09-14 NOTE — TELEPHONE ENCOUNTER
Patient remains admitted to [facility] after lung resection. INRs have been labile during admission (see below). O2 requirements weaning down. Date INR Warfarin Notes:   8/31 1.03 7.5 mg     9/1 -- 7.5 mg     9/2 1.43 5 mg      9/3 2.12 5 mg     9/4 2.83 7.5 mg     9/5 3.77 -- Vit K 5mg PO   9/6 1.62 7.5 mg     9/7 1.6 5 mg     9/8 2.7 -- Enoxaparin d/c   9/9 2.82 5 mg     9/10 3.64 -- Methylprednisolone started   9/11 4.29 --      9/12 3.65 --     9/13 3.43 -- Methylpred changed to prednisone   9/14 2.81           Plan is for patient to get discharged home with [facility]. Called [facility] and spoke with ZULY Davis. Faxed INR order to [phone] and asked RN to draw INR with initial home visit (hopefully Monday if discharged home today/tomorrow). Patient is not yet active in their system since he is still admitted so verbal order could not be provided.      Nupur Bello, PharmD, BCPS  [facility]  [place]: [phone]  [place]: [phone]  9/14/2023 11:47 AM

## 2023-09-14 NOTE — CARE COORDINATION
CM continues to follow for DC planning and needs. Patient currently weaned down to 6L O2, CM noted orders for pending home O2 eval ordered. Patient will need rollator prior to DC, orders noted. [facility] is following for [address] at this time. Patient plans for return home at DC once medically stable for DC.     Thank you,  Oseas GRANTN, RN,   4th Floor Progressive Care Unit  [phone]

## 2023-09-15 LAB
INR PPP: 2.34 (ref 0.84–1.16)
PROTHROMBIN TIME: 25.5 SEC (ref 11.5–14.8)

## 2023-09-15 PROCEDURE — 2580000003 HC RX 258

## 2023-09-15 PROCEDURE — 6370000000 HC RX 637 (ALT 250 FOR IP)

## 2023-09-15 PROCEDURE — 2700000000 HC OXYGEN THERAPY PER DAY

## 2023-09-15 PROCEDURE — 2580000003 HC RX 258: Performed by: THORACIC SURGERY (CARDIOTHORACIC VASCULAR SURGERY)

## 2023-09-15 PROCEDURE — 85610 PROTHROMBIN TIME: CPT

## 2023-09-15 PROCEDURE — 99233 SBSQ HOSP IP/OBS HIGH 50: CPT | Performed by: NURSE PRACTITIONER

## 2023-09-15 PROCEDURE — 6370000000 HC RX 637 (ALT 250 FOR IP): Performed by: STUDENT IN AN ORGANIZED HEALTH CARE EDUCATION/TRAINING PROGRAM

## 2023-09-15 PROCEDURE — 94618 PULMONARY STRESS TESTING: CPT

## 2023-09-15 PROCEDURE — 2060000000 HC ICU INTERMEDIATE R&B

## 2023-09-15 PROCEDURE — 94640 AIRWAY INHALATION TREATMENT: CPT

## 2023-09-15 PROCEDURE — 94669 MECHANICAL CHEST WALL OSCILL: CPT

## 2023-09-15 PROCEDURE — 36415 COLL VENOUS BLD VENIPUNCTURE: CPT

## 2023-09-15 RX ORDER — NICOTINE 21 MG/24HR
1 PATCH, TRANSDERMAL 24 HOURS TRANSDERMAL DAILY
Status: DISCONTINUED | OUTPATIENT
Start: 2023-09-15 | End: 2023-09-17 | Stop reason: HOSPADM

## 2023-09-15 RX ADMIN — WARFARIN SODIUM 2.5 MG: 2.5 TABLET ORAL at 16:27

## 2023-09-15 RX ADMIN — PREGABALIN 150 MG: 150 CAPSULE ORAL at 16:35

## 2023-09-15 RX ADMIN — FAMOTIDINE 20 MG: 20 TABLET ORAL at 08:04

## 2023-09-15 RX ADMIN — SODIUM CHLORIDE, PRESERVATIVE FREE 10 ML: 5 INJECTION INTRAVENOUS at 20:31

## 2023-09-15 RX ADMIN — SODIUM CHLORIDE, PRESERVATIVE FREE 10 ML: 5 INJECTION INTRAVENOUS at 08:12

## 2023-09-15 RX ADMIN — METHOCARBAMOL TABLETS 1000 MG: 500 TABLET, COATED ORAL at 20:28

## 2023-09-15 RX ADMIN — ASPIRIN 81 MG: 81 TABLET, COATED ORAL at 08:06

## 2023-09-15 RX ADMIN — SODIUM CHLORIDE, PRESERVATIVE FREE 10 ML: 5 INJECTION INTRAVENOUS at 08:13

## 2023-09-15 RX ADMIN — ACETAMINOPHEN 1000 MG: 500 TABLET ORAL at 12:34

## 2023-09-15 RX ADMIN — IPRATROPIUM BROMIDE AND ALBUTEROL SULFATE 1 DOSE: 2.5; .5 SOLUTION RESPIRATORY (INHALATION) at 14:06

## 2023-09-15 RX ADMIN — ACETAMINOPHEN 1000 MG: 500 TABLET ORAL at 06:31

## 2023-09-15 RX ADMIN — METHOCARBAMOL TABLETS 1000 MG: 500 TABLET, COATED ORAL at 16:26

## 2023-09-15 RX ADMIN — Medication 5 MG: at 20:28

## 2023-09-15 RX ADMIN — DIGOXIN 250 MCG: 125 TABLET ORAL at 08:04

## 2023-09-15 RX ADMIN — OXYCODONE HYDROCHLORIDE 15 MG: 15 TABLET ORAL at 09:50

## 2023-09-15 RX ADMIN — METHOCARBAMOL TABLETS 1000 MG: 500 TABLET, COATED ORAL at 12:35

## 2023-09-15 RX ADMIN — IPRATROPIUM BROMIDE AND ALBUTEROL SULFATE 1 DOSE: 2.5; .5 SOLUTION RESPIRATORY (INHALATION) at 19:57

## 2023-09-15 RX ADMIN — PREGABALIN 150 MG: 150 CAPSULE ORAL at 08:05

## 2023-09-15 RX ADMIN — SODIUM CHLORIDE 30 MG/ML INHALATION SOLUTION 4 ML: 30 SOLUTION INHALANT at 10:00

## 2023-09-15 RX ADMIN — SODIUM CHLORIDE 30 MG/ML INHALATION SOLUTION 4 ML: 30 SOLUTION INHALANT at 19:57

## 2023-09-15 RX ADMIN — OXYCODONE HYDROCHLORIDE 15 MG: 15 TABLET ORAL at 20:29

## 2023-09-15 RX ADMIN — IPRATROPIUM BROMIDE AND ALBUTEROL SULFATE 1 DOSE: 2.5; .5 SOLUTION RESPIRATORY (INHALATION) at 16:53

## 2023-09-15 RX ADMIN — PREGABALIN 150 MG: 150 CAPSULE ORAL at 20:28

## 2023-09-15 RX ADMIN — FAMOTIDINE 20 MG: 20 TABLET ORAL at 20:27

## 2023-09-15 RX ADMIN — METHOCARBAMOL TABLETS 1000 MG: 500 TABLET, COATED ORAL at 09:50

## 2023-09-15 RX ADMIN — ACETAMINOPHEN 1000 MG: 500 TABLET ORAL at 00:10

## 2023-09-15 RX ADMIN — ROSUVASTATIN CALCIUM 20 MG: 20 TABLET, FILM COATED ORAL at 20:28

## 2023-09-15 RX ADMIN — DOCUSATE SODIUM 100 MG: 100 CAPSULE, LIQUID FILLED ORAL at 08:03

## 2023-09-15 RX ADMIN — ACETAMINOPHEN 1000 MG: 500 TABLET ORAL at 16:25

## 2023-09-15 RX ADMIN — IPRATROPIUM BROMIDE AND ALBUTEROL SULFATE 1 DOSE: 2.5; .5 SOLUTION RESPIRATORY (INHALATION) at 10:00

## 2023-09-15 ASSESSMENT — PAIN SCALES - WONG BAKER
WONGBAKER_NUMERICALRESPONSE: 0
WONGBAKER_NUMERICALRESPONSE: 2
WONGBAKER_NUMERICALRESPONSE: 0

## 2023-09-15 ASSESSMENT — PAIN DESCRIPTION - ONSET
ONSET: ON-GOING
ONSET: GRADUAL
ONSET: ON-GOING
ONSET: ON-GOING

## 2023-09-15 ASSESSMENT — PAIN DESCRIPTION - FREQUENCY
FREQUENCY: CONTINUOUS
FREQUENCY: CONTINUOUS
FREQUENCY: INTERMITTENT
FREQUENCY: CONTINUOUS

## 2023-09-15 ASSESSMENT — PAIN DESCRIPTION - LOCATION
LOCATION: BACK

## 2023-09-15 ASSESSMENT — PAIN DESCRIPTION - ORIENTATION
ORIENTATION: LOWER

## 2023-09-15 ASSESSMENT — PAIN DESCRIPTION - PAIN TYPE
TYPE: CHRONIC PAIN
TYPE: ACUTE PAIN;CHRONIC PAIN
TYPE: CHRONIC PAIN;ACUTE PAIN
TYPE: ACUTE PAIN;CHRONIC PAIN

## 2023-09-15 ASSESSMENT — PAIN SCALES - GENERAL
PAINLEVEL_OUTOF10: 0
PAINLEVEL_OUTOF10: 8
PAINLEVEL_OUTOF10: 0
PAINLEVEL_OUTOF10: 8
PAINLEVEL_OUTOF10: 9
PAINLEVEL_OUTOF10: 2
PAINLEVEL_OUTOF10: 8
PAINLEVEL_OUTOF10: 0
PAINLEVEL_OUTOF10: 0

## 2023-09-15 ASSESSMENT — PAIN DESCRIPTION - DESCRIPTORS
DESCRIPTORS: DISCOMFORT
DESCRIPTORS: ACHING;SORE
DESCRIPTORS: ACHING
DESCRIPTORS: SORE

## 2023-09-15 ASSESSMENT — PAIN - FUNCTIONAL ASSESSMENT
PAIN_FUNCTIONAL_ASSESSMENT: PREVENTS OR INTERFERES SOME ACTIVE ACTIVITIES AND ADLS
PAIN_FUNCTIONAL_ASSESSMENT: ACTIVITIES ARE NOT PREVENTED
PAIN_FUNCTIONAL_ASSESSMENT: PREVENTS OR INTERFERES SOME ACTIVE ACTIVITIES AND ADLS
PAIN_FUNCTIONAL_ASSESSMENT: PREVENTS OR INTERFERES SOME ACTIVE ACTIVITIES AND ADLS

## 2023-09-15 NOTE — CARE COORDINATION
CM continues to follow for DC planning and needs. Patient had home O2 eval done this AM, he is currently 4L O2 at rest and requires 6L with activity. Patient will need a 10L home concentrator at DC and portable tanks for DC. Per MD, he would like to have patient down to 3L O2 prior to DC. HOME OXYGEN ORDERING GUIDELINE:  Home oxygen evaluation to be done by respiratory therapist to evaluate for need for home oxygen at discharge. ATTENDING MD to place DME order for Oxygen AFTER respiratory therapy testing has been completed. Testing must be done prior to DME order being placed and ATTENDING MD or LANDEN certified practitioner. CM will continue to follow for DC planning and needs, [facility] is also following for new HHC at [place].     Thank you,  Olivia GRANTN, RN,   4th Floor Progressive Care Unit  [phone]

## 2023-09-16 LAB
ALBUMIN SERPL-MCNC: 3 G/DL (ref 3.4–5)
ANION GAP SERPL CALCULATED.3IONS-SCNC: 15 MMOL/L (ref 3–16)
BUN SERPL-MCNC: 12 MG/DL (ref 7–20)
CALCIUM SERPL-MCNC: 8.8 MG/DL (ref 8.3–10.6)
CHLORIDE SERPL-SCNC: 85 MMOL/L (ref 99–110)
CO2 SERPL-SCNC: 28 MMOL/L (ref 21–32)
CREAT SERPL-MCNC: 0.7 MG/DL (ref 0.8–1.3)
GFR SERPLBLD CREATININE-BSD FMLA CKD-EPI: >60 ML/MIN/{1.73_M2}
GLUCOSE SERPL-MCNC: 142 MG/DL (ref 70–99)
INR PPP: 2.75 (ref 0.84–1.16)
MAGNESIUM SERPL-MCNC: 2.2 MG/DL (ref 1.8–2.4)
PHOSPHATE SERPL-MCNC: 3 MG/DL (ref 2.5–4.9)
POTASSIUM SERPL-SCNC: 3.8 MMOL/L (ref 3.5–5.1)
PROTHROMBIN TIME: 28.9 SEC (ref 11.5–14.8)
SODIUM SERPL-SCNC: 128 MMOL/L (ref 136–145)

## 2023-09-16 PROCEDURE — 94761 N-INVAS EAR/PLS OXIMETRY MLT: CPT

## 2023-09-16 PROCEDURE — 6370000000 HC RX 637 (ALT 250 FOR IP)

## 2023-09-16 PROCEDURE — 2580000003 HC RX 258: Performed by: THORACIC SURGERY (CARDIOTHORACIC VASCULAR SURGERY)

## 2023-09-16 PROCEDURE — 36415 COLL VENOUS BLD VENIPUNCTURE: CPT

## 2023-09-16 PROCEDURE — 2060000000 HC ICU INTERMEDIATE R&B

## 2023-09-16 PROCEDURE — 2580000003 HC RX 258

## 2023-09-16 PROCEDURE — 83735 ASSAY OF MAGNESIUM: CPT

## 2023-09-16 PROCEDURE — 99232 SBSQ HOSP IP/OBS MODERATE 35: CPT | Performed by: INTERNAL MEDICINE

## 2023-09-16 PROCEDURE — 94669 MECHANICAL CHEST WALL OSCILL: CPT

## 2023-09-16 PROCEDURE — 94640 AIRWAY INHALATION TREATMENT: CPT

## 2023-09-16 PROCEDURE — 85610 PROTHROMBIN TIME: CPT

## 2023-09-16 PROCEDURE — 6370000000 HC RX 637 (ALT 250 FOR IP): Performed by: STUDENT IN AN ORGANIZED HEALTH CARE EDUCATION/TRAINING PROGRAM

## 2023-09-16 PROCEDURE — 2700000000 HC OXYGEN THERAPY PER DAY

## 2023-09-16 PROCEDURE — 80069 RENAL FUNCTION PANEL: CPT

## 2023-09-16 RX ADMIN — ASPIRIN 81 MG: 81 TABLET, COATED ORAL at 08:50

## 2023-09-16 RX ADMIN — METHOCARBAMOL TABLETS 1000 MG: 500 TABLET, COATED ORAL at 08:49

## 2023-09-16 RX ADMIN — DIGOXIN 250 MCG: 125 TABLET ORAL at 08:51

## 2023-09-16 RX ADMIN — ROSUVASTATIN CALCIUM 20 MG: 20 TABLET, FILM COATED ORAL at 23:40

## 2023-09-16 RX ADMIN — PREGABALIN 150 MG: 150 CAPSULE ORAL at 08:50

## 2023-09-16 RX ADMIN — LISINOPRIL 10 MG: 10 TABLET ORAL at 23:43

## 2023-09-16 RX ADMIN — SODIUM CHLORIDE, PRESERVATIVE FREE 10 ML: 5 INJECTION INTRAVENOUS at 23:43

## 2023-09-16 RX ADMIN — ACETAMINOPHEN 1000 MG: 500 TABLET ORAL at 16:37

## 2023-09-16 RX ADMIN — ACETAMINOPHEN 1000 MG: 500 TABLET ORAL at 23:32

## 2023-09-16 RX ADMIN — METHOCARBAMOL TABLETS 1000 MG: 500 TABLET, COATED ORAL at 16:38

## 2023-09-16 RX ADMIN — ACETAMINOPHEN 1000 MG: 500 TABLET ORAL at 06:58

## 2023-09-16 RX ADMIN — OXYCODONE HYDROCHLORIDE 15 MG: 15 TABLET ORAL at 06:57

## 2023-09-16 RX ADMIN — ACETAMINOPHEN 1000 MG: 500 TABLET ORAL at 13:19

## 2023-09-16 RX ADMIN — METOPROLOL TARTRATE 50 MG: 50 TABLET, FILM COATED ORAL at 23:43

## 2023-09-16 RX ADMIN — SODIUM CHLORIDE 30 MG/ML INHALATION SOLUTION 4 ML: 30 SOLUTION INHALANT at 21:44

## 2023-09-16 RX ADMIN — FAMOTIDINE 20 MG: 20 TABLET ORAL at 23:42

## 2023-09-16 RX ADMIN — IPRATROPIUM BROMIDE AND ALBUTEROL SULFATE 1 DOSE: 2.5; .5 SOLUTION RESPIRATORY (INHALATION) at 12:08

## 2023-09-16 RX ADMIN — PREGABALIN 150 MG: 150 CAPSULE ORAL at 13:19

## 2023-09-16 RX ADMIN — SODIUM CHLORIDE 30 MG/ML INHALATION SOLUTION 4 ML: 30 SOLUTION INHALANT at 08:04

## 2023-09-16 RX ADMIN — FAMOTIDINE 20 MG: 20 TABLET ORAL at 08:50

## 2023-09-16 RX ADMIN — ACETAMINOPHEN 1000 MG: 500 TABLET ORAL at 00:38

## 2023-09-16 RX ADMIN — METHOCARBAMOL TABLETS 1000 MG: 500 TABLET, COATED ORAL at 23:41

## 2023-09-16 RX ADMIN — IPRATROPIUM BROMIDE AND ALBUTEROL SULFATE 1 DOSE: 2.5; .5 SOLUTION RESPIRATORY (INHALATION) at 16:50

## 2023-09-16 RX ADMIN — PREGABALIN 150 MG: 150 CAPSULE ORAL at 23:40

## 2023-09-16 RX ADMIN — METHOCARBAMOL TABLETS 1000 MG: 500 TABLET, COATED ORAL at 13:19

## 2023-09-16 RX ADMIN — WARFARIN SODIUM 2.5 MG: 2.5 TABLET ORAL at 16:37

## 2023-09-16 RX ADMIN — IPRATROPIUM BROMIDE AND ALBUTEROL SULFATE 1 DOSE: 2.5; .5 SOLUTION RESPIRATORY (INHALATION) at 08:04

## 2023-09-16 RX ADMIN — Medication 5 MG: at 23:41

## 2023-09-16 RX ADMIN — IPRATROPIUM BROMIDE AND ALBUTEROL SULFATE 1 DOSE: 2.5; .5 SOLUTION RESPIRATORY (INHALATION) at 21:44

## 2023-09-16 RX ADMIN — FUROSEMIDE 40 MG: 40 TABLET ORAL at 08:51

## 2023-09-16 RX ADMIN — SODIUM CHLORIDE, PRESERVATIVE FREE 10 ML: 5 INJECTION INTRAVENOUS at 08:55

## 2023-09-16 ASSESSMENT — PAIN SCALES - GENERAL
PAINLEVEL_OUTOF10: 6
PAINLEVEL_OUTOF10: 8
PAINLEVEL_OUTOF10: 0

## 2023-09-16 ASSESSMENT — PAIN DESCRIPTION - DESCRIPTORS
DESCRIPTORS: ACHING;SHARP
DESCRIPTORS: ACHING;SHARP
DESCRIPTORS: ACHING
DESCRIPTORS: STABBING;SHARP
DESCRIPTORS: ACHING;STABBING;SHARP

## 2023-09-16 ASSESSMENT — PAIN DESCRIPTION - PAIN TYPE
TYPE: ACUTE PAIN
TYPE: CHRONIC PAIN
TYPE: CHRONIC PAIN

## 2023-09-16 ASSESSMENT — PAIN DESCRIPTION - LOCATION
LOCATION: BACK;LEG
LOCATION: BACK;LEG
LOCATION: BACK;LEG;HIP
LOCATION: BACK;LEG
LOCATION: BACK;LEG

## 2023-09-16 ASSESSMENT — PAIN SCALES - WONG BAKER
WONGBAKER_NUMERICALRESPONSE: 0

## 2023-09-16 ASSESSMENT — PAIN DESCRIPTION - ONSET
ONSET: PROGRESSIVE
ONSET: GRADUAL
ONSET: PROGRESSIVE

## 2023-09-16 ASSESSMENT — PAIN DESCRIPTION - ORIENTATION
ORIENTATION: LOWER

## 2023-09-16 ASSESSMENT — PAIN DESCRIPTION - FREQUENCY
FREQUENCY: CONTINUOUS
FREQUENCY: CONTINUOUS
FREQUENCY: INTERMITTENT

## 2023-09-16 ASSESSMENT — PAIN - FUNCTIONAL ASSESSMENT
PAIN_FUNCTIONAL_ASSESSMENT: PREVENTS OR INTERFERES SOME ACTIVE ACTIVITIES AND ADLS
PAIN_FUNCTIONAL_ASSESSMENT: PREVENTS OR INTERFERES SOME ACTIVE ACTIVITIES AND ADLS
PAIN_FUNCTIONAL_ASSESSMENT: PREVENTS OR INTERFERES WITH MANY ACTIVE NOT PASSIVE ACTIVITIES
PAIN_FUNCTIONAL_ASSESSMENT: ACTIVITIES ARE NOT PREVENTED
PAIN_FUNCTIONAL_ASSESSMENT: PREVENTS OR INTERFERES SOME ACTIVE ACTIVITIES AND ADLS

## 2023-09-17 VITALS
DIASTOLIC BLOOD PRESSURE: 73 MMHG | SYSTOLIC BLOOD PRESSURE: 114 MMHG | TEMPERATURE: 98.2 F | HEART RATE: 102 BPM | HEIGHT: 72 IN | OXYGEN SATURATION: 92 % | RESPIRATION RATE: 20 BRPM | BODY MASS INDEX: 29.53 KG/M2 | WEIGHT: 218.03 LBS

## 2023-09-17 LAB
ALBUMIN SERPL-MCNC: 3.2 G/DL (ref 3.4–5)
ANION GAP SERPL CALCULATED.3IONS-SCNC: 15 MMOL/L (ref 3–16)
BASOPHILS # BLD: 0.1 K/UL (ref 0–0.2)
BASOPHILS NFR BLD: 0.7 %
BUN SERPL-MCNC: 13 MG/DL (ref 7–20)
CALCIUM SERPL-MCNC: 9 MG/DL (ref 8.3–10.6)
CHLORIDE SERPL-SCNC: 83 MMOL/L (ref 99–110)
CO2 SERPL-SCNC: 28 MMOL/L (ref 21–32)
CREAT SERPL-MCNC: 0.7 MG/DL (ref 0.8–1.3)
DEPRECATED RDW RBC AUTO: 14.9 % (ref 12.4–15.4)
EOSINOPHIL # BLD: 0.4 K/UL (ref 0–0.6)
EOSINOPHIL NFR BLD: 2.2 %
GFR SERPLBLD CREATININE-BSD FMLA CKD-EPI: >60 ML/MIN/{1.73_M2}
GLUCOSE SERPL-MCNC: 144 MG/DL (ref 70–99)
HCT VFR BLD AUTO: 40.2 % (ref 40.5–52.5)
HGB BLD-MCNC: 13.3 G/DL (ref 13.5–17.5)
INR PPP: 2.66 (ref 0.84–1.16)
LYMPHOCYTES # BLD: 2.1 K/UL (ref 1–5.1)
LYMPHOCYTES NFR BLD: 13.2 %
MAGNESIUM SERPL-MCNC: 2.2 MG/DL (ref 1.8–2.4)
MCH RBC QN AUTO: 30.5 PG (ref 26–34)
MCHC RBC AUTO-ENTMCNC: 33.2 G/DL (ref 31–36)
MCV RBC AUTO: 91.8 FL (ref 80–100)
MONOCYTES # BLD: 1.1 K/UL (ref 0–1.3)
MONOCYTES NFR BLD: 6.8 %
NEUTROPHILS # BLD: 12.5 K/UL (ref 1.7–7.7)
NEUTROPHILS NFR BLD: 77.1 %
PHOSPHATE SERPL-MCNC: 2.9 MG/DL (ref 2.5–4.9)
PLATELET # BLD AUTO: 346 K/UL (ref 135–450)
PMV BLD AUTO: 9 FL (ref 5–10.5)
POTASSIUM SERPL-SCNC: 3.5 MMOL/L (ref 3.5–5.1)
PROTHROMBIN TIME: 28.2 SEC (ref 11.5–14.8)
RBC # BLD AUTO: 4.38 M/UL (ref 4.2–5.9)
SODIUM SERPL-SCNC: 126 MMOL/L (ref 136–145)
WBC # BLD AUTO: 16.2 K/UL (ref 4–11)

## 2023-09-17 PROCEDURE — 94640 AIRWAY INHALATION TREATMENT: CPT

## 2023-09-17 PROCEDURE — 6370000000 HC RX 637 (ALT 250 FOR IP)

## 2023-09-17 PROCEDURE — 80069 RENAL FUNCTION PANEL: CPT

## 2023-09-17 PROCEDURE — 85025 COMPLETE CBC W/AUTO DIFF WBC: CPT

## 2023-09-17 PROCEDURE — 85610 PROTHROMBIN TIME: CPT

## 2023-09-17 PROCEDURE — 94761 N-INVAS EAR/PLS OXIMETRY MLT: CPT

## 2023-09-17 PROCEDURE — 94618 PULMONARY STRESS TESTING: CPT

## 2023-09-17 PROCEDURE — 2580000003 HC RX 258: Performed by: THORACIC SURGERY (CARDIOTHORACIC VASCULAR SURGERY)

## 2023-09-17 PROCEDURE — 83735 ASSAY OF MAGNESIUM: CPT

## 2023-09-17 PROCEDURE — 2580000003 HC RX 258

## 2023-09-17 PROCEDURE — 6370000000 HC RX 637 (ALT 250 FOR IP): Performed by: STUDENT IN AN ORGANIZED HEALTH CARE EDUCATION/TRAINING PROGRAM

## 2023-09-17 PROCEDURE — 94669 MECHANICAL CHEST WALL OSCILL: CPT

## 2023-09-17 PROCEDURE — 2700000000 HC OXYGEN THERAPY PER DAY

## 2023-09-17 PROCEDURE — 36415 COLL VENOUS BLD VENIPUNCTURE: CPT

## 2023-09-17 RX ORDER — NICOTINE 21 MG/24HR
1 PATCH, TRANSDERMAL 24 HOURS TRANSDERMAL DAILY
Qty: 30 PATCH | Refills: 3 | Status: SHIPPED | OUTPATIENT
Start: 2023-09-18

## 2023-09-17 RX ORDER — SODIUM CHLORIDE, SODIUM LACTATE, POTASSIUM CHLORIDE, AND CALCIUM CHLORIDE .6; .31; .03; .02 G/100ML; G/100ML; G/100ML; G/100ML
1000 INJECTION, SOLUTION INTRAVENOUS ONCE
Status: COMPLETED | OUTPATIENT
Start: 2023-09-17 | End: 2023-09-17

## 2023-09-17 RX ORDER — DOCUSATE SODIUM 100 MG/1
100 CAPSULE, LIQUID FILLED ORAL 2 TIMES DAILY
Qty: 60 CAPSULE | Refills: 0 | Status: SHIPPED | OUTPATIENT
Start: 2023-09-17 | End: 2023-10-17

## 2023-09-17 RX ORDER — DIGOXIN 250 MCG
250 TABLET ORAL DAILY
Qty: 30 TABLET | Refills: 3 | Status: SHIPPED | OUTPATIENT
Start: 2023-09-18

## 2023-09-17 RX ORDER — OXYCODONE HYDROCHLORIDE 5 MG/1
5 TABLET ORAL EVERY 6 HOURS PRN
Qty: 28 TABLET | Refills: 0 | Status: SHIPPED | OUTPATIENT
Start: 2023-09-17 | End: 2023-09-20

## 2023-09-17 RX ORDER — LIDOCAINE 4 G/G
1 PATCH TOPICAL DAILY
Qty: 14 EACH | Refills: 0 | Status: SHIPPED | OUTPATIENT
Start: 2023-09-18 | End: 2023-10-02

## 2023-09-17 RX ORDER — ALBUTEROL SULFATE 2.5 MG/3ML
2.5 SOLUTION RESPIRATORY (INHALATION) EVERY 4 HOURS PRN
Qty: 120 EACH | Refills: 3 | Status: SHIPPED | OUTPATIENT
Start: 2023-09-17

## 2023-09-17 RX ADMIN — ASPIRIN 81 MG: 81 TABLET, COATED ORAL at 10:07

## 2023-09-17 RX ADMIN — ACETAMINOPHEN 1000 MG: 500 TABLET ORAL at 07:02

## 2023-09-17 RX ADMIN — SODIUM CHLORIDE, PRESERVATIVE FREE 10 ML: 5 INJECTION INTRAVENOUS at 10:15

## 2023-09-17 RX ADMIN — IPRATROPIUM BROMIDE AND ALBUTEROL SULFATE 1 DOSE: 2.5; .5 SOLUTION RESPIRATORY (INHALATION) at 09:39

## 2023-09-17 RX ADMIN — IPRATROPIUM BROMIDE AND ALBUTEROL SULFATE 1 DOSE: 2.5; .5 SOLUTION RESPIRATORY (INHALATION) at 12:00

## 2023-09-17 RX ADMIN — SODIUM CHLORIDE, SODIUM LACTATE, POTASSIUM CHLORIDE, AND CALCIUM CHLORIDE 1000 ML: .6; .31; .03; .02 INJECTION, SOLUTION INTRAVENOUS at 08:51

## 2023-09-17 RX ADMIN — PREGABALIN 150 MG: 150 CAPSULE ORAL at 15:51

## 2023-09-17 RX ADMIN — METHOCARBAMOL TABLETS 1000 MG: 500 TABLET, COATED ORAL at 15:52

## 2023-09-17 RX ADMIN — DIGOXIN 250 MCG: 125 TABLET ORAL at 10:22

## 2023-09-17 RX ADMIN — IPRATROPIUM BROMIDE AND ALBUTEROL SULFATE 1 DOSE: 2.5; .5 SOLUTION RESPIRATORY (INHALATION) at 16:19

## 2023-09-17 RX ADMIN — SODIUM CHLORIDE, PRESERVATIVE FREE 10 ML: 5 INJECTION INTRAVENOUS at 10:16

## 2023-09-17 RX ADMIN — ACETAMINOPHEN 1000 MG: 500 TABLET ORAL at 12:32

## 2023-09-17 RX ADMIN — PREGABALIN 150 MG: 150 CAPSULE ORAL at 10:06

## 2023-09-17 RX ADMIN — SODIUM CHLORIDE 30 MG/ML INHALATION SOLUTION 4 ML: 30 SOLUTION INHALANT at 09:39

## 2023-09-17 RX ADMIN — FAMOTIDINE 20 MG: 20 TABLET ORAL at 10:07

## 2023-09-17 ASSESSMENT — PAIN DESCRIPTION - PAIN TYPE
TYPE: CHRONIC PAIN

## 2023-09-17 ASSESSMENT — PAIN DESCRIPTION - DESCRIPTORS
DESCRIPTORS: ACHING;DISCOMFORT
DESCRIPTORS: ACHING;DISCOMFORT
DESCRIPTORS: ACHING;CRAMPING;SHARP
DESCRIPTORS: ACHING;CRAMPING;SHARP
DESCRIPTORS: ACHING;DISCOMFORT

## 2023-09-17 ASSESSMENT — PAIN DESCRIPTION - ORIENTATION
ORIENTATION: MID
ORIENTATION: RIGHT;LEFT;LOWER
ORIENTATION: RIGHT;LEFT;LOWER
ORIENTATION: MID
ORIENTATION: MID

## 2023-09-17 ASSESSMENT — PAIN SCALES - GENERAL
PAINLEVEL_OUTOF10: 8
PAINLEVEL_OUTOF10: 9
PAINLEVEL_OUTOF10: 6
PAINLEVEL_OUTOF10: 7
PAINLEVEL_OUTOF10: 7
PAINLEVEL_OUTOF10: 4
PAINLEVEL_OUTOF10: 8
PAINLEVEL_OUTOF10: 8

## 2023-09-17 ASSESSMENT — PAIN DESCRIPTION - LOCATION
LOCATION: BACK
LOCATION: BACK
LOCATION: BACK;CHEST;LEG
LOCATION: BACK
LOCATION: BACK;CHEST;LEG

## 2023-09-17 ASSESSMENT — PAIN DESCRIPTION - ONSET
ONSET: ON-GOING

## 2023-09-17 ASSESSMENT — PAIN DESCRIPTION - FREQUENCY
FREQUENCY: CONTINUOUS

## 2023-09-17 NOTE — PLAN OF CARE
Problem: Chronic Conditions and Co-morbidities  Goal: Patient's chronic conditions and co-morbidity symptoms are monitored and maintained or improved  9/10/2023 1500 by Glenys Quijano RN  Outcome: Progressing  Flowsheets (Taken 9/10/2023 0800)  Care Plan - Patient's Chronic Conditions and Co-Morbidity Symptoms are Monitored and Maintained or Improved:   Monitor and assess patient's chronic conditions and comorbid symptoms for stability, deterioration, or improvement   Collaborate with multidisciplinary team to address chronic and comorbid conditions and prevent exacerbation or deterioration   Update acute care plan with appropriate goals if chronic or comorbid symptoms are exacerbated and prevent overall improvement and discharge    Flowsheets (Taken 9/9/2023 2000)  Care Plan - Patient's Chronic Conditions and Co-Morbidity Symptoms are Monitored and Maintained or Improved:   Monitor and assess patient's chronic conditions and comorbid symptoms for stability, deterioration, or improvement   Collaborate with multidisciplinary team to address chronic and comorbid conditions and prevent exacerbation or deterioration   Update acute care plan with appropriate goals if chronic or comorbid symptoms are exacerbated and prevent overall improvement and discharge     Problem: Discharge Planning  Goal: Discharge to home or other facility with appropriate resources  9/10/2023 1500 by Glenys Quijano RN  Outcome: Progressing  Flowsheets (Taken 9/10/2023 0800)  Discharge to home or other facility with appropriate resources:   Identify barriers to discharge with patient and caregiver   Arrange for needed discharge resources and transportation as appropriate   Identify discharge learning needs (meds, wound care, etc)   Arrange for interpreters to assist at discharge as needed   Refer to discharge planning if patient needs post-hospital services based on physician order or complex needs related to functional status,
Problem: Chronic Conditions and Co-morbidities  Goal: Patient's chronic conditions and co-morbidity symptoms are monitored and maintained or improved  9/10/2023 2045 by Fito Silva RN  Outcome: Progressing     Problem: Discharge Planning  Goal: Discharge to home or other facility with appropriate resources  9/10/2023 2045 by Fito Silva RN  Outcome: Progressing     Problem: Pain  Goal: Verbalizes/displays adequate comfort level or baseline comfort level  9/10/2023 2045 by Fito Silva RN  Outcome: Progressing  Flowsheets (Taken 9/10/2023 2000)  Verbalizes/displays adequate comfort level or baseline comfort level:   Encourage patient to monitor pain and request assistance   Assess pain using appropriate pain scale   Administer analgesics based on type and severity of pain and evaluate response   Implement non-pharmacological measures as appropriate and evaluate response     Problem: Safety - Adult  Goal: Free from fall injury  9/10/2023 2045 by Fito Silva RN  Outcome: Progressing     Problem: ABCDS Injury Assessment  Goal: Absence of physical injury  9/10/2023 2045 by Fito Silva RN  Outcome: Progressing     Problem: Respiratory - Adult  Goal: Achieves optimal ventilation and oxygenation  9/10/2023 2045 by Fito Silva RN  Outcome: Progressing     Problem: Cardiovascular - Adult  Goal: Maintains optimal cardiac output and hemodynamic stability  9/10/2023 2045 by Fito Silva RN  Outcome: Progressing  Flowsheets (Taken 9/10/2023 2000)  Maintains optimal cardiac output and hemodynamic stability:   Monitor blood pressure and heart rate   Monitor urine output and notify Licensed Independent Practitioner for values outside of normal range   Assess for signs of decreased cardiac output   Administer fluid and/or volume expanders as ordered     Problem: Skin/Tissue Integrity  Goal: Absence of new skin breakdown  Description: 1. Monitor for areas of redness and/or skin breakdown  2. Assess vascular access sites hourly  3.   Every
Problem: Chronic Conditions and Co-morbidities  Goal: Patient's chronic conditions and co-morbidity symptoms are monitored and maintained or improved  9/12/2023 0054 by Kaye Carrera RN  Outcome: Progressing  Flowsheets (Taken 9/10/2023 0800 by Tanya Cooney RN)  Care Plan - Patient's Chronic Conditions and Co-Morbidity Symptoms are Monitored and Maintained or Improved:   Monitor and assess patient's chronic conditions and comorbid symptoms for stability, deterioration, or improvement   Collaborate with multidisciplinary team to address chronic and comorbid conditions and prevent exacerbation or deterioration   Update acute care plan with appropriate goals if chronic or comorbid symptoms are exacerbated and prevent overall improvement and discharge  9/11/2023 1740 by Shikha Strickland RN  Outcome: Progressing     Problem: Discharge Planning  Goal: Discharge to home or other facility with appropriate resources  9/11/2023 1740 by Shikha Strickland RN  Outcome: Progressing     Problem: Pain  Goal: Verbalizes/displays adequate comfort level or baseline comfort level  9/12/2023 0054 by Kaye Carrera RN  Outcome: Olivia Parikh (Taken 9/10/2023 2000 by Eric Hernandez RN)  Verbalizes/displays adequate comfort level or baseline comfort level:   Encourage patient to monitor pain and request assistance   Assess pain using appropriate pain scale   Administer analgesics based on type and severity of pain and evaluate response   Implement non-pharmacological measures as appropriate and evaluate response  9/11/2023 1740 by Shikha Strickland RN  Outcome: Progressing     Problem: Safety - Adult  Goal: Free from fall injury  9/12/2023 0054 by Kaye Carrera RN  Outcome: Progressing  Flowsheets (Taken 9/12/2023 0054)  Free From Fall Injury: Instruct family/caregiver on patient safety  Note: Bed locked and in lowest position w/ side rails up. Call light within reach and bed alarm activated.     9/11/2023 1740 by Marvel Sullivan
Problem: Chronic Conditions and Co-morbidities  Goal: Patient's chronic conditions and co-morbidity symptoms are monitored and maintained or improved  9/13/2023 2156 by Eduardo Elizalde RN  Outcome: Progressing     Problem: Discharge Planning  Goal: Discharge to home or other facility with appropriate resources  9/13/2023 2156 by Eduardo Elizalde RN  Outcome: Progressing     Problem: Pain  Goal: Verbalizes/displays adequate comfort level or baseline comfort level  9/13/2023 2156 by Eduardo Elizalde RN  Outcome: Zerita Asha (Taken 9/10/2023 2000 by Lavonne Chong RN)  Verbalizes/displays adequate comfort level or baseline comfort level:   Encourage patient to monitor pain and request assistance   Assess pain using appropriate pain scale   Administer analgesics based on type and severity of pain and evaluate response   Implement non-pharmacological measures as appropriate and evaluate response     Problem: Safety - Adult  Goal: Free from fall injury  9/13/2023 2156 by Eduardo Elizalde RN  Outcome: Progressing  Flowsheets (Taken 9/13/2023 1007 by Tez Wall RN)  Free From Fall Injury: Instruct family/caregiver on patient safety     Problem: ABCDS Injury Assessment  Goal: Absence of physical injury  9/13/2023 2156 by Eduardo Elizalde RN  Outcome: Progressing     Problem: Respiratory - Adult  Goal: Achieves optimal ventilation and oxygenation  9/13/2023 2156 by Eduardo Elizalde RN  Outcome: Progressing  Note: Decrease O2 requirements as tolerated       Problem: Cardiovascular - Adult  Goal: Maintains optimal cardiac output and hemodynamic stability  9/13/2023 2156 by Eduardo Elizalde RN  Outcome: Progressing  Flowsheets (Taken 9/10/2023 2000 by Lavonne Chong RN)  Maintains optimal cardiac output and hemodynamic stability:   Monitor blood pressure and heart rate   Monitor urine output and notify Licensed Independent Practitioner for values outside of normal range   Assess for signs of decreased cardiac
Problem: Chronic Conditions and Co-morbidities  Goal: Patient's chronic conditions and co-morbidity symptoms are monitored and maintained or improved  9/15/2023 0106 by Jason Gresham RN  Outcome: Progressing  Flowsheets (Taken 9/15/2023 0106)  Care Plan - Patient's Chronic Conditions and Co-Morbidity Symptoms are Monitored and Maintained or Improved:   Monitor and assess patient's chronic conditions and comorbid symptoms for stability, deterioration, or improvement   Collaborate with multidisciplinary team to address chronic and comorbid conditions and prevent exacerbation or deterioration   Update acute care plan with appropriate goals if chronic or comorbid symptoms are exacerbated and prevent overall improvement and discharge     Problem: Pain  Goal: Verbalizes/displays adequate comfort level or baseline comfort level  9/15/2023 0106 by Jason Gresham RN  Outcome: Progressing  Flowsheets (Taken 9/15/2023 0106)  Verbalizes/displays adequate comfort level or baseline comfort level:   Encourage patient to monitor pain and request assistance   Assess pain using appropriate pain scale   Administer analgesics based on type and severity of pain and evaluate response   Implement non-pharmacological measures as appropriate and evaluate response   Consider cultural and social influences on pain and pain management   Notify Licensed Independent Practitioner if interventions unsuccessful or patient reports new pain     Problem: Respiratory - Adult  Goal: Achieves optimal ventilation and oxygenation  9/15/2023 0106 by Jason Gresham RN  Outcome: Progressing  Flowsheets (Taken 9/15/2023 0106)  Achieves optimal ventilation and oxygenation:   Assess for changes in respiratory status   Assess for changes in mentation and behavior   Position to facilitate oxygenation and minimize respiratory effort   Oxygen supplementation based on oxygen saturation or arterial blood gases
Problem: Chronic Conditions and Co-morbidities  Goal: Patient's chronic conditions and co-morbidity symptoms are monitored and maintained or improved  9/2/2023 2229 by Solitario May RN  Outcome: Progressing     Problem: Discharge Planning  Goal: Discharge to home or other facility with appropriate resources  Outcome: Not Progressing     Problem: Pain  Goal: Verbalizes/displays adequate comfort level or baseline comfort level  9/2/2023 2229 by Solitario May RN  Outcome: Not Progressing     Problem: Safety - Adult  Goal: Free from fall injury  9/2/2023 2229 by Solitario May RN  Outcome: Progressing     Problem: ABCDS Injury Assessment  Goal: Absence of physical injury  Outcome: Progressing     Problem: Respiratory - Adult  Goal: Achieves optimal ventilation and oxygenation  9/2/2023 2229 by Solitario May RN  Outcome: Not Progressing     Problem: Cardiovascular - Adult  Goal: Maintains optimal cardiac output and hemodynamic stability  9/2/2023 2229 by Solitario May RN  Outcome: Not Progressing     Problem: Skin/Tissue Integrity  Goal: Absence of new skin breakdown  Description: 1. Monitor for areas of redness and/or skin breakdown  2. Assess vascular access sites hourly  3. Every 4-6 hours minimum:  Change oxygen saturation probe site  4. Every 4-6 hours:  If on nasal continuous positive airway pressure, respiratory therapy assess nares and determine need for appliance change or resting period.   Outcome: Progressing     Problem: Discharge Planning  Goal: Discharge to home or other facility with appropriate resources  Outcome: Not Progressing     Problem: Pain  Goal: Verbalizes/displays adequate comfort level or baseline comfort level  9/2/2023 2229 by Solitario May RN  Outcome: Not Progressing  9/2/2023 1754 by Jewel Lopez RN  Outcome: Not Progressing  Flowsheets (Taken 9/2/2023 1741)  Verbalizes/displays adequate comfort level or baseline comfort level:   Assess pain using appropriate pain
Problem: Chronic Conditions and Co-morbidities  Goal: Patient's chronic conditions and co-morbidity symptoms are monitored and maintained or improved  Outcome: [id number] [facility] - Patient's Chronic Conditions and Co-Morbidity Symptoms are Monitored and Maintained or Improved:   Monitor and assess patient's chronic conditions and comorbid symptoms for stability, deterioration, or improvement   Collaborate with multidisciplinary team to address chronic and comorbid conditions and prevent exacerbation or deterioration   Update acute care plan with appropriate goals if chronic or comorbid symptoms are exacerbated and prevent overall improvement and discharge     Problem: Discharge Planning  Goal: Discharge to home or other facility with appropriate resources  Outcome: Progressing  Flowsheets  Discharge to home or other facility with appropriate resources:   Identify barriers to discharge with patient and caregiver   Arrange for needed discharge resources and transportation as appropriate   Identify discharge learning needs (meds, wound care, etc)     Problem: Pain  Goal: Verbalizes/displays adequate comfort level or baseline comfort level  Outcome: Progressing  Flowsheets   Verbalizes/displays adequate comfort level or baseline comfort level:   Assess pain using appropriate pain scale   Administer analgesics based on type and severity of pain and evaluate response   Encourage patient to monitor pain and request assistance     Problem: Safety - Adult  Goal: Free from fall injury  Outcome: Progressing  Flowsheets   Free From Fall Injury:   Instruct family/caregiver on patient safety   Based on caregiver fall risk screen, instruct family/caregiver to ask for assistance with transferring infant if caregiver noted to have fall risk factors     Problem: ABCDS Injury Assessment  Goal: Absence of physical injury  Outcome: Progressing  Flowsheets  Absence of Physical Injury: Implement safety measures based on
Problem: Chronic Conditions and Co-morbidities  Goal: Patient's chronic conditions and co-morbidity symptoms are monitored and maintained or improved  Outcome: Adequate for Discharge     Problem: Discharge Planning  Goal: Discharge to home or other facility with appropriate resources  Outcome: Adequate for Discharge     Problem: Pain  Goal: Verbalizes/displays adequate comfort level or baseline comfort level  Outcome: Adequate for Discharge     Problem: Safety - Adult  Goal: Free from fall injury  Outcome: Adequate for Discharge     Problem: ABCDS Injury Assessment  Goal: Absence of physical injury  Outcome: Adequate for Discharge     Problem: Respiratory - Adult  Goal: Achieves optimal ventilation and oxygenation  Outcome: Adequate for Discharge     Problem: Cardiovascular - Adult  Goal: Maintains optimal cardiac output and hemodynamic stability  Outcome: Adequate for Discharge     Problem: Skin/Tissue Integrity  Goal: Absence of new skin breakdown  Description: 1. Monitor for areas of redness and/or skin breakdown  2. Assess vascular access sites hourly  3. Every 4-6 hours minimum:  Change oxygen saturation probe site  4. Every 4-6 hours:  If on nasal continuous positive airway pressure, respiratory therapy assess nares and determine need for appliance change or resting period.   Outcome: Adequate for Discharge     Problem: Nutrition Deficit:  Goal: Optimize nutritional status  Outcome: Adequate for Discharge
Problem: Chronic Conditions and Co-morbidities  Goal: Patient's chronic conditions and co-morbidity symptoms are monitored and maintained or improved  Outcome: Progressing     Problem: Discharge Planning  Goal: Discharge to home or other facility with appropriate resources  Outcome: Progressing     Problem: Pain  Goal: Verbalizes/displays adequate comfort level or baseline comfort level  Outcome: Progressing     Problem: Safety - Adult  Goal: Free from fall injury  Outcome: Progressing     Problem: ABCDS Injury Assessment  Goal: Absence of physical injury  Outcome: Progressing     Problem: Cardiovascular - Adult  Goal: Maintains optimal cardiac output and hemodynamic stability  Outcome: Progressing
Problem: Chronic Conditions and Co-morbidities  Goal: Patient's chronic conditions and co-morbidity symptoms are monitored and maintained or improved  Outcome: Progressing  Flowsheets  Taken 9/7/2023 1600 by Manuel King [facility] - Patient's Chronic Conditions and Co-Morbidity Symptoms are Monitored and Maintained or Improved: Monitor and assess patient's chronic conditions and comorbid symptoms for stability, deterioration, or improvement  Taken 9/7/2023 1200 by Manuel King [facility] - Patient's Chronic Conditions and Co-Morbidity Symptoms are Monitored and Maintained or Improved: Monitor and assess patient's chronic conditions and comorbid symptoms for stability, deterioration, or improvement  Taken 9/7/2023 0800 by Manuel King [facility] - Patient's Chronic Conditions and Co-Morbidity Symptoms are Monitored and Maintained or Improved: Monitor and assess patient's chronic conditions and comorbid symptoms for stability, deterioration, or improvement     Problem: Pain  Goal: Verbalizes/displays adequate comfort level or baseline comfort level  Outcome: Progressing  Flowsheets  Taken 9/7/2023 2103 by Cale Kaufman RN  Verbalizes/displays adequate comfort level or baseline comfort level:   Encourage patient to monitor pain and request assistance   Administer analgesics based on type and severity of pain and evaluate response   Assess pain using appropriate pain scale   Implement non-pharmacological measures as appropriate and evaluate response   Notify Licensed Independent Practitioner if interventions unsuccessful or patient reports new pain  Taken 9/7/2023 1200 by Emily Church RN  Verbalizes/displays adequate comfort level or baseline comfort level: Assess pain using appropriate pain scale     Problem: Safety - Adult  Goal: Free from fall injury  Flowsheets (Taken 9/7/2023 2103)  Free From Fall Injury: Instruct family/caregiver on patient safety  Note: Bed locked and in lowest position w/ side
Problem: Chronic Conditions and Co-morbidities  Goal: Patient's chronic conditions and co-morbidity symptoms are monitored and maintained or improved  Outcome: Progressing  Flowsheets (Taken 9/12/2023 1600 by Inez Rodriguez RN)  Care Plan - Patient's Chronic Conditions and Co-Morbidity Symptoms are Monitored and Maintained or Improved: Monitor and assess patient's chronic conditions and comorbid symptoms for stability, deterioration, or improvement     Problem: Discharge Planning  Goal: Discharge to home or other facility with appropriate resources  Outcome: Progressing  Flowsheets (Taken 9/12/2023 1600 by Inez Rodriguez RN)  Discharge to home or other facility with appropriate resources: Identify barriers to discharge with patient and caregiver  Note: Wean patient off of oxygen to achieve baseline status      Problem: Pain  Goal: Verbalizes/displays adequate comfort level or baseline comfort level  Outcome: Progressing  Flowsheets (Taken 9/10/2023 2000 by Sarita Fernandes RN)  Verbalizes/displays adequate comfort level or baseline comfort level:   Encourage patient to monitor pain and request assistance   Assess pain using appropriate pain scale   Administer analgesics based on type and severity of pain and evaluate response   Implement non-pharmacological measures as appropriate and evaluate response  Note: Encourage patient to vocalize pain. Use pain mgt strategies, pharmaceutical  and non-pharmaceutical interventions. Problem: Safety - Adult  Goal: Free from fall injury  Outcome: Progressing  Note: Use standard safety measures, make sure call light is within reach, and encourage expression of needs.       Problem: ABCDS Injury Assessment  Goal: Absence of physical injury  Outcome: Progressing     Problem: Respiratory - Adult  Goal: Achieves optimal ventilation and oxygenation  Outcome: Progressing  Flowsheets (Taken 9/10/2023 0800 by Ray Lucas RN)  Achieves optimal ventilation and oxygenation:   Assess
Problem: Chronic Conditions and Co-morbidities  Goal: Patient's chronic conditions and co-morbidity symptoms are monitored and maintained or improved  Outcome: Progressing  Flowsheets (Taken 9/12/2023 1600 by Jean Marie Win RN)  Care Plan - Patient's Chronic Conditions and Co-Morbidity Symptoms are Monitored and Maintained or Improved: Monitor and assess patient's chronic conditions and comorbid symptoms for stability, deterioration, or improvement     Problem: Discharge Planning  Goal: Discharge to home or other facility with appropriate resources  Outcome: Progressing  Flowsheets (Taken 9/12/2023 1600 by Jean Marie Win RN)  Discharge to home or other facility with appropriate resources: Identify barriers to discharge with patient and caregiver     Problem: Pain  Goal: Verbalizes/displays adequate comfort level or baseline comfort level  Outcome: Progressing  Flowsheets (Taken 9/10/2023 2000 by Bonita Tovar RN)  Verbalizes/displays adequate comfort level or baseline comfort level:   Encourage patient to monitor pain and request assistance   Assess pain using appropriate pain scale   Administer analgesics based on type and severity of pain and evaluate response   Implement non-pharmacological measures as appropriate and evaluate response     Problem: Safety - Adult  Goal: Free from fall injury  Outcome: Progressing  Flowsheets (Taken 9/13/2023 1007)  Free From Fall Injury: Instruct family/caregiver on patient safety     Problem: ABCDS Injury Assessment  Goal: Absence of physical injury  Outcome: Progressing  Flowsheets (Taken 9/12/2023 0739 by Kwame Mo RN)  Absence of Physical Injury: Implement safety measures based on patient assessment     Problem: Respiratory - Adult  Goal: Achieves optimal ventilation and oxygenation  Outcome: Progressing  Flowsheets (Taken 9/10/2023 0800 by Mary Olmedo RN)  Achieves optimal ventilation and oxygenation:   Assess for changes in respiratory status   Assess for changes in
Problem: Chronic Conditions and Co-morbidities  Goal: Patient's chronic conditions and co-morbidity symptoms are monitored and maintained or improved  Outcome: Progressing  Flowsheets (Taken 9/15/2023 2154)  Care Plan - Patient's Chronic Conditions and Co-Morbidity Symptoms are Monitored and Maintained or Improved:   Monitor and assess patient's chronic conditions and comorbid symptoms for stability, deterioration, or improvement   Collaborate with multidisciplinary team to address chronic and comorbid conditions and prevent exacerbation or deterioration   Update acute care plan with appropriate goals if chronic or comorbid symptoms are exacerbated and prevent overall improvement and discharge     Problem: Discharge Planning  Goal: Discharge to home or other facility with appropriate resources  Outcome: Progressing  Flowsheets (Taken 9/15/2023 2154)  Discharge to home or other facility with appropriate resources:   Identify barriers to discharge with patient and caregiver   Arrange for needed discharge resources and transportation as appropriate   Identify discharge learning needs (meds, wound care, etc)   Arrange for interpreters to assist at discharge as needed   Refer to discharge planning if patient needs post-hospital services based on physician order or complex needs related to functional status, cognitive ability or social support system     Problem: Pain  Goal: Verbalizes/displays adequate comfort level or baseline comfort level  Outcome: Progressing  Flowsheets (Taken 9/15/2023 2154)  Verbalizes/displays adequate comfort level or baseline comfort level:   Encourage patient to monitor pain and request assistance   Assess pain using appropriate pain scale   Administer analgesics based on type and severity of pain and evaluate response   Implement non-pharmacological measures as appropriate and evaluate response   Consider cultural and social influences on pain and pain management   Notify Licensed
Problem: Chronic Conditions and Co-morbidities  Goal: Patient's chronic conditions and co-morbidity symptoms are monitored and maintained or improved  Outcome: Progressing  Flowsheets (Taken 9/16/2023 1957)  Care Plan - Patient's Chronic Conditions and Co-Morbidity Symptoms are Monitored and Maintained or Improved:   Collaborate with multidisciplinary team to address chronic and comorbid conditions and prevent exacerbation or deterioration   Monitor and assess patient's chronic conditions and comorbid symptoms for stability, deterioration, or improvement   Update acute care plan with appropriate goals if chronic or comorbid symptoms are exacerbated and prevent overall improvement and discharge     Problem: Safety - Adult  Goal: Free from fall injury  Outcome: Progressing  Flowsheets (Taken 9/16/2023 1957)  Free From Fall Injury:   Instruct family/caregiver on patient safety   Based on caregiver fall risk screen, instruct family/caregiver to ask for assistance with transferring infant if caregiver noted to have fall risk factors     Problem: Respiratory - Adult  Goal: Achieves optimal ventilation and oxygenation  Outcome: Progressing  Flowsheets (Taken 9/16/2023 1957)  Achieves optimal ventilation and oxygenation:   Assess for changes in respiratory status   Assess for changes in mentation and behavior   Position to facilitate oxygenation and minimize respiratory effort   Oxygen supplementation based on oxygen saturation or arterial blood gases   Initiate smoking cessation protocol as indicated   Encourage broncho-pulmonary hygiene including cough, deep breathe, incentive spirometry   Assess the need for suctioning and aspirate as needed     Problem: Cardiovascular - Adult  Goal: Maintains optimal cardiac output and hemodynamic stability  Outcome: Progressing  Flowsheets (Taken 9/16/2023 1957)  Maintains optimal cardiac output and hemodynamic stability:   Monitor blood pressure and heart rate   Monitor urine output
Problem: Chronic Conditions and Co-morbidities  Goal: Patient's chronic conditions and co-morbidity symptoms are monitored and maintained or improved  Outcome: Progressing  Flowsheets (Taken 9/6/2023 0800)  Care Plan - Patient's Chronic Conditions and Co-Morbidity Symptoms are Monitored and Maintained or Improved:   Monitor and assess patient's chronic conditions and comorbid symptoms for stability, deterioration, or improvement   Collaborate with multidisciplinary team to address chronic and comorbid conditions and prevent exacerbation or deterioration   Update acute care plan with appropriate goals if chronic or comorbid symptoms are exacerbated and prevent overall improvement and discharge     Problem: Discharge Planning  Goal: Discharge to home or other facility with appropriate resources  Outcome: Progressing  Flowsheets (Taken 9/6/2023 0800)  Discharge to home or other facility with appropriate resources:   Identify barriers to discharge with patient and caregiver   Arrange for needed discharge resources and transportation as appropriate   Identify discharge learning needs (meds, wound care, etc)   Arrange for interpreters to assist at discharge as needed   Refer to discharge planning if patient needs post-hospital services based on physician order or complex needs related to functional status, cognitive ability or social support system     Problem: Pain  Goal: Verbalizes/displays adequate comfort level or baseline comfort level  Outcome: Progressing     Problem: Safety - Adult  Goal: Free from fall injury  Outcome: Progressing     Problem: ABCDS Injury Assessment  Goal: Absence of physical injury  Outcome: Progressing     Problem: Respiratory - Adult  Goal: Achieves optimal ventilation and oxygenation  9/6/2023 1152 by Valeria Islas RN  Outcome: Progressing  Flowsheets (Taken 9/6/2023 0800)  Achieves optimal ventilation and oxygenation:   Assess for changes in respiratory status   Assess for changes in
Problem: Chronic Conditions and Co-morbidities  Goal: Patient's chronic conditions and co-morbidity symptoms are monitored and maintained or improved  Outcome: Progressing  Flowsheets (Taken 9/8/2023 0800)  Care Plan - Patient's Chronic Conditions and Co-Morbidity Symptoms are Monitored and Maintained or Improved:   Monitor and assess patient's chronic conditions and comorbid symptoms for stability, deterioration, or improvement   Collaborate with multidisciplinary team to address chronic and comorbid conditions and prevent exacerbation or deterioration   Update acute care plan with appropriate goals if chronic or comorbid symptoms are exacerbated and prevent overall improvement and discharge     Problem: Discharge Planning  Goal: Discharge to home or other facility with appropriate resources  Outcome: Progressing  Flowsheets (Taken 9/8/2023 0800)  Discharge to home or other facility with appropriate resources:   Identify barriers to discharge with patient and caregiver   Arrange for needed discharge resources and transportation as appropriate   Identify discharge learning needs (meds, wound care, etc)   Arrange for interpreters to assist at discharge as needed   Refer to discharge planning if patient needs post-hospital services based on physician order or complex needs related to functional status, cognitive ability or social support system     Problem: Pain  Goal: Verbalizes/displays adequate comfort level or baseline comfort level  Outcome: Progressing  Flowsheets (Taken 9/8/2023 0800)  Verbalizes/displays adequate comfort level or baseline comfort level:   Assess pain using appropriate pain scale   Encourage patient to monitor pain and request assistance   Administer analgesics based on type and severity of pain and evaluate response   Implement non-pharmacological measures as appropriate and evaluate response   Consider cultural and social influences on pain and pain management   Notify Licensed Independent
Problem: Discharge Planning  Goal: Discharge to home or other facility with appropriate resources  Outcome: Progressing  Flowsheets (Taken 9/4/2023 0251)  Discharge to home or other facility with appropriate resources:   Identify barriers to discharge with patient and caregiver   Arrange for needed discharge resources and transportation as appropriate     Problem: Pain  Goal: Verbalizes/displays adequate comfort level or baseline comfort level  9/4/2023 0251 by Anupama Marcelino RN  Outcome: Progressing  Flowsheets (Taken 9/4/2023 0251)  Verbalizes/displays adequate comfort level or baseline comfort level:   Assess pain using appropriate pain scale   Administer analgesics based on type and severity of pain and evaluate response   Encourage patient to monitor pain and request assistance  Note: Patient called out appropriately for pain medications     Problem: Safety - Adult  Goal: Free from fall injury  9/4/2023 0251 by Anupama Marcelino RN  Outcome: Progressing  Flowsheets (Taken 9/4/2023 0251)  Free From Fall Injury: Instruct family/caregiver on patient safety  Note: Patient moved from the chair to the bed with assistance. Pt is a Fall Risk. See Kathaleen Kussmaul Fall Risk Score. Pt bed in low position and side rails up. Call light and belongings in reach. Pt encouraged to call for assistance. Will continue with hourly rounds for PO intake, pain needs, toileting, and repositioning as needed. Problem: Respiratory - Adult  Goal: Achieves optimal ventilation and oxygenation  9/4/2023 0251 by Anupama Marcelino RN  Flowsheets (Taken 9/4/2023 0251)  Achieves optimal ventilation and oxygenation:   Assess for changes in respiratory status   Assess for changes in mentation and behavior   Position to facilitate oxygenation and minimize respiratory effort   Oxygen supplementation based on oxygen saturation or arterial blood gases  Note: Patient was on 15 liters high flow moved to 40L Heated high flow and then to bipap.      Problem:
Problem: Pain  Goal: Verbalizes/displays adequate comfort level or baseline comfort level  9/2/2023 0241 by Bindu Bennett RN  Outcome: Progressing  Flowsheets (Taken 9/2/2023 0241)  Verbalizes/displays adequate comfort level or baseline comfort level:   Encourage patient to monitor pain and request assistance   Assess pain using appropriate pain scale   Administer analgesics based on type and severity of pain and evaluate response   Implement non-pharmacological measures as appropriate and evaluate response  Note: Pt reporting high pain levels throughout the shift, PRN and scheduled medications given for management. Pt encouraged to begin moving and walking more as tolerated. Will continue to monitor. Problem: Safety - Adult  Goal: Free from fall injury  9/2/2023 0241 by Bindu Bennett RN  Outcome: Progressing  Flowsheets (Taken 9/2/2023 0241)  Free From Fall Injury:   Suzy Graven family/caregiver on patient safety   Based on caregiver fall risk screen, instruct family/caregiver to ask for assistance with transferring infant if caregiver noted to have fall risk factors  Note: Pt high fall risk, all standard safety precautions in place. Pt free from injury. Problem: Respiratory - Adult  Goal: Achieves optimal ventilation and oxygenation  Outcome: Progressing  Flowsheets (Taken 9/2/2023 0245)  Achieves optimal ventilation and oxygenation:   Assess for changes in respiratory status   Assess for changes in mentation and behavior   Position to facilitate oxygenation and minimize respiratory effort  Note: Pt remains on 2L NC. SpO2 90-93%. Will wean as tolerated.
Problem: Pain  Goal: Verbalizes/displays adequate comfort level or baseline comfort level  Outcome: Not Progressing  Flowsheets (Taken 9/1/2023 2009)  Verbalizes/displays adequate comfort level or baseline comfort level:   Assess pain using appropriate pain scale   Administer analgesics based on type and severity of pain and evaluate response  Note: Administrating prn dilaudid and oxy and scheduled tylenol, robaxin and toradol. Pt still c/o uncontrolled pain. Toradol seems to help the best, but unable to get to tolerable level and ambulate. Residents aware. Problem: Safety - Adult  Goal: Free from fall injury  Outcome: Progressing  Note: Pt is alert and oriented x 4. Bed alarm on, call light within reach. Pt calls appropriately for assistance. Problem: Pain  Goal: Verbalizes/displays adequate comfort level or baseline comfort level  Outcome: Not Progressing  Flowsheets (Taken 9/1/2023 2009)  Verbalizes/displays adequate comfort level or baseline comfort level:   Assess pain using appropriate pain scale   Administer analgesics based on type and severity of pain and evaluate response  Note: Administrating prn dilaudid and oxy and scheduled tylenol, robaxin and toradol. Pt still c/o uncontrolled pain. Toradol seems to help the best, but unable to get to tolerable level and ambulate. Residents aware.
Problem: Respiratory - Adult  Goal: Achieves optimal ventilation and oxygenation  9/6/2023 0413 by Ana Hood RN  Outcome: Progressing  Flowsheets (Taken 9/6/2023 0111)  Achieves optimal ventilation and oxygenation: Assess for changes in respiratory status     Problem: Cardiovascular - Adult  Goal: Maintains optimal cardiac output and hemodynamic stability  9/6/2023 0413 by Ana Hood RN  Outcome: Progressing  Flowsheets (Taken 9/6/2023 0111)  Maintains optimal cardiac output and hemodynamic stability: Monitor blood pressure and heart rate
Problem: Safety - Adult  Goal: Free from fall injury  Outcome: Progressing  Flowsheets (Taken 9/3/2023 1750)  Free From Fall Injury: Instruct family/caregiver on patient safety  Note: Patient educated on fall precautions. Toileting, repositioning needs check done every 2 hours. Bed alarm and brake on, bed at the lowest position, call light within in reach. Armband and  socks on. Patient encouraged to call for assistance. Tiwari score 60. Problem: Respiratory - Adult  Goal: Achieves optimal ventilation and oxygenation  Outcome: Progressing  Flowsheets (Taken 9/3/2023 1750)  Achieves optimal ventilation and oxygenation:   Assess for changes in respiratory status   Assess for changes in mentation and behavior   Position to facilitate oxygenation and minimize respiratory effort   Oxygen supplementation based on oxygen saturation or arterial blood gases   Encourage broncho-pulmonary hygiene including cough, deep breathe, incentive spirometry   Assess and instruct to report shortness of breath or any respiratory difficulty   Respiratory therapy support as indicated  Note: Patient was on 12 L HFNC this morning. O2 was increased to 14 L per minute when the patient was ambulated to the chair; SpO2 was around 88-89%. With increase in O2, SpO2 returned to 92%. Currently patient is on 13 liter per minute. Respiratory therapist working with the patient. Needs reinforcement to use incentive spirometry, and ambulating.      Problem: Pain  Goal: Verbalizes/displays adequate comfort level or baseline comfort level  Outcome: Not Progressing  Flowsheets (Taken 9/3/2023 1750)  Verbalizes/displays adequate comfort level or baseline comfort level:   Encourage patient to monitor pain and request assistance   Assess pain using appropriate pain scale   Administer analgesics based on type and severity of pain and evaluate response   Implement non-pharmacological measures as appropriate and evaluate response   Notify Licensed
1741)  Verbalizes/displays adequate comfort level or baseline comfort level:   Assess pain using appropriate pain scale   Encourage patient to monitor pain and request assistance   Administer analgesics based on type and severity of pain and evaluate response   Implement non-pharmacological measures as appropriate and evaluate response   Notify Licensed Independent Practitioner if interventions unsuccessful or patient reports new pain  Note: Patient pain level has been at 7-8/ 10 throughout. Patient has pain in back, flank, and head, and states it as aching and sharp. Pain med as scheduled and as PRN administered. Non-pharmacological interventions such as rest, repositioning and use of pillows as required done. Patient pain level not improving as of now.
lowest position w/ side rails up. Call light within reach and bed alarm activated. 9/12/2023 0054 by Jeannette Sosa RN  Outcome: Progressing  Flowsheets (Taken 9/12/2023 0054)  Free From Fall Injury: Instruct family/caregiver on patient safety  Note: Bed locked and in lowest position w/ side rails up. Call light within reach and bed alarm activated.     9/11/2023 1740 by Satya Storey RN  Outcome: Progressing
blood pressure and heart rate   Monitor urine output and notify Licensed Independent Practitioner for values outside of normal range   Assess for signs of decreased cardiac output   Administer fluid and/or volume expanders as ordered   Administer vasoactive medications as ordered   For PPHN infants, administer sedation as ordered and minimize all controllable stressors. Problem: Skin/Tissue Integrity  Goal: Absence of new skin breakdown  Description: 1. Monitor for areas of redness and/or skin breakdown  2. Assess vascular access sites hourly  3. Every 4-6 hours minimum:  Change oxygen saturation probe site  4. Every 4-6 hours:  If on nasal continuous positive airway pressure, respiratory therapy assess nares and determine need for appliance change or resting period.   9/10/2023 0324 by Camila Rothman RN  Outcome: Progressing  9/10/2023 0324 by Camila Rothman RN  Outcome: Progressing     Problem: Nutrition Deficit:  Goal: Optimize nutritional status  9/10/2023 0324 by Camila Rothman RN  Outcome: Progressing  9/10/2023 0324 by Camila Rothman RN  Outcome: Progressing
complains of pain/discomfort

## 2023-09-17 NOTE — DISCHARGE INSTRUCTIONS
Discharge Instructions:    Diet:   You may resume a regular diet. Wound Care:   Skin glue was used to cover your incision(s). It will fall off on its own in about 10 days. You may shower, but do not scrub the incision sites directly or soak (tub, pool, etc.). Your previous chest tube incision has healed, you may cover it as needed. Activity:   Please take multiple walks per day. Pain management:   Unless informed of any restrictions by your primary care physician, please use your preferred over-the-counter pain reliever (Tylenol or Ibuprofen) as your primary pain medication. If you have pain that persists despite over-the-counter pain medications, you have been provided with a temporary additional prescription for an opioid/narcotic pain reliever (Roxicodone). Please wean this back to your normal dose as able. No driving or operating machinery while taking opioid/narcotic medications. Bowel Regimen:   Opioid/Narcotic pain relievers have a common side effect of constipation; therefore, you have been provided with a prescription for a stool softener, Docusate (Colace). These medications are intended to help prevent you from experiencing this very common side effect and also help to regulate your bowels after surgery. If your stools become too loose and/or frequent, decrease the Colace to one pill one time each day. If your stools are still loose after this modification, stop taking this medication all together. Return Precautions:   Call/ Return to ED for increased redness, worsening pain, drainage from wound, fevers, shorteness of breath, dizziness,  or any other concerns about your incision or post op course. IT IS VERY IMPORTANT THAT YOU SCHEDULE AN APPOINTMENT WITH YOUR PRIMARY CARE PHYSICIAN WITHIN THE NEXT FEW DAYS TO [facility] STAY AND CHANGE IN MEDICATIONS  - you are now taking a new medication (digoxin) for your atrial fibrillation.  Follow up with cardiologist to

## 2023-09-17 NOTE — CARE COORDINATION
Case Management Assessment            Discharge Note                    Date / Time of Note: 9/17/2023 1:20 PM                  Discharge Note Completed by: Bibiana Núñez RN    Patient Name: Opal Dalal   YOB: 1965  Diagnosis: Nodule of lower lobe of left lung [R91.1]   Date / Time: 8/31/2023 12:08 PM    Current PCP: Alexander Lebron MD  Clinic patient: No    Hospitalization in the last 30 days: No       Advance Directives:  Code Status: Full Code  [place] DNR form completed and on chart: No    Financial:  Payor: MEDICARE / Plan: MEDICARE PART A AND B / Product Type: *No Product type* /      Pharmacy:    [facility] [place] [address] [facility]. Dian Lawrence [phone] - f [phone]  [address] [place] [address]  Phone: [phone] Fax: [phone]    [address] [place] [address] [address] [place] [address] - [address] [place] [address] [address] [address] [facility] [place] [address] [phone] - F [phone]  [address] [place] [address]  Phone: [phone] Fax: [phone]      Assistance purchasing medications?: Potential Assistance Purchasing Medications: No  Assistance provided by Case Management: None at this time    Does patient want to participate in local refill/ meds to beds program?: Yes    Meds To Beds General Rules:  1. Can ONLY be done Monday- Friday between 8:30am-5pm  2. Prescription(s) must be in pharmacy by 3pm to be filled same day  3. Copy of patient's insurance/ prescription drug card and patient face sheet must be sent along with the prescription(s)  4. Cost of Rx cannot be added to hospital bill. If financial assistance is needed, please contact unit  or ;  or  CANNOT provide pharmacy voucher for patients co-pays  5.  Patients can then  the prescription on their way out of the hospital at discharge, or pharmacy can deliver to the bedside if staff is available. (payment due at time of pick-up or delivery - cash,

## 2023-09-18 NOTE — TELEPHONE ENCOUNTER
Kiana ([phone]) called back and patient's initial visit will be this Wednesday (09/20/23) and will draw patient's INR at this visit. Patient scheduled for Wednesday.     Denilson Samuels PharmD  [facility]  [id number]  9/18/2023   5:00 PM    For Pharmacy Admin Tracking Only  Time Spent (min): 5

## 2023-09-18 NOTE — TELEPHONE ENCOUNTER
Called [facility] ([phone]) to follow up when patient was going to get his first visit with [facility] and get his INR drawn. Was directed to Margrette Phoenix, RN. Left message with ZULY Bruno to call back and confirm.     Berna Donahue PharmD  The [address] [place] [address]  9/18/2023   4:43 PM    For Pharmacy Admin Tracking Only  Time Spent (min): 5

## 2023-09-20 ENCOUNTER — APPOINTMENT (OUTPATIENT)
Dept: CT IMAGING | Age: 58
DRG: 312 | End: 2023-09-20
Payer: MEDICARE

## 2023-09-20 ENCOUNTER — ANTI-COAG VISIT (OUTPATIENT)
Dept: PHARMACY | Age: 58
End: 2023-09-20

## 2023-09-20 ENCOUNTER — HOSPITAL ENCOUNTER (INPATIENT)
Age: 58
LOS: 1 days | Discharge: HOME HEALTH | DRG: 312 | End: 2023-09-21
Attending: EMERGENCY MEDICINE | Admitting: INTERNAL MEDICINE
Payer: MEDICARE

## 2023-09-20 ENCOUNTER — TELEPHONE (OUTPATIENT)
Dept: PULMONOLOGY | Age: 58
End: 2023-09-20

## 2023-09-20 ENCOUNTER — APPOINTMENT (OUTPATIENT)
Dept: GENERAL RADIOLOGY | Age: 58
DRG: 312 | End: 2023-09-20
Payer: MEDICARE

## 2023-09-20 ENCOUNTER — TELEPHONE (OUTPATIENT)
Dept: INTERNAL MEDICINE CLINIC | Age: 58
End: 2023-09-20

## 2023-09-20 DIAGNOSIS — Z95.2 S/P AORTIC VALVE REPLACEMENT: Primary | Chronic | ICD-10-CM

## 2023-09-20 DIAGNOSIS — I95.89 OTHER SPECIFIED HYPOTENSION: Primary | ICD-10-CM

## 2023-09-20 DIAGNOSIS — I48.91 ATRIAL FIBRILLATION, UNSPECIFIED TYPE (HCC): ICD-10-CM

## 2023-09-20 PROBLEM — R42 LIGHTHEADEDNESS: Status: ACTIVE | Noted: 2023-09-20

## 2023-09-20 LAB
ALBUMIN SERPL-MCNC: 2.8 G/DL (ref 3.4–5)
ANION GAP SERPL CALCULATED.3IONS-SCNC: 13 MMOL/L (ref 3–16)
BASOPHILS # BLD: 0.1 K/UL (ref 0–0.2)
BASOPHILS NFR BLD: 0.7 %
BILIRUB UR QL STRIP.AUTO: NEGATIVE
BUN SERPL-MCNC: 16 MG/DL (ref 7–20)
CALCIUM SERPL-MCNC: 8.6 MG/DL (ref 8.3–10.6)
CHLORIDE SERPL-SCNC: 82 MMOL/L (ref 99–110)
CLARITY UR: CLEAR
CO2 SERPL-SCNC: 29 MMOL/L (ref 21–32)
COLOR UR: YELLOW
CREAT SERPL-MCNC: 1.4 MG/DL (ref 0.8–1.3)
DEPRECATED RDW RBC AUTO: 14.8 % (ref 12.4–15.4)
EKG ATRIAL RATE: 75 BPM
EKG DIAGNOSIS: NORMAL
EKG P AXIS: 57 DEGREES
EKG P-R INTERVAL: 162 MS
EKG Q-T INTERVAL: 420 MS
EKG QRS DURATION: 116 MS
EKG QTC CALCULATION (BAZETT): 469 MS
EKG R AXIS: 19 DEGREES
EKG T AXIS: -1 DEGREES
EKG VENTRICULAR RATE: 75 BPM
EOSINOPHIL # BLD: 0.5 K/UL (ref 0–0.6)
EOSINOPHIL NFR BLD: 3.7 %
GFR SERPLBLD CREATININE-BSD FMLA CKD-EPI: 57 ML/MIN/{1.73_M2}
GLUCOSE SERPL-MCNC: 123 MG/DL (ref 70–99)
GLUCOSE UR STRIP.AUTO-MCNC: NEGATIVE MG/DL
HCT VFR BLD AUTO: 36.7 % (ref 40.5–52.5)
HGB BLD-MCNC: 12.2 G/DL (ref 13.5–17.5)
HGB UR QL STRIP.AUTO: NEGATIVE
INR PPP: 3.9 (ref 0.84–1.16)
INTERNATIONAL NORMALIZATION RATIO, POC: 5.4
KETONES UR STRIP.AUTO-MCNC: ABNORMAL MG/DL
LACTATE BLDV-SCNC: 1.1 MMOL/L (ref 0.4–2)
LEUKOCYTE ESTERASE UR QL STRIP.AUTO: NEGATIVE
LYMPHOCYTES # BLD: 2.2 K/UL (ref 1–5.1)
LYMPHOCYTES NFR BLD: 17.3 %
MCH RBC QN AUTO: 30.4 PG (ref 26–34)
MCHC RBC AUTO-ENTMCNC: 33.3 G/DL (ref 31–36)
MCV RBC AUTO: 91.1 FL (ref 80–100)
MONOCYTES # BLD: 1.2 K/UL (ref 0–1.3)
MONOCYTES NFR BLD: 9.6 %
NEUTROPHILS # BLD: 8.6 K/UL (ref 1.7–7.7)
NEUTROPHILS NFR BLD: 68.7 %
NITRITE UR QL STRIP.AUTO: NEGATIVE
NT-PROBNP SERPL-MCNC: 1624 PG/ML (ref 0–124)
OSMOLALITY SERPL: 268 MOSM/KG (ref 278–305)
OSMOLALITY UR: 178 MOSM/KG (ref 390–1070)
PH UR STRIP.AUTO: 6.5 [PH] (ref 5–8)
PLATELET # BLD AUTO: 304 K/UL (ref 135–450)
PMV BLD AUTO: 8.4 FL (ref 5–10.5)
POTASSIUM SERPL-SCNC: 4 MMOL/L (ref 3.5–5.1)
PROT UR STRIP.AUTO-MCNC: NEGATIVE MG/DL
PROTHROMBIN TIME: 37.9 SEC (ref 11.5–14.8)
RBC # BLD AUTO: 4.03 M/UL (ref 4.2–5.9)
SODIUM SERPL-SCNC: 124 MMOL/L (ref 136–145)
SODIUM UR-SCNC: <20 MMOL/L
SP GR UR STRIP.AUTO: 1.01 (ref 1–1.03)
TROPONIN, HIGH SENSITIVITY: 34 NG/L (ref 0–22)
TROPONIN, HIGH SENSITIVITY: 41 NG/L (ref 0–22)
UA COMPLETE W REFLEX CULTURE PNL UR: ABNORMAL
UA DIPSTICK W REFLEX MICRO PNL UR: ABNORMAL
URATE SERPL-MCNC: 6.2 MG/DL (ref 3.5–7.2)
URN SPEC COLLECT METH UR: ABNORMAL
UROBILINOGEN UR STRIP-ACNC: 0.2 E.U./DL
WBC # BLD AUTO: 12.5 K/UL (ref 4–11)

## 2023-09-20 PROCEDURE — 71046 X-RAY EXAM CHEST 2 VIEWS: CPT

## 2023-09-20 PROCEDURE — 2580000003 HC RX 258: Performed by: PHYSICIAN ASSISTANT

## 2023-09-20 PROCEDURE — 36415 COLL VENOUS BLD VENIPUNCTURE: CPT

## 2023-09-20 PROCEDURE — 83930 ASSAY OF BLOOD OSMOLALITY: CPT

## 2023-09-20 PROCEDURE — 2700000000 HC OXYGEN THERAPY PER DAY

## 2023-09-20 PROCEDURE — 84300 ASSAY OF URINE SODIUM: CPT

## 2023-09-20 PROCEDURE — 94761 N-INVAS EAR/PLS OXIMETRY MLT: CPT

## 2023-09-20 PROCEDURE — 96360 HYDRATION IV INFUSION INIT: CPT

## 2023-09-20 PROCEDURE — 85025 COMPLETE CBC W/AUTO DIFF WBC: CPT

## 2023-09-20 PROCEDURE — 6370000000 HC RX 637 (ALT 250 FOR IP): Performed by: PHYSICIAN ASSISTANT

## 2023-09-20 PROCEDURE — 80048 BASIC METABOLIC PNL TOTAL CA: CPT

## 2023-09-20 PROCEDURE — 70450 CT HEAD/BRAIN W/O DYE: CPT

## 2023-09-20 PROCEDURE — 2060000000 HC ICU INTERMEDIATE R&B

## 2023-09-20 PROCEDURE — 6360000004 HC RX CONTRAST MEDICATION: Performed by: PHYSICIAN ASSISTANT

## 2023-09-20 PROCEDURE — 83880 ASSAY OF NATRIURETIC PEPTIDE: CPT

## 2023-09-20 PROCEDURE — 84550 ASSAY OF BLOOD/URIC ACID: CPT

## 2023-09-20 PROCEDURE — 81003 URINALYSIS AUTO W/O SCOPE: CPT

## 2023-09-20 PROCEDURE — 71260 CT THORAX DX C+: CPT

## 2023-09-20 PROCEDURE — 83605 ASSAY OF LACTIC ACID: CPT

## 2023-09-20 PROCEDURE — 82040 ASSAY OF SERUM ALBUMIN: CPT

## 2023-09-20 PROCEDURE — 83935 ASSAY OF URINE OSMOLALITY: CPT

## 2023-09-20 PROCEDURE — 93005 ELECTROCARDIOGRAM TRACING: CPT | Performed by: EMERGENCY MEDICINE

## 2023-09-20 PROCEDURE — 85610 PROTHROMBIN TIME: CPT

## 2023-09-20 PROCEDURE — 96361 HYDRATE IV INFUSION ADD-ON: CPT

## 2023-09-20 PROCEDURE — 99285 EMERGENCY DEPT VISIT HI MDM: CPT

## 2023-09-20 PROCEDURE — 84484 ASSAY OF TROPONIN QUANT: CPT

## 2023-09-20 RX ORDER — SODIUM CHLORIDE 9 MG/ML
INJECTION, SOLUTION INTRAVENOUS CONTINUOUS
Status: CANCELLED | OUTPATIENT
Start: 2023-09-20 | End: 2023-09-21

## 2023-09-20 RX ORDER — 0.9 % SODIUM CHLORIDE 0.9 %
500 INTRAVENOUS SOLUTION INTRAVENOUS ONCE
Status: COMPLETED | OUTPATIENT
Start: 2023-09-20 | End: 2023-09-20

## 2023-09-20 RX ORDER — OXYCODONE HYDROCHLORIDE AND ACETAMINOPHEN 5; 325 MG/1; MG/1
1 TABLET ORAL ONCE
Status: COMPLETED | OUTPATIENT
Start: 2023-09-20 | End: 2023-09-20

## 2023-09-20 RX ADMIN — SODIUM CHLORIDE 500 ML: 9 INJECTION, SOLUTION INTRAVENOUS at 16:08

## 2023-09-20 RX ADMIN — SODIUM CHLORIDE 500 ML: 9 INJECTION, SOLUTION INTRAVENOUS at 14:50

## 2023-09-20 RX ADMIN — OXYCODONE AND ACETAMINOPHEN 1 TABLET: 5; 325 TABLET ORAL at 17:48

## 2023-09-20 RX ADMIN — IOPAMIDOL 75 ML: 755 INJECTION, SOLUTION INTRAVENOUS at 15:37

## 2023-09-20 ASSESSMENT — ENCOUNTER SYMPTOMS
ABDOMINAL PAIN: 0
CHEST TIGHTNESS: 0
SHORTNESS OF BREATH: 0
COUGH: 0
NAUSEA: 0
DIARRHEA: 0
VOMITING: 0

## 2023-09-20 ASSESSMENT — PAIN SCALES - GENERAL: PAINLEVEL_OUTOF10: 8

## 2023-09-20 ASSESSMENT — PAIN DESCRIPTION - LOCATION: LOCATION: BACK

## 2023-09-20 NOTE — PROGRESS NOTES
symptoms  Benefits to quitting: health, time, financial  Tobacco cessation quit tips  Trigger avoidance and coping with the urge to quit   Nicotine replacement and pharmacological options     Standard written patient education provided:  Medication Management Tobacco Cessation Program packet  Stopping smoking: Care Instructions  Deciding About Using Medicines To Quit Smoking  Learning About Benefits From Quitting Smoking  10 Things You Should Know About Quitting Smoking     If not ready to quit: Reviewed 5Rs- Relevance, Risk, Rewards, Roadblocks, Repetition    Karin WelchD, BCPS  [facility] Medication Management [place]: [phone]  [place]: [phone]  9/20/2023 12:12 PM    For Pharmacy Admin Tracking Only    Program: Medication Management  CPA in place:  Yes  Recommendation Provided To: Provider: 1 via Note to Provider and Patient/Caregiver: 2 via In person  Intervention Detail: Dose Adjustment: 1, reason: Therapy De-escalation  Intervention Accepted By: Provider: 1 and Patient/Caregiver: 2  Gap Closed?: No   Time Spent (min): 30

## 2023-09-20 NOTE — ED PROVIDER NOTES
ED Attending Attestation Note     Date of evaluation: 9/20/2023    This patient was seen by the advance practice provider. I have seen and examined the patient, agree with the workup, evaluation, management and diagnosis. The care plan has been discussed. I have reviewed the ECG and concur with the ANGEL's interpretation. My assessment reveals uncomfortable appearing gentleman sitting up in bed looking mildly short of breath    Chief complaint--lightheaded, low blood pressure at home    HPI--was discharged 2 days ago after a prolonged stay after partial left upper lobe removal with multiple complications. At home the patient's had low blood pressures, lightheaded sensation and was referred back to the ER for evaluation. PMH--COPD, CAD, AVR.      Ty Champion MD  09/20/23 3628
Joint pain, Left testicular cancer ([address]), Myalgia and myositis, unspecified, Neuropathy, OA (osteoarthritis) of knee, Obesity, Class I, BMI 30.0-34.9 (see actual BMI), Prolonged emergence from general anesthesia, S/P AVR, S/P CABG x 2, Shoulder instability-LEFT, Type 2 diabetes mellitus without complication, without long-term current use of insulin ([address]), and Wears dentures. He has a past surgical history that includes Aortic valve surgery (08/27/2004); Testicle removal (Left, 2001); Coronary artery bypass graft (08/27/2004); Foot surgery (Left, 01/24/2012); Shoulder arthroscopy (Right, 11/21/2013); Coronary angioplasty with stent (07/2014); Carpal tunnel release (Right, 03/17/2015); lumbar discectomy (2012); Tunneled venous port placement (2002); colectomy (10/10/2016); Incisional hernia repair (2010); EMG (04/16/2012); Cardiac catheterization (05/09/2017); Aorta surgery (08/27/2004); Soft Tissue Tumor Resection (2001); Cardiac catheterization (08/26/2004); Aortic valve replacement (05/30/2017); Coronary artery bypass graft (05/30/2017); transesophageal echocardiogram (05/30/2017); back surgery; hemicolectomy (N/A, 07/26/2019); bronchoscopy (N/A, 04/08/2022); bronchoscopy (04/08/2022); Spinal Cord Stimulator Surgery (Right, 10/15/2019); and Lung removal, total (Left, 8/31/2023). His family history includes Alzheimer's Disease in his sister; Cancer in his father and mother; Domi Zepeda in his brother. He reports that he has been smoking cigarettes. He has a 45.00 pack-year smoking history. He has never used smokeless tobacco. He reports that he does not drink alcohol and does not use drugs.     Medications     Previous Medications    ALBUTEROL (PROVENTIL) (2.5 MG/3ML) 0.083% NEBULIZER SOLUTION    Take 3 mLs by nebulization every 4 hours as needed for Wheezing    AMLODIPINE (NORVASC) 5 MG TABLET    Take 1 tablet by mouth daily    ASPIRIN 81 MG EC TABLET    Take 1 tablet by mouth daily    BLOOD PRESSURE KIT    1

## 2023-09-20 NOTE — TELEPHONE ENCOUNTER
Will forward message to Dr. Frandy Alvarez: Patient seen today by home health nurse and B/p 60/40; patient refused 911 but daughter was planning to take him to [facility] in area where they live. Pulmonologist  was notified by visiting nurse.

## 2023-09-20 NOTE — TELEPHONE ENCOUNTER
SID FROM [facility] STATED SHE SEEN PT TODAY FOR START OF HOME CARE AND HIS BP WAS 60/40 SO HE WAS SENT TO THE ER. SID STATED HE WENT TO [facility].  SID CAN BE REACHED [phone]

## 2023-09-20 NOTE — ED TRIAGE NOTES
Pt coming from home for increased weakness, near syncope, low BP and fatigue since he was d/c on Monday for lung resection.  On 3-5 L baseline

## 2023-09-20 NOTE — TELEPHONE ENCOUNTER
Sasha from [employer] called to update on [place]. She arrived for first home visit and BP was 60/40  Offered to call 911 and patient wanted his sister to take him.    At the time of the call patient was in route to [address] [place] [address]

## 2023-09-21 ENCOUNTER — TELEPHONE (OUTPATIENT)
Dept: PHARMACY | Age: 58
End: 2023-09-21

## 2023-09-21 VITALS
HEIGHT: 72 IN | WEIGHT: 211.86 LBS | HEART RATE: 98 BPM | BODY MASS INDEX: 28.7 KG/M2 | TEMPERATURE: 97.8 F | SYSTOLIC BLOOD PRESSURE: 116 MMHG | RESPIRATION RATE: 18 BRPM | DIASTOLIC BLOOD PRESSURE: 69 MMHG | OXYGEN SATURATION: 96 %

## 2023-09-21 LAB
ALBUMIN SERPL-MCNC: 2.7 G/DL (ref 3.4–5)
ANION GAP SERPL CALCULATED.3IONS-SCNC: 11 MMOL/L (ref 3–16)
BASOPHILS # BLD: 0.1 K/UL (ref 0–0.2)
BASOPHILS NFR BLD: 1.2 %
BUN SERPL-MCNC: 8 MG/DL (ref 7–20)
CALCIUM SERPL-MCNC: 8.6 MG/DL (ref 8.3–10.6)
CHLORIDE SERPL-SCNC: 92 MMOL/L (ref 99–110)
CO2 SERPL-SCNC: 31 MMOL/L (ref 21–32)
CREAT SERPL-MCNC: 0.6 MG/DL (ref 0.8–1.3)
DEPRECATED RDW RBC AUTO: 14.5 % (ref 12.4–15.4)
EOSINOPHIL # BLD: 0.4 K/UL (ref 0–0.6)
EOSINOPHIL NFR BLD: 5 %
EST. AVERAGE GLUCOSE BLD GHB EST-MCNC: 142.7 MG/DL
GFR SERPLBLD CREATININE-BSD FMLA CKD-EPI: >60 ML/MIN/{1.73_M2}
GLUCOSE SERPL-MCNC: 129 MG/DL (ref 70–99)
HBA1C MFR BLD: 6.6 %
HCT VFR BLD AUTO: 34.6 % (ref 40.5–52.5)
HGB BLD-MCNC: 11.9 G/DL (ref 13.5–17.5)
INR PPP: 3.67 (ref 0.84–1.16)
LYMPHOCYTES # BLD: 1.7 K/UL (ref 1–5.1)
LYMPHOCYTES NFR BLD: 21.4 %
MAGNESIUM SERPL-MCNC: 1.9 MG/DL (ref 1.8–2.4)
MCH RBC QN AUTO: 31 PG (ref 26–34)
MCHC RBC AUTO-ENTMCNC: 34.4 G/DL (ref 31–36)
MCV RBC AUTO: 90.2 FL (ref 80–100)
MONOCYTES # BLD: 0.7 K/UL (ref 0–1.3)
MONOCYTES NFR BLD: 9.2 %
NEUTROPHILS # BLD: 4.9 K/UL (ref 1.7–7.7)
NEUTROPHILS NFR BLD: 63.2 %
PHOSPHATE SERPL-MCNC: 2.6 MG/DL (ref 2.5–4.9)
PLATELET # BLD AUTO: 217 K/UL (ref 135–450)
PMV BLD AUTO: 7.7 FL (ref 5–10.5)
POTASSIUM SERPL-SCNC: 3.2 MMOL/L (ref 3.5–5.1)
PROTHROMBIN TIME: 36.2 SEC (ref 11.5–14.8)
RBC # BLD AUTO: 3.83 M/UL (ref 4.2–5.9)
SODIUM SERPL-SCNC: 129 MMOL/L (ref 136–145)
SODIUM SERPL-SCNC: 133 MMOL/L (ref 136–145)
SODIUM SERPL-SCNC: 134 MMOL/L (ref 136–145)
WBC # BLD AUTO: 7.7 K/UL (ref 4–11)

## 2023-09-21 PROCEDURE — 36415 COLL VENOUS BLD VENIPUNCTURE: CPT

## 2023-09-21 PROCEDURE — 2580000003 HC RX 258

## 2023-09-21 PROCEDURE — 6360000002 HC RX W HCPCS

## 2023-09-21 PROCEDURE — 85025 COMPLETE CBC W/AUTO DIFF WBC: CPT

## 2023-09-21 PROCEDURE — 85610 PROTHROMBIN TIME: CPT

## 2023-09-21 PROCEDURE — 84295 ASSAY OF SERUM SODIUM: CPT

## 2023-09-21 PROCEDURE — 83735 ASSAY OF MAGNESIUM: CPT

## 2023-09-21 PROCEDURE — 83036 HEMOGLOBIN GLYCOSYLATED A1C: CPT

## 2023-09-21 PROCEDURE — 80069 RENAL FUNCTION PANEL: CPT

## 2023-09-21 PROCEDURE — 6370000000 HC RX 637 (ALT 250 FOR IP)

## 2023-09-21 PROCEDURE — 6370000000 HC RX 637 (ALT 250 FOR IP): Performed by: INTERNAL MEDICINE

## 2023-09-21 RX ORDER — ACETAMINOPHEN 325 MG/1
650 TABLET ORAL EVERY 6 HOURS PRN
Status: DISCONTINUED | OUTPATIENT
Start: 2023-09-21 | End: 2023-09-21 | Stop reason: HOSPADM

## 2023-09-21 RX ORDER — ASPIRIN 81 MG/1
81 TABLET ORAL DAILY
Status: DISCONTINUED | OUTPATIENT
Start: 2023-09-21 | End: 2023-09-21 | Stop reason: HOSPADM

## 2023-09-21 RX ORDER — 0.9 % SODIUM CHLORIDE 0.9 %
1000 INTRAVENOUS SOLUTION INTRAVENOUS ONCE
Status: COMPLETED | OUTPATIENT
Start: 2023-09-21 | End: 2023-09-21

## 2023-09-21 RX ORDER — DIGOXIN 125 MCG
250 TABLET ORAL DAILY
Status: DISCONTINUED | OUTPATIENT
Start: 2023-09-21 | End: 2023-09-21 | Stop reason: HOSPADM

## 2023-09-21 RX ORDER — ONDANSETRON 2 MG/ML
4 INJECTION INTRAMUSCULAR; INTRAVENOUS EVERY 6 HOURS PRN
Status: DISCONTINUED | OUTPATIENT
Start: 2023-09-21 | End: 2023-09-21 | Stop reason: HOSPADM

## 2023-09-21 RX ORDER — ONDANSETRON 4 MG/1
4 TABLET, ORALLY DISINTEGRATING ORAL EVERY 8 HOURS PRN
Status: DISCONTINUED | OUTPATIENT
Start: 2023-09-21 | End: 2023-09-21 | Stop reason: HOSPADM

## 2023-09-21 RX ORDER — FAMOTIDINE 20 MG/1
20 TABLET, FILM COATED ORAL 2 TIMES DAILY
Status: DISCONTINUED | OUTPATIENT
Start: 2023-09-21 | End: 2023-09-21 | Stop reason: HOSPADM

## 2023-09-21 RX ORDER — OXYCODONE AND ACETAMINOPHEN 7.5; 325 MG/1; MG/1
1 TABLET ORAL EVERY 6 HOURS PRN
Status: DISCONTINUED | OUTPATIENT
Start: 2023-09-21 | End: 2023-09-21 | Stop reason: HOSPADM

## 2023-09-21 RX ORDER — POLYETHYLENE GLYCOL 3350 17 G/17G
17 POWDER, FOR SOLUTION ORAL DAILY PRN
Status: DISCONTINUED | OUTPATIENT
Start: 2023-09-21 | End: 2023-09-21 | Stop reason: HOSPADM

## 2023-09-21 RX ORDER — DEXTROSE MONOHYDRATE 100 MG/ML
INJECTION, SOLUTION INTRAVENOUS CONTINUOUS PRN
Status: DISCONTINUED | OUTPATIENT
Start: 2023-09-21 | End: 2023-09-21 | Stop reason: HOSPADM

## 2023-09-21 RX ORDER — SODIUM CHLORIDE 0.9 % (FLUSH) 0.9 %
5-40 SYRINGE (ML) INJECTION EVERY 12 HOURS SCHEDULED
Status: DISCONTINUED | OUTPATIENT
Start: 2023-09-21 | End: 2023-09-21 | Stop reason: HOSPADM

## 2023-09-21 RX ORDER — ALBUTEROL SULFATE 2.5 MG/3ML
2.5 SOLUTION RESPIRATORY (INHALATION) EVERY 4 HOURS PRN
Status: DISCONTINUED | OUTPATIENT
Start: 2023-09-21 | End: 2023-09-21 | Stop reason: HOSPADM

## 2023-09-21 RX ORDER — DIPHENHYDRAMINE HYDROCHLORIDE 50 MG/ML
25 INJECTION INTRAMUSCULAR; INTRAVENOUS EVERY 6 HOURS PRN
Status: DISCONTINUED | OUTPATIENT
Start: 2023-09-21 | End: 2023-09-21 | Stop reason: HOSPADM

## 2023-09-21 RX ORDER — SODIUM CHLORIDE 0.9 % (FLUSH) 0.9 %
5-40 SYRINGE (ML) INJECTION PRN
Status: DISCONTINUED | OUTPATIENT
Start: 2023-09-21 | End: 2023-09-21 | Stop reason: HOSPADM

## 2023-09-21 RX ORDER — METHOCARBAMOL 750 MG/1
750 TABLET, FILM COATED ORAL 3 TIMES DAILY
Status: DISCONTINUED | OUTPATIENT
Start: 2023-09-21 | End: 2023-09-21 | Stop reason: HOSPADM

## 2023-09-21 RX ORDER — ACETAMINOPHEN 650 MG/1
650 SUPPOSITORY RECTAL EVERY 6 HOURS PRN
Status: DISCONTINUED | OUTPATIENT
Start: 2023-09-21 | End: 2023-09-21 | Stop reason: HOSPADM

## 2023-09-21 RX ORDER — ENOXAPARIN SODIUM 100 MG/ML
40 INJECTION SUBCUTANEOUS DAILY
Status: DISCONTINUED | OUTPATIENT
Start: 2023-09-21 | End: 2023-09-21

## 2023-09-21 RX ORDER — PREGABALIN 150 MG/1
150 CAPSULE ORAL 3 TIMES DAILY
Status: DISCONTINUED | OUTPATIENT
Start: 2023-09-21 | End: 2023-09-21 | Stop reason: HOSPADM

## 2023-09-21 RX ORDER — ROSUVASTATIN CALCIUM 20 MG/1
20 TABLET, COATED ORAL NIGHTLY
Status: DISCONTINUED | OUTPATIENT
Start: 2023-09-21 | End: 2023-09-21 | Stop reason: HOSPADM

## 2023-09-21 RX ORDER — SODIUM CHLORIDE 9 MG/ML
INJECTION, SOLUTION INTRAVENOUS PRN
Status: DISCONTINUED | OUTPATIENT
Start: 2023-09-21 | End: 2023-09-21 | Stop reason: HOSPADM

## 2023-09-21 RX ADMIN — SODIUM CHLORIDE 1000 ML: 9 INJECTION, SOLUTION INTRAVENOUS at 02:41

## 2023-09-21 RX ADMIN — OXYCODONE AND ACETAMINOPHEN 1 TABLET: 7.5; 325 TABLET ORAL at 11:50

## 2023-09-21 RX ADMIN — OXYCODONE AND ACETAMINOPHEN 1 TABLET: 7.5; 325 TABLET ORAL at 02:24

## 2023-09-21 RX ADMIN — METHOCARBAMOL 750 MG: 750 TABLET ORAL at 08:17

## 2023-09-21 RX ADMIN — POTASSIUM BICARBONATE 40 MEQ: 782 TABLET, EFFERVESCENT ORAL at 11:55

## 2023-09-21 RX ADMIN — SODIUM CHLORIDE, PRESERVATIVE FREE 10 ML: 5 INJECTION INTRAVENOUS at 08:18

## 2023-09-21 RX ADMIN — DIPHENHYDRAMINE HYDROCHLORIDE 25 MG: 50 INJECTION, SOLUTION INTRAMUSCULAR; INTRAVENOUS at 02:42

## 2023-09-21 RX ADMIN — ASPIRIN 81 MG: 81 TABLET, COATED ORAL at 08:17

## 2023-09-21 RX ADMIN — PREGABALIN 150 MG: 150 CAPSULE ORAL at 08:17

## 2023-09-21 RX ADMIN — DIGOXIN 250 MCG: 125 TABLET ORAL at 08:17

## 2023-09-21 RX ADMIN — FAMOTIDINE 20 MG: 20 TABLET, FILM COATED ORAL at 08:17

## 2023-09-21 ASSESSMENT — PAIN DESCRIPTION - PAIN TYPE
TYPE: CHRONIC PAIN

## 2023-09-21 ASSESSMENT — PAIN DESCRIPTION - ORIENTATION
ORIENTATION: LOWER
ORIENTATION: MID
ORIENTATION: LOWER
ORIENTATION: MID

## 2023-09-21 ASSESSMENT — PAIN SCALES - GENERAL
PAINLEVEL_OUTOF10: 9
PAINLEVEL_OUTOF10: 9
PAINLEVEL_OUTOF10: 10
PAINLEVEL_OUTOF10: 9
PAINLEVEL_OUTOF10: 9

## 2023-09-21 ASSESSMENT — PAIN DESCRIPTION - FREQUENCY
FREQUENCY: CONTINUOUS
FREQUENCY: INTERMITTENT
FREQUENCY: INTERMITTENT
FREQUENCY: CONTINUOUS

## 2023-09-21 ASSESSMENT — PAIN DESCRIPTION - ONSET
ONSET: ON-GOING

## 2023-09-21 ASSESSMENT — PAIN DESCRIPTION - DESCRIPTORS
DESCRIPTORS: ACHING
DESCRIPTORS: DISCOMFORT
DESCRIPTORS: ACHING;DISCOMFORT

## 2023-09-21 ASSESSMENT — PAIN DESCRIPTION - LOCATION
LOCATION: BACK
LOCATION: BACK
LOCATION: BACK;HIP
LOCATION: BACK

## 2023-09-21 ASSESSMENT — PAIN DESCRIPTION - DIRECTION: RADIATING_TOWARDS: NO

## 2023-09-21 NOTE — PROGRESS NOTES
Comprehensive Nutrition Assessment    RECOMMENDATIONS:  PO Diet: Regular ; continue   ONS: Ensure +HP TID  Nutrition Education: Education not indicated     NUTRITION ASSESSMENT:   Nutritional summary & status: +IP r/t MST 3. Per chart review, pt has lost 11% of bw x 3 mos. Noted pt previously admitted to [facility] and assessed by RD on 9/7. He presented to the ED after home health aide observed him to have a BP of 82/51. He was recently at the hospital for two weeks after a lung mass removal. He states he has been more light headed since hospital discharge. He was admitted to further investigate the causes for his low BP and BRENDA. Pt is currently on a regular diet w/ meal intakes unknown d/t recent admit <24h. Pt receptive to receiving Ensure supplements. Will order Ensure +HP BID w/ meals in order to encourage optimal nutrition status/intakes during LOS. Will assess intakes/acceptance at f/u visit & modify prn. Labs reviewed. Glucose elevated, but <180. Pt having regular BM's on admit. Continue to monitor pt per [facility]; RD following. Admission // PMH: Admit 2/2 Fatigue and Shortness of Breath (Pt coming from home for weakness, near syncope and weakness. New O2 requirement since last admission after having portion of lung removed and d/c on monday)    MALNUTRITION ASSESSMENT  Context of Malnutrition: Acute Illness   Malnutrition Status:  At risk for malnutrition (Comment)  Findings of the 6 clinical characteristics of malnutrition (Minimum of 2 out of 6 clinical characteristics is required to make the diagnosis of moderate or severe Protein Calorie Malnutrition based on AND/ASPEN Guidelines):  Energy Intake:  Unable to assess  Weight Loss:  Greater than 5% over 1 month     Body Fat Loss:  No significant body fat loss     Muscle Mass Loss:  No significant muscle mass loss    Fluid Accumulation:  No significant fluid accumulation     Strength:  Not Performed    NUTRITION DIAGNOSIS   Inadequate oral intake related to

## 2023-09-21 NOTE — CONSULTS
Clinical Pharmacy Progress Note    Warfarin - Management by Pharmacy    Consult Date(s): 9/21/23  Consulting Provider(s): Shad Castro    Assessment / Plan  A-fib/Heart valve replacement (bovine AVR) - Warfarin  Goal INR: 2 - 3  Concurrent Anticoagulants / Antiplatelets: Aspirin 81 mg daily  Interactions: Digoxin - may enhance anticoagulant effect of Warfarin, typically does not require Warfarin dose adjustment. Current Regimen / Plan:   Recently discharged from [facility]. During previous hospital admission patient had dramatic increase in INR after restarting home dose, possibly in part due to course of steroids. Warfarin was held for 4 days, INR peaked at 4.29 but returned to goal before discharge. Recommended substantially reducing home dose for discharge. INR today = 3.67, down from 3.90 yesterday and 5.4 the day prior. Will hold warfarin dose tonight. Will monitor pt's clinical status and INR daily, and make dose adjustments as needed. Thank you for consulting pharmacy,    Jace Andres, PharmD  PGY1 Pharmacy Resident  Phone: [phone]  Main Pharmacy: [id number]  9/21/2023 11:49 AM      Interval update:     Subjective/Objective:   Dominique Dye is a 59 y.o. male with a PMHx significant for bovine AVR, CAD s/p CABG (x3 in May, 2017), pA-fib, HLD, HTN, testicular CA, and recent admission to [facility] for lung nodule resection with subsequent ICU admission who is admitted with hypotension and elevated INR. [facility] is consulted to manage warfarin. Ht Readings from Last 1 Encounters:   09/21/23 6' (1.829 m)     Wt Readings from Last 1 Encounters:   09/21/23 211 lb 13.8 oz (96.1 kg)     Prior / Home Warfarin Regimen:  Previous home dose was warfarin 7.5 mg MWF, 5 mg all other days (42.5 mg weekly). Recommended dose at discharge on 9/18 was 5 mg Sun/Wed, 2.5 mg all other days (22.5 mg weekly).     Date INR Warfarin   9/15 2.34 2.5 mg   9/16 2.75 2.5 mg   9/17 2.66 5 mg   9/18 9/19 -    9/20 3.90 Hold   9/21 3.67

## 2023-09-21 NOTE — PROGRESS NOTES
Internal Medicine Progress Note    Date: 9/21/2023  Patient: Luis Eduardo Jade Day: 1      CC: Fatigue and Shortness of Breath (Pt coming from home for weakness, near syncope and weakness. New O2 requirement since last admission after having portion of lung removed and d/c on monday)      Interval Hx   ***      HPI: Patient is a 78-year-old male with PMHx CAD s/p CABG, aortic valve replacement, atrial fibrillation w/ RVR, HFpEF, severe COPD on baseline 2 L O2, tobacco use (40 pack years), DM, transverse colectomy (2016), right colectomy (2019), cholecystectomy (2019), and testicular cancer s/p chemo and resection. Was recently hospitalized for elective VATS with minithoracotomy and wide resection of LLL mass on 8/31/2023 found to be NSCLC who presented with fatigue and dyspnea. Came from home after home health measured blood pressure of 82/51. Per H&P, patient complained of lightheadedness since hospital discharge. Denied chest pain, dizziness, visual disturbance, and headaches. Endorsed cervical neck pain which radiated to the bilateral legs, which seems to be chronic. Serum sodium in the ED measured at 124. Of note, patient appears to have chronic hyponatremia in the low 130s typically. Creatinine measured in the ED at 1.4 above baseline of 0.9. Pressures and sodium both improved with administration of IVF. Patient also requiring 4 L via nasal cannula. Patient admitted for BRENDA, hypotension, and acute hypoxemic respiratory failure. CTPA negative for PE. Chest x-ray showed patchy consolidation in the left greater than right lung bases similar to multiple prior studies. CT head showed no acute intracranial process. Urinalysis significant for trace ketones. CBC stable from previous hospitalization. Serum osmolality 268 and urine osmolality 178. proBNP elevated 1624 above baseline of 600s.       Objective     Vital Signs:  Patient Vitals for the past 8 hrs:   BP Temp Temp src Pulse Resp

## 2023-09-21 NOTE — CONSULTS
Nephrology Consult Note                                                                                                                                                                                                                                                                                                                                                               Office : [phone]     Fax :[phone]    Patient's Name: Joan Lopez  8:33 AM  9/21/2023    Reason for Consult:  Hyponatremia / BRENDA   Requesting Physician:  Jaqui Jung MD  Chief Complaint:    Chief Complaint   Patient presents with    Fatigue    Shortness of Breath     Pt coming from home for weakness, near syncope and weakness. New O2 requirement since last admission after having portion of lung removed and d/c on monday       Assessment/Plan     # Hyponatremia - improving   - Suspect secondary to hypovolemia. Urine Na < 20   - Na improving s/p IVF   - Continue to monitor with BMP  - free water restriction       # Orthostatic hypotension   - Home BP meds on hold  - BP's improving s/p fluid    # BRENDA - resolved   - Likely due to hypoperfusion/hypovolemia   - Cr back to baseline after hydration     # Dyspnea - Resolved  - In setting of recent left lower lobectomy 8/31/23 for SCLC    # Atrial fibrillation       History of Present Ilness:    Joan Lopez is a 59 y.o. male with a PMH of AVR, CAD, CABG, atrial fibrtillation, s/p resection of lung mass on 8/31/22, who presented from home with hypotension and dizziness. In the ED, he was noted to have a sodium of 124 and creatinine of 1.4. Interval hx:    Feeling better this morning  Hoping to discharge back home soon   Admits he hasn't been eating great at home.  Food not tasting right  No N/V/D  No further dizziness this AM    BP's improving   Cr improved  Na improving     Past Medical History:   Diagnosis Date    Abdominal hernia     s/p repair 2010- HAS RETURNED    Aortic valve

## 2023-09-21 NOTE — TELEPHONE ENCOUNTER
Spoke with patient's Sameer Sultana ([phone]) and stated patient was on his way to the hospital due to low BP. He was readmitted to [facility] 09/20/23 with lightheadedness and hypotension. Will follow patient and see what the plan is closer to discharge.     Clint Cano, PharmD  The [address] [place] [address]  9/21/2023   7:41 AM      For Pharmacy Admin Tracking Only  Time Spent (min): 5

## 2023-09-21 NOTE — PLAN OF CARE
Problem: Discharge Planning  Goal: Discharge to home or other facility with appropriate resources  Outcome: Adequate for Discharge     Problem: Pain  Goal: Verbalizes/displays adequate comfort level or baseline comfort level  9/21/2023 1335 by Myrene Phalen, RN  Outcome: Adequate for Discharge  3/29/5858 [id number] by Tyler Sanders RN  Outcome: Progressing  Flowsheets (Taken 9/21/2023 0309)  Verbalizes/displays adequate comfort level or baseline comfort level:   Encourage patient to monitor pain and request assistance   Assess pain using appropriate pain scale   Administer analgesics based on type and severity of pain and evaluate response   Implement non-pharmacological measures as appropriate and evaluate response  Note: Pt reports pain in back since last admission. Rest and repositioning encouraged. Percocet administered     Problem: ABCDS Injury Assessment  Goal: Absence of physical injury  Outcome: Adequate for Discharge     Problem: Skin/Tissue Integrity  Goal: Absence of new skin breakdown  Description: 1. Monitor for areas of redness and/or skin breakdown  2. Assess vascular access sites hourly  3. Every 4-6 hours minimum:  Change oxygen saturation probe site  4. Every 4-6 hours:  If on nasal continuous positive airway pressure, respiratory therapy assess nares and determine need for appliance change or resting period. Outcome: Adequate for Discharge     Problem: Safety - Adult  Goal: Free from fall injury  9/21/2023 1335 by Myrene Phalen, RN  Outcome: Adequate for Discharge  5/09/4158 7911 by Tyler Sanders RN  Outcome: Progressing  Flowsheets (Taken 9/21/2023 0309)  Free From Fall Injury: Instruct family/caregiver on patient safety  Note: Pt is a Fall Risk. See Magalene Tori Fall Risk Score. Pt bed in low position and side rails up. Call light and belongings in reach. Pt encouraged to call for assistance. Will continue with hourly rounds for PO intake, pain needs, toileting, and repositioning as needed.

## 2023-09-21 NOTE — H&P
hospital 2 days ago. BP at 82/51. Says he has been drinking enough but mentions he felt a lot better after 1L fluids. He mentions he has diarrhea since he was 23 but it hasn't exacerbated lately. Has slight blurred vision upon standing.   - 1L fluids given  - Regular diet  - Strict I/O      #BRENDA 2/2 to likely hypovolemia  Creatinine at 1.4. Urine sodium <20. BP 82/51. Improved with fluids. Sodium corrected from 124 to 129 with fluids. Avoid NS tonight for now to prevent overcorrection  - Regular diet  - 1L fluids given      Chronic conditions:    #Low back pain with sciatica  - Robaxin  - Pregabalin    #A-fib  #CABG  - Home Warfarin  - Home Metoprolol  - Home Lisinopril  - Digoxin    DVT PPx: Lovenox  Diet: Diet NPO   Code status:  Full Code   ELOS: 3 days      Was discussed with attending physician Corinne Multani MD    The note was completed using EMR and Dragon dictation system.  Every effort was made to ensure accuracy; however, inadvertent computerized transcription errors may be present.  ________________________  Emmanuelle Funes MD,   PGY-1, Internal Medicine  09/21/23  3:02 AM

## 2023-09-21 NOTE — ED NOTES
ED TO INPATIENT SBAR HANDOFF    Patient Name: Kris Mock   :  1965  59 y.o. MRN:  [id number]  Preferred Name    ED Room #:  E42/L41-61  Family/Caregiver Present no   Restraints no   Sitter no   Sepsis Risk Score Sepsis Risk Score: 4.98    Situation  Code Status: Prior No additional code details. Allergies: Cymbalta [duloxetine hcl], Atorvastatin calcium [lipitor], Hydrocodone, and Wellbutrin [bupropion]  Weight: Patient Vitals for the past 96 hrs (Last 3 readings):   Weight   23 1418 217 lb (98.4 kg)     Arrived from: home  Chief Complaint:   Chief Complaint   Patient presents with    Fatigue    Shortness of Breath     Pt coming from home for weakness, near syncope and weakness. New O2 requirement since last admission after having portion of lung removed and d/c on [facility] Problem/Diagnosis:  Principal Problem:    Lightheadedness  Resolved Problems:    * No resolved hospital problems. *    Imaging:   CT HEAD WO CONTRAST   Final Result      No acute intracranial hemorrhage or mass effect. CT CHEST PULMONARY EMBOLISM W CONTRAST   Final Result      No evidence of pulmonary embolus. Bilateral airspace disease, worse on the left, improved compared to prior CT. XR CHEST (2 VW)   Final Result      Patchy consolidation in the left greater than right lung bases similar to   multiple prior studies including 2023. Stable cardiomediastinal silhouette status post CABG. Cardiac valve replacement   noted. Left subclavian venous port tip in the upper SVC. Elevated right hemidiaphragm. Thoracic spine stimulator.         Abnormal labs:   Abnormal Labs Reviewed   BASIC METABOLIC PANEL W/ REFLEX TO MG FOR LOW K - Abnormal; Notable for the following components:       Result Value    Sodium 124 (*)     Chloride 82 (*)     Glucose 123 (*)     Creatinine 1.4 (*)     Est, Glom Filt Rate 57 (*)     All other components within normal limits   CBC WITH AUTO DIFFERENTIAL

## 2023-09-21 NOTE — DISCHARGE SUMMARY
INTERNAL MEDICINE DEPARTMENT AT [address] [place] [address]  DISCHARGE SUMMARY    Patient ID: Maria Teresa Span                                             Discharge Date: 9/21/2023   Patient's PCP: Mehrdad Matias MD                                          Discharge Physician: Alma Vasquez DO  Admit Date: 9/20/2023   Admitting Physician: Kaur Olson MD    PROBLEMS DURING HOSPITALIZATION:  Present on Admission:   Lightheadedness      DISCHARGE DIAGNOSES:  #Transient renal insufficiency (resolved)  #Hyponatremia (resolving)  Baseline creatinine 0.7 prior to admission. In the ED, creatinine measured at 1.4. This morning, 9/21, creatinine back to 0.6. Patient has chronic hyponatremia in the low 130s. Admitted with serum sodium 124, which corrected to 134 this morning. Despite quick correction, patient has abrupt swings in sodium levels and is asymptomatic. Both diagnoses likely due to hypovolemia but amendable to IVF. Used to take citalopram in the past unclear if still takes it. Patient also has reported poor appetite leading up to this admission. It is thought that poor appetite is likely the cause of his electrolyte abnormalities. Other DDX are SIADH 2/2 lung cancer or SSRI AR or polydipsia. -- Consulted dietitian  -- Continue maintenance IVF    HPI:  Patient is a 68-year-old male with PMHx CAD s/p CABG, aortic valve replacement, atrial fibrillation w/ RVR, HFpEF, severe COPD on baseline 2 L O2, tobacco use (40 pack years), DM, transverse colectomy (2016), right colectomy (2019), cholecystectomy (2019), and testicular cancer s/p chemo and resection. Was recently hospitalized for elective VATS with minithoracotomy and wide resection of LLL mass on 8/31/2023 found to be NSCLC who presented with fatigue and dyspnea. Came from home after home health measured blood pressure of 82/51. Per H&P, patient complained of lightheadedness since hospital discharge.   Denied chest pain, dizziness, visual

## 2023-09-21 NOTE — CARE COORDINATION
[facility]    Patient aware and agreeable to services.  Faxed orders to [facility] for [facility] by 9/23    Nate Mohan LPN  Care Transition Nurse  [facility]  [phone]

## 2023-09-21 NOTE — PROGRESS NOTES
Pt arrived to 4319. VSS at this time. Fall precautions in place. Admission and 4 eye skin assessment complete. Call light within reach. Pt denies any further needs at this time. Electronically signed by Julissa Epperson RN on 0/89/5875 at 3:47 AM            4 Eyes Skin Assessment     NAME:  Delvis Perez OF BIRTH:  1965  MEDICAL RECORD NUMBER:  [id number]    The patient is being assessed for  Admission    I agree that at least one RN has performed a thorough Head to Toe Skin Assessment on the patient. ALL assessment sites listed below have been assessed. Areas assessed by both nurses:    Head, Face, Ears, Shoulders, Back, Chest, Arms, Elbows, Hands, Sacrum. Buttock, Coccyx, Ischium, Legs. Feet and Heels, and Under Medical Devices     Blanching redness coccyx  Surgical incision x2 L lateral chest and L axilla        Does the Patient have a Wound? Yes wound(s) were present on assessment.  LDA wound assessment was Initiated and completed by RN       Jc Prevention initiated by RN: No  Wound Care Orders initiated by RN: No    Pressure Injury (Stage 3,4, Unstageable, DTI, NWPT, and Complex wounds) if present, place Wound referral order by RN under : No    New Ostomies, if present place, Ostomy referral order under : No     Nurse 1 eSignature: Electronically signed by Julissa Epperson RN on 4/62/75 at 2:28 AM EDT    **SHARE this note so that the co-signing nurse can place an eSignature**    Nurse 2 eSignature: Electronically signed by Pietro Hogan RN on 9/21/23 at 2:10 AM EDT

## 2023-09-21 NOTE — PROGRESS NOTES
Patient given d/c instructions per order. Patient expressed un derstanding and had no further questions at this time. Patient's daughter is transported patient home, patient escorted via w/c to [facility] at this time.

## 2023-09-21 NOTE — PLAN OF CARE
Problem: Pain  Goal: Verbalizes/displays adequate comfort level or baseline comfort level  Outcome: Progressing  Flowsheets (Taken 9/21/2023 0309)  Verbalizes/displays adequate comfort level or baseline comfort level:   Encourage patient to monitor pain and request assistance   Assess pain using appropriate pain scale   Administer analgesics based on type and severity of pain and evaluate response   Implement non-pharmacological measures as appropriate and evaluate response  Note: Pt reports pain in back since last admission. Rest and repositioning encouraged. Percocet administered     Problem: Safety - Adult  Goal: Free from fall injury  Outcome: Progressing  Flowsheets (Taken 9/21/2023 0309)  Free From Fall Injury: Instruct family/caregiver on patient safety  Note: Pt is a Fall Risk. See [place] Fall Risk Score. Pt bed in low position and side rails up. Call light and belongings in reach. Pt encouraged to call for assistance. Will continue with hourly rounds for PO intake, pain needs, toileting, and repositioning as needed.          Problem: Cardiovascular - Adult  Goal: Maintains optimal cardiac output and hemodynamic stability  Outcome: Progressing  Flowsheets (Taken 9/21/2023 0310)  Maintains optimal cardiac output and hemodynamic stability:   Monitor blood pressure and heart rate   Monitor urine output and notify Licensed Independent Practitioner for values outside of normal range   Assess for signs of decreased cardiac output

## 2023-09-21 NOTE — PLAN OF CARE
[facility] Hospitalist brief note  Consult received. Case reviewed with ER physician  40-year-old male status post left lower lobectomy for bronchogenic carcinoma comes in with hyponatremia and low blood pressure at home feeling lightheaded. Full note to follow.     Juanita Dumont MD    Thanks  Chastity Florez MD

## 2023-09-22 NOTE — TELEPHONE ENCOUNTER
Patient was discharged from [facility] last evening (09/21/23). Called [facility] ([phone]) and spoke with Kiana. The  has not reached out to the patient yet since discharge from the hospital. Kiana stated that they may be able to have a HHRN see the patient over the weekend, but more likely on Monday (09/25/23). Discussed with Kiana to get an INR at the first visit and to have the [facility] call into the clinic. Will schedule patient for Monday (09/25/23) to follow up.      Anita Wisdom, PharmD  [facility]  9/22/2023   9:34 AM    For Pharmacy Admin Tracking Only  Time Spent (min): 15

## 2023-09-25 ENCOUNTER — ANTI-COAG VISIT (OUTPATIENT)
Dept: PHARMACY | Age: 58
End: 2023-09-25

## 2023-09-25 DIAGNOSIS — I48.91 ATRIAL FIBRILLATION, UNSPECIFIED TYPE (HCC): ICD-10-CM

## 2023-09-25 DIAGNOSIS — Z95.2 S/P AORTIC VALVE REPLACEMENT: Primary | Chronic | ICD-10-CM

## 2023-09-25 LAB
CREAT UR-MCNC: 53.7 MG/DL (ref 39–259)
INR BLD: 1.5

## 2023-09-25 NOTE — PROGRESS NOTES
5 5 5 5 5 7.5   5 37.5  7/5 2.4 At goal, continue   5 5 5 5 5 7.5   5 37.5  6/19 4.1 Above goal, hold+dec  5 5 5 5 5 0/7.5   5 37.5  6/2 3.7 Above goal, hold+dec  5 7.5 5 7.5 5 0/7.5   5 42.5  5/5 3.0 At goal, continue  7.5 7.5 5 7.5 5 7.5   5 45  4/14 3.1 Above goal, continue  7.5 7.5 5 7.5 5 7.5   5 45  3/31 3.5 Above goal, dec  7.5 7.5 5 7.5 5 2.5/7.5   5 45  2/24 2.2 At goal, continue  7.5 7.5 5 7.5 5 7.5 7.5 47.5  1/26 2.3 At goal, continue  7.5 7.5 5 7.5 5 7.5 7.5 47.5  12/30 2.1 At goal, continue  7.5 7.5 5 7.5 5 7.5 7.5 47.5  12/2 2.4 At goal, continue  7.5 7.5 5 7.5 5 7.5 7.5 47.5  10/28 2.7 At goal, continue  7.5 7.5 5 7.5 5 7.5 7.5 47.5  9/28 2.1 At goal, continue  7.5 7.5 5 7.5 5 7.5 7.5 47.5  9/7 2.8 At goal, continue  7.5 7.5 5 7.5 5 7.5 7.5 47.5  8/19 3.2 Above goal, decrease  7.5 7.5 5 7.5 5 7.5 7.5 47.5  7/22 2.0 At goal, continue  7.5 7.5 7.5 7.5 5 7.5 7.5 50  7/8 2.1 At goal, continue  7.5 7.5 7.5 7.5 5 7.5 7.5 50  6/24 1.4 Below goal, bolus +inc 7.5 7.5 7.5 7.5 5 10/7.5 7.5 50  6/3 1.7 Below goal, bolus + incr 5 7.5 7.5 7.5 5 10/7.5 7.5 47.5  5/20 1.9 Below goal, continue  5 7.5 5 7.5 5 7.5 7.5 45  5/6 1.3 Below goal, bolus + incr 5 7.5 5 7.5 5 10/7.5 7.5 45  4/21 1.5 Below goal, bolus  5 7.5 5 7.5 7.5/5 7.5 5 42.5  4/7 1.2 Below goal (held) see below 5 7.5 5 7.5 5 7.5 5 42.5  3/4 2.2 At goal, continue   5 7.5 5 7.5 5 7.5 5 42.5  2/1 2.1 At goal, continue   5 7.5 5 7.5 5 7.5 5 42.5  1/7 1.9 Below goal, continue   5 7.5 5 7.5 5 7.5 5 42.5  11/22 2.2 At goal, continue   5 7.5 5 7.5 5 7.5 5 42.5  11/22 2.1 At goal, continue   5 7.5 5 7.5 5 7.5 5 42.5  11/8 1.8 Below goal, increase  5 7.5 5 7.5 5 7.5 5 42.5  10/25 1.6 Below goal, bolus   7.5 7.5/5 5 5 5 5 7.5 40  10/4 2.0 At goal, continue   7.5 5 5 5 5 5 7.5 40  9/17 2.7 At goal (dosing error)  7.5 5 5 5 5 5 7.5 40  8/23 4.3 Above goal, decrease  7.5 0/5 5 5 5 5 7.5 40  8/9 3.3 Above goal, dec  7.5 5 5 5 7.5 5 5 40  7/21 1.4 Below goal,

## 2023-09-27 ENCOUNTER — OFFICE VISIT (OUTPATIENT)
Dept: INTERNAL MEDICINE CLINIC | Age: 58
End: 2023-09-27
Payer: MEDICARE

## 2023-09-27 VITALS
BODY MASS INDEX: 29.62 KG/M2 | HEIGHT: 72 IN | HEART RATE: 64 BPM | WEIGHT: 218.7 LBS | OXYGEN SATURATION: 100 % | RESPIRATION RATE: 16 BRPM | TEMPERATURE: 97.4 F

## 2023-09-27 DIAGNOSIS — I48.91 ATRIAL FIBRILLATION, UNSPECIFIED TYPE (HCC): ICD-10-CM

## 2023-09-27 DIAGNOSIS — I50.30 HEART FAILURE WITH PRESERVED EJECTION FRACTION, UNSPECIFIED HF CHRONICITY (HCC): ICD-10-CM

## 2023-09-27 DIAGNOSIS — I10 PRIMARY HYPERTENSION: ICD-10-CM

## 2023-09-27 DIAGNOSIS — C34.92 NSCLC OF LEFT LUNG (HCC): ICD-10-CM

## 2023-09-27 DIAGNOSIS — J44.9 COPD, SEVERITY TO BE DETERMINED (HCC): ICD-10-CM

## 2023-09-27 PROCEDURE — 99213 OFFICE O/P EST LOW 20 MIN: CPT | Performed by: STUDENT IN AN ORGANIZED HEALTH CARE EDUCATION/TRAINING PROGRAM

## 2023-09-27 ASSESSMENT — ENCOUNTER SYMPTOMS
RHINORRHEA: 1
VOMITING: 0
COUGH: 1
DIARRHEA: 0
ABDOMINAL PAIN: 0
SHORTNESS OF BREATH: 0
NAUSEA: 0
BACK PAIN: 1

## 2023-09-27 NOTE — PATIENT INSTRUCTIONS
Non Small Cell Lung Cancer  -Follow up with Cardiothoracic Surgeon Dr Beena Cullen    COPD  -Follow up with your Pulmonologist Dr. Ronel Farley  -Please continue your home oxygen as directed by Pulmonology  -Pulmonology recommended pulmonary rehab, they will discuss this with you at your next appointment    Atrial Fibrillation  -Please call and schedule an appointment with your Cardiologist, Dr Yoel Mccoy following your most recent hospital stay  -Decrease Metoprolol Tartrate to 25 mg twice daily. You may take one half of your 50 mg pill.  -Continue Digoxin 250 mcg daily, as instructed by Cardiology  -Please stop taking Amlodipine, and lisinopril    High Blood Pressure  -Decrease Metoprolol Tartrate to 25 mg twice daily. You may take one half of your 50 mg pill.

## 2023-09-27 NOTE — PROGRESS NOTES
[facility] Outpatient Internal Medicine Clinic    Juju Shay is a 59 y.o. male, here for evaluation of the following concerns: Hospital follow up    HPI    The pt is a 59year old male with a PMHx of CAD s/p stents s/p CABG, AV replacement, atrial fibrillation on Warfarin, severe COPD, NSLC s/p mini vats (8/31/2023), and 45 pack year cigarette smoking history who presents to the clinic for a hospital follow up. The pt was in the hospital for a schedule thoracotomy and mini VATS procedure of a lesion of his left lower lobe at the end of this past August. The pt's lung biopsy was positive for non-small cell carcinoma during this stay. The pt experienced acute hypoxic respiratory failure following his procedure, required BiPAP, and was transferred to the ICU for a portion of his hospital stay due for closer monitoring of his respiratory status. The pt improved, was transferred out of the ICU, and discharge from the hospital on home oxygen on 9/17/2023. Following discharge, the pt presented back to the ED for near syncope. His SBP was in the 80s, he was given fluids, and his lisinopril and amlodipine were held upon discharge. The pt's states that he is now taking one half of his Lopressor 50 mg pill due to concern of his hypotension. He endorses lightheadedness with standing, and bending over. The pt experienced atrial fibrillation with RVR during his recent hospitalization. Was evaluated by Cardiology during his first hospitalization, was started on Digoxin 250 mcg, and it was prescribed following discharge from the hospital.        Review of Systems   Constitutional:  Negative for fever. HENT:  Positive for rhinorrhea. Respiratory:  Positive for cough. Negative for shortness of breath. Cardiovascular:  Negative for chest pain and leg swelling. Gastrointestinal:  Negative for abdominal pain, diarrhea, nausea and vomiting. Endocrine: Negative for polyuria.    Genitourinary:  Negative for

## 2023-09-28 ENCOUNTER — TELEPHONE (OUTPATIENT)
Dept: PULMONOLOGY | Age: 58
End: 2023-09-28

## 2023-09-28 ENCOUNTER — ANTI-COAG VISIT (OUTPATIENT)
Dept: PHARMACY | Age: 58
End: 2023-09-28

## 2023-09-28 DIAGNOSIS — I48.91 ATRIAL FIBRILLATION, UNSPECIFIED TYPE (HCC): ICD-10-CM

## 2023-09-28 DIAGNOSIS — Z95.2 S/P AORTIC VALVE REPLACEMENT: Primary | Chronic | ICD-10-CM

## 2023-09-28 LAB — INR BLD: 1.5

## 2023-09-28 NOTE — TELEPHONE ENCOUNTER
Sasha from [facility] called asking if it would be ok to start weaning pt down on his O2.  He is at 4 lpm and is staying the 90's      Sasha @ [facility]  [phone]

## 2023-09-28 NOTE — PROGRESS NOTES
CLINICAL PHARMACY NOTE--Smoking Cessation and Anticoagulation     Rahul Grant is a 59 y.o. male with PMHx significant for AVR (re-do in May, 2017), CAD s/p CABG (x3 in May, 2017), pA-fib, HLD, HTN, testicular CA who presents to clinic on 9/28/2023 for anticoagulation monitoring and smoking cessation counseling. Interval update:   9/25/23: Patient reports he has not smoked since he was admitted at the hospital on 8/31/23! He reports he has no desire to re-start smoking and does not have any cravings / symptoms of withdrawal. He is currently getting home health services after a lengthy hospital stay s/p lung resection on 8/31.     8/18/23: Patient has not called [facility] for patches as he \"doesn't want to seem like he's asking for free stuff\". Advised patient that this is not the point and urged him to call them asap. He is also going to look into getting the nicotine lozenges ([facility]). He is motivated to quit but doesn't think he's in the right state of mind currently given stress of upcoming surgery. He does believe he will be able to make it through hospitalization without smoking. Asked him to clean out of his house of cigarettes / triggers so  he can continue to be smoke free upon returning home. 8/3/23: Patient has not gotten nicotine patches or lozenges due to being too expensive. Advised patient to call [facility] for free patches and let me know what pharmacy he would like the lozenges to go to as you can use a GoodRx coupon and get them for $15. Patient did not do either of these things and is still smoking the same amount of cigarettes. 7/19/23: Patient overall depressed lately since lung nodule diagnosis and with chronic pain. Does not endorse feeling of harming himself but wants to start making changes to improve his overall health. Smoking 20 cigarettes per day. Goal to quit by his 58th birthday (8/823). Cut back by 1-2 cigarettes per day.  Will

## 2023-10-02 ENCOUNTER — ANTI-COAG VISIT (OUTPATIENT)
Dept: PHARMACY | Age: 58
End: 2023-10-02

## 2023-10-02 DIAGNOSIS — I48.91 ATRIAL FIBRILLATION, UNSPECIFIED TYPE (HCC): ICD-10-CM

## 2023-10-02 DIAGNOSIS — Z95.2 S/P AORTIC VALVE REPLACEMENT: Primary | Chronic | ICD-10-CM

## 2023-10-02 LAB — INR BLD: 1.6

## 2023-10-02 NOTE — PROGRESS NOTES
5 37.5  6/19 4.1 Above goal, hold+dec  5 5 5 5 5 0/7.5   5 37.5  6/2 3.7 Above goal, hold+dec  5 7.5 5 7.5 5 0/7.5   5 42.5  5/5 3.0 At goal, continue  7.5 7.5 5 7.5 5 7.5   5 45  4/14 3.1 Above goal, continue  7.5 7.5 5 7.5 5 7.5   5 45  3/31 3.5 Above goal, dec  7.5 7.5 5 7.5 5 2.5/7.5   5 45  2/24 2.2 At goal, continue  7.5 7.5 5 7.5 5 7.5 7.5 47.5  1/26 2.3 At goal, continue  7.5 7.5 5 7.5 5 7.5 7.5 47.5  12/30 2.1 At goal, continue  7.5 7.5 5 7.5 5 7.5 7.5 47.5  12/2 2.4 At goal, continue  7.5 7.5 5 7.5 5 7.5 7.5 47.5  10/28 2.7 At goal, continue  7.5 7.5 5 7.5 5 7.5 7.5 47.5  9/28 2.1 At goal, continue  7.5 7.5 5 7.5 5 7.5 7.5 47.5  9/7 2.8 At goal, continue  7.5 7.5 5 7.5 5 7.5 7.5 47.5  8/19 3.2 Above goal, decrease  7.5 7.5 5 7.5 5 7.5 7.5 47.5  7/22 2.0 At goal, continue  7.5 7.5 7.5 7.5 5 7.5 7.5 50  7/8 2.1 At goal, continue  7.5 7.5 7.5 7.5 5 7.5 7.5 50  6/24 1.4 Below goal, bolus +inc 7.5 7.5 7.5 7.5 5 10/7.5 7.5 50  6/3 1.7 Below goal, bolus + incr 5 7.5 7.5 7.5 5 10/7.5 7.5 47.5  5/20 1.9 Below goal, continue  5 7.5 5 7.5 5 7.5 7.5 45  5/6 1.3 Below goal, bolus + incr 5 7.5 5 7.5 5 10/7.5 7.5 45  4/21 1.5 Below goal, bolus  5 7.5 5 7.5 7.5/5 7.5 5 42.5  4/7 1.2 Below goal (held) see below 5 7.5 5 7.5 5 7.5 5 42.5  3/4 2.2 At goal, continue   5 7.5 5 7.5 5 7.5 5 42.5  2/1 2.1 At goal, continue   5 7.5 5 7.5 5 7.5 5 42.5  1/7 1.9 Below goal, continue   5 7.5 5 7.5 5 7.5 5 42.5  11/22 2.2 At goal, continue   5 7.5 5 7.5 5 7.5 5 42.5  11/22 2.1 At goal, continue   5 7.5 5 7.5 5 7.5 5 42.5  11/8 1.8 Below goal, increase  5 7.5 5 7.5 5 7.5 5 42.5  10/25 1.6 Below goal, bolus   7.5 7.5/5 5 5 5 5 7.5 40  10/4 2.0 At goal, continue   7.5 5 5 5 5 5 7.5 40  9/17 2.7 At goal (dosing error)  7.5 5 5 5 5 5 7.5 40  8/23 4.3 Above goal, decrease  7.5 0/5 5 5 5 5 7.5 40  8/9 3.3 Above goal, dec  7.5 5 5 5 7.5 5 5 40  7/21 1.4 Below goal, increase  5 7.5 5 7.5 5 7.5 5 42.5  6/28 2.0 At goal, no

## 2023-10-03 PROBLEM — J44.1 COPD EXACERBATION (HCC): Status: ACTIVE | Noted: 2023-05-10

## 2023-10-04 DIAGNOSIS — E11.9 TYPE 2 DIABETES MELLITUS WITHOUT COMPLICATION, WITHOUT LONG-TERM CURRENT USE OF INSULIN (HCC): ICD-10-CM

## 2023-10-05 ENCOUNTER — ANTI-COAG VISIT (OUTPATIENT)
Dept: PHARMACY | Age: 58
End: 2023-10-05

## 2023-10-05 DIAGNOSIS — Z95.2 S/P AORTIC VALVE REPLACEMENT: Primary | Chronic | ICD-10-CM

## 2023-10-05 DIAGNOSIS — I48.91 ATRIAL FIBRILLATION, UNSPECIFIED TYPE (HCC): ICD-10-CM

## 2023-10-05 LAB — INR BLD: 2.1

## 2023-10-05 NOTE — TELEPHONE ENCOUNTER
Requested Prescriptions     Pending Prescriptions Disp Refills    metFORMIN (GLUCOPHAGE) 500 MG tablet [Pharmacy Med Name: METFORMIN HCL 500MG TABS] 120 tablet 0     Sig: TAKE TWO TABLETS BY MOUTH ONCE A DAY WITH BREAKFAST AND TAKE TWO TABLETS BY MOUTH ONCE A DAY [address] Visit:  9/27/2023     Next Clinic Appointment:  12/13/2023

## 2023-10-05 NOTE — PROGRESS NOTES
CLINICAL PHARMACY NOTE--Smoking Cessation and Anticoagulation     Shane Mckeon is a 59 y.o. male with PMHx significant for AVR (re-do in May, 2017), CAD s/p CABG (x3 in May, 2017), pA-fib, HLD, HTN, testicular CA who presents to clinic on 10/5/2023 for anticoagulation monitoring and smoking cessation counseling. Interval update:   9/25/23: Patient reports he has not smoked since he was admitted at the hospital on 8/31/23! He reports he has no desire to re-start smoking and does not have any cravings / symptoms of withdrawal. He is currently getting home health services after a lengthy hospital stay s/p lung resection on 8/31.     8/18/23: Patient has not called [facility] for patches as he \"doesn't want to seem like he's asking for free stuff\". Advised patient that this is not the point and urged him to call them asap. He is also going to look into getting the nicotine lozenges ([facility]). He is motivated to quit but doesn't think he's in the right state of mind currently given stress of upcoming surgery. He does believe he will be able to make it through hospitalization without smoking. Asked him to clean out of his house of cigarettes / triggers so  he can continue to be smoke free upon returning home. 8/3/23: Patient has not gotten nicotine patches or lozenges due to being too expensive. Advised patient to call [facility] for free patches and let me know what pharmacy he would like the lozenges to go to as you can use a GoodRx coupon and get them for $15. Patient did not do either of these things and is still smoking the same amount of cigarettes. 7/19/23: Patient overall depressed lately since lung nodule diagnosis and with chronic pain. Does not endorse feeling of harming himself but wants to start making changes to improve his overall health. Smoking 20 cigarettes per day. Goal to quit by his 58th birthday (8/823). Cut back by 1-2 cigarettes per day.  Will

## 2023-10-09 ENCOUNTER — ANTI-COAG VISIT (OUTPATIENT)
Dept: PHARMACY | Age: 58
End: 2023-10-09

## 2023-10-09 DIAGNOSIS — Z95.2 S/P AORTIC VALVE REPLACEMENT: Primary | Chronic | ICD-10-CM

## 2023-10-09 DIAGNOSIS — I48.91 ATRIAL FIBRILLATION, UNSPECIFIED TYPE (HCC): ICD-10-CM

## 2023-10-09 LAB — INTERNATIONAL NORMALIZATION RATIO, POC: 2

## 2023-10-12 ENCOUNTER — ANTI-COAG VISIT (OUTPATIENT)
Dept: PHARMACY | Age: 58
End: 2023-10-12

## 2023-10-12 DIAGNOSIS — Z95.2 S/P AORTIC VALVE REPLACEMENT: Primary | Chronic | ICD-10-CM

## 2023-10-12 DIAGNOSIS — I48.91 ATRIAL FIBRILLATION, UNSPECIFIED TYPE (HCC): ICD-10-CM

## 2023-10-12 LAB — INR BLD: 1.9

## 2023-10-12 NOTE — PROGRESS NOTES
[address] [place] [address] [place]   8/18 1.6 Below goal, increase  5 7.5 5 7.5 5 7.5   5 42.5  8/3 1.6 Below goal, increase  5 7.5 5 5 5 7.5   5 40  7/19 2.0 At goal, continue   5 5 5 5 5 7.5   5 37.5  7/5 2.4 At goal, continue   5 5 5 5 5 7.5   5 37.5  6/19 4.1 Above goal, hold+dec  5 5 5 5 5 0/7.5   5 37.5  6/2 3.7 Above goal, hold+dec  5 7.5 5 7.5 5 0/7.5   5 42.5  5/5 3.0 At goal, continue  7.5 7.5 5 7.5 5 7.5   5 45  4/14 3.1 Above goal, continue  7.5 7.5 5 7.5 5 7.5   5 45  3/31 3.5 Above goal, dec  7.5 7.5 5 7.5 5 2.5/7.5   5 45  2/24 2.2 At goal, continue  7.5 7.5 5 7.5 5 7.5 7.5 47.5  1/26 2.3 At goal, continue  7.5 7.5 5 7.5 5 7.5 7.5 47.5  12/30 2.1 At goal, continue  7.5 7.5 5 7.5 5 7.5 7.5 47.5  12/2 2.4 At goal, continue  7.5 7.5 5 7.5 5 7.5 7.5 47.5  10/28 2.7 At goal, continue  7.5 7.5 5 7.5 5 7.5 7.5 47.5  9/28 2.1 At goal, continue  7.5 7.5 5 7.5 5 7.5 7.5 47.5  9/7 2.8 At goal, continue  7.5 7.5 5 7.5 5 7.5 7.5 47.5  8/19 3.2 Above goal, decrease  7.5 7.5 5 7.5 5 7.5 7.5 47.5  7/22 2.0 At goal, continue  7.5 7.5 7.5 7.5 5 7.5 7.5 50  7/8 2.1 At goal, continue  7.5 7.5 7.5 7.5 5 7.5 7.5 50  6/24 1.4 Below goal, bolus +inc 7.5 7.5 7.5 7.5 5 10/7.5 7.5 50  6/3 1.7 Below goal, bolus + incr 5 7.5 7.5 7.5 5 10/7.5 7.5 47.5  5/20 1.9 Below goal, continue  5 7.5 5 7.5 5 7.5 7.5 45  5/6 1.3 Below goal, bolus + incr 5 7.5 5 7.5 5 10/7.5 7.5 45  4/21 1.5 Below goal, bolus  5 7.5 5 7.5 7.5/5 7.5 5 42.5  4/7 1.2 Below goal (held) see below 5 7.5 5 7.5 5 7.5 5 42.5  3/4 2.2 At goal, continue   5 7.5 5 7.5 5 7.5 5 42.5  2/1 2.1 At goal, continue   5 7.5 5 7.5 5 7.5 5 42.5  1/7 1.9 Below goal, continue   5 7.5 5 7.5 5 7.5 5 42.5  11/22 2.2 At goal, continue   5 7.5 5 7.5 5 7.5 5 42.5  11/22 2.1 At goal, continue   5 7.5 5 7.5 5 7.5 5 42.5  11/8 1.8 Below goal, increase  5 7.5 5 7.5 5 7.5 5 42.5  10/25 1.6 Below goal, bolus   7.5 7.5/5 5 5 5 5 7.5 40  10/4 2.0 At goal, continue   7.5 5 5 5 5 5 7.5 40  9/17 2.7 At goal (dosing

## 2023-10-16 ENCOUNTER — ANTI-COAG VISIT (OUTPATIENT)
Dept: PHARMACY | Age: 58
End: 2023-10-16

## 2023-10-16 DIAGNOSIS — I48.91 ATRIAL FIBRILLATION, UNSPECIFIED TYPE (HCC): ICD-10-CM

## 2023-10-16 DIAGNOSIS — Z95.2 S/P AORTIC VALVE REPLACEMENT: Primary | Chronic | ICD-10-CM

## 2023-10-16 LAB — INR BLD: 1.8

## 2023-10-19 ENCOUNTER — ANTI-COAG VISIT (OUTPATIENT)
Dept: PHARMACY | Age: 58
End: 2023-10-19

## 2023-10-19 DIAGNOSIS — Z95.2 S/P AORTIC VALVE REPLACEMENT: Primary | Chronic | ICD-10-CM

## 2023-10-19 DIAGNOSIS — I48.91 ATRIAL FIBRILLATION, UNSPECIFIED TYPE (HCC): ICD-10-CM

## 2023-10-19 LAB — INR BLD: 2.3

## 2023-10-19 NOTE — PROGRESS NOTES
quit by his 58th birthday (8/823). Cut back by 1-2 cigarettes per day. Will use nicotine patches + nicotine lozenges to help with cravings. Recommended patient schedule an appointment with PCP to get started on an antidepressant and or see a therapist.     Assessment/Plan:     Anticoagulation   Patient's INR was therapeutic (2.3) today. INR called in today by [facility] ([phone]). Patient has been taking warfarin as prescribed since last INR check. Instructed patient and RN to continue warfarin 5 mg daily. Repeat INR in 2 weeks in clinic. INR was consistently elevated during hospitalization despite much lower doses of warfarin, however per patient and RN his health is significantly improving since being back home. Prior to that he recently underwent surgical resection of his lung nodule which resulted in an extensive hospital stay. 2. Smoking Cessation   Patient is ready and motivated to quit smoking. Quit date:  8/8/23 (58th birthday)   Tapering schedule: cut back by 1-2 cigarettes per day   Reviewed options for pharmacological therapy including directions for use, benefits, and possible side effects. Will try nicotine patches and nicotine lozenges.   - OTC Nicotine replacement patches >10 cigarettes/day  21 mg patch QAM x 6 weeks, remove at bedtime   14 mg patch QAM x 2 weeks, remove at bedtime   7 mg patch QAM x 2 weeks, remove at bedtime   - OTC Nicotine Lozenge  4 mg for those who smoke first cigarette within 30 minutes of waking  Dissolve 1 lozenge every 1-2 hours when urge to smoke occurs; Max 20/day   Do not chew or swallow; allow to dissolve slowly (~20 to 30 minutes); minimize swallowing and occasionally move lozenge from one side of the mouth to the other until completely dissolved. Do not eat or drink 15 minutes before using or while lozenge is in mouth.   Patient instructions:   - Suck on a straw or toothpick to help overcome the \"touch and handle\" addiction   - Try to  some more days

## 2023-10-24 ENCOUNTER — OFFICE VISIT (OUTPATIENT)
Dept: PULMONOLOGY | Age: 58
End: 2023-10-24
Payer: MEDICARE

## 2023-10-24 VITALS
DIASTOLIC BLOOD PRESSURE: 80 MMHG | OXYGEN SATURATION: 97 % | HEIGHT: 72 IN | WEIGHT: 229 LBS | RESPIRATION RATE: 16 BRPM | BODY MASS INDEX: 31.02 KG/M2 | HEART RATE: 67 BPM | SYSTOLIC BLOOD PRESSURE: 130 MMHG

## 2023-10-24 DIAGNOSIS — C34.32 MALIGNANT NEOPLASM OF LOWER LOBE OF LEFT LUNG (HCC): ICD-10-CM

## 2023-10-24 DIAGNOSIS — J96.01 ACUTE HYPOXEMIC RESPIRATORY FAILURE (HCC): ICD-10-CM

## 2023-10-24 DIAGNOSIS — Z95.2 HISTORY OF AORTIC VALVE REPLACEMENT: Primary | ICD-10-CM

## 2023-10-24 DIAGNOSIS — Z72.0 TOBACCO ABUSE DISORDER: ICD-10-CM

## 2023-10-24 PROCEDURE — 3017F COLORECTAL CA SCREEN DOC REV: CPT | Performed by: INTERNAL MEDICINE

## 2023-10-24 PROCEDURE — 3075F SYST BP GE 130 - 139MM HG: CPT | Performed by: INTERNAL MEDICINE

## 2023-10-24 PROCEDURE — 3079F DIAST BP 80-89 MM HG: CPT | Performed by: INTERNAL MEDICINE

## 2023-10-24 PROCEDURE — 1036F TOBACCO NON-USER: CPT | Performed by: INTERNAL MEDICINE

## 2023-10-24 PROCEDURE — G8417 CALC BMI ABV UP PARAM F/U: HCPCS | Performed by: INTERNAL MEDICINE

## 2023-10-24 PROCEDURE — 99214 OFFICE O/P EST MOD 30 MIN: CPT | Performed by: INTERNAL MEDICINE

## 2023-10-24 PROCEDURE — G8484 FLU IMMUNIZE NO ADMIN: HCPCS | Performed by: INTERNAL MEDICINE

## 2023-10-24 PROCEDURE — G8427 DOCREV CUR MEDS BY ELIG CLIN: HCPCS | Performed by: INTERNAL MEDICINE

## 2023-10-24 NOTE — PROGRESS NOTES
[facility] Pulmonary and Critical Care    Outpatient Follow up Note    Subjective:   Referring Physician: Haylee Seymour / HPI:     The patient is 59 y.o. male who presents today for a follow up visit for lung cancer and acute hypoxemic respiratory failure. Salma Miramontes had his wedge resection of the LLL in late August, but his post op course was complicated by acute hypoxemic respiratory failure and acute hypercapnic failure. He briefly needed bipap, but was on high flow oxygen for an extended period. His hospitalization lasted 21 days and he was discharged on oxygen. He came back within 24 hours for hypotension but that was a short stay. Other than shoulder and back pain which are worse since surgery he's doing pretty well. He's not interested in pulmonary rehab at this time, saying he is [place] himself at home. He has been off cigarettes for 3 weeks he was in the hospital but says he is started cheating again. He is smoking about 4 self rolled cigarettes a day but he is soon to run out. I offered lozenges or gum but he said that he has looked into it before and he cannot afford them. He is already seeing the [facility] smoking cessation group. Last visit:  Patient comes in today without acute complaints. He does report that he gets occasionally a little wheezy and dyspneic. Still smoking just under a pack per day. He's not interested in quitting. He had his follow-up CT scan in March where her to do on the results. 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 He had an orchiectomy as well as tumor removal from his abdomen. He also has a history of coronary artery disease and problems with his aortic valve he has had 2 open heart surgeries to repair his aortic valve as well as a double and triple bypass. Despite these things patient continues to smoke.   He also

## 2023-11-02 ENCOUNTER — ANTI-COAG VISIT (OUTPATIENT)
Dept: PHARMACY | Age: 58
End: 2023-11-02
Payer: MEDICARE

## 2023-11-02 DIAGNOSIS — I48.91 ATRIAL FIBRILLATION, UNSPECIFIED TYPE (HCC): ICD-10-CM

## 2023-11-02 DIAGNOSIS — Z95.2 S/P AORTIC VALVE REPLACEMENT: Primary | Chronic | ICD-10-CM

## 2023-11-02 LAB — INTERNATIONAL NORMALIZATION RATIO, POC: 3.1

## 2023-11-02 PROCEDURE — 85610 PROTHROMBIN TIME: CPT | Performed by: PHARMACIST

## 2023-11-02 PROCEDURE — 99212 OFFICE O/P EST SF 10 MIN: CPT | Performed by: PHARMACIST

## 2023-11-02 NOTE — PROGRESS NOTES
quitting: health, family, money      Personal Goals:  Long term- Remain smoke free   Short term- Quit by 8/823 - 58th birthday     Ph[facility]: [facility]  Wt Readings from Last 3 Encounters:   10/24/23 103.9 kg (229 lb)   09/27/23 99.2 kg (218 lb 11.2 oz)   09/21/23 96.1 kg (211 lb 13.8 oz)      BP Readings from Last 3 Encounters:   10/24/23 130/80   09/21/23 116/69   09/17/23 114/73     Follow healthy diet, moderate exercise, and limit alcohol  Referred pt to 1800 QUIT NOW hotline. Referred pt to [email or web address]  Referred to [employer] on their smartphone device    Discussed the following  Health risks of smoking  Physical and psychological dependence associated with smoking and potential withdrawal symptoms  Benefits to quitting: health, time, financial  Tobacco cessation quit tips  Trigger avoidance and coping with the urge to quit   Nicotine replacement and pharmacological options     Standard written patient education provided:  Medication Management Tobacco Cessation Program packet  Stopping smoking: Care Instructions  Deciding About Using Medicines To Quit Smoking  Learning About Benefits From Quitting Smoking  10 Things You Should Know About Quitting Smoking     If not ready to quit: Reviewed 5Rs- Relevance, Risk, Rewards, Roadblocks, Repetition    Thanks!   Keven Santos, KarinD, BCPS  [facility]  [place]: [phone]  [place]: [phone]  11/2/2023 9:07 AM    For Pharmacy Admin Tracking Only    Intervention Detail: Dose Adjustment: 1, reason: Therapy De-escalation  Total # of Interventions Recommended: 1  Total # of Interventions Accepted: 1  Time Spent (min): 15

## 2023-11-03 RX ORDER — PIOGLITAZONEHYDROCHLORIDE 15 MG/1
15 TABLET ORAL DAILY
Qty: 30 TABLET | Refills: 3 | Status: SHIPPED | OUTPATIENT
Start: 2023-11-03

## 2023-11-03 NOTE — TELEPHONE ENCOUNTER
Requested Prescriptions     Pending Prescriptions Disp Refills    pioglitazone (ACTOS) 15 MG tablet 30 tablet 3     Sig: Take 1 tablet by mouth daily       Last Clinic Visit:  9/27/2023     Next Clinic Appointment:  12/13/2023

## 2023-11-17 ENCOUNTER — ANTI-COAG VISIT (OUTPATIENT)
Dept: PHARMACY | Age: 58
End: 2023-11-17
Payer: MEDICARE

## 2023-11-17 DIAGNOSIS — I48.91 ATRIAL FIBRILLATION, UNSPECIFIED TYPE (HCC): ICD-10-CM

## 2023-11-17 DIAGNOSIS — Z95.2 S/P AORTIC VALVE REPLACEMENT: Primary | Chronic | ICD-10-CM

## 2023-11-17 LAB — INTERNATIONAL NORMALIZATION RATIO, POC: 2.2

## 2023-11-17 PROCEDURE — 99211 OFF/OP EST MAY X REQ PHY/QHP: CPT | Performed by: PHARMACIST

## 2023-11-17 PROCEDURE — 85610 PROTHROMBIN TIME: CPT | Performed by: PHARMACIST

## 2023-11-17 NOTE — PROGRESS NOTES
CLINICAL PHARMACY NOTE--Smoking Cessation and Anticoagulation     Sunday Keyes is a 59 y.o. male with PMHx significant for AVR (re-do in May, 2017), CAD s/p CABG (x3 in May, 2017), pA-fib, HLD, HTN, testicular CA who presents to clinic on 11/17/2023 for anticoagulation monitoring and smoking cessation counseling. Interval update:   11/17/23: Patient smoking 6 cigarettes per day     11/2/23: Patient reports he was smoke free for ~3 weeks but went back to smoking \"a few\" cigarettes per day recently. He will think about patches and lozenges. [facility] if he wants to go that route. He is worried about cost.     10/9/23: Per HHRN patient remains smoke free since hospital discharge! 9/25/23: Patient reports he has not smoked since he was admitted at the hospital on 8/31/23! He reports he has no desire to re-start smoking and does not have any cravings / symptoms of withdrawal. He is currently getting home health services after a lengthy hospital stay s/p lung resection on 8/31.     8/18/23: Patient has not called [facility] for patches as he \"doesn't want to seem like he's asking for free stuff\". Advised patient that this is not the point and urged him to call them asap. He is also going to look into getting the nicotine lozenges ([facility] likely [facility]). He is motivated to quit but doesn't think he's in the right state of mind currently given stress of upcoming surgery. He does believe he will be able to make it through hospitalization without smoking. Asked him to clean out of his house of cigarettes / triggers so  he can continue to be smoke free upon returning home. 8/3/23: Patient has not gotten nicotine patches or lozenges due to being too expensive.  Advised patient to call [phone] for free patches and let me know what pharmacy he would like the lozenges to go to as you can use a GoodRx coupon and get them for $15. Patient did not do either of

## 2023-11-20 DIAGNOSIS — E11.9 TYPE 2 DIABETES MELLITUS WITHOUT COMPLICATION, WITHOUT LONG-TERM CURRENT USE OF INSULIN (HCC): ICD-10-CM

## 2023-11-20 NOTE — TELEPHONE ENCOUNTER
Requested Prescriptions     Pending Prescriptions Disp Refills    metFORMIN (GLUCOPHAGE) 500 MG tablet [Pharmacy Med Name: METFORMIN HCL 500MG TABS] 120 tablet 0     Sig: TAKE TWO TABLETS BY MOUTH TWICE A DAY (WITH BREAKFAST AND EVENING MEAL)       Last Clinic Visit:  9/27/2023     Next Clinic Appointment:  12/13/2023

## 2023-11-29 ENCOUNTER — ANTI-COAG VISIT (OUTPATIENT)
Dept: PHARMACY | Age: 58
End: 2023-11-29
Payer: MEDICARE

## 2023-11-29 DIAGNOSIS — Z95.2 S/P AORTIC VALVE REPLACEMENT: Primary | Chronic | ICD-10-CM

## 2023-11-29 DIAGNOSIS — I48.91 ATRIAL FIBRILLATION, UNSPECIFIED TYPE (HCC): ICD-10-CM

## 2023-11-29 LAB — INTERNATIONAL NORMALIZATION RATIO, POC: 3.2

## 2023-11-29 PROCEDURE — 99212 OFFICE O/P EST SF 10 MIN: CPT | Performed by: PHARMACIST

## 2023-11-29 PROCEDURE — 85610 PROTHROMBIN TIME: CPT | Performed by: PHARMACIST

## 2023-12-13 ENCOUNTER — ANTI-COAG VISIT (OUTPATIENT)
Dept: PHARMACY | Age: 58
End: 2023-12-13
Payer: MEDICARE

## 2023-12-13 ENCOUNTER — OFFICE VISIT (OUTPATIENT)
Dept: INTERNAL MEDICINE CLINIC | Age: 58
End: 2023-12-13
Payer: MEDICARE

## 2023-12-13 VITALS
HEART RATE: 61 BPM | OXYGEN SATURATION: 97 % | BODY MASS INDEX: 31.34 KG/M2 | DIASTOLIC BLOOD PRESSURE: 78 MMHG | RESPIRATION RATE: 20 BRPM | TEMPERATURE: 97.3 F | WEIGHT: 231.4 LBS | SYSTOLIC BLOOD PRESSURE: 133 MMHG | HEIGHT: 72 IN

## 2023-12-13 DIAGNOSIS — I48.91 ATRIAL FIBRILLATION, UNSPECIFIED TYPE (HCC): ICD-10-CM

## 2023-12-13 DIAGNOSIS — Z00.00 HEALTHCARE MAINTENANCE: ICD-10-CM

## 2023-12-13 DIAGNOSIS — Z95.2 S/P AORTIC VALVE REPLACEMENT: Chronic | ICD-10-CM

## 2023-12-13 DIAGNOSIS — Z95.2 S/P AORTIC VALVE REPLACEMENT: Primary | Chronic | ICD-10-CM

## 2023-12-13 DIAGNOSIS — C34.92 NSCLC OF LEFT LUNG (HCC): ICD-10-CM

## 2023-12-13 DIAGNOSIS — I25.10 CAD, MULTIPLE VESSEL: Chronic | ICD-10-CM

## 2023-12-13 DIAGNOSIS — E87.6 HYPOKALEMIA: Primary | ICD-10-CM

## 2023-12-13 DIAGNOSIS — E78.5 HYPERLIPIDEMIA, UNSPECIFIED HYPERLIPIDEMIA TYPE: ICD-10-CM

## 2023-12-13 DIAGNOSIS — E11.9 TYPE 2 DIABETES MELLITUS WITHOUT COMPLICATION, WITHOUT LONG-TERM CURRENT USE OF INSULIN (HCC): ICD-10-CM

## 2023-12-13 LAB — INTERNATIONAL NORMALIZATION RATIO, POC: 1.3

## 2023-12-13 PROCEDURE — 99212 OFFICE O/P EST SF 10 MIN: CPT | Performed by: PHARMACIST

## 2023-12-13 PROCEDURE — 85610 PROTHROMBIN TIME: CPT | Performed by: PHARMACIST

## 2023-12-13 PROCEDURE — 99213 OFFICE O/P EST LOW 20 MIN: CPT | Performed by: INTERNAL MEDICINE

## 2023-12-13 ASSESSMENT — PATIENT HEALTH QUESTIONNAIRE - PHQ9
9. THOUGHTS THAT YOU WOULD BE BETTER OFF DEAD, OR OF HURTING YOURSELF: 0
1. LITTLE INTEREST OR PLEASURE IN DOING THINGS: 0
10. IF YOU CHECKED OFF ANY PROBLEMS, HOW DIFFICULT HAVE THESE PROBLEMS MADE IT FOR YOU TO DO YOUR WORK, TAKE CARE OF THINGS AT HOME, OR GET ALONG WITH OTHER PEOPLE: 0
5. POOR APPETITE OR OVEREATING: 0
3. TROUBLE FALLING OR STAYING ASLEEP: 3
8. MOVING OR SPEAKING SO SLOWLY THAT OTHER PEOPLE COULD HAVE NOTICED. OR THE OPPOSITE, BEING SO FIGETY OR RESTLESS THAT YOU HAVE BEEN MOVING AROUND A LOT MORE THAN USUAL: 0
SUM OF ALL RESPONSES TO PHQ QUESTIONS 1-9: 6
7. TROUBLE CONCENTRATING ON THINGS, SUCH AS READING THE NEWSPAPER OR WATCHING TELEVISION: 0
6. FEELING BAD ABOUT YOURSELF - OR THAT YOU ARE A FAILURE OR HAVE LET YOURSELF OR YOUR FAMILY DOWN: 0
SUM OF ALL RESPONSES TO PHQ QUESTIONS 1-9: 6
2. FEELING DOWN, DEPRESSED OR HOPELESS: 0
SUM OF ALL RESPONSES TO PHQ9 QUESTIONS 1 & 2: 0
SUM OF ALL RESPONSES TO PHQ QUESTIONS 1-9: 6
SUM OF ALL RESPONSES TO PHQ QUESTIONS 1-9: 6
4. FEELING TIRED OR HAVING LITTLE ENERGY: 3

## 2023-12-13 ASSESSMENT — ENCOUNTER SYMPTOMS
ABDOMINAL PAIN: 0
BLOOD IN STOOL: 0
DIARRHEA: 0
COUGH: 0
PHOTOPHOBIA: 0
NAUSEA: 0
ABDOMINAL DISTENTION: 0
VOMITING: 0
CONSTIPATION: 0
SHORTNESS OF BREATH: 1
BACK PAIN: 1
CHEST TIGHTNESS: 0
RHINORRHEA: 0
COLOR CHANGE: 0

## 2023-12-13 ASSESSMENT — COLUMBIA-SUICIDE SEVERITY RATING SCALE - C-SSRS
4. HAVE YOU HAD THESE THOUGHTS AND HAD SOME INTENTION OF ACTING ON THEM?: NO
5. HAVE YOU STARTED TO WORK OUT OR WORKED OUT THE DETAILS OF HOW TO KILL YOURSELF? DO YOU INTEND TO CARRY OUT THIS PLAN?: NO
3. HAVE YOU BEEN THINKING ABOUT HOW YOU MIGHT KILL YOURSELF?: NO
7. DID THIS OCCUR IN THE LAST THREE MONTHS: NO

## 2023-12-13 NOTE — PATIENT INSTRUCTIONS
Thank you so much for visiting the [facility] outpatient clinic! As we discussed, it seems that you are doing well and your pulmonary rehabilitation. I would like you to continue your efforts. You are due for a diabetic retinal exam.  You may have this done at any local optometrist, including [facility] or [facility]. I would advise that you not let them convince you to update your prescriptions on your glasses if you do not want to pay to have them renewed. You may have a retinal eye exam without buying glasses. I also noticed that you have a low potassium level on your last lab work, I would like you to have this rechecked. It seems our hemoglobin A1C measuring device is not working in the clinic so please also have that level checked in the lab. In addition, in order to screen you for treatable cancers, I would like to screen you for prostate cancer with a prostate-specific antigen (PSA) level. Quitting smoking will greatly improve your health as well. Please see us back in the clinic in 3 months.

## 2023-12-13 NOTE — PROGRESS NOTES
CLINICAL PHARMACY NOTE--Smoking Cessation and Anticoagulation     Loraine Carter is a 59 y.o. male with PMHx significant for AVR (re-do in May, 2017), CAD s/p CABG (x3 in May, 2017), pA-fib, HLD, HTN, testicular CA who presents to clinic on 12/13/2023 for anticoagulation monitoring and smoking cessation counseling. Interval update:   12/13/23: 10 cigarettes per day     11/29/23: >6 cigarettes per day    11/17/23: Patient smoking 6 cigarettes per day     11/2/23: Patient reports he was smoke free for ~3 weeks but went back to smoking \"a few\" cigarettes per day recently. He will think about patches and lozenges. Will sent to [facility] with goodrx coupon if he wants to go that route. He is worried about cost.     10/9/23: Per HHRN patient remains smoke free since hospital discharge! 9/25/23: Patient reports he has not smoked since he was admitted at the hospital on 8/31/23! He reports he has no desire to re-start smoking and does not have any cravings / symptoms of withdrawal. He is currently getting home health services after a lengthy hospital stay s/p lung resection on 8/31.     8/18/23: Patient has not called [facility] for patches as he \"doesn't want to seem like he's asking for free stuff\". Advised patient that this is not the point and urged him to call them asap. He is also going to look into getting the nicotine lozenges ([facility]). He is motivated to quit but doesn't think he's in the right state of mind currently given stress of upcoming surgery. He does believe he will be able to make it through hospitalization without smoking. Asked him to clean out of his house of cigarettes / triggers so  he can continue to be smoke free upon returning home. 8/3/23: Patient has not gotten nicotine patches or lozenges due to being too expensive.  Advised patient to call [phone] for free patches and let me know what pharmacy he would like the lozenges to go to as you can

## 2023-12-26 DIAGNOSIS — E11.9 TYPE 2 DIABETES MELLITUS WITHOUT COMPLICATION, WITHOUT LONG-TERM CURRENT USE OF INSULIN (HCC): ICD-10-CM

## 2023-12-26 NOTE — TELEPHONE ENCOUNTER
Requested Prescriptions     Pending Prescriptions Disp Refills    metFORMIN (GLUCOPHAGE) 500 MG tablet [Pharmacy Med Name: METFORMIN HCL 500MG TABS] 120 tablet 2     Sig: TAKE TWO TABLETS BY MOUTH TWICE A DAY WITH BREAKFAST  [address] Visit:  12/13/2023     Next Clinic Appointment:  3/28/2024

## 2023-12-27 ENCOUNTER — ANTI-COAG VISIT (OUTPATIENT)
Dept: PHARMACY | Age: 58
End: 2023-12-27
Payer: MEDICARE

## 2023-12-27 DIAGNOSIS — Z95.2 S/P AORTIC VALVE REPLACEMENT: Primary | Chronic | ICD-10-CM

## 2023-12-27 DIAGNOSIS — I48.91 ATRIAL FIBRILLATION, UNSPECIFIED TYPE (HCC): ICD-10-CM

## 2023-12-27 LAB — INTERNATIONAL NORMALIZATION RATIO, POC: 2.7

## 2023-12-27 PROCEDURE — 85610 PROTHROMBIN TIME: CPT

## 2023-12-27 PROCEDURE — 99211 OFF/OP EST MAY X REQ PHY/QHP: CPT

## 2023-12-27 NOTE — PROGRESS NOTES
CLINICAL PHARMACY NOTE--Smoking Cessation and Anticoagulation     Hina Wetzel is a 59 y.o. male with PMHx significant for AVR (re-do in May, 2017), CAD s/p CABG (x3 in May, 2017), pA-fib, HLD, HTN, testicular CA who presents to clinic on 12/27/2023 for anticoagulation monitoring and smoking cessation counseling. Interval update:   12/13/23: 10 cigarettes per day     11/29/23: >6 cigarettes per day    11/17/23: Patient smoking 6 cigarettes per day     11/2/23: Patient reports he was smoke free for ~3 weeks but went back to smoking \"a few\" cigarettes per day recently. He will think about patches and lozenges. Will sent to [employer] with goodrx coupon if he wants to go that route. He is worried about cost.     10/9/23: Per HHRN patient remains smoke free since hospital discharge! 9/25/23: Patient reports he has not smoked since he was admitted at the hospital on 8/31/23! He reports he has no desire to re-start smoking and does not have any cravings / symptoms of withdrawal. He is currently getting home health services after a lengthy hospital stay s/p lung resection on 8/31.     8/18/23: Patient has not called [employer] for patches as he \"doesn't want to seem like he's asking for free stuff\". Advised patient that this is not the point and urged him to call them asap. He is also going to look into getting the nicotine lozenges ([facility] with [facility]). He is motivated to quit but doesn't think he's in the right state of mind currently given stress of upcoming surgery. He does believe he will be able to make it through hospitalization without smoking. Asked him to clean out of his house of cigarettes / triggers so  he can continue to be smoke free upon returning home. 8/3/23: Patient has not gotten nicotine patches or lozenges due to being too expensive.  Advised patient to call [phone] for free patches and let me know what pharmacy he would like the lozenges to go to as you can

## 2024-01-10 ENCOUNTER — ANTI-COAG VISIT (OUTPATIENT)
Dept: PHARMACY | Age: 59
End: 2024-01-10
Payer: MEDICARE

## 2024-01-10 DIAGNOSIS — Z95.2 S/P AORTIC VALVE REPLACEMENT: Primary | Chronic | ICD-10-CM

## 2024-01-10 DIAGNOSIS — I48.91 ATRIAL FIBRILLATION, UNSPECIFIED TYPE (HCC): ICD-10-CM

## 2024-01-10 LAB — INTERNATIONAL NORMALIZATION RATIO, POC: 4.6

## 2024-01-10 PROCEDURE — 99212 OFFICE O/P EST SF 10 MIN: CPT | Performed by: PHARMACIST

## 2024-01-10 PROCEDURE — 85610 PROTHROMBIN TIME: CPT | Performed by: PHARMACIST

## 2024-01-10 NOTE — PROGRESS NOTES
(Cymbalta) 5 0/5 0/5 5 5 5 5 35  12/28 2.0 At goal, no change  7.5 5 5 5 5 5 7.5 40  11/24 2.4 At goal, no change  7.5 5 5 5 5 5 7.5 40  10/30 2.5 At goal, no change  7.5 5 5 5 5 5 7.5 40  10/2 2.2 At goal, no change  7.5 5 5 5 5 5 7.5 40  9/3 2.4 At goal, no change  7.5 5 5 5 5 5 7.5 40  8/3 2.9 At goal, no change  7.5 5 5 5 5 5 7.5 40  7/6 2.7 At goal, no change  7.5 5 5 5 5 5 7.5 40  6/8 2.8 At goal, no change  7.5 5 5 5 5 5 7.5 40  5/11 2.9 At goal, no change  7.5 5 5 5 5 5 7.5 40  3/30 3.0 At goal, no change  7.5 5 5 5 5 5 7.5 40  2/26 2.3 At goal, no change  7.5 5 5 5 5 5 7.5 40  2/5 3.3 Above goal, decrease  7.5 5 5 5 5 5 7.5 40  1/8 2.9 At goal, no change  7.5 7.5 5 5 5 5 7.5 42.5    Patient History:  Recent hospitalizations/HC visits 9/20-9/31/23: [facility] with lightheadedness and hypotension   8/31-9/17/23: [facility] s/p lung nodule resection   4/8/22: bronchoscopy per Dr. Cherry, warfarin held x3 days (no bridge). Findings were likely infectious process, prescribed Augmentin.   2/21/22: Pulmonology, work-up underway for lung nodule    Recent medication changes 9/17/23: digoxin 250 mg daily   4/14-4/21/22: Augmentin BID x7 days    Medications taken regularly that may interact with warfarin or alter INR ASA 81 mg (increase bleeding)   Digoxin 250 mg daily (increase INR)    Warfarin dose taken as prescribed Usually compliant   Does not use pillbox  Patient has been taking warfarin since re-do AVR in May, 2017   Signs/symptoms of bleeding No h/o major bleeding   Vitamin K intake Normally has ~0 servings of green, leafy vegetables per week   Recent vomiting/diarrhea/fever, changes in weight or activity level None reported   Tobacco or alcohol use -No recent changes in smoking as of 11/24/20 (~3/4 PPD)  -Patient cut back from 2 PPD to 3/4 PPD in February (2019) when he moved in with his daughter. Decrease in smoking typically increases INR gradually.   -Patient denies any alcohol or illicit drug use  See above for smoking

## 2024-01-12 PROBLEM — Z00.00 HEALTHCARE MAINTENANCE: Status: RESOLVED | Noted: 2023-12-13 | Resolved: 2024-01-12

## 2024-01-17 NOTE — TELEPHONE ENCOUNTER
Requested Prescriptions     Pending Prescriptions Disp Refills    metoprolol tartrate (LOPRESSOR) 25 MG tablet [Pharmacy Med Name: METOPROLOL TARTRATE 25MG TABS] 60 tablet 2     Sig: TAKE ONE TABLET BY MOUTH TWICE A DAY       Last Clinic Visit:  12/13/2023     Next Clinic Appointment:  3/28/2024

## 2024-01-22 RX ORDER — DIGOXIN 250 MCG
250 TABLET ORAL DAILY
Qty: 30 TABLET | Refills: 3 | Status: SHIPPED | OUTPATIENT
Start: 2024-01-22

## 2024-01-22 NOTE — TELEPHONE ENCOUNTER
Requested Prescriptions     Pending Prescriptions Disp Refills    digoxin (LANOXIN) 250 MCG tablet 30 tablet 3     Sig: Take 1 tablet by mouth daily       Last Clinic Visit:  12/13/2023     Next Clinic Appointment:  3/28/2024

## 2024-01-24 ENCOUNTER — ANTI-COAG VISIT (OUTPATIENT)
Dept: PHARMACY | Age: 59
End: 2024-01-24
Payer: MEDICARE

## 2024-01-24 DIAGNOSIS — I48.91 ATRIAL FIBRILLATION, UNSPECIFIED TYPE (HCC): ICD-10-CM

## 2024-01-24 DIAGNOSIS — Z95.2 S/P AORTIC VALVE REPLACEMENT: Primary | Chronic | ICD-10-CM

## 2024-01-24 LAB — INTERNATIONAL NORMALIZATION RATIO, POC: 7

## 2024-01-24 PROCEDURE — 99212 OFFICE O/P EST SF 10 MIN: CPT

## 2024-01-24 PROCEDURE — 85610 PROTHROMBIN TIME: CPT

## 2024-01-24 NOTE — PROGRESS NOTES
CLINICAL PHARMACY NOTE--Smoking Cessation and Anticoagulation     Alexandr Witt is a 58 y.o. male with PMHx significant for AVR (re-do in May, 2017), CAD s/p CABG (x3 in May, 2017), pA-fib, HLD, HTN, testicular CA who presents to clinic on 1/24/2024 for anticoagulation monitoring and smoking cessation counseling.    Interval update:   1/10/24: Patient back to smoking ~20 cigarettes per day. Discussed with patient today. He does not think it is a good time for him right now to cut back / quit. He voiced he has been struggling with depression lately, especially around the holidays. He was supposed to spend [place] at his daughters but his family got sick with Covid-19 and he is struggling being at home by himself since the passing of his wife. Recommended he discuss this with his PCP. Especially given the strong correlation between depression and pain, which patient also reports as an ongoing issue. Asked patient to let me know when he is ready to start trying to cut back on smoking again and we will re-visit at that time.   12/13/23: 10 cigarettes per day   11/29/23: >6 cigarettes per day  11/17/23: Patient smoking 6 cigarettes per day   11/2/23: Patient reports he was smoke free for ~3 weeks but went back to smoking \"a few\" cigarettes per day recently. He will think about patches and lozenges. [facility] if he wants to go that route. He is worried about cost.   10/9/23: Per HHRN patient remains smoke free since hospital discharge!   9/25/23: Patient reports he has not smoked since he was admitted at the hospital on 8/31/23! He reports he has no desire to re-start smoking and does not have any cravings / symptoms of withdrawal. He is currently getting home health services after a lengthy hospital stay s/p lung resection on 8/31.   8/18/23: Patient has not called [phone] for patches as he \"doesn't want to seem like he's asking for free stuff\". Advised patient that this is not the point

## 2024-01-31 ENCOUNTER — ANTI-COAG VISIT (OUTPATIENT)
Dept: PHARMACY | Age: 59
End: 2024-01-31
Payer: MEDICARE

## 2024-01-31 DIAGNOSIS — I48.91 ATRIAL FIBRILLATION, UNSPECIFIED TYPE (HCC): ICD-10-CM

## 2024-01-31 DIAGNOSIS — Z95.2 S/P AORTIC VALVE REPLACEMENT: Primary | Chronic | ICD-10-CM

## 2024-01-31 LAB — INTERNATIONAL NORMALIZATION RATIO, POC: 3.7

## 2024-01-31 PROCEDURE — 99212 OFFICE O/P EST SF 10 MIN: CPT | Performed by: PHARMACIST

## 2024-01-31 PROCEDURE — 85610 PROTHROMBIN TIME: CPT | Performed by: PHARMACIST

## 2024-01-31 NOTE — PROGRESS NOTES
CLINICAL PHARMACY NOTE--Smoking Cessation and Anticoagulation     Alexandr Witt is a 58 y.o. male with PMHx significant for AVR (re-do in May, 2017), CAD s/p CABG (x3 in May, 2017), pA-fib, HLD, HTN, testicular CA who presents to clinic on 1/31/2024 for anticoagulation monitoring and smoking cessation counseling.    Interval update:   1/10/24: Patient back to smoking ~20 cigarettes per day. Discussed with patient today. He does not think it is a good time for him right now to cut back / quit. He voiced he has been struggling with depression lately, especially around the holidays. He was supposed to spend [place] at his daughters but his family got sick with Covid-19 and he is struggling being at home by himself since the passing of his wife. Recommended he discuss this with his PCP. Especially given the strong correlation between depression and pain, which patient also reports as an ongoing issue. Asked patient to let me know when he is ready to start trying to cut back on smoking again and we will re-visit at that time.   12/13/23: 10 cigarettes per day   11/29/23: >6 cigarettes per day  11/17/23: Patient smoking 6 cigarettes per day   11/2/23: Patient reports he was smoke free for ~3 weeks but went back to smoking \"a few\" cigarettes per day recently. He will think about patches and lozenges. [facility] if he wants to go that route. He is worried about cost.   10/9/23: Per HHRN patient remains smoke free since hospital discharge!   9/25/23: Patient reports he has not smoked since he was admitted at the hospital on 8/31/23! He reports he has no desire to re-start smoking and does not have any cravings / symptoms of withdrawal. He is currently getting home health services after a lengthy hospital stay s/p lung resection on 8/31.   8/18/23: Patient has not called [phone] for patches as he \"doesn't want to seem like he's asking for free stuff\". Advised patient that this is not the point

## 2024-02-14 ENCOUNTER — ANTI-COAG VISIT (OUTPATIENT)
Dept: PHARMACY | Age: 59
End: 2024-02-14
Payer: MEDICARE

## 2024-02-14 DIAGNOSIS — I48.91 ATRIAL FIBRILLATION, UNSPECIFIED TYPE (HCC): ICD-10-CM

## 2024-02-14 DIAGNOSIS — Z95.2 S/P AORTIC VALVE REPLACEMENT: Primary | Chronic | ICD-10-CM

## 2024-02-14 LAB — INTERNATIONAL NORMALIZATION RATIO, POC: 6.3

## 2024-02-14 PROCEDURE — 99212 OFFICE O/P EST SF 10 MIN: CPT | Performed by: PHARMACIST

## 2024-02-14 PROCEDURE — 85610 PROTHROMBIN TIME: CPT | Performed by: PHARMACIST

## 2024-02-14 NOTE — PROGRESS NOTES
CLINICAL PHARMACY NOTE--Smoking Cessation and Anticoagulation     Alexandr Witt is a 58 y.o. male with PMHx significant for AVR (re-do in May, 2017), CAD s/p CABG (x3 in May, 2017), pA-fib, HLD, HTN, testicular CA who presents to clinic on 2/14/2024 for anticoagulation monitoring and smoking cessation counseling.    Interval update:   1/10/24: Patient back to smoking ~20 cigarettes per day. Discussed with patient today. He does not think it is a good time for him right now to cut back / quit. He voiced he has been struggling with depression lately, especially around the holidays. He was supposed to spend [place] at his daughters but his family got sick with Covid-19 and he is struggling being at home by himself since the passing of his wife. Recommended he discuss this with his PCP. Especially given the strong correlation between depression and pain, which patient also reports as an ongoing issue. Asked patient to let me know when he is ready to start trying to cut back on smoking again and we will re-visit at that time.   12/13/23: 10 cigarettes per day   11/29/23: >6 cigarettes per day  11/17/23: Patient smoking 6 cigarettes per day   11/2/23: Patient reports he was smoke free for ~3 weeks but went back to smoking \"a few\" cigarettes per day recently. He will think about patches and lozenges. [facility] if he wants to go that route. He is worried about cost.   10/9/23: Per HHRN patient remains smoke free since hospital discharge!   9/25/23: Patient reports he has not smoked since he was admitted at the hospital on 8/31/23! He reports he has no desire to re-start smoking and does not have any cravings / symptoms of withdrawal. He is currently getting home health services after a lengthy hospital stay s/p lung resection on 8/31.   8/18/23: Patient has not called [phone] for patches as he \"doesn't want to seem like he's asking for free stuff\". Advised patient that this is not the point

## 2024-02-26 ENCOUNTER — TELEPHONE (OUTPATIENT)
Dept: PHARMACY | Age: 59
End: 2024-02-26

## 2024-02-26 NOTE — TELEPHONE ENCOUNTER
Patient LVM stating he is very sick this morning and unable to come in for INR appt today. Called patient back and LVM asking him to call to r/s.     Bethany Hernandez, PharmD, [facility]  [facility]  [place]: [phone]  [place]: [phone]  2/26/2024 8:25 AM

## 2024-03-04 ENCOUNTER — ANTI-COAG VISIT (OUTPATIENT)
Dept: PHARMACY | Age: 59
End: 2024-03-04
Payer: MEDICARE

## 2024-03-04 DIAGNOSIS — Z95.2 S/P AORTIC VALVE REPLACEMENT: Primary | Chronic | ICD-10-CM

## 2024-03-04 DIAGNOSIS — I48.91 ATRIAL FIBRILLATION, UNSPECIFIED TYPE (HCC): ICD-10-CM

## 2024-03-04 LAB — INTERNATIONAL NORMALIZATION RATIO, POC: 3.7

## 2024-03-04 PROCEDURE — 99212 OFFICE O/P EST SF 10 MIN: CPT

## 2024-03-04 PROCEDURE — 85610 PROTHROMBIN TIME: CPT

## 2024-03-04 NOTE — PROGRESS NOTES
CLINICAL PHARMACY NOTE--Smoking Cessation and Anticoagulation     Alexandr Witt is a 58 y.o. male with PMHx significant for AVR (re-do in May, 2017), CAD s/p CABG (x3 in May, 2017), pA-fib, HLD, HTN, testicular CA who presents to clinic on 3/4/2024 for anticoagulation monitoring and smoking cessation counseling.    Interval update:   1/10/24: Patient back to smoking ~20 cigarettes per day. Discussed with patient today. He does not think it is a good time for him right now to cut back / quit. He voiced he has been struggling with depression lately, especially around the holidays. He was supposed to spend [place] at his daughters but his family got sick with Covid-19 and he is struggling being at home by himself since the passing of his wife. Recommended he discuss this with his PCP. Especially given the strong correlation between depression and pain, which patient also reports as an ongoing issue. Asked patient to let me know when he is ready to start trying to cut back on smoking again and we will re-visit at that time.   12/13/23: 10 cigarettes per day   11/29/23: >6 cigarettes per day  11/17/23: Patient smoking 6 cigarettes per day   11/2/23: Patient reports he was smoke free for ~3 weeks but went back to smoking \"a few\" cigarettes per day recently. He will think about patches and lozenges. [facility] if he wants to go that route. He is worried about cost.   10/9/23: Per HHRN patient remains smoke free since hospital discharge!   9/25/23: Patient reports he has not smoked since he was admitted at the hospital on 8/31/23! He reports he has no desire to re-start smoking and does not have any cravings / symptoms of withdrawal. He is currently getting home health services after a lengthy hospital stay s/p lung resection on 8/31.   8/18/23: Patient has not called [phone] for patches as he \"doesn't want to seem like he's asking for free stuff\". Advised patient that this is not the point

## 2024-03-18 ENCOUNTER — ANTI-COAG VISIT (OUTPATIENT)
Dept: PHARMACY | Age: 59
End: 2024-03-18
Payer: MEDICARE

## 2024-03-18 DIAGNOSIS — Z95.2 S/P AORTIC VALVE REPLACEMENT: Primary | Chronic | ICD-10-CM

## 2024-03-18 DIAGNOSIS — I48.91 ATRIAL FIBRILLATION, UNSPECIFIED TYPE (HCC): ICD-10-CM

## 2024-03-18 LAB — INTERNATIONAL NORMALIZATION RATIO, POC: 2.9

## 2024-03-18 PROCEDURE — 85610 PROTHROMBIN TIME: CPT

## 2024-03-18 PROCEDURE — 99211 OFF/OP EST MAY X REQ PHY/QHP: CPT

## 2024-03-18 NOTE — PROGRESS NOTES
4/8/22: bronchoscopy per Dr. Cherry, warfarin held x3 days (no bridge). Findings were likely infectious process, prescribed Augmentin.   2/21/22: Pulmonology, work-up underway for lung nodule    Recent medication changes 9/17/23: digoxin 250 mg daily   4/14-4/21/22: Augmentin BID x7 days    Medications taken regularly that may interact with warfarin or alter INR ASA 81 mg (increase bleeding)   Digoxin 250 mg daily (increase INR)    Warfarin dose taken as prescribed Usually compliant   Does not use pillbox  Patient has been taking warfarin since re-do AVR in May, 2017   Signs/symptoms of bleeding No h/o major bleeding   Vitamin K intake Normally has ~0 servings of green, leafy vegetables per week   Recent vomiting/diarrhea/fever, changes in weight or activity level None reported   Tobacco or alcohol use -No recent changes in smoking as of 11/24/20 (~3/4 PPD)  -Patient cut back from 2 PPD to 3/4 PPD in February (2019) when he moved in with his daughter. Decrease in smoking typically increases INR gradually.   -Patient denies any alcohol or illicit drug use  See above for smoking patterns      Upcoming surgeries or procedures None        2. 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 y  Do you smoke 20 or more cigarettes each day? y  At times when you can't smoke or haven't got

## 2024-03-27 DIAGNOSIS — E11.9 TYPE 2 DIABETES MELLITUS WITHOUT COMPLICATION, WITHOUT LONG-TERM CURRENT USE OF INSULIN (HCC): ICD-10-CM

## 2024-03-28 ENCOUNTER — OFFICE VISIT (OUTPATIENT)
Dept: INTERNAL MEDICINE CLINIC | Age: 59
End: 2024-03-28
Payer: MEDICARE

## 2024-03-28 VITALS
RESPIRATION RATE: 16 BRPM | TEMPERATURE: 97.1 F | BODY MASS INDEX: 26.78 KG/M2 | DIASTOLIC BLOOD PRESSURE: 78 MMHG | SYSTOLIC BLOOD PRESSURE: 113 MMHG | HEART RATE: 77 BPM | HEIGHT: 72 IN | WEIGHT: 197.7 LBS | OXYGEN SATURATION: 97 %

## 2024-03-28 DIAGNOSIS — E11.9 TYPE 2 DIABETES MELLITUS WITHOUT COMPLICATION, WITHOUT LONG-TERM CURRENT USE OF INSULIN (HCC): ICD-10-CM

## 2024-03-28 DIAGNOSIS — H66.93 RECURRENT OTITIS MEDIA, BILATERAL: ICD-10-CM

## 2024-03-28 DIAGNOSIS — F32.9 UNIPOLAR DEPRESSION: Chronic | ICD-10-CM

## 2024-03-28 DIAGNOSIS — R63.4 WEIGHT LOSS, UNINTENTIONAL: ICD-10-CM

## 2024-03-28 DIAGNOSIS — M51.26 LUMBAR HERNIATED DISC: ICD-10-CM

## 2024-03-28 DIAGNOSIS — C34.92 NSCLC OF LEFT LUNG (HCC): Primary | ICD-10-CM

## 2024-03-28 DIAGNOSIS — R45.89 DEPRESSED MOOD: ICD-10-CM

## 2024-03-28 DIAGNOSIS — I48.91 ATRIAL FIBRILLATION, UNSPECIFIED TYPE (HCC): ICD-10-CM

## 2024-03-28 DIAGNOSIS — D68.69 SECONDARY HYPERCOAGULABLE STATE (HCC): ICD-10-CM

## 2024-03-28 DIAGNOSIS — Z71.89 ACP (ADVANCE CARE PLANNING): ICD-10-CM

## 2024-03-28 DIAGNOSIS — R42 DIZZINESS: ICD-10-CM

## 2024-03-28 DIAGNOSIS — R35.0 URINARY FREQUENCY: ICD-10-CM

## 2024-03-28 LAB
GLUCOSE BLD-MCNC: 210 MG/DL (ref 70–99)
HBA1C MFR BLD: 7.3 %
PERFORMED ON: ABNORMAL

## 2024-03-28 PROCEDURE — 83036 HEMOGLOBIN GLYCOSYLATED A1C: CPT

## 2024-03-28 PROCEDURE — 99213 OFFICE O/P EST LOW 20 MIN: CPT | Performed by: INTERNAL MEDICINE

## 2024-03-28 RX ORDER — ONDANSETRON 4 MG/1
4 TABLET, ORALLY DISINTEGRATING ORAL 3 TIMES DAILY PRN
Qty: 90 TABLET | Refills: 1 | Status: SHIPPED | OUTPATIENT
Start: 2024-03-28

## 2024-03-28 RX ORDER — MIRTAZAPINE 7.5 MG/1
7.5 TABLET, FILM COATED ORAL NIGHTLY
Qty: 30 TABLET | Refills: 0 | Status: CANCELLED | OUTPATIENT
Start: 2024-03-28

## 2024-03-28 RX ORDER — CITALOPRAM 20 MG/1
20 TABLET ORAL DAILY
Qty: 30 TABLET | Refills: 0 | Status: SHIPPED | OUTPATIENT
Start: 2024-03-28

## 2024-03-28 RX ORDER — PIOGLITAZONEHYDROCHLORIDE 15 MG/1
15 TABLET ORAL DAILY
Qty: 30 TABLET | Refills: 3 | Status: ON HOLD | OUTPATIENT
Start: 2024-03-28 | End: 2024-04-30 | Stop reason: HOSPADM

## 2024-03-28 ASSESSMENT — ENCOUNTER SYMPTOMS
SHORTNESS OF BREATH: 0
VOICE CHANGE: 1
COUGH: 0
BACK PAIN: 1
CHEST TIGHTNESS: 0
ABDOMINAL DISTENTION: 0
RHINORRHEA: 0
COLOR CHANGE: 0
SINUS PRESSURE: 0
NAUSEA: 1
CONSTIPATION: 0
ABDOMINAL PAIN: 0
PHOTOPHOBIA: 0
BLOOD IN STOOL: 0
SINUS PAIN: 0
DIARRHEA: 0

## 2024-03-28 NOTE — TELEPHONE ENCOUNTER
Requested Prescriptions     Pending Prescriptions Disp Refills    metFORMIN (GLUCOPHAGE) 500 MG tablet [Pharmacy Med Name: METFORMIN HCL 500MG TABS] 120 tablet 2     Sig: TAKE TWO TABLETS BY MOUTH TWICE A DAY WITH BREAKFAST AND EVENING MEAL       Last Clinic Visit:  12/13/2023     Next Clinic Appointment:  3/27/2024

## 2024-03-28 NOTE — ASSESSMENT & PLAN NOTE
Unclear control, continue current medications and continue current treatment plan pending workup below.   - CT CHEST ABDOMEN PELVIS W WO CONTRAST Additional Contrast? Radiologist Recommendation

## 2024-03-28 NOTE — ASSESSMENT & PLAN NOTE
Uncontrolled, continue current medications and advised patient to try OTC allergy medications such as Zyrtec or Claritin.

## 2024-03-28 NOTE — ASSESSMENT & PLAN NOTE
Uncontrolled, changes made today: start Celexa 20mg daily.  Consider mirtazapine if this does not work.

## 2024-03-28 NOTE — PATIENT INSTRUCTIONS
Thank you for visiting the [facility]!    As we discussed, your ear issue may be related to allergies. Please try benadryl as needed.     We also discussed getting a PET scan. Please see us back again after you obtain this.     Please start taking mirtazepine 7.5 mg tablets every night. This should help with sleep, appetite, mood and cognition.    We discussed as well that it is becoming more difficult to get your daily activities accomplished. I would like you to start considering assisted living since it doesn't seem possible for your family to move in with you.     Please see us back in the clinic in a few weeks.       Advance Care Planning     Advance Care Planning opens a door to talk about and write down your wishes before a sudden accident or illness.  Make your goals, values, and preferences known.     This puts you in the ’s seat and helps others know what matters most to you so they won’t have to guess.      Where can you learn more?    Go to [email or web address]   to learn how to:    Name someone you trust to make healthcare decisions for you, only if you can’t. (Healthcare Power of )    Document your wishes for care if you were seriously ill and not expected to recover or are approaching end of life. (Advance Directive or Living Will)    The same page can be found using the QR code below.

## 2024-03-28 NOTE — PROGRESS NOTES
NSCLC of left lung (HCC)  Assessment & Plan:   Unclear control, continue current medications and continue current treatment plan pending workup below.   - CT CHEST ABDOMEN PELVIS W WO CONTRAST Additional Contrast? Radiologist Recommendation   Orders:  -     PET CT WHOLE BODY; Future  -     CT CHEST ABDOMEN PELVIS W WO CONTRAST Additional Contrast? Radiologist Recommendation  2. Dizziness  3. Recurrent otitis media, bilateral  Assessment & Plan:   Uncontrolled, continue current medications and advised patient to try OTC allergy medications such as Zyrtec or Claritin.  4. ACP (advance care planning)  Assessment & Plan:    Will continue discussing at next visit.  5. Depressed mood  -     citalopram (CELEXA) 20 MG tablet; Take 1 tablet by mouth daily, Disp-30 tablet, R-0Normal  6. Type 2 diabetes mellitus without complication, without long-term current use of insulin (HCC)  Assessment & Plan:   Borderline controlled, continue current medications, consider medication changes at next visit.  Orders:  -     POCT glycosylated hemoglobin (Hb A1C)  -     CT CHEST ABDOMEN PELVIS W WO CONTRAST Additional Contrast? Radiologist Recommendation  7. Urinary frequency  -     Urinalysis with Microscopic ([place] ONLY)  -     Uric Acid; Future  -     OSMOLALITY; Future  -     OSMOLALITY, URINE; Future  8. Lumbar herniated disc  Assessment & Plan:   Monitored by specialist- no acute findings meriting change in the plan.  9. Weight loss, unintentional  10. Unipolar depression  Assessment & Plan:   Uncontrolled, changes made today: start Celexa 20mg daily.  Consider mirtazapine if this does not work.  11. Atrial fibrillation, unspecified type (HCC)  Assessment & Plan:   Well-controlled, continue current medications  12. Secondary hypercoagulable state (HCC)  Assessment & Plan:   Asymptomatic, continue current medications    Return in about 2 weeks (around 4/11/2024).    The patient was staffed with the teaching attending: Dr. Strange

## 2024-03-28 NOTE — TELEPHONE ENCOUNTER
Requested Prescriptions     Pending Prescriptions Disp Refills    pioglitazone (ACTOS) 15 MG tablet [Pharmacy Med Name: PIOGLITAZONE HCL 15MG TABS] 30 tablet 3     Sig: TAKE ONE TABLET BY MOUTH ONCE A DAY       Last Clinic Visit:  12/13/2023     Next Clinic Appointment:  3/28/2024

## 2024-03-29 ENCOUNTER — HOSPITAL ENCOUNTER (OUTPATIENT)
Dept: CT IMAGING | Age: 59
Discharge: HOME | End: 2024-03-29
Payer: MEDICARE

## 2024-03-29 DIAGNOSIS — C34.92 NSCLC OF LEFT LUNG (HCC): ICD-10-CM

## 2024-03-29 DIAGNOSIS — E11.9 TYPE 2 DIABETES MELLITUS WITHOUT COMPLICATION, WITHOUT LONG-TERM CURRENT USE OF INSULIN (HCC): ICD-10-CM

## 2024-03-29 LAB
PERFORMED ON: NORMAL
POC CREATININE: 0.9 MG/DL (ref 0.8–1.3)
POC SAMPLE TYPE: NORMAL

## 2024-03-29 PROCEDURE — 2500000003 HC RX 250 WO HCPCS: Performed by: SURGERY

## 2024-03-29 PROCEDURE — 82565 ASSAY OF CREATININE: CPT

## 2024-03-29 PROCEDURE — 74177 CT ABD & PELVIS W/CONTRAST: CPT

## 2024-03-29 PROCEDURE — 6360000004 HC RX CONTRAST MEDICATION: Performed by: SURGERY

## 2024-03-29 RX ADMIN — IOPAMIDOL 75 ML: 755 INJECTION, SOLUTION INTRAVENOUS at 14:52

## 2024-03-29 RX ADMIN — BARIUM SULFATE 900 ML: 20 SUSPENSION ORAL at 14:52

## 2024-04-03 DIAGNOSIS — E87.6 HYPOKALEMIA: ICD-10-CM

## 2024-04-03 DIAGNOSIS — C34.92 NSCLC OF LEFT LUNG (HCC): Primary | ICD-10-CM

## 2024-04-03 DIAGNOSIS — R63.4 WEIGHT LOSS, UNINTENTIONAL: ICD-10-CM

## 2024-04-03 DIAGNOSIS — I50.30 HEART FAILURE WITH PRESERVED EJECTION FRACTION, UNSPECIFIED HF CHRONICITY (HCC): ICD-10-CM

## 2024-04-08 ENCOUNTER — APPOINTMENT (OUTPATIENT)
Dept: PHARMACY | Age: 59
End: 2024-04-08
Payer: MEDICARE

## 2024-04-11 ENCOUNTER — OFFICE VISIT (OUTPATIENT)
Dept: INTERNAL MEDICINE CLINIC | Age: 59
End: 2024-04-11
Payer: MEDICARE

## 2024-04-11 ENCOUNTER — TELEPHONE (OUTPATIENT)
Dept: PULMONOLOGY | Age: 59
End: 2024-04-11

## 2024-04-11 VITALS
DIASTOLIC BLOOD PRESSURE: 61 MMHG | BODY MASS INDEX: 26.22 KG/M2 | WEIGHT: 193.3 LBS | TEMPERATURE: 97 F | HEART RATE: 58 BPM | SYSTOLIC BLOOD PRESSURE: 94 MMHG | OXYGEN SATURATION: 97 % | RESPIRATION RATE: 18 BRPM

## 2024-04-11 DIAGNOSIS — H66.93 RECURRENT OTITIS MEDIA, BILATERAL: ICD-10-CM

## 2024-04-11 DIAGNOSIS — J30.9 ALLERGIC RHINITIS, UNSPECIFIED SEASONALITY, UNSPECIFIED TRIGGER: ICD-10-CM

## 2024-04-11 DIAGNOSIS — C34.92 NSCLC OF LEFT LUNG (HCC): Primary | ICD-10-CM

## 2024-04-11 DIAGNOSIS — R45.89 DEPRESSED MOOD: ICD-10-CM

## 2024-04-11 DIAGNOSIS — E44.1 MILD MALNUTRITION (HCC): ICD-10-CM

## 2024-04-11 PROCEDURE — 99213 OFFICE O/P EST LOW 20 MIN: CPT

## 2024-04-11 RX ORDER — CITALOPRAM 40 MG/1
40 TABLET ORAL DAILY
Qty: 30 TABLET | Refills: 2 | Status: SHIPPED | OUTPATIENT
Start: 2024-04-11 | End: 2024-07-10

## 2024-04-11 RX ORDER — FLUTICASONE PROPIONATE 50 MCG
2 SPRAY, SUSPENSION (ML) NASAL DAILY
Qty: 16 G | Refills: 0 | Status: SHIPPED | OUTPATIENT
Start: 2024-04-11

## 2024-04-11 ASSESSMENT — ENCOUNTER SYMPTOMS
ABDOMINAL PAIN: 0
FACIAL SWELLING: 0
NAUSEA: 1
COUGH: 1
VOMITING: 0
SHORTNESS OF BREATH: 1
SINUS PRESSURE: 0
ABDOMINAL DISTENTION: 0
RHINORRHEA: 1
SINUS PAIN: 0

## 2024-04-11 NOTE — PROGRESS NOTES
[facility]    Alexandr Witt is a 58 y.o. male, here for evaluation of the following concerns:    HPI  Patient has complex medical history including non-small cell lung cancer status post wedge resection of left lower lobe, type 2 diabetes, COPD, CAD status post CABG.    Patient presents to the clinic today for follow-up on his acute discomfort in his ears, fatigue, decreased appetite.  Patient comes to the clinic with his brother.  Patient reports that he has been doing about the same as he was prior.  He continues to have headaches, ear fullness, fatigue, loss of appetite.  While stepping out to use the bathroom his brother reports that patient has been increasingly depressed and has not been eating much of anything.  He has lost about 5 pounds since his last visit, down about 40 pounds since December.    Patient states that his mood is not improved with the Celexa 20 that was started at last visit.  He additionally reports increased fatigue and daytime sleepiness especially after taking Zofran for nausea.  He states that he has been taking the Zofran about twice a day but has not been eating much of anything with it.    Patient did have a PET scan done about a week ago which did show 2 pulmonary nodules.  One of which was mildly hypermetabolic measuring 9 mm in the medial basal left lower lobe.  The other is located in the posterior basal left lower lobe, and measures 11 mm with no hypermetabolic uptake.  Patient has not seen his pulmonologist, Dr. Cherry since October.  He does not have a follow-up appointment at this time.  The PET scan otherwise did not show any findings to suggest metastatic disease.    In regards to his weight loss patient states that he has very limited appetite.  Brother states that both he and other family members have noted that his decreased mood has contributed to reduction in food intake.  Patient states that he only eats at home at this

## 2024-04-11 NOTE — TELEPHONE ENCOUNTER
Alexandr Witt [birth year]- [facility] called- said recent PET scan shows recurrent lung cancer- wanting to schedule an appointment- please advise on best time

## 2024-04-11 NOTE — PATIENT INSTRUCTIONS
Please continue to take all medications as prescribed.    We will increase your Celexa to 40mg a day. You can take two of your current 20mg tablets until they run out and then fill the new prescription for 40mg tablets.    You also can reduce your zofran (nausea med) as tolerated. If you feel nauseated you can take 1/2 or 1/4 of a tablet for symptom relief.    We will call in and try to arrange an appointment with Dr Cherry or one of his partners. We will get in touch with you regarding this appointment.

## 2024-04-12 ENCOUNTER — ANTI-COAG VISIT (OUTPATIENT)
Dept: PHARMACY | Age: 59
End: 2024-04-12
Payer: MEDICARE

## 2024-04-12 DIAGNOSIS — I48.91 ATRIAL FIBRILLATION, UNSPECIFIED TYPE (HCC): ICD-10-CM

## 2024-04-12 DIAGNOSIS — Z95.2 S/P AORTIC VALVE REPLACEMENT: Primary | Chronic | ICD-10-CM

## 2024-04-12 LAB — INTERNATIONAL NORMALIZATION RATIO, POC: 8

## 2024-04-12 PROCEDURE — 99212 OFFICE O/P EST SF 10 MIN: CPT

## 2024-04-12 PROCEDURE — 85610 PROTHROMBIN TIME: CPT

## 2024-04-12 NOTE — PROGRESS NOTES
CLINICAL PHARMACY NOTE--Smoking Cessation and Anticoagulation     Alexandr Witt is a 58 y.o. male with PMHx significant for AVR (re-do in May, 2017), CAD s/p CABG (x3 in May, 2017), pA-fib, HLD, HTN, testicular CA who presents to clinic on 4/12/2024 for anticoagulation monitoring and smoking cessation counseling.    Interval update:   1/10/24: Patient back to smoking ~20 cigarettes per day. Discussed with patient today. He does not think it is a good time for him right now to cut back / quit. He voiced he has been struggling with depression lately, especially around the holidays. He was supposed to spend [place] at his daughters but his family got sick with Covid-19 and he is struggling being at home by himself since the passing of his wife. Recommended he discuss this with his PCP. Especially given the strong correlation between depression and pain, which patient also reports as an ongoing issue. Asked patient to let me know when he is ready to start trying to cut back on smoking again and we will re-visit at that time.   12/13/23: 10 cigarettes per day   11/29/23: >6 cigarettes per day  11/17/23: Patient smoking 6 cigarettes per day   11/2/23: Patient reports he was smoke free for ~3 weeks but went back to smoking \"a few\" cigarettes per day recently. He will think about patches and lozenges. Will sent to [facility] with goodrx coupon if he wants to go that route. He is worried about cost.   10/9/23: Per HHRN patient remains smoke free since hospital discharge!   9/25/23: Patient reports he has not smoked since he was admitted at the hospital on 8/31/23! He reports he has no desire to re-start smoking and does not have any cravings / symptoms of withdrawal. He is currently getting home health services after a lengthy hospital stay s/p lung resection on 8/31.   8/18/23: Patient has not called [phone] for patches as he \"doesn't want to seem like he's asking for free stuff\". Advised patient that this is not the point

## 2024-04-16 ENCOUNTER — APPOINTMENT (OUTPATIENT)
Dept: CT IMAGING | Age: 59
DRG: 981 | End: 2024-04-16
Payer: MEDICARE

## 2024-04-16 ENCOUNTER — TELEPHONE (OUTPATIENT)
Dept: PHARMACY | Age: 59
End: 2024-04-16

## 2024-04-16 ENCOUNTER — HOSPITAL ENCOUNTER (INPATIENT)
Age: 59
LOS: 14 days | Discharge: NURSING, REHAB OR LONG-TERM CARE | DRG: 981 | End: 2024-04-30
Attending: STUDENT IN AN ORGANIZED HEALTH CARE EDUCATION/TRAINING PROGRAM | Admitting: INTERNAL MEDICINE
Payer: MEDICARE

## 2024-04-16 DIAGNOSIS — R41.82 ALTERED MENTAL STATUS, UNSPECIFIED ALTERED MENTAL STATUS TYPE: Primary | ICD-10-CM

## 2024-04-16 DIAGNOSIS — C34.32 PRIMARY MALIGNANT NEOPLASM OF LEFT LOWER LOBE OF LUNG (HCC): ICD-10-CM

## 2024-04-16 DIAGNOSIS — N17.9 AKI (ACUTE KIDNEY INJURY) (HCC): ICD-10-CM

## 2024-04-16 DIAGNOSIS — R79.89 ELEVATED BRAIN NATRIURETIC PEPTIDE (BNP) LEVEL: ICD-10-CM

## 2024-04-16 DIAGNOSIS — M48.061 SPINAL STENOSIS OF LUMBAR REGION, UNSPECIFIED WHETHER NEUROGENIC CLAUDICATION PRESENT: ICD-10-CM

## 2024-04-16 DIAGNOSIS — E83.51 HYPOCALCEMIA: ICD-10-CM

## 2024-04-16 DIAGNOSIS — E87.3 METABOLIC ALKALOSIS: ICD-10-CM

## 2024-04-16 DIAGNOSIS — J18.9 ATYPICAL PNEUMONIA: ICD-10-CM

## 2024-04-16 DIAGNOSIS — C34.92 NSCLC OF LEFT LUNG (HCC): ICD-10-CM

## 2024-04-16 DIAGNOSIS — E87.6 HYPOKALEMIA: ICD-10-CM

## 2024-04-16 DIAGNOSIS — R79.89 ELEVATED TSH: ICD-10-CM

## 2024-04-16 DIAGNOSIS — R79.89 ELEVATED TROPONIN: ICD-10-CM

## 2024-04-16 DIAGNOSIS — I46.9 CARDIAC ARREST (HCC): ICD-10-CM

## 2024-04-16 PROBLEM — J15.9 PNEUMONIA, BACTERIAL: Status: ACTIVE | Noted: 2024-04-16

## 2024-04-16 LAB
ALBUMIN SERPL-MCNC: 3.3 G/DL (ref 3.4–5)
ALP SERPL-CCNC: 85 U/L (ref 40–129)
ALT SERPL-CCNC: 12 U/L (ref 10–40)
AMMONIA PLAS-SCNC: 30 UMOL/L (ref 16–60)
ANION GAP SERPL CALCULATED.3IONS-SCNC: 15 MMOL/L (ref 3–16)
APAP SERPL-MCNC: <5 UG/ML (ref 10–30)
APTT BLD: 43.7 SEC (ref 22.1–36.4)
AST SERPL-CCNC: 34 U/L (ref 15–37)
BASE EXCESS BLDV CALC-SCNC: 16.1 MMOL/L (ref -2–3)
BASOPHILS # BLD: 0 K/UL (ref 0–0.2)
BASOPHILS NFR BLD: 0.4 %
BILIRUB DIRECT SERPL-MCNC: <0.2 MG/DL (ref 0–0.3)
BILIRUB INDIRECT SERPL-MCNC: ABNORMAL MG/DL (ref 0–1)
BILIRUB SERPL-MCNC: 0.8 MG/DL (ref 0–1)
BUN SERPL-MCNC: 24 MG/DL (ref 7–20)
CA-I BLD-SCNC: 0.86 MMOL/L (ref 1.12–1.32)
CALCIUM SERPL-MCNC: 8.6 MG/DL (ref 8.3–10.6)
CHLORIDE SERPL-SCNC: 83 MMOL/L (ref 99–110)
CK SERPL-CCNC: 88 U/L (ref 39–308)
CO2 BLDV-SCNC: 42 MMOL/L
CO2 SERPL-SCNC: 32 MMOL/L (ref 21–32)
COHGB MFR BLDV: 2.8 % (ref 0–1.5)
CREAT SERPL-MCNC: 1.1 MG/DL (ref 0.8–1.3)
D DIMER: 0.44 UG/ML FEU (ref 0–0.6)
DEPRECATED RDW RBC AUTO: 18.1 % (ref 12.4–15.4)
DIGOXIN SERPL-MCNC: 3 NG/ML (ref 0.8–2)
DO-HGB MFR BLDV: 14.9 %
EKG ATRIAL RATE: 77 BPM
EKG DIAGNOSIS: NORMAL
EKG DIAGNOSIS: NORMAL
EKG Q-T INTERVAL: 654 MS
EKG Q-T INTERVAL: 712 MS
EKG QRS DURATION: 126 MS
EKG QRS DURATION: 136 MS
EKG QTC CALCULATION (BAZETT): 526 MS
EKG QTC CALCULATION (BAZETT): 681 MS
EKG R AXIS: -48 DEGREES
EKG R AXIS: -7 DEGREES
EKG T AXIS: 55 DEGREES
EKG T AXIS: 72 DEGREES
EKG VENTRICULAR RATE: 39 BPM
EKG VENTRICULAR RATE: 55 BPM
EOSINOPHIL # BLD: 0 K/UL (ref 0–0.6)
EOSINOPHIL NFR BLD: 0.1 %
FIBRINOGEN PPP-MCNC: 707 MG/DL (ref 227–534)
FLUAV RNA RESP QL NAA+PROBE: NOT DETECTED
FLUBV RNA RESP QL NAA+PROBE: NOT DETECTED
GFR SERPLBLD CREATININE-BSD FMLA CKD-EPI: 77 ML/MIN/{1.73_M2}
GLUCOSE BLD-MCNC: 130 MG/DL (ref 70–99)
GLUCOSE SERPL-MCNC: 140 MG/DL (ref 70–99)
HCO3 BLDV-SCNC: 40.9 MMOL/L (ref 24–28)
HCT VFR BLD AUTO: 42.2 % (ref 40.5–52.5)
HGB BLD-MCNC: 13.7 G/DL (ref 13.5–17.5)
INR PPP: 3.33 (ref 0.85–1.15)
LACTATE BLDV-SCNC: 2.1 MMOL/L (ref 0.4–2)
LACTATE BLDV-SCNC: 2.8 MMOL/L (ref 0.4–2)
LIPASE SERPL-CCNC: 19 U/L (ref 13–60)
LYMPHOCYTES # BLD: 1.2 K/UL (ref 1–5.1)
LYMPHOCYTES NFR BLD: 11.6 %
MAGNESIUM SERPL-MCNC: 2.2 MG/DL (ref 1.8–2.4)
MCH RBC QN AUTO: 29.9 PG (ref 26–34)
MCHC RBC AUTO-ENTMCNC: 32.4 G/DL (ref 31–36)
MCV RBC AUTO: 92.3 FL (ref 80–100)
METHGB MFR BLDV: 0.1 % (ref 0–1.5)
MONOCYTES # BLD: 0.7 K/UL (ref 0–1.3)
MONOCYTES NFR BLD: 6.4 %
NEUTROPHILS # BLD: 8.6 K/UL (ref 1.7–7.7)
NEUTROPHILS NFR BLD: 81.5 %
NT-PROBNP SERPL-MCNC: 8154 PG/ML (ref 0–124)
OSMOLALITY UR: 697 MOSM/KG (ref 390–1070)
PCO2 BLDV: 48.1 MMHG (ref 41–51)
PERFORMED ON: ABNORMAL
PH BLDV: 7.54 [PH] (ref 7.35–7.45)
PH VENOUS: 7.55 (ref 7.35–7.45)
PHOSPHATE SERPL-MCNC: 1.8 MG/DL (ref 2.5–4.9)
PLATELET # BLD AUTO: 151 K/UL (ref 135–450)
PMV BLD AUTO: 9.3 FL (ref 5–10.5)
PO2 BLDV: 47.8 MMHG (ref 25–40)
POTASSIUM SERPL-SCNC: 2.6 MMOL/L (ref 3.5–5.1)
POTASSIUM UR-SCNC: 34.2 MMOL/L
PROT SERPL-MCNC: 7.1 G/DL (ref 6.4–8.2)
PROTHROMBIN TIME: 33.6 SEC (ref 11.9–14.9)
RBC # BLD AUTO: 4.57 M/UL (ref 4.2–5.9)
SALICYLATES SERPL-MCNC: <0.3 MG/DL (ref 15–30)
SAO2 % BLDV: 85 %
SARS-COV-2 RNA RESP QL NAA+PROBE: NOT DETECTED
SODIUM SERPL-SCNC: 130 MMOL/L (ref 136–145)
SODIUM UR-SCNC: <20 MMOL/L
TROPONIN, HIGH SENSITIVITY: 54 NG/L (ref 0–22)
TROPONIN, HIGH SENSITIVITY: 65 NG/L (ref 0–22)
TSH SERPL DL<=0.005 MIU/L-ACNC: 7.39 UIU/ML (ref 0.27–4.2)
WBC # BLD AUTO: 10.6 K/UL (ref 4–11)

## 2024-04-16 PROCEDURE — 83935 ASSAY OF URINE OSMOLALITY: CPT

## 2024-04-16 PROCEDURE — 71275 CT ANGIOGRAPHY CHEST: CPT

## 2024-04-16 PROCEDURE — 85384 FIBRINOGEN ACTIVITY: CPT

## 2024-04-16 PROCEDURE — 80143 DRUG ASSAY ACETAMINOPHEN: CPT

## 2024-04-16 PROCEDURE — 70450 CT HEAD/BRAIN W/O DYE: CPT

## 2024-04-16 PROCEDURE — 84100 ASSAY OF PHOSPHORUS: CPT

## 2024-04-16 PROCEDURE — 83605 ASSAY OF LACTIC ACID: CPT

## 2024-04-16 PROCEDURE — 83735 ASSAY OF MAGNESIUM: CPT

## 2024-04-16 PROCEDURE — 82570 ASSAY OF URINE CREATININE: CPT

## 2024-04-16 PROCEDURE — 6370000000 HC RX 637 (ALT 250 FOR IP): Performed by: INTERNAL MEDICINE

## 2024-04-16 PROCEDURE — 6370000000 HC RX 637 (ALT 250 FOR IP): Performed by: STUDENT IN AN ORGANIZED HEALTH CARE EDUCATION/TRAINING PROGRAM

## 2024-04-16 PROCEDURE — 84133 ASSAY OF URINE POTASSIUM: CPT

## 2024-04-16 PROCEDURE — 84484 ASSAY OF TROPONIN QUANT: CPT

## 2024-04-16 PROCEDURE — 6360000002 HC RX W HCPCS: Performed by: STUDENT IN AN ORGANIZED HEALTH CARE EDUCATION/TRAINING PROGRAM

## 2024-04-16 PROCEDURE — 84443 ASSAY THYROID STIM HORMONE: CPT

## 2024-04-16 PROCEDURE — 87636 SARSCOV2 & INF A&B AMP PRB: CPT

## 2024-04-16 PROCEDURE — 82330 ASSAY OF CALCIUM: CPT

## 2024-04-16 PROCEDURE — 96374 THER/PROPH/DIAG INJ IV PUSH: CPT

## 2024-04-16 PROCEDURE — 85025 COMPLETE CBC W/AUTO DIFF WBC: CPT

## 2024-04-16 PROCEDURE — 82803 BLOOD GASES ANY COMBINATION: CPT

## 2024-04-16 PROCEDURE — 93005 ELECTROCARDIOGRAM TRACING: CPT | Performed by: STUDENT IN AN ORGANIZED HEALTH CARE EDUCATION/TRAINING PROGRAM

## 2024-04-16 PROCEDURE — 83690 ASSAY OF LIPASE: CPT

## 2024-04-16 PROCEDURE — 74177 CT ABD & PELVIS W/CONTRAST: CPT

## 2024-04-16 PROCEDURE — 2580000003 HC RX 258: Performed by: STUDENT IN AN ORGANIZED HEALTH CARE EDUCATION/TRAINING PROGRAM

## 2024-04-16 PROCEDURE — 36415 COLL VENOUS BLD VENIPUNCTURE: CPT

## 2024-04-16 PROCEDURE — 80162 ASSAY OF DIGOXIN TOTAL: CPT

## 2024-04-16 PROCEDURE — 84300 ASSAY OF URINE SODIUM: CPT

## 2024-04-16 PROCEDURE — 96375 TX/PRO/DX INJ NEW DRUG ADDON: CPT

## 2024-04-16 PROCEDURE — 6360000002 HC RX W HCPCS: Performed by: INTERNAL MEDICINE

## 2024-04-16 PROCEDURE — 2580000003 HC RX 258: Performed by: INTERNAL MEDICINE

## 2024-04-16 PROCEDURE — 2500000003 HC RX 250 WO HCPCS: Performed by: STUDENT IN AN ORGANIZED HEALTH CARE EDUCATION/TRAINING PROGRAM

## 2024-04-16 PROCEDURE — 99285 EMERGENCY DEPT VISIT HI MDM: CPT

## 2024-04-16 PROCEDURE — 82550 ASSAY OF CK (CPK): CPT

## 2024-04-16 PROCEDURE — 85379 FIBRIN DEGRADATION QUANT: CPT

## 2024-04-16 PROCEDURE — 2060000000 HC ICU INTERMEDIATE R&B

## 2024-04-16 PROCEDURE — 93005 ELECTROCARDIOGRAM TRACING: CPT | Performed by: INTERNAL MEDICINE

## 2024-04-16 PROCEDURE — 6360000004 HC RX CONTRAST MEDICATION: Performed by: STUDENT IN AN ORGANIZED HEALTH CARE EDUCATION/TRAINING PROGRAM

## 2024-04-16 PROCEDURE — 83880 ASSAY OF NATRIURETIC PEPTIDE: CPT

## 2024-04-16 PROCEDURE — 80179 DRUG ASSAY SALICYLATE: CPT

## 2024-04-16 PROCEDURE — 82140 ASSAY OF AMMONIA: CPT

## 2024-04-16 PROCEDURE — 80076 HEPATIC FUNCTION PANEL: CPT

## 2024-04-16 PROCEDURE — 85730 THROMBOPLASTIN TIME PARTIAL: CPT

## 2024-04-16 PROCEDURE — 84439 ASSAY OF FREE THYROXINE: CPT

## 2024-04-16 PROCEDURE — 85610 PROTHROMBIN TIME: CPT

## 2024-04-16 PROCEDURE — 80048 BASIC METABOLIC PNL TOTAL CA: CPT

## 2024-04-16 PROCEDURE — 87040 BLOOD CULTURE FOR BACTERIA: CPT

## 2024-04-16 RX ORDER — ALBUTEROL SULFATE 2.5 MG/3ML
2.5 SOLUTION RESPIRATORY (INHALATION) EVERY 4 HOURS PRN
Status: DISCONTINUED | OUTPATIENT
Start: 2024-04-16 | End: 2024-04-30 | Stop reason: HOSPADM

## 2024-04-16 RX ORDER — POTASSIUM CHLORIDE 20 MEQ/1
40 TABLET, EXTENDED RELEASE ORAL ONCE
Status: COMPLETED | OUTPATIENT
Start: 2024-04-16 | End: 2024-04-16

## 2024-04-16 RX ORDER — POTASSIUM CHLORIDE 7.45 MG/ML
10 INJECTION INTRAVENOUS
Status: COMPLETED | OUTPATIENT
Start: 2024-04-16 | End: 2024-04-16

## 2024-04-16 RX ORDER — CALCIUM GLUCONATE 20 MG/ML
2000 INJECTION, SOLUTION INTRAVENOUS ONCE
Status: COMPLETED | OUTPATIENT
Start: 2024-04-16 | End: 2024-04-16

## 2024-04-16 RX ORDER — SODIUM CHLORIDE 0.9 % (FLUSH) 0.9 %
5-40 SYRINGE (ML) INJECTION PRN
Status: DISCONTINUED | OUTPATIENT
Start: 2024-04-16 | End: 2024-04-30 | Stop reason: HOSPADM

## 2024-04-16 RX ORDER — DOXYCYCLINE 100 MG/1
100 CAPSULE ORAL ONCE
Status: COMPLETED | OUTPATIENT
Start: 2024-04-16 | End: 2024-04-16

## 2024-04-16 RX ORDER — MORPHINE SULFATE 15 MG/1
15 TABLET, FILM COATED, EXTENDED RELEASE ORAL 3 TIMES DAILY
Status: DISCONTINUED | OUTPATIENT
Start: 2024-04-16 | End: 2024-04-18

## 2024-04-16 RX ORDER — GLUCAGON 1 MG/ML
1 KIT INJECTION PRN
Status: DISCONTINUED | OUTPATIENT
Start: 2024-04-16 | End: 2024-04-30 | Stop reason: HOSPADM

## 2024-04-16 RX ORDER — SODIUM CHLORIDE 9 MG/ML
INJECTION, SOLUTION INTRAVENOUS PRN
Status: DISCONTINUED | OUTPATIENT
Start: 2024-04-16 | End: 2024-04-30 | Stop reason: HOSPADM

## 2024-04-16 RX ORDER — POTASSIUM CHLORIDE 7.45 MG/ML
10 INJECTION INTRAVENOUS PRN
Status: DISCONTINUED | OUTPATIENT
Start: 2024-04-16 | End: 2024-04-17

## 2024-04-16 RX ORDER — ROSUVASTATIN CALCIUM 20 MG/1
20 TABLET, COATED ORAL NIGHTLY
Status: DISCONTINUED | OUTPATIENT
Start: 2024-04-16 | End: 2024-04-30 | Stop reason: HOSPADM

## 2024-04-16 RX ORDER — DIGOXIN 125 MCG
250 TABLET ORAL DAILY
Status: CANCELLED | OUTPATIENT
Start: 2024-04-17

## 2024-04-16 RX ORDER — ACETAMINOPHEN 650 MG/1
650 SUPPOSITORY RECTAL EVERY 6 HOURS PRN
Status: DISCONTINUED | OUTPATIENT
Start: 2024-04-16 | End: 2024-04-19

## 2024-04-16 RX ORDER — SODIUM CHLORIDE 0.9 % (FLUSH) 0.9 %
5-40 SYRINGE (ML) INJECTION EVERY 12 HOURS SCHEDULED
Status: DISCONTINUED | OUTPATIENT
Start: 2024-04-16 | End: 2024-04-30 | Stop reason: HOSPADM

## 2024-04-16 RX ORDER — DEXTROSE MONOHYDRATE 100 MG/ML
INJECTION, SOLUTION INTRAVENOUS CONTINUOUS PRN
Status: DISCONTINUED | OUTPATIENT
Start: 2024-04-16 | End: 2024-04-30 | Stop reason: HOSPADM

## 2024-04-16 RX ORDER — CITALOPRAM 40 MG/1
40 TABLET ORAL DAILY
Status: DISCONTINUED | OUTPATIENT
Start: 2024-04-17 | End: 2024-04-30 | Stop reason: HOSPADM

## 2024-04-16 RX ORDER — ONDANSETRON 2 MG/ML
4 INJECTION INTRAMUSCULAR; INTRAVENOUS EVERY 6 HOURS PRN
Status: DISCONTINUED | OUTPATIENT
Start: 2024-04-16 | End: 2024-04-30 | Stop reason: HOSPADM

## 2024-04-16 RX ORDER — FAMOTIDINE 20 MG/1
20 TABLET, FILM COATED ORAL 2 TIMES DAILY
Status: DISCONTINUED | OUTPATIENT
Start: 2024-04-16 | End: 2024-04-20

## 2024-04-16 RX ORDER — POLYETHYLENE GLYCOL 3350 17 G/17G
17 POWDER, FOR SOLUTION ORAL DAILY PRN
Status: DISCONTINUED | OUTPATIENT
Start: 2024-04-16 | End: 2024-04-19

## 2024-04-16 RX ORDER — ONDANSETRON 4 MG/1
4 TABLET, ORALLY DISINTEGRATING ORAL EVERY 8 HOURS PRN
Status: DISCONTINUED | OUTPATIENT
Start: 2024-04-16 | End: 2024-04-30 | Stop reason: HOSPADM

## 2024-04-16 RX ORDER — DOXYCYCLINE 50 MG/1
50 CAPSULE ORAL EVERY 12 HOURS SCHEDULED
Status: DISCONTINUED | OUTPATIENT
Start: 2024-04-17 | End: 2024-04-19

## 2024-04-16 RX ORDER — MAGNESIUM SULFATE IN WATER 40 MG/ML
2000 INJECTION, SOLUTION INTRAVENOUS PRN
Status: DISCONTINUED | OUTPATIENT
Start: 2024-04-16 | End: 2024-04-18

## 2024-04-16 RX ORDER — ASPIRIN 81 MG/1
81 TABLET ORAL DAILY
Status: DISCONTINUED | OUTPATIENT
Start: 2024-04-17 | End: 2024-04-30 | Stop reason: HOSPADM

## 2024-04-16 RX ORDER — POTASSIUM CHLORIDE 20 MEQ/1
40 TABLET, EXTENDED RELEASE ORAL PRN
Status: DISCONTINUED | OUTPATIENT
Start: 2024-04-16 | End: 2024-04-18

## 2024-04-16 RX ORDER — INSULIN LISPRO 100 [IU]/ML
0-4 INJECTION, SOLUTION INTRAVENOUS; SUBCUTANEOUS NIGHTLY
Status: DISCONTINUED | OUTPATIENT
Start: 2024-04-16 | End: 2024-04-24

## 2024-04-16 RX ORDER — SODIUM CHLORIDE, SODIUM LACTATE, POTASSIUM CHLORIDE, AND CALCIUM CHLORIDE .6; .31; .03; .02 G/100ML; G/100ML; G/100ML; G/100ML
500 INJECTION, SOLUTION INTRAVENOUS ONCE
Status: DISCONTINUED | OUTPATIENT
Start: 2024-04-16 | End: 2024-04-16

## 2024-04-16 RX ORDER — INSULIN LISPRO 100 [IU]/ML
0-4 INJECTION, SOLUTION INTRAVENOUS; SUBCUTANEOUS
Status: DISCONTINUED | OUTPATIENT
Start: 2024-04-17 | End: 2024-04-24

## 2024-04-16 RX ORDER — SODIUM CHLORIDE 9 MG/ML
INJECTION, SOLUTION INTRAVENOUS CONTINUOUS
Status: DISCONTINUED | OUTPATIENT
Start: 2024-04-16 | End: 2024-04-17

## 2024-04-16 RX ORDER — MAGNESIUM SULFATE IN WATER 40 MG/ML
2000 INJECTION, SOLUTION INTRAVENOUS ONCE
Status: COMPLETED | OUTPATIENT
Start: 2024-04-16 | End: 2024-04-16

## 2024-04-16 RX ORDER — ACETAMINOPHEN 325 MG/1
650 TABLET ORAL EVERY 6 HOURS PRN
Status: DISCONTINUED | OUTPATIENT
Start: 2024-04-16 | End: 2024-04-19

## 2024-04-16 RX ORDER — PREGABALIN 150 MG/1
150 CAPSULE ORAL 3 TIMES DAILY
Status: DISCONTINUED | OUTPATIENT
Start: 2024-04-17 | End: 2024-04-17

## 2024-04-16 RX ADMIN — IOPAMIDOL 75 ML: 755 INJECTION, SOLUTION INTRAVENOUS at 17:57

## 2024-04-16 RX ADMIN — DOXYCYCLINE 100 MG: 100 CAPSULE ORAL at 20:08

## 2024-04-16 RX ADMIN — ROSUVASTATIN CALCIUM 20 MG: 20 TABLET, COATED ORAL at 21:57

## 2024-04-16 RX ADMIN — POTASSIUM CHLORIDE 10 MEQ: 10 INJECTION, SOLUTION INTRAVENOUS at 20:20

## 2024-04-16 RX ADMIN — SODIUM CHLORIDE 1500 MG: 9 INJECTION, SOLUTION INTRAVENOUS at 20:55

## 2024-04-16 RX ADMIN — POTASSIUM CHLORIDE 10 MEQ: 10 INJECTION, SOLUTION INTRAVENOUS at 19:17

## 2024-04-16 RX ADMIN — POTASSIUM CHLORIDE 40 MEQ: 1500 TABLET, EXTENDED RELEASE ORAL at 18:55

## 2024-04-16 RX ADMIN — SODIUM CHLORIDE, PRESERVATIVE FREE 10 ML: 5 INJECTION INTRAVENOUS at 21:58

## 2024-04-16 RX ADMIN — POTASSIUM CHLORIDE 10 MEQ: 10 INJECTION, SOLUTION INTRAVENOUS at 22:33

## 2024-04-16 RX ADMIN — SODIUM CHLORIDE: 9 INJECTION, SOLUTION INTRAVENOUS at 22:43

## 2024-04-16 RX ADMIN — POTASSIUM CHLORIDE 10 MEQ: 10 INJECTION, SOLUTION INTRAVENOUS at 21:30

## 2024-04-16 RX ADMIN — MAGNESIUM SULFATE HEPTAHYDRATE 2000 MG: 40 INJECTION, SOLUTION INTRAVENOUS at 20:17

## 2024-04-16 RX ADMIN — CALCIUM GLUCONATE 2000 MG: 20 INJECTION, SOLUTION INTRAVENOUS at 18:54

## 2024-04-16 RX ADMIN — PIPERACILLIN AND TAZOBACTAM 4500 MG: 4; .5 INJECTION, POWDER, LYOPHILIZED, FOR SOLUTION INTRAVENOUS at 20:07

## 2024-04-16 RX ADMIN — FAMOTIDINE 20 MG: 20 TABLET, FILM COATED ORAL at 21:57

## 2024-04-16 ASSESSMENT — LIFESTYLE VARIABLES
HOW OFTEN DO YOU HAVE A DRINK CONTAINING ALCOHOL: NEVER
HOW MANY STANDARD DRINKS CONTAINING ALCOHOL DO YOU HAVE ON A TYPICAL DAY: PATIENT DOES NOT DRINK

## 2024-04-16 ASSESSMENT — PAIN SCALES - GENERAL
PAINLEVEL_OUTOF10: 0
PAINLEVEL_OUTOF10: 0

## 2024-04-16 NOTE — ED PROVIDER NOTES
THE [facility]  EMERGENCY DEPARTMENT ENCOUNTER          EM RESIDENT NOTE       Date of evaluation: 4/16/2024    Chief Complaint     Hematuria (Pt reports 3-4 days of dark red blood in his urine. ), Hematemesis (Pt reports dark red emesis), and Altered Mental Status (Family reports auditory and visual hallucinations)      History of Present Illness   Alexandr Witt is a 58 y.o. male with history of NSCLC, CAD, testicular cancer, COPD who presents altered mental status.  Patient normally lives alone.  His daughter is normally calm every few days patient has not been answering for several days.  Due to this they went to his house and visited him.  He initially refused transport by EMS, however, his daughter brought him into the ED for further evaluation.  He told his daughter that he had been urinating gross blood and having blood in his vomit but does not report that to me now.  The patient has difficulty providing any further useful history at this time.  When asked where he is he reports that he has had a Jiffy Lube.      Review of Systems   Unable to obtain 2/2 altered mental status    Past Medical, Surgical, Family, and Social History     He has a past medical history of Abdominal hernia, Aortic valve prosthesis present, CAD (coronary artery disease), Chronic back pain greater than 3 months duration, Clostridium difficile diarrhea, COPD, severity to be determined ([facility]), Current every day smoker, DDD (degenerative disc disease), lumbosacral, Erectile dysfunction, GERD (gastroesophageal reflux disease), Hyperlipidemia, Hypertension, Joint pain, Left testicular cancer ([facility]), Myalgia and myositis, unspecified, Neuropathy, OA (osteoarthritis) of knee, Obesity, Class I, BMI 30.0-34.9 (see actual BMI), Prolonged emergence from general anesthesia, S/P AVR, S/P CABG x 2, Shoulder instability-LEFT, Type 2 diabetes mellitus without complication, without long-term current use of insulin ([facility]), and Wears dentures.  He

## 2024-04-16 NOTE — ED PROVIDER NOTES
ED Attending Attestation Note     Date of evaluation: 4/16/2024    This patient was seen by the resident.  I have seen and examined the patient, agree with the workup, evaluation, management and diagnosis. The care plan has been discussed.  My assessment reveals a 58-year-old male with history of hypertension, atrial fibrillation on Coumadin, and lung cancer who presents with his daughter with altered mental status, hematuria and hematemesis.  Patient lives alone and his daughter is from [place].  Family members call patient daily and report that they were unable to reach him for the last 3 days and this morning he called the daughter reporting that he was peeing blood and vomiting blood.  Daughter called EMS to the home and he apparently declined transport, but she came and picked him up and brought him here.  She reports that he seems confused.  He is not acting at baseline.  On my exam, patient is bradycardic but otherwise has normal vital signs.  He follows commands all 4 extremities and does not have any focal neurologic deficits, although appears mildly encephalopathic and said that he was \"at [facility]\" and did not know the date.  He is not having any hematemesis here and has a benign abdomen.  He is denying any pain.    Patient's workup was notable for multiple electrolyte derangements including hypokalemia, hyponatremia, hypochloremia and hypocalcemia.  His EKG did show a somewhat bizarre appearance, with suspected sinus rhythm but also sloping ST depressions and possible PVCs.  Patient does take digoxin and so this along with the electrolyte abnormalities and intermittent bradycardia here in the emergency room did cause concern for possible digoxin toxicity.  CT scans also showed infectious/inflammatory changes.  We are replacing patient's electrolytes and will send a digoxin level to evaluate for any acute toxicity.  Will also treat him for possible underlying infectious etiologies with broad-spectrum

## 2024-04-16 NOTE — H&P
Hospital Medicine History & Physical      Date of Admission: 4/16/2024    Date of Service:  Pt seen/examined on 4/16/2024     [x]Admitted to Inpatient with expected LOS greater than two midnights due to medical therapy.  []Placed in Observation status.    Chief Admission Complaint:  Pneumonia, Hypokalemia    Presenting Admission History:      58 y.o. male who presented to [facility] with dyspnea, hematemesis with questionable hematuria.  PMHx significant for non-small cell lung cancer left lung, A-fib on Coumadin, testicular cancer, COPD, CAD, diabetes mellitus type 2.  Patient is presenting to the emergency room as his family was concerned about his condition today, he lives by himself, but he has not been answering his daughter for the last couple of days so she went to check on him and patient was more confused than usual, he had mentioned having some hematuria with some hematemesis few days ago but none today and he was not able to quantify the amount, he said he has not been eating or drinking well and but continued to take his Coumadin and his INR level was 3.3, he complained of some dyspnea with cough nonproductive but denied any fever, chills no chest pain, no abdomen pain, patient is on Lyrica 3 times a day in addition to morphine 3 times a day for his chronic pain.  His vital signs showed initial pulse ox of 83% on room air with pulse a a 41 that eventually started to improve, he had low blood pressure of 96/67, labs were significant for sodium of 130 potassium 3.6, creatinine 0.86 his lactic acid was 2.8, troponin 65-54, digoxin level was elevated at 3.0 with TSH level of 7.39, white blood cells of 10.6 with INR of 3.33, CT scan head showed no acute finding, CTA chest showed no pulmonary embolism but mild patchy opacities throughout the left lung.    Assessment/Plan:      Current Principal Problem:  Pneumonia, bacterial    1.  Pneumonia on the left side patient was started on vancomycin

## 2024-04-16 NOTE — TELEPHONE ENCOUNTER
LVM with pt to remind him of importance of keeping appt for INR check tomorrow 4/17 at 11:30a.     Rosemary Pedroza, PharmD, BCACP  [facility]   [facility] [place] Ph: [phone]  [facility] [place] Ph: [phone]  4/16/2024 11:47 AM

## 2024-04-17 ENCOUNTER — TELEPHONE (OUTPATIENT)
Dept: PHARMACY | Age: 59
End: 2024-04-17

## 2024-04-17 ENCOUNTER — APPOINTMENT (OUTPATIENT)
Dept: PHARMACY | Age: 59
End: 2024-04-17
Payer: MEDICARE

## 2024-04-17 PROBLEM — R41.82 ALTERED MENTAL STATUS: Status: ACTIVE | Noted: 2024-04-17

## 2024-04-17 LAB
ALBUMIN SERPL-MCNC: 3.1 G/DL (ref 3.4–5)
AMORPH SED URNS QL MICRO: ABNORMAL /HPF
ANION GAP SERPL CALCULATED.3IONS-SCNC: 11 MMOL/L (ref 3–16)
ANION GAP SERPL CALCULATED.3IONS-SCNC: 12 MMOL/L (ref 3–16)
BASOPHILS # BLD: 0 K/UL (ref 0–0.2)
BASOPHILS NFR BLD: 0.7 %
BILIRUB UR QL STRIP.AUTO: NEGATIVE
BUN SERPL-MCNC: 17 MG/DL (ref 7–20)
BUN SERPL-MCNC: 21 MG/DL (ref 7–20)
C DIFF TOX A+B STL QL IA: NORMAL
CALCIUM SERPL-MCNC: 8.1 MG/DL (ref 8.3–10.6)
CALCIUM SERPL-MCNC: 8.1 MG/DL (ref 8.3–10.6)
CHLORIDE SERPL-SCNC: 89 MMOL/L (ref 99–110)
CHLORIDE SERPL-SCNC: 90 MMOL/L (ref 99–110)
CLARITY UR: ABNORMAL
CO2 SERPL-SCNC: 32 MMOL/L (ref 21–32)
CO2 SERPL-SCNC: 35 MMOL/L (ref 21–32)
COLOR UR: YELLOW
CREAT SERPL-MCNC: 0.8 MG/DL (ref 0.8–1.3)
CREAT SERPL-MCNC: 0.9 MG/DL (ref 0.8–1.3)
CREAT UR-MCNC: 118.5 MG/DL (ref 39–259)
CREAT UR-MCNC: 204.5 MG/DL (ref 39–259)
DEPRECATED RDW RBC AUTO: 17.9 % (ref 12.4–15.4)
EKG ATRIAL RATE: 42 BPM
EKG DIAGNOSIS: NORMAL
EKG P AXIS: 62 DEGREES
EKG P-R INTERVAL: 144 MS
EKG Q-T INTERVAL: 678 MS
EKG QRS DURATION: 106 MS
EKG QTC CALCULATION (BAZETT): 566 MS
EKG R AXIS: 31 DEGREES
EKG T AXIS: 79 DEGREES
EKG VENTRICULAR RATE: 42 BPM
EOSINOPHIL # BLD: 0.1 K/UL (ref 0–0.6)
EOSINOPHIL NFR BLD: 1.1 %
GFR SERPLBLD CREATININE-BSD FMLA CKD-EPI: >90 ML/MIN/{1.73_M2}
GFR SERPLBLD CREATININE-BSD FMLA CKD-EPI: >90 ML/MIN/{1.73_M2}
GLUCOSE BLD-MCNC: 132 MG/DL (ref 70–99)
GLUCOSE BLD-MCNC: 160 MG/DL (ref 70–99)
GLUCOSE SERPL-MCNC: 133 MG/DL (ref 70–99)
GLUCOSE SERPL-MCNC: 140 MG/DL (ref 70–99)
GLUCOSE UR STRIP.AUTO-MCNC: NEGATIVE MG/DL
HCT VFR BLD AUTO: 37 % (ref 40.5–52.5)
HGB BLD-MCNC: 12.1 G/DL (ref 13.5–17.5)
HGB UR QL STRIP.AUTO: ABNORMAL
INR PPP: 2.95 (ref 0.85–1.15)
KETONES UR STRIP.AUTO-MCNC: ABNORMAL MG/DL
LEUKOCYTE ESTERASE UR QL STRIP.AUTO: NEGATIVE
LYMPHOCYTES # BLD: 0.7 K/UL (ref 1–5.1)
LYMPHOCYTES NFR BLD: 10.3 %
MAGNESIUM SERPL-MCNC: 2.3 MG/DL (ref 1.8–2.4)
MAGNESIUM SERPL-MCNC: 2.3 MG/DL (ref 1.8–2.4)
MCH RBC QN AUTO: 29.5 PG (ref 26–34)
MCHC RBC AUTO-ENTMCNC: 32.6 G/DL (ref 31–36)
MCV RBC AUTO: 90.5 FL (ref 80–100)
MONOCYTES # BLD: 0.4 K/UL (ref 0–1.3)
MONOCYTES NFR BLD: 5.9 %
NEUTROPHILS # BLD: 5.8 K/UL (ref 1.7–7.7)
NEUTROPHILS NFR BLD: 82 %
NITRITE UR QL STRIP.AUTO: POSITIVE
PERFORMED ON: ABNORMAL
PERFORMED ON: ABNORMAL
PH UR STRIP.AUTO: 6.5 [PH] (ref 5–8)
PHOSPHATE SERPL-MCNC: 1.4 MG/DL (ref 2.5–4.9)
PLATELET # BLD AUTO: 110 K/UL (ref 135–450)
PMV BLD AUTO: 8.6 FL (ref 5–10.5)
POTASSIUM SERPL-SCNC: 2.2 MMOL/L (ref 3.5–5.1)
POTASSIUM SERPL-SCNC: 2.8 MMOL/L (ref 3.5–5.1)
PROCALCITONIN SERPL IA-MCNC: 0.35 NG/ML (ref 0–0.15)
PROT UR STRIP.AUTO-MCNC: 30 MG/DL
PROTHROMBIN TIME: 30.6 SEC (ref 11.9–14.9)
RBC # BLD AUTO: 4.09 M/UL (ref 4.2–5.9)
RBC #/AREA URNS HPF: ABNORMAL /HPF (ref 0–4)
SODIUM SERPL-SCNC: 133 MMOL/L (ref 136–145)
SODIUM SERPL-SCNC: 136 MMOL/L (ref 136–145)
SP GR UR STRIP.AUTO: 1.02 (ref 1–1.03)
T4 FREE SERPL-MCNC: 1.3 NG/DL (ref 0.9–1.8)
UA COMPLETE W REFLEX CULTURE PNL UR: ABNORMAL
UA DIPSTICK W REFLEX MICRO PNL UR: YES
URN SPEC COLLECT METH UR: ABNORMAL
UROBILINOGEN UR STRIP-ACNC: 1 E.U./DL
WBC # BLD AUTO: 7 K/UL (ref 4–11)
WBC #/AREA URNS HPF: ABNORMAL /HPF (ref 0–5)

## 2024-04-17 PROCEDURE — 6360000002 HC RX W HCPCS: Performed by: INTERNAL MEDICINE

## 2024-04-17 PROCEDURE — 99222 1ST HOSP IP/OBS MODERATE 55: CPT | Performed by: INTERNAL MEDICINE

## 2024-04-17 PROCEDURE — 36415 COLL VENOUS BLD VENIPUNCTURE: CPT

## 2024-04-17 PROCEDURE — 84145 PROCALCITONIN (PCT): CPT

## 2024-04-17 PROCEDURE — 83735 ASSAY OF MAGNESIUM: CPT

## 2024-04-17 PROCEDURE — 87324 CLOSTRIDIUM AG IA: CPT

## 2024-04-17 PROCEDURE — 82570 ASSAY OF URINE CREATININE: CPT

## 2024-04-17 PROCEDURE — 99223 1ST HOSP IP/OBS HIGH 75: CPT | Performed by: INTERNAL MEDICINE

## 2024-04-17 PROCEDURE — 93010 ELECTROCARDIOGRAM REPORT: CPT | Performed by: INTERNAL MEDICINE

## 2024-04-17 PROCEDURE — 2580000003 HC RX 258: Performed by: INTERNAL MEDICINE

## 2024-04-17 PROCEDURE — 81001 URINALYSIS AUTO W/SCOPE: CPT

## 2024-04-17 PROCEDURE — 6370000000 HC RX 637 (ALT 250 FOR IP): Performed by: INTERNAL MEDICINE

## 2024-04-17 PROCEDURE — 85610 PROTHROMBIN TIME: CPT

## 2024-04-17 PROCEDURE — 2060000000 HC ICU INTERMEDIATE R&B

## 2024-04-17 PROCEDURE — 87449 NOS EACH ORGANISM AG IA: CPT

## 2024-04-17 PROCEDURE — 85025 COMPLETE CBC W/AUTO DIFF WBC: CPT

## 2024-04-17 PROCEDURE — 80069 RENAL FUNCTION PANEL: CPT

## 2024-04-17 RX ORDER — MECOBALAMIN 5000 MCG
5 TABLET,DISINTEGRATING ORAL NIGHTLY
Status: DISCONTINUED | OUTPATIENT
Start: 2024-04-17 | End: 2024-04-30 | Stop reason: HOSPADM

## 2024-04-17 RX ORDER — WARFARIN SODIUM 1 MG/1
2 TABLET ORAL DAILY
Status: DISCONTINUED | OUTPATIENT
Start: 2024-04-17 | End: 2024-04-19 | Stop reason: DRUGHIGH

## 2024-04-17 RX ORDER — POTASSIUM CHLORIDE 20 MEQ/1
40 TABLET, EXTENDED RELEASE ORAL
Status: COMPLETED | OUTPATIENT
Start: 2024-04-17 | End: 2024-04-17

## 2024-04-17 RX ORDER — OXYCODONE HYDROCHLORIDE 5 MG/1
10 TABLET ORAL EVERY 4 HOURS PRN
Status: DISCONTINUED | OUTPATIENT
Start: 2024-04-17 | End: 2024-04-20

## 2024-04-17 RX ORDER — PREGABALIN 75 MG/1
75 CAPSULE ORAL 3 TIMES DAILY
Status: DISCONTINUED | OUTPATIENT
Start: 2024-04-17 | End: 2024-04-30 | Stop reason: HOSPADM

## 2024-04-17 RX ADMIN — ASPIRIN 81 MG: 81 TABLET, COATED ORAL at 07:59

## 2024-04-17 RX ADMIN — PIPERACILLIN AND TAZOBACTAM 3375 MG: 3; .375 INJECTION, POWDER, LYOPHILIZED, FOR SOLUTION INTRAVENOUS at 09:39

## 2024-04-17 RX ADMIN — PREGABALIN 150 MG: 150 CAPSULE ORAL at 07:59

## 2024-04-17 RX ADMIN — DOXYCYCLINE 50 MG: 50 CAPSULE ORAL at 20:27

## 2024-04-17 RX ADMIN — FAMOTIDINE 20 MG: 20 TABLET, FILM COATED ORAL at 07:59

## 2024-04-17 RX ADMIN — POTASSIUM CHLORIDE 40 MEQ: 1500 TABLET, EXTENDED RELEASE ORAL at 17:02

## 2024-04-17 RX ADMIN — PIPERACILLIN AND TAZOBACTAM 3375 MG: 3; .375 INJECTION, POWDER, LYOPHILIZED, FOR SOLUTION INTRAVENOUS at 17:09

## 2024-04-17 RX ADMIN — DOXYCYCLINE 50 MG: 50 CAPSULE ORAL at 07:59

## 2024-04-17 RX ADMIN — POTASSIUM CHLORIDE 40 MEQ: 1500 TABLET, EXTENDED RELEASE ORAL at 20:27

## 2024-04-17 RX ADMIN — WARFARIN SODIUM 2 MG: 2 TABLET ORAL at 17:02

## 2024-04-17 RX ADMIN — POTASSIUM CHLORIDE 40 MEQ: 1500 TABLET, EXTENDED RELEASE ORAL at 10:41

## 2024-04-17 RX ADMIN — POTASSIUM CHLORIDE 40 MEQ: 1500 TABLET, EXTENDED RELEASE ORAL at 08:31

## 2024-04-17 RX ADMIN — SODIUM CHLORIDE, PRESERVATIVE FREE 10 ML: 5 INJECTION INTRAVENOUS at 20:37

## 2024-04-17 RX ADMIN — FAMOTIDINE 20 MG: 20 TABLET, FILM COATED ORAL at 20:27

## 2024-04-17 RX ADMIN — PREGABALIN 75 MG: 75 CAPSULE ORAL at 20:27

## 2024-04-17 RX ADMIN — PIPERACILLIN AND TAZOBACTAM 3375 MG: 3; .375 INJECTION, POWDER, LYOPHILIZED, FOR SOLUTION INTRAVENOUS at 02:12

## 2024-04-17 RX ADMIN — ROSUVASTATIN CALCIUM 20 MG: 20 TABLET, COATED ORAL at 20:27

## 2024-04-17 RX ADMIN — CITALOPRAM 40 MG: 40 TABLET, FILM COATED ORAL at 07:59

## 2024-04-17 ASSESSMENT — PAIN SCALES - GENERAL
PAINLEVEL_OUTOF10: 0

## 2024-04-17 NOTE — TELEPHONE ENCOUNTER
Patient admitted to [facility] yesterday 4/16 for PNA. Notes show patient had been experiencing some hematuria and hematemesis. INR 3.3 at admission, Warfarin held for now.     Patient was scheduled in clinic 4/17. Will cancel appointment, call patient to reschedule when discharged. LOS currently listed as greater than 2 midnights

## 2024-04-17 NOTE — ED NOTES
.RN screened patient's swallowing by administering the [place] Swallow Protocol. The patient consumed 3 ounces of water by cup in sequential swallows without signs/symptoms of aspiration.      Sara Viera RN  04/16/24 2008    
within normal limits   TSH WITH REFLEX - Abnormal; Notable for the following components:    TSH 7.39 (*)     All other components within normal limits   CALCIUM, IONIZED - Abnormal; Notable for the following components:    Calcium, Ionized 0.86 (*)     pH, Jason 7.546 (*)     All other components within normal limits   PHOSPHORUS - Abnormal; Notable for the following components:    Phosphorus 1.8 (*)     All other components within normal limits   ACETAMINOPHEN LEVEL - Abnormal; Notable for the following components:    Acetaminophen Level <5 (*)     All other components within normal limits   SALICYLATE LEVEL - Abnormal; Notable for the following components:    Salicylate, Serum <0.3 (*)     All other components within normal limits   LACTIC ACID - Abnormal; Notable for the following components:    Lactic Acid 2.8 (*)     All other components within normal limits   FIBRINOGEN - Abnormal; Notable for the following components:    Fibrinogen 707 (*)     All other components within normal limits   DIGOXIN LEVEL - Abnormal; Notable for the following components:    Digoxin Lvl 3.0 (*)     All other components within normal limits    Narrative:     CALL  Nicho PEREZ tel. [phone],  Chemistry results called to and read back by CLOVER LINDER 04/16/2024  20:01, by CAMRON     Critical values: yes     Abnormal Assessment Findings: Crit dig level 3.0    Background  History:   Past Medical History:   Diagnosis Date    Abdominal hernia     s/p repair 2010- HAS RETURNED    Aortic valve prosthesis present     CAD (coronary artery disease)     Chronic back pain greater than 3 months duration     Clostridium difficile diarrhea 10/17/2016    PCR    COPD, severity to be determined ([facility]) 05/10/2023    Current every day smoker     DDD (degenerative disc disease), lumbosacral 08/07/2013    Erectile dysfunction     GERD (gastroesophageal reflux disease)     Hyperlipidemia     Hypertension     Joint pain     Left testicular cancer (HCC)

## 2024-04-18 ENCOUNTER — APPOINTMENT (OUTPATIENT)
Dept: GENERAL RADIOLOGY | Age: 59
DRG: 981 | End: 2024-04-18
Payer: MEDICARE

## 2024-04-18 PROBLEM — I49.01 VENTRICULAR FIBRILLATION (HCC): Status: ACTIVE | Noted: 2024-04-18

## 2024-04-18 PROBLEM — E43 SEVERE MALNUTRITION (HCC): Chronic | Status: ACTIVE | Noted: 2024-04-18

## 2024-04-18 PROBLEM — I46.9 CARDIAC ARREST (HCC): Status: ACTIVE | Noted: 2024-04-18

## 2024-04-18 LAB
ALBUMIN SERPL-MCNC: 3.3 G/DL (ref 3.4–5)
ANION GAP SERPL CALCULATED.3IONS-SCNC: 13 MMOL/L (ref 3–16)
ANION GAP SERPL CALCULATED.3IONS-SCNC: 15 MMOL/L (ref 3–16)
ANION GAP SERPL CALCULATED.3IONS-SCNC: 24 MMOL/L (ref 3–16)
BASE EXCESS BLDA CALC-SCNC: -7 MMOL/L (ref -3–3)
BUN SERPL-MCNC: 13 MG/DL (ref 7–20)
BUN SERPL-MCNC: 13 MG/DL (ref 7–20)
BUN SERPL-MCNC: 21 MG/DL (ref 7–20)
CA-I BLD-SCNC: 1.13 MMOL/L (ref 1.12–1.32)
CALCIUM SERPL-MCNC: 7.9 MG/DL (ref 8.3–10.6)
CALCIUM SERPL-MCNC: 8.3 MG/DL (ref 8.3–10.6)
CALCIUM SERPL-MCNC: 8.4 MG/DL (ref 8.3–10.6)
CHLORIDE SERPL-SCNC: 89 MMOL/L (ref 99–110)
CHLORIDE SERPL-SCNC: 92 MMOL/L (ref 99–110)
CHLORIDE SERPL-SCNC: 94 MMOL/L (ref 99–110)
CO2 BLDA-SCNC: 21 MMOL/L
CO2 SERPL-SCNC: 20 MMOL/L (ref 21–32)
CO2 SERPL-SCNC: 26 MMOL/L (ref 21–32)
CO2 SERPL-SCNC: 28 MMOL/L (ref 21–32)
CREAT SERPL-MCNC: 0.7 MG/DL (ref 0.8–1.3)
CREAT SERPL-MCNC: 1 MG/DL (ref 0.8–1.3)
CREAT SERPL-MCNC: 1.3 MG/DL (ref 0.8–1.3)
DEPRECATED RDW RBC AUTO: 17.8 % (ref 12.4–15.4)
DEPRECATED RDW RBC AUTO: 18.1 % (ref 12.4–15.4)
FERRITIN SERPL IA-MCNC: 318.6 NG/ML (ref 30–400)
GFR SERPLBLD CREATININE-BSD FMLA CKD-EPI: 63 ML/MIN/{1.73_M2}
GFR SERPLBLD CREATININE-BSD FMLA CKD-EPI: 86 ML/MIN/{1.73_M2}
GFR SERPLBLD CREATININE-BSD FMLA CKD-EPI: >90 ML/MIN/{1.73_M2}
GLUCOSE BLD-MCNC: 137 MG/DL (ref 70–99)
GLUCOSE BLD-MCNC: 227 MG/DL (ref 70–99)
GLUCOSE BLD-MCNC: 229 MG/DL (ref 70–99)
GLUCOSE BLD-MCNC: 275 MG/DL (ref 70–99)
GLUCOSE SERPL-MCNC: 142 MG/DL (ref 70–99)
GLUCOSE SERPL-MCNC: 235 MG/DL (ref 70–99)
GLUCOSE SERPL-MCNC: 281 MG/DL (ref 70–99)
HCG SERPL QL: NEGATIVE
HCO3 BLDA-SCNC: 19.8 MMOL/L (ref 21–29)
HCT VFR BLD AUTO: 36.7 % (ref 40.5–52.5)
HCT VFR BLD AUTO: 37.1 % (ref 40.5–52.5)
HCT VFR BLD AUTO: 38.5 % (ref 40.5–52.5)
HGB BLD-MCNC: 12.2 G/DL (ref 13.5–17.5)
HGB BLD-MCNC: 12.3 G/DL (ref 13.5–17.5)
IMMATURE RETIC FRACT: 0.43 (ref 0.21–0.37)
INR PPP: 2.67 (ref 0.85–1.15)
IRON SATN MFR SERPL: 30 % (ref 20–50)
IRON SERPL-MCNC: 79 UG/DL (ref 59–158)
LACTATE BLD-SCNC: 11.75 MMOL/L (ref 0.4–2)
LACTATE BLDV-SCNC: 10.3 MMOL/L (ref 0.4–2)
LDH SERPL L TO P-CCNC: 247 U/L (ref 100–190)
MAGNESIUM SERPL-MCNC: 1.9 MG/DL (ref 1.8–2.4)
MAGNESIUM SERPL-MCNC: 2.1 MG/DL (ref 1.8–2.4)
MCH RBC QN AUTO: 29.4 PG (ref 26–34)
MCH RBC QN AUTO: 29.8 PG (ref 26–34)
MCHC RBC AUTO-ENTMCNC: 32 G/DL (ref 31–36)
MCHC RBC AUTO-ENTMCNC: 32.8 G/DL (ref 31–36)
MCV RBC AUTO: 90.6 FL (ref 80–100)
MCV RBC AUTO: 92 FL (ref 80–100)
PCO2 BLDA: 42.2 MM HG (ref 35–45)
PERFORMED ON: ABNORMAL
PH BLDA: 7.28 [PH] (ref 7.35–7.45)
PHOSPHATE SERPL-MCNC: 1.3 MG/DL (ref 2.5–4.9)
PLATELET # BLD AUTO: 169 K/UL (ref 135–450)
PLATELET # BLD AUTO: 99 K/UL (ref 135–450)
PMV BLD AUTO: 8.6 FL (ref 5–10.5)
PMV BLD AUTO: 9.1 FL (ref 5–10.5)
PO2 BLDA: 64 MM HG (ref 75–108)
POC SAMPLE TYPE: ABNORMAL
POTASSIUM BLD-SCNC: 3.5 MMOL/L (ref 3.5–5.1)
POTASSIUM SERPL-SCNC: 2.6 MMOL/L (ref 3.5–5.1)
POTASSIUM SERPL-SCNC: 3.2 MMOL/L (ref 3.5–5.1)
POTASSIUM SERPL-SCNC: 3.9 MMOL/L (ref 3.5–5.1)
PROTHROMBIN TIME: 28.4 SEC (ref 11.9–14.9)
RBC # BLD AUTO: 4.1 M/UL (ref 4.2–5.9)
RBC # BLD AUTO: 4.19 M/UL (ref 4.2–5.9)
RETICS # AUTO: 0.04 M/UL
RETICS/RBC NFR AUTO: 1.03 % (ref 0.5–2.18)
SAO2 % BLDA: 89 % (ref 93–100)
SODIUM BLD-SCNC: 136 MMOL/L (ref 136–145)
SODIUM SERPL-SCNC: 133 MMOL/L (ref 136–145)
SODIUM SERPL-SCNC: 133 MMOL/L (ref 136–145)
SODIUM SERPL-SCNC: 135 MMOL/L (ref 136–145)
TIBC SERPL-MCNC: 264 UG/DL (ref 260–445)
WBC # BLD AUTO: 13.9 K/UL (ref 4–11)
WBC # BLD AUTO: 6 K/UL (ref 4–11)

## 2024-04-18 PROCEDURE — 6360000002 HC RX W HCPCS: Performed by: INTERNAL MEDICINE

## 2024-04-18 PROCEDURE — 83550 IRON BINDING TEST: CPT

## 2024-04-18 PROCEDURE — 83540 ASSAY OF IRON: CPT

## 2024-04-18 PROCEDURE — 82607 VITAMIN B-12: CPT

## 2024-04-18 PROCEDURE — 2000000000 HC ICU R&B

## 2024-04-18 PROCEDURE — 93005 ELECTROCARDIOGRAM TRACING: CPT | Performed by: INTERNAL MEDICINE

## 2024-04-18 PROCEDURE — 83735 ASSAY OF MAGNESIUM: CPT

## 2024-04-18 PROCEDURE — 6360000002 HC RX W HCPCS

## 2024-04-18 PROCEDURE — 36600 WITHDRAWAL OF ARTERIAL BLOOD: CPT

## 2024-04-18 PROCEDURE — 82803 BLOOD GASES ANY COMBINATION: CPT

## 2024-04-18 PROCEDURE — 82746 ASSAY OF FOLIC ACID SERUM: CPT

## 2024-04-18 PROCEDURE — 71045 X-RAY EXAM CHEST 1 VIEW: CPT

## 2024-04-18 PROCEDURE — 80069 RENAL FUNCTION PANEL: CPT

## 2024-04-18 PROCEDURE — 2580000003 HC RX 258: Performed by: INTERNAL MEDICINE

## 2024-04-18 PROCEDURE — 6360000002 HC RX W HCPCS: Performed by: STUDENT IN AN ORGANIZED HEALTH CARE EDUCATION/TRAINING PROGRAM

## 2024-04-18 PROCEDURE — 6370000000 HC RX 637 (ALT 250 FOR IP)

## 2024-04-18 PROCEDURE — 93005 ELECTROCARDIOGRAM TRACING: CPT

## 2024-04-18 PROCEDURE — 2700000000 HC OXYGEN THERAPY PER DAY

## 2024-04-18 PROCEDURE — 84132 ASSAY OF SERUM POTASSIUM: CPT

## 2024-04-18 PROCEDURE — 85610 PROTHROMBIN TIME: CPT

## 2024-04-18 PROCEDURE — 02HV33Z INSERTION OF INFUSION DEVICE INTO SUPERIOR VENA CAVA, PERCUTANEOUS APPROACH: ICD-10-PCS

## 2024-04-18 PROCEDURE — 99223 1ST HOSP IP/OBS HIGH 75: CPT | Performed by: INTERNAL MEDICINE

## 2024-04-18 PROCEDURE — 6370000000 HC RX 637 (ALT 250 FOR IP): Performed by: INTERNAL MEDICINE

## 2024-04-18 PROCEDURE — 6370000000 HC RX 637 (ALT 250 FOR IP): Performed by: NURSE PRACTITIONER

## 2024-04-18 PROCEDURE — 82105 ALPHA-FETOPROTEIN SERUM: CPT

## 2024-04-18 PROCEDURE — 82330 ASSAY OF CALCIUM: CPT

## 2024-04-18 PROCEDURE — 94761 N-INVAS EAR/PLS OXIMETRY MLT: CPT

## 2024-04-18 PROCEDURE — 84703 CHORIONIC GONADOTROPIN ASSAY: CPT

## 2024-04-18 PROCEDURE — 82947 ASSAY GLUCOSE BLOOD QUANT: CPT

## 2024-04-18 PROCEDURE — 85045 AUTOMATED RETICULOCYTE COUNT: CPT

## 2024-04-18 PROCEDURE — 83605 ASSAY OF LACTIC ACID: CPT

## 2024-04-18 PROCEDURE — 99233 SBSQ HOSP IP/OBS HIGH 50: CPT | Performed by: INTERNAL MEDICINE

## 2024-04-18 PROCEDURE — 82728 ASSAY OF FERRITIN: CPT

## 2024-04-18 PROCEDURE — 36415 COLL VENOUS BLD VENIPUNCTURE: CPT

## 2024-04-18 PROCEDURE — 36556 INSERT NON-TUNNEL CV CATH: CPT

## 2024-04-18 PROCEDURE — 2580000003 HC RX 258

## 2024-04-18 PROCEDURE — 84295 ASSAY OF SERUM SODIUM: CPT

## 2024-04-18 PROCEDURE — 83615 LACTATE (LD) (LDH) ENZYME: CPT

## 2024-04-18 PROCEDURE — 85027 COMPLETE CBC AUTOMATED: CPT

## 2024-04-18 RX ORDER — HYDROMORPHONE HYDROCHLORIDE 1 MG/ML
0.5 INJECTION, SOLUTION INTRAMUSCULAR; INTRAVENOUS; SUBCUTANEOUS
Status: DISCONTINUED | OUTPATIENT
Start: 2024-04-18 | End: 2024-04-18

## 2024-04-18 RX ORDER — MORPHINE SULFATE 15 MG/1
15 TABLET, FILM COATED, EXTENDED RELEASE ORAL 3 TIMES DAILY
Status: DISCONTINUED | OUTPATIENT
Start: 2024-04-18 | End: 2024-04-30 | Stop reason: HOSPADM

## 2024-04-18 RX ORDER — ARFORMOTEROL TARTRATE 15 UG/2ML
15 SOLUTION RESPIRATORY (INHALATION)
Status: DISCONTINUED | OUTPATIENT
Start: 2024-04-18 | End: 2024-04-30 | Stop reason: HOSPADM

## 2024-04-18 RX ORDER — LIDOCAINE 4 G/G
1 PATCH TOPICAL DAILY
Status: DISCONTINUED | OUTPATIENT
Start: 2024-04-18 | End: 2024-04-30 | Stop reason: HOSPADM

## 2024-04-18 RX ORDER — SODIUM CHLORIDE, SODIUM LACTATE, POTASSIUM CHLORIDE, CALCIUM CHLORIDE 600; 310; 30; 20 MG/100ML; MG/100ML; MG/100ML; MG/100ML
INJECTION, SOLUTION INTRAVENOUS CONTINUOUS
Status: ACTIVE | OUTPATIENT
Start: 2024-04-18 | End: 2024-04-19

## 2024-04-18 RX ORDER — MORPHINE SULFATE 2 MG/ML
2 INJECTION, SOLUTION INTRAMUSCULAR; INTRAVENOUS ONCE
Status: COMPLETED | OUTPATIENT
Start: 2024-04-18 | End: 2024-04-18

## 2024-04-18 RX ORDER — POTASSIUM CHLORIDE 29.8 MG/ML
20 INJECTION INTRAVENOUS
Status: DISCONTINUED | OUTPATIENT
Start: 2024-04-18 | End: 2024-04-18

## 2024-04-18 RX ORDER — POTASSIUM CHLORIDE 29.8 MG/ML
20 INJECTION INTRAVENOUS
Status: COMPLETED | OUTPATIENT
Start: 2024-04-18 | End: 2024-04-18

## 2024-04-18 RX ORDER — HYDROMORPHONE HYDROCHLORIDE 1 MG/ML
0.5 INJECTION, SOLUTION INTRAMUSCULAR; INTRAVENOUS; SUBCUTANEOUS ONCE
Status: COMPLETED | OUTPATIENT
Start: 2024-04-18 | End: 2024-04-18

## 2024-04-18 RX ORDER — EPINEPHRINE IN SOD CHLOR,ISO 1 MG/10 ML
SYRINGE (ML) INTRAVENOUS
Status: COMPLETED | OUTPATIENT
Start: 2024-04-18 | End: 2024-04-18

## 2024-04-18 RX ORDER — MORPHINE SULFATE 2 MG/ML
INJECTION, SOLUTION INTRAMUSCULAR; INTRAVENOUS
Status: DISPENSED
Start: 2024-04-18 | End: 2024-04-19

## 2024-04-18 RX ORDER — POTASSIUM CHLORIDE 20 MEQ/1
40 TABLET, EXTENDED RELEASE ORAL
Status: DISCONTINUED | OUTPATIENT
Start: 2024-04-18 | End: 2024-04-18

## 2024-04-18 RX ORDER — BUDESONIDE 0.5 MG/2ML
0.5 INHALANT ORAL
Status: DISCONTINUED | OUTPATIENT
Start: 2024-04-18 | End: 2024-04-30 | Stop reason: HOSPADM

## 2024-04-18 RX ORDER — HYDROMORPHONE HYDROCHLORIDE 1 MG/ML
0.5 INJECTION, SOLUTION INTRAMUSCULAR; INTRAVENOUS; SUBCUTANEOUS
Status: DISCONTINUED | OUTPATIENT
Start: 2024-04-18 | End: 2024-04-19

## 2024-04-18 RX ORDER — HYDROMORPHONE HYDROCHLORIDE 1 MG/ML
0.5 INJECTION, SOLUTION INTRAMUSCULAR; INTRAVENOUS; SUBCUTANEOUS EVERY 6 HOURS PRN
Status: DISCONTINUED | OUTPATIENT
Start: 2024-04-18 | End: 2024-04-18

## 2024-04-18 RX ADMIN — DOXYCYCLINE 50 MG: 50 CAPSULE ORAL at 09:00

## 2024-04-18 RX ADMIN — EPINEPHRINE 1 MG: 0.1 INJECTION, SOLUTION ENDOTRACHEAL; INTRACARDIAC; INTRAVENOUS at 13:43

## 2024-04-18 RX ADMIN — SODIUM CHLORIDE, PRESERVATIVE FREE 10 ML: 5 INJECTION INTRAVENOUS at 09:00

## 2024-04-18 RX ADMIN — Medication 5 MG: at 20:44

## 2024-04-18 RX ADMIN — ASPIRIN 81 MG: 81 TABLET, COATED ORAL at 09:00

## 2024-04-18 RX ADMIN — POTASSIUM CHLORIDE 20 MEQ: 400 INJECTION, SOLUTION INTRAVENOUS at 16:53

## 2024-04-18 RX ADMIN — AMIODARONE HYDROCHLORIDE 1 MG/MIN: 50 INJECTION, SOLUTION INTRAVENOUS at 20:56

## 2024-04-18 RX ADMIN — MORPHINE SULFATE 2 MG: 2 INJECTION, SOLUTION INTRAMUSCULAR; INTRAVENOUS at 16:23

## 2024-04-18 RX ADMIN — PIPERACILLIN AND TAZOBACTAM 3375 MG: 3; .375 INJECTION, POWDER, LYOPHILIZED, FOR SOLUTION INTRAVENOUS at 01:57

## 2024-04-18 RX ADMIN — PREGABALIN 75 MG: 75 CAPSULE ORAL at 09:00

## 2024-04-18 RX ADMIN — WARFARIN SODIUM 2 MG: 2 TABLET ORAL at 18:17

## 2024-04-18 RX ADMIN — PIPERACILLIN AND TAZOBACTAM 3375 MG: 3; .375 INJECTION, POWDER, LYOPHILIZED, FOR SOLUTION INTRAVENOUS at 17:31

## 2024-04-18 RX ADMIN — SODIUM CHLORIDE, PRESERVATIVE FREE 10 ML: 5 INJECTION INTRAVENOUS at 21:36

## 2024-04-18 RX ADMIN — ONDANSETRON 4 MG: 2 INJECTION INTRAMUSCULAR; INTRAVENOUS at 15:02

## 2024-04-18 RX ADMIN — POTASSIUM CHLORIDE 20 MEQ: 400 INJECTION, SOLUTION INTRAVENOUS at 18:20

## 2024-04-18 RX ADMIN — DOXYCYCLINE 50 MG: 50 CAPSULE ORAL at 20:45

## 2024-04-18 RX ADMIN — SODIUM CHLORIDE, POTASSIUM CHLORIDE, SODIUM LACTATE AND CALCIUM CHLORIDE: 600; 310; 30; 20 INJECTION, SOLUTION INTRAVENOUS at 15:45

## 2024-04-18 RX ADMIN — INSULIN LISPRO 2 UNITS: 100 INJECTION, SOLUTION INTRAVENOUS; SUBCUTANEOUS at 17:01

## 2024-04-18 RX ADMIN — ROSUVASTATIN CALCIUM 20 MG: 20 TABLET, COATED ORAL at 20:44

## 2024-04-18 RX ADMIN — HYDROMORPHONE HYDROCHLORIDE 0.5 MG: 1 INJECTION, SOLUTION INTRAMUSCULAR; INTRAVENOUS; SUBCUTANEOUS at 22:42

## 2024-04-18 RX ADMIN — FAMOTIDINE 20 MG: 20 TABLET, FILM COATED ORAL at 20:44

## 2024-04-18 RX ADMIN — HYDROMORPHONE HYDROCHLORIDE 0.5 MG: 1 INJECTION, SOLUTION INTRAMUSCULAR; INTRAVENOUS; SUBCUTANEOUS at 20:52

## 2024-04-18 RX ADMIN — POTASSIUM CHLORIDE 20 MEQ: 400 INJECTION, SOLUTION INTRAVENOUS at 15:12

## 2024-04-18 RX ADMIN — CITALOPRAM 40 MG: 40 TABLET, FILM COATED ORAL at 09:00

## 2024-04-18 RX ADMIN — MORPHINE SULFATE 15 MG: 15 TABLET, FILM COATED, EXTENDED RELEASE ORAL at 09:00

## 2024-04-18 RX ADMIN — PREGABALIN 75 MG: 75 CAPSULE ORAL at 15:34

## 2024-04-18 RX ADMIN — MORPHINE SULFATE 2 MG: 2 INJECTION, SOLUTION INTRAMUSCULAR; INTRAVENOUS at 16:39

## 2024-04-18 RX ADMIN — SODIUM CHLORIDE, POTASSIUM CHLORIDE, SODIUM LACTATE AND CALCIUM CHLORIDE: 600; 310; 30; 20 INJECTION, SOLUTION INTRAVENOUS at 23:20

## 2024-04-18 RX ADMIN — MORPHINE SULFATE 15 MG: 15 TABLET, FILM COATED, EXTENDED RELEASE ORAL at 18:17

## 2024-04-18 RX ADMIN — PREGABALIN 75 MG: 75 CAPSULE ORAL at 20:44

## 2024-04-18 RX ADMIN — POTASSIUM CHLORIDE 40 MEQ: 1500 TABLET, EXTENDED RELEASE ORAL at 13:35

## 2024-04-18 RX ADMIN — AMIODARONE HYDROCHLORIDE 1 MG/MIN: 50 INJECTION, SOLUTION INTRAVENOUS at 14:53

## 2024-04-18 RX ADMIN — PIPERACILLIN AND TAZOBACTAM 3375 MG: 3; .375 INJECTION, POWDER, LYOPHILIZED, FOR SOLUTION INTRAVENOUS at 09:05

## 2024-04-18 RX ADMIN — POTASSIUM CHLORIDE 20 MEQ: 400 INJECTION, SOLUTION INTRAVENOUS at 21:35

## 2024-04-18 RX ADMIN — FAMOTIDINE 20 MG: 20 TABLET, FILM COATED ORAL at 09:00

## 2024-04-18 RX ADMIN — HYDROMORPHONE HYDROCHLORIDE 0.5 MG: 1 INJECTION, SOLUTION INTRAMUSCULAR; INTRAVENOUS; SUBCUTANEOUS at 17:27

## 2024-04-18 RX ADMIN — POTASSIUM CHLORIDE 20 MEQ: 400 INJECTION, SOLUTION INTRAVENOUS at 22:54

## 2024-04-18 RX ADMIN — Medication 5 MG: at 00:16

## 2024-04-18 ASSESSMENT — PAIN SCALES - GENERAL
PAINLEVEL_OUTOF10: 3
PAINLEVEL_OUTOF10: 10
PAINLEVEL_OUTOF10: 4
PAINLEVEL_OUTOF10: 9
PAINLEVEL_OUTOF10: 10
PAINLEVEL_OUTOF10: 4
PAINLEVEL_OUTOF10: 8
PAINLEVEL_OUTOF10: 4
PAINLEVEL_OUTOF10: 0
PAINLEVEL_OUTOF10: 8
PAINLEVEL_OUTOF10: 8

## 2024-04-18 ASSESSMENT — PAIN DESCRIPTION - ORIENTATION
ORIENTATION: MID
ORIENTATION: UPPER

## 2024-04-18 ASSESSMENT — PAIN SCALES - WONG BAKER
WONGBAKER_NUMERICALRESPONSE: HURTS A LITTLE BIT
WONGBAKER_NUMERICALRESPONSE: HURTS A LITTLE BIT

## 2024-04-18 ASSESSMENT — PAIN DESCRIPTION - LOCATION
LOCATION: CHEST

## 2024-04-18 NOTE — CODE DOCUMENTATION
1340- patient pulseless and not breathing, Code called and CPR started by Prakash CARUSO, Code team arrives with Dr Lyle Stewart, charge RN Lucretia Peña, Sri Silver RN, Lindsay Madrid RN, Anselmo Ambrose RN, Mk Vance RN, Luis Gomez RT.     1343- 1mg epi given by Lucretia CARUSO, CPR continues By Prakash CARUSO, BVM breaths BY Luis RT    1345- pulse check, no pulse found, CPR resumed,By Lindsay CARUSO patient placed on Code cart heart monitor with defib pads applied.     1346- 1 mg epi given By Lucretia CARUSO.     1347- pulse check, no pulse found , VT noted on monitor, shock advised by Dr Stewart of 360j, CPR resumed By Prakash CARUSO while defib charged, shock administered. CPR resumed BY Prakash CARUSO    1349- Amio IVP given, pulse check performed, ROSC achieved.     1349- patient becomes pulseless again, CPR restarted by Lindsay CARUSO    1352 -pulse check, VT noted on monitor, shock advised by Dr Stewart of 360j, CPR resumed By Prakash CARUSO while defib charged, shock administered. CPR resumed BY Prakash CARUSO    1354- pulse check, ROSC achieved, patient noted to be alert, and responding to questions.     1354- /92, pulse 80, SPO2 on ABG 89%    1401- patient transferred to ICU, noted to be alert, awake and responding to questions.     Above reflects written documentation by   Mk Vance RN.

## 2024-04-19 ENCOUNTER — APPOINTMENT (OUTPATIENT)
Dept: GENERAL RADIOLOGY | Age: 59
DRG: 981 | End: 2024-04-19
Payer: MEDICARE

## 2024-04-19 LAB
AFP-TM SERPL-MCNC: <1.8 UG/L
ALBUMIN SERPL-MCNC: 3 G/DL (ref 3.4–5)
ALBUMIN SERPL-MCNC: 3 G/DL (ref 3.4–5)
ALBUMIN SERPL-MCNC: 3.2 G/DL (ref 3.4–5)
ANION GAP SERPL CALCULATED.3IONS-SCNC: 10 MMOL/L (ref 3–16)
ANION GAP SERPL CALCULATED.3IONS-SCNC: 12 MMOL/L (ref 3–16)
ANION GAP SERPL CALCULATED.3IONS-SCNC: 12 MMOL/L (ref 3–16)
BUN SERPL-MCNC: 28 MG/DL (ref 7–20)
BUN SERPL-MCNC: 29 MG/DL (ref 7–20)
BUN SERPL-MCNC: 31 MG/DL (ref 7–20)
CALCIUM SERPL-MCNC: 7.2 MG/DL (ref 8.3–10.6)
CALCIUM SERPL-MCNC: 7.5 MG/DL (ref 8.3–10.6)
CALCIUM SERPL-MCNC: 7.5 MG/DL (ref 8.3–10.6)
CHLORIDE SERPL-SCNC: 95 MMOL/L (ref 99–110)
CHLORIDE SERPL-SCNC: 97 MMOL/L (ref 99–110)
CHLORIDE SERPL-SCNC: 98 MMOL/L (ref 99–110)
CO2 SERPL-SCNC: 24 MMOL/L (ref 21–32)
CO2 SERPL-SCNC: 26 MMOL/L (ref 21–32)
CO2 SERPL-SCNC: 27 MMOL/L (ref 21–32)
CREAT SERPL-MCNC: 1.5 MG/DL (ref 0.8–1.3)
CREAT SERPL-MCNC: 1.7 MG/DL (ref 0.8–1.3)
CREAT SERPL-MCNC: 1.7 MG/DL (ref 0.8–1.3)
DEPRECATED RDW RBC AUTO: 18 % (ref 12.4–15.4)
DIGOXIN SERPL-MCNC: 1.6 NG/ML (ref 0.8–2)
EKG ATRIAL RATE: 45 BPM
EKG ATRIAL RATE: 97 BPM
EKG DIAGNOSIS: NORMAL
EKG DIAGNOSIS: NORMAL
EKG P AXIS: 58 DEGREES
EKG P AXIS: 67 DEGREES
EKG P-R INTERVAL: 190 MS
EKG P-R INTERVAL: 330 MS
EKG Q-T INTERVAL: 266 MS
EKG Q-T INTERVAL: 618 MS
EKG QRS DURATION: 110 MS
EKG QRS DURATION: 116 MS
EKG QTC CALCULATION (BAZETT): 337 MS
EKG QTC CALCULATION (BAZETT): 534 MS
EKG R AXIS: 67 DEGREES
EKG R AXIS: 71 DEGREES
EKG T AXIS: -75 DEGREES
EKG T AXIS: 109 DEGREES
EKG VENTRICULAR RATE: 45 BPM
EKG VENTRICULAR RATE: 97 BPM
GFR SERPLBLD CREATININE-BSD FMLA CKD-EPI: 45 ML/MIN/{1.73_M2}
GFR SERPLBLD CREATININE-BSD FMLA CKD-EPI: 45 ML/MIN/{1.73_M2}
GFR SERPLBLD CREATININE-BSD FMLA CKD-EPI: 53 ML/MIN/{1.73_M2}
GLUCOSE BLD-MCNC: 183 MG/DL (ref 70–99)
GLUCOSE BLD-MCNC: 190 MG/DL (ref 70–99)
GLUCOSE SERPL-MCNC: 154 MG/DL (ref 70–99)
GLUCOSE SERPL-MCNC: 180 MG/DL (ref 70–99)
GLUCOSE SERPL-MCNC: 190 MG/DL (ref 70–99)
HCT VFR BLD AUTO: 38.5 % (ref 40.5–52.5)
HGB BLD-MCNC: 12.4 G/DL (ref 13.5–17.5)
INR PPP: 3.93 (ref 0.85–1.15)
LACTATE BLDV-SCNC: 1.6 MMOL/L (ref 0.4–2)
MAGNESIUM SERPL-MCNC: 1.9 MG/DL (ref 1.8–2.4)
MCH RBC QN AUTO: 29.8 PG (ref 26–34)
MCHC RBC AUTO-ENTMCNC: 32.4 G/DL (ref 31–36)
MCV RBC AUTO: 92.2 FL (ref 80–100)
PERFORMED ON: ABNORMAL
PERFORMED ON: ABNORMAL
PHOSPHATE SERPL-MCNC: 2 MG/DL (ref 2.5–4.9)
PHOSPHATE SERPL-MCNC: 2.2 MG/DL (ref 2.5–4.9)
PHOSPHATE SERPL-MCNC: 2.8 MG/DL (ref 2.5–4.9)
PLATELET # BLD AUTO: 125 K/UL (ref 135–450)
PMV BLD AUTO: 8.8 FL (ref 5–10.5)
POTASSIUM SERPL-SCNC: 3.5 MMOL/L (ref 3.5–5.1)
POTASSIUM SERPL-SCNC: 3.5 MMOL/L (ref 3.5–5.1)
POTASSIUM SERPL-SCNC: 3.8 MMOL/L (ref 3.5–5.1)
PROTHROMBIN TIME: 38.1 SEC (ref 11.9–14.9)
RBC # BLD AUTO: 4.17 M/UL (ref 4.2–5.9)
SODIUM SERPL-SCNC: 132 MMOL/L (ref 136–145)
SODIUM SERPL-SCNC: 134 MMOL/L (ref 136–145)
SODIUM SERPL-SCNC: 135 MMOL/L (ref 136–145)
WBC # BLD AUTO: 9.1 K/UL (ref 4–11)

## 2024-04-19 PROCEDURE — 2580000003 HC RX 258

## 2024-04-19 PROCEDURE — 6370000000 HC RX 637 (ALT 250 FOR IP): Performed by: INTERNAL MEDICINE

## 2024-04-19 PROCEDURE — 2580000003 HC RX 258: Performed by: INTERNAL MEDICINE

## 2024-04-19 PROCEDURE — 83735 ASSAY OF MAGNESIUM: CPT

## 2024-04-19 PROCEDURE — 6360000002 HC RX W HCPCS: Performed by: STUDENT IN AN ORGANIZED HEALTH CARE EDUCATION/TRAINING PROGRAM

## 2024-04-19 PROCEDURE — 6360000002 HC RX W HCPCS: Performed by: INTERNAL MEDICINE

## 2024-04-19 PROCEDURE — 6360000002 HC RX W HCPCS

## 2024-04-19 PROCEDURE — 2700000000 HC OXYGEN THERAPY PER DAY

## 2024-04-19 PROCEDURE — 80069 RENAL FUNCTION PANEL: CPT

## 2024-04-19 PROCEDURE — 83605 ASSAY OF LACTIC ACID: CPT

## 2024-04-19 PROCEDURE — 85027 COMPLETE CBC AUTOMATED: CPT

## 2024-04-19 PROCEDURE — 6370000000 HC RX 637 (ALT 250 FOR IP)

## 2024-04-19 PROCEDURE — 94640 AIRWAY INHALATION TREATMENT: CPT

## 2024-04-19 PROCEDURE — 2000000000 HC ICU R&B

## 2024-04-19 PROCEDURE — 36592 COLLECT BLOOD FROM PICC: CPT

## 2024-04-19 PROCEDURE — C8929 TTE W OR WO FOL WCON,DOPPLER: HCPCS

## 2024-04-19 PROCEDURE — 94761 N-INVAS EAR/PLS OXIMETRY MLT: CPT

## 2024-04-19 PROCEDURE — 93010 ELECTROCARDIOGRAM REPORT: CPT | Performed by: INTERNAL MEDICINE

## 2024-04-19 PROCEDURE — 74018 RADEX ABDOMEN 1 VIEW: CPT

## 2024-04-19 PROCEDURE — 80162 ASSAY OF DIGOXIN TOTAL: CPT

## 2024-04-19 PROCEDURE — 99233 SBSQ HOSP IP/OBS HIGH 50: CPT | Performed by: INTERNAL MEDICINE

## 2024-04-19 PROCEDURE — 85610 PROTHROMBIN TIME: CPT

## 2024-04-19 RX ORDER — HYDROMORPHONE HYDROCHLORIDE 1 MG/ML
1 INJECTION, SOLUTION INTRAMUSCULAR; INTRAVENOUS; SUBCUTANEOUS
Status: DISCONTINUED | OUTPATIENT
Start: 2024-04-19 | End: 2024-04-20

## 2024-04-19 RX ORDER — METHOCARBAMOL 750 MG/1
750 TABLET, FILM COATED ORAL 3 TIMES DAILY
Status: DISCONTINUED | OUTPATIENT
Start: 2024-04-19 | End: 2024-04-19

## 2024-04-19 RX ORDER — POTASSIUM CHLORIDE 29.8 MG/ML
20 INJECTION INTRAVENOUS
Status: COMPLETED | OUTPATIENT
Start: 2024-04-19 | End: 2024-04-19

## 2024-04-19 RX ORDER — SODIUM CHLORIDE, SODIUM LACTATE, POTASSIUM CHLORIDE, CALCIUM CHLORIDE 600; 310; 30; 20 MG/100ML; MG/100ML; MG/100ML; MG/100ML
INJECTION, SOLUTION INTRAVENOUS CONTINUOUS
Status: ACTIVE | OUTPATIENT
Start: 2024-04-19 | End: 2024-04-19

## 2024-04-19 RX ORDER — DOXYCYCLINE 100 MG/1
100 CAPSULE ORAL EVERY 12 HOURS SCHEDULED
Status: DISCONTINUED | OUTPATIENT
Start: 2024-04-19 | End: 2024-04-22

## 2024-04-19 RX ORDER — SIMETHICONE 80 MG
80 TABLET,CHEWABLE ORAL EVERY 6 HOURS PRN
Status: DISCONTINUED | OUTPATIENT
Start: 2024-04-19 | End: 2024-04-30 | Stop reason: HOSPADM

## 2024-04-19 RX ORDER — POLYETHYLENE GLYCOL 3350 17 G/17G
17 POWDER, FOR SOLUTION ORAL DAILY
Status: DISCONTINUED | OUTPATIENT
Start: 2024-04-19 | End: 2024-04-30 | Stop reason: HOSPADM

## 2024-04-19 RX ORDER — ACETAMINOPHEN 650 MG/1
650 SUPPOSITORY RECTAL EVERY 8 HOURS SCHEDULED
Status: ACTIVE | OUTPATIENT
Start: 2024-04-19 | End: 2024-04-21

## 2024-04-19 RX ORDER — METHOCARBAMOL 750 MG/1
750 TABLET, FILM COATED ORAL 3 TIMES DAILY
Status: DISCONTINUED | OUTPATIENT
Start: 2024-04-19 | End: 2024-04-30 | Stop reason: HOSPADM

## 2024-04-19 RX ORDER — ACETAMINOPHEN 500 MG
1000 TABLET ORAL EVERY 8 HOURS SCHEDULED
Status: DISPENSED | OUTPATIENT
Start: 2024-04-19 | End: 2024-04-21

## 2024-04-19 RX ADMIN — PIPERACILLIN AND TAZOBACTAM 3375 MG: 3; .375 INJECTION, POWDER, LYOPHILIZED, FOR SOLUTION INTRAVENOUS at 19:03

## 2024-04-19 RX ADMIN — FAMOTIDINE 20 MG: 20 TABLET, FILM COATED ORAL at 08:14

## 2024-04-19 RX ADMIN — CITALOPRAM 40 MG: 40 TABLET, FILM COATED ORAL at 08:14

## 2024-04-19 RX ADMIN — METHOCARBAMOL TABLETS 750 MG: 750 TABLET, COATED ORAL at 08:15

## 2024-04-19 RX ADMIN — POTASSIUM CHLORIDE 20 MEQ: 400 INJECTION, SOLUTION INTRAVENOUS at 17:24

## 2024-04-19 RX ADMIN — MORPHINE SULFATE 15 MG: 15 TABLET, FILM COATED, EXTENDED RELEASE ORAL at 08:15

## 2024-04-19 RX ADMIN — POTASSIUM CHLORIDE 20 MEQ: 400 INJECTION, SOLUTION INTRAVENOUS at 18:41

## 2024-04-19 RX ADMIN — FAMOTIDINE 20 MG: 20 TABLET, FILM COATED ORAL at 21:52

## 2024-04-19 RX ADMIN — Medication 5 MG: at 21:52

## 2024-04-19 RX ADMIN — HYDROMORPHONE HYDROCHLORIDE 1 MG: 1 INJECTION, SOLUTION INTRAMUSCULAR; INTRAVENOUS; SUBCUTANEOUS at 09:33

## 2024-04-19 RX ADMIN — ARFORMOTEROL TARTRATE 15 MCG: 15 SOLUTION RESPIRATORY (INHALATION) at 09:43

## 2024-04-19 RX ADMIN — NALOXEGOL OXALATE 12.5 MG: 25 TABLET, FILM COATED ORAL at 17:24

## 2024-04-19 RX ADMIN — AMIODARONE HYDROCHLORIDE 1 MG/MIN: 50 INJECTION, SOLUTION INTRAVENOUS at 12:51

## 2024-04-19 RX ADMIN — POTASSIUM CHLORIDE 20 MEQ: 400 INJECTION, SOLUTION INTRAVENOUS at 09:15

## 2024-04-19 RX ADMIN — HYDROMORPHONE HYDROCHLORIDE 1 MG: 1 INJECTION, SOLUTION INTRAMUSCULAR; INTRAVENOUS; SUBCUTANEOUS at 02:34

## 2024-04-19 RX ADMIN — POTASSIUM CHLORIDE 20 MEQ: 400 INJECTION, SOLUTION INTRAVENOUS at 08:14

## 2024-04-19 RX ADMIN — POTASSIUM CHLORIDE 20 MEQ: 400 INJECTION, SOLUTION INTRAVENOUS at 19:00

## 2024-04-19 RX ADMIN — METHOCARBAMOL TABLETS 750 MG: 750 TABLET, COATED ORAL at 14:28

## 2024-04-19 RX ADMIN — BUDESONIDE INHALATION 500 MCG: 0.5 SUSPENSION RESPIRATORY (INHALATION) at 20:30

## 2024-04-19 RX ADMIN — PREGABALIN 75 MG: 75 CAPSULE ORAL at 14:28

## 2024-04-19 RX ADMIN — METHOCARBAMOL TABLETS 750 MG: 750 TABLET, COATED ORAL at 02:39

## 2024-04-19 RX ADMIN — AMIODARONE HYDROCHLORIDE 1 MG/MIN: 50 INJECTION, SOLUTION INTRAVENOUS at 21:28

## 2024-04-19 RX ADMIN — MORPHINE SULFATE 15 MG: 15 TABLET, FILM COATED, EXTENDED RELEASE ORAL at 14:29

## 2024-04-19 RX ADMIN — HYDROMORPHONE HYDROCHLORIDE 1 MG: 1 INJECTION, SOLUTION INTRAMUSCULAR; INTRAVENOUS; SUBCUTANEOUS at 21:52

## 2024-04-19 RX ADMIN — BUDESONIDE INHALATION 500 MCG: 0.5 SUSPENSION RESPIRATORY (INHALATION) at 09:43

## 2024-04-19 RX ADMIN — DOXYCYCLINE 50 MG: 50 CAPSULE ORAL at 08:14

## 2024-04-19 RX ADMIN — HYDROMORPHONE HYDROCHLORIDE 1 MG: 1 INJECTION, SOLUTION INTRAMUSCULAR; INTRAVENOUS; SUBCUTANEOUS at 17:24

## 2024-04-19 RX ADMIN — PIPERACILLIN AND TAZOBACTAM 3375 MG: 3; .375 INJECTION, POWDER, LYOPHILIZED, FOR SOLUTION INTRAVENOUS at 10:00

## 2024-04-19 RX ADMIN — ACETAMINOPHEN 1000 MG: 500 TABLET ORAL at 14:28

## 2024-04-19 RX ADMIN — PIPERACILLIN AND TAZOBACTAM 3375 MG: 3; .375 INJECTION, POWDER, LYOPHILIZED, FOR SOLUTION INTRAVENOUS at 02:05

## 2024-04-19 RX ADMIN — ACETAMINOPHEN 1000 MG: 500 TABLET ORAL at 21:52

## 2024-04-19 RX ADMIN — ASPIRIN 81 MG: 81 TABLET, COATED ORAL at 08:14

## 2024-04-19 RX ADMIN — ROSUVASTATIN CALCIUM 20 MG: 20 TABLET, COATED ORAL at 21:52

## 2024-04-19 RX ADMIN — AMIODARONE HYDROCHLORIDE 1 MG/MIN: 50 INJECTION, SOLUTION INTRAVENOUS at 04:06

## 2024-04-19 RX ADMIN — MORPHINE SULFATE 15 MG: 15 TABLET, FILM COATED, EXTENDED RELEASE ORAL at 21:52

## 2024-04-19 RX ADMIN — SODIUM CHLORIDE, POTASSIUM CHLORIDE, SODIUM LACTATE AND CALCIUM CHLORIDE: 600; 310; 30; 20 INJECTION, SOLUTION INTRAVENOUS at 12:00

## 2024-04-19 RX ADMIN — HYDROMORPHONE HYDROCHLORIDE 0.5 MG: 1 INJECTION, SOLUTION INTRAMUSCULAR; INTRAVENOUS; SUBCUTANEOUS at 00:42

## 2024-04-19 RX ADMIN — METHOCARBAMOL TABLETS 750 MG: 750 TABLET, COATED ORAL at 21:52

## 2024-04-19 RX ADMIN — PREGABALIN 75 MG: 75 CAPSULE ORAL at 21:52

## 2024-04-19 RX ADMIN — SODIUM CHLORIDE, POTASSIUM CHLORIDE, SODIUM LACTATE AND CALCIUM CHLORIDE: 600; 310; 30; 20 INJECTION, SOLUTION INTRAVENOUS at 06:38

## 2024-04-19 RX ADMIN — HYDROMORPHONE HYDROCHLORIDE 1 MG: 1 INJECTION, SOLUTION INTRAMUSCULAR; INTRAVENOUS; SUBCUTANEOUS at 05:17

## 2024-04-19 RX ADMIN — DOXYCYCLINE 100 MG: 50 CAPSULE ORAL at 21:54

## 2024-04-19 RX ADMIN — ARFORMOTEROL TARTRATE 15 MCG: 15 SOLUTION RESPIRATORY (INHALATION) at 20:30

## 2024-04-19 RX ADMIN — PREGABALIN 75 MG: 75 CAPSULE ORAL at 08:15

## 2024-04-19 ASSESSMENT — PAIN DESCRIPTION - LOCATION
LOCATION: BACK;CHEST
LOCATION: CHEST;BACK
LOCATION: CHEST
LOCATION: BACK;CHEST
LOCATION: RIB CAGE
LOCATION: BACK;CHEST
LOCATION: BACK;CHEST
LOCATION: CHEST
LOCATION: CHEST;BACK

## 2024-04-19 ASSESSMENT — PAIN SCALES - GENERAL
PAINLEVEL_OUTOF10: 9
PAINLEVEL_OUTOF10: 9
PAINLEVEL_OUTOF10: 8
PAINLEVEL_OUTOF10: 3
PAINLEVEL_OUTOF10: 6
PAINLEVEL_OUTOF10: 8
PAINLEVEL_OUTOF10: 7
PAINLEVEL_OUTOF10: 3
PAINLEVEL_OUTOF10: 3
PAINLEVEL_OUTOF10: 7
PAINLEVEL_OUTOF10: 4
PAINLEVEL_OUTOF10: 6
PAINLEVEL_OUTOF10: 9

## 2024-04-19 ASSESSMENT — PAIN DESCRIPTION - DESCRIPTORS: DESCRIPTORS: ACHING;SORE

## 2024-04-19 ASSESSMENT — PAIN SCALES - WONG BAKER
WONGBAKER_NUMERICALRESPONSE: NO HURT
WONGBAKER_NUMERICALRESPONSE: HURTS A LITTLE BIT

## 2024-04-19 ASSESSMENT — PAIN DESCRIPTION - ORIENTATION: ORIENTATION: ANTERIOR

## 2024-04-19 ASSESSMENT — PAIN - FUNCTIONAL ASSESSMENT: PAIN_FUNCTIONAL_ASSESSMENT: PREVENTS OR INTERFERES SOME ACTIVE ACTIVITIES AND ADLS

## 2024-04-20 ENCOUNTER — APPOINTMENT (OUTPATIENT)
Dept: MRI IMAGING | Age: 59
DRG: 981 | End: 2024-04-20
Payer: MEDICARE

## 2024-04-20 LAB
ALBUMIN SERPL-MCNC: 2.8 G/DL (ref 3.4–5)
ALBUMIN SERPL-MCNC: 2.8 G/DL (ref 3.4–5)
ALBUMIN SERPL-MCNC: 2.9 G/DL (ref 3.4–5)
ALBUMIN SERPL-MCNC: 2.9 G/DL (ref 3.4–5)
ALP SERPL-CCNC: 53 U/L (ref 40–129)
ALT SERPL-CCNC: 15 U/L (ref 10–40)
ANION GAP SERPL CALCULATED.3IONS-SCNC: 10 MMOL/L (ref 3–16)
ANION GAP SERPL CALCULATED.3IONS-SCNC: 10 MMOL/L (ref 3–16)
ANION GAP SERPL CALCULATED.3IONS-SCNC: 11 MMOL/L (ref 3–16)
AST SERPL-CCNC: 31 U/L (ref 15–37)
BACTERIA BLD CULT ORG #2: NORMAL
BACTERIA BLD CULT: NORMAL
BILIRUB DIRECT SERPL-MCNC: <0.2 MG/DL (ref 0–0.3)
BILIRUB INDIRECT SERPL-MCNC: ABNORMAL MG/DL (ref 0–1)
BILIRUB SERPL-MCNC: 0.4 MG/DL (ref 0–1)
BUN SERPL-MCNC: 28 MG/DL (ref 7–20)
BUN SERPL-MCNC: 29 MG/DL (ref 7–20)
BUN SERPL-MCNC: 29 MG/DL (ref 7–20)
CALCIUM SERPL-MCNC: 7.3 MG/DL (ref 8.3–10.6)
CALCIUM SERPL-MCNC: 7.4 MG/DL (ref 8.3–10.6)
CALCIUM SERPL-MCNC: 7.4 MG/DL (ref 8.3–10.6)
CHLORIDE SERPL-SCNC: 100 MMOL/L (ref 99–110)
CHLORIDE SERPL-SCNC: 97 MMOL/L (ref 99–110)
CHLORIDE SERPL-SCNC: 98 MMOL/L (ref 99–110)
CO2 SERPL-SCNC: 25 MMOL/L (ref 21–32)
CREAT SERPL-MCNC: 1.8 MG/DL (ref 0.8–1.3)
CREAT SERPL-MCNC: 1.8 MG/DL (ref 0.8–1.3)
CREAT SERPL-MCNC: 1.9 MG/DL (ref 0.8–1.3)
DEPRECATED RDW RBC AUTO: 17.7 % (ref 12.4–15.4)
GFR SERPLBLD CREATININE-BSD FMLA CKD-EPI: 40 ML/MIN/{1.73_M2}
GFR SERPLBLD CREATININE-BSD FMLA CKD-EPI: 42 ML/MIN/{1.73_M2}
GFR SERPLBLD CREATININE-BSD FMLA CKD-EPI: 42 ML/MIN/{1.73_M2}
GLUCOSE BLD-MCNC: 149 MG/DL (ref 70–99)
GLUCOSE BLD-MCNC: 155 MG/DL (ref 70–99)
GLUCOSE SERPL-MCNC: 120 MG/DL (ref 70–99)
GLUCOSE SERPL-MCNC: 235 MG/DL (ref 70–99)
GLUCOSE SERPL-MCNC: 96 MG/DL (ref 70–99)
HCT VFR BLD AUTO: 32.1 % (ref 40.5–52.5)
HGB BLD-MCNC: 10.2 G/DL (ref 13.5–17.5)
INR PPP: 7.1 (ref 0.85–1.15)
INR PPP: 7.68 (ref 0.85–1.15)
MAGNESIUM SERPL-MCNC: 1.7 MG/DL (ref 1.8–2.4)
MCH RBC QN AUTO: 29.5 PG (ref 26–34)
MCHC RBC AUTO-ENTMCNC: 31.8 G/DL (ref 31–36)
MCV RBC AUTO: 92.8 FL (ref 80–100)
PERFORMED ON: ABNORMAL
PERFORMED ON: ABNORMAL
PHOSPHATE SERPL-MCNC: 1.8 MG/DL (ref 2.5–4.9)
PHOSPHATE SERPL-MCNC: 2.5 MG/DL (ref 2.5–4.9)
PHOSPHATE SERPL-MCNC: 2.7 MG/DL (ref 2.5–4.9)
PLATELET # BLD AUTO: 103 K/UL (ref 135–450)
PMV BLD AUTO: 9.2 FL (ref 5–10.5)
POTASSIUM SERPL-SCNC: 3.8 MMOL/L (ref 3.5–5.1)
POTASSIUM SERPL-SCNC: 3.9 MMOL/L (ref 3.5–5.1)
POTASSIUM SERPL-SCNC: 4 MMOL/L (ref 3.5–5.1)
PROT SERPL-MCNC: 5.3 G/DL (ref 6.4–8.2)
PROTHROMBIN TIME: 59.8 SEC (ref 11.9–14.9)
PROTHROMBIN TIME: 63.5 SEC (ref 11.9–14.9)
RBC # BLD AUTO: 3.45 M/UL (ref 4.2–5.9)
SODIUM SERPL-SCNC: 132 MMOL/L (ref 136–145)
SODIUM SERPL-SCNC: 134 MMOL/L (ref 136–145)
SODIUM SERPL-SCNC: 135 MMOL/L (ref 136–145)
WBC # BLD AUTO: 8.3 K/UL (ref 4–11)

## 2024-04-20 PROCEDURE — 6360000002 HC RX W HCPCS

## 2024-04-20 PROCEDURE — 6370000000 HC RX 637 (ALT 250 FOR IP)

## 2024-04-20 PROCEDURE — 6370000000 HC RX 637 (ALT 250 FOR IP): Performed by: INTERNAL MEDICINE

## 2024-04-20 PROCEDURE — 80069 RENAL FUNCTION PANEL: CPT

## 2024-04-20 PROCEDURE — 6360000002 HC RX W HCPCS: Performed by: INTERNAL MEDICINE

## 2024-04-20 PROCEDURE — 2580000003 HC RX 258

## 2024-04-20 PROCEDURE — 36415 COLL VENOUS BLD VENIPUNCTURE: CPT

## 2024-04-20 PROCEDURE — 2000000000 HC ICU R&B

## 2024-04-20 PROCEDURE — 36592 COLLECT BLOOD FROM PICC: CPT

## 2024-04-20 PROCEDURE — 94640 AIRWAY INHALATION TREATMENT: CPT

## 2024-04-20 PROCEDURE — 99233 SBSQ HOSP IP/OBS HIGH 50: CPT | Performed by: INTERNAL MEDICINE

## 2024-04-20 PROCEDURE — 94761 N-INVAS EAR/PLS OXIMETRY MLT: CPT

## 2024-04-20 PROCEDURE — 83735 ASSAY OF MAGNESIUM: CPT

## 2024-04-20 PROCEDURE — 6360000002 HC RX W HCPCS: Performed by: STUDENT IN AN ORGANIZED HEALTH CARE EDUCATION/TRAINING PROGRAM

## 2024-04-20 PROCEDURE — 80076 HEPATIC FUNCTION PANEL: CPT

## 2024-04-20 PROCEDURE — 85027 COMPLETE CBC AUTOMATED: CPT

## 2024-04-20 PROCEDURE — 85610 PROTHROMBIN TIME: CPT

## 2024-04-20 PROCEDURE — 2700000000 HC OXYGEN THERAPY PER DAY

## 2024-04-20 RX ORDER — POTASSIUM CHLORIDE 29.8 MG/ML
20 INJECTION INTRAVENOUS ONCE
Status: COMPLETED | OUTPATIENT
Start: 2024-04-20 | End: 2024-04-20

## 2024-04-20 RX ORDER — MAGNESIUM SULFATE IN WATER 40 MG/ML
2000 INJECTION, SOLUTION INTRAVENOUS ONCE
Status: COMPLETED | OUTPATIENT
Start: 2024-04-20 | End: 2024-04-20

## 2024-04-20 RX ORDER — POTASSIUM CHLORIDE 29.8 MG/ML
20 INJECTION INTRAVENOUS
Status: COMPLETED | OUTPATIENT
Start: 2024-04-20 | End: 2024-04-20

## 2024-04-20 RX ORDER — HYDROMORPHONE HYDROCHLORIDE 1 MG/ML
0.5 INJECTION, SOLUTION INTRAMUSCULAR; INTRAVENOUS; SUBCUTANEOUS EVERY 4 HOURS PRN
Status: DISPENSED | OUTPATIENT
Start: 2024-04-20 | End: 2024-04-22

## 2024-04-20 RX ORDER — FAMOTIDINE 20 MG/1
20 TABLET, FILM COATED ORAL DAILY
Status: DISCONTINUED | OUTPATIENT
Start: 2024-04-21 | End: 2024-04-23

## 2024-04-20 RX ADMIN — PREGABALIN 75 MG: 75 CAPSULE ORAL at 10:03

## 2024-04-20 RX ADMIN — NALOXEGOL OXALATE 12.5 MG: 25 TABLET, FILM COATED ORAL at 10:04

## 2024-04-20 RX ADMIN — AMIODARONE HYDROCHLORIDE 1 MG/MIN: 50 INJECTION, SOLUTION INTRAVENOUS at 04:42

## 2024-04-20 RX ADMIN — Medication 5 MG: at 19:57

## 2024-04-20 RX ADMIN — ACETAMINOPHEN 1000 MG: 500 TABLET ORAL at 21:32

## 2024-04-20 RX ADMIN — METHOCARBAMOL TABLETS 750 MG: 750 TABLET, COATED ORAL at 14:39

## 2024-04-20 RX ADMIN — MORPHINE SULFATE 15 MG: 15 TABLET, FILM COATED, EXTENDED RELEASE ORAL at 14:39

## 2024-04-20 RX ADMIN — POTASSIUM CHLORIDE 20 MEQ: 29.8 INJECTION, SOLUTION INTRAVENOUS at 19:56

## 2024-04-20 RX ADMIN — ARFORMOTEROL TARTRATE 15 MCG: 15 SOLUTION RESPIRATORY (INHALATION) at 12:30

## 2024-04-20 RX ADMIN — HYDROMORPHONE HYDROCHLORIDE 1 MG: 1 INJECTION, SOLUTION INTRAMUSCULAR; INTRAVENOUS; SUBCUTANEOUS at 04:39

## 2024-04-20 RX ADMIN — METHOCARBAMOL TABLETS 750 MG: 750 TABLET, COATED ORAL at 19:57

## 2024-04-20 RX ADMIN — HYDROMORPHONE HYDROCHLORIDE 0.5 MG: 1 INJECTION, SOLUTION INTRAMUSCULAR; INTRAVENOUS; SUBCUTANEOUS at 17:07

## 2024-04-20 RX ADMIN — ROSUVASTATIN CALCIUM 20 MG: 20 TABLET, COATED ORAL at 19:57

## 2024-04-20 RX ADMIN — PREGABALIN 75 MG: 75 CAPSULE ORAL at 19:57

## 2024-04-20 RX ADMIN — ACETAMINOPHEN 1000 MG: 500 TABLET ORAL at 14:39

## 2024-04-20 RX ADMIN — HYDROMORPHONE HYDROCHLORIDE 0.5 MG: 1 INJECTION, SOLUTION INTRAMUSCULAR; INTRAVENOUS; SUBCUTANEOUS at 21:32

## 2024-04-20 RX ADMIN — SODIUM CHLORIDE, PRESERVATIVE FREE 10 ML: 5 INJECTION INTRAVENOUS at 08:34

## 2024-04-20 RX ADMIN — ONDANSETRON 4 MG: 2 INJECTION INTRAMUSCULAR; INTRAVENOUS at 08:34

## 2024-04-20 RX ADMIN — ACETAMINOPHEN 1000 MG: 500 TABLET ORAL at 04:39

## 2024-04-20 RX ADMIN — HYDROMORPHONE HYDROCHLORIDE 1 MG: 1 INJECTION, SOLUTION INTRAMUSCULAR; INTRAVENOUS; SUBCUTANEOUS at 00:54

## 2024-04-20 RX ADMIN — PREGABALIN 75 MG: 75 CAPSULE ORAL at 14:39

## 2024-04-20 RX ADMIN — PIPERACILLIN AND TAZOBACTAM 3375 MG: 3; .375 INJECTION, POWDER, LYOPHILIZED, FOR SOLUTION INTRAVENOUS at 10:11

## 2024-04-20 RX ADMIN — POTASSIUM CHLORIDE 20 MEQ: 400 INJECTION, SOLUTION INTRAVENOUS at 04:00

## 2024-04-20 RX ADMIN — PIPERACILLIN AND TAZOBACTAM 3375 MG: 3; .375 INJECTION, POWDER, LYOPHILIZED, FOR SOLUTION INTRAVENOUS at 01:03

## 2024-04-20 RX ADMIN — ASPIRIN 81 MG: 81 TABLET, COATED ORAL at 10:04

## 2024-04-20 RX ADMIN — DOXYCYCLINE 100 MG: 50 CAPSULE ORAL at 21:10

## 2024-04-20 RX ADMIN — MORPHINE SULFATE 15 MG: 15 TABLET, FILM COATED, EXTENDED RELEASE ORAL at 10:03

## 2024-04-20 RX ADMIN — MAGNESIUM SULFATE HEPTAHYDRATE 2000 MG: 40 INJECTION, SOLUTION INTRAVENOUS at 08:39

## 2024-04-20 RX ADMIN — DOXYCYCLINE 100 MG: 50 CAPSULE ORAL at 10:01

## 2024-04-20 RX ADMIN — POTASSIUM CHLORIDE 20 MEQ: 400 INJECTION, SOLUTION INTRAVENOUS at 03:00

## 2024-04-20 RX ADMIN — PIPERACILLIN AND TAZOBACTAM 3375 MG: 3; .375 INJECTION, POWDER, LYOPHILIZED, FOR SOLUTION INTRAVENOUS at 18:11

## 2024-04-20 RX ADMIN — FAMOTIDINE 20 MG: 20 TABLET, FILM COATED ORAL at 10:04

## 2024-04-20 RX ADMIN — MORPHINE SULFATE 15 MG: 15 TABLET, FILM COATED, EXTENDED RELEASE ORAL at 19:57

## 2024-04-20 RX ADMIN — BUDESONIDE INHALATION 500 MCG: 0.5 SUSPENSION RESPIRATORY (INHALATION) at 12:30

## 2024-04-20 RX ADMIN — METHOCARBAMOL TABLETS 750 MG: 750 TABLET, COATED ORAL at 10:04

## 2024-04-20 RX ADMIN — OXYCODONE 10 MG: 5 TABLET ORAL at 18:02

## 2024-04-20 RX ADMIN — POLYETHYLENE GLYCOL 3350 17 G: 17 POWDER, FOR SOLUTION ORAL at 10:02

## 2024-04-20 RX ADMIN — CITALOPRAM 40 MG: 40 TABLET, FILM COATED ORAL at 10:04

## 2024-04-20 ASSESSMENT — PAIN DESCRIPTION - ORIENTATION: ORIENTATION: POSTERIOR

## 2024-04-20 ASSESSMENT — PAIN DESCRIPTION - LOCATION
LOCATION: RIB CAGE
LOCATION: BACK
LOCATION: RIB CAGE

## 2024-04-20 ASSESSMENT — PAIN SCALES - GENERAL
PAINLEVEL_OUTOF10: 8
PAINLEVEL_OUTOF10: 8
PAINLEVEL_OUTOF10: 9
PAINLEVEL_OUTOF10: 8
PAINLEVEL_OUTOF10: 5
PAINLEVEL_OUTOF10: 9
PAINLEVEL_OUTOF10: 8
PAINLEVEL_OUTOF10: 6

## 2024-04-20 ASSESSMENT — PAIN DESCRIPTION - DESCRIPTORS
DESCRIPTORS: SORE
DESCRIPTORS: SORE

## 2024-04-21 LAB
ALBUMIN SERPL-MCNC: 2.9 G/DL (ref 3.4–5)
ALBUMIN SERPL-MCNC: 3 G/DL (ref 3.4–5)
ALBUMIN SERPL-MCNC: 3 G/DL (ref 3.4–5)
ANION GAP SERPL CALCULATED.3IONS-SCNC: 10 MMOL/L (ref 3–16)
ANION GAP SERPL CALCULATED.3IONS-SCNC: 13 MMOL/L (ref 3–16)
ANION GAP SERPL CALCULATED.3IONS-SCNC: 9 MMOL/L (ref 3–16)
BUN SERPL-MCNC: 27 MG/DL (ref 7–20)
BUN SERPL-MCNC: 28 MG/DL (ref 7–20)
BUN SERPL-MCNC: 30 MG/DL (ref 7–20)
CA-I BLD-SCNC: 1.06 MMOL/L (ref 1.12–1.32)
CALCIUM SERPL-MCNC: 7.5 MG/DL (ref 8.3–10.6)
CALCIUM SERPL-MCNC: 7.7 MG/DL (ref 8.3–10.6)
CALCIUM SERPL-MCNC: 8 MG/DL (ref 8.3–10.6)
CHLORIDE SERPL-SCNC: 97 MMOL/L (ref 99–110)
CHLORIDE SERPL-SCNC: 98 MMOL/L (ref 99–110)
CHLORIDE SERPL-SCNC: 99 MMOL/L (ref 99–110)
CO2 SERPL-SCNC: 23 MMOL/L (ref 21–32)
CO2 SERPL-SCNC: 24 MMOL/L (ref 21–32)
CO2 SERPL-SCNC: 25 MMOL/L (ref 21–32)
CREAT SERPL-MCNC: 1.7 MG/DL (ref 0.8–1.3)
CREAT SERPL-MCNC: 1.8 MG/DL (ref 0.8–1.3)
CREAT SERPL-MCNC: 1.8 MG/DL (ref 0.8–1.3)
DEPRECATED RDW RBC AUTO: 18.7 % (ref 12.4–15.4)
GFR SERPLBLD CREATININE-BSD FMLA CKD-EPI: 42 ML/MIN/{1.73_M2}
GFR SERPLBLD CREATININE-BSD FMLA CKD-EPI: 42 ML/MIN/{1.73_M2}
GFR SERPLBLD CREATININE-BSD FMLA CKD-EPI: 45 ML/MIN/{1.73_M2}
GLUCOSE BLD-MCNC: 106 MG/DL (ref 70–99)
GLUCOSE BLD-MCNC: 115 MG/DL (ref 70–99)
GLUCOSE BLD-MCNC: 99 MG/DL (ref 70–99)
GLUCOSE SERPL-MCNC: 103 MG/DL (ref 70–99)
GLUCOSE SERPL-MCNC: 88 MG/DL (ref 70–99)
GLUCOSE SERPL-MCNC: 94 MG/DL (ref 70–99)
HCT VFR BLD AUTO: 29.1 % (ref 40.5–52.5)
HGB BLD-MCNC: 9.3 G/DL (ref 13.5–17.5)
INR PPP: 7.61 (ref 0.85–1.15)
MAGNESIUM SERPL-MCNC: 2 MG/DL (ref 1.8–2.4)
MCH RBC QN AUTO: 29.4 PG (ref 26–34)
MCHC RBC AUTO-ENTMCNC: 31.9 G/DL (ref 31–36)
MCV RBC AUTO: 92.4 FL (ref 80–100)
PERFORMED ON: ABNORMAL
PERFORMED ON: ABNORMAL
PERFORMED ON: NORMAL
PH VENOUS: 7.36 (ref 7.35–7.45)
PHOSPHATE SERPL-MCNC: 2.9 MG/DL (ref 2.5–4.9)
PHOSPHATE SERPL-MCNC: 3.1 MG/DL (ref 2.5–4.9)
PHOSPHATE SERPL-MCNC: 3.3 MG/DL (ref 2.5–4.9)
PLATELET # BLD AUTO: 86 K/UL (ref 135–450)
PMV BLD AUTO: 8.4 FL (ref 5–10.5)
POTASSIUM SERPL-SCNC: 3.9 MMOL/L (ref 3.5–5.1)
POTASSIUM SERPL-SCNC: 4 MMOL/L (ref 3.5–5.1)
POTASSIUM SERPL-SCNC: 4.2 MMOL/L (ref 3.5–5.1)
PROTHROMBIN TIME: 63.1 SEC (ref 11.9–14.9)
RBC # BLD AUTO: 3.15 M/UL (ref 4.2–5.9)
SODIUM SERPL-SCNC: 131 MMOL/L (ref 136–145)
SODIUM SERPL-SCNC: 133 MMOL/L (ref 136–145)
SODIUM SERPL-SCNC: 134 MMOL/L (ref 136–145)
WBC # BLD AUTO: 6.6 K/UL (ref 4–11)

## 2024-04-21 PROCEDURE — 83735 ASSAY OF MAGNESIUM: CPT

## 2024-04-21 PROCEDURE — 99233 SBSQ HOSP IP/OBS HIGH 50: CPT | Performed by: INTERNAL MEDICINE

## 2024-04-21 PROCEDURE — 6370000000 HC RX 637 (ALT 250 FOR IP): Performed by: INTERNAL MEDICINE

## 2024-04-21 PROCEDURE — 2580000003 HC RX 258

## 2024-04-21 PROCEDURE — 85027 COMPLETE CBC AUTOMATED: CPT

## 2024-04-21 PROCEDURE — 6360000002 HC RX W HCPCS: Performed by: STUDENT IN AN ORGANIZED HEALTH CARE EDUCATION/TRAINING PROGRAM

## 2024-04-21 PROCEDURE — 2000000000 HC ICU R&B

## 2024-04-21 PROCEDURE — 82330 ASSAY OF CALCIUM: CPT

## 2024-04-21 PROCEDURE — 85610 PROTHROMBIN TIME: CPT

## 2024-04-21 PROCEDURE — 6370000000 HC RX 637 (ALT 250 FOR IP)

## 2024-04-21 PROCEDURE — 6360000002 HC RX W HCPCS

## 2024-04-21 PROCEDURE — 94640 AIRWAY INHALATION TREATMENT: CPT

## 2024-04-21 PROCEDURE — 2500000003 HC RX 250 WO HCPCS

## 2024-04-21 PROCEDURE — 80069 RENAL FUNCTION PANEL: CPT

## 2024-04-21 RX ORDER — POTASSIUM CHLORIDE 29.8 MG/ML
20 INJECTION INTRAVENOUS ONCE
Status: COMPLETED | OUTPATIENT
Start: 2024-04-21 | End: 2024-04-21

## 2024-04-21 RX ORDER — CALCIUM GLUCONATE 10 MG/ML
1000 INJECTION, SOLUTION INTRAVENOUS ONCE
Status: COMPLETED | OUTPATIENT
Start: 2024-04-21 | End: 2024-04-21

## 2024-04-21 RX ADMIN — ROSUVASTATIN CALCIUM 20 MG: 20 TABLET, COATED ORAL at 20:14

## 2024-04-21 RX ADMIN — ASPIRIN 81 MG: 81 TABLET, COATED ORAL at 08:39

## 2024-04-21 RX ADMIN — PIPERACILLIN AND TAZOBACTAM 3375 MG: 3; .375 INJECTION, POWDER, LYOPHILIZED, FOR SOLUTION INTRAVENOUS at 00:30

## 2024-04-21 RX ADMIN — SODIUM CHLORIDE, PRESERVATIVE FREE 10 ML: 5 INJECTION INTRAVENOUS at 20:15

## 2024-04-21 RX ADMIN — MORPHINE SULFATE 15 MG: 15 TABLET, FILM COATED, EXTENDED RELEASE ORAL at 08:39

## 2024-04-21 RX ADMIN — PIPERACILLIN AND TAZOBACTAM 3375 MG: 3; .375 INJECTION, POWDER, LYOPHILIZED, FOR SOLUTION INTRAVENOUS at 17:40

## 2024-04-21 RX ADMIN — FAMOTIDINE 20 MG: 20 TABLET, FILM COATED ORAL at 08:39

## 2024-04-21 RX ADMIN — BUDESONIDE INHALATION 500 MCG: 0.5 SUSPENSION RESPIRATORY (INHALATION) at 12:41

## 2024-04-21 RX ADMIN — METHOCARBAMOL TABLETS 750 MG: 750 TABLET, COATED ORAL at 08:39

## 2024-04-21 RX ADMIN — MORPHINE SULFATE 15 MG: 15 TABLET, FILM COATED, EXTENDED RELEASE ORAL at 20:14

## 2024-04-21 RX ADMIN — NALOXEGOL OXALATE 12.5 MG: 25 TABLET, FILM COATED ORAL at 08:39

## 2024-04-21 RX ADMIN — PREGABALIN 75 MG: 75 CAPSULE ORAL at 20:14

## 2024-04-21 RX ADMIN — DOXYCYCLINE 100 MG: 50 CAPSULE ORAL at 20:17

## 2024-04-21 RX ADMIN — SODIUM CHLORIDE, PRESERVATIVE FREE 10 ML: 5 INJECTION INTRAVENOUS at 08:37

## 2024-04-21 RX ADMIN — POLYETHYLENE GLYCOL 3350 17 G: 17 POWDER, FOR SOLUTION ORAL at 08:38

## 2024-04-21 RX ADMIN — MORPHINE SULFATE 15 MG: 15 TABLET, FILM COATED, EXTENDED RELEASE ORAL at 14:35

## 2024-04-21 RX ADMIN — METHOCARBAMOL TABLETS 750 MG: 750 TABLET, COATED ORAL at 20:14

## 2024-04-21 RX ADMIN — ARFORMOTEROL TARTRATE 15 MCG: 15 SOLUTION RESPIRATORY (INHALATION) at 12:41

## 2024-04-21 RX ADMIN — HYDROMORPHONE HYDROCHLORIDE 0.5 MG: 1 INJECTION, SOLUTION INTRAMUSCULAR; INTRAVENOUS; SUBCUTANEOUS at 11:34

## 2024-04-21 RX ADMIN — PIPERACILLIN AND TAZOBACTAM 3375 MG: 3; .375 INJECTION, POWDER, LYOPHILIZED, FOR SOLUTION INTRAVENOUS at 09:49

## 2024-04-21 RX ADMIN — CALCIUM GLUCONATE 1000 MG: 10 INJECTION, SOLUTION INTRAVENOUS at 19:35

## 2024-04-21 RX ADMIN — DOXYCYCLINE 100 MG: 50 CAPSULE ORAL at 08:40

## 2024-04-21 RX ADMIN — PREGABALIN 75 MG: 75 CAPSULE ORAL at 08:39

## 2024-04-21 RX ADMIN — HYDROMORPHONE HYDROCHLORIDE 0.5 MG: 1 INJECTION, SOLUTION INTRAMUSCULAR; INTRAVENOUS; SUBCUTANEOUS at 21:58

## 2024-04-21 RX ADMIN — CITALOPRAM 40 MG: 40 TABLET, FILM COATED ORAL at 08:39

## 2024-04-21 RX ADMIN — POTASSIUM CHLORIDE 20 MEQ: 400 INJECTION, SOLUTION INTRAVENOUS at 00:27

## 2024-04-21 RX ADMIN — PREGABALIN 75 MG: 75 CAPSULE ORAL at 14:35

## 2024-04-21 RX ADMIN — Medication 5 MG: at 20:14

## 2024-04-21 RX ADMIN — HYDROMORPHONE HYDROCHLORIDE 0.5 MG: 1 INJECTION, SOLUTION INTRAMUSCULAR; INTRAVENOUS; SUBCUTANEOUS at 15:45

## 2024-04-21 RX ADMIN — METHOCARBAMOL TABLETS 750 MG: 750 TABLET, COATED ORAL at 14:35

## 2024-04-21 ASSESSMENT — PAIN DESCRIPTION - PAIN TYPE
TYPE: ACUTE PAIN

## 2024-04-21 ASSESSMENT — PAIN DESCRIPTION - ORIENTATION
ORIENTATION: MID

## 2024-04-21 ASSESSMENT — PAIN DESCRIPTION - ONSET
ONSET: ON-GOING

## 2024-04-21 ASSESSMENT — PAIN SCALES - GENERAL
PAINLEVEL_OUTOF10: 6
PAINLEVEL_OUTOF10: 8
PAINLEVEL_OUTOF10: 7
PAINLEVEL_OUTOF10: 7
PAINLEVEL_OUTOF10: 8
PAINLEVEL_OUTOF10: 7
PAINLEVEL_OUTOF10: 7

## 2024-04-21 ASSESSMENT — PAIN DESCRIPTION - LOCATION
LOCATION: CHEST

## 2024-04-21 ASSESSMENT — PAIN - FUNCTIONAL ASSESSMENT
PAIN_FUNCTIONAL_ASSESSMENT: PREVENTS OR INTERFERES WITH MANY ACTIVE NOT PASSIVE ACTIVITIES

## 2024-04-21 ASSESSMENT — PAIN DESCRIPTION - FREQUENCY
FREQUENCY: CONTINUOUS

## 2024-04-22 LAB
ALBUMIN SERPL-MCNC: 2.9 G/DL (ref 3.4–5)
ANION GAP SERPL CALCULATED.3IONS-SCNC: 12 MMOL/L (ref 3–16)
BUN SERPL-MCNC: 29 MG/DL (ref 7–20)
CALCIUM SERPL-MCNC: 7.9 MG/DL (ref 8.3–10.6)
CHLORIDE SERPL-SCNC: 99 MMOL/L (ref 99–110)
CO2 SERPL-SCNC: 23 MMOL/L (ref 21–32)
CREAT SERPL-MCNC: 1.7 MG/DL (ref 0.8–1.3)
DEPRECATED RDW RBC AUTO: 18.2 % (ref 12.4–15.4)
FOLATE SERPL-MCNC: 2.22 NG/ML (ref 4.78–24.2)
GFR SERPLBLD CREATININE-BSD FMLA CKD-EPI: 45 ML/MIN/{1.73_M2}
GLUCOSE BLD-MCNC: 105 MG/DL (ref 70–99)
GLUCOSE BLD-MCNC: 115 MG/DL (ref 70–99)
GLUCOSE BLD-MCNC: 116 MG/DL (ref 70–99)
GLUCOSE BLD-MCNC: 121 MG/DL (ref 70–99)
GLUCOSE BLD-MCNC: 128 MG/DL (ref 70–99)
GLUCOSE SERPL-MCNC: 104 MG/DL (ref 70–99)
HCT VFR BLD AUTO: 28.5 % (ref 40.5–52.5)
HGB BLD-MCNC: 8.9 G/DL (ref 13.5–17.5)
INR PPP: 6.38 (ref 0.85–1.15)
MAGNESIUM SERPL-MCNC: 1.9 MG/DL (ref 1.8–2.4)
MCH RBC QN AUTO: 29.2 PG (ref 26–34)
MCHC RBC AUTO-ENTMCNC: 31.2 G/DL (ref 31–36)
MCV RBC AUTO: 93.7 FL (ref 80–100)
PERFORMED ON: ABNORMAL
PHOSPHATE SERPL-MCNC: 3.6 MG/DL (ref 2.5–4.9)
PLATELET # BLD AUTO: 83 K/UL (ref 135–450)
PMV BLD AUTO: 7.9 FL (ref 5–10.5)
POTASSIUM SERPL-SCNC: 4 MMOL/L (ref 3.5–5.1)
PROTHROMBIN TIME: 55.2 SEC (ref 11.9–14.9)
RBC # BLD AUTO: 3.04 M/UL (ref 4.2–5.9)
SODIUM SERPL-SCNC: 134 MMOL/L (ref 136–145)
VIT B12 SERPL-MCNC: 202 PG/ML (ref 211–911)
WBC # BLD AUTO: 5.4 K/UL (ref 4–11)

## 2024-04-22 PROCEDURE — 6370000000 HC RX 637 (ALT 250 FOR IP)

## 2024-04-22 PROCEDURE — 80069 RENAL FUNCTION PANEL: CPT

## 2024-04-22 PROCEDURE — 6360000002 HC RX W HCPCS

## 2024-04-22 PROCEDURE — 94761 N-INVAS EAR/PLS OXIMETRY MLT: CPT

## 2024-04-22 PROCEDURE — 97530 THERAPEUTIC ACTIVITIES: CPT

## 2024-04-22 PROCEDURE — 2580000003 HC RX 258

## 2024-04-22 PROCEDURE — 99233 SBSQ HOSP IP/OBS HIGH 50: CPT | Performed by: INTERNAL MEDICINE

## 2024-04-22 PROCEDURE — 36415 COLL VENOUS BLD VENIPUNCTURE: CPT

## 2024-04-22 PROCEDURE — 6370000000 HC RX 637 (ALT 250 FOR IP): Performed by: INTERNAL MEDICINE

## 2024-04-22 PROCEDURE — 85027 COMPLETE CBC AUTOMATED: CPT

## 2024-04-22 PROCEDURE — 2700000000 HC OXYGEN THERAPY PER DAY

## 2024-04-22 PROCEDURE — 83735 ASSAY OF MAGNESIUM: CPT

## 2024-04-22 PROCEDURE — 85610 PROTHROMBIN TIME: CPT

## 2024-04-22 PROCEDURE — 99223 1ST HOSP IP/OBS HIGH 75: CPT | Performed by: INTERNAL MEDICINE

## 2024-04-22 PROCEDURE — 97166 OT EVAL MOD COMPLEX 45 MIN: CPT

## 2024-04-22 PROCEDURE — 97163 PT EVAL HIGH COMPLEX 45 MIN: CPT

## 2024-04-22 PROCEDURE — 97116 GAIT TRAINING THERAPY: CPT

## 2024-04-22 PROCEDURE — 1200000000 HC SEMI PRIVATE

## 2024-04-22 RX ORDER — MULTIVITAMIN WITH IRON
1 TABLET ORAL DAILY
Status: DISCONTINUED | OUTPATIENT
Start: 2024-04-22 | End: 2024-04-30 | Stop reason: HOSPADM

## 2024-04-22 RX ORDER — DOXYCYCLINE 100 MG/1
100 CAPSULE ORAL EVERY 12 HOURS SCHEDULED
Status: COMPLETED | OUTPATIENT
Start: 2024-04-22 | End: 2024-04-22

## 2024-04-22 RX ORDER — GAUZE BANDAGE 2" X 2"
200 BANDAGE TOPICAL DAILY
Status: COMPLETED | OUTPATIENT
Start: 2024-04-22 | End: 2024-04-24

## 2024-04-22 RX ORDER — POTASSIUM CHLORIDE 20 MEQ/1
20 TABLET, EXTENDED RELEASE ORAL ONCE
Status: COMPLETED | OUTPATIENT
Start: 2024-04-22 | End: 2024-04-22

## 2024-04-22 RX ORDER — FOLIC ACID 1 MG/1
1 TABLET ORAL DAILY
Status: DISCONTINUED | OUTPATIENT
Start: 2024-04-22 | End: 2024-04-30 | Stop reason: HOSPADM

## 2024-04-22 RX ADMIN — POLYETHYLENE GLYCOL 3350 17 G: 17 POWDER, FOR SOLUTION ORAL at 07:39

## 2024-04-22 RX ADMIN — PREGABALIN 75 MG: 75 CAPSULE ORAL at 15:27

## 2024-04-22 RX ADMIN — PIPERACILLIN AND TAZOBACTAM 3375 MG: 3; .375 INJECTION, POWDER, LYOPHILIZED, FOR SOLUTION INTRAVENOUS at 01:52

## 2024-04-22 RX ADMIN — SODIUM CHLORIDE, PRESERVATIVE FREE 10 ML: 5 INJECTION INTRAVENOUS at 07:42

## 2024-04-22 RX ADMIN — PREGABALIN 75 MG: 75 CAPSULE ORAL at 08:44

## 2024-04-22 RX ADMIN — PREGABALIN 75 MG: 75 CAPSULE ORAL at 20:32

## 2024-04-22 RX ADMIN — ROSUVASTATIN CALCIUM 20 MG: 20 TABLET, COATED ORAL at 20:32

## 2024-04-22 RX ADMIN — MORPHINE SULFATE 15 MG: 15 TABLET, FILM COATED, EXTENDED RELEASE ORAL at 15:27

## 2024-04-22 RX ADMIN — FAMOTIDINE 20 MG: 20 TABLET, FILM COATED ORAL at 07:36

## 2024-04-22 RX ADMIN — CITALOPRAM 40 MG: 40 TABLET, FILM COATED ORAL at 08:44

## 2024-04-22 RX ADMIN — METHOCARBAMOL TABLETS 750 MG: 750 TABLET, COATED ORAL at 20:32

## 2024-04-22 RX ADMIN — MORPHINE SULFATE 15 MG: 15 TABLET, FILM COATED, EXTENDED RELEASE ORAL at 20:32

## 2024-04-22 RX ADMIN — ASPIRIN 81 MG: 81 TABLET, COATED ORAL at 07:36

## 2024-04-22 RX ADMIN — DOXYCYCLINE 100 MG: 50 CAPSULE ORAL at 08:44

## 2024-04-22 RX ADMIN — PIPERACILLIN AND TAZOBACTAM 3375 MG: 3; .375 INJECTION, POWDER, LYOPHILIZED, FOR SOLUTION INTRAVENOUS at 09:56

## 2024-04-22 RX ADMIN — FOLIC ACID 1 MG: 1 TABLET ORAL at 12:22

## 2024-04-22 RX ADMIN — MORPHINE SULFATE 15 MG: 15 TABLET, FILM COATED, EXTENDED RELEASE ORAL at 07:36

## 2024-04-22 RX ADMIN — POTASSIUM CHLORIDE 20 MEQ: 1500 TABLET, EXTENDED RELEASE ORAL at 00:33

## 2024-04-22 RX ADMIN — DOXYCYCLINE 100 MG: 100 CAPSULE ORAL at 20:32

## 2024-04-22 RX ADMIN — PIPERACILLIN AND TAZOBACTAM 3375 MG: 3; .375 INJECTION, POWDER, FOR SOLUTION INTRAVENOUS at 17:46

## 2024-04-22 RX ADMIN — METHOCARBAMOL TABLETS 750 MG: 750 TABLET, COATED ORAL at 07:36

## 2024-04-22 RX ADMIN — SODIUM CHLORIDE, PRESERVATIVE FREE 10 ML: 5 INJECTION INTRAVENOUS at 20:33

## 2024-04-22 RX ADMIN — ONDANSETRON 4 MG: 2 INJECTION INTRAMUSCULAR; INTRAVENOUS at 09:57

## 2024-04-22 RX ADMIN — METHOCARBAMOL TABLETS 750 MG: 750 TABLET, COATED ORAL at 12:23

## 2024-04-22 RX ADMIN — Medication 5 MG: at 20:32

## 2024-04-22 RX ADMIN — Medication 200 MG: at 12:22

## 2024-04-22 RX ADMIN — THERA TABS 1 TABLET: TAB at 12:25

## 2024-04-22 ASSESSMENT — PAIN SCALES - WONG BAKER
WONGBAKER_NUMERICALRESPONSE: NO HURT

## 2024-04-22 ASSESSMENT — PAIN DESCRIPTION - DESCRIPTORS
DESCRIPTORS: ACHING
DESCRIPTORS: ACHING;SORE
DESCRIPTORS: ACHING

## 2024-04-22 ASSESSMENT — PAIN DESCRIPTION - LOCATION
LOCATION: CHEST;BACK
LOCATION: BACK
LOCATION: CHEST;BACK

## 2024-04-22 ASSESSMENT — PAIN DESCRIPTION - ORIENTATION
ORIENTATION: MID

## 2024-04-22 ASSESSMENT — PAIN - FUNCTIONAL ASSESSMENT
PAIN_FUNCTIONAL_ASSESSMENT: ACTIVITIES ARE NOT PREVENTED
PAIN_FUNCTIONAL_ASSESSMENT: ACTIVITIES ARE NOT PREVENTED
PAIN_FUNCTIONAL_ASSESSMENT: PREVENTS OR INTERFERES WITH MANY ACTIVE NOT PASSIVE ACTIVITIES
PAIN_FUNCTIONAL_ASSESSMENT: ACTIVITIES ARE NOT PREVENTED
PAIN_FUNCTIONAL_ASSESSMENT: ACTIVITIES ARE NOT PREVENTED

## 2024-04-22 ASSESSMENT — PAIN SCALES - GENERAL
PAINLEVEL_OUTOF10: 5
PAINLEVEL_OUTOF10: 7
PAINLEVEL_OUTOF10: 10
PAINLEVEL_OUTOF10: 10
PAINLEVEL_OUTOF10: 9
PAINLEVEL_OUTOF10: 9
PAINLEVEL_OUTOF10: 10
PAINLEVEL_OUTOF10: 10

## 2024-04-22 ASSESSMENT — PAIN DESCRIPTION - PAIN TYPE: TYPE: ACUTE PAIN

## 2024-04-22 ASSESSMENT — PAIN DESCRIPTION - FREQUENCY: FREQUENCY: CONTINUOUS

## 2024-04-22 ASSESSMENT — PAIN DESCRIPTION - ONSET: ONSET: ON-GOING

## 2024-04-23 PROBLEM — I42.9 MYOCARDIOPATHY (HCC): Status: ACTIVE | Noted: 2024-04-23

## 2024-04-23 PROBLEM — R79.89 ELEVATED TROPONIN: Status: ACTIVE | Noted: 2024-04-23

## 2024-04-23 PROBLEM — R79.89 ELEVATED BRAIN NATRIURETIC PEPTIDE (BNP) LEVEL: Status: ACTIVE | Noted: 2024-04-23

## 2024-04-23 PROBLEM — N17.9 AKI (ACUTE KIDNEY INJURY) (HCC): Status: ACTIVE | Noted: 2024-04-23

## 2024-04-23 LAB
ALBUMIN SERPL-MCNC: 3 G/DL (ref 3.4–5)
ANION GAP SERPL CALCULATED.3IONS-SCNC: 12 MMOL/L (ref 3–16)
BUN SERPL-MCNC: 29 MG/DL (ref 7–20)
CALCIUM SERPL-MCNC: 8.4 MG/DL (ref 8.3–10.6)
CHLORIDE SERPL-SCNC: 103 MMOL/L (ref 99–110)
CO2 SERPL-SCNC: 24 MMOL/L (ref 21–32)
CREAT SERPL-MCNC: 1.5 MG/DL (ref 0.8–1.3)
CREAT UR-MCNC: 113.4 MG/DL (ref 39–259)
CREAT UR-MCNC: 113.6 MG/DL (ref 39–259)
DEPRECATED RDW RBC AUTO: 18.5 % (ref 12.4–15.4)
GFR SERPLBLD CREATININE-BSD FMLA CKD-EPI: 53 ML/MIN/{1.73_M2}
GLUCOSE BLD-MCNC: 118 MG/DL (ref 70–99)
GLUCOSE BLD-MCNC: 124 MG/DL (ref 70–99)
GLUCOSE BLD-MCNC: 155 MG/DL (ref 70–99)
GLUCOSE BLD-MCNC: 172 MG/DL (ref 70–99)
GLUCOSE SERPL-MCNC: 105 MG/DL (ref 70–99)
HCT VFR BLD AUTO: 26.4 % (ref 40.5–52.5)
HGB BLD-MCNC: 8.4 G/DL (ref 13.5–17.5)
INR PPP: 4.45 (ref 0.85–1.15)
MAGNESIUM SERPL-MCNC: 1.7 MG/DL (ref 1.8–2.4)
MCH RBC QN AUTO: 29.3 PG (ref 26–34)
MCHC RBC AUTO-ENTMCNC: 31.6 G/DL (ref 31–36)
MCV RBC AUTO: 92.8 FL (ref 80–100)
MICROALBUMIN UR DL<=1MG/L-MCNC: 4.4 MG/DL
MICROALBUMIN/CREAT UR: 38.8 MG/G (ref 0–30)
PERFORMED ON: ABNORMAL
PHOSPHATE SERPL-MCNC: 3.2 MG/DL (ref 2.5–4.9)
PLATELET # BLD AUTO: 83 K/UL (ref 135–450)
PMV BLD AUTO: 7.9 FL (ref 5–10.5)
POTASSIUM SERPL-SCNC: 3.9 MMOL/L (ref 3.5–5.1)
PROT UR-MCNC: 49 MG/DL
PROT/CREAT UR-RTO: 0.4 MG/DL
PROTHROMBIN TIME: 41.9 SEC (ref 11.9–14.9)
RBC # BLD AUTO: 2.85 M/UL (ref 4.2–5.9)
SODIUM SERPL-SCNC: 139 MMOL/L (ref 136–145)
WBC # BLD AUTO: 4.7 K/UL (ref 4–11)

## 2024-04-23 PROCEDURE — 6370000000 HC RX 637 (ALT 250 FOR IP): Performed by: NURSE PRACTITIONER

## 2024-04-23 PROCEDURE — 83735 ASSAY OF MAGNESIUM: CPT

## 2024-04-23 PROCEDURE — 1200000000 HC SEMI PRIVATE

## 2024-04-23 PROCEDURE — 6370000000 HC RX 637 (ALT 250 FOR IP)

## 2024-04-23 PROCEDURE — 82043 UR ALBUMIN QUANTITATIVE: CPT

## 2024-04-23 PROCEDURE — 94761 N-INVAS EAR/PLS OXIMETRY MLT: CPT

## 2024-04-23 PROCEDURE — 82570 ASSAY OF URINE CREATININE: CPT

## 2024-04-23 PROCEDURE — 94640 AIRWAY INHALATION TREATMENT: CPT

## 2024-04-23 PROCEDURE — 97530 THERAPEUTIC ACTIVITIES: CPT

## 2024-04-23 PROCEDURE — 99233 SBSQ HOSP IP/OBS HIGH 50: CPT | Performed by: INTERNAL MEDICINE

## 2024-04-23 PROCEDURE — 6360000002 HC RX W HCPCS

## 2024-04-23 PROCEDURE — 6370000000 HC RX 637 (ALT 250 FOR IP): Performed by: INTERNAL MEDICINE

## 2024-04-23 PROCEDURE — 80069 RENAL FUNCTION PANEL: CPT

## 2024-04-23 PROCEDURE — 85610 PROTHROMBIN TIME: CPT

## 2024-04-23 PROCEDURE — 97110 THERAPEUTIC EXERCISES: CPT

## 2024-04-23 PROCEDURE — 84156 ASSAY OF PROTEIN URINE: CPT

## 2024-04-23 PROCEDURE — 2700000000 HC OXYGEN THERAPY PER DAY

## 2024-04-23 PROCEDURE — 99233 SBSQ HOSP IP/OBS HIGH 50: CPT | Performed by: NURSE PRACTITIONER

## 2024-04-23 PROCEDURE — 2580000003 HC RX 258

## 2024-04-23 PROCEDURE — 99223 1ST HOSP IP/OBS HIGH 75: CPT | Performed by: INTERNAL MEDICINE

## 2024-04-23 PROCEDURE — 85027 COMPLETE CBC AUTOMATED: CPT

## 2024-04-23 RX ORDER — POTASSIUM CHLORIDE 20 MEQ/1
40 TABLET, EXTENDED RELEASE ORAL PRN
Status: DISCONTINUED | OUTPATIENT
Start: 2024-04-23 | End: 2024-04-30 | Stop reason: HOSPADM

## 2024-04-23 RX ORDER — ACETAMINOPHEN 325 MG/1
650 TABLET ORAL EVERY 4 HOURS PRN
Status: DISCONTINUED | OUTPATIENT
Start: 2024-04-23 | End: 2024-04-25 | Stop reason: SDUPTHER

## 2024-04-23 RX ORDER — OXYCODONE HYDROCHLORIDE AND ACETAMINOPHEN 5; 325 MG/1; MG/1
1 TABLET ORAL EVERY 4 HOURS PRN
Status: DISCONTINUED | OUTPATIENT
Start: 2024-04-23 | End: 2024-04-30 | Stop reason: HOSPADM

## 2024-04-23 RX ORDER — DIPHENHYDRAMINE HCL 25 MG
25 TABLET ORAL EVERY 6 HOURS PRN
Status: DISCONTINUED | OUTPATIENT
Start: 2024-04-23 | End: 2024-04-30 | Stop reason: HOSPADM

## 2024-04-23 RX ORDER — FAMOTIDINE 20 MG/1
20 TABLET, FILM COATED ORAL 2 TIMES DAILY
Status: DISCONTINUED | OUTPATIENT
Start: 2024-04-23 | End: 2024-04-30 | Stop reason: HOSPADM

## 2024-04-23 RX ORDER — FLUTICASONE PROPIONATE 50 MCG
2 SPRAY, SUSPENSION (ML) NASAL DAILY
Status: DISCONTINUED | OUTPATIENT
Start: 2024-04-23 | End: 2024-04-30 | Stop reason: HOSPADM

## 2024-04-23 RX ORDER — POTASSIUM CHLORIDE 7.45 MG/ML
10 INJECTION INTRAVENOUS PRN
Status: DISCONTINUED | OUTPATIENT
Start: 2024-04-23 | End: 2024-04-30 | Stop reason: HOSPADM

## 2024-04-23 RX ORDER — MAGNESIUM SULFATE IN WATER 40 MG/ML
2000 INJECTION, SOLUTION INTRAVENOUS PRN
Status: DISCONTINUED | OUTPATIENT
Start: 2024-04-23 | End: 2024-04-30 | Stop reason: HOSPADM

## 2024-04-23 RX ADMIN — MAGNESIUM SULFATE HEPTAHYDRATE 2000 MG: 40 INJECTION, SOLUTION INTRAVENOUS at 08:54

## 2024-04-23 RX ADMIN — ASPIRIN 81 MG: 81 TABLET, COATED ORAL at 08:33

## 2024-04-23 RX ADMIN — CITALOPRAM 40 MG: 40 TABLET, FILM COATED ORAL at 08:33

## 2024-04-23 RX ADMIN — ACETAMINOPHEN 650 MG: 325 TABLET ORAL at 06:21

## 2024-04-23 RX ADMIN — SODIUM CHLORIDE, PRESERVATIVE FREE 10 ML: 5 INJECTION INTRAVENOUS at 08:38

## 2024-04-23 RX ADMIN — OXYCODONE HYDROCHLORIDE AND ACETAMINOPHEN 1 TABLET: 5; 325 TABLET ORAL at 18:07

## 2024-04-23 RX ADMIN — MORPHINE SULFATE 15 MG: 15 TABLET, FILM COATED, EXTENDED RELEASE ORAL at 08:34

## 2024-04-23 RX ADMIN — METHOCARBAMOL TABLETS 750 MG: 750 TABLET, COATED ORAL at 20:39

## 2024-04-23 RX ADMIN — PREGABALIN 75 MG: 75 CAPSULE ORAL at 08:34

## 2024-04-23 RX ADMIN — FLUTICASONE PROPIONATE 2 SPRAY: 50 SPRAY, METERED NASAL at 11:28

## 2024-04-23 RX ADMIN — PREGABALIN 75 MG: 75 CAPSULE ORAL at 15:12

## 2024-04-23 RX ADMIN — THERA TABS 1 TABLET: TAB at 08:35

## 2024-04-23 RX ADMIN — ROSUVASTATIN CALCIUM 20 MG: 20 TABLET, COATED ORAL at 20:39

## 2024-04-23 RX ADMIN — METHOCARBAMOL TABLETS 750 MG: 750 TABLET, COATED ORAL at 12:51

## 2024-04-23 RX ADMIN — SODIUM CHLORIDE, PRESERVATIVE FREE 10 ML: 5 INJECTION INTRAVENOUS at 20:39

## 2024-04-23 RX ADMIN — Medication 5 MG: at 20:40

## 2024-04-23 RX ADMIN — ARFORMOTEROL TARTRATE 15 MCG: 15 SOLUTION RESPIRATORY (INHALATION) at 08:47

## 2024-04-23 RX ADMIN — Medication 200 MG: at 08:33

## 2024-04-23 RX ADMIN — BUDESONIDE INHALATION 500 MCG: 0.5 SUSPENSION RESPIRATORY (INHALATION) at 08:47

## 2024-04-23 RX ADMIN — PREGABALIN 75 MG: 75 CAPSULE ORAL at 20:39

## 2024-04-23 RX ADMIN — FOLIC ACID 1 MG: 1 TABLET ORAL at 08:33

## 2024-04-23 RX ADMIN — POLYETHYLENE GLYCOL 3350 17 G: 17 POWDER, FOR SOLUTION ORAL at 08:38

## 2024-04-23 RX ADMIN — FAMOTIDINE 20 MG: 20 TABLET, FILM COATED ORAL at 08:35

## 2024-04-23 RX ADMIN — MORPHINE SULFATE 15 MG: 15 TABLET, FILM COATED, EXTENDED RELEASE ORAL at 20:39

## 2024-04-23 RX ADMIN — METHOCARBAMOL TABLETS 750 MG: 750 TABLET, COATED ORAL at 08:33

## 2024-04-23 RX ADMIN — MORPHINE SULFATE 15 MG: 15 TABLET, FILM COATED, EXTENDED RELEASE ORAL at 15:12

## 2024-04-23 RX ADMIN — FAMOTIDINE 20 MG: 20 TABLET, FILM COATED ORAL at 20:39

## 2024-04-23 RX ADMIN — OXYCODONE HYDROCHLORIDE AND ACETAMINOPHEN 1 TABLET: 5; 325 TABLET ORAL at 12:51

## 2024-04-23 ASSESSMENT — PAIN DESCRIPTION - DESCRIPTORS
DESCRIPTORS: DISCOMFORT
DESCRIPTORS: ACHING
DESCRIPTORS: DISCOMFORT
DESCRIPTORS: ACHING
DESCRIPTORS: ACHING
DESCRIPTORS: DISCOMFORT

## 2024-04-23 ASSESSMENT — PAIN - FUNCTIONAL ASSESSMENT
PAIN_FUNCTIONAL_ASSESSMENT: ACTIVITIES ARE NOT PREVENTED

## 2024-04-23 ASSESSMENT — PAIN SCALES - GENERAL
PAINLEVEL_OUTOF10: 8
PAINLEVEL_OUTOF10: 8
PAINLEVEL_OUTOF10: 9
PAINLEVEL_OUTOF10: 9
PAINLEVEL_OUTOF10: 8
PAINLEVEL_OUTOF10: 10
PAINLEVEL_OUTOF10: 8
PAINLEVEL_OUTOF10: 7
PAINLEVEL_OUTOF10: 8
PAINLEVEL_OUTOF10: 9
PAINLEVEL_OUTOF10: 9

## 2024-04-23 ASSESSMENT — PAIN DESCRIPTION - LOCATION
LOCATION: BACK

## 2024-04-23 ASSESSMENT — PAIN DESCRIPTION - PAIN TYPE: TYPE: CHRONIC PAIN

## 2024-04-23 ASSESSMENT — PAIN DESCRIPTION - ORIENTATION
ORIENTATION: MID

## 2024-04-23 ASSESSMENT — PAIN SCALES - WONG BAKER: WONGBAKER_NUMERICALRESPONSE: NO HURT

## 2024-04-23 NOTE — CONSULTS
Clinical Pharmacy Consult Note    Pharmacy to dose warfarin has been discontinued at this time as INR supratherapeutic (per Dr. Castillo). Pharmacy will sign off on dosing at this time.     If warfarin is to be resumed, please re-consult pharmacy to dose.     Please call with questions.    Pam Smith, PharmD, [facility]  Clinical Pharmacist - Emergency Med  Wireless: [phone]  4/19/2024 12:24 PM    
Consult received   Pt forwarded by Dr Aaliyah Colvin MD   
[facility] -  Clinical Pharmacy Note    Warfarin - Management by Pharmacy    Consult Date(s): 04/16/2024  Consulting Provider(s): Nabeel Mcbride    Assessment / Plan  Atrial Fibrillation s/p Aortic valve replacement- Warfarin  Goal INR: 2 - 3  Concurrent Anticoagulants / Antiplatelets: None  Interactions:   Doxycycline- increase anticoagulation effects   Zosyn- increase anticoagulation effects  Aspirin- increase anticoagulation effects (home med)  Citalopram- increase anticoagulation effects (home med)  Rosuvastatin- increase anticoagulation effects (home med)  Current Regimen / Plan:   INR supratherapeutic at 3.33 on 4/16  Holding dose for tonight. Will recheck INR in the morning  Will monitor pt's clinical status and INR daily, and make dose adjustments as needed.    Thank you for consulting pharmacy,      Diane Reza, PharmD  [id number] (Main Pharmacy)       Interval update:   New Consult.  Warfarin therapy managed by [facility]    Subjective/Objective:   Alexandr Witt is a 58 y.o. male with a PMHx significant for for AVR (re-do in May, 2017), CAD s/p CABG (x3 in May, 2017), pA-fib, HLD, HTN, testicular CA  who is admitted with Pneumonia.     Pharmacy is consulted to dose Warfarin.    Ht Readings from Last 1 Encounters:   04/16/24 1.829 m (6')     Wt Readings from Last 1 Encounters:   04/16/24 82.4 kg (181 lb 10.5 oz)     Prior / Home Warfarin Regimen:  INR goal 2-3, A Fib, s/p Aortic valve replacement   Home dose: Warfarin 2.5 mg PO daily  Patient was not alert and oriented to verbalize when last dose was taken.     Date INR Warfarin   04/16 3.33 unknown                      Recent Labs     04/16/24  1646   INR 3.33*   HGB 13.7      CREATININE 1.1      
GERD (gastroesophageal reflux disease)     Hyperlipidemia     Hypertension     Joint pain     Left testicular cancer (HCC) 2002    s/p abdominal resection    Myalgia and myositis, unspecified 08/26/2013    Neuropathy     OA (osteoarthritis) of knee     bilateral    Obesity, Class I, BMI 30.0-34.9 (see actual BMI)     Prolonged emergence from general anesthesia     after CABG    S/P AVR 2005    tissue valve    S/P CABG x 2 2005    w/AVR & primary repair of dissected ascending aorta    Shoulder instability-LEFT 08/28/2023    \"POPS OUT ABOUT 10 TIMES A DAY\"    Type 2 diabetes mellitus without complication, without long-term current use of insulin ([facility]) 07/13/2021    Wears dentures        Past Surgical History:   Procedure Laterality Date    AORTA SURGERY  08/27/2004    Dr. Ramírez Wright - primary repair of limited ascending aortic dissection    AORTIC VALVE REPLACEMENT  05/30/2017    Dr. Zhou - redo w/25mm Castelan Theon ThermaFix model 3300 bovine pericardial bioprosthesis    AORTIC VALVE SURGERY  08/27/2004    Dr. Ramírez Wright - 27mm Kelton-Castelan pericardial prosthesis     BACK SURGERY      L4-S1    BRONCHOSCOPY N/A 04/08/2022    ROBOTIC NAVIGATIONAL BRONCHOSCOPY FOR TRANSBRONCHIAL LUNG BIOPSY WITH FLOURO performed by Justin Cherry MD at [facility] ENDOSCOPY    BRONCHOSCOPY  04/08/2022    BRONCHOSCOPY ALVEOLAR LAVAGE performed by Justin Cherry MD at [facility] ENDOSCOPY    CARDIAC CATHETERIZATION  05/09/2017    Dr. Daniels    CARDIAC CATHETERIZATION  08/26/2004    Dr. Daniels    CARPAL TUNNEL RELEASE Right 03/17/2015    Dr. Miller    COLECTOMY  10/10/2016    Dr. Young - w/laparascopic lysis of extensive adhesions, open transverse colon resection, excision of old abd mesh adhering to colon    CORONARY ANGIOPLASTY WITH STENT PLACEMENT  07/2014    CORONARY ARTERY BYPASS GRAFT  08/27/2004    Dr. Ramírez Wright - x2 (LIMA-LAD, SVG sequentially to ascending aorta & D1)    CORONARY ARTERY BYPASS 
04/26/2013 09:42 AM    AGRATIO 1.8 04/22/2022 07:09 AM    LABGLOM >90 04/17/2024 06:19 AM    GLUCOSE 140 04/17/2024 06:19 AM    PROT 7.1 04/16/2024 04:46 PM    PROT 7.6 08/03/2012 02:27 PM    CALCIUM 8.1 04/17/2024 06:19 AM    BILITOT 0.8 04/16/2024 04:46 PM    ALKPHOS 85 04/16/2024 04:46 PM    AST 34 04/16/2024 04:46 PM    ALT 12 04/16/2024 04:46 PM     PT/INR:  No results found for: \"PTINR\"  Lab Results   Component Value Date    CKTOTAL 88 04/16/2024    TROPONINI 0.00 08/17/2017       EKG:  I have reviewed EKG with the following interpretation:  Impression:  NSR, PVC, 2nd degree AVB, NSSTTW changes    Assessment  Patient Active Problem List   Diagnosis    Primary hypertension    Hyperlipidemia    Generalized Arthralgias     Lumbar spinal stenosis    Lumbar herniated disc    S/P aortic valve replacement    Hx of CABG    Needle phobia    CAD, multiple vessel    History of testicular cancer    Degenerative arthritis of right knee    Exostosis of toe    S/P hernia repair    Myofascial pain dysfunction syndrome    Postlaminectomy syndrome, lumbar region    GERD (gastroesophageal reflux disease)    Severe hypoxemia    Bradycardia    Carpal tunnel syndrome on left    History of partial colectomy    NSTEMI (non-ST elevated myocardial infarction) (HCC)    Nonrheumatic aortic valve insufficiency    Obesity, Class I, BMI 30.0-34.9 (see actual BMI)    S/P AVR    CAD in native artery    Atrial fibrillation (HCC)    Pure hypercholesterolemia    Plantar fasciitis    Adjustment disorder with depressed mood    BPH associated with nocturia    Colonic mass    Severe obesity (BMI 35.0-39.9) with comorbidity (HCC)    Type 2 diabetes mellitus without complication, without long-term current use of insulin (HCC)    Unipolar depression    Abnormal gait    Dysphagia    COPD exacerbation (HCC)    Nodule of lower lobe of left lung    Primary malignant neoplasm of left lower lobe of lung (HCC)    Acute pulmonary edema (HCC)    Mild 
arrest and surgical team was emergently consulted for central access    Patient requires central access during active code  Line placed in R femoral vein  Case discussed with Dr. Ennis.     Willis Amin,   2:07 PM  4/18/2024     I discussed with the resident the patient's diagnosis and concur with the plan.    Joaquin Ennis M.D.  4/19/24   8:17 AM      
04/21/24  2153 04/22/24  0440 04/23/24  0540   * 134* 139   K 3.9 4.0 3.9   CL 98* 99 103   CO2 23 23 24   BUN 28* 29* 29*   CREATININE 1.8* 1.7* 1.5*   GLUCOSE 88 104* 105*     Ca/Mg/Phos:   Recent Labs     04/21/24  0556 04/21/24  1340 04/21/24  2153 04/22/24  0440 04/23/24  0540 04/23/24  0542   CALCIUM 7.5*   < > 8.0* 7.9* 8.4  --    MG 2.00  --   --  1.90  --  1.70*   PHOS 2.9   < > 3.3 3.6 3.2  --     < > = values in this interval not displayed.     INR:   Recent Labs     04/21/24  0556 04/22/24  0440 04/23/24  0542   INR 7.61* 6.38* 4.45*             IMAGING:  XR ABDOMEN (KUB) (SINGLE AP VIEW)   Final Result      Nonspecific nonobstructive bowel gas pattern. Diffuse gaseous distention of the bowel.      Electronically signed by Karl Howell MD      XR CHEST PORTABLE   Final Result   Right fifth rib fracture. No pneumothorax.      CTA Chest W/ Contrast R/O PE   Final Result      Chest:   1.  No evidence of pulmonary embolus.   2.  Mild patchy opacities throughout the left lung which may be infectious/inflammatory.   3.  Unchanged 9 mm pulmonary nodule in the medial basal left lower lobe, previously mildly hypermetabolic on PET/CT.   4.  Unchanged 11 mm pulmonary nodule in the posterior basal left lower lobe, previously nonhypermetabolic.   5.  Ectatic ascending thoracic aorta measuring up to 4 cm.      Abdomen pelvis:   1.  Mild sigmoid colonic wall thickening which may reflect colitis.   2.  Mild hepatic steatosis.            Electronically signed by Karl Howell MD      CT ABDOMEN PELVIS W IV CONTRAST Additional Contrast? None   Final Result      Chest:   1.  No evidence of pulmonary embolus.   2.  Mild patchy opacities throughout the left lung which may be infectious/inflammatory.   3.  Unchanged 9 mm pulmonary nodule in the medial basal left lower lobe, previously mildly hypermetabolic on PET/CT.   4.  Unchanged 11 mm pulmonary nodule in the posterior basal left lower lobe, previously nonhypermetabolic. 
04:46 PM    BILIDIR <0.2 04/16/2024 04:46 PM    IBILI see below 04/16/2024 04:46 PM     ABG:    Lab Results   Component Value Date/Time    TXY6KMO 31.7 09/10/2023 10:49 AM    BEART 7 09/10/2023 10:49 AM    L9KGWMTO 94 09/10/2023 10:49 AM    PHART 7.428 09/10/2023 10:49 AM    FDM1DJF 47.9 09/10/2023 10:49 AM    PO2ART 71.3 09/10/2023 10:49 AM    QCV5FNR 33 09/10/2023 10:49 AM     Procalcitonin:    Lab Results   Component Value Date/Time    PROCAL 0.35 04/17/2024 06:19 AM     Cultures:   Blood Culture:    Sputum Culture:        Radiology Review:  All pertinent images / reports were reviewed as a part of this visit. imaging reveals the following:  CTA Chest W/ Contrast R/O PE   Final Result      Chest:   1.  No evidence of pulmonary embolus.   2.  Mild patchy opacities throughout the left lung which may be infectious/inflammatory.   3.  Unchanged 9 mm pulmonary nodule in the medial basal left lower lobe, previously mildly hypermetabolic on PET/CT.   4.  Unchanged 11 mm pulmonary nodule in the posterior basal left lower lobe, previously nonhypermetabolic.   5.  Ectatic ascending thoracic aorta measuring up to 4 cm.      Abdomen pelvis:   1.  Mild sigmoid colonic wall thickening which may reflect colitis.   2.  Mild hepatic steatosis.            Electronically signed by Karl Howell MD      CT ABDOMEN PELVIS W IV CONTRAST Additional Contrast? None   Final Result      Chest:   1.  No evidence of pulmonary embolus.   2.  Mild patchy opacities throughout the left lung which may be infectious/inflammatory.   3.  Unchanged 9 mm pulmonary nodule in the medial basal left lower lobe, previously mildly hypermetabolic on PET/CT.   4.  Unchanged 11 mm pulmonary nodule in the posterior basal left lower lobe, previously nonhypermetabolic.   5.  Ectatic ascending thoracic aorta measuring up to 4 cm.      Abdomen pelvis:   1.  Mild sigmoid colonic wall thickening which may reflect colitis.   2.  Mild hepatic steatosis.          
Pulmonary nodule 08/31/2023    COPD exacerbation (HCC) 05/10/2023    Type 2 diabetes mellitus without complication, without long-term current use of insulin (HCC) 07/13/2021    Unipolar depression 07/13/2021    Abnormal gait 07/13/2021    Severe obesity (BMI 35.0-39.9) with comorbidity (HCC) 10/01/2020    Colonic mass 07/26/2019    BPH associated with nocturia 09/17/2018    Adjustment disorder with depressed mood 04/06/2018    Plantar fasciitis 07/24/2017    Obesity, Class I, BMI 30.0-34.9 (see actual BMI)     S/P AVR     History of partial colectomy 03/13/2017    Carpal tunnel syndrome on left 09/24/2014    Bradycardia     Severe hypoxemia 03/22/2014    GERD (gastroesophageal reflux disease) 09/13/2013    Myofascial pain dysfunction syndrome 08/07/2013    Postlaminectomy syndrome, lumbar region 08/07/2013    S/P hernia repair 08/03/2012    Exostosis of toe 01/19/2012    Lumbar spinal stenosis 09/02/2011    Lumbar herniated disc 09/02/2011    S/P aortic valve replacement 09/02/2011    Hx of CABG 09/02/2011    Needle phobia 09/02/2011    CAD, multiple vessel 09/02/2011    History of testicular cancer 09/02/2011    Degenerative arthritis of right knee 09/02/2011    Primary hypertension 08/19/2011    Hyperlipidemia 08/19/2011    Generalized Arthralgias  08/19/2011      Active Hospital Problems    Diagnosis Date Noted    Ventricular fibrillation (HCC) [I49.01] 04/18/2024    Cardiac arrest (HCC) [I46.9] 04/18/2024    Altered mental status [R41.82] 04/17/2024    Pneumonia, bacterial [J15.9] 04/16/2024    Pulmonary nodule [R91.1] 08/31/2023    Bradycardia [R00.1]      Thank you for allowing me to participate in the care of Eric Moore . If you have any questions/comments, please do not hesitate to contact us.      Willa Trevino MD   Cardiac Electrophysiology  [facility]    For any EP related issues after 5 PM, contact [facility] on call cardiology through .   
care for the patient.    Thank you to Dr. Vince Johansen MD, for this consultation. We will continue to follow Mr. Witt's care as needed.    Signed,    Hosea Mckee MD, MS, PGY-3  Internal Medicine, [facility]  4/19/2024 5:15 PM   
CONTRAST Additional Contrast? None  Narrative: CTA CHEST W CONTRAST, CT ABDOMEN PELVIS W IV CONTRAST    CLINICAL HISTORY: 58 years Male; \"Pulmonary Embolism, hematemesis, hematuria\".    COMPARISON: CT 3/29/2024    TECHNIQUE: CTA chest with contrast per standard protocol. CT abdomen, pelvis with contrast per standard protocol. Multiplanar reformatted images are provided for review. 3D/MIP images are provided for review. Up-to-date CT equipment and radiation dose   reduction techniques were employed.    FINDINGS:    Diagnostic quality: Adequate.  Pulmonary emboli: None.  Right heart strain: None.  Lungs/Airway/Pleura: Central airway is patent.  Unchanged 9 mm nodule in the medial basal left lower lobe. 11 mm nodule in the basal posterior left lower lung with adjacent smaller nodules, nonhypermetabolic on prior PET/CT. Mild patchy opacities in the   posterior left upper lobe, lingula, and left lower lobe.  Heart/Great Vessels: Heart is enlarged. No pericardial effusion. Trivessel coronary artery calcifications.  Ectatic ascending thoracic aorta measuring up to 4 cm with mild atherosclerosis. Aortic valvular prosthesis. Main pulmonary artery is dilated   suggestive of pulmonary arterial hypertension.  Adenopathy: None.  Chest wall / Lower neck: No significant abnormality.  Bones: Degenerative changes of the spine. Thoracic stimulator lead in the midthoracic spine.  Other: None.    FINDINGS:    Liver: Mildly decreased in attenuation diffusely. No solid mass.  Gallbladder and bile ducts: Surgically absent gallbladder. No biliary dilatation.  Spleen: Unremarkable.  Pancreas: Unremarkable.  Adrenals: Unremarkable.  Kidneys and ureters: Cortical thinning of the inferior pole of the left kidney, likely sequela of remote ischemia or infection. No hydronephrosis. Bilateral simple appearing renal cysts. No follow up required.  Bowel: Prior right and transverse colonic resection with anastomosis. Mild wall thickening of the

## 2024-04-24 LAB
ALBUMIN SERPL-MCNC: 3.1 G/DL (ref 3.4–5)
ANION GAP SERPL CALCULATED.3IONS-SCNC: 7 MMOL/L (ref 3–16)
BUN SERPL-MCNC: 28 MG/DL (ref 7–20)
CALCIUM SERPL-MCNC: 8.8 MG/DL (ref 8.3–10.6)
CHLORIDE SERPL-SCNC: 98 MMOL/L (ref 99–110)
CO2 SERPL-SCNC: 29 MMOL/L (ref 21–32)
CREAT SERPL-MCNC: 1.5 MG/DL (ref 0.8–1.3)
DEPRECATED RDW RBC AUTO: 18.3 % (ref 12.4–15.4)
GFR SERPLBLD CREATININE-BSD FMLA CKD-EPI: 53 ML/MIN/{1.73_M2}
GLUCOSE BLD-MCNC: 136 MG/DL (ref 70–99)
GLUCOSE SERPL-MCNC: 129 MG/DL (ref 70–99)
HCT VFR BLD AUTO: 27.1 % (ref 40.5–52.5)
HGB BLD-MCNC: 8.7 G/DL (ref 13.5–17.5)
INR PPP: 3.02 (ref 0.85–1.15)
MAGNESIUM SERPL-MCNC: 1.9 MG/DL (ref 1.8–2.4)
MCH RBC QN AUTO: 29.7 PG (ref 26–34)
MCHC RBC AUTO-ENTMCNC: 32.1 G/DL (ref 31–36)
MCV RBC AUTO: 92.4 FL (ref 80–100)
NT-PROBNP SERPL-MCNC: 3544 PG/ML (ref 0–124)
PERFORMED ON: ABNORMAL
PHOSPHATE SERPL-MCNC: 3.4 MG/DL (ref 2.5–4.9)
PLATELET # BLD AUTO: 95 K/UL (ref 135–450)
PMV BLD AUTO: 8.2 FL (ref 5–10.5)
POTASSIUM SERPL-SCNC: 3.8 MMOL/L (ref 3.5–5.1)
PROTHROMBIN TIME: 31.1 SEC (ref 11.9–14.9)
RBC # BLD AUTO: 2.93 M/UL (ref 4.2–5.9)
SODIUM SERPL-SCNC: 134 MMOL/L (ref 136–145)
WBC # BLD AUTO: 4.7 K/UL (ref 4–11)

## 2024-04-24 PROCEDURE — 6370000000 HC RX 637 (ALT 250 FOR IP)

## 2024-04-24 PROCEDURE — 99233 SBSQ HOSP IP/OBS HIGH 50: CPT | Performed by: INTERNAL MEDICINE

## 2024-04-24 PROCEDURE — 97116 GAIT TRAINING THERAPY: CPT

## 2024-04-24 PROCEDURE — 2700000000 HC OXYGEN THERAPY PER DAY

## 2024-04-24 PROCEDURE — 36415 COLL VENOUS BLD VENIPUNCTURE: CPT

## 2024-04-24 PROCEDURE — 1200000000 HC SEMI PRIVATE

## 2024-04-24 PROCEDURE — 99232 SBSQ HOSP IP/OBS MODERATE 35: CPT | Performed by: NURSE PRACTITIONER

## 2024-04-24 PROCEDURE — 83735 ASSAY OF MAGNESIUM: CPT

## 2024-04-24 PROCEDURE — 6370000000 HC RX 637 (ALT 250 FOR IP): Performed by: INTERNAL MEDICINE

## 2024-04-24 PROCEDURE — 83880 ASSAY OF NATRIURETIC PEPTIDE: CPT

## 2024-04-24 PROCEDURE — 97530 THERAPEUTIC ACTIVITIES: CPT

## 2024-04-24 PROCEDURE — 80069 RENAL FUNCTION PANEL: CPT

## 2024-04-24 PROCEDURE — 85027 COMPLETE CBC AUTOMATED: CPT

## 2024-04-24 PROCEDURE — 6360000002 HC RX W HCPCS

## 2024-04-24 PROCEDURE — 2580000003 HC RX 258

## 2024-04-24 PROCEDURE — 85610 PROTHROMBIN TIME: CPT

## 2024-04-24 PROCEDURE — 94761 N-INVAS EAR/PLS OXIMETRY MLT: CPT

## 2024-04-24 PROCEDURE — 94640 AIRWAY INHALATION TREATMENT: CPT

## 2024-04-24 RX ADMIN — CITALOPRAM 40 MG: 40 TABLET, FILM COATED ORAL at 09:04

## 2024-04-24 RX ADMIN — SODIUM CHLORIDE, PRESERVATIVE FREE 5 ML: 5 INJECTION INTRAVENOUS at 20:59

## 2024-04-24 RX ADMIN — MORPHINE SULFATE 15 MG: 15 TABLET, FILM COATED, EXTENDED RELEASE ORAL at 15:19

## 2024-04-24 RX ADMIN — Medication 5 MG: at 20:59

## 2024-04-24 RX ADMIN — BUDESONIDE INHALATION 500 MCG: 0.5 SUSPENSION RESPIRATORY (INHALATION) at 08:16

## 2024-04-24 RX ADMIN — THERA TABS 1 TABLET: TAB at 09:04

## 2024-04-24 RX ADMIN — SODIUM CHLORIDE, PRESERVATIVE FREE 10 ML: 5 INJECTION INTRAVENOUS at 09:05

## 2024-04-24 RX ADMIN — PREGABALIN 75 MG: 75 CAPSULE ORAL at 09:04

## 2024-04-24 RX ADMIN — FOLIC ACID 1 MG: 1 TABLET ORAL at 09:04

## 2024-04-24 RX ADMIN — NALOXEGOL OXALATE 12.5 MG: 25 TABLET, FILM COATED ORAL at 06:15

## 2024-04-24 RX ADMIN — ARFORMOTEROL TARTRATE 15 MCG: 15 SOLUTION RESPIRATORY (INHALATION) at 20:30

## 2024-04-24 RX ADMIN — Medication 200 MG: at 09:04

## 2024-04-24 RX ADMIN — ASPIRIN 81 MG: 81 TABLET, COATED ORAL at 09:04

## 2024-04-24 RX ADMIN — MORPHINE SULFATE 15 MG: 15 TABLET, FILM COATED, EXTENDED RELEASE ORAL at 20:59

## 2024-04-24 RX ADMIN — BUDESONIDE INHALATION 500 MCG: 0.5 SUSPENSION RESPIRATORY (INHALATION) at 20:30

## 2024-04-24 RX ADMIN — MORPHINE SULFATE 15 MG: 15 TABLET, FILM COATED, EXTENDED RELEASE ORAL at 09:04

## 2024-04-24 RX ADMIN — OXYCODONE HYDROCHLORIDE AND ACETAMINOPHEN 1 TABLET: 5; 325 TABLET ORAL at 06:15

## 2024-04-24 RX ADMIN — ROSUVASTATIN CALCIUM 20 MG: 20 TABLET, COATED ORAL at 20:59

## 2024-04-24 RX ADMIN — FAMOTIDINE 20 MG: 20 TABLET, FILM COATED ORAL at 20:59

## 2024-04-24 RX ADMIN — METHOCARBAMOL TABLETS 750 MG: 750 TABLET, COATED ORAL at 09:04

## 2024-04-24 RX ADMIN — FAMOTIDINE 20 MG: 20 TABLET, FILM COATED ORAL at 09:04

## 2024-04-24 RX ADMIN — ARFORMOTEROL TARTRATE 15 MCG: 15 SOLUTION RESPIRATORY (INHALATION) at 08:16

## 2024-04-24 RX ADMIN — METHOCARBAMOL TABLETS 750 MG: 750 TABLET, COATED ORAL at 20:57

## 2024-04-24 RX ADMIN — FLUTICASONE PROPIONATE 2 SPRAY: 50 SPRAY, METERED NASAL at 09:03

## 2024-04-24 RX ADMIN — OXYCODONE HYDROCHLORIDE AND ACETAMINOPHEN 1 TABLET: 5; 325 TABLET ORAL at 18:30

## 2024-04-24 RX ADMIN — METHOCARBAMOL TABLETS 750 MG: 750 TABLET, COATED ORAL at 15:19

## 2024-04-24 RX ADMIN — PREGABALIN 75 MG: 75 CAPSULE ORAL at 15:19

## 2024-04-24 ASSESSMENT — PAIN - FUNCTIONAL ASSESSMENT
PAIN_FUNCTIONAL_ASSESSMENT: ACTIVITIES ARE NOT PREVENTED
PAIN_FUNCTIONAL_ASSESSMENT: ACTIVITIES ARE NOT PREVENTED
PAIN_FUNCTIONAL_ASSESSMENT: PREVENTS OR INTERFERES SOME ACTIVE ACTIVITIES AND ADLS
PAIN_FUNCTIONAL_ASSESSMENT: PREVENTS OR INTERFERES SOME ACTIVE ACTIVITIES AND ADLS

## 2024-04-24 ASSESSMENT — PAIN SCALES - GENERAL
PAINLEVEL_OUTOF10: 0
PAINLEVEL_OUTOF10: 5
PAINLEVEL_OUTOF10: 7
PAINLEVEL_OUTOF10: 4
PAINLEVEL_OUTOF10: 4
PAINLEVEL_OUTOF10: 10

## 2024-04-24 ASSESSMENT — PAIN DESCRIPTION - DESCRIPTORS
DESCRIPTORS: ACHING

## 2024-04-24 ASSESSMENT — PAIN DESCRIPTION - ORIENTATION
ORIENTATION: MID

## 2024-04-24 ASSESSMENT — PAIN DESCRIPTION - PAIN TYPE
TYPE: CHRONIC PAIN

## 2024-04-24 ASSESSMENT — PAIN SCALES - WONG BAKER
WONGBAKER_NUMERICALRESPONSE: NO HURT
WONGBAKER_NUMERICALRESPONSE: NO HURT

## 2024-04-24 ASSESSMENT — PAIN DESCRIPTION - ONSET: ONSET: ON-GOING

## 2024-04-24 ASSESSMENT — PAIN DESCRIPTION - LOCATION
LOCATION: BACK
LOCATION: CHEST;BACK;HEAD
LOCATION: BACK
LOCATION: CHEST;HEAD;BACK
LOCATION: BACK

## 2024-04-24 ASSESSMENT — PAIN DESCRIPTION - FREQUENCY: FREQUENCY: CONTINUOUS

## 2024-04-25 ENCOUNTER — APPOINTMENT (OUTPATIENT)
Dept: CARDIAC CATH/INVASIVE PROCEDURES | Age: 59
DRG: 981 | End: 2024-04-25
Payer: MEDICARE

## 2024-04-25 LAB
ALBUMIN SERPL-MCNC: 2.8 G/DL (ref 3.4–5)
ANION GAP SERPL CALCULATED.3IONS-SCNC: 7 MMOL/L (ref 3–16)
BUN SERPL-MCNC: 24 MG/DL (ref 7–20)
CALCIUM SERPL-MCNC: 8.3 MG/DL (ref 8.3–10.6)
CHLORIDE SERPL-SCNC: 103 MMOL/L (ref 99–110)
CO2 SERPL-SCNC: 29 MMOL/L (ref 21–32)
CREAT SERPL-MCNC: 1.4 MG/DL (ref 0.8–1.3)
DEPRECATED RDW RBC AUTO: 19 % (ref 12.4–15.4)
GFR SERPLBLD CREATININE-BSD FMLA CKD-EPI: 57 ML/MIN/{1.73_M2}
GLUCOSE SERPL-MCNC: 124 MG/DL (ref 70–99)
HCT VFR BLD AUTO: 24.9 % (ref 40.5–52.5)
HGB BLD-MCNC: 8 G/DL (ref 13.5–17.5)
INR PPP: 1.9 (ref 0.85–1.15)
MAGNESIUM SERPL-MCNC: 2.1 MG/DL (ref 1.8–2.4)
MCH RBC QN AUTO: 29.7 PG (ref 26–34)
MCHC RBC AUTO-ENTMCNC: 32.1 G/DL (ref 31–36)
MCV RBC AUTO: 92.5 FL (ref 80–100)
PHOSPHATE SERPL-MCNC: 3.5 MG/DL (ref 2.5–4.9)
PLATELET # BLD AUTO: 99 K/UL (ref 135–450)
PMV BLD AUTO: 8.5 FL (ref 5–10.5)
POTASSIUM SERPL-SCNC: 3.2 MMOL/L (ref 3.5–5.1)
PROTHROMBIN TIME: 21.8 SEC (ref 11.9–14.9)
RBC # BLD AUTO: 2.69 M/UL (ref 4.2–5.9)
SODIUM SERPL-SCNC: 139 MMOL/L (ref 136–145)
WBC # BLD AUTO: 4.2 K/UL (ref 4–11)

## 2024-04-25 PROCEDURE — 6370000000 HC RX 637 (ALT 250 FOR IP)

## 2024-04-25 PROCEDURE — 94640 AIRWAY INHALATION TREATMENT: CPT

## 2024-04-25 PROCEDURE — 2700000000 HC OXYGEN THERAPY PER DAY

## 2024-04-25 PROCEDURE — 2580000003 HC RX 258: Performed by: INTERNAL MEDICINE

## 2024-04-25 PROCEDURE — 99152 MOD SED SAME PHYS/QHP 5/>YRS: CPT

## 2024-04-25 PROCEDURE — 80069 RENAL FUNCTION PANEL: CPT

## 2024-04-25 PROCEDURE — 99153 MOD SED SAME PHYS/QHP EA: CPT

## 2024-04-25 PROCEDURE — 99233 SBSQ HOSP IP/OBS HIGH 50: CPT | Performed by: INTERNAL MEDICINE

## 2024-04-25 PROCEDURE — C1769 GUIDE WIRE: HCPCS

## 2024-04-25 PROCEDURE — 6360000002 HC RX W HCPCS

## 2024-04-25 PROCEDURE — 36415 COLL VENOUS BLD VENIPUNCTURE: CPT

## 2024-04-25 PROCEDURE — 2500000003 HC RX 250 WO HCPCS

## 2024-04-25 PROCEDURE — 2580000003 HC RX 258

## 2024-04-25 PROCEDURE — 4A023N7 MEASUREMENT OF CARDIAC SAMPLING AND PRESSURE, LEFT HEART, PERCUTANEOUS APPROACH: ICD-10-PCS | Performed by: INTERNAL MEDICINE

## 2024-04-25 PROCEDURE — B2111ZZ FLUOROSCOPY OF MULTIPLE CORONARY ARTERIES USING LOW OSMOLAR CONTRAST: ICD-10-PCS | Performed by: INTERNAL MEDICINE

## 2024-04-25 PROCEDURE — 6370000000 HC RX 637 (ALT 250 FOR IP): Performed by: INTERNAL MEDICINE

## 2024-04-25 PROCEDURE — 94761 N-INVAS EAR/PLS OXIMETRY MLT: CPT

## 2024-04-25 PROCEDURE — B2151ZZ FLUOROSCOPY OF LEFT HEART USING LOW OSMOLAR CONTRAST: ICD-10-PCS | Performed by: INTERNAL MEDICINE

## 2024-04-25 PROCEDURE — 99232 SBSQ HOSP IP/OBS MODERATE 35: CPT | Performed by: NURSE PRACTITIONER

## 2024-04-25 PROCEDURE — 85610 PROTHROMBIN TIME: CPT

## 2024-04-25 PROCEDURE — 85027 COMPLETE CBC AUTOMATED: CPT

## 2024-04-25 PROCEDURE — 83735 ASSAY OF MAGNESIUM: CPT

## 2024-04-25 PROCEDURE — C1894 INTRO/SHEATH, NON-LASER: HCPCS

## 2024-04-25 PROCEDURE — 6360000004 HC RX CONTRAST MEDICATION: Performed by: INTERNAL MEDICINE

## 2024-04-25 PROCEDURE — 93459 L HRT ART/GRFT ANGIO: CPT

## 2024-04-25 PROCEDURE — 1200000000 HC SEMI PRIVATE

## 2024-04-25 PROCEDURE — 2709999900 HC NON-CHARGEABLE SUPPLY

## 2024-04-25 RX ORDER — SODIUM CHLORIDE 9 MG/ML
INJECTION, SOLUTION INTRAVENOUS CONTINUOUS
Status: ACTIVE | OUTPATIENT
Start: 2024-04-25 | End: 2024-04-25

## 2024-04-25 RX ORDER — ACETAMINOPHEN 325 MG/1
650 TABLET ORAL EVERY 4 HOURS PRN
Status: DISCONTINUED | OUTPATIENT
Start: 2024-04-25 | End: 2024-04-30 | Stop reason: HOSPADM

## 2024-04-25 RX ORDER — SODIUM CHLORIDE 0.9 % (FLUSH) 0.9 %
5-40 SYRINGE (ML) INJECTION PRN
Status: DISCONTINUED | OUTPATIENT
Start: 2024-04-25 | End: 2024-04-30 | Stop reason: HOSPADM

## 2024-04-25 RX ORDER — SODIUM CHLORIDE 0.9 % (FLUSH) 0.9 %
5-40 SYRINGE (ML) INJECTION EVERY 12 HOURS SCHEDULED
Status: DISCONTINUED | OUTPATIENT
Start: 2024-04-25 | End: 2024-04-30 | Stop reason: HOSPADM

## 2024-04-25 RX ORDER — SODIUM CHLORIDE 9 MG/ML
INJECTION, SOLUTION INTRAVENOUS PRN
Status: DISCONTINUED | OUTPATIENT
Start: 2024-04-25 | End: 2024-04-30 | Stop reason: HOSPADM

## 2024-04-25 RX ADMIN — ARFORMOTEROL TARTRATE 15 MCG: 15 SOLUTION RESPIRATORY (INHALATION) at 08:36

## 2024-04-25 RX ADMIN — PREGABALIN 75 MG: 75 CAPSULE ORAL at 09:31

## 2024-04-25 RX ADMIN — CITALOPRAM 40 MG: 40 TABLET, FILM COATED ORAL at 09:32

## 2024-04-25 RX ADMIN — METHOCARBAMOL TABLETS 750 MG: 750 TABLET, COATED ORAL at 09:31

## 2024-04-25 RX ADMIN — BUDESONIDE INHALATION 500 MCG: 0.5 SUSPENSION RESPIRATORY (INHALATION) at 08:36

## 2024-04-25 RX ADMIN — FAMOTIDINE 20 MG: 20 TABLET, FILM COATED ORAL at 20:07

## 2024-04-25 RX ADMIN — METHOCARBAMOL TABLETS 750 MG: 750 TABLET, COATED ORAL at 20:07

## 2024-04-25 RX ADMIN — MORPHINE SULFATE 15 MG: 15 TABLET, FILM COATED, EXTENDED RELEASE ORAL at 09:31

## 2024-04-25 RX ADMIN — ARFORMOTEROL TARTRATE 15 MCG: 15 SOLUTION RESPIRATORY (INHALATION) at 21:35

## 2024-04-25 RX ADMIN — FLUTICASONE PROPIONATE 2 SPRAY: 50 SPRAY, METERED NASAL at 09:51

## 2024-04-25 RX ADMIN — SODIUM CHLORIDE, PRESERVATIVE FREE 10 ML: 5 INJECTION INTRAVENOUS at 09:42

## 2024-04-25 RX ADMIN — Medication 5 MG: at 20:07

## 2024-04-25 RX ADMIN — METHOCARBAMOL TABLETS 750 MG: 750 TABLET, COATED ORAL at 15:17

## 2024-04-25 RX ADMIN — BUDESONIDE INHALATION 500 MCG: 0.5 SUSPENSION RESPIRATORY (INHALATION) at 21:35

## 2024-04-25 RX ADMIN — OXYCODONE HYDROCHLORIDE AND ACETAMINOPHEN 1 TABLET: 5; 325 TABLET ORAL at 23:51

## 2024-04-25 RX ADMIN — SODIUM CHLORIDE: 9 INJECTION, SOLUTION INTRAVENOUS at 15:37

## 2024-04-25 RX ADMIN — PREGABALIN 75 MG: 75 CAPSULE ORAL at 20:07

## 2024-04-25 RX ADMIN — SODIUM CHLORIDE: 9 INJECTION, SOLUTION INTRAVENOUS at 11:57

## 2024-04-25 RX ADMIN — FAMOTIDINE 20 MG: 20 TABLET, FILM COATED ORAL at 09:31

## 2024-04-25 RX ADMIN — POTASSIUM CHLORIDE 40 MEQ: 1500 TABLET, EXTENDED RELEASE ORAL at 09:33

## 2024-04-25 RX ADMIN — MORPHINE SULFATE 15 MG: 15 TABLET, FILM COATED, EXTENDED RELEASE ORAL at 20:08

## 2024-04-25 RX ADMIN — ASPIRIN 81 MG: 81 TABLET, COATED ORAL at 09:32

## 2024-04-25 RX ADMIN — PREGABALIN 75 MG: 75 CAPSULE ORAL at 15:17

## 2024-04-25 RX ADMIN — IOPAMIDOL 200 ML: 755 INJECTION, SOLUTION INTRAVENOUS at 14:31

## 2024-04-25 RX ADMIN — NALOXEGOL OXALATE 12.5 MG: 25 TABLET, FILM COATED ORAL at 07:19

## 2024-04-25 RX ADMIN — SODIUM CHLORIDE, PRESERVATIVE FREE 10 ML: 5 INJECTION INTRAVENOUS at 20:05

## 2024-04-25 RX ADMIN — ROSUVASTATIN CALCIUM 20 MG: 20 TABLET, COATED ORAL at 20:07

## 2024-04-25 RX ADMIN — MORPHINE SULFATE 15 MG: 15 TABLET, FILM COATED, EXTENDED RELEASE ORAL at 15:17

## 2024-04-25 ASSESSMENT — PAIN DESCRIPTION - LOCATION
LOCATION: GENERALIZED
LOCATION: GENERALIZED

## 2024-04-25 ASSESSMENT — PAIN SCALES - GENERAL
PAINLEVEL_OUTOF10: 8
PAINLEVEL_OUTOF10: 0
PAINLEVEL_OUTOF10: 0
PAINLEVEL_OUTOF10: 9

## 2024-04-25 ASSESSMENT — PAIN DESCRIPTION - ONSET: ONSET: ON-GOING

## 2024-04-25 ASSESSMENT — PAIN DESCRIPTION - PAIN TYPE: TYPE: CHRONIC PAIN

## 2024-04-25 ASSESSMENT — PAIN DESCRIPTION - DESCRIPTORS
DESCRIPTORS: DISCOMFORT;SORE
DESCRIPTORS: DISCOMFORT;SORE

## 2024-04-25 ASSESSMENT — PAIN SCALES - WONG BAKER
WONGBAKER_NUMERICALRESPONSE: NO HURT

## 2024-04-25 ASSESSMENT — PAIN - FUNCTIONAL ASSESSMENT: PAIN_FUNCTIONAL_ASSESSMENT: ACTIVITIES ARE NOT PREVENTED

## 2024-04-25 ASSESSMENT — PAIN DESCRIPTION - FREQUENCY: FREQUENCY: CONTINUOUS

## 2024-04-25 NOTE — ANESTHESIA PRE-OP
[facility]  2024, 4:01 PM    H&P Update    I have reviewed the history and physical and examined the patient and find no relevant changes.   I have reviewed with the patient and/or family the risks, benefits, and alternatives to the procedure.    Pre-sedation Assessment    Patient:  Alexandr Witt   :   1965  Intended Procedure: cath and possible intervention  Procedure indications chest pain with known CAD previous bypass grafts x 2 and aortic valve replacement with a tissue valve.    [facility] nurses notes reviewed and agreed.  Medications reviewed  Allergies:   Allergies   Allergen Reactions    Cymbalta [Duloxetine Hcl] Other (See Comments)     Unknown reaction    Atorvastatin Calcium [Lipitor] Headaches     Severe headaches      Hydrocodone Itching    Wellbutrin [Bupropion] Anxiety and Other (See Comments)     patient complains of feeling anxious, irritable and \"hyped up\" on wellbutrin around .       Vitals:    24 1504 24 1515 24 1535 24 1545   BP:  (!) 156/138 128/64 110/73   Pulse:  60 62 62   Resp:  16 14 14   Temp: 98.2 °F (36.8 °C)      TempSrc: Oral      SpO2:  94% (!) 89% 95%   Weight:       Height:           Pre-Procedure Assessment/Plan:  ASA 2 - Patient with mild systemic disease with no functional limitations  Mallampati score : Mallampati 1Level of Sedation Plan:Mild sedation    Post Procedure plan: Return to same level of care  Ton Mason M.D.,[facility]

## 2024-04-25 NOTE — RT PROTOCOL NOTE
RT Nebulizer Bronchodilator Protocol Note    There is a bronchodilator order in the chart from a provider indicating to follow the RT Bronchodilator Protocol and there is an “Initiate RT Bronchodilator Protocol” order as well (see protocol at bottom of note).    CXR Findings:  No results found.    The findings from the last RT Protocol Assessment were as follows:  Smoking: Chronic pulmonary disease  Respiratory Pattern: Regular pattern and RR 12-20 bpm  Breath Sounds: Slightly diminished and/or crackles  Cough: Strong, productive  Indication for Bronchodilator Therapy: Decreased or absent breath sounds  Bronchodilator Assessment Score: 5    Aerosolized bronchodilator medication orders have been revised according to the RT Nebulizer Bronchodilator Protocol below.    Respiratory Therapist to perform RT Therapy Protocol Assessment initially then follow the protocol.  Repeat RT Therapy Protocol Assessment PRN for score 0-3 or on second treatment, BID, and PRN for scores above 3.    No Indications - adjust the frequency to every 6 hours PRN wheezing or bronchospasm, if no treatments needed after 48 hours then discontinue using Per Protocol order mode.     If indication present, adjust the RT bronchodilator orders based on the Bronchodilator Assessment Score as indicated below.  If a patient is on this medication at home then do not decrease Frequency below that used at home.    0-3 - enter or revise RT bronchodilator order(s) to equivalent RT Bronchodilator order with Frequency of every 4 hours PRN for wheezing or increased work of breathing using Per Protocol order mode.       4-6 - enter or revise RT Bronchodilator order(s) to two equivalent RT bronchodilator orders with one order with BID Frequency and one order with Frequency of every 4 hours PRN wheezing or increased work of breathing using Per Protocol order mode.         7-10 - enter or revise RT Bronchodilator order(s) to two equivalent RT bronchodilator orders

## 2024-04-26 ENCOUNTER — TELEPHONE (OUTPATIENT)
Dept: CARDIOLOGY CLINIC | Age: 59
End: 2024-04-26

## 2024-04-26 PROBLEM — E83.42 HYPOMAGNESEMIA: Status: ACTIVE | Noted: 2024-04-26

## 2024-04-26 PROBLEM — I50.20 HFREF (HEART FAILURE WITH REDUCED EJECTION FRACTION) (HCC): Status: ACTIVE | Noted: 2024-04-26

## 2024-04-26 LAB
ALBUMIN SERPL-MCNC: 2.7 G/DL (ref 3.4–5)
ANION GAP SERPL CALCULATED.3IONS-SCNC: 6 MMOL/L (ref 3–16)
BASOPHILS # BLD: 0.1 K/UL (ref 0–0.2)
BASOPHILS NFR BLD: 1.8 %
BUN SERPL-MCNC: 21 MG/DL (ref 7–20)
CALCIUM SERPL-MCNC: 8.5 MG/DL (ref 8.3–10.6)
CHLORIDE SERPL-SCNC: 103 MMOL/L (ref 99–110)
CO2 SERPL-SCNC: 28 MMOL/L (ref 21–32)
CREAT SERPL-MCNC: 1.4 MG/DL (ref 0.8–1.3)
DEPRECATED RDW RBC AUTO: 18.6 % (ref 12.4–15.4)
EOSINOPHIL # BLD: 0.1 K/UL (ref 0–0.6)
EOSINOPHIL NFR BLD: 2.6 %
GFR SERPLBLD CREATININE-BSD FMLA CKD-EPI: 57 ML/MIN/{1.73_M2}
GLUCOSE SERPL-MCNC: 111 MG/DL (ref 70–99)
HCT VFR BLD AUTO: 22.4 % (ref 40.5–52.5)
HCT VFR BLD AUTO: 23 % (ref 40.5–52.5)
HGB BLD-MCNC: 7.2 G/DL (ref 13.5–17.5)
IMMATURE RETIC FRACT: 0.51 (ref 0.21–0.37)
INR PPP: 1.61 (ref 0.85–1.15)
LDH SERPL L TO P-CCNC: 304 U/L (ref 100–190)
LYMPHOCYTES # BLD: 1.2 K/UL (ref 1–5.1)
LYMPHOCYTES NFR BLD: 32.1 %
MAGNESIUM SERPL-MCNC: 1.6 MG/DL (ref 1.8–2.4)
MCH RBC QN AUTO: 29.8 PG (ref 26–34)
MCHC RBC AUTO-ENTMCNC: 32 G/DL (ref 31–36)
MCV RBC AUTO: 93.2 FL (ref 80–100)
MONOCYTES # BLD: 0.3 K/UL (ref 0–1.3)
MONOCYTES NFR BLD: 8 %
NEUTROPHILS # BLD: 2.1 K/UL (ref 1.7–7.7)
NEUTROPHILS NFR BLD: 55.5 %
PATH INTERP BLD-IMP: NORMAL
PHOSPHATE SERPL-MCNC: 3.3 MG/DL (ref 2.5–4.9)
PLATELET # BLD AUTO: 106 K/UL (ref 135–450)
PMV BLD AUTO: 8.5 FL (ref 5–10.5)
POTASSIUM SERPL-SCNC: 3.1 MMOL/L (ref 3.5–5.1)
PROTHROMBIN TIME: 19.3 SEC (ref 11.9–14.9)
RBC # BLD AUTO: 2.41 M/UL (ref 4.2–5.9)
RETICS # AUTO: 0.08 M/UL
RETICS/RBC NFR AUTO: 3.25 % (ref 0.5–2.18)
SODIUM SERPL-SCNC: 137 MMOL/L (ref 136–145)
WBC # BLD AUTO: 3.7 K/UL (ref 4–11)

## 2024-04-26 PROCEDURE — 2580000003 HC RX 258: Performed by: INTERNAL MEDICINE

## 2024-04-26 PROCEDURE — 2580000003 HC RX 258

## 2024-04-26 PROCEDURE — 97535 SELF CARE MNGMENT TRAINING: CPT

## 2024-04-26 PROCEDURE — 6370000000 HC RX 637 (ALT 250 FOR IP)

## 2024-04-26 PROCEDURE — 6370000000 HC RX 637 (ALT 250 FOR IP): Performed by: INTERNAL MEDICINE

## 2024-04-26 PROCEDURE — 6360000002 HC RX W HCPCS

## 2024-04-26 PROCEDURE — 1200000000 HC SEMI PRIVATE

## 2024-04-26 PROCEDURE — 82668 ASSAY OF ERYTHROPOIETIN: CPT

## 2024-04-26 PROCEDURE — 36415 COLL VENOUS BLD VENIPUNCTURE: CPT

## 2024-04-26 PROCEDURE — 97530 THERAPEUTIC ACTIVITIES: CPT

## 2024-04-26 PROCEDURE — 83010 ASSAY OF HAPTOGLOBIN QUANT: CPT

## 2024-04-26 PROCEDURE — 83615 LACTATE (LD) (LDH) ENZYME: CPT

## 2024-04-26 PROCEDURE — 85025 COMPLETE CBC W/AUTO DIFF WBC: CPT

## 2024-04-26 PROCEDURE — 94761 N-INVAS EAR/PLS OXIMETRY MLT: CPT

## 2024-04-26 PROCEDURE — 85610 PROTHROMBIN TIME: CPT

## 2024-04-26 PROCEDURE — 2700000000 HC OXYGEN THERAPY PER DAY

## 2024-04-26 PROCEDURE — 80069 RENAL FUNCTION PANEL: CPT

## 2024-04-26 PROCEDURE — 85045 AUTOMATED RETICULOCYTE COUNT: CPT

## 2024-04-26 PROCEDURE — 6360000002 HC RX W HCPCS: Performed by: NURSE PRACTITIONER

## 2024-04-26 PROCEDURE — 83735 ASSAY OF MAGNESIUM: CPT

## 2024-04-26 PROCEDURE — 97116 GAIT TRAINING THERAPY: CPT

## 2024-04-26 PROCEDURE — 99232 SBSQ HOSP IP/OBS MODERATE 35: CPT

## 2024-04-26 PROCEDURE — 6370000000 HC RX 637 (ALT 250 FOR IP): Performed by: NURSE PRACTITIONER

## 2024-04-26 PROCEDURE — 99233 SBSQ HOSP IP/OBS HIGH 50: CPT | Performed by: INTERNAL MEDICINE

## 2024-04-26 PROCEDURE — 94640 AIRWAY INHALATION TREATMENT: CPT

## 2024-04-26 RX ORDER — POTASSIUM CHLORIDE 20 MEQ/1
40 TABLET, EXTENDED RELEASE ORAL
Status: COMPLETED | OUTPATIENT
Start: 2024-04-26 | End: 2024-04-26

## 2024-04-26 RX ORDER — CYANOCOBALAMIN 1000 UG/ML
1000 INJECTION, SOLUTION INTRAMUSCULAR; SUBCUTANEOUS ONCE
Status: COMPLETED | OUTPATIENT
Start: 2024-04-26 | End: 2024-04-26

## 2024-04-26 RX ORDER — MAGNESIUM SULFATE IN WATER 40 MG/ML
2000 INJECTION, SOLUTION INTRAVENOUS ONCE
Status: COMPLETED | OUTPATIENT
Start: 2024-04-26 | End: 2024-04-26

## 2024-04-26 RX ORDER — POTASSIUM CHLORIDE 20 MEQ/1
40 TABLET, EXTENDED RELEASE ORAL ONCE
Status: COMPLETED | OUTPATIENT
Start: 2024-04-26 | End: 2024-04-26

## 2024-04-26 RX ADMIN — ARFORMOTEROL TARTRATE 15 MCG: 15 SOLUTION RESPIRATORY (INHALATION) at 08:24

## 2024-04-26 RX ADMIN — MORPHINE SULFATE 15 MG: 15 TABLET, FILM COATED, EXTENDED RELEASE ORAL at 16:55

## 2024-04-26 RX ADMIN — FLUTICASONE PROPIONATE 2 SPRAY: 50 SPRAY, METERED NASAL at 11:15

## 2024-04-26 RX ADMIN — PREGABALIN 75 MG: 75 CAPSULE ORAL at 16:54

## 2024-04-26 RX ADMIN — SODIUM CHLORIDE, PRESERVATIVE FREE 10 ML: 5 INJECTION INTRAVENOUS at 20:21

## 2024-04-26 RX ADMIN — CITALOPRAM 40 MG: 40 TABLET, FILM COATED ORAL at 09:12

## 2024-04-26 RX ADMIN — ROSUVASTATIN CALCIUM 20 MG: 20 TABLET, COATED ORAL at 20:09

## 2024-04-26 RX ADMIN — POTASSIUM CHLORIDE 40 MEQ: 1500 TABLET, EXTENDED RELEASE ORAL at 11:15

## 2024-04-26 RX ADMIN — FAMOTIDINE 20 MG: 20 TABLET, FILM COATED ORAL at 09:12

## 2024-04-26 RX ADMIN — POTASSIUM CHLORIDE 40 MEQ: 1500 TABLET, EXTENDED RELEASE ORAL at 16:54

## 2024-04-26 RX ADMIN — PREGABALIN 75 MG: 75 CAPSULE ORAL at 09:13

## 2024-04-26 RX ADMIN — MAGNESIUM SULFATE HEPTAHYDRATE 2000 MG: 40 INJECTION, SOLUTION INTRAVENOUS at 12:03

## 2024-04-26 RX ADMIN — METHOCARBAMOL TABLETS 750 MG: 750 TABLET, COATED ORAL at 20:09

## 2024-04-26 RX ADMIN — THERA TABS 1 TABLET: TAB at 09:13

## 2024-04-26 RX ADMIN — METHOCARBAMOL TABLETS 750 MG: 750 TABLET, COATED ORAL at 09:13

## 2024-04-26 RX ADMIN — POLYETHYLENE GLYCOL 3350 17 G: 17 POWDER, FOR SOLUTION ORAL at 09:13

## 2024-04-26 RX ADMIN — POTASSIUM CHLORIDE 40 MEQ: 1500 TABLET, EXTENDED RELEASE ORAL at 20:20

## 2024-04-26 RX ADMIN — ACETAMINOPHEN 650 MG: 325 TABLET ORAL at 11:15

## 2024-04-26 RX ADMIN — FOLIC ACID 1 MG: 1 TABLET ORAL at 09:12

## 2024-04-26 RX ADMIN — MORPHINE SULFATE 15 MG: 15 TABLET, FILM COATED, EXTENDED RELEASE ORAL at 09:13

## 2024-04-26 RX ADMIN — BUDESONIDE INHALATION 500 MCG: 0.5 SUSPENSION RESPIRATORY (INHALATION) at 08:24

## 2024-04-26 RX ADMIN — MORPHINE SULFATE 15 MG: 15 TABLET, FILM COATED, EXTENDED RELEASE ORAL at 20:09

## 2024-04-26 RX ADMIN — Medication 5 MG: at 20:09

## 2024-04-26 RX ADMIN — ASPIRIN 81 MG: 81 TABLET, COATED ORAL at 09:13

## 2024-04-26 RX ADMIN — CYANOCOBALAMIN 1000 MCG: 1000 INJECTION, SOLUTION INTRAMUSCULAR; SUBCUTANEOUS at 17:01

## 2024-04-26 RX ADMIN — NALOXEGOL OXALATE 12.5 MG: 25 TABLET, FILM COATED ORAL at 07:31

## 2024-04-26 RX ADMIN — SODIUM CHLORIDE, PRESERVATIVE FREE 10 ML: 5 INJECTION INTRAVENOUS at 09:14

## 2024-04-26 RX ADMIN — FAMOTIDINE 20 MG: 20 TABLET, FILM COATED ORAL at 20:09

## 2024-04-26 RX ADMIN — SODIUM CHLORIDE, PRESERVATIVE FREE 10 ML: 5 INJECTION INTRAVENOUS at 09:13

## 2024-04-26 RX ADMIN — PREGABALIN 75 MG: 75 CAPSULE ORAL at 20:09

## 2024-04-26 ASSESSMENT — PAIN - FUNCTIONAL ASSESSMENT
PAIN_FUNCTIONAL_ASSESSMENT: ACTIVITIES ARE NOT PREVENTED
PAIN_FUNCTIONAL_ASSESSMENT: PREVENTS OR INTERFERES SOME ACTIVE ACTIVITIES AND ADLS

## 2024-04-26 ASSESSMENT — PAIN SCALES - GENERAL
PAINLEVEL_OUTOF10: 8
PAINLEVEL_OUTOF10: 4
PAINLEVEL_OUTOF10: 9
PAINLEVEL_OUTOF10: 4

## 2024-04-26 ASSESSMENT — PAIN DESCRIPTION - DESCRIPTORS
DESCRIPTORS: PRESSURE;DISCOMFORT
DESCRIPTORS: DISCOMFORT
DESCRIPTORS: ACHING;THROBBING

## 2024-04-26 ASSESSMENT — PAIN DESCRIPTION - PAIN TYPE
TYPE: CHRONIC PAIN

## 2024-04-26 ASSESSMENT — PAIN DESCRIPTION - ONSET
ONSET: ON-GOING

## 2024-04-26 ASSESSMENT — PAIN DESCRIPTION - ORIENTATION: ORIENTATION: MID

## 2024-04-26 ASSESSMENT — PAIN DESCRIPTION - FREQUENCY
FREQUENCY: CONTINUOUS

## 2024-04-26 ASSESSMENT — PAIN DESCRIPTION - LOCATION
LOCATION: GENERALIZED

## 2024-04-27 LAB
ALBUMIN SERPL-MCNC: 2.6 G/DL (ref 3.4–5)
ANION GAP SERPL CALCULATED.3IONS-SCNC: 7 MMOL/L (ref 3–16)
BUN SERPL-MCNC: 16 MG/DL (ref 7–20)
CALCIUM SERPL-MCNC: 8.4 MG/DL (ref 8.3–10.6)
CHLORIDE SERPL-SCNC: 104 MMOL/L (ref 99–110)
CO2 SERPL-SCNC: 27 MMOL/L (ref 21–32)
CREAT SERPL-MCNC: 1.5 MG/DL (ref 0.8–1.3)
GFR SERPLBLD CREATININE-BSD FMLA CKD-EPI: 53 ML/MIN/{1.73_M2}
GLUCOSE BLD-MCNC: 122 MG/DL (ref 70–99)
GLUCOSE BLD-MCNC: 125 MG/DL (ref 70–99)
GLUCOSE BLD-MCNC: 188 MG/DL (ref 70–99)
GLUCOSE SERPL-MCNC: 104 MG/DL (ref 70–99)
HAPTOGLOB SERPL-MCNC: 198 MG/DL (ref 30–200)
INR PPP: 1.39 (ref 0.85–1.15)
MAGNESIUM SERPL-MCNC: 1.8 MG/DL (ref 1.8–2.4)
PERFORMED ON: ABNORMAL
PHOSPHATE SERPL-MCNC: 2.7 MG/DL (ref 2.5–4.9)
POTASSIUM SERPL-SCNC: 4.1 MMOL/L (ref 3.5–5.1)
PROTHROMBIN TIME: 17.3 SEC (ref 11.9–14.9)
SODIUM SERPL-SCNC: 138 MMOL/L (ref 136–145)

## 2024-04-27 PROCEDURE — 6370000000 HC RX 637 (ALT 250 FOR IP)

## 2024-04-27 PROCEDURE — 1200000000 HC SEMI PRIVATE

## 2024-04-27 PROCEDURE — 99232 SBSQ HOSP IP/OBS MODERATE 35: CPT | Performed by: INTERNAL MEDICINE

## 2024-04-27 PROCEDURE — 83735 ASSAY OF MAGNESIUM: CPT

## 2024-04-27 PROCEDURE — 85610 PROTHROMBIN TIME: CPT

## 2024-04-27 PROCEDURE — 36415 COLL VENOUS BLD VENIPUNCTURE: CPT

## 2024-04-27 PROCEDURE — 80069 RENAL FUNCTION PANEL: CPT

## 2024-04-27 PROCEDURE — 99233 SBSQ HOSP IP/OBS HIGH 50: CPT | Performed by: INTERNAL MEDICINE

## 2024-04-27 PROCEDURE — 6370000000 HC RX 637 (ALT 250 FOR IP): Performed by: INTERNAL MEDICINE

## 2024-04-27 PROCEDURE — 2580000003 HC RX 258: Performed by: INTERNAL MEDICINE

## 2024-04-27 PROCEDURE — 2580000003 HC RX 258

## 2024-04-27 RX ADMIN — FOLIC ACID 1 MG: 1 TABLET ORAL at 08:34

## 2024-04-27 RX ADMIN — METHOCARBAMOL TABLETS 750 MG: 750 TABLET, COATED ORAL at 20:00

## 2024-04-27 RX ADMIN — FAMOTIDINE 20 MG: 20 TABLET, FILM COATED ORAL at 08:34

## 2024-04-27 RX ADMIN — Medication 5 MG: at 20:00

## 2024-04-27 RX ADMIN — SODIUM CHLORIDE, PRESERVATIVE FREE 10 ML: 5 INJECTION INTRAVENOUS at 20:04

## 2024-04-27 RX ADMIN — THERA TABS 1 TABLET: TAB at 08:34

## 2024-04-27 RX ADMIN — PREGABALIN 75 MG: 75 CAPSULE ORAL at 08:34

## 2024-04-27 RX ADMIN — METHOCARBAMOL TABLETS 750 MG: 750 TABLET, COATED ORAL at 16:09

## 2024-04-27 RX ADMIN — METHOCARBAMOL TABLETS 750 MG: 750 TABLET, COATED ORAL at 08:34

## 2024-04-27 RX ADMIN — FLUTICASONE PROPIONATE 2 SPRAY: 50 SPRAY, METERED NASAL at 08:35

## 2024-04-27 RX ADMIN — PREGABALIN 75 MG: 75 CAPSULE ORAL at 20:00

## 2024-04-27 RX ADMIN — NALOXEGOL OXALATE 12.5 MG: 25 TABLET, FILM COATED ORAL at 08:35

## 2024-04-27 RX ADMIN — OXYCODONE HYDROCHLORIDE AND ACETAMINOPHEN 1 TABLET: 5; 325 TABLET ORAL at 04:38

## 2024-04-27 RX ADMIN — MORPHINE SULFATE 15 MG: 15 TABLET, FILM COATED, EXTENDED RELEASE ORAL at 16:09

## 2024-04-27 RX ADMIN — MORPHINE SULFATE 15 MG: 15 TABLET, FILM COATED, EXTENDED RELEASE ORAL at 20:00

## 2024-04-27 RX ADMIN — MORPHINE SULFATE 15 MG: 15 TABLET, FILM COATED, EXTENDED RELEASE ORAL at 08:34

## 2024-04-27 RX ADMIN — SODIUM CHLORIDE, PRESERVATIVE FREE 10 ML: 5 INJECTION INTRAVENOUS at 20:03

## 2024-04-27 RX ADMIN — SODIUM CHLORIDE, PRESERVATIVE FREE 10 ML: 5 INJECTION INTRAVENOUS at 08:35

## 2024-04-27 RX ADMIN — POLYETHYLENE GLYCOL 3350 17 G: 17 POWDER, FOR SOLUTION ORAL at 08:34

## 2024-04-27 RX ADMIN — SODIUM CHLORIDE, PRESERVATIVE FREE 10 ML: 5 INJECTION INTRAVENOUS at 08:36

## 2024-04-27 RX ADMIN — ASPIRIN 81 MG: 81 TABLET, COATED ORAL at 08:34

## 2024-04-27 RX ADMIN — PREGABALIN 75 MG: 75 CAPSULE ORAL at 16:09

## 2024-04-27 RX ADMIN — CITALOPRAM 40 MG: 40 TABLET, FILM COATED ORAL at 08:34

## 2024-04-27 RX ADMIN — FAMOTIDINE 20 MG: 20 TABLET, FILM COATED ORAL at 20:00

## 2024-04-27 RX ADMIN — ROSUVASTATIN CALCIUM 20 MG: 20 TABLET, COATED ORAL at 20:00

## 2024-04-27 ASSESSMENT — PAIN SCALES - GENERAL
PAINLEVEL_OUTOF10: 4
PAINLEVEL_OUTOF10: 3
PAINLEVEL_OUTOF10: 6
PAINLEVEL_OUTOF10: 5
PAINLEVEL_OUTOF10: 3

## 2024-04-27 ASSESSMENT — PAIN DESCRIPTION - FREQUENCY
FREQUENCY: CONTINUOUS

## 2024-04-27 ASSESSMENT — PAIN DESCRIPTION - ONSET
ONSET: ON-GOING

## 2024-04-27 ASSESSMENT — PAIN DESCRIPTION - DESCRIPTORS
DESCRIPTORS: ACHING;PRESSURE;DISCOMFORT
DESCRIPTORS: ACHING;DISCOMFORT;PRESSURE
DESCRIPTORS: ACHING;PRESSURE;DISCOMFORT

## 2024-04-27 ASSESSMENT — PAIN DESCRIPTION - LOCATION
LOCATION: BACK;RIB CAGE;GENERALIZED
LOCATION: ABDOMEN;RIB CAGE;BACK
LOCATION: GENERALIZED

## 2024-04-27 ASSESSMENT — PAIN DESCRIPTION - PAIN TYPE: TYPE: CHRONIC PAIN

## 2024-04-28 PROBLEM — D63.8 ANEMIA OF CHRONIC DISEASE: Status: ACTIVE | Noted: 2024-04-28

## 2024-04-28 LAB
ALBUMIN SERPL-MCNC: 2.9 G/DL (ref 3.4–5)
ANION GAP SERPL CALCULATED.3IONS-SCNC: 8 MMOL/L (ref 3–16)
BUN SERPL-MCNC: 16 MG/DL (ref 7–20)
CALCIUM SERPL-MCNC: 8.7 MG/DL (ref 8.3–10.6)
CHLORIDE SERPL-SCNC: 105 MMOL/L (ref 99–110)
CO2 SERPL-SCNC: 24 MMOL/L (ref 21–32)
CREAT SERPL-MCNC: 1.5 MG/DL (ref 0.8–1.3)
DEPRECATED RDW RBC AUTO: 18.4 % (ref 12.4–15.4)
GFR SERPLBLD CREATININE-BSD FMLA CKD-EPI: 53 ML/MIN/{1.73_M2}
GLUCOSE SERPL-MCNC: 190 MG/DL (ref 70–99)
HCT VFR BLD AUTO: 27.6 % (ref 40.5–52.5)
HGB BLD-MCNC: 8.7 G/DL (ref 13.5–17.5)
INR PPP: 1.24 (ref 0.85–1.15)
MAGNESIUM SERPL-MCNC: 1.7 MG/DL (ref 1.8–2.4)
MCH RBC QN AUTO: 29.5 PG (ref 26–34)
MCHC RBC AUTO-ENTMCNC: 31.5 G/DL (ref 31–36)
MCV RBC AUTO: 93.6 FL (ref 80–100)
PHOSPHATE SERPL-MCNC: 2.8 MG/DL (ref 2.5–4.9)
PLATELET # BLD AUTO: 132 K/UL (ref 135–450)
PMV BLD AUTO: 8.2 FL (ref 5–10.5)
POTASSIUM SERPL-SCNC: 3.5 MMOL/L (ref 3.5–5.1)
PROTHROMBIN TIME: 15.8 SEC (ref 11.9–14.9)
RBC # BLD AUTO: 2.95 M/UL (ref 4.2–5.9)
SODIUM SERPL-SCNC: 137 MMOL/L (ref 136–145)
WBC # BLD AUTO: 4.7 K/UL (ref 4–11)

## 2024-04-28 PROCEDURE — 99233 SBSQ HOSP IP/OBS HIGH 50: CPT | Performed by: INTERNAL MEDICINE

## 2024-04-28 PROCEDURE — 94761 N-INVAS EAR/PLS OXIMETRY MLT: CPT

## 2024-04-28 PROCEDURE — 6370000000 HC RX 637 (ALT 250 FOR IP)

## 2024-04-28 PROCEDURE — 6360000002 HC RX W HCPCS

## 2024-04-28 PROCEDURE — 2580000003 HC RX 258

## 2024-04-28 PROCEDURE — 1200000000 HC SEMI PRIVATE

## 2024-04-28 PROCEDURE — 83735 ASSAY OF MAGNESIUM: CPT

## 2024-04-28 PROCEDURE — 85610 PROTHROMBIN TIME: CPT

## 2024-04-28 PROCEDURE — 85027 COMPLETE CBC AUTOMATED: CPT

## 2024-04-28 PROCEDURE — 2580000003 HC RX 258: Performed by: INTERNAL MEDICINE

## 2024-04-28 PROCEDURE — 99232 SBSQ HOSP IP/OBS MODERATE 35: CPT | Performed by: INTERNAL MEDICINE

## 2024-04-28 PROCEDURE — 94640 AIRWAY INHALATION TREATMENT: CPT

## 2024-04-28 PROCEDURE — 36415 COLL VENOUS BLD VENIPUNCTURE: CPT

## 2024-04-28 PROCEDURE — 80069 RENAL FUNCTION PANEL: CPT

## 2024-04-28 PROCEDURE — 6370000000 HC RX 637 (ALT 250 FOR IP): Performed by: INTERNAL MEDICINE

## 2024-04-28 RX ADMIN — Medication 5 MG: at 19:57

## 2024-04-28 RX ADMIN — ROSUVASTATIN CALCIUM 20 MG: 20 TABLET, COATED ORAL at 19:57

## 2024-04-28 RX ADMIN — FOLIC ACID 1 MG: 1 TABLET ORAL at 08:54

## 2024-04-28 RX ADMIN — METHOCARBAMOL TABLETS 750 MG: 750 TABLET, COATED ORAL at 15:52

## 2024-04-28 RX ADMIN — NALOXEGOL OXALATE 12.5 MG: 25 TABLET, FILM COATED ORAL at 08:53

## 2024-04-28 RX ADMIN — THERA TABS 1 TABLET: TAB at 08:54

## 2024-04-28 RX ADMIN — METHOCARBAMOL TABLETS 750 MG: 750 TABLET, COATED ORAL at 08:53

## 2024-04-28 RX ADMIN — SODIUM CHLORIDE, PRESERVATIVE FREE 10 ML: 5 INJECTION INTRAVENOUS at 08:54

## 2024-04-28 RX ADMIN — PREGABALIN 75 MG: 75 CAPSULE ORAL at 08:53

## 2024-04-28 RX ADMIN — POLYETHYLENE GLYCOL 3350 17 G: 17 POWDER, FOR SOLUTION ORAL at 08:54

## 2024-04-28 RX ADMIN — PREGABALIN 75 MG: 75 CAPSULE ORAL at 15:52

## 2024-04-28 RX ADMIN — ARFORMOTEROL TARTRATE 15 MCG: 15 SOLUTION RESPIRATORY (INHALATION) at 08:26

## 2024-04-28 RX ADMIN — CITALOPRAM 40 MG: 40 TABLET, FILM COATED ORAL at 08:53

## 2024-04-28 RX ADMIN — PREGABALIN 75 MG: 75 CAPSULE ORAL at 19:57

## 2024-04-28 RX ADMIN — SODIUM CHLORIDE, PRESERVATIVE FREE 10 ML: 5 INJECTION INTRAVENOUS at 20:54

## 2024-04-28 RX ADMIN — FLUTICASONE PROPIONATE 2 SPRAY: 50 SPRAY, METERED NASAL at 08:54

## 2024-04-28 RX ADMIN — MORPHINE SULFATE 15 MG: 15 TABLET, FILM COATED, EXTENDED RELEASE ORAL at 19:57

## 2024-04-28 RX ADMIN — ASPIRIN 81 MG: 81 TABLET, COATED ORAL at 08:53

## 2024-04-28 RX ADMIN — FAMOTIDINE 20 MG: 20 TABLET, FILM COATED ORAL at 08:53

## 2024-04-28 RX ADMIN — METHOCARBAMOL TABLETS 750 MG: 750 TABLET, COATED ORAL at 19:57

## 2024-04-28 RX ADMIN — MORPHINE SULFATE 15 MG: 15 TABLET, FILM COATED, EXTENDED RELEASE ORAL at 15:52

## 2024-04-28 RX ADMIN — FAMOTIDINE 20 MG: 20 TABLET, FILM COATED ORAL at 19:57

## 2024-04-28 RX ADMIN — BUDESONIDE INHALATION 500 MCG: 0.5 SUSPENSION RESPIRATORY (INHALATION) at 08:26

## 2024-04-28 RX ADMIN — MORPHINE SULFATE 15 MG: 15 TABLET, FILM COATED, EXTENDED RELEASE ORAL at 08:53

## 2024-04-28 RX ADMIN — ACETAMINOPHEN 650 MG: 325 TABLET ORAL at 23:28

## 2024-04-28 ASSESSMENT — PAIN DESCRIPTION - LOCATION
LOCATION: GENERALIZED
LOCATION: HEAD;GENERALIZED

## 2024-04-28 ASSESSMENT — PAIN DESCRIPTION - PAIN TYPE: TYPE: ACUTE PAIN

## 2024-04-28 ASSESSMENT — PAIN DESCRIPTION - ONSET: ONSET: ON-GOING

## 2024-04-28 ASSESSMENT — PAIN DESCRIPTION - DESCRIPTORS
DESCRIPTORS: ACHING;PRESSURE;SORE
DESCRIPTORS: ACHING;DISCOMFORT

## 2024-04-28 ASSESSMENT — PAIN DESCRIPTION - FREQUENCY: FREQUENCY: CONTINUOUS

## 2024-04-28 ASSESSMENT — PAIN SCALES - GENERAL
PAINLEVEL_OUTOF10: 9
PAINLEVEL_OUTOF10: 3
PAINLEVEL_OUTOF10: 3
PAINLEVEL_OUTOF10: 0
PAINLEVEL_OUTOF10: 3

## 2024-04-29 ENCOUNTER — ANESTHESIA (OUTPATIENT)
Dept: CARDIAC CATH/INVASIVE PROCEDURES | Age: 59
DRG: 981 | End: 2024-04-29
Payer: MEDICARE

## 2024-04-29 ENCOUNTER — ANESTHESIA EVENT (OUTPATIENT)
Dept: CARDIAC CATH/INVASIVE PROCEDURES | Age: 59
DRG: 981 | End: 2024-04-29
Payer: MEDICARE

## 2024-04-29 ENCOUNTER — APPOINTMENT (OUTPATIENT)
Dept: CARDIAC CATH/INVASIVE PROCEDURES | Age: 59
DRG: 981 | End: 2024-04-29
Payer: MEDICARE

## 2024-04-29 DIAGNOSIS — Z95.810 CARDIAC DEFIBRILLATOR IN SITU: Primary | ICD-10-CM

## 2024-04-29 LAB
ALBUMIN SERPL-MCNC: 3.2 G/DL (ref 3.4–5)
ANION GAP SERPL CALCULATED.3IONS-SCNC: 8 MMOL/L (ref 3–16)
BUN SERPL-MCNC: 15 MG/DL (ref 7–20)
CALCIUM SERPL-MCNC: 8.8 MG/DL (ref 8.3–10.6)
CHLORIDE SERPL-SCNC: 103 MMOL/L (ref 99–110)
CO2 SERPL-SCNC: 25 MMOL/L (ref 21–32)
CREAT SERPL-MCNC: 1.6 MG/DL (ref 0.8–1.3)
GFR SERPLBLD CREATININE-BSD FMLA CKD-EPI: 49 ML/MIN/{1.73_M2}
GLUCOSE SERPL-MCNC: 137 MG/DL (ref 70–99)
INR PPP: 1.27 (ref 0.85–1.15)
MAGNESIUM SERPL-MCNC: 1.7 MG/DL (ref 1.8–2.4)
PHOSPHATE SERPL-MCNC: 3.4 MG/DL (ref 2.5–4.9)
POTASSIUM SERPL-SCNC: 3.5 MMOL/L (ref 3.5–5.1)
PROTHROMBIN TIME: 16.1 SEC (ref 11.9–14.9)
SODIUM SERPL-SCNC: 136 MMOL/L (ref 136–145)

## 2024-04-29 PROCEDURE — 99233 SBSQ HOSP IP/OBS HIGH 50: CPT | Performed by: INTERNAL MEDICINE

## 2024-04-29 PROCEDURE — 6360000002 HC RX W HCPCS: Performed by: NURSE ANESTHETIST, CERTIFIED REGISTERED

## 2024-04-29 PROCEDURE — 33249 INSJ/RPLCMT DEFIB W/LEAD(S): CPT

## 2024-04-29 PROCEDURE — 94640 AIRWAY INHALATION TREATMENT: CPT

## 2024-04-29 PROCEDURE — C1892 INTRO/SHEATH,FIXED,PEEL-AWAY: HCPCS | Performed by: INTERNAL MEDICINE

## 2024-04-29 PROCEDURE — 80069 RENAL FUNCTION PANEL: CPT

## 2024-04-29 PROCEDURE — 2580000003 HC RX 258: Performed by: INTERNAL MEDICINE

## 2024-04-29 PROCEDURE — 6370000000 HC RX 637 (ALT 250 FOR IP)

## 2024-04-29 PROCEDURE — 3700000000 HC ANESTHESIA ATTENDED CARE: Performed by: ANESTHESIOLOGY

## 2024-04-29 PROCEDURE — 1200000000 HC SEMI PRIVATE

## 2024-04-29 PROCEDURE — C1777 LEAD, AICD, ENDO SINGLE COIL: HCPCS | Performed by: INTERNAL MEDICINE

## 2024-04-29 PROCEDURE — 02HK3KZ INSERTION OF DEFIBRILLATOR LEAD INTO RIGHT VENTRICLE, PERCUTANEOUS APPROACH: ICD-10-PCS | Performed by: INTERNAL MEDICINE

## 2024-04-29 PROCEDURE — 2580000003 HC RX 258

## 2024-04-29 PROCEDURE — C1889 IMPLANT/INSERT DEVICE, NOC: HCPCS | Performed by: INTERNAL MEDICINE

## 2024-04-29 PROCEDURE — 33249 INSJ/RPLCMT DEFIB W/LEAD(S): CPT | Performed by: INTERNAL MEDICINE

## 2024-04-29 PROCEDURE — C1722 AICD, SINGLE CHAMBER: HCPCS | Performed by: INTERNAL MEDICINE

## 2024-04-29 PROCEDURE — 6360000002 HC RX W HCPCS

## 2024-04-29 PROCEDURE — 85610 PROTHROMBIN TIME: CPT

## 2024-04-29 PROCEDURE — 6370000000 HC RX 637 (ALT 250 FOR IP): Performed by: INTERNAL MEDICINE

## 2024-04-29 PROCEDURE — B2141ZZ FLUOROSCOPY OF RIGHT HEART USING LOW OSMOLAR CONTRAST: ICD-10-PCS | Performed by: INTERNAL MEDICINE

## 2024-04-29 PROCEDURE — 3700000001 HC ADD 15 MINUTES (ANESTHESIA): Performed by: ANESTHESIOLOGY

## 2024-04-29 PROCEDURE — 36415 COLL VENOUS BLD VENIPUNCTURE: CPT

## 2024-04-29 PROCEDURE — C1894 INTRO/SHEATH, NON-LASER: HCPCS | Performed by: INTERNAL MEDICINE

## 2024-04-29 PROCEDURE — 83735 ASSAY OF MAGNESIUM: CPT

## 2024-04-29 PROCEDURE — 2580000003 HC RX 258: Performed by: NURSE ANESTHETIST, CERTIFIED REGISTERED

## 2024-04-29 PROCEDURE — 0JH608Z INSERTION OF DEFIBRILLATOR GENERATOR INTO CHEST SUBCUTANEOUS TISSUE AND FASCIA, OPEN APPROACH: ICD-10-PCS | Performed by: INTERNAL MEDICINE

## 2024-04-29 RX ORDER — SODIUM CHLORIDE, SODIUM LACTATE, POTASSIUM CHLORIDE, CALCIUM CHLORIDE 600; 310; 30; 20 MG/100ML; MG/100ML; MG/100ML; MG/100ML
INJECTION, SOLUTION INTRAVENOUS CONTINUOUS PRN
Status: DISCONTINUED | OUTPATIENT
Start: 2024-04-29 | End: 2024-04-29 | Stop reason: SDUPTHER

## 2024-04-29 RX ORDER — PROPOFOL 10 MG/ML
INJECTION, EMULSION INTRAVENOUS PRN
Status: DISCONTINUED | OUTPATIENT
Start: 2024-04-29 | End: 2024-04-29

## 2024-04-29 RX ORDER — FENTANYL CITRATE 50 UG/ML
INJECTION, SOLUTION INTRAMUSCULAR; INTRAVENOUS PRN
Status: DISCONTINUED | OUTPATIENT
Start: 2024-04-29 | End: 2024-04-29 | Stop reason: SDUPTHER

## 2024-04-29 RX ORDER — PROPOFOL 10 MG/ML
INJECTION, EMULSION INTRAVENOUS CONTINUOUS PRN
Status: DISCONTINUED | OUTPATIENT
Start: 2024-04-29 | End: 2024-04-29 | Stop reason: SDUPTHER

## 2024-04-29 RX ORDER — CEFAZOLIN SODIUM 1 G/3ML
INJECTION, POWDER, FOR SOLUTION INTRAMUSCULAR; INTRAVENOUS PRN
Status: DISCONTINUED | OUTPATIENT
Start: 2024-04-29 | End: 2024-04-29 | Stop reason: SDUPTHER

## 2024-04-29 RX ORDER — MIDAZOLAM HYDROCHLORIDE 1 MG/ML
INJECTION INTRAMUSCULAR; INTRAVENOUS PRN
Status: DISCONTINUED | OUTPATIENT
Start: 2024-04-29 | End: 2024-04-29 | Stop reason: SDUPTHER

## 2024-04-29 RX ADMIN — CITALOPRAM 40 MG: 40 TABLET, FILM COATED ORAL at 08:09

## 2024-04-29 RX ADMIN — FAMOTIDINE 20 MG: 20 TABLET, FILM COATED ORAL at 08:09

## 2024-04-29 RX ADMIN — PREGABALIN 75 MG: 75 CAPSULE ORAL at 20:53

## 2024-04-29 RX ADMIN — SODIUM CHLORIDE, PRESERVATIVE FREE 10 ML: 5 INJECTION INTRAVENOUS at 08:10

## 2024-04-29 RX ADMIN — PREGABALIN 75 MG: 75 CAPSULE ORAL at 08:09

## 2024-04-29 RX ADMIN — CEFAZOLIN SODIUM 2 G: 1 POWDER, FOR SOLUTION INTRAMUSCULAR; INTRAVENOUS at 10:11

## 2024-04-29 RX ADMIN — SODIUM CHLORIDE, SODIUM LACTATE, POTASSIUM CHLORIDE, AND CALCIUM CHLORIDE: .6; .31; .03; .02 INJECTION, SOLUTION INTRAVENOUS at 10:03

## 2024-04-29 RX ADMIN — NALOXEGOL OXALATE 12.5 MG: 25 TABLET, FILM COATED ORAL at 08:09

## 2024-04-29 RX ADMIN — POLYETHYLENE GLYCOL 3350 17 G: 17 POWDER, FOR SOLUTION ORAL at 08:09

## 2024-04-29 RX ADMIN — FAMOTIDINE 20 MG: 20 TABLET, FILM COATED ORAL at 20:54

## 2024-04-29 RX ADMIN — METHOCARBAMOL TABLETS 750 MG: 750 TABLET, COATED ORAL at 15:09

## 2024-04-29 RX ADMIN — OXYCODONE HYDROCHLORIDE AND ACETAMINOPHEN 1 TABLET: 5; 325 TABLET ORAL at 19:40

## 2024-04-29 RX ADMIN — Medication 5 MG: at 20:54

## 2024-04-29 RX ADMIN — ASPIRIN 81 MG: 81 TABLET, COATED ORAL at 08:09

## 2024-04-29 RX ADMIN — ARFORMOTEROL TARTRATE 15 MCG: 15 SOLUTION RESPIRATORY (INHALATION) at 08:20

## 2024-04-29 RX ADMIN — BUDESONIDE INHALATION 500 MCG: 0.5 SUSPENSION RESPIRATORY (INHALATION) at 08:20

## 2024-04-29 RX ADMIN — MIDAZOLAM HYDROCHLORIDE 1 MG: 2 INJECTION, SOLUTION INTRAMUSCULAR; INTRAVENOUS at 10:03

## 2024-04-29 RX ADMIN — FENTANYL CITRATE 25 MCG: 50 INJECTION, SOLUTION INTRAMUSCULAR; INTRAVENOUS at 10:03

## 2024-04-29 RX ADMIN — FLUTICASONE PROPIONATE 2 SPRAY: 50 SPRAY, METERED NASAL at 08:10

## 2024-04-29 RX ADMIN — MORPHINE SULFATE 15 MG: 15 TABLET, FILM COATED, EXTENDED RELEASE ORAL at 15:09

## 2024-04-29 RX ADMIN — MORPHINE SULFATE 15 MG: 15 TABLET, FILM COATED, EXTENDED RELEASE ORAL at 08:09

## 2024-04-29 RX ADMIN — PROPOFOL 125 MCG/KG/MIN: 10 INJECTION, EMULSION INTRAVENOUS at 10:10

## 2024-04-29 RX ADMIN — MORPHINE SULFATE 15 MG: 15 TABLET, FILM COATED, EXTENDED RELEASE ORAL at 20:53

## 2024-04-29 RX ADMIN — THERA TABS 1 TABLET: TAB at 08:09

## 2024-04-29 RX ADMIN — METHOCARBAMOL TABLETS 750 MG: 750 TABLET, COATED ORAL at 20:54

## 2024-04-29 RX ADMIN — PREGABALIN 75 MG: 75 CAPSULE ORAL at 15:08

## 2024-04-29 RX ADMIN — METHOCARBAMOL TABLETS 750 MG: 750 TABLET, COATED ORAL at 08:09

## 2024-04-29 RX ADMIN — FOLIC ACID 1 MG: 1 TABLET ORAL at 08:09

## 2024-04-29 RX ADMIN — SODIUM CHLORIDE, PRESERVATIVE FREE 20 ML: 5 INJECTION INTRAVENOUS at 20:55

## 2024-04-29 RX ADMIN — SODIUM CHLORIDE, PRESERVATIVE FREE 5 ML: 5 INJECTION INTRAVENOUS at 08:09

## 2024-04-29 RX ADMIN — ROSUVASTATIN CALCIUM 20 MG: 20 TABLET, COATED ORAL at 20:53

## 2024-04-29 ASSESSMENT — PAIN DESCRIPTION - LOCATION
LOCATION: GENERALIZED;ARM
LOCATION: HEAD
LOCATION: HEAD
LOCATION: GENERALIZED

## 2024-04-29 ASSESSMENT — PAIN DESCRIPTION - FREQUENCY
FREQUENCY: CONTINUOUS
FREQUENCY: CONTINUOUS
FREQUENCY: INTERMITTENT

## 2024-04-29 ASSESSMENT — PAIN DESCRIPTION - ONSET
ONSET: ON-GOING
ONSET: ON-GOING
ONSET: GRADUAL

## 2024-04-29 ASSESSMENT — PAIN DESCRIPTION - ORIENTATION
ORIENTATION: MID
ORIENTATION: MID
ORIENTATION: RIGHT;LEFT;LOWER;UPPER

## 2024-04-29 ASSESSMENT — PAIN DESCRIPTION - DESCRIPTORS
DESCRIPTORS: ACHING
DESCRIPTORS: ACHING
DESCRIPTORS: DISCOMFORT
DESCRIPTORS: ACHING

## 2024-04-29 ASSESSMENT — PAIN DESCRIPTION - PAIN TYPE
TYPE: ACUTE PAIN

## 2024-04-29 ASSESSMENT — PAIN SCALES - GENERAL
PAINLEVEL_OUTOF10: 6
PAINLEVEL_OUTOF10: 9
PAINLEVEL_OUTOF10: 2
PAINLEVEL_OUTOF10: 3
PAINLEVEL_OUTOF10: 6

## 2024-04-29 NOTE — ANESTHESIA PRE PROCEDURE
ARTHROSCOPY Right 2013    Dr. Selby - w/subacromial decompression, debridement of the SLAP tear, distal clavicle excision    SOFT TISSUE TUMOR RESECTION      retroperitoneal mass    SPINAL CORD STIMULATOR SURGERY Right 10/15/2019    RIGHT FLANK    TESTICLE REMOVAL Left     radical orchiectomy    TRANSESOPHAGEAL ECHOCARDIOGRAM  2017    during redo CABG & AVR    TUNNELED VENOUS PORT PLACEMENT  2002    portacath        Social History:    Social History     Tobacco Use    Smoking status: Some Days     Current packs/day: 0.00     Average packs/day: 1 pack/day for 45.0 years (45.0 ttl pk-yrs)     Types: Cigarettes     Start date: 1978     Last attempt to quit: 2023     Years since quittin.6    Smokeless tobacco: Never   Substance Use Topics    Alcohol use: No     Alcohol/week: 0.0 standard drinks of alcohol                                Ready to quit: Not Answered  Counseling given: Not Answered      Vital Signs (Current):   Vitals:    24 2323 24 0400 24 0807 24 0839   BP: (!) 142/74 (!) 159/84 (!) 148/84    Pulse: 71 62 60    Resp:  18 20 22   Temp: 98.9 °F (37.2 °C) 98.5 °F (36.9 °C) 98.2 °F (36.8 °C)    TempSrc: Oral Oral Oral    SpO2:  96% 94%    Weight:  83.5 kg (184 lb 1.4 oz)     Height:                                                  BP Readings from Last 3 Encounters:   24 (!) 148/84   24 94/61   24 113/78       NPO Status:                                                                                 BMI:   Wt Readings from Last 3 Encounters:   24 83.5 kg (184 lb 1.4 oz)   24 87.7 kg (193 lb 4.8 oz)   24 89.7 kg (197 lb 11.2 oz)     Body mass index is 24.97 kg/m².    CBC:   Lab Results   Component Value Date/Time    WBC 4.7 2024 10:45 AM    RBC 2.95 2024 10:45 AM    HGB 8.7 2024 10:45 AM    HCT 27.6 2024 10:45 AM    MCV 93.6 2024 10:45 AM    RDW 18.4 2024 10:45 AM    PLT

## 2024-04-29 NOTE — DISCHARGE INSTRUCTIONS
[facility] office at:  [phone]    MD Shay Regalado, SHIRLEY Xavier, SHIRLEY Beltran RN    Permanent Pacemaker (PPM)   Implantable Cardioverter Defibrillator (ICD)  Care Instructions      Your permanent pacemaker/ICD is a sophisticated piece of electronic equipment and both the wound and the device need special care during your recovery period and into the future.  Please use these instructions as guide.  If you have any questions please call the office.     Wound Care:   Keep the incision site clean and dry for one week.  Do not get the incision or bandage wet.   You may sponge bathe but DO NOT shower for the first seven days or until after your first follow up office visit.   Do not remove the outer dressing   DO NOT apply soaps, ointments,  lotion or powder to the incision site.   Wear comfortable clothes that will not rub on the incision site. Avoid bra straps or suspenders.   At your one week follow up appointment your bandage will be removed and you will be given further instruction on care of the site at that time.  Your bra strap may lay directly over the incision. If it does - please do not wear the bra strap on the incisional side for 4 weeks to prevent irritation.      When to contact the office:  Changes in how your incision site is healing including:  Increase in swelling and/or tenderness   Increase in redness at the incision site or surrounding the device  Drainage from the incision  If you experience:  Lightheadedness, dizziness or passing out  Increase in fatigue or shortness of breath  Fever (temperature greater than 100 degrees F)  Chills   Prolonged hiccups or chest discomfort  If you have any questions     Activity:   Do not raise your elbow above shoulder level or reach behind your back for 4 weeks after your procedure.  DO NOT lift more than 8 pounds with your affected arm for 4 weeks after your procedure. (A gallon of milk is 8

## 2024-04-29 NOTE — PROCEDURES
[facility]                                              INTERVENTIONAL CARDIOLOGY                                           [facility]                                                LEFT HEART CATH    Alexandr Witt   58 y.o., male  1965 4/25/2024    Procedure performed by Dr. Ton Mason MD, [facility]  Surgical assistants :none    Procedure  Selective Coronary Angiography  Cardiac Catheterization for Coronary Anatomy  Left Heart Catheterization  Left Ventriculogram  Vein graft injections  Radial artery access assisted by ultrasound  Arterial Access Right Radial Artery after a negative Jason test  TR Band    Any and all anesthesia was administered by my staff under my direct supervision and monitored by a trained independent observer.    Indication:Cardiac cath to rule out ischemic CAD, Possible angioplasty, The procedure and risks described to patient including risk of CVA, MI, bleeding, emergency surgery, death, previous bypass grafts.  Medication including aortic valve replacement 1346, or Consent signed  Unspecified Angina  Anesthesia: Moderate sedation with Versed and Fentanyl IV  Anesthesia Start time 1346  Anesthesia end time 1430  Estimated blood loss :minimal    Specimen removed: NONE    Abnormal Stress Test      Procedure Description:  After written informed consent was obtained, the patient was   brought to the cardiac catheterization suite, where patient was prepped and   draped in the usual sterile fashion. Local anesthesia was achieved in the   Left wrist with 2% lidocaine. A 5-Danish hemostasis sheath was placed into   the radial artery.    The pre cocktail of heparin, verapamil and nitroglycerin was injected into the sheath    The JR4 catheter was introduced  to engage the right coronary   artery. Radiographic  images were obtained. The catheter was removed and 
PROCEDURE NOTE  Date: 4/18/2024   Name: Alexandr Witt  YOB: 1965    Central Line    Date/Time: 4/18/2024 5:06 PM    Performed by: Willis Amin DO  Authorized by: Joaquin Ennis MD  Consent: The procedure was performed in an emergent situation. Verbal consent not obtained. Written consent not obtained.  Required items: required blood products, implants, devices, and special equipment available  Patient identity confirmed: hospital-assigned identification number  Indications: vascular access    Sedation:  Patient sedated: no    Preparation: skin prepped with ChloraPrep  Skin prep agent dried: skin prep agent completely dried prior to procedure  Location details: right femoral  Site selection rationale: emergent placement of CVC for vascular access  Patient position: flat  Catheter type: triple lumen  Pre-procedure: landmarks identified  Ultrasound guidance: no  Number of attempts: 1  Successful placement: yes  Post-procedure: line sutured and dressing applied  Assessment: blood return through all ports and free fluid flow  Comments: EBL:5cc,line okay to use in femoral site      Willis Amin DO  PGY-1, General Surgery Resident  04/18/24 5:08 PM  [phone]        
stroke, myocardial infarction and death were discussed in detail. The patient opted to proceed with the device implantation. Written informed consent was signed and placed in the chart.  Prophylactic antibiotic using Ancef 2 g IV was given.     The patient was prepped and draped in sterile fashion. A timeout protocol was completed to identify the patient and the procedure being performed.  IV sedation was provided by anesthesia colleagues.  The patient was monitored continuously with ECG, pulse oximetry, blood pressure monitoring, and direct observation. An incision was made in the right upper pectoral area in a transverse line roughly 1 cm from the clavicle after administration of lidocaine/bupivicaine combination. Using electrocautery and blunt dissection, a pocket was created. Central venous access into the right extrathoracic subclavian vein was obtained using the modified Seldinger technique. After central venous access was obtained, a sheath was placed in the axillary vein.     A right ventricular lead was advanced into the RV apical septal position under fluoroscopic guidance and using a series of curved and straight stylets. The lead was actively fixated. After confirming appropriate function and no diaphragmatic stimulation at maximum output, the sheath was split and removed. The lead was secured to the underlying tissue using suture material.       The leads were then connected to the new ICD pulse generator which was then placed into the cleaned pocket. Copious amount (> 200 cc) of saline flush was used to irrigate the pocket.     The pocket was then closed using a 2-0 Vicryl and 3-0 Vicryl subcutaneous layer and a subcuticular layer using 4-0 Vicryl.  The skin was covered with pressure dressing.     Defibrillation threshold testing was not performed.    All sponge and needle counts were reported as correct at the end of the procedure. The patient tolerated the procedure well and there were no

## 2024-04-29 NOTE — ANESTHESIA POSTPROCEDURE EVALUATION
Department of Anesthesiology  Postprocedure Note    Patient: Alexandr Witt  MRN: [id number]  YOB: 1965  Date of evaluation: 4/29/2024    Procedure Summary       Date: 04/29/24 Room / Location: [facility] Lab    Anesthesia Start: 1003 Anesthesia Stop: 1119    Procedure: TJHZ ICD W ANES Diagnosis:     Scheduled Providers: Hung Monsalve DO Responsible Provider: Hung Monsalve DO    Anesthesia Type: MAC ASA Status: 3            Anesthesia Type: No value filed.    Shaheed Phase I:      Shaheed Phase II:      Vitals:    04/29/24 0400 04/29/24 0807 04/29/24 0839 04/29/24 0942   BP: (!) 159/84 (!) 148/84  (!) 142/90   Pulse: 62 60  60   Resp: 18 20 22    Temp: 98.5 °F (36.9 °C) 98.2 °F (36.8 °C)     TempSrc: Oral Oral     SpO2: 96% 94%     Weight: 83.5 kg (184 lb 1.4 oz)      Height:             Anesthesia Post Evaluation    Patient location during evaluation: bedside  Patient participation: complete - patient participated  Level of consciousness: awake and awake and alert  Pain score: 0  Airway patency: patent  Nausea & Vomiting: no nausea and no vomiting  Cardiovascular status: hemodynamically stable  Respiratory status: acceptable  Hydration status: euvolemic  Pain management: adequate and satisfactory to patient        No notable events documented.

## 2024-04-30 ENCOUNTER — APPOINTMENT (OUTPATIENT)
Dept: GENERAL RADIOLOGY | Age: 59
DRG: 981 | End: 2024-04-30
Payer: MEDICARE

## 2024-04-30 VITALS
HEART RATE: 62 BPM | SYSTOLIC BLOOD PRESSURE: 160 MMHG | OXYGEN SATURATION: 96 % | RESPIRATION RATE: 18 BRPM | BODY MASS INDEX: 24.84 KG/M2 | HEIGHT: 72 IN | WEIGHT: 183.42 LBS | TEMPERATURE: 98 F | DIASTOLIC BLOOD PRESSURE: 81 MMHG

## 2024-04-30 DIAGNOSIS — I49.01 VENTRICULAR FIBRILLATION (HCC): ICD-10-CM

## 2024-04-30 DIAGNOSIS — I46.9 CARDIAC ARREST (HCC): ICD-10-CM

## 2024-04-30 DIAGNOSIS — I25.5 ISCHEMIC CARDIOMYOPATHY: ICD-10-CM

## 2024-04-30 DIAGNOSIS — Z95.810 CARDIAC DEFIBRILLATOR IN SITU: Primary | ICD-10-CM

## 2024-04-30 DIAGNOSIS — I50.20 HFREF (HEART FAILURE WITH REDUCED EJECTION FRACTION) (HCC): ICD-10-CM

## 2024-04-30 LAB
ALBUMIN SERPL-MCNC: 3 G/DL (ref 3.4–5)
ANION GAP SERPL CALCULATED.3IONS-SCNC: 10 MMOL/L (ref 3–16)
BUN SERPL-MCNC: 15 MG/DL (ref 7–20)
CALCIUM SERPL-MCNC: 8.6 MG/DL (ref 8.3–10.6)
CHLORIDE SERPL-SCNC: 105 MMOL/L (ref 99–110)
CO2 SERPL-SCNC: 25 MMOL/L (ref 21–32)
CREAT SERPL-MCNC: 1.6 MG/DL (ref 0.8–1.3)
GFR SERPLBLD CREATININE-BSD FMLA CKD-EPI: 49 ML/MIN/{1.73_M2}
GLUCOSE SERPL-MCNC: 101 MG/DL (ref 70–99)
INR PPP: 1.24 (ref 0.85–1.15)
MAGNESIUM SERPL-MCNC: 1.7 MG/DL (ref 1.8–2.4)
PHOSPHATE SERPL-MCNC: 3.5 MG/DL (ref 2.5–4.9)
POTASSIUM SERPL-SCNC: 3.3 MMOL/L (ref 3.5–5.1)
PROTHROMBIN TIME: 15.8 SEC (ref 11.9–14.9)
SODIUM SERPL-SCNC: 140 MMOL/L (ref 136–145)

## 2024-04-30 PROCEDURE — 2580000003 HC RX 258: Performed by: INTERNAL MEDICINE

## 2024-04-30 PROCEDURE — 6360000002 HC RX W HCPCS

## 2024-04-30 PROCEDURE — 71045 X-RAY EXAM CHEST 1 VIEW: CPT

## 2024-04-30 PROCEDURE — 85610 PROTHROMBIN TIME: CPT

## 2024-04-30 PROCEDURE — 6370000000 HC RX 637 (ALT 250 FOR IP): Performed by: INTERNAL MEDICINE

## 2024-04-30 PROCEDURE — 93005 ELECTROCARDIOGRAM TRACING: CPT | Performed by: INTERNAL MEDICINE

## 2024-04-30 PROCEDURE — 94761 N-INVAS EAR/PLS OXIMETRY MLT: CPT

## 2024-04-30 PROCEDURE — 99232 SBSQ HOSP IP/OBS MODERATE 35: CPT

## 2024-04-30 PROCEDURE — 80069 RENAL FUNCTION PANEL: CPT

## 2024-04-30 PROCEDURE — 6370000000 HC RX 637 (ALT 250 FOR IP)

## 2024-04-30 PROCEDURE — 97535 SELF CARE MNGMENT TRAINING: CPT

## 2024-04-30 PROCEDURE — 99232 SBSQ HOSP IP/OBS MODERATE 35: CPT | Performed by: INTERNAL MEDICINE

## 2024-04-30 PROCEDURE — 83735 ASSAY OF MAGNESIUM: CPT

## 2024-04-30 PROCEDURE — 36415 COLL VENOUS BLD VENIPUNCTURE: CPT

## 2024-04-30 PROCEDURE — 94640 AIRWAY INHALATION TREATMENT: CPT

## 2024-04-30 PROCEDURE — 99233 SBSQ HOSP IP/OBS HIGH 50: CPT | Performed by: INTERNAL MEDICINE

## 2024-04-30 RX ORDER — SODIUM CHLORIDE 9 MG/ML
INJECTION, SOLUTION INTRAVENOUS PRN
Status: DISCONTINUED | OUTPATIENT
Start: 2024-04-30 | End: 2024-04-30 | Stop reason: HOSPADM

## 2024-04-30 RX ORDER — ARFORMOTEROL TARTRATE 15 UG/2ML
15 SOLUTION RESPIRATORY (INHALATION)
Qty: 120 ML | Refills: 3 | Status: SHIPPED | OUTPATIENT
Start: 2024-04-30

## 2024-04-30 RX ORDER — POTASSIUM CHLORIDE 20 MEQ/1
20 TABLET, EXTENDED RELEASE ORAL DAILY
Qty: 180 TABLET | Refills: 1 | Status: SHIPPED | OUTPATIENT
Start: 2024-04-30

## 2024-04-30 RX ORDER — FOLIC ACID 1 MG/1
1 TABLET ORAL DAILY
Qty: 30 TABLET | Refills: 3 | Status: SHIPPED | OUTPATIENT
Start: 2024-05-01

## 2024-04-30 RX ORDER — MAGNESIUM 200 MG
200 TABLET ORAL DAILY
Qty: 30 TABLET | Refills: 0 | Status: SHIPPED | OUTPATIENT
Start: 2024-04-30 | End: 2024-05-30

## 2024-04-30 RX ORDER — CARVEDILOL 3.12 MG/1
3.12 TABLET ORAL 2 TIMES DAILY WITH MEALS
Status: DISCONTINUED | OUTPATIENT
Start: 2024-04-30 | End: 2024-04-30 | Stop reason: HOSPADM

## 2024-04-30 RX ORDER — LIDOCAINE 4 G/G
1 PATCH TOPICAL DAILY
Qty: 30 EACH | Refills: 0 | Status: SHIPPED | OUTPATIENT
Start: 2024-05-01 | End: 2024-05-31

## 2024-04-30 RX ORDER — SODIUM CHLORIDE 0.9 % (FLUSH) 0.9 %
5-40 SYRINGE (ML) INJECTION EVERY 12 HOURS SCHEDULED
Status: DISCONTINUED | OUTPATIENT
Start: 2024-04-30 | End: 2024-04-30 | Stop reason: HOSPADM

## 2024-04-30 RX ORDER — SODIUM CHLORIDE 0.9 % (FLUSH) 0.9 %
5-40 SYRINGE (ML) INJECTION PRN
Status: DISCONTINUED | OUTPATIENT
Start: 2024-04-30 | End: 2024-04-30 | Stop reason: HOSPADM

## 2024-04-30 RX ORDER — BUDESONIDE 0.5 MG/2ML
0.5 INHALANT ORAL
Qty: 60 EACH | Refills: 3 | Status: SHIPPED | OUTPATIENT
Start: 2024-04-30

## 2024-04-30 RX ORDER — OXYCODONE HYDROCHLORIDE AND ACETAMINOPHEN 5; 325 MG/1; MG/1
1 TABLET ORAL EVERY 4 HOURS PRN
Qty: 9 TABLET | Refills: 0 | Status: SHIPPED | OUTPATIENT
Start: 2024-04-30 | End: 2024-05-03

## 2024-04-30 RX ORDER — CARVEDILOL 3.12 MG/1
3.12 TABLET ORAL 2 TIMES DAILY WITH MEALS
Qty: 60 TABLET | Refills: 3 | Status: SHIPPED | OUTPATIENT
Start: 2024-04-30

## 2024-04-30 RX ORDER — MECOBALAMIN 5000 MCG
5 TABLET,DISINTEGRATING ORAL NIGHTLY
Qty: 30 TABLET | Refills: 0 | Status: SHIPPED | OUTPATIENT
Start: 2024-04-30

## 2024-04-30 RX ORDER — PREGABALIN 75 MG/1
75 CAPSULE ORAL 3 TIMES DAILY
Qty: 90 CAPSULE | Refills: 0 | Status: SHIPPED | OUTPATIENT
Start: 2024-04-30 | End: 2024-05-30

## 2024-04-30 RX ADMIN — CITALOPRAM 40 MG: 40 TABLET, FILM COATED ORAL at 09:14

## 2024-04-30 RX ADMIN — OXYCODONE HYDROCHLORIDE AND ACETAMINOPHEN 1 TABLET: 5; 325 TABLET ORAL at 05:32

## 2024-04-30 RX ADMIN — BUDESONIDE INHALATION 500 MCG: 0.5 SUSPENSION RESPIRATORY (INHALATION) at 09:11

## 2024-04-30 RX ADMIN — SODIUM CHLORIDE, PRESERVATIVE FREE 10 ML: 5 INJECTION INTRAVENOUS at 09:15

## 2024-04-30 RX ADMIN — FLUTICASONE PROPIONATE 2 SPRAY: 50 SPRAY, METERED NASAL at 09:29

## 2024-04-30 RX ADMIN — PREGABALIN 75 MG: 75 CAPSULE ORAL at 09:15

## 2024-04-30 RX ADMIN — FOLIC ACID 1 MG: 1 TABLET ORAL at 09:14

## 2024-04-30 RX ADMIN — THERA TABS 1 TABLET: TAB at 09:14

## 2024-04-30 RX ADMIN — ARFORMOTEROL TARTRATE 15 MCG: 15 SOLUTION RESPIRATORY (INHALATION) at 09:11

## 2024-04-30 RX ADMIN — METHOCARBAMOL TABLETS 750 MG: 750 TABLET, COATED ORAL at 09:14

## 2024-04-30 RX ADMIN — MORPHINE SULFATE 15 MG: 15 TABLET, FILM COATED, EXTENDED RELEASE ORAL at 09:15

## 2024-04-30 RX ADMIN — ASPIRIN 81 MG: 81 TABLET, COATED ORAL at 09:15

## 2024-04-30 RX ADMIN — OXYCODONE HYDROCHLORIDE AND ACETAMINOPHEN 1 TABLET: 5; 325 TABLET ORAL at 00:20

## 2024-04-30 RX ADMIN — CARVEDILOL 3.12 MG: 3.12 TABLET, FILM COATED ORAL at 09:21

## 2024-04-30 RX ADMIN — OXYCODONE HYDROCHLORIDE AND ACETAMINOPHEN 1 TABLET: 5; 325 TABLET ORAL at 11:53

## 2024-04-30 RX ADMIN — NALOXEGOL OXALATE 12.5 MG: 25 TABLET, FILM COATED ORAL at 05:33

## 2024-04-30 RX ADMIN — FAMOTIDINE 20 MG: 20 TABLET, FILM COATED ORAL at 09:14

## 2024-04-30 ASSESSMENT — PAIN DESCRIPTION - LOCATION
LOCATION: ARM;SHOULDER
LOCATION: ARM;GENERALIZED
LOCATION: ARM;SHOULDER
LOCATION: ARM;GENERALIZED
LOCATION: ARM;SHOULDER

## 2024-04-30 ASSESSMENT — PAIN DESCRIPTION - ONSET
ONSET: ON-GOING
ONSET: AWAKENED FROM SLEEP
ONSET: AWAKENED FROM SLEEP

## 2024-04-30 ASSESSMENT — PAIN DESCRIPTION - FREQUENCY
FREQUENCY: CONTINUOUS
FREQUENCY: INTERMITTENT
FREQUENCY: CONTINUOUS
FREQUENCY: CONTINUOUS

## 2024-04-30 ASSESSMENT — PAIN DESCRIPTION - DESCRIPTORS
DESCRIPTORS: ACHING
DESCRIPTORS: ACHING;SHARP
DESCRIPTORS: ACHING
DESCRIPTORS: ACHING;SHARP
DESCRIPTORS: ACHING;SHARP

## 2024-04-30 ASSESSMENT — PAIN - FUNCTIONAL ASSESSMENT
PAIN_FUNCTIONAL_ASSESSMENT: ACTIVITIES ARE NOT PREVENTED

## 2024-04-30 ASSESSMENT — PAIN SCALES - GENERAL
PAINLEVEL_OUTOF10: 6
PAINLEVEL_OUTOF10: 7
PAINLEVEL_OUTOF10: 8
PAINLEVEL_OUTOF10: 2
PAINLEVEL_OUTOF10: 4
PAINLEVEL_OUTOF10: 5
PAINLEVEL_OUTOF10: 2

## 2024-04-30 ASSESSMENT — PAIN DESCRIPTION - PAIN TYPE
TYPE: ACUTE PAIN
TYPE: CHRONIC PAIN
TYPE: CHRONIC PAIN
TYPE: ACUTE PAIN

## 2024-04-30 ASSESSMENT — PAIN DESCRIPTION - ORIENTATION
ORIENTATION: RIGHT
ORIENTATION: RIGHT
ORIENTATION: RIGHT;LEFT;LOWER;UPPER
ORIENTATION: RIGHT
ORIENTATION: RIGHT;LEFT;LOWER;UPPER

## 2024-04-30 NOTE — CARE COORDINATION
1:18 PM  Patient is from home, alone.   Active with [facility], previously active with [facility].   Seen by [facility]e Onc today; admitted to being noncompliant with treatments, meds, ignoring symptoms. Per Mary WATSON with [facility], palliative care consult placed for goals of care discussion.   CM following; if plans to return home, would appreciate PT/OT evals.     Electronically signed by Zeke Kenyon RN, CM on 4/18/2024 at 1:20 PM.  Phone: [phone]  Fax: [phone]    
1:27 PM  Patient is from home, plans [place] SNF [place].   Trx from ICU.  Palliative care following   Worked with PT/OT today; recs SNF. No DME needs.   [facility] liaison looking to evaluate patient. [facility] number provided to patient.       Electronically signed by Zeke Kenyon RN, CM on 4/26/2024 at 2:15 PM.  Phone: [phone]  Fax: [phone]    
3:54 PM  Spoke with daughter, she is agreeable to [facility] and [facility].   Spoke with Lupillo at [facility]; able to accept.   Patient will not need cert to dc to [facility]  Awaiting final clearance from Cardio prior to dc.     CM following for dispo planning and support.       Electronically signed by Zeke Kenyon RN, CM on 4/29/2024 at 3:58 PM.  Phone: [phone]  Fax: [phone]    
CM following for  d/c planning:    Anticipate patient will need  Cardiac cath once medically  optimized  per  IDR rounds .    CM cont to follow.  Electronically signed by Jo Ann Moncada RN on 4/25/2024 at 10:44 AM       
Case Management Assessment  Initial Evaluation    Date/Time of Evaluation: 4/17/2024 3:25 PM  Assessment Completed by: Zeke Kenyon RN    If patient is discharged prior to next notation, then this note serves as note for discharge by case management.    Patient Name: Alexandr Witt                   YOB: 1965  Diagnosis: Hypocalcemia [E83.51]  Hypokalemia [E87.6]  Metabolic alkalosis [E87.3]  Pneumonia, bacterial [J15.9]  Atypical pneumonia [J18.9]  Elevated TSH [R79.89]  Elevated troponin [R79.89]  BRENDA (acute kidney injury) (HCC) [N17.9]  Elevated brain natriuretic peptide (BNP) level [R79.89]  Altered mental status, unspecified altered mental status type [R41.82]                   Date / Time: 4/16/2024  4:09 PM    Patient Admission Status: Inpatient   Readmission Risk (Low < 19, Mod (19-27), High > 27): Readmission Risk Score: 18.1    Current PCP: Hosea Mckee MD  PCP verified by CM? Yes    Chart Reviewed: Yes      History Provided by: Patient, Medical Record  Patient Orientation: Alert and Oriented    Patient Cognition: Alert    Hospitalization in the last 30 days (Readmission):  No    If yes, Readmission Assessment in CM Navigator will be completed and shown below.          Advance Directives:      Code Status: Full Code   Patient's Primary Decision Maker is: Legal Next of Kin      Discharge Planning:    Patient lives with: Alone Type of Home: House  Primary Care Giver: Self  Patient Support Systems include: Family Members   Current Financial resources: Medicare  Current community resources:    Current services prior to admission: Home Care            Current DME:              Type of Home Care services:  PT, OT    ADLS  Prior functional level: Independent in ADLs/IADLs  Current functional level: Independent in ADLs/IADLs    PT AM-PAC:   /24  OT AM-PAC:   /24    Family can provide assistance at DC: Yes  Would you like Case Management to discuss the discharge plan with any other family 
Case management is following for discharge planning. The chart was reviewed. Spoke with Mr. Witt's daughter, Charity. She provided 3 skilled nursing facilities for referrals: [facility], [facility], and [facility]. Referrals were sent via [employer] and follow up call made to admissions staff. Waiting for a determination.    PT AM-PAC: 16 / 24 per last evaluation on: 4/23    OT AM-PAC: 16 / 24 per last evaluation on: 4/23    Discharge Plan: SNF TBD    Pre-cert required for SNF: Traditional   MCR  COVID Result:    Lab Results   Component Value Date/Time    COVID19 NOT DETECTED 04/16/2024 05:02 PM    COVID19 NOT DETECTED 12/21/2020 08:35 AM       Transportation PLAN for discharge: EMS transportation    Alexandr and his family were provided with choice of provider; he and his family are in agreement with the discharge plan.    Emergency Contacts:  Extended Emergency Contact Information  Primary Emergency Contact: ShilovalreioCharity  [place]  Home Phone: [phone]  Mobile Phone: [phone]  Relation: Child  Secondary Emergency Contact: shola witt  Home Phone: [phone]  Mobile Phone: [phone]  Relation: Brother/Sister    Care Transition Patient: No    IMM Status:      Shea Gatica RN  [facility]  Case Management Department  [phone]    
Case management is following for discharge planning. The chart was reviewed. Therapy worked with Mr. Witt and recommended a skilled nursing facility at discharge.    PT AM-PAC: 16 / 24 per last evaluation on: 4/22    OT AM-PAC: 16 / 24 per last evaluation on: 4/22    A call was placed to Mr. Witt's daughter, Charity, at her request to discuss options for post acute care. Charity stated she and her cousin have been visiting daily. Her cousin has more understanding about the rehab process. A list of Medicare-approved facilities was left at the bedside for review. Explained the 5 star rating system. Encouraged the family to call CM with preferences of facilities for referrals. Charity expressed understanding.    Charity requested information regarding a Medicaid application. CM will reach out to Artem in Public Benefits to see if she can assist with the process. Mr. Witt may be in need of long term care.    Discharge Plan: SNF  Pre-cert required for SNF: NO    COVID Result:    Lab Results   Component Value Date/Time    COVID19 NOT DETECTED 04/16/2024 05:02 PM    COVID19 NOT DETECTED 12/21/2020 08:35 AM       Transportation PLAN for discharge: EMS transportation      Alexandr and his family were provided with choice of provider; he and his family are in agreement with the discharge plan.    Emergency Contacts:  Extended Emergency Contact Information  Primary Emergency Contact: Charity Sar[place]  [place]  Home Phone: [phone]  Mobile Phone: [phone]  Relation: Child  Secondary Emergency Contact: sohla witt  Home Phone: [phone]  Mobile Phone: [phone]  Relation: Brother/Sister      Shea Gatica RN  [facility]  Case Management Department  [phone]    
Linda spoke with Lupillo at [facility]. She plans to do on-site visit today with pt. She will call CM after on-site to confirm they can accept pt.    CM left message for Aranza to call  CM back regarding referral to [facility].     LINDA spoke with Danika at [facility]. They do not have a bed available until Tuesday. CM will fax referral as they did not receive. CM faxed to [phone].    CM received call from Aranza at [facility]. They currently do not have beds but may have a bed in next couple of days. They do have beds available at [facility] if family is interested.     1:29 PM  Cm spoke with Danika at [facility]. They cannot accept due to lack of participation in therapy and then potentially needing LTC.     4:16 PM  CM left message for Lupillo at [facility] to see if they can accept pt.     LINDA spoke with pt's daughter Hilda [phone]. Cm informed her about SNF responses. She prefers [facility] or [facility] as [facility] would be far for her. CM unsure when pt will be ready for dc and CM informed her [facility] will have a bed in a couple of days.   Hilda also stated pt is not eligible for Medicaid. She is asking for assistance in looking at [facility] for pt when he is finished with SNF. LINDA gave her [facility] phone number for assistance.   
 today.         Transportation:  Transportation PLAN for discharge: EMS transportation   Mode of Transport: Ambulance stretcher - BLS  Reason for medical transport: Bed confined: Meets the following criteria 1) unable to get out of bed without assistance or ambulate, 2) unable to safely sit up in a wheelchair, 3) unable to maintain erect seating position in a chair for time needed for transport  Name of Transport Company: [facility]  Phone: [phone]  Time of Transport: 1400    Transport form completed: Yes    Additional CM Notes: patient to dc to [facility]. Facility aware and agreeable. Spoke with daughter Rosa Maria, aware and agreeable.     COVID Result:    Lab Results   Component Value Date/Time    COVID19 NOT DETECTED 04/16/2024 05:02 PM    COVID19 NOT DETECTED 12/21/2020 08:35 AM           The Plan for Transition of Care is related to the following treatment goals of Hypocalcemia [E83.51]  Hypokalemia [E87.6]  Metabolic alkalosis [E87.3]  Pneumonia, bacterial [J15.9]  Atypical pneumonia [J18.9]  Elevated TSH [R79.89]  Elevated troponin [R79.89]  BRENDA (acute kidney injury) (HCC) [N17.9]  Elevated brain natriuretic peptide (BNP) level [R79.89]  Altered mental status, unspecified altered mental status type [R41.82]    The Patient and/or patient representative Alexandr and his family were provided with a choice of provider and agrees with the discharge plan Yes    Freedom of choice list was provided with basic dialogue that supports the patient's individualized plan of care/goals and shares the quality data associated with the providers. Yes    Care Transitions patient: No    eZke Kenyon RN  [facility]  Case Management Department  Ph: [phone]  Fax: [phone]

## 2024-04-30 NOTE — PLAN OF CARE
Problem: Discharge Planning  Goal: Discharge to home or other facility with appropriate resources  4/19/2024 0834 by Veronika Bryan RN  Outcome: Progressing  4/19/2024 0215 by Ramon Bal RN  Outcome: Progressing     Problem: Safety - Adult  Goal: Free from fall injury  4/19/2024 0834 by Veronika Bryan RN  Outcome: Progressing  4/19/2024 0215 by Ramon Bal RN  Outcome: Progressing     Problem: Confusion  Goal: Confusion, delirium, dementia, or psychosis is improved or at baseline  Description: INTERVENTIONS:  1. Assess for possible contributors to thought disturbance, including medications, impaired vision or hearing, underlying metabolic abnormalities, dehydration, psychiatric diagnoses, and notify attending LIP  2. [place] high risk fall precautions, as indicated  3. Provide frequent short contacts to provide reality reorientation, refocusing and direction  4. Decrease environmental stimuli, including noise as appropriate  5. Monitor and intervene to maintain adequate nutrition, hydration, elimination, sleep and activity  6. If unable to ensure safety without constant attention obtain sitter and review sitter guidelines with assigned personnel  7. Initiate Psychosocial CNS and Spiritual Care consult, as indicated  4/19/2024 0834 by Veronika Bryan RN  Outcome: Progressing  4/19/2024 0215 by Ramon Bal RN  Outcome: Progressing  Flowsheets (Taken 4/18/2024 2000)  Effect of thought disturbance (confusion, delirium, dementia, or psychosis) are managed with adequate functional status: Assess for contributors to thought disturbance, including medications, impaired vision or hearing, underlying metabolic abnormalities, dehydration, psychiatric diagnoses, notify LIP     Problem: Risk for Elopement  Goal: Patient will not exit the unit/facility without proper excort  4/19/2024 0834 by Veronika Bryan RN  Outcome: Progressing  4/19/2024 0215 by Ramon Bal RN  Outcome: Progressing     Problem: Pain  Goal: 
  Problem: Discharge Planning  Goal: Discharge to home or other facility with appropriate resources  4/23/2024 2104 by Diane Parker RN  Outcome: Progressing     Problem: Safety - Adult  Goal: Free from fall injury  4/23/2024 2104 by Diane Parker RN  Outcome: Progressing     Problem: Confusion  Goal: Confusion, delirium, dementia, or psychosis is improved or at baseline  Description: INTERVENTIONS:  1. Assess for possible contributors to thought disturbance, including medications, impaired vision or hearing, underlying metabolic abnormalities, dehydration, psychiatric diagnoses, and notify attending LIP  2. [place] high risk fall precautions, as indicated  3. Provide frequent short contacts to provide reality reorientation, refocusing and direction  4. Decrease environmental stimuli, including noise as appropriate  5. Monitor and intervene to maintain adequate nutrition, hydration, elimination, sleep and activity  6. If unable to ensure safety without constant attention obtain sitter and review sitter guidelines with assigned personnel  7. Initiate Psychosocial CNS and Spiritual Care consult, as indicated  4/23/2024 2104 by Diane Parker RN  Outcome: Progressing     Problem: Risk for Elopement  Goal: Patient will not exit the unit/facility without proper excort  4/23/2024 2104 by Diane Parker RN  Outcome: Progressing     Problem: Chronic Conditions and Co-morbidities  Goal: Patient's chronic conditions and co-morbidity symptoms are monitored and maintained or improved  4/23/2024 2104 by Diane Parker RN  Outcome: Progressing     Problem: Nutrition Deficit:  Goal: Optimize nutritional status  4/23/2024 2104 by Diane Parker RN  Outcome: Progressing     Problem: Skin/Tissue Integrity  Goal: Absence of new skin breakdown  Description: 1.  Monitor for areas of redness and/or skin breakdown  2.  Assess vascular access sites hourly  3.  Every 4-6 hours minimum:  Change oxygen saturation probe site  4.  
  Problem: Discharge Planning  Goal: Discharge to home or other facility with appropriate resources  4/24/2024 0419 by Diane Parker RN  Outcome: Progressing     Problem: Safety - Adult  Goal: Free from fall injury  4/24/2024 0419 by Diane Parker RN  Outcome: Progressing     Problem: Confusion  Goal: Confusion, delirium, dementia, or psychosis is improved or at baseline  Description: INTERVENTIONS:  1. Assess for possible contributors to thought disturbance, including medications, impaired vision or hearing, underlying metabolic abnormalities, dehydration, psychiatric diagnoses, and notify attending LIP  2. [place] high risk fall precautions, as indicated  3. Provide frequent short contacts to provide reality reorientation, refocusing and direction  4. Decrease environmental stimuli, including noise as appropriate  5. Monitor and intervene to maintain adequate nutrition, hydration, elimination, sleep and activity  6. If unable to ensure safety without constant attention obtain sitter and review sitter guidelines with assigned personnel  7. Initiate Psychosocial CNS and Spiritual Care consult, as indicated  4/24/2024 0419 by Diane Parker RN  Outcome: Progressing     Problem: Risk for Elopement  Goal: Patient will not exit the unit/facility without proper excort  4/24/2024 0419 by Diane Parker RN  Outcome: Progressing     Problem: Pain  Goal: Verbalizes/displays adequate comfort level or baseline comfort level  4/24/2024 0419 by Diane Parker RN  Outcome: Progressing     Problem: Nutrition Deficit:  Goal: Optimize nutritional status  Outcome: Progressing     
  Problem: Discharge Planning  Goal: Discharge to home or other facility with appropriate resources  4/24/2024 1217 by Nakia Archer  Outcome: Progressing  4/24/2024 0419 by Diane Parker RN  Outcome: Progressing     Problem: Safety - Adult  Goal: Free from fall injury  4/24/2024 1217 by Nakia Archer  Outcome: Progressing  4/24/2024 0419 by Diane Parker RN  Outcome: Progressing     Problem: Confusion  Goal: Confusion, delirium, dementia, or psychosis is improved or at baseline  Description: INTERVENTIONS:  1. Assess for possible contributors to thought disturbance, including medications, impaired vision or hearing, underlying metabolic abnormalities, dehydration, psychiatric diagnoses, and notify attending LIP  2. [place] high risk fall precautions, as indicated  3. Provide frequent short contacts to provide reality reorientation, refocusing and direction  4. Decrease environmental stimuli, including noise as appropriate  5. Monitor and intervene to maintain adequate nutrition, hydration, elimination, sleep and activity  6. If unable to ensure safety without constant attention obtain sitter and review sitter guidelines with assigned personnel  7. Initiate Psychosocial CNS and Spiritual Care consult, as indicated  4/24/2024 1217 by Nakia Archer  Outcome: Progressing  4/24/2024 0419 by Diane Parker RN  Outcome: Progressing     Problem: Risk for Elopement  Goal: Patient will not exit the unit/facility without proper excort  4/24/2024 1217 by Nakia Archer  Outcome: Progressing  4/24/2024 0419 by Diane Parker RN  Outcome: Progressing     Problem: Pain  Goal: Verbalizes/displays adequate comfort level or baseline comfort level  4/24/2024 1217 by Nakia Archer  Outcome: Progressing  4/24/2024 0419 by Diane Parker RN  Outcome: Progressing     Problem: Chronic Conditions and Co-morbidities  Goal: Patient's chronic conditions and co-morbidity symptoms are monitored and 
  Problem: Discharge Planning  Goal: Discharge to home or other facility with appropriate resources  4/24/2024 2042 by Lisa Brown RN  Outcome: Progressing     Problem: Safety - Adult  Goal: Free from fall injury  4/24/2024 2042 by Lisa Brown RN  Outcome: Progressing     Problem: Risk for Elopement  Goal: Patient will not exit the unit/facility without proper excort  4/24/2024 2042 by Lisa Brown RN  Outcome: Progressing     Problem: Pain  Goal: Verbalizes/displays adequate comfort level or baseline comfort level  4/24/2024 2042 by Lisa Brown RN  Outcome: Progressing     Problem: Chronic Conditions and Co-morbidities  Goal: Patient's chronic conditions and co-morbidity symptoms are monitored and maintained or improved  4/24/2024 2042 by Lisa Brown RN  Outcome: Progressing      Problem: Skin/Tissue Integrity  Goal: Absence of new skin breakdown  Description: 1.  Monitor for areas of redness and/or skin breakdown  2.  Assess vascular access sites hourly  3.  Every 4-6 hours minimum:  Change oxygen saturation probe site  4.  Every 4-6 hours:  If on nasal continuous positive airway pressure, respiratory therapy assess nares and determine need for appliance change or resting period.  4/24/2024 2042 by Lisa Brown RN  Outcome: Progressing     Problem: ABCDS Injury Assessment  Goal: Absence of physical injury  4/24/2024 2042 by Lisa Brown RN  Outcome: Progressing     Problem: Respiratory - Adult  Goal: Achieves optimal ventilation and oxygenation  4/24/2024 2042 by Lisa Brown RN  Outcome: Progressing     Problem: Cardiovascular - Adult  Goal: Maintains optimal cardiac output and hemodynamic stability  4/24/2024 2042 by Lisa Brown RN  Outcome: Progressing  Goal: Absence of cardiac dysrhythmias or at baseline  4/24/2024 2042 by Lisa Brown RN  Outcome: Progressing     Problem: Skin/Tissue Integrity - Adult  Goal: Skin integrity remains intact  4/24/2024 2042 by Lisa Brown RN  Outcome: 
  Problem: Discharge Planning  Goal: Discharge to home or other facility with appropriate resources  Outcome: Progressing     Problem: Safety - Adult  Goal: Free from fall injury  Outcome: Progressing     Problem: Confusion  Goal: Confusion, delirium, dementia, or psychosis is improved or at baseline  Description: INTERVENTIONS:  1. Assess for possible contributors to thought disturbance, including medications, impaired vision or hearing, underlying metabolic abnormalities, dehydration, psychiatric diagnoses, and notify attending LIP  2. [place] high risk fall precautions, as indicated  3. Provide frequent short contacts to provide reality reorientation, refocusing and direction  4. Decrease environmental stimuli, including noise as appropriate  5. Monitor and intervene to maintain adequate nutrition, hydration, elimination, sleep and activity  6. If unable to ensure safety without constant attention obtain sitter and review sitter guidelines with assigned personnel  7. Initiate Psychosocial CNS and Spiritual Care consult, as indicated  Outcome: Progressing     Problem: Risk for Elopement  Goal: Patient will not exit the unit/facility without proper excort  Outcome: Progressing     Problem: Pain  Goal: Verbalizes/displays adequate comfort level or baseline comfort level  Outcome: Progressing     Problem: Chronic Conditions and Co-morbidities  Goal: Patient's chronic conditions and co-morbidity symptoms are monitored and maintained or improved  Outcome: Progressing     Problem: Nutrition Deficit:  Goal: Optimize nutritional status  Outcome: Progressing     Problem: Skin/Tissue Integrity  Goal: Absence of new skin breakdown  Description: 1.  Monitor for areas of redness and/or skin breakdown  2.  Assess vascular access sites hourly  3.  Every 4-6 hours minimum:  Change oxygen saturation probe site  4.  Every 4-6 hours:  If on nasal continuous positive airway pressure, respiratory therapy assess nares and determine 
  Problem: Discharge Planning  Goal: Discharge to home or other facility with appropriate resources  Outcome: Progressing     Problem: Safety - Adult  Goal: Free from fall injury  Outcome: Progressing     Problem: Confusion  Goal: Confusion, delirium, dementia, or psychosis is improved or at baseline  Description: INTERVENTIONS:  1. Assess for possible contributors to thought disturbance, including medications, impaired vision or hearing, underlying metabolic abnormalities, dehydration, psychiatric diagnoses, and notify attending LIP  2. [place] high risk fall precautions, as indicated  3. Provide frequent short contacts to provide reality reorientation, refocusing and direction  4. Decrease environmental stimuli, including noise as appropriate  5. Monitor and intervene to maintain adequate nutrition, hydration, elimination, sleep and activity  6. If unable to ensure safety without constant attention obtain sitter and review sitter guidelines with assigned personnel  7. Initiate Psychosocial CNS and Spiritual Care consult, as indicated      Problem: Pain  Goal: Verbalizes/displays adequate comfort level or baseline comfort level  Outcome: Progressing  Verbalizes/displays adequate comfort level or baseline comfort level: Encourage patient to monitor pain and request assistance     Problem: Skin/Tissue Integrity  Goal: Absence of new skin breakdown  Description: 1.  Monitor for areas of redness and/or skin breakdown  2.  Assess vascular access sites hourly  3.  Every 4-6 hours minimum:  Change oxygen saturation probe site  4.  Every 4-6 hours:  If on nasal continuous positive airway pressure, respiratory therapy assess nares and determine need for appliance change or resting period.  Outcome: Progressing     Problem: ABCDS Injury Assessment  Goal: Absence of physical injury  Outcome: Progressing        
  Problem: Discharge Planning  Goal: Discharge to home or other facility with appropriate resources  Outcome: Progressing  Flowsheets  Taken 4/30/2024 1021  Discharge to home or other facility with appropriate resources:   Identify barriers to discharge with patient and caregiver   Identify discharge learning needs (meds, wound care, etc)   Refer to discharge planning if patient needs post-hospital services based on physician order or complex needs related to functional status, cognitive ability or social support system   Arrange for interpreters to assist at discharge as needed   Arrange for needed discharge resources and transportation as appropriate  Taken 4/30/2024 0800  Discharge to home or other facility with appropriate resources: Identify barriers to discharge with patient and caregiver     Problem: Safety - Adult  Goal: Free from fall injury  4/30/2024 1021 by Saima Villa, RN  Outcome: Progressing  Flowsheets  Taken 4/30/2024 1021  Free From Fall Injury:   Instruct family/caregiver on patient safety   Based on caregiver fall risk screen, instruct family/caregiver to ask for assistance with transferring infant if caregiver noted to have fall risk factors  Taken 4/30/2024 0800  Free From Fall Injury: Instruct family/caregiver on patient safety     Problem: Confusion  Goal: Confusion, delirium, dementia, or psychosis is improved or at baseline  Description: INTERVENTIONS:  1. Assess for possible contributors to thought disturbance, including medications, impaired vision or hearing, underlying metabolic abnormalities, dehydration, psychiatric diagnoses, and notify attending LIP  2. [place] high risk fall precautions, as indicated  3. Provide frequent short contacts to provide reality reorientation, refocusing and direction  4. Decrease environmental stimuli, including noise as appropriate  5. Monitor and intervene to maintain adequate nutrition, hydration, elimination, sleep and activity  6. If unable to 
  Problem: Discharge Planning  Goal: Discharge to home or other facility with appropriate resources  Outcome: Progressing  Flowsheets (Taken 4/17/2024 0126)  Discharge to home or other facility with appropriate resources:   Identify barriers to discharge with patient and caregiver   Arrange for needed discharge resources and transportation as appropriate   Identify discharge learning needs (meds, wound care, etc)   Refer to discharge planning if patient needs post-hospital services based on physician order or complex needs related to functional status, cognitive ability or social support system  Note: Fall protocol in place      Problem: Safety - Adult  Goal: Free from fall injury  Outcome: Progressing  Flowsheets (Taken 4/17/2024 0126)  Free From Fall Injury: Instruct family/caregiver on patient safety     Problem: Confusion  Goal: Confusion, delirium, dementia, or psychosis is improved or at baseline  Description: INTERVENTIONS:  1. Assess for possible contributors to thought disturbance, including medications, impaired vision or hearing, underlying metabolic abnormalities, dehydration, psychiatric diagnoses, and notify attending LIP  2. [place] high risk fall precautions, as indicated  3. Provide frequent short contacts to provide reality reorientation, refocusing and direction  4. Decrease environmental stimuli, including noise as appropriate  5. Monitor and intervene to maintain adequate nutrition, hydration, elimination, sleep and activity  6. If unable to ensure safety without constant attention obtain sitter and review sitter guidelines with assigned personnel  7. Initiate Psychosocial CNS and Spiritual Care consult, as indicated  Outcome: Progressing  Flowsheets (Taken 4/17/2024 0126)  Effect of thought disturbance (confusion, delirium, dementia, or psychosis) are managed with adequate functional status:   Assess for contributors to thought disturbance, including medications, impaired vision or hearing, 
  Problem: Discharge Planning  Goal: Discharge to home or other facility with appropriate resources  Outcome: Progressing  Flowsheets (Taken 4/18/2024 0308)  Discharge to home or other facility with appropriate resources:   Identify barriers to discharge with patient and caregiver   Arrange for needed discharge resources and transportation as appropriate   Identify discharge learning needs (meds, wound care, etc)   Refer to discharge planning if patient needs post-hospital services based on physician order or complex needs related to functional status, cognitive ability or social support system     Problem: Safety - Adult  Goal: Free from fall injury  Outcome: Progressing  Flowsheets (Taken 4/18/2024 0308)  Free From Fall Injury: Instruct family/caregiver on patient safety  Note: Fall protocol In place      Problem: Confusion  Goal: Confusion, delirium, dementia, or psychosis is improved or at baseline  Description: INTERVENTIONS:  1. Assess for possible contributors to thought disturbance, including medications, impaired vision or hearing, underlying metabolic abnormalities, dehydration, psychiatric diagnoses, and notify attending LIP  2. [place] high risk fall precautions, as indicated  3. Provide frequent short contacts to provide reality reorientation, refocusing and direction  4. Decrease environmental stimuli, including noise as appropriate  5. Monitor and intervene to maintain adequate nutrition, hydration, elimination, sleep and activity  6. If unable to ensure safety without constant attention obtain sitter and review sitter guidelines with assigned personnel  7. Initiate Psychosocial CNS and Spiritual Care consult, as indicated  Outcome: Progressing  Flowsheets (Taken 4/18/2024 0308)  Effect of thought disturbance (confusion, delirium, dementia, or psychosis) are managed with adequate functional status:   Assess for contributors to thought disturbance, including medications, impaired vision or hearing, 
  Problem: Discharge Planning  Goal: Discharge to home or other facility with appropriate resources  Outcome: Progressing  Flowsheets (Taken 4/19/2024 2000)  Discharge to home or other facility with appropriate resources:   Identify barriers to discharge with patient and caregiver   Identify discharge learning needs (meds, wound care, etc)   Arrange for needed discharge resources and transportation as appropriate   Arrange for interpreters to assist at discharge as needed   Refer to discharge planning if patient needs post-hospital services based on physician order or complex needs related to functional status, cognitive ability or social support system     Problem: Safety - Adult  Goal: Free from fall injury  Outcome: Progressing     Problem: Confusion  Goal: Confusion, delirium, dementia, or psychosis is improved or at baseline  Description: INTERVENTIONS:  1. Assess for possible contributors to thought disturbance, including medications, impaired vision or hearing, underlying metabolic abnormalities, dehydration, psychiatric diagnoses, and notify attending LIP  2. [place] high risk fall precautions, as indicated  3. Provide frequent short contacts to provide reality reorientation, refocusing and direction  4. Decrease environmental stimuli, including noise as appropriate  5. Monitor and intervene to maintain adequate nutrition, hydration, elimination, sleep and activity  6. If unable to ensure safety without constant attention obtain sitter and review sitter guidelines with assigned personnel  7. Initiate Psychosocial CNS and Spiritual Care consult, as indicated  Outcome: Progressing  Flowsheets  Taken 4/20/2024 0000  Effect of thought disturbance (confusion, delirium, dementia, or psychosis) are managed with adequate functional status:   Assess for contributors to thought disturbance, including medications, impaired vision or hearing, underlying metabolic abnormalities, dehydration, psychiatric diagnoses, 
  Problem: Discharge Planning  Goal: Discharge to home or other facility with appropriate resources  Outcome: Progressing  Flowsheets (Taken 4/20/2024 0800)  Discharge to home or other facility with appropriate resources:   Identify barriers to discharge with patient and caregiver   Arrange for needed discharge resources and transportation as appropriate   Identify discharge learning needs (meds, wound care, etc)   Arrange for interpreters to assist at discharge as needed   Refer to discharge planning if patient needs post-hospital services based on physician order or complex needs related to functional status, cognitive ability or social support system     Problem: Safety - Adult  Goal: Free from fall injury  Outcome: Progressing  Flowsheets (Taken 4/20/2024 1056)  Free From Fall Injury:   Instruct family/caregiver on patient safety   Based on caregiver fall risk screen, instruct family/caregiver to ask for assistance with transferring infant if caregiver noted to have fall risk factors     Problem: Confusion  Goal: Confusion, delirium, dementia, or psychosis is improved or at baseline  Description: INTERVENTIONS:  1. Assess for possible contributors to thought disturbance, including medications, impaired vision or hearing, underlying metabolic abnormalities, dehydration, psychiatric diagnoses, and notify attending LIP  2. [place] high risk fall precautions, as indicated  3. Provide frequent short contacts to provide reality reorientation, refocusing and direction  4. Decrease environmental stimuli, including noise as appropriate  5. Monitor and intervene to maintain adequate nutrition, hydration, elimination, sleep and activity  6. If unable to ensure safety without constant attention obtain sitter and review sitter guidelines with assigned personnel  7. Initiate Psychosocial CNS and Spiritual Care consult, as indicated  Outcome: Progressing  Flowsheets (Taken 4/20/2024 0800)  Effect of thought disturbance 
  Problem: Discharge Planning  Goal: Discharge to home or other facility with appropriate resources  Outcome: Progressing  Flowsheets (Taken 4/25/2024 2000 by Ruben Witt, RN)  Discharge to home or other facility with appropriate resources: Identify barriers to discharge with patient and caregiver     Problem: Safety - Adult  Goal: Free from fall injury  Outcome: Progressing  Flowsheets (Taken 4/25/2024 2000 by Ruben Witt, RN)  Free From Fall Injury: Instruct family/caregiver on patient safety  Note: Pt free of falls this shift.     Problem: Confusion  Goal: Confusion, delirium, dementia, or psychosis is improved or at baseline  Description: INTERVENTIONS:  1. Assess for possible contributors to thought disturbance, including medications, impaired vision or hearing, underlying metabolic abnormalities, dehydration, psychiatric diagnoses, and notify attending LIP  2. [place] high risk fall precautions, as indicated  3. Provide frequent short contacts to provide reality reorientation, refocusing and direction  4. Decrease environmental stimuli, including noise as appropriate  5. Monitor and intervene to maintain adequate nutrition, hydration, elimination, sleep and activity  6. If unable to ensure safety without constant attention obtain sitter and review sitter guidelines with assigned personnel  7. Initiate Psychosocial CNS and Spiritual Care consult, as indicated  Outcome: Progressing  Flowsheets (Taken 4/25/2024 2000 by Ruben Witt, RN)  Effect of thought disturbance (confusion, delirium, dementia, or psychosis) are managed with adequate functional status: Assess for contributors to thought disturbance, including medications, impaired vision or hearing, underlying metabolic abnormalities, dehydration, psychiatric diagnoses, notify LIP  Note: Pt has been a+o x's 4 all shift. Obey commands and very pleasant .     Problem: Pain  Goal: Verbalizes/displays adequate comfort level or baseline comfort 
  Problem: Discharge Planning  Goal: Discharge to home or other facility with appropriate resources  Outcome: Progressing  Flowsheets (Taken 4/25/2024 2000 by Ruben Witt, RN)  Discharge to home or other facility with appropriate resources: Identify barriers to discharge with patient and caregiver     Problem: Safety - Adult  Goal: Free from fall injury  Outcome: Progressing  Note: Pt free of falls all shift. Bed alarm is on and call light is in reach     Problem: Skin/Tissue Integrity  Goal: Absence of new skin breakdown  Description: 1.  Monitor for areas of redness and/or skin breakdown  2.  Assess vascular access sites hourly  3.  Every 4-6 hours minimum:  Change oxygen saturation probe site  4.  Every 4-6 hours:  If on nasal continuous positive airway pressure, respiratory therapy assess nares and determine need for appliance change or resting period.  Note: No new skin issues     Problem: Confusion  Goal: Confusion, delirium, dementia, or psychosis is improved or at baseline  Description: INTERVENTIONS:  1. Assess for possible contributors to thought disturbance, including medications, impaired vision or hearing, underlying metabolic abnormalities, dehydration, psychiatric diagnoses, and notify attending LIP  2. [facility] high risk fall precautions, as indicated  3. Provide frequent short contacts to provide reality reorientation, refocusing and direction  4. Decrease environmental stimuli, including noise as appropriate  5. Monitor and intervene to maintain adequate nutrition, hydration, elimination, sleep and activity  6. If unable to ensure safety without constant attention obtain sitter and review sitter guidelines with assigned personnel  7. Initiate Psychosocial CNS and Spiritual Care consult, as indicated  Flowsheets (Taken 4/25/2024 2000 by Ruben Witt, RN)  Effect of thought disturbance (confusion, delirium, dementia, or psychosis) are managed with adequate functional status: Assess for 
  Problem: Safety - Adult  Goal: Free from fall injury  Outcome: Progressing  Flowsheets (Taken 4/30/2024 0608)  Free From Fall Injury:   Instruct family/caregiver on patient safety   Based on caregiver fall risk screen, instruct family/caregiver to ask for assistance with transferring infant if caregiver noted to have fall risk factors  Note: All stanrd safety precautions in place, pt free from injury.     Problem: Pain  Goal: Verbalizes/displays adequate comfort level or baseline comfort level  Outcome: Progressing  Flowsheets (Taken 4/30/2024 0608)  Verbalizes/displays adequate comfort level or baseline comfort level:   Encourage patient to monitor pain and request assistance   Assess pain using appropriate pain scale   Administer analgesics based on type and severity of pain and evaluate response   Implement non-pharmacological measures as appropriate and evaluate response  Note: Pt c/o of high pain levels PRN and scheduled medications given for management. Pt repositioned as needed.     
and/or skin breakdown  2.  Assess vascular access sites hourly  3.  Every 4-6 hours minimum:  Change oxygen saturation probe site  4.  Every 4-6 hours:  If on nasal continuous positive airway pressure, respiratory therapy assess nares and determine need for appliance change or resting period.  Outcome: Progressing     Problem: Nutrition Deficit:  Goal: Optimize nutritional status  Outcome: Not Progressing     
maintain adequate nutrition, hydration, elimination, sleep and activity   If unable to ensure safety without constant attention obtain sitter and review sitter guidelines with assigned personnel   Initiate Psychosocial Clinical Nurse Specialist and Spiritual Care consult, as indicated     Problem: Risk for Elopement  Goal: Patient will not exit the unit/facility without proper excort  4/21/2024 1945 by Kiera Rosado, RN  Outcome: Progressing  4/21/2024 1222 by Grace Garza, RN  Outcome: Progressing  Flowsheets  Taken 4/21/2024 1200  Nursing Interventions for Elopement Risk:   Assist with personal care needs such as toileting, eating, dressing, as needed to reduce the risk of wandering   Collaborate with family members/caregivers to mitigate the elopement risk   Collaborate with treatment team for drug withdrawal symptoms treatment   Collaborate with treatment team for nicotine replacement   Communicate/escalate to charge nurse the risk of elopement   Communicate/escalate to /other team member the risk of elopement   Communicate/escalate to nursing supervisor the risk of elopement   Communicate to physician the risk for elopement   Escort with two staff members if patient must leave the unit   Place patient in room far away from exits and stairways   Make sure patient has all necessary personal care items   Reduce environmental triggers   Shoes and clothing collected and placed in gown attire  Taken 4/21/2024 0800  Nursing Interventions for Elopement Risk:   Assist with personal care needs such as toileting, eating, dressing, as needed to reduce the risk of wandering   Collaborate with family members/caregivers to mitigate the elopement risk   Collaborate with treatment team for drug withdrawal symptoms treatment   Collaborate with treatment team for nicotine replacement   Communicate/escalate to charge nurse the risk of elopement   Communicate/escalate to /other team member the 
saturation probe site  4.  Every 4-6 hours:  If on nasal continuous positive airway pressure, respiratory therapy assess nares and determine need for appliance change or resting period.  4/22/2024 0800 by Gianfranco Tapia, RN  Outcome: Progressing     Problem: ABCDS Injury Assessment  Goal: Absence of physical injury  4/22/2024 0800 by Gianfranco Tapia, RN  Outcome: Progressing  Flowsheets (Taken 4/22/2024 0756)  Absence of Physical Injury: Implement safety measures based on patient assessment     
Assess pain using appropriate pain scale   Administer analgesics based on type and severity of pain and evaluate response   Implement non-pharmacological measures as appropriate and evaluate response   Consider cultural and social influences on pain and pain management     Problem: Chronic Conditions and Co-morbidities  Goal: Patient's chronic conditions and co-morbidity symptoms are monitored and maintained or improved  Outcome: Progressing  Flowsheets (Taken 4/21/2024 0800)  Care Plan - Patient's Chronic Conditions and Co-Morbidity Symptoms are Monitored and Maintained or Improved:   Monitor and assess patient's chronic conditions and comorbid symptoms for stability, deterioration, or improvement   Collaborate with multidisciplinary team to address chronic and comorbid conditions and prevent exacerbation or deterioration   Update acute care plan with appropriate goals if chronic or comorbid symptoms are exacerbated and prevent overall improvement and discharge     Problem: Nutrition Deficit:  Goal: Optimize nutritional status  Outcome: Progressing  Flowsheets (Taken 4/19/2024 1224 by Keiry Burns, HEATHER)  Nutrient intake appropriate for improving, restoring, or maintaining nutritional needs:   Monitor oral intake, labs, and treatment plans   Assess nutritional status and recommend course of action     Problem: Skin/Tissue Integrity  Goal: Absence of new skin breakdown  Description: 1.  Monitor for areas of redness and/or skin breakdown  2.  Assess vascular access sites hourly  3.  Every 4-6 hours minimum:  Change oxygen saturation probe site  4.  Every 4-6 hours:  If on nasal continuous positive airway pressure, respiratory therapy assess nares and determine need for appliance change or resting period.  Outcome: Progressing     Problem: ABCDS Injury Assessment  Goal: Absence of physical injury  Outcome: Progressing  Flowsheets (Taken 4/21/2024 0800)  Absence of Physical Injury: Implement safety measures based on 
has all necessary personal care items   Reduce environmental triggers   Shoes and clothing collected and placed in gown attire     Problem: Pain  Goal: Verbalizes/displays adequate comfort level or baseline comfort level  4/23/2024 1124 by Shea Lloyd RN  Outcome: Progressing  4/23/2024 0334 by Diane Parker RN  Outcome: Progressing  Flowsheets (Taken 4/22/2024 1521 by Gianfranco Tapia RN)  Verbalizes/displays adequate comfort level or baseline comfort level: Encourage patient to monitor pain and request assistance     Problem: Chronic Conditions and Co-morbidities  Goal: Patient's chronic conditions and co-morbidity symptoms are monitored and maintained or improved  4/23/2024 1124 by Shea Lloyd RN  Outcome: Progressing  4/23/2024 0334 by Diane Parker RN  Outcome: Progressing     Problem: Nutrition Deficit:  Goal: Optimize nutritional status  4/23/2024 1124 by Shea Lloyd RN  Outcome: Progressing  4/23/2024 0334 by Diane Parker RN  Outcome: Progressing     Problem: Skin/Tissue Integrity  Goal: Absence of new skin breakdown  Description: 1.  Monitor for areas of redness and/or skin breakdown  2.  Assess vascular access sites hourly  3.  Every 4-6 hours minimum:  Change oxygen saturation probe site  4.  Every 4-6 hours:  If on nasal continuous positive airway pressure, respiratory therapy assess nares and determine need for appliance change or resting period.  4/23/2024 1124 by Shea Lloyd RN  Outcome: Progressing  4/23/2024 0334 by Diane Parker RN  Outcome: Progressing     Problem: ABCDS Injury Assessment  Goal: Absence of physical injury  4/23/2024 1124 by Shea Lloyd RN  Outcome: Progressing  Flowsheets (Taken 4/23/2024 1122)  Absence of Physical Injury: Implement safety measures based on patient assessment  4/23/2024 0334 by Diane Parker RN  Outcome: Progressing     Problem: Respiratory - Adult  Goal: Achieves optimal ventilation and oxygenation  Outcome: Progressing     Problem: 
and symptoms of volume excess or deficit  4/25/2024 1411 by Nakia Acrher  Outcome: Progressing  Goal: Glucose maintained within prescribed range  4/25/2024 2113 by Ruben Witt, RN  Outcome: Progressing  Flowsheets (Taken 4/25/2024 2000)  Glucose maintained within prescribed range: Assess for signs and symptoms of hyperglycemia and hypoglycemia  4/25/2024 1411 by Nakia Archer  Outcome: Progressing

## 2024-04-30 NOTE — ACP (ADVANCE CARE PLANNING)
Advance Care Planning     Advance Care Planning Inpatient Note  [facility] Department    Today's Date: 4/30/2024  Unit: [facility] 4 PCU    Received request from IDT Member.  Upon review of chart and communication with care team, patient's decision making abilities are not in question.. Patient was/were present in the room during visit.    Goals of ACP Conversation:  Discuss advance care planning documents    Health Care Decision Makers:       Primary Decision Maker: Charity Sarabia - Child - [phone]    Secondary Decision Maker: shola rowley - Brother/Sister - [phone]  Summary:  Updated Healthcare Decision Maker    Advance Care Planning Documents (Patient Wishes):  Healthcare Power of /Advance Directive Appointment of Health Care Agent  Living Will/Advance Directive     Assessment:  Follow up visit for Advanced Directives. Pt has 4 children, 10 grandchildren and 3 siblings. Pt is currently not . Pt described his illness as \"an eye opener.\" \"Now I know who cares about me. My family has been there for me. Now I want to live for them.\" Pt talked about his hospitalization and reasons for hospitalization. \"I don't know why I got here. I was not eating or answering phones for more than 8 days. I think I was depressed, but didn't realize it.\" Pt working on getting himself \"back together.\" Pt wants his daughter Charity, to make his medical decisions if he looses capacity. Pt wants to discuss with Charity before making decision. Pt also wants his brother James to be a secondary person. Pt will call when ready to complete Advanced Directives. No other needs at this time.    Interventions:  Provided education on documents for clarity and greater understanding  Discussed and provided education on state decision maker hierarchy  Reviewed but did not complete ACP document    Outcomes/Plan:  ACP Discussion: Postponed  Teach Back Method used to verify the patient's and/or Healthcare Decision Maker's understanding of key

## 2024-04-30 NOTE — DISCHARGE SUMMARY
04/17/2024 05:00 AM    RBCUA  04/17/2024 05:00 AM    MUCUS 1+ 03/28/2014 05:30 AM    BACTERIA Rare 06/19/2015 04:33 PM    CLARITYU CLOUDY 04/17/2024 05:00 AM    SPECGRAV 1.020 04/17/2024 05:00 AM    LEUKOCYTESUR Negative 04/17/2024 05:00 AM    UROBILINOGEN 1.0 04/17/2024 05:00 AM    BILIRUBINUR Negative 04/17/2024 05:00 AM    BILIRUBINUR Negative 12/02/2011 03:17 PM    BLOODU LARGE 04/17/2024 05:00 AM    GLUCOSEU Negative 04/17/2024 05:00 AM    GLUCOSEU Negative 12/02/2011 03:17 PM    KETUA TRACE 04/17/2024 05:00 AM    AMORPHOUS 3+ 04/17/2024 05:00 AM     Urine Cultures:   Lab Results   Component Value Date/Time    LABURIN No growth at 18 to 36 hours 08/28/2023 09:20 AM     Blood Cultures:   Lab Results   Component Value Date/Time    BC No Growth after 4 days of incubation. 04/16/2024 10:19 PM     Lab Results   Component Value Date/Time    BLOODCULT2 No Growth after 4 days of incubation. 04/16/2024 10:21 PM     Organism:   Lab Results   Component Value Date/Time    ORG C. difficile toxin B gene detected 10/17/2016 03:01 AM       Time Spent Discharging patient 45  minutes    Electronically signed by Nguyen Edmond MD on 4/30/2024 at 2:50 PM

## 2024-04-30 NOTE — DISCHARGE INSTR - COC
[phone], and [facility] [phone].  Please use hospital discharge weight as baseline reference.   Please monitor for signs and symptoms of and report to MD:  Worsening Heart Failure: sudden weight gain, shortness of breath, lower extremity or general edema/swelling, abdominal bloating/swelling, inability to lie flat, intolerance to usual activity, or cough (especially at night). Report these finding even if no increase in weight.  Dehydration:  having difficulty or a decrease in urination, dizziness, worsening fatigue, or new onset/worsening of generalized weakness.     Please continue a LOW SODIUM diet and LIMIT fluid intake to 48 - 64 ounces ( 1.5 - 2 liters) per day.     Call Hosea Mckee -[phone], [facility] MD, and [facility] [phone] with any questions or concerns.   Please continue heart failure education to patient and family/support system.  See After Visit Summary for hospital follow up appointment details.  Consider spiritual care referral for support and/or completion of advance directives .  Consider: having the [facility] MD complete required 7 day follow up, [facility] telehealth program if patient agreeable and able to participate, and palliative care consult for ongoing goals of care, end of life, and/or chronic disease management discussions.  Patient's primary cardiologist is Dr Davies.      Rehab Therapies: Physical Therapy and Occupational Therapy  Weight Bearing Status/Restrictions: No weight bearing restrictions  Other Medical Equipment (for information only, NOT a DME order):  none  Other Treatments: none    Patient's personal belongings (please select all that are sent with patient):  Glasses    RN SIGNATURE:  Electronically signed by Saima Villa RN on 4/30/24 at 1:02 PM EDT    CASE MANAGEMENT/SOCIAL WORK SECTION    Inpatient Status Date: ***    Readmission Risk Assessment Score:  Readmission Risk

## 2024-05-01 LAB
EKG ATRIAL RATE: 56 BPM
EKG DIAGNOSIS: NORMAL
EKG P AXIS: 30 DEGREES
EKG P-R INTERVAL: 192 MS
EKG Q-T INTERVAL: 468 MS
EKG QRS DURATION: 114 MS
EKG QTC CALCULATION (BAZETT): 451 MS
EKG R AXIS: 34 DEGREES
EKG T AXIS: 222 DEGREES
EKG VENTRICULAR RATE: 56 BPM

## 2024-05-01 PROCEDURE — 93010 ELECTROCARDIOGRAM REPORT: CPT | Performed by: INTERNAL MEDICINE

## 2024-05-01 NOTE — PROGRESS NOTES
Electrophysiology - PROGRESS NOTE    Admit Date: 4/16/2024     Chief Complaint: VF, CHB     Interval History:   Patient seen and examined and notes reviewed. Patient is being followed for VF, CHB. Patient had p/w AMS, hematuria and hematemesis (4/16/2024). S/p VF arrest (4/18) w/ 4  shocks w/ ROSC. K+ 2.6 - replaced. EF 30-35% per echo. Planned for ischemic eval when condition improves. ACE, Entresto and spironolactone on hold for improvement of Cr prior to C. Cr 1.5. Forgetful.     In: 285 [P.O.:120; I.V.:165]  Out: 825    Wt Readings from Last 2 Encounters:   04/24/24 85.6 kg (188 lb 11.4 oz)   04/11/24 87.7 kg (193 lb 4.8 oz)       Data:   Scheduled Meds:   Scheduled Meds:   famotidine  20 mg Oral BID    fluticasone  2 spray Each Nostril Daily    thiamine  200 mg Oral Daily    multivitamin  1 tablet Oral Daily    folic acid  1 mg Oral Daily    methocarbamol  750 mg Oral TID    naloxegol  12.5 mg Oral QAM AC    polyethylene glycol  17 g Oral Daily    budesonide  0.5 mg Nebulization BID RT    arformoterol tartrate  15 mcg Nebulization BID RT    lidocaine  1 patch TransDERmal Daily    morphine  15 mg Oral TID    pregabalin  75 mg Oral TID    melatonin  5 mg Oral Nightly    aspirin  81 mg Oral Daily    citalopram  40 mg Oral Daily    insulin lispro  0-4 Units SubCUTAneous TID WC    insulin lispro  0-4 Units SubCUTAneous Nightly    [Held by provider] metoprolol tartrate  25 mg Oral BID    rosuvastatin  20 mg Oral Nightly    sodium chloride flush  5-40 mL IntraVENous 2 times per day     Continuous Infusions:   sodium chloride      dextrose       PRN Meds:.acetaminophen, magnesium sulfate, potassium chloride **OR** potassium alternative oral replacement **OR** potassium chloride, sodium phosphate 15 mmol in sodium chloride 0.9 % 250 mL IVPB, oxyCODONE-acetaminophen, diphenhydrAMINE, perflutren lipid microspheres, simethicone, albuterol, sodium chloride flush, 
                                                        Electrophysiology - PROGRESS NOTE    Admit Date: 4/16/2024     Chief Complaint: VF, CHB     Interval History:   Patient seen and examined and notes reviewed. Patient is being followed for VF, CHB. Patient had p/w AMS, hematuria and hematemesis (4/16/2024). S/p VF arrest (4/18) w/ 4  shocks w/ ROSC. K+ 2.6 - replaced. EF 30-35% per echo. Planned for ischemic eval when condition improves. ACE, Entresto and spironolactone on hold for improvement of Cr prior to LHC. Cr 1.4. Forgetful. For LHC today. C/o abd pain after taking po K+.    In: 490 [P.O.:490]  Out: 400    Wt Readings from Last 2 Encounters:   04/25/24 86 kg (189 lb 9.5 oz)   04/11/24 87.7 kg (193 lb 4.8 oz)       Data:   Scheduled Meds:   Scheduled Meds:   famotidine  20 mg Oral BID    fluticasone  2 spray Each Nostril Daily    multivitamin  1 tablet Oral Daily    folic acid  1 mg Oral Daily    methocarbamol  750 mg Oral TID    naloxegol  12.5 mg Oral QAM AC    polyethylene glycol  17 g Oral Daily    budesonide  0.5 mg Nebulization BID RT    arformoterol tartrate  15 mcg Nebulization BID RT    lidocaine  1 patch TransDERmal Daily    morphine  15 mg Oral TID    pregabalin  75 mg Oral TID    melatonin  5 mg Oral Nightly    aspirin  81 mg Oral Daily    citalopram  40 mg Oral Daily    [Held by provider] metoprolol tartrate  25 mg Oral BID    rosuvastatin  20 mg Oral Nightly    sodium chloride flush  5-40 mL IntraVENous 2 times per day     Continuous Infusions:   sodium chloride      dextrose       PRN Meds:.acetaminophen, magnesium sulfate, potassium chloride **OR** potassium alternative oral replacement **OR** potassium chloride, sodium phosphate 15 mmol in sodium chloride 0.9 % 250 mL IVPB, oxyCODONE-acetaminophen, diphenhydrAMINE, perflutren lipid microspheres, simethicone, albuterol, sodium chloride flush, sodium chloride, ondansetron **OR** ondansetron, glucose, dextrose bolus **OR** dextrose bolus, 
                                                    Electrophysiology - PROGRESS NOTE    Admit Date: 4/16/2024     Chief Complaint: VF, CHB     Interval History:   Patient seen and examined and notes reviewed. Patient is being followed for VF, and CHB.  Patient had presented with altered mental status, hematuria and hematemesis (4/16/2024). S/p VF arrest (4/18/2024) with 4 shocks and ROSC. Potassium was found to be 2.6 and was replaced. EF 30-35% per echo. Patient underwent LHC yesterday with Dr. Mason. It showed significant native coronary artery disease involving the right coronary artery. No intervention done during LHC. Patient has remained SR/SB overnight. Patient resting in bed this morning.     In: 329.2 [P.O.:240; I.V.:89.2]  Out: 750    Wt Readings from Last 2 Encounters:   04/26/24 83.1 kg (183 lb 3.2 oz)   04/11/24 87.7 kg (193 lb 4.8 oz)         Data:   Scheduled Meds:   Scheduled Meds:   sodium chloride flush  5-40 mL IntraVENous 2 times per day    famotidine  20 mg Oral BID    fluticasone  2 spray Each Nostril Daily    multivitamin  1 tablet Oral Daily    folic acid  1 mg Oral Daily    methocarbamol  750 mg Oral TID    naloxegol  12.5 mg Oral QAM AC    polyethylene glycol  17 g Oral Daily    budesonide  0.5 mg Nebulization BID RT    arformoterol tartrate  15 mcg Nebulization BID RT    lidocaine  1 patch TransDERmal Daily    morphine  15 mg Oral TID    pregabalin  75 mg Oral TID    melatonin  5 mg Oral Nightly    aspirin  81 mg Oral Daily    citalopram  40 mg Oral Daily    [Held by provider] metoprolol tartrate  25 mg Oral BID    rosuvastatin  20 mg Oral Nightly    sodium chloride flush  5-40 mL IntraVENous 2 times per day     Continuous Infusions:   sodium chloride      sodium chloride      dextrose       PRN Meds:.sodium chloride flush, sodium chloride, acetaminophen, magnesium sulfate, potassium chloride **OR** potassium alternative oral replacement **OR** potassium chloride, sodium phosphate 15 
                                      ONCOLOGY HEMATOLOGY CARE PROGRESS NOTE      SUBJECTIVE:    He is seen sitting up in the bedside chair with his family at bedside. He c/o ear fullness which was previously well controlled by Flonase. States he brought his bottle with him to the hospital but it is now missing and he is requesting this again.     He remains undecided about goals of care. Daughter is supportive and states his treatment course is up to him and she will support him with whatever decision he makes. Reports he is overwhelmed by decisions for pacemaker and feeding tube.     ROS:     Constitutional:  No weight loss, No fever, No chills, No night sweats.  Energy level poor.   Eyes:  No impairment or change in vision  ENT / Mouth:  No pain, abnormal ulceration, bleeding, nasal drip or change in voice or hearing  Cardiovascular:  No chest pain, palpitations, new edema, or calf discomfort  Respiratory:  No pain, hemoptysis, change to breathing  Breast:  No pain, discharge, change in appearance or texture  Gastrointestinal:  No pain, cramping, jaundice, change to eating and bowel habits  Urinary:  No pain, bleeding or change in continence  Genitalia: No pain, bleeding or discharge  Musculoskeletal:  No redness, pain, edema or weakness  Skin:  No pruritus, rash, change to nodules or lesions  Neurologic:  No discomfort, change in mental status, speech, sensory or motor activity  Psychiatric:  No change in concentration or change to affect or mood  Endocrine:  No hot flashes, increased thirst, or change to urine production  Hematologic: No petechiae, ecchymosis or bleeding  Lymphatic:  No lymphadenopathy or lymphedema  Allergy / Immunologic:  No eczema, hives, frequent or recurrent infections    OBJECTIVE        Physical    VITALS:  Patient Vitals for the past 24 hrs:   BP Temp Temp src Pulse Resp SpO2 Weight   04/23/24 0904 -- -- -- -- 16 -- --   04/23/24 0900 90/62 -- -- 55 15 100 % --   04/23/24 0857 -- -- -- 
                                      ONCOLOGY HEMATOLOGY CARE PROGRESS NOTE      SUBJECTIVE:    Oncology re-engaged due to worsening anemia. Patient is resting quietly in bed today, no family at bedside.     OBJECTIVE        Physical    VITALS:  Patient Vitals for the past 24 hrs:   BP Temp Temp src Pulse Resp SpO2 Weight   04/26/24 1206 100/60 97.9 °F (36.6 °C) Oral 64 20 95 % --   04/26/24 0943 -- -- -- -- 18 -- --   04/26/24 0911 99/62 97.6 °F (36.4 °C) Oral 63 20 94 % --   04/26/24 0817 -- -- -- -- -- 93 % --   04/26/24 0527 -- -- -- -- -- -- 83.1 kg (183 lb 3.2 oz)   04/26/24 0501 117/68 98.2 °F (36.8 °C) Oral 59 14 92 % --   04/26/24 0400 (!) 96/40 98.5 °F (36.9 °C) Oral 58 10 93 % --   04/26/24 0300 105/61 -- -- 59 (!) 9 100 % --   04/26/24 0200 (!) 92/54 -- -- 59 11 95 % --   04/26/24 0100 105/63 -- -- 57 11 95 % --   04/26/24 0000 112/73 98.5 °F (36.9 °C) Oral 60 11 93 % --   04/25/24 2300 111/60 -- -- 63 10 92 % --   04/25/24 2200 (!) 96/42 -- -- 61 12 92 % --   04/25/24 2139 -- -- -- 60 13 100 % --   04/25/24 2135 -- -- -- 66 14 93 % --   04/25/24 2100 107/65 -- -- 65 16 90 % --   04/25/24 2000 (!) 92/54 98.3 °F (36.8 °C) Oral 66 13 91 % --   04/25/24 1900 98/68 -- -- 57 14 93 % --   04/25/24 1800 117/79 -- -- 58 11 97 % --   04/25/24 1700 119/69 -- -- 60 13 95 % --   04/25/24 1630 (!) 126/109 -- -- 60 16 95 % --   04/25/24 1615 117/77 -- -- 61 10 95 % --   04/25/24 1600 93/75 -- -- 61 20 96 % --   04/25/24 1545 110/73 -- -- 62 14 95 % --   04/25/24 1535 128/64 -- -- 62 14 (!) 89 % --   04/25/24 1515 (!) 156/138 -- -- 60 16 94 % --   04/25/24 1504 -- 98.2 °F (36.8 °C) Oral -- -- -- --   04/25/24 1501 (!) 149/70 -- -- 59 13 91 % --   04/25/24 1447 127/65 -- -- 64 17 (!) 81 % --       24HR INTAKE/OUTPUT:    Intake/Output Summary (Last 24 hours) at 4/26/2024 1333  Last data filed at 4/25/2024 2300  Gross per 24 hour   Intake 89.22 ml   Output 425 ml   Net -335.78 ml       CONSTITUTIONAL: no apparent 
                                      ONCOLOGY HEMATOLOGY CARE PROGRESS NOTE      SUBJECTIVE: Code Blue April 18 for v-fib arrest. Currently in the ICU on NC oxygen. C/O chest wall pain.      ROS:  Constitutional:  Positive weight loss, No fever, No chills, No night sweats.  Energy level poor.  Eyes:  No impairment or change in vision  ENT / Mouth:  No pain, abnormal ulceration, bleeding, nasal drip or change in voice or hearing  Cardiovascular:  No chest pain, palpitations, new edema, or calf discomfort  Respiratory:  No pain, hemoptysis, change to breathing  Breast:  No pain, discharge, change in appearance or texture  Gastrointestinal:  No pain, cramping, jaundice, change to eating and bowel habits  Urinary:  No pain, bleeding or change in continence  Musculoskeletal:  No redness, pain, edema or weakness  Skin:  No pruritus, rash, change to nodules or lesions  Neurologic:  No discomfort, change in mental status, speech, sensory or motor activity  Psychiatric:  No change in concentration or change to affect or mood  Endocrine:  No hot flashes, increased thirst, or change to urine production  Hematologic: No petechiae, ecchymosis or bleeding  Lymphatic:  No lymphadenopathy or lymphedema  Allergy / Immunologic:  No eczema, hives, frequent or recurrent infections    OBJECTIVE      HISTORY OF PRESENT ILLNESS:     Mr. Witt  is a 58 y.o. male we are seeing in consultation for Lung cancer. He has a PMH which includes colon cancer x 2, testicular cancer, and more recently NSCLC.      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 He had an orchiectomy as well as tumor removal from his abdomen      He also has history of colon cancer x2 and has had roughly 3 feet of his bowel removed per his report.      Regarding his Lung cancer, he was followed by Dr. Cherry as an outpatient and was followed for a pulmonary nodule. This ultimately was found to be PET positive  and he 
     ICU Progress Note    Admit Date: 4/16/2024  Day: 1  Vent Day: 0  IV Access:Peripheral  IV Fluids:None  Vasopressors:None                Antibiotics: none  Diet: ADULT DIET; Regular; 4 carb choices (60 gm/meal)  ADULT ORAL NUTRITION SUPPLEMENT; Dinner, Lunch; Frozen Oral Supplement    CC: s/p cardiac arrest, initially came for hematemesis and hematuria    Interval history: Patient was seen and examined at bedside this morning. VSS, except some episodes of bradycardia to high 50s. Creatinine continues to rise from 1.3 to 1.5. K continues to drop and get repleted. Patient does not feel like he has energy to eat. Spoke to Dietician about the possibility of starting tube feeds. Palliative will speak to patient on goals of care and how aggressive we need to be with management. Abdomen continues to be tender and tympanic, will get KUB.     HPI:   Mr. Alexandr Witt is a 58 year old male with PMHx of non small cell lung cancer in the left lung, colon cancer times 2, A fib on Coumadin, testicular cancer, COPD, CAD and DM type 2 who presented to the emergency department pm 4/16 due to a couple days of hematuria with some hematemesis.  Patient at the time was not able to quantify the amount.  He was not able to eat or drink well but continues to take his home medications.  Patient also complained of shortness of breath with a nonproductive cough.  He denied fevers chills chest pain abdominal pain.  Patient also goes to a pain clinic and receives morphine.      Initial vital signs showed a initial pulse ox of 83% on room air, pulse of 41 that improved, hypotensive to BP 96/67. Labs significant for LA of 2.8, trops elevated to 65, down trended to 54. CT scan head showed no acute finding, CTA chest showed no pulmonary embolism but mild patchy opacities throughout the left lung.      Patient had a CODE Blue called on 4/18, initial rhythm showed ventricular fibrillation and was transferred to the ICU. Patient sees Dr. Cherry 
     ICU Progress Note    Admit Date: 4/16/2024  Day: 1  Vent Day: 0  IV Access:Peripheral  IV Fluids:None  Vasopressors:None                Antibiotics: none  Diet: ADULT ORAL NUTRITION SUPPLEMENT; Dinner, Lunch; Frozen Oral Supplement  ADULT DIET; Regular    CC: s/p cardiac arrest, initially came for hematemesis and hematuria    Interval history: Patient was seen and examined at bedside this morning. VSS, except some episodes of bradycardia to high 50s. Creatinine continues to rise, currently now 1.9. K 4 this morning. PT 63.5 and INR 7.68. Patient does not feel like he has energy to eat. Palliative on board. Will need to consider tube feeds for nutrition as he is not meeting his metabolic needs. Abdomen continues to be tender and tympanic, KUB revealed diffuse gas throughout small and large bowel. Patient has naloxagel and simethicone on board. Cardiology on board as well. ECHO revealed EF of 30-35%. Recommendations are to continue Amio and consider cath when Cr and PNA improve. Will start heparin bridge once coumadin is out of his system.     HPI:   Mr. Alexandr Witt is a 58 year old male with PMHx of non small cell lung cancer in the left lung, colon cancer times 2, A fib on Coumadin, testicular cancer, COPD, CAD and DM type 2 who presented to the emergency department pm 4/16 due to a couple days of hematuria with some hematemesis.  Patient at the time was not able to quantify the amount.  He was not able to eat or drink well but continues to take his home medications.  Patient also complained of shortness of breath with a nonproductive cough.  He denied fevers chills chest pain abdominal pain.  Patient also goes to a pain clinic and receives morphine.      Initial vital signs showed a initial pulse ox of 83% on room air, pulse of 41 that improved, hypotensive to BP 96/67. Labs significant for LA of 2.8, trops elevated to 65, down trended to 54. CT scan head showed no acute finding, CTA chest showed no 
     ICU Progress Note    Admit Date: 4/16/2024  Day: 4  Vent Day: 0  IV Access:Peripheral  IV Fluids:None  Vasopressors:None                Antibiotics: none  Diet: ADULT ORAL NUTRITION SUPPLEMENT; Dinner, Lunch; Frozen Oral Supplement  ADULT DIET; Regular    CC: s/p cardiac arrest, initially came for hematemesis and hematuria    Interval history:   Patient was seen at bedside in the AM. No acute overnight events. Had a green BM, K 3.9 with a goal over 4, received 20 mEq. VSS. Not on pressors or any respiratory support. INR trending down from 7 to 6.38. His abdomen looks less distended, tympanic sounds still present, and as per the patient he is passing gas. Continues to take naloxagel and simethicone. Overall pain well controlled. Palliative on board, should consider getting tube feeds to meet metabolic needs as he is very reluctant to eat. Cardiology on board as well. ECHO revealed EF of 30-35%. Recommendations are to continue Amio and consider cath when Cr and PNA improve. Will start heparin bridge once coumadin is out of his system. Can possibly get transferred out of the ICU.       HPI:   Mr. Alexandr Witt is a 58 year old male with PMHx of non small cell lung cancer in the left lung, colon cancer times 2, A fib on Coumadin, testicular cancer, COPD, CAD and DM type 2 who presented to the emergency department pm 4/16 due to a couple days of hematuria with some hematemesis.  Patient at the time was not able to quantify the amount.  He was not able to eat or drink well but continues to take his home medications.  Patient also complained of shortness of breath with a nonproductive cough.  He denied fevers chills chest pain abdominal pain.  Patient also goes to a pain clinic and receives morphine.      Initial vital signs showed a initial pulse ox of 83% on room air, pulse of 41 that improved, hypotensive to BP 96/67. Labs significant for LA of 2.8, trops elevated to 65, down trended to 54. CT scan head showed no 
    V2.0    [facility] [place] Progress Note      Name:  Alexandr Witt /Age/Sex: 1965  (58 y.o. male)   MRN & CSN:  [id number] & [id number] Encounter Date/Time: 2024 12:56 PM EDT   Location:  [facility]/4317-01 PCP: Hosea Mckee MD     Attending:Vince Johansen MD       Hospital Day: 3    Assessment and Recommendations       Assessment/Plan:     Hypokalemia/hypophosphatemia:  Potassium 2.6 and phosphorus 1.8 this a.m.  Supplement and trend in a.m.    Bradycardia:  Second-degree AV block  Rhythm strip reviewed shows sinus bradycardia  Reviewed note from cardiology  Dig and beta-blocker on hold  Heart rate remains in the mid 40s    Confusion:  Unclear etiology at this time  It is somewhat better today  Head CT reviewed by me did not show any bleed or midline shift  MRI brain ordered, pending  Patient on a fair amount of pain medication pending MRI scan results may need to decrease    Pneumonia:  Continue patient on doxycycline and Zosyn    Lung mass:  Case discussed with oncology  Patient did not have any oncology follow-up for his squamous cell lung cancer  Question about possibility of recurrence of squamous cell lung cancer with a 9 mm and 11 mm pulmonary nodules.  9 mm the medial basal left lower lobe had been previously mildly hypermetabolic on PET/CT.  11 mm nodule posterior basal left lower has not been hyper metabolic.    UTI:  Urine culture was not done  UA was cloudy with large amount of blood and positive for nitrates  Rest of UA was unremarkable  Continue current antibiotics remaining on Zosyn at this time    Status post aortic valve replacement on chronic anticoagulation:  Patient remains on warfarin  INR 2.67 today reviewed with pharmacy    Diabetes mellitus type 2:  Sugars running in the mid to upper 100    Hyponatremia:  136 today    Lumbar spinal stenosis:  Currently on morphine sulfate ER 15 mg in the morning and at bedtime  And as needed oxycodone    Paroxysmal atrial fibrillation:  Currently in 
    V2.0    [facility] [place] [facility] [place]      Name:  Alexandr Witt /Age/Sex: 1965  (58 y.o. male)   MRN & CSN:  [id number] & [id number] Encounter Date/Time: 2024 7:01 AM EDT   Location:  [address] [place] [address] PCP: Hosea Mckee MD     Attending:Vince Johansen MD       Hospital Day: 9    Assessment and Recommendations       Assessment/Plan:     Status postcardiac arrest B fib:  Cardiac ischemic workup  Amiodarone discontinued due to bradycardia   Hold other medications till creatinine stabilized  Unable to give beta-blocker due to bradycardia  Reviewed note from EP cardiology  Reviewed rhythm strip shows sinus bradycardia at this time    BRENDA:  Reviewed note from nephrology  Creatinine 1.5 and GFR 53    Pneumonia secondary to aspiration:  Completed course of Zosyn and doxycycline     Bradycardia:  Heart rate running in the 50s    Hypokalemia:   Potassium pending    Acute metabolic encephalopathy of unclear etiology resolved    Lung lesion most likely secondary to non-small cell lung cancer in the left lung:  Reviewed note from pulmonology  Considering bronchoscopy once patient stabilized, will need high-resolution CT prior to [facility]    Status post bovine aortic valve replacement:  History of paroxysmal atrial fibrillation:  History of coronary artery disease status post CABG:  Warfarin on hold  INR 3.02   continue to monitor    Anemia:  Hemoglobin 8.7  Continue to trend    Diabetes mellitus type 2:  Low-dose correction continue to trend  Sugars in the low 100s to mid 100 range    Chronic pain syndrome:  Remains on Lyrica and morphine    Hyponatremia:  134 most recently  Trend    Depression:  Patient continues with bland affect poor p.o. intake  May need to consider feeding tube if patient unable to eat      Diet ADULT ORAL NUTRITION SUPPLEMENT; Dinner, Lunch; Frozen Oral Supplement  ADULT DIET; Regular   DVT Prophylaxis [] Lovenox, []  Heparin, [] SCDs, [] Ambulation,  [] Eliquis, [] Xarelto  [x] Coumadin 
    V2.0    [facility] [place]      Name:  Alexandr Witt /Age/Sex: 1965  (58 y.o. male)   MRN & CSN:  [id number] & [id number] Encounter Date/Time: 2024 8:15 AM EDT   Location:  4317/4317-01 PCP: Hosea Mckee MD     Attending:Nguyen Edmond MD       Hospital Day: 14    Assessment and Recommendations   Alexandr Witt is a 58 y.o. male with pmh of non-small cell lung cancer s/p left minithoracotomy, paroxysmal A-fib on Coumadin, aortic valve replacement, coronary artery disease s/p CABG, diabetes, coronary artery disease, remote history of colon cancer and testicular cancer who admitted with pneumonia and altered mental status.  Patient finished his antibiotic course and mentation improved.  On  patient underwent V-fib cardiac arrest.  The time of cardiac arrest patient was also hypokalemic.  Echo showed a EF of 30 to 35%.  Patient underwent a cardiac angiogram which showed significant disease       Plan:     V-fib cardiac arrest-s/p AICD placement on 2024  -EP consulted      Heart failure reduced EF  -Echo shows a EF of 35% with diffuse hypokinesis  -Not on cardiac medications, beta-blocker/ACE ARB/Aldactone due to bradycardia, hypotension and BRENDA    BRENDA-creatinine stable at around 1.5  -Nephrology consulted    Coronary artery disease s/p CABG  -Recent cardiac angiogram showed completely occluded LAD patent LUZ graft to LAD and significant calcification of left circumflex, dominant RCA that is occluded completely at the midpoint with some collaterals and no left-to-right collateral.  Patient will need possible  at an outpatient  -Continue aspirin and statin    Hypokalemia-resolved monitor potassium    Pneumonia  -Finished antibiotic course    Bradycardia-heart rate improved    Anemia of chronic disease  -B12 folate deficiency  -Continue replacement  -Transfuse less than 7    Paroxysmal A-fib  -Off Coumadin at present.  -Not on any rhythm/rate control medication due to 
    V2.0    [facility] [place]      Name:  Alexandr Witt /Age/Sex: 1965  (58 y.o. male)   MRN & CSN:  [id number] & [id number] Encounter Date/Time: 2024 12:30 PM EDT   Location:  [facility] PCP: Hosea Mckee MD     Attending:Vince Johansen MD       Hospital Day: 4    Assessment and Recommendations       Assessment/Plan:     Status post cardiac arrest with V-fib:  Patient had CODE BLUE on   Currently holding l Lopressor due to tachycardia and low blood pressure    Hypokalemia:  Continues to be ongoing issue  Potassium 2.6 on  since then  Patient received a total of 170 K and potassium currently 3.9 on   Additional 40 of K given this morning and will continue to trend  Goal of K greater than 4  Magnesium 1.9    Aspiration pneumonia:  Patient continues on Zosyn and doxycycline    Confusion/visual auditory hallucinations:  Intermittent in nature  Unclear etiology  MRI brain pending    Bradycardia with second-degree A-V block asymptomatic:  With history of CABG and A-fib  Beta-blockers and dig is been discontinued  Heart rate has improved anywhere from 54-73 currently  Cardiology following patient continue to monitor    Abdominal pain/distention:  Patient with poor p.o. intake  KUB ordered    Lung lesion secondary to non-small cell lung cancer left lun/11 recent PET scan questions left lower lung lesion concerning for malignancy  Patient seen by pulmonology and oncology  Not clear if patient is going to want it biopsied or not at this time    Aortic valve replacement:  Bovine valve  Coumadin discontinued at this time  Trend INR    COPD without exacerbation:  Continue home medication    Diabetes mellitus type 2:  Low-dose correction  Trend sugars    Hyponatremia:  Suspect secondary to poor p.o.   Trend        Diet ADULT ORAL NUTRITION SUPPLEMENT; Dinner, Lunch; Frozen Oral Supplement  ADULT DIET; Regular   DVT Prophylaxis [] Lovenox, []  Heparin, [x] SCDs, [] Ambulation,  [] 
    V2.0    [facility] [place] Progress Note      Name:  Alexandr Witt /Age/Sex: 1965  (58 y.o. male)   MRN & CSN:  [id number] & [id number] Encounter Date/Time: 2024 12:50 PM EDT   Location:  [address] [place] [address] PCP: Hosea Mckee MD     Attending:Vince Johansen MD       Hospital Day: 5    Assessment and Recommendations       Assessment/Plan:     Status postcardiac arrest(V-fib):  Status post CODE BLUE from   Reviewed cardiology note   At this point in time we will need to continue the patient on amiodarone drip     Hypokalemia/hypomagnesia:  Required aggressive therapy received a total of 170 K  Goal to maintain potassium greater than 4  Today potassium 4.0 magnesium 1.7    Pneumonia:  Continue Zosyn and doxycycline    BRENDA:  Creatinine was 0.9 on admission   today it is 1.9 and GFR is 40    Intermittent confusion/metabolic encephalopathy:  MRI brain pending    Bradycardia with second-degree AV block:  Beta-blockers and dig have been held  Did have an episode of heart rate in the 50s    Abdominal pain/distention:  Patient with poor p.o. intake  KUB completed yesterday was unremarkable     Lung lesion secondary to non-small cell lung cancer left lun/11 recent PET scan questions left lower lung lesion concerning for malignancy  Patient seen by pulmonology and oncology  Not clear if patient is going to want it biopsied or not at this time  Palliative care is seeing patient    Aortic valve replacement:  Bovine valve  Coumadin discontinued   Trend INR 7.68 off Coumadin     COPD without exacerbation:  Continue home medication     Diabetes mellitus type 2:  Low-dose correction  Trend sugars        Diet ADULT ORAL NUTRITION SUPPLEMENT; Dinner, Lunch; Frozen Oral Supplement  ADULT DIET; Regular   DVT Prophylaxis [] Lovenox, []  Heparin, [x] SCDs, [] Ambulation,  [] Eliquis, [] Xarelto  [] Coumadin   Code Status Full Code       Surrogate Decision Maker/ POA      Personally reviewed Lab Studies and Imaging 
    V2.0    [facility] [place] Progress Note      Name:  Alexandr Witt /Age/Sex: 1965  (58 y.o. male)   MRN & CSN:  [id number] & [id number] Encounter Date/Time: 2024 7:01 AM EDT   Location:  [address] [place] [address] PCP: Hosea Mckee MD     Attending:Vince Johansen MD       Hospital Day: 6    Assessment and Recommendations       Assessment/Plan:     Status postcardiac arrest B fib:  Reviewed note from cardiology  Amiodarone discontinued due to bradycardia  Hold other medications till creatinine stabilized  Unable to give beta-blocker due to bradycardia    Pneumonia secondary to aspiration:  Completed course of Zosyn and doxycycline on     Bradycardia:  As noted above amiodarone discontinued no beta-blocker at this time    Hypokalemia: Resolved    Acute metabolic encephalopathy of unclear etiology resolved    Lung lesion most likely secondary to non-small cell lung cancer in the left lung:  Reviewed note from pulmonology  Considering bronchoscopy once patient stabilized, will need high-resolution CT prior to [facility]    Status post bovine aortic valve replacement:  Warfarin on hold  INR 7.61  No overt signs of bleeding continue to monitor    Diabetes mellitus type 2:  Low-dose correction continue to trend    Chronic pain syndrome:  Remains on Lyrica and morphine    Hyponatremia:  133 most recently  Trend    Depression:  Patient continues with bland affect poor p.o. intake  May need to consider feeding tube if patient unable to eat      Diet ADULT ORAL NUTRITION SUPPLEMENT; Dinner, Lunch; Frozen Oral Supplement  ADULT DIET; Regular   DVT Prophylaxis [] Lovenox, []  Heparin, [] SCDs, [] Ambulation,  [] Eliquis, [] Xarelto  [x] Coumadin   Code Status Full Code       Surrogate Decision Maker/ POA      Personally reviewed Lab Studies and Imaging     Rhythm strip reviewed shows sinus rhythm        Subjective:     Chief Complaint: Hematuria altered mental status with auditory and visual hallucinations    Alexandr Witt is a 
    V2.0    [facility] [place] Progress Note      Name:  Alexandr Witt /Age/Sex: 1965  (58 y.o. male)   MRN & CSN:  [id number] & [id number] Encounter Date/Time: 2024 11:27 AM EDT   Location:  431/4317-01 PCP: Hosea Mckee MD     Attending:Vince Johansen MD       Hospital Day: 2    Assessment and Recommendations       Assessment/Plan:     Pneumonia:  Patient has been started on Zosyn and doxycycline due to concerns about aspiration    Procalcitonin elevated at 0.35    Bradycardia:  Patient's rhythm strip shows bradycardia with rate in the 40s  Metoprolol has been held  Patient states he has not been taking it  Cardiology has been consulted    Hypokalemia:  Potassium 2.2  Supplemented with 40 M EQ's x 2  Recheck potassium this afternoon    Lung lesion (history of non-small cell lung cancer left lung):  I reviewed notes from office visits  recent PET scan showed question about left lower lung lesion concerning for malignancy  Consult pulmonology for further evaluation  Should bronchoscopy be indicated will need to hold Coumadin and bridged with Lovenox or heparin    Confusion:  Unclear etiology at this time  Intermittent in nature  Will start weaning Lyrica  MRI brain    Status post aortic valve replacement:  Chronic anticoagulation with warfarin  Reviewed lab work from  INR was greater than 8  INR on  is 2.95  Discussed management with pharmacy    Diabetes mellitus type 2:  Low-dose correction during his stay  Holding home metformin and Actos    Hyponatremia:  Sodium 130 on admission   136 on     COPD without exacerbation:  Continue with home medication    Lumbar spinal stenosis:  Continue home medication morphine sulfate ER 15 mg in the morning noon and bedtime  As needed oxycodone    Paroxysmal atrial fibrillation:  Currently in sinus bradycardia    Other chronic conditions:  Testicular cancer  CAD status post CABG  HLD  Hypertension        Diet ADULT DIET; Regular; 4 carb choices (60 
    V2.0    [facility] [place] Progress Note      Name:  Alexandr Witt /Age/Sex: 1965  (58 y.o. male)   MRN & CSN:  [id number] & [id number] Encounter Date/Time: 2024 6:35 AM EDT   Location:  4317/4317-01 PCP: Hosea Mckee MD     Attending:Matti Haley MD       Hospital Day: 12    Assessment and Recommendations     Hospital course:   Alexandr Witt is a 58-year-old male with significant past medical history of non-small cell lung cancer left lung, paroxysmal atrial fibrillation on Coumadin, bovine aortic valve replacement, coronary disease status post CABG, diabetes mellitus type 2, COPD, remote history of colon cancer and testicular cancer who presented to the hospital  with pneumonia and question of altered mental status.  Patient also had complaints of hematemesis and hematuria.  Patient experienced neither these while here.  Patient's history of squamous cell lung cancer status post left minithoracotomy with Dr. Pantoja 2023.  Patient had not been compliant postprocedure.  But in 2024 patient had PET/CT which showed mild hypermetabolic uptake within a 9 mm subpleural nodule in the medial basal left lower lobe and unchanged 11 mm pulmonary nodule posterior basal left lower lobe.  Patient was being treated and has completed treatment for pneumonia with antibiotics.  His mental status improved over time.  On  patient had a cardiac arrest with CODE BLUE and as you can see was successfully revived.  It was V-fib arrest at that time.  Patient was also hypokalemic at that time.  Patient is being followed by oncology, nephrology, cardiology, EP cardiology, critical care team and pulmonology.  Patient had echocardiogram which showed EF 30 to 35%.  Patient went for cardiac cath yesterday with significant finding of significant disease and may have been coronary artery the right coronary artery showing complete occlusion.  The  will be evaluated for possible intervention.  Patient initially was not sure if 
    V2.0    [facility] [place]      Name:  Alexandr Witt /Age/Sex: 1965  (58 y.o. male)   MRN & CSN:  [id number] & [id number] Encounter Date/Time: 2024 6:35 AM EDT   Location:  4317/4317-01 PCP: Hosea Mckee MD     Attending:Vince Johansen MD       Hospital Day: 11    Assessment and Recommendations     Hospital course:   Alexandr Witt is a 58-year-old male with significant past medical history of non-small cell lung cancer left lung, paroxysmal atrial fibrillation on Coumadin, bovine aortic valve replacement, coronary disease status post CABG, diabetes mellitus type 2, COPD, remote history of colon cancer and testicular cancer who presented to the hospital  with pneumonia and question of altered mental status.  Patient also had complaints of hematemesis and hematuria.  Patient experienced neither these while here.  Patient's history of squamous cell lung cancer status post left minithoracotomy with Dr. Pantoja 2023.  Patient had not been compliant postprocedure.  But in 2024 patient had PET/CT which showed mild hypermetabolic uptake within a 9 mm subpleural nodule in the medial basal left lower lobe and unchanged 11 mm pulmonary nodule posterior basal left lower lobe.  Patient was being treated and has completed treatment for pneumonia with antibiotics.  His mental status improved over time.  On  patient had a cardiac arrest with CODE BLUE and as you can see was successfully revived.  It was V-fib arrest at that time.  Patient was also hypokalemic at that time.  Patient is being followed by oncology, nephrology, cardiology, EP cardiology, critical care team and pulmonology.  Patient had echocardiogram which showed EF 30 to 35%.  Patient went for cardiac cath yesterday with significant finding of significant disease and may have been coronary artery the right coronary artery showing complete occlusion.  The  will be evaluated for possible intervention.  Patient initially was not 
   Renal Progress Note  Subjective:   Admit Date: 4/16/2024       Assessment/Plan     BRENDA sec to Contrast ass nephropathy   UPCR 0.5   UACR neg  BNP: 3,5 K   Plan:   - monitor BMP  - Avoid nephrotoxins   - dose meds based on GFR      2. pAF  - EP and cardiology following   - Warfarin on hold  - Dig, BB on hold      3. S/p cardiac arrest due to V. Fib  ECHO 4/16: EF 30-35%, global hypokinesis, and indeterminate diastolic function   - Cardiology consulted, - EP consulted   - Patient in need for Heart cath, Cr 1.5 today  - plan for PPM vs ICD after ischemic evaluation   - Holding ACE/Entresto/aldactone/SGLT2     4. Hyponatremia (resolved)     5. Hypomagnesia- resolved      6. Hypokalemia - resolved  Replete wiuht Kcl PRN      6. Pneumonia 2/2 aspiration  - Completed course of zosyn and doxycycline  - Blood cultures showed NGTD     7. Bradycardia  CHB 4/18, 4/19  - Pt became bradycardiac after amiodarone  - Amiodarone discontinued      8. Lung lesion likely 2/2 Squamous cell lung caner   - Pulmonology consulted, consider bronchoscopy once patient stabilized     9. Hematuria, resolved  Pt informed daughter that he had urinating gross blood and having blood in his vomit. Now resolved. UA showed cloudly clarity, trace ketones, large blood, pH 30, positive nitrite, and  RBC. CT abdomen/pelvis showed cortical thinning of the inferior pole of the left kidney, likely sequela of remote ischemia or infection. B/L simple appearing renal cysts.         HPI      Interval History:   Cr stable 1.5  K 4.1   Mg wnl    Hb 7.2  BP better  Pt reports depression/ dysphoric mood   Appetite better  Diffuse chest pain and SOB        DIET ADULT DIET; Regular  ADULT ORAL NUTRITION SUPPLEMENT; Breakfast, Lunch, Dinner; Frozen Oral Supplement  Medications:   Scheduled Meds:   sodium chloride flush  5-40 mL IntraVENous 2 times per day    famotidine  20 mg Oral BID    fluticasone  2 spray Each Nostril Daily    multivitamin  1 tablet Oral 
   Renal Progress Note  Subjective:   Admit Date: 4/16/2024       Assessment/Plan     BRENDA sec to contrast associated nephropathy   UPCR 0.5   UACR neg  BNP: 3,5 K   Cr stable   Plan:   - monitor BMP  - Avoid nephrotoxins   - dose meds based on GFR      2. pAF  - EP and cardiology following   - Warfarin on hold  - Dig, BB on hold      3. S/p cardiac arrest due to V. Fib  ECHO 4/16: EF 30-35%, global hypokinesis, and indeterminate diastolic function   - Cardiology consulted, - EP consulted   - ICD placed   - Holding ACE/Entresto/aldactone/SGLT2     4. Hyponatremia (resolved)     5. Hypomagnesia- mild-  Monitor Mg      6. Hypokalemia - resolved  Replete wiuht Kcl PRN      6. Pneumonia 2/2 aspiration  - Completed course of zosyn and doxycycline  - Blood cultures showed NGTD     7. Bradycardia  CHB 4/18, 4/19  - Pt became bradycardiac after amiodarone  - Amiodarone discontinued      8. Lung lesion likely 2/2 Squamous cell lung caner   - Pulmonology consulted, consider bronchoscopy once patient stabilized     9. Hematuria, resolved    10. Anemia- worsening   Of chronic dis  Hematology following         Interval History:            DIET No diet orders on file  Medications:   Scheduled Meds:      Continuous Infusions:        Labs:  CBC:   Recent Labs     04/28/24  1045   WBC 4.7   HGB 8.7*   *       BMP:    Recent Labs     04/28/24  1046 04/29/24  1408 04/30/24  0611    136 140   K 3.5 3.5 3.3*    103 105   CO2 24 25 25   BUN 16 15 15   CREATININE 1.5* 1.6* 1.6*   GLUCOSE 190* 137* 101*       Ca/Mg/Phos:   Recent Labs     04/28/24  1046 04/29/24  1408 04/30/24  0611   CALCIUM 8.7 8.8 8.6   MG 1.70* 1.70* 1.70*   PHOS 2.8 3.4 3.5       Hepatic: No results for input(s): \"AST\", \"ALT\", \"BILITOT\", \"ALKPHOS\" in the last 72 hours.    Invalid input(s): \"ALB\"  Troponin: No results for input(s): \"TROPONINI\" in the last 72 hours.  BNP: No results for input(s): \"BNP\" in the last 72 hours.  Lipids: No results for 
   Renal Progress Note  Subjective:   Admit Date: 4/16/2024       Assessment/Plan     BRENDA sec to contrast associated nephropathy   UPCR 0.5   UACR neg  BNP: 3,5 K   Cr stable   Plan:   - monitor BMP  - Avoid nephrotoxins   - dose meds based on GFR      2. pAF  - EP and cardiology following   - Warfarin on hold  - Dig, BB on hold      3. S/p cardiac arrest due to V. Fib  ECHO 4/16: EF 30-35%, global hypokinesis, and indeterminate diastolic function   - Cardiology consulted, - EP consulted   - Patient in need for Heart cath  - plan for PPM vs ICD after ischemic evaluation   - Holding ACE/Entresto/aldactone/SGLT2     4. Hyponatremia (resolved)     5. Hypomagnesia- mild-  Monitor Mg      6. Hypokalemia - resolved  Replete wiuht Kcl PRN      6. Pneumonia 2/2 aspiration  - Completed course of zosyn and doxycycline  - Blood cultures showed NGTD     7. Bradycardia  CHB 4/18, 4/19  - Pt became bradycardiac after amiodarone  - Amiodarone discontinued      8. Lung lesion likely 2/2 Squamous cell lung caner   - Pulmonology consulted, consider bronchoscopy once patient stabilized     9. Hematuria, resolved    10. Anemia- worsening   Of chronic dis  Hematology following         Interval History:         Labs pending this AM  Having ICD placed       DIET Diet NPO  Medications:   Scheduled Meds:   sodium chloride flush  5-40 mL IntraVENous 2 times per day    famotidine  20 mg Oral BID    fluticasone  2 spray Each Nostril Daily    multivitamin  1 tablet Oral Daily    folic acid  1 mg Oral Daily    methocarbamol  750 mg Oral TID    naloxegol  12.5 mg Oral QAM AC    polyethylene glycol  17 g Oral Daily    budesonide  0.5 mg Nebulization BID RT    arformoterol tartrate  15 mcg Nebulization BID RT    lidocaine  1 patch TransDERmal Daily    morphine  15 mg Oral TID    pregabalin  75 mg Oral TID    melatonin  5 mg Oral Nightly    aspirin  81 mg Oral Daily    citalopram  40 mg Oral Daily    [Held by provider] metoprolol tartrate  25 mg 
   Renal Progress Note  Subjective:   Admit Date: 4/16/2024       Assessment/Plan   BRENDA sec to Contrast ass nephropathy   Protein/Creatinine ratio: Ur protein 49, Ur Creatinine 113.6, Protein/cre ratio 0.4.   Microalbumin/creatinine urine ratio: microalbumin, random urine 4.40, urine creatinine 113.4, microalbumin creatinine ratio 38.8.   BNP: 3,544  - Cr 1.4  - s/p cath 4/25    2. pAF  - EP and cardiology following   - Warfarin on hold  - Dig, BB on hold      3. S/p cardiac arrest due to V. Fib  ECHO 4/16: EF 30-35%, global hypokinesis, and indeterminate diastolic function   - Code blue on 4/18, 4 rounds of CPR, 4 shocks given  - Cardiology consulted  - EP consulted   - s/p cardiac cath 4/25- possible  treatment  - plan for PPM vs ICD after ischemic evaluation   - Holding ACE/Entresto/aldactone/SGLT2     4. Hyponatremia (resolved)  - Na 137 today      5. Hypomagnesia  - Mg 1.70, repleted     6. Pneumonia 2/2 aspiration  - Completed course of zosyn and doxycycline  - Blood cultures showed NGTD     7. Bradycardia  CHB 4/18, 4/19  - Pt became bradycardiac after amiodarone  - Amiodarone discontinued      8. Lung lesion likely 2/2 Squamous cell lung caner   - Pulmonology consulted, consider bronchoscopy once patient stabilized     9. Hematuria, resolved  Pt informed daughter that he had urinating gross blood and having blood in his vomit. Now resolved. UA showed cloudly clarity, trace ketones, large blood, pH 30, positive nitrite, and  RBC. CT abdomen/pelvis showed cortical thinning of the inferior pole of the left kidney, likely sequela of remote ischemia or infection. B/L simple appearing renal cysts.     Interval History:   BP soft. Na 137 today. Cr. 1.4. Normal urine output. Patient had cardiac cath yesterday.  Patient was seen and examined at bedside. He is currently doing well. He is working with PT/OT.       DIET ADULT DIET; Regular  ADULT ORAL NUTRITION SUPPLEMENT; Breakfast, Lunch, Dinner; Frozen Oral 
   Renal Progress Note  Subjective:   Admit Date: 4/16/2024       Assessment/Plan   BRENDA sec to Contrast ass nephropathy   Protein/Creatinine ratio: Ur protein 49, Ur Creatinine 113.6, Protein/cre ratio 0.4.   Microalbumin/creatinine urine ratio: microalbumin, random urine 4.40, urine creatinine 113.4, microalbumin creatinine ratio 38.8.   BNP: 3,544  - need to bring pt to euvolemia before contrast use   - low risk for ANTONIO  - give fluids acc EDP in cath lab  - Cr 1.4  - plan for cath today    2. pAF  - EP and cardiology following   - Warfarin on hold  - Dig, BB on hold      3. S/p cardiac arrest due to V. Fib  ECHO 4/16: EF 30-35%, global hypokinesis, and indeterminate diastolic function   - Code blue on 4/18, 4 rounds of CPR, 4 shocks given  - Cardiology consulted  - EP consulted   - Patient in need for Heart cath, Cr 1.5 today  - plan for PPM vs ICD after ischemic evaluation   - Holding ACE/Entresto/aldactone/SGLT2     4. Hyponatremia (resolved)  - Na 134 today      5. Hypomagnesia  - Mg 1.70, repleted     6. Pneumonia 2/2 aspiration  - Completed course of zosyn and doxycycline  - Blood cultures showed NGTD     7. Bradycardia  CHB 4/18, 4/19  - Pt became bradycardiac after amiodarone  - Amiodarone discontinued      8. Lung lesion likely 2/2 Squamous cell lung caner   - Pulmonology consulted, consider bronchoscopy once patient stabilized     9. Hematuria, resolved  Pt informed daughter that he had urinating gross blood and having blood in his vomit. Now resolved. UA showed cloudly clarity, trace ketones, large blood, pH 30, positive nitrite, and  RBC. CT abdomen/pelvis showed cortical thinning of the inferior pole of the left kidney, likely sequela of remote ischemia or infection. B/L simple appearing renal cysts.     Interval History:   BP soft. Na 139 today. Cr. 1.4. Normal urine output.   Patient was seen and examined at bedside. Patient complaining of abdominal and chest pain. He states it is an ongoing 
   Renal Progress Note  Subjective:   Admit Date: 4/16/2024       Assessment/Plan   BRENDA sec to Contrast ass nephropathy   Protein/Creatinine ratio: Ur protein 49, Ur Creatinine 113.6, Protein/cre ratio 0.4.   Microalbumin/creatinine urine ratio: microalbumin, random urine 4.40, urine creatinine 113.4, microalbumin creatinine ratio 38.8.   BNP: 3,544  Plan:   - Cr 1.4  - monitor BMP  Avoid nephrotoxins     2. pAF  - EP and cardiology following   - Warfarin on hold  - Dig, BB on hold      3. S/p cardiac arrest due to V. Fib  ECHO 4/16: EF 30-35%, global hypokinesis, and indeterminate diastolic function   - Code blue on 4/18, 4 rounds of CPR, 4 shocks given  - Cardiology consulted  - EP consulted   - Patient in need for Heart cath, Cr 1.5 today  - plan for PPM vs ICD after ischemic evaluation   - Holding ACE/Entresto/aldactone/SGLT2     4. Hyponatremia (resolved)     5. Hypomagnesia  - Mg 1.60, repleted    6. Hypokalemia   Replete wiuht Kcl oral 60 meq      6. Pneumonia 2/2 aspiration  - Completed course of zosyn and doxycycline  - Blood cultures showed NGTD     7. Bradycardia  CHB 4/18, 4/19  - Pt became bradycardiac after amiodarone  - Amiodarone discontinued      8. Lung lesion likely 2/2 Squamous cell lung caner   - Pulmonology consulted, consider bronchoscopy once patient stabilized     9. Hematuria, resolved  Pt informed daughter that he had urinating gross blood and having blood in his vomit. Now resolved. UA showed cloudly clarity, trace ketones, large blood, pH 30, positive nitrite, and  RBC. CT abdomen/pelvis showed cortical thinning of the inferior pole of the left kidney, likely sequela of remote ischemia or infection. B/L simple appearing renal cysts.     Interval History:   Cr stable 1.4   K 3.1   Mg 1.6   Hb 7.2  BP better  Pt reports depression/ dysphoric mood   Appetite better  Diffuse chest pain and SOB   S/p L cath today     DIET ADULT DIET; Regular  ADULT ORAL NUTRITION SUPPLEMENT; Breakfast, 
  Comprehensive Nutrition Assessment    RECOMMENDATIONS:  PO Diet: Continue Regular  ONS: Continue magic cup tid  Nutrition Education: Education not indicated     NUTRITION ASSESSMENT:   Nutritional summary & status: Follow up. s/p cath 4/25. Continues on a Regular diet. Meal intake remains poor at 0% and 26-50%. Visited at noon meal. Receiving magic cup tid;dislikes Ensure supplements. Only took several bites of magic cup, however, wishes to continue to receive on  meal trays. Declined offer of Fruit Smoothie and  Ensure M/S. Reviewed high protein foods on menu for pt to order. Pt voiced understanding. Declined placement of [place] for enteral nutrition. Palliative continues to follow. Will continue to monitor.   Admission // PMH: Pneumonia,bacterial//NSCLC, testicular cancer, T2DM, CAD, AFIB, HTN, COPD    MALNUTRITION ASSESSMENT  Context of Malnutrition: Chronic Illness   Malnutrition Status: Severe malnutrition  Findings of the 6 clinical characteristics of malnutrition (Minimum of 2 out of 6 clinical characteristics is required to make the diagnosis of moderate or severe Protein Calorie Malnutrition based on AND/ASPEN Guidelines):  Energy Intake:  75% or less estimated energy requirements for 1 month or longer  Weight Loss:  Greater than 20% over 1 year     Body Fat Loss:  Severe body fat loss Buccal region, Orbital   Muscle Mass Loss:  Mild muscle mass loss Temples (temporalis), Clavicles (pectoralis & deltoids)  Fluid Accumulation:  No significant fluid accumulation     Strength:  Not Performed    NUTRITION DIAGNOSIS   Severe malnutrition related to catabolic illness, inadequate protein-energy intake as evidenced by Criteria as identified in malnutrition assessment    Nutrition Monitoring and Evaluation:   Food/Nutrient Intake Outcomes:  Food and Nutrient Intake, Supplement Intake, Enteral Nutrition Intake/Tolerance  Physical Signs/Symptoms Outcomes:  Biochemical Data, Nutrition Focused Physical Findings, 
  Comprehensive Nutrition Assessment    RECOMMENDATIONS:  PO Diet: Continue Regular diet  ONS: Continue magic cup BID  Nutrition Education: Education not indicated     NUTRITION ASSESSMENT:   Nutritional summary & status: Follow-up.  Pt reports improvement in appetite and abd discomfort from overeating.  Corpak was not placed d/t pt being unsure of wanting aggressive treatment.  As of today, pt reports wanting to move forward to determine dx and tx options.  Unable to confirm PO intake per EMR d/t pt skipping meals offered from TJ and pt being occupied w/ other disciplines during multiple attempted encounters.   Admission // PMH: Pneumonia,bacterial//NSCLC, testicular cancer, T2DM, CAD, AFIB, HTN, COPD    MALNUTRITION ASSESSMENT  Context of Malnutrition: Chronic Illness   Malnutrition Status: Severe malnutrition  Findings of the 6 clinical characteristics of malnutrition (Minimum of 2 out of 6 clinical characteristics is required to make the diagnosis of moderate or severe Protein Calorie Malnutrition based on AND/ASPEN Guidelines):  Energy Intake:  75% or less estimated energy requirements for 1 month or longer  Weight Loss:  Greater than 20% over 1 year     Body Fat Loss:  Severe body fat loss Buccal region, Orbital   Muscle Mass Loss:  Mild muscle mass loss Temples (temporalis), Clavicles (pectoralis & deltoids)  Fluid Accumulation:  No significant fluid accumulation      NUTRITION DIAGNOSIS   Severe malnutrition related to catabolic illness, inadequate protein-energy intake as evidenced by Criteria as identified in malnutrition assessment    Nutrition Monitoring and Evaluation:   Food/Nutrient Intake Outcomes:  Food and Nutrient Intake, Supplement Intake, Enteral Nutrition Intake/Tolerance  Physical Signs/Symptoms Outcomes:  Biochemical Data, Nutrition Focused Physical Findings, Weight, Hemodynamic Status, GI Status     OBJECTIVE DATA: Significant to nutrition assessment  Nutrition Related Findings: +6.3L; +bm 
  Comprehensive Nutrition Assessment    RECOMMENDATIONS:  PO Diet: Continue Regular diet  ONS: Continue magic cup BID  TF  Glucerna 1.5 @ 10 mL/hr, increase 10 mL q6h until goal of 55 mL/hr is met  Flushes: 30 mL q4h  Q12 electrolyte labs  Nutrition Education: Education not indicated     NUTRITION ASSESSMENT:   Nutritional summary & status: Pt continues w/ minimal PO intakes. Plan for corpak today and TF to supplement oral diet. B1/MVI/folic acid all on board, ordering q12 electrolyte labs as pt is at a high risk for refeeding. Will advance TF slowly as pt has had some abdominal pain and previous emesis.   Admission // PMH: Pneumonia,bacterial//NSCLC, testicular cancer, T2DM, CAD, AFIB, HTN, COPD    MALNUTRITION ASSESSMENT  Context of Malnutrition: Chronic Illness   Malnutrition Status: Severe malnutrition  Findings of the 6 clinical characteristics of malnutrition (Minimum of 2 out of 6 clinical characteristics is required to make the diagnosis of moderate or severe Protein Calorie Malnutrition based on AND/ASPEN Guidelines):  Energy Intake:  75% or less estimated energy requirements for 1 month or longer  Weight Loss:  Greater than 20% over 1 year     Body Fat Loss:  Severe body fat loss Buccal region, Orbital   Muscle Mass Loss:  Mild muscle mass loss Temples (temporalis), Clavicles (pectoralis & deltoids)  Fluid Accumulation:  No significant fluid accumulation      NUTRITION DIAGNOSIS   Severe malnutrition related to catabolic illness, inadequate protein-energy intake as evidenced by Criteria as identified in malnutrition assessment    Nutrition Monitoring and Evaluation:   Food/Nutrient Intake Outcomes:  Food and Nutrient Intake, Supplement Intake, Enteral Nutrition Intake/Tolerance  Physical Signs/Symptoms Outcomes:  Biochemical Data, Nutrition Focused Physical Findings, Weight, Hemodynamic Status, GI Status     OBJECTIVE DATA: Significant to nutrition assessment  Nutrition Related Findings: . Na 134. 
  Comprehensive Nutrition Assessment    RECOMMENDATIONS:  PO Diet: Continue Regular;4 Carb Choices  ONS: Begin Glucerna bid(Chocolate)  Nutrition Education: No recommendation at this time     NUTRITION ASSESSMENT:   Nutritional summary & status: Positive nutrition screen for poor appetite and weight loss pta. Pt admitted with bacterial pneumonia. Endorses wt loss of 8.6% in 1 month and 22% in 4 months, indicating severe weight change..Subcuatenous fat/muscle mass loss noted on observation. Severe malnutrition identified per ASPEN criteria.Pt reports wt loss to poor appetite and nausea.  Pt with hx of NSCLC and testicular cancer. PET scan of 4/11 showing left lower lung lesion concerning for malignancy per MD. Pt agreeable to trial Glucerna bid to promote adequate nutrition. Will monitor acceptance of ONS on follow-up.   Admission // PMH: Pneumonia,bacterial//NSCLC, testicular cancer, T2DM, CAD, AFIB, HTN, COPD    MALNUTRITION ASSESSMENT  Context of Malnutrition: Chronic Illness   Malnutrition Status: Severe malnutrition  Findings of the 6 clinical characteristics of malnutrition (Minimum of 2 out of 6 clinical characteristics is required to make the diagnosis of moderate or severe Protein Calorie Malnutrition based on AND/ASPEN Guidelines):  Energy Intake:  75% or less estimated energy requirements for 1 month or longer  Weight Loss:  Greater than 5% over 1 month     Body Fat Loss:  Mild body fat loss Orbital, Buccal region   Muscle Mass Loss:  Mild muscle mass loss Temples (temporalis), Clavicles (pectoralis & deltoids)  Fluid Accumulation:  No significant fluid accumulation     Strength:  Not Performed    NUTRITION DIAGNOSIS   Severe malnutrition related to catabolic illness, inadequate protein-energy intake as evidenced by Criteria as identified in malnutrition assessment    Nutrition Monitoring and Evaluation:   Food/Nutrient Intake Outcomes:  Food and Nutrient Intake, Supplement Intake  Physical 
  Comprehensive Nutrition Assessment    RECOMMENDATIONS:  PO Diet: Remove all restrictions from diet  ONS: Modify to magic cup BID  Snacks stocked in ICU fridge for pt that he is agreeable to  If GOC are aggressive, TF would be appropriate to supplement oral diet  Glucerna 1.5 @ 10 mL/hr, advance 10 mL q6h until goal of 55 mL/hr is met  Flushes: 30 mL q4h  Nutrition Education: Education not indicated     NUTRITION ASSESSMENT:   Nutritional summary & status: Follow up: Pt transfered to ICU yesterday after code. On a regular; 4 carb choice diet, removing all restrictions as pt has severe malnutrition and has not eaten anything throughout admission. Refused to drink ONS, brought up snacks to pt that he typically eats at home and encouraged family to bring in food that he would be willing to eat. Abdomen distended and painful today, plan for abdominal CT. Palliative on board to discuss goal of care. Spoke w/ pt and daughter about nutrition support. He has no interest in eating and if he wishes to continue aggressive care TF would help alleviate some of his stress surrounded with wt loss while still allowing him to eat if he so desires. Will provide recs.   Admission // PMH: Pneumonia,bacterial//NSCLC, testicular cancer, T2DM, CAD, AFIB, HTN, COPD    MALNUTRITION ASSESSMENT  Context of Malnutrition: Chronic Illness   Malnutrition Status: Severe malnutrition  Findings of the 6 clinical characteristics of malnutrition (Minimum of 2 out of 6 clinical characteristics is required to make the diagnosis of moderate or severe Protein Calorie Malnutrition based on AND/ASPEN Guidelines):  Energy Intake:  75% or less estimated energy requirements for 1 month or longer  Weight Loss:  Greater than 20% over 1 year     Body Fat Loss:  Severe body fat loss Buccal region, Orbital   Muscle Mass Loss:  Mild muscle mass loss Temples (temporalis), Clavicles (pectoralis & deltoids)  Fluid Accumulation:  No significant fluid accumulation  
4 Eyes Skin Assessment     NAME:  Alexandr Witt  YOB: 1965  MEDICAL RECORD NUMBER:  [id number]    The patient is being assessed for  Transfer to New Unit    I agree that at least one RN has performed a thorough Head to Toe Skin Assessment on the patient. ALL assessment sites listed below have been assessed.      Areas assessed by both nurses:    Head, Face, Ears, Shoulders, Back, Chest, Arms, Elbows, Hands, Sacrum. Buttock, Coccyx, Ischium, Legs. Feet and Heels, and Under Medical Devices   Bruising left wrist, right arm, angio site right groin with bruising, lateral left/ right chest wall redness, bruise to chest, blanchable redness to coccyx, Dark Skin discoloration/ hyperpigmentation coccyx, left buttock discoloration/bruising, blanchable redness to bilateral ears      Does the Patient have a Wound? No noted wound(s)       Jc Prevention initiated by RN: Yes  Wound Care Orders initiated by RN: No    Pressure Injury (Stage 3,4, Unstageable, DTI, NWPT, and Complex wounds) if present, place Wound referral order by RN under : No    New Ostomies, if present place, Ostomy referral order under : No     Nurse 1 eSignature: Electronically signed by Ed Jennings RN on 4/26/24 at 5:11 AM EDT    **SHARE this note so that the co-signing nurse can place an eSignature**    Nurse 2 eSignature: {Esignature:[id number]}   
4 Eyes Skin Assessment     NAME:  Alexandr Witt  YOB: 1965  MEDICAL RECORD NUMBER:  [id number]    The patient is being assessed for  Admission    I agree that at least one RN has performed a thorough Head to Toe Skin Assessment on the patient. ALL assessment sites listed below have been assessed.      Areas assessed by both nurses:    Head, Face, Ears, Shoulders, Back, Chest, Arms, Elbows, Hands, Sacrum. Buttock, Coccyx, Ischium, Legs. Feet and Heels, and Under Medical Devices     Blanching redness coccyx  Dark Skin discoloration/ hyperpigmentation coccyx    Blanching redness  b/l heels         Does the Patient have a Wound? No noted wound(s)       Jc Prevention initiated by RN: No  Wound Care Orders initiated by RN: No    Pressure Injury (Stage 3,4, Unstageable, DTI, NWPT, and Complex wounds) if present, place Wound referral order by RN under : No    New Ostomies, if present place, Ostomy referral order under : No     Nurse 1 eSignature: Electronically signed by Todd Lindsey RN on 4/17/24 at 3:11 AM EDT    **SHARE this note so that the co-signing nurse can place an eSignature**    Nurse 2 eSignature: Electronically signed by Sue Murcia RN on 4/16/24 at 9:54 PM EDT    
[facility] [place] Daily Progress Note      Admit Date:  4/16/2024    Subjective:  Mr. Witt was seen and examined/  F/u bradycardia/HB.  No complaints. No cp/sob.      Objective:   BP (!) 142/90   Pulse 60   Temp 98.2 °F (36.8 °C) (Oral)   Resp 22   Ht 1.829 m (6')   Wt 83.5 kg (184 lb 1.4 oz)   SpO2 94%   BMI 24.97 kg/m²     Intake/Output Summary (Last 24 hours) at 4/29/2024 1103  Last data filed at 4/29/2024 0400  Gross per 24 hour   Intake 540 ml   Output 850 ml   Net -310 ml       TELEMETRY: Sinus     Physical Exam:  General:  Awakens, NAD  Skin:  Warm and dry  Neck:  JVP difficult  Chest:  decreased BS, respiration normal  Cardiovascular:  RRR S1S2  Abdomen:  Soft nontender  Extremities:  no edema    Medications:    sodium chloride flush  5-40 mL IntraVENous 2 times per day    famotidine  20 mg Oral BID    fluticasone  2 spray Each Nostril Daily    multivitamin  1 tablet Oral Daily    folic acid  1 mg Oral Daily    methocarbamol  750 mg Oral TID    naloxegol  12.5 mg Oral QAM AC    polyethylene glycol  17 g Oral Daily    budesonide  0.5 mg Nebulization BID RT    arformoterol tartrate  15 mcg Nebulization BID RT    lidocaine  1 patch TransDERmal Daily    morphine  15 mg Oral TID    pregabalin  75 mg Oral TID    melatonin  5 mg Oral Nightly    aspirin  81 mg Oral Daily    citalopram  40 mg Oral Daily    [Held by provider] metoprolol tartrate  25 mg Oral BID    rosuvastatin  20 mg Oral Nightly    sodium chloride flush  5-40 mL IntraVENous 2 times per day      sodium chloride      sodium chloride      dextrose       sodium chloride flush, sodium chloride, acetaminophen, magnesium sulfate, potassium chloride **OR** potassium alternative oral replacement **OR** potassium chloride, sodium phosphate 15 mmol in sodium chloride 0.9 % 250 mL IVPB, oxyCODONE-acetaminophen, diphenhydrAMINE, perflutren lipid microspheres, simethicone, albuterol, sodium chloride flush, sodium chloride, ondansetron **OR** 
At 2355 pts bed alarm went off and this RN went into room. Pt was found at side of bed attempting to get out. When pt was asked where they were going, pt was disoriented saying he did not know where he was or how he got here. All of the lights were turned on and pt was reoriented to room and situation. Pt was still confused, asking \"if I were to go all the way over there, where would  me\" and \"is that number the address to this place\". Residents were called and brought to bedside. Pt then became oriented and was able to say where he was and why he was here, still unsure why he got confused, but is aware of the episode.     During this time, pt had a large green watery bowel movement. Residents aware.     Pt was cleaned up and is now resting in bed, alert and oriented x4.     Standard safety measures in place.   
Attemtped to call report to [facility] at [phone]. Left  for patient admissions nurse at [facility].   
[facility] [place] Daily Progress Note      Admit Date:  4/16/2024    Subjective:  Mr. Witt was seen and examined/  F/u bradycardia/HB.  No complaints. No cp/sob.      Objective:   BP (!) 166/110   Pulse 75   Temp 96.8 °F (36 °C) (Temporal)   Resp 19   Ht 1.829 m (6')   Wt 83.1 kg (183 lb 3.2 oz)   SpO2 99%   BMI 24.85 kg/m²     Intake/Output Summary (Last 24 hours) at 4/20/2024 0701  Last data filed at 4/20/2024 0600  Gross per 24 hour   Intake 3893.99 ml   Output 400 ml   Net 3493.99 ml       TELEMETRY: Sinus     Physical Exam:  General:  Awakens, NAD  Skin:  Warm and dry  Neck:  JVP difficult  Chest:  decreased BS, respiration normal  Cardiovascular:  RRR S1S2  Abdomen:  Soft nontender  Extremities:  no edema    Medications:    acetaminophen  1,000 mg Oral 3 times per day    Or    acetaminophen  650 mg Rectal 3 times per day    methocarbamol  750 mg Oral TID    doxycycline monohydrate  100 mg Oral 2 times per day    naloxegol  12.5 mg Oral QAM AC    polyethylene glycol  17 g Oral Daily    budesonide  0.5 mg Nebulization BID RT    arformoterol tartrate  15 mcg Nebulization BID RT    lidocaine  1 patch TransDERmal Daily    morphine  15 mg Oral TID    pregabalin  75 mg Oral TID    melatonin  5 mg Oral Nightly    piperacillin-tazobactam  3,375 mg IntraVENous Q8H    aspirin  81 mg Oral Daily    citalopram  40 mg Oral Daily    famotidine  20 mg Oral BID    insulin lispro  0-4 Units SubCUTAneous TID     insulin lispro  0-4 Units SubCUTAneous Nightly    [Held by provider] metoprolol tartrate  25 mg Oral BID    rosuvastatin  20 mg Oral Nightly    sodium chloride flush  5-40 mL IntraVENous 2 times per day      amiodarone (CORDARONE) 450 mg in dextrose 5 % 250 mL infusion 1 mg/min (04/20/24 8242)    sodium chloride      dextrose       HYDROmorphone, simethicone, oxyCODONE, albuterol, sodium chloride flush, sodium chloride, ondansetron **OR** ondansetron, glucose, dextrose bolus **OR** dextrose bolus, glucagon 
[facility] [place] Daily Progress Note      Admit Date:  4/16/2024    Subjective:  Mr. Witt was seen and examined/  F/u bradycardia/HB.  No specific complaints. No cp/sob.      Objective:   BP (!) 87/73   Pulse 64   Temp 97.9 °F (36.6 °C) (Axillary)   Resp 19   Ht 1.829 m (6')   Wt 82 kg (180 lb 12.4 oz)   SpO2 91%   BMI 24.52 kg/m²     Intake/Output Summary (Last 24 hours) at 4/18/2024 1553  Last data filed at 4/18/2024 0418  Gross per 24 hour   Intake 100 ml   Output 150 ml   Net -50 ml       TELEMETRY: Sinus ,  Bradycardia, HB    Physical Exam:  General:  Awake, alert, NAD  Skin:  Warm and dry  Neck:  JVP difficult  Chest:  decreased BS, respiration normal  Cardiovascular:  RRR S1S2  Abdomen:  Soft nontender  Extremities:  no edema    Medications:    budesonide  0.5 mg Nebulization BID RT    arformoterol tartrate  15 mcg Nebulization BID RT    potassium phosphate IVPB (PERIPHERAL LINE)  20 mmol IntraVENous Once    lidocaine  1 patch TransDERmal Daily    morphine        potassium chloride  20 mEq IntraVENous Q1H    warfarin  2 mg Oral Daily    pregabalin  75 mg Oral TID    melatonin  5 mg Oral Nightly    piperacillin-tazobactam  3,375 mg IntraVENous Q8H    doxycycline monohydrate  50 mg Oral 2 times per day    aspirin  81 mg Oral Daily    citalopram  40 mg Oral Daily    famotidine  20 mg Oral BID    insulin lispro  0-4 Units SubCUTAneous TID WC    insulin lispro  0-4 Units SubCUTAneous Nightly    [Held by provider] metoprolol tartrate  25 mg Oral BID    [Held by provider] morphine  15 mg Oral TID    rosuvastatin  20 mg Oral Nightly    sodium chloride flush  5-40 mL IntraVENous 2 times per day      amiodarone (CORDARONE) 450 mg in dextrose 5 % 250 mL infusion 1 mg/min (04/18/24 1453)    lactated ringers IV soln 125 mL/hr at 04/18/24 1545    sodium chloride      dextrose       morphine, oxyCODONE, albuterol, sodium chloride flush, sodium chloride, potassium chloride **OR** potassium alternative oral 
Cath Lab Pre Procedure Flowsheet    ICD insertion    Pre-procedure:    Allergies reviewed    Informed consent obtained    NPO Status: Pt has been NPO since midnight.     Contrast / IV Dye Allergy: none reported    Pregnancy Test: N/A.    Prep Sites: Chest    Anticoagulants: None. - warfarin \"more than a week ago\"    Antiplatelets: Aspirin. Last Dose: this morning.  Any missed doses:  n/a..     IV access: 18 g R AC    Anesthesia consent signed with Dr. Bello MCKEON/Donald Nick on tele    Pre /83  
Dr. Angeles , oncall Cardiologist called up , updated about the status of the pt , vitals and heart rhythm , pt is asymptomatic. He advised to call him whenever the pt is symptomatic and vitals are not stable. No uther order .   
Encounter:   educated pt on POA and Living Will materials. The pt wishes to speak with dter, request  follow up tomorrow to assist with completion of POA.  Chaplain Ingrid Whitt Mdiv., [facility]   04/29/24 1617   Encounter Summary   Encounter Overview/Reason Follow-up   Service Provided For Patient   Last Encounter  04/29/24  (MSR)   Begin Time 1614   End Time  1618   Total Time Calculated 4 min   Advance Care Planning   Type ACP conversation       
Fem CVC removed per order. See order for details.   
ICU residents notified of breakthrough pain 9/10. See order for Dilaudid increase to 1mg every 2 hours.     Electronically signed by Ramon Bal RN on 4/19/2024 at 2:15 AM    
Interventional cardiology progress note.    Patient is seen in follow-up of the procedure we performed yesterday from the left radial.  He is doing well today without discomfort or pain.  The left hand and wrist area looks great.  No complications.  Denies any chest pains at this time.  At this point we will sign off but please call us if we can be of any further assistance.  Ton Mason MD, FACC  
[facility]   Cardiac Electrophysiology Progress Note     Admit Date: 2024     Reason for follow up: V-fib arrest    HPI and Interval History:   Patient seen and examined. Clinical notes reviewed.   Telemetry reviewed. No new complaint today.   No major events overnight.   Denies having chest pain, shortness of breath, dyspnea on exertion, Orthopnea, PND at the time of this visit.    Review of System:  All other systems reviewed except for that noted above. Pertinent negatives and positives are:     General: negative for fever, chills   Ophthalmic ROS: negative for - eye pain or loss of vision  ENT ROS: negative for - headaches, sore throat   Respiratory: negative for - cough, sputum  Cardiovascular: Reviewed in HPI  Gastrointestinal: negative for - abdominal pain, diarrhea, N/V  Hematology: negative for - bleeding, blood clots, bruising or jaundice  Genito-Urinary:  negative for - Dysuria or incontinence  Musculoskeletal: negative for - Joint swelling, muscle pain  Neurological: negative for - confusion, dizziness, headaches   Psychiatric: No anxiety, no depression.  Dermatological: negative for - rash      Physical Examination:  Vitals:    24 0807   BP: (!) 148/84   Pulse: 60   Resp: 20   Temp: 98.2 °F (36.8 °C)   SpO2: 94%        Intake/Output Summary (Last 24 hours) at 2024 0838  Last data filed at 2024 0400  Gross per 24 hour   Intake 660 ml   Output 850 ml   Net -190 ml     In: 660 [P.O.:660]  Out: 850    Wt Readings from Last 3 Encounters:   24 83.5 kg (184 lb 1.4 oz)   24 87.7 kg (193 lb 4.8 oz)   24 89.7 kg (197 lb 11.2 oz)     Temp  Av.2 °F (36.8 °C)  Min: 97.6 °F (36.4 °C)  Max: 98.9 °F (37.2 °C)  Pulse  Av.6  Min: 56  Max: 80  BP  Min: 118/60  Max: 159/84  SpO2  Av.3 %  Min: 92 %  Max: 96 %    Telemetry: Sinus rhythm   Constitutional: Alert. Oriented to person, place, and time. No distress.   Head: Normocephalic and atraumatic. 
[facility] [place] Daily Progress Note      Admit Date:  4/16/2024    Subjective:  Mr. Witt was seen and examined/  F/u bradycardia/HB.  No complaints. No cp/sob.      Objective:   /69   Pulse 53   Temp 97 °F (36.1 °C) (Temporal)   Resp 11   Ht 1.829 m (6')   Wt 83.1 kg (183 lb 3.2 oz)   SpO2 97%   BMI 24.85 kg/m²     Intake/Output Summary (Last 24 hours) at 4/21/2024 0812  Last data filed at 4/20/2024 1800  Gross per 24 hour   Intake 899.18 ml   Output 600 ml   Net 299.18 ml       TELEMETRY: Sinus     Physical Exam:  General:  Awakens, NAD  Skin:  Warm and dry  Neck:  JVP difficult  Chest:  decreased BS, respiration normal  Cardiovascular:  RRR S1S2  Abdomen:  Soft nontender  Extremities:  no edema    Medications:    famotidine  20 mg Oral Daily    methocarbamol  750 mg Oral TID    doxycycline monohydrate  100 mg Oral 2 times per day    naloxegol  12.5 mg Oral QAM AC    polyethylene glycol  17 g Oral Daily    budesonide  0.5 mg Nebulization BID RT    arformoterol tartrate  15 mcg Nebulization BID RT    lidocaine  1 patch TransDERmal Daily    morphine  15 mg Oral TID    pregabalin  75 mg Oral TID    melatonin  5 mg Oral Nightly    piperacillin-tazobactam  3,375 mg IntraVENous Q8H    aspirin  81 mg Oral Daily    citalopram  40 mg Oral Daily    insulin lispro  0-4 Units SubCUTAneous TID WC    insulin lispro  0-4 Units SubCUTAneous Nightly    [Held by provider] metoprolol tartrate  25 mg Oral BID    rosuvastatin  20 mg Oral Nightly    sodium chloride flush  5-40 mL IntraVENous 2 times per day      amiodarone (CORDARONE) 450 mg in dextrose 5 % 250 mL infusion Stopped (04/20/24 1234)    sodium chloride      dextrose       HYDROmorphone, simethicone, albuterol, sodium chloride flush, sodium chloride, ondansetron **OR** ondansetron, glucose, dextrose bolus **OR** dextrose bolus, glucagon (rDNA), dextrose    Lab Data:  CBC:   Recent Labs     04/19/24  0513 04/20/24  0555 04/21/24  0556   WBC 9.1 8.3 6.6 
[facility] [place] Daily Progress Note      Admit Date:  4/16/2024    Subjective:  Mr. Witt was seen and examined/  F/u bradycardia/HB.  No specific complaints. No cp/sob.  Events from yesterday noted    Objective:   /86   Pulse 65   Temp 97.9 °F (36.6 °C) (Axillary)   Resp 16   Ht 1.829 m (6')   Wt 82 kg (180 lb 12.4 oz)   SpO2 95%   BMI 24.52 kg/m²     Intake/Output Summary (Last 24 hours) at 4/19/2024 1528  Last data filed at 4/19/2024 1520  Gross per 24 hour   Intake 2374.41 ml   Output 200 ml   Net 2174.41 ml       TELEMETRY: Sinus     Physical Exam:  General:  Awake, alert, NAD  Skin:  Warm and dry  Neck:  JVP difficult  Chest:  decreased BS, respiration normal  Cardiovascular:  RRR S1S2  Abdomen:  Soft nontender  Extremities:  no edema    Medications:    acetaminophen  1,000 mg Oral 3 times per day    Or    acetaminophen  650 mg Rectal 3 times per day    methocarbamol  750 mg Oral TID    doxycycline monohydrate  100 mg Oral 2 times per day    budesonide  0.5 mg Nebulization BID RT    arformoterol tartrate  15 mcg Nebulization BID RT    lidocaine  1 patch TransDERmal Daily    morphine  15 mg Oral TID    pregabalin  75 mg Oral TID    melatonin  5 mg Oral Nightly    piperacillin-tazobactam  3,375 mg IntraVENous Q8H    aspirin  81 mg Oral Daily    citalopram  40 mg Oral Daily    famotidine  20 mg Oral BID    insulin lispro  0-4 Units SubCUTAneous TID WC    insulin lispro  0-4 Units SubCUTAneous Nightly    [Held by provider] metoprolol tartrate  25 mg Oral BID    rosuvastatin  20 mg Oral Nightly    sodium chloride flush  5-40 mL IntraVENous 2 times per day      lactated ringers IV soln 125 mL/hr at 04/19/24 1200    amiodarone (CORDARONE) 450 mg in dextrose 5 % 250 mL infusion 1 mg/min (04/19/24 1251)    sodium chloride      dextrose       HYDROmorphone, oxyCODONE, albuterol, sodium chloride flush, sodium chloride, ondansetron **OR** ondansetron, polyethylene glycol, glucose, dextrose bolus **OR** 
[facility] [place] Daily Progress Note      Admit Date:  4/16/2024    Subjective:  Mr. Witt was seen and examined/  F/u bradycardia/HB.  No complaints. No cp/sob.      Objective:   BP (!) 109/59   Pulse 54   Temp 98.5 °F (36.9 °C) (Oral)   Resp 17   Ht 1.829 m (6')   Wt 86 kg (189 lb 9.5 oz)   SpO2 96%   BMI 25.71 kg/m²     Intake/Output Summary (Last 24 hours) at 4/25/2024 0945  Last data filed at 4/25/2024 0600  Gross per 24 hour   Intake 250 ml   Output 400 ml   Net -150 ml       TELEMETRY: Sinus     Physical Exam:  General:  Awakens, NAD  Skin:  Warm and dry  Neck:  JVP difficult  Chest:  decreased BS, respiration normal  Cardiovascular:  RRR S1S2  Abdomen:  Soft nontender  Extremities:  no edema    Medications:    famotidine  20 mg Oral BID    fluticasone  2 spray Each Nostril Daily    multivitamin  1 tablet Oral Daily    folic acid  1 mg Oral Daily    methocarbamol  750 mg Oral TID    naloxegol  12.5 mg Oral QAM AC    polyethylene glycol  17 g Oral Daily    budesonide  0.5 mg Nebulization BID RT    arformoterol tartrate  15 mcg Nebulization BID RT    lidocaine  1 patch TransDERmal Daily    morphine  15 mg Oral TID    pregabalin  75 mg Oral TID    melatonin  5 mg Oral Nightly    aspirin  81 mg Oral Daily    citalopram  40 mg Oral Daily    [Held by provider] metoprolol tartrate  25 mg Oral BID    rosuvastatin  20 mg Oral Nightly    sodium chloride flush  5-40 mL IntraVENous 2 times per day      sodium chloride      dextrose       acetaminophen, magnesium sulfate, potassium chloride **OR** potassium alternative oral replacement **OR** potassium chloride, sodium phosphate 15 mmol in sodium chloride 0.9 % 250 mL IVPB, oxyCODONE-acetaminophen, diphenhydrAMINE, perflutren lipid microspheres, simethicone, albuterol, sodium chloride flush, sodium chloride, ondansetron **OR** ondansetron, glucose, dextrose bolus **OR** dextrose bolus, glucagon (rDNA), dextrose    Lab Data:  CBC:   Recent Labs     
[facility] [place] Daily Progress Note      Admit Date:  4/16/2024    Subjective:  Mr. Witt was seen and examined/  F/u bradycardia/HB.  No complaints. No cp/sob.      Objective:   BP 90/62   Pulse 55   Temp 97.5 °F (36.4 °C) (Oral)   Resp 16   Ht 1.829 m (6')   Wt 85.8 kg (189 lb 2.5 oz)   SpO2 100%   BMI 25.65 kg/m²     Intake/Output Summary (Last 24 hours) at 4/23/2024 1002  Last data filed at 4/22/2024 1554  Gross per 24 hour   Intake 631 ml   Output --   Net 631 ml       TELEMETRY: Sinus     Physical Exam:  General:  Awakens, NAD  Skin:  Warm and dry  Neck:  JVP difficult  Chest:  decreased BS, respiration normal  Cardiovascular:  RRR S1S2  Abdomen:  Soft nontender  Extremities:  no edema    Medications:    famotidine  20 mg Oral BID    thiamine  200 mg Oral Daily    multivitamin  1 tablet Oral Daily    folic acid  1 mg Oral Daily    methocarbamol  750 mg Oral TID    naloxegol  12.5 mg Oral QAM AC    polyethylene glycol  17 g Oral Daily    budesonide  0.5 mg Nebulization BID RT    arformoterol tartrate  15 mcg Nebulization BID RT    lidocaine  1 patch TransDERmal Daily    morphine  15 mg Oral TID    pregabalin  75 mg Oral TID    melatonin  5 mg Oral Nightly    aspirin  81 mg Oral Daily    citalopram  40 mg Oral Daily    insulin lispro  0-4 Units SubCUTAneous TID WC    insulin lispro  0-4 Units SubCUTAneous Nightly    [Held by provider] metoprolol tartrate  25 mg Oral BID    rosuvastatin  20 mg Oral Nightly    sodium chloride flush  5-40 mL IntraVENous 2 times per day      sodium chloride      dextrose       acetaminophen, magnesium sulfate, potassium chloride **OR** potassium alternative oral replacement **OR** potassium chloride, sodium phosphate 15 mmol in sodium chloride 0.9 % 250 mL IVPB, perflutren lipid microspheres, simethicone, albuterol, sodium chloride flush, sodium chloride, ondansetron **OR** ondansetron, glucose, dextrose bolus **OR** dextrose bolus, glucagon (rDNA), dextrose    Lab 
Neurostimulator - [employer] Proclaim 5 model #: 3660; leads (2) both 3186; use remote to place in MRI mode. Remote must be fully charged prior to this. internal stim generator is not rechargable. MRI conditional system. normal mode, no TISHA restrictions, whole body eligible, any receive only or transmit receive coil ok.     The patient does not have their remote and does not know where it is. Patient can not have MRI until this can be found or they order a new one to be sent to them.   
Occupational Therapy  Daily Treatment Note  Patient Name: Alexandr Witt  MRN: [id number]    Assessment: Pt declined functional mobility or ADLs with OT, but was agreeable to exercises. He reported walking to bathroom with staff this morning, felt steady with walker. Pt is encouraged to continue increasing his activity levels - he will need reinforcement of all staff.       Discharge Recommendations: SNF  Equipment Needs: Defer (if home, rolling walker, shower chair)  [facility] Score: 16    Chart Reviewed: Yes     Other position/activity restrictions: ambulate   Additional Pertinent Hx: Admit 4/16 with AMS, (+) hypocalcemia; 4/18 code blue with CPR performed; PMHx: NSCLC, CAD, testicular cancer, COPD    Diagnosis: Pneumonia, bacterial  Treatment Diagnosis: impaired ADLs and functional mobility/transfers    Subjective: Pt in chair on entry. Two visitors present. Pt reports walking to bathroom first thing this morning and being up in chair since around 6am. Despite offers of various forms of mobility and encouragement to increase activity levels, pt declined all but seated exercises.     Pain: No pain complaints during session    Objective:    Cognition/Orientation: alert, oriented to self, place, aspects of situation; cues to attend to task    Exercises - seated  Shoulder flex: 6 reps  Ankle pumps: 20 reps  Elbow flex/ext w/ 0.5 lb weight: 6 reps  Long arc quads: 5 reps  Row w/ scap retraction: 5 reps  March: 5 reps  Glut sets: 5 reps  Comment: cues to attend to task, increase effort, complete sets    Activity Tolerance: Tolerated session without adverse events, reports exercise increased his work of breathing a bit; Encouraged to continue to spend time in chair, walk with staff. Daughter reported pt would love to get outside for fresh air and sunlight. Pt educated on possibility of walking or taking w/c to sitting room on unit for change of environment - verb understanding, but denied interest during our 
Occupational Therapy  Facility/Department: [address]  Occupational Therapy     Name: Alexandr Witt  : 1965  MRN: [id number]  Date of Service: 2024    Discharge Recommendations:  Subacute/Skilled Nursing Facility  OT Equipment Recommendations  Equipment Needed: No  Other: defer to next level of care       Patient Diagnosis(es): The primary encounter diagnosis was Altered mental status, unspecified altered mental status type. Diagnoses of Hypokalemia, Hypocalcemia, Elevated TSH, Elevated troponin, Elevated brain natriuretic peptide (BNP) level, Atypical pneumonia, BRENDA (acute kidney injury) ([facility]), Metabolic alkalosis, and Cardiac arrest ([facility]) were also pertinent to this visit.  Past Medical History:  has a past medical history of Abdominal hernia, Aortic valve prosthesis present, CAD (coronary artery disease), Chronic back pain greater than 3 months duration, Clostridium difficile diarrhea, COPD, severity to be determined ([facility]), Current every day smoker, DDD (degenerative disc disease), lumbosacral, Encounter for imaging to screen for metal prior to MRI, Erectile dysfunction, GERD (gastroesophageal reflux disease), Hyperlipidemia, Hypertension, Joint pain, Left testicular cancer ([facility]), Myalgia and myositis, unspecified, Neuropathy, OA (osteoarthritis) of knee, Obesity, Class I, BMI 30.0-34.9 (see actual BMI), Prolonged emergence from general anesthesia, S/P AVR, S/P CABG x 2, Shoulder instability-LEFT, Type 2 diabetes mellitus without complication, without long-term current use of insulin ([facility]), and Wears dentures.  Past Surgical History:  has a past surgical history that includes Aortic valve surgery (2004); Testicle removal (Left, ); Coronary artery bypass graft (2004); Foot surgery (Left, 2012); Shoulder arthroscopy (Right, 2013); Coronary angioplasty with stent (2014); Carpal tunnel release (Right, 2015); lumbar discectomy (); Tunneled venous port placement 
Occupational Therapy  Occupational Therapy  Daily Treatment Note  Patient Name: Alexandr Witt  MRN: [id number]    Chart Reviewed: Yes       Other position/activity restrictions: ambulate     Additional Pertinent Hx: Admit 4/16 with AMS, (+) hypocalcemia; 4/18 code blue with CPR performed; PMHx: NSCLC, CAD, testicular cancer, COPD        Diagnosis: Pneumonia, bacterial  Treatment Diagnosis: impaired ADLs and functional mobility/transfers    Subjective: Pt supine in bed upon entry, pleasant and agreeable to therapy session.    Pain: 9/10 back, LEs, chest, headache - RN notified and delivered meds during therapy session    Social/Functional History  Lives With: Spouse  Type of Home: House  Home Layout: Able to Live on Main level with bedroom/bathroom, Multi-level (\"I can't do steps\")  Home Access:  (\"a few steps\" with questionable rail)  Bathroom Shower/Tub: Walk-in shower  Bathroom Toilet: Handicap height  Bathroom Equipment: Grab bars in shower  Home Equipment: None  ADL Assistance: Independent  Homemaking Assistance:  (dtrs order groceries online; increased difficulty with homemaking over the past few months)  Ambulation Assistance: Independent  Transfer Assistance: Independent  Active : No (recently stopped driving)  Patient's  Info: unable to get to MD appointments recently  Occupation: Retired  Leisure & Hobbies: spend time with his dogs - calle retrievers Sonido and Neha  Additional Comments: dtr at bedside, visiting from out of town.  Unclear if pt has anyone locally available to assist.  Prior Function  ADL Assistance: Independent  Homemaking Assistance:  (dtrs order groceries online; increased difficulty with homemaking over the past few months)  Ambulation Assistance: Independent  Transfer Assistance: Independent  Additional Comments: dtr at bedside, visiting from out of town.  Unclear if pt has anyone locally available to assist.    Objective:    Cognition/Orientation: Orientation not formally 
Occupational/Physical Therapy    Pt off floor this morning for a procedure - unavailable for for PT/OT treatment. Will continue to f/u per POC.     Pam Pruett, OTR/L, [id number]  Gisela Mcmillan, PT [id number]      
PRN dilaudid changed to Q2 hours. Pt complaining of pain 9/10 in chest. VSS, K 3.2 on BMP. 40 mEq ordered per ICU residents.     Placed on 2L O2, Desatting to 88% while asleep.     Standard safety measures in place.     Electronically signed by Ramon Bal RN on 4/18/2024 at 9:48 PM    
Palliative Care Chart Review  and Check in Note:     NAME:  Alexandr Witt  Admit Date: 4/16/2024  Hospital Day:  Hospital Day: 10   Current Code status: Full Code    Palliative care is continuing to following Mr. Witt for symptom management,  and goals of care discussion as needed. Patient's chart reviewed today 4/25/24. Attempted to see patient but he was off the floor for heart catheterization. Will return tomorrow to follow up.      The following are the currently established goals/code status, and Symptom management.     Goals of care: life-prolonging    Code status: FULL CODE    Discharge plan: TBD      Signed,    Hosea Mckee MD, MS, PGY-3  04/25/24  2:54 PM  
Palliative Care Chart Review  and Check in Note:     NAME:  Alexandr Witt  Admit Date: 4/16/2024  Hospital Day:  Hospital Day: 11   Current Code status: Full Code    Palliative care is continuing to following Mr. Witt for symptom management,  and goals of care discussion as needed. Patient's chart reviewed today 4/26/24. Patient had cardiac catheterization yesterday with Dr. Mason. 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 He recommends the patient to see the  clinic and medical management. At this time pacemaker/ICD discussion should be pursued per cardiology. Patient denies pain or SOB. He is tired but did work with PT today and walked about some.       The following are the currently established goals/code status, and Symptom management.     Goals of care: life-prolonging    Code status: FULL CODE    Discharge plan: Likely home with [facility]      Signed,    Hosea Mckee MD, MS, PGY-3  04/26/24  1:07 PM  
Palliative Care Chart Review  and Check in Note:     NAME:  Alexandr Witt  Admit Date: 4/16/2024  Hospital Day:  Hospital Day: 14   Current Code status: Full Code    Palliative care is continuing to following Mr. Witt for symptom management,  and goals of care discussion as needed. Patient's chart reviewed today 4/29/24.      Pt is resting in bed, c/o headache, pain related to recent chest compressions, and not sleeping well at night. He requested percocet which I d/w RN. Pt had just returned from ICD placement. Offered emotional support.    The following are the currently established goals/code status, and Symptom management.     Goals of care: Pt wants to continue aggressive management.    Code status: Full Code, did not discuss today    Discharge plan: MATIAS Harding NP  Palliative Care  [phone]    
Palliative Care Chart Review  and Check in Note:     NAME:  Alexandr Witt  Admit Date: 4/16/2024  Hospital Day:  Hospital Day: 7  Current Code status: Full Code    Palliative care is continuing to following Mr. Witt for symptom management,  and goals of care discussion as needed. Patient's chart reviewed today 4/22/24. He had an episode of confusion overnight which he recalls. He believes that being in the ICU is disorienting. I spoke with him again about the possibility of hospice vs SNF, which are really the best options for him. He seems unsure and would like some more time to think about it. Speaking to his daughter Charity, she indicates that he would likely not want to pursue further medical treatment, for example, for his lung cancer. She would like some information on these options, I relayed this to CM.       The following are the currently established goals/code status, and Symptom management.     Goals of care: life-prolonging for now    Code status: FULL CODE    Discharge plan: SNF vs inpatient hospice      Signed,    Hosea Mckee MD, MS, PGY-3  04/22/24  10:02 AM  
Palliative Care Chart Review  and Check in Note:     NAME:  Alexandr Witt  Admit Date: 4/16/2024  Hospital Day:  Hospital Day: 8   Current Code status: Full Code    Palliative care is continuing to following Mr. Witt for symptom management,  and goals of care discussion as needed. Patient's chart reviewed today 4/23/24. His creatinine is coming down nicely but is not yet in range for cardiac catheterization, should he chose to proceed. That would be accomplished prior to PPM placement, which would be done prior to considering additional treatment for his lung cancer such as bronchoscopy. In light of that his creatinine is not yet in range, he has what seems to be a few more days to decide if he would like to proceed with all of these medical interventions. He states he will continue to consider these things and hopefully have an answer for us by the end of tomorrow.     He states he continues to have hemoptysis, however his abdominal pain is resolved. He denies any other pains. His appetite seems to be improving as he was able to eat half a sandwich and some chocolate today which is fantastic. He was also able to sit in his armchair for a good portion of the day.       The following are the currently established goals/code status, and Symptom management.     Goals of care: life-prolonging at this time    Code status: FULL CODE    Discharge plan: TBD      Signed,    Hosea Mckee MD, MS, PGY-3  04/23/24  10:40 AM  
Palliative Care Chart Review  and Check in Note:     NAME:  Alexandr Witt  Admit Date: 4/16/2024  Hospital Day:  Hospital Day: 9   Current Code status: Full Code    Palliative care is continuing to following Mr. Witt for symptom management,  and goals of care discussion as needed. Patient's chart reviewed today 4/24/24. It appears that nephrology has cleared the patient for cardiac catheterization with fluids, stating the patient's risk for ANTONIO is low. It does not appear the cath will happen today, perhaps tomorrow. Patient is agreeable to cardiac cath. We will assess next steps after this diagnostic procedure however he is amenable to stenting should the conditions be right. We have decided that should he be recommended for CABG we would not pursue that at this time. Regarding his lung cancer, we discussed the possibility of bronchoscopy, and that we would take things one step at a time and focus on the heart first.    Mr. Witt has made the choice to continue his healthcare \"fight\". His appetite is increasing, as a matter of fact he asked for a sandwich today and is continuing to eat mike kisses. He appears much more energetic today as well, and his voice is stronger as well, especially with the added flonase. Mr Witt feels hopeful and energized since his family has been visiting.      The following are the currently established goals/code status, and Symptom management.     Goals of care: clinical improvement, rehabilitation, longevity    Code status: FULL CODE    Discharge plan: TBD      Signed,    Hosea Mckee MD, MS, PGY-3  04/24/24  9:53 AM  
[facility] Daily Progress Note      Admit Date:  4/16/2024    Subjective:  Mr. Witt was seen and examined/  F/u bradycardia/HB.  No complaints. No cp/sob.      Objective:   /62   Pulse 55   Temp 97.9 °F (36.6 °C) (Oral)   Resp 13   Ht 1.829 m (6')   Wt 85.6 kg (188 lb 11.4 oz)   SpO2 95%   BMI 25.59 kg/m²     Intake/Output Summary (Last 24 hours) at 4/24/2024 1349  Last data filed at 4/24/2024 0900  Gross per 24 hour   Intake 525 ml   Output 525 ml   Net 0 ml       TELEMETRY: Sinus     Physical Exam:  General:  Awakens, NAD  Skin:  Warm and dry  Neck:  JVP difficult  Chest:  decreased BS, respiration normal  Cardiovascular:  RRR S1S2  Abdomen:  Soft nontender  Extremities:  no edema    Medications:    famotidine  20 mg Oral BID    fluticasone  2 spray Each Nostril Daily    multivitamin  1 tablet Oral Daily    folic acid  1 mg Oral Daily    methocarbamol  750 mg Oral TID    naloxegol  12.5 mg Oral QAM AC    polyethylene glycol  17 g Oral Daily    budesonide  0.5 mg Nebulization BID RT    arformoterol tartrate  15 mcg Nebulization BID RT    lidocaine  1 patch TransDERmal Daily    morphine  15 mg Oral TID    pregabalin  75 mg Oral TID    melatonin  5 mg Oral Nightly    aspirin  81 mg Oral Daily    citalopram  40 mg Oral Daily    [Held by provider] metoprolol tartrate  25 mg Oral BID    rosuvastatin  20 mg Oral Nightly    sodium chloride flush  5-40 mL IntraVENous 2 times per day      sodium chloride      dextrose       acetaminophen, magnesium sulfate, potassium chloride **OR** potassium alternative oral replacement **OR** potassium chloride, sodium phosphate 15 mmol in sodium chloride 0.9 % 250 mL IVPB, oxyCODONE-acetaminophen, diphenhydrAMINE, perflutren lipid microspheres, simethicone, albuterol, sodium chloride flush, sodium chloride, ondansetron **OR** ondansetron, glucose, dextrose bolus **OR** dextrose bolus, glucagon (rDNA), dextrose    Lab Data:  CBC:   Recent Labs     04/22/24  0440 
Patient refused NG tube placement, and is A/Ox4. POA daughter Charity also does not want NG tube placed. Resident aware.  
Physical Therapy  Attempt    Upon entry, pt reports he just returned to bed after sitting up in chair since 6am today, and has not had a chance to rest. Provider in room and asked if we could come back at later time.  Attempted to return to room for PT treatment but pt still talking with provider and has not rested.  Will follow up 4/24 or per POC.        Talya Dunn, SPT    Therapist was present, directed the patient's care, made skilled judgment, and was responsible for assessment and treatment of the patient.    If patient discharges prior to next session this note will serve as a discharge summary.      Carol Mcrae, PT, DPT  [phone]       
Physical Therapy  Facility/Department: [facility] ICU  Physical Therapy Treatment    Name: Alexandr Witt  : 1965  MRN: [id number]  Date of Service: 2024    Discharge Recommendations:  Subacute/Skilled Nursing Facility   PT Equipment Recommendations  Other: defer to next level of care      Patient Diagnosis(es): The primary encounter diagnosis was Altered mental status, unspecified altered mental status type. Diagnoses of Hypokalemia, Hypocalcemia, Elevated TSH, Elevated troponin, Elevated brain natriuretic peptide (BNP) level, Atypical pneumonia, BRENDA (acute kidney injury) ([facility]), Metabolic alkalosis, and Cardiac arrest ([facility]) were also pertinent to this visit.  Past Medical History:  has a past medical history of Abdominal hernia, Aortic valve prosthesis present, CAD (coronary artery disease), Chronic back pain greater than 3 months duration, Clostridium difficile diarrhea, COPD, severity to be determined ([facility]), Current every day smoker, DDD (degenerative disc disease), lumbosacral, Encounter for imaging to screen for metal prior to MRI, Erectile dysfunction, GERD (gastroesophageal reflux disease), Hyperlipidemia, Hypertension, Joint pain, Left testicular cancer ([facility]), Myalgia and myositis, unspecified, Neuropathy, OA (osteoarthritis) of knee, Obesity, Class I, BMI 30.0-34.9 (see actual BMI), Prolonged emergence from general anesthesia, S/P AVR, S/P CABG x 2, Shoulder instability-LEFT, Type 2 diabetes mellitus without complication, without long-term current use of insulin ([facility]), and Wears dentures.  Past Surgical History:  has a past surgical history that includes Aortic valve surgery (2004); Testicle removal (Left, ); Coronary artery bypass graft (2004); Foot surgery (Left, 2012); Shoulder arthroscopy (Right, 2013); Coronary angioplasty with stent (2014); Carpal tunnel release (Right, 2015); lumbar discectomy (); Tunneled venous port placement (); colectomy 
Post procedure CXR and ECG was ordered and not yet released / performed.     If post procedure CXR is WNL, OK to be discharged from cardiology point    Willa Trevino MD   Cardiac Electrophysiology  [facility]  Office [phone]        
Pt appears Mobitz II, with complete heart block runs on arrival to unit. STAT EKG ordered per protocol. Pacer pads placed on pt. MD notified. Pt asymptomatic  
Pt briefly Disoriented in AM, Thought that he was not in the hospital, 1 episode of brown emesis. Up to the bathroom with 1 uo and small amt of green stool.  
Pt c/o nausea. Refuses AM medications at this time. Zofran administered per order.     
Pt continues to be bradycardic sustaining in 40's but as low as 38.     Dr Davies paged.   Telephone order from Dr Davies to stop amio gtt at this time.   Amio gtt stopped  Will continue to monitor.   
Pt declines to get OOB today. States he will get up to chair tomorrow.   
Pt discharged per order. All education complete and discharge instructions reviewed with patient and sent in TAMIKA packet. PIV removed with no complications. All belongings sent with patient. LVM for facility to call report. Pt transported via EMS  
Pt drank about half of an Ensure Protein before being told by cardiology to be NPO for cath this afternoon.    Gave important PO meds with sips of water.   Held vitamins to minimize bulk of oral intake.     Pt aware of plan of care.       
Pt exam stable overnight. Pain much more under control w/ dosage increase on PRN medications. Pt denies having to urinate bladder scan still only showing 225 mL. Pt educated to call when he has to urinate and if he is unable he will need to be straight cathed. Standard safety measures in place.     Electronically signed by Ramon Bal RN on 4/19/2024 at 6:15 AM    
Pt has been refusing all meals.   
Pt has not voided this shift. Bladder scan showing 165 mL, Residents made aware.     Electronically signed by Ramon Bal RN on 4/19/2024 at 12:58 AM    
Pt low risk for ANTONIO  Please give fluids acc to EDP in Cath lab  
Pt moving arm a lot and scant oozing noted under L radial dressing. L wrist immobilizer replaced.   
Pt off floor to Cath lab  
Pt ordered dinner but refuses to eat. A/Ox4.  
Pt refused to have another IV.   
Pt refusing hospital-provided meals. Snacking on food brought from family members. Pt educated on eating enough food. VSS.   
Pt reports 8/10 pain, morning pain meds given, VSS, standard safety precautions in place, PIV dressing changed, A/Ox4.  
Pt reports his port a cath has not \"been accessed in 20 years\". Denies any flushing of port in 20 years.       
Pt returns to room 4522 from Cath Lab  VSS  L radial access site with TR band in place.   Pt alert, oriented, on the same amount of O2.   
Pt spit up quarter-sized blood clot. Hospitalist aware. VSS at this time.   
Pt still intermittently coughing up ce red/bloody mucous \"about once per day\". Coughed up one in his cup late this AM to show to medical staff.   
Pt. Arrived to ICU via code team/PCU RN. Pt. Alert and oriented/able to answer questions. He was complaining of 10/10 chest pain. Pain treated with morphine and subsequent dilaudid (see MAR). Pt. Vss at this time and potassium corrected with central line administration. Pt. Resting at this time after dilaudid administration. Will continue to assess pain.   
RN entered room a few minutes before 1605 TR band/radial site assessment was due, and found pt holding his wrist splint and TR band, trying to reapply it, one-handed. RN asked why it was off, and pt responded \"it fell off\". At the time RN had left after 1550 assessment, pt had said \"it's about time to take this off, this is how you do it\" and RN educated that it's not time yet. Not sure exactly how long it's been off.    No oozing, swelling hematoma at the site. No dressing in place. Site looks the same (deeply bruised but otherwise unchanged). RN retrieved a transparent dressing to place at the site. Will continue the usual checks but TR band is off. 5mL of air was in place at the time of removal.   
Repeat pt/inr sent stat  
Repeat pt: 63.5  INR: 7.68  ICU resident Dr Wu notified via secure message.  
Responded to code Blue, pt busy with care. No family present, but daughter on her way when other called. Will continue to follow for support.     04/18/24 1432   Encounter Summary   Encounter Overview/Reason  Crisis   Service Provided For: Patient  (busy with care)   Referral/Consult From: Multi-disciplinary team   Support System Children  (on her way)   Last Encounter  04/18/24  (ME, present at Code, no family present at the time, daughter on her way, will continue to follow for support)   Crisis   Type Code Blue       
Spoken to Roxann from the lab, she confirmed that the blood culture x 2 has already collected.  
[facility]   Cardiac Electrophysiology Progress Note     Admit Date: 2024     Reason for follow up: CHB / Vfib arrest    HPI and Interval History:   Patient seen and examined. Clinical notes reviewed.   Telemetry reviewed. No new complaint today.   No major events overnight.   Denies having chest pain, shortness of breath, dyspnea on exertion, Orthopnea, PND at the time of this visit.    Review of System:  All other systems reviewed except for that noted above. Pertinent negatives and positives are:     General: negative for fever, chills   Ophthalmic ROS: negative for - eye pain or loss of vision  ENT ROS: negative for - headaches, sore throat   Respiratory: negative for - cough, sputum  Cardiovascular: Reviewed in HPI  Gastrointestinal: negative for - abdominal pain, diarrhea, N/V  Hematology: negative for - bleeding, blood clots, bruising or jaundice  Genito-Urinary:  negative for - Dysuria or incontinence  Musculoskeletal: negative for - Joint swelling, muscle pain  Neurological: negative for - confusion, dizziness, headaches   Psychiatric: No anxiety, no depression.  Dermatological: negative for - rash      Physical Examination:  Vitals:    24 0829   BP:    Pulse: 52   Resp: 18   Temp:    SpO2: 96%        Intake/Output Summary (Last 24 hours) at 2024 0838  Last data filed at 2024 0510  Gross per 24 hour   Intake 1140 ml   Output 2050 ml   Net -910 ml     In: 1140 [P.O.:1140]  Out: 2050    Wt Readings from Last 3 Encounters:   24 83 kg (182 lb 15.7 oz)   24 87.7 kg (193 lb 4.8 oz)   24 89.7 kg (197 lb 11.2 oz)     Temp  Av.7 °F (36.5 °C)  Min: 97.4 °F (36.3 °C)  Max: 98.2 °F (36.8 °C)  Pulse  Av.7  Min: 52  Max: 86  BP  Min: 109/64  Max: 149/80  SpO2  Av.3 %  Min: 94 %  Max: 96 %    Telemetry: Sinus rhythm   Constitutional: Alert. Oriented to person, place, and time. No distress.   Head: Normocephalic and atraumatic.   Mouth/Throat: Lips appear 
[facility] Daily Progress Note      Admit Date:  4/16/2024    Subjective:  Mr. Witt was seen and examined/  F/u bradycardia/HB.  No complaints. No cp/sob.      Objective:   BP (!) 145/83   Pulse 60   Temp 97.6 °F (36.4 °C) (Oral)   Resp 18   Ht 1.829 m (6')   Wt 83.2 kg (183 lb 6.8 oz)   SpO2 94%   BMI 24.88 kg/m²     Intake/Output Summary (Last 24 hours) at 4/30/2024 1013  Last data filed at 4/29/2024 1841  Gross per 24 hour   Intake 610 ml   Output 5 ml   Net 605 ml       TELEMETRY: Sinus     Physical Exam:  General:  Awakens, NAD  Skin:  Warm and dry  Neck:  JVP difficult  Chest:  decreased BS, respiration normal  Cardiovascular:  RRR S1S2  Abdomen:  Soft nontender  Extremities:  no edema    Medications:    sodium chloride flush  5-40 mL IntraVENous 2 times per day    sodium chloride flush  5-40 mL IntraVENous 2 times per day    carvedilol  3.125 mg Oral BID WC    sodium chloride flush  5-40 mL IntraVENous 2 times per day    famotidine  20 mg Oral BID    fluticasone  2 spray Each Nostril Daily    multivitamin  1 tablet Oral Daily    folic acid  1 mg Oral Daily    methocarbamol  750 mg Oral TID    naloxegol  12.5 mg Oral QAM AC    polyethylene glycol  17 g Oral Daily    budesonide  0.5 mg Nebulization BID RT    arformoterol tartrate  15 mcg Nebulization BID RT    lidocaine  1 patch TransDERmal Daily    morphine  15 mg Oral TID    pregabalin  75 mg Oral TID    melatonin  5 mg Oral Nightly    aspirin  81 mg Oral Daily    citalopram  40 mg Oral Daily    rosuvastatin  20 mg Oral Nightly    sodium chloride flush  5-40 mL IntraVENous 2 times per day      sodium chloride      sodium chloride      sodium chloride      sodium chloride      dextrose       sodium chloride flush, sodium chloride, sodium chloride flush, sodium chloride, sodium chloride flush, sodium chloride, acetaminophen, magnesium sulfate, potassium chloride **OR** potassium alternative oral replacement **OR** potassium chloride, sodium 
[facility] - Clinical Pharmacy Note - Renal Dosing    Famotidine ordered for prophylaxis. Per [facility] Renal Dose Adjustment Policy, Famotidine will be changed to 20 mg PO Daily.     Estimated Creatinine Clearance: Estimated Creatinine Clearance: 45 mL/min (A) (based on SCr of 1.9 mg/dL (H)).  Dialysis Status, BRENDA, CKD: SCr increased 1.7 --> 1.9  BMI: Body mass index is 24.85 kg/m².    Rationale for Adjustment: Agent is renally eliminated.    Pharmacy will continue to monitor renal function and adjust dose as necessary.      Please call with any questions.    Rachana FrazierD., BCPS   4/20/2024 1:24 PM  Wireless: [phone]      
[facility] -  Clinical Pharmacy Note    Warfarin - Management by Pharmacy    Consult Date(s): 04/16/2024  Consulting Provider(s): Nabeel Mcbride    Assessment / Plan  Atrial Fibrillation s/p Aortic valve replacement- Warfarin  Goal INR: 2 - 3  Concurrent Anticoagulants / Antiplatelets:   Aspirin - home med; continued on admission  Interactions:   Doxycycline -- can potentially increase INR   Current Regimen / Plan:   INR supratherapeutic (3.33) on admission. Of note, INR was 8 at last outpatient check on 4/12 (at that time, pt had been instructed to hold doses on 4/12-4/4/15).   INR therapeutic today (2.95).  Will resume Warfarin 2mg po daily  This is a ~20% reduction in home dose as pt has had supratherpeutic INRs at last appointments (likely associated with decreased po intake and weight loss).   Will monitor pt's clinical status and INR daily, and make dose adjustments as needed.    Please call with questions--  Juliet Gutierrez, PharmD, [facility]  Wireless: [phone]   4/17/2024 8:39 AM        Interval update:  Bradycardic overnight in 40s. Afebrile.     Subjective/Objective:   Alexandr Witt is a 58 y.o. male with a PMHx significant for for AVR (re-do in May, 2017), CAD s/p CABG (x3 in May, 2017), pA-fib, HLD, HTN, testicular CA  who is admitted with PNA and hypokalemia.     Pharmacy is consulted to dose Warfarin.    Ht Readings from Last 1 Encounters:   04/16/24 1.829 m (6')     Wt Readings from Last 1 Encounters:   04/17/24 82 kg (180 lb 12.4 oz)     Prior / Home Warfarin Regimen:  INR goal 2-3, A Fib, s/p Aortic valve replacement   Home dose: Warfarin 2.5 mg PO daily  Managed as outpatient by [facility]  Last INR check 4/12 - INR was 8 (pt refused  INR check)   At that time, pt reported loss of appetite with significant weight loss; and acute illness  Instructed to hold Warfarin x 3 days (4/12, 4/13, 4/14) then continue Warfarin 2.5mg po daily.   From review of clinic notes, pt has had 
[facility] - Clinical Pharmacy Note - Renal Dosing    Famotidine ordered for prophylaxis. Per [facility] Renal Dose Adjustment Policy, Famotidine will be changed to 20 mg PO BID.     Estimated Creatinine Clearance: Estimated Creatinine Clearance: 57 mL/min (A) (based on SCr of 1.5 mg/dL (H)).  Dialysis Status, BRENDA, CKD: SCr improved again, 1.9 --> 1.5  BMI: Body mass index is 25.65 kg/m².    Rationale for Adjustment: Agent is renally eliminated.    Pharmacy will continue to monitor renal function and adjust dose as necessary.      Please call with any questions.    Rachana FrazierD., BCPS   4/23/2024 8:24 AM  Wireless: [phone]      
Unable to do MRI due to spinal stimulator. Need paired IPOD to be able to switch to MRI compatible mode. Will ask family.   
[facility]   Cardiac Electrophysiology Progress Note     Admit Date: 2024     Reason for follow up: VFib    HPI and Interval History:   Patient seen and examined. Clinical notes reviewed.   Telemetry reviewed. No new complaint today.   No major events overnight.   Denies having chest pain, shortness of breath, dyspnea on exertion, Orthopnea, PND at the time of this visit.    Review of System:  All other systems reviewed except for that noted above. Pertinent negatives and positives are:     General: negative for fever, chills   Ophthalmic ROS: negative for - eye pain or loss of vision  ENT ROS: negative for - headaches, sore throat   Respiratory: negative for - cough, sputum  Cardiovascular: Reviewed in HPI  Gastrointestinal: negative for - abdominal pain, diarrhea, N/V  Hematology: negative for - bleeding, blood clots, bruising or jaundice  Genito-Urinary:  negative for - Dysuria or incontinence  Musculoskeletal: negative for - Joint swelling, muscle pain  Neurological: negative for - confusion, dizziness, headaches   Psychiatric: No anxiety, no depression.  Dermatological: negative for - rash      Physical Examination:  Vitals:    24 1645   BP: 109/64   Pulse: 74   Resp: 20   Temp: 98.2 °F (36.8 °C)   SpO2: 94%        Intake/Output Summary (Last 24 hours) at 2024 1823  Last data filed at 2024 1813  Gross per 24 hour   Intake 1080 ml   Output 2000 ml   Net -920 ml     In:  [P.O.:]  Out: 3200    Wt Readings from Last 3 Encounters:   24 83 kg (182 lb 15.7 oz)   24 87.7 kg (193 lb 4.8 oz)   24 89.7 kg (197 lb 11.2 oz)     Temp  Av.3 °F (36.8 °C)  Min: 98 °F (36.7 °C)  Max: 98.6 °F (37 °C)  Pulse  Av.3  Min: 57  Max: 86  BP  Min: 106/67  Max: 142/72  SpO2  Av.8 %  Min: 94 %  Max: 97 %    Telemetry: Sinus rhythm   Constitutional: Alert. Oriented to person, place, and time. No distress.   Head: Normocephalic and atraumatic.   Mouth/Throat: Lips 
While administering patients PO K+ replacement, patient stated he did not want to take it. I began to educate patient on importance of the replacement as his potassium was critically low. I told patient his heart could stop if he did not take it. Patient then began to Joke with me and state that he didn't need it , but he would take it if it would make the me stop lecturing him on it. Moments after taking the medication, while talking with me patient suddenly became unresponsive and slumped over to his left side. I began yelling are you ok to patient, and turned him back over to his back when I I noticed he was grayish blue in color and not breathing. I felt for a pulse, there wasn't one, I yelled for help, pushed to Code blue button and started CPR. Fellow nursing staff arrived seconds later. See code narrator for further info.   
While pt in bathroom, had an episode of brown tinged emesis.   
LHC  Left ventricular pressure not identified.   Aortic pressure 132/70 mmHg  Coronary anatomy:   The left main coronary artery is calcified but has great flow.    Left anterior descending artery is completely occluded shortly after its takeoff.  There is LUZ graft to the LAD.  Circumflex artery has significant calcification and atherosclerotic plaque but no appreciable stenosis.  The right coronary artery is a dominant vessel and completely occluded mid vessel.  I believe there is some right to right collateral flow.  We did not identify any left to right collateral flow.  LUZ graft to the LAD is a patent vessel.  Have both flow included retrograde flow of the LAD.  There is no appreciable stenosis.  Saphenous vein grafts were not found.  Left ventriculogram was not performed however the EF by echocardiogram was 35%.  Global hypokinesia.    Impression:  Significant native coronary artery disease with the right coronary artery showing complete occlusion in his proximal one half however there is right to right collateral flow.  This is chronic total occlusion and we could consider attempt at  treatment.  The left coronary system looks to be in pretty good shape with the LUZ graft to the LAD widely patent with good flow.  Circumflex has good flow.  Again we cannot find a vein graft to the circumflex system.   Complications: none  Plan:  Continue primary prevention.  This includes optimization of lipid LDL profile.  Will have our  colleagues review the films for possible consideration for a  attempt.  I do not see that surgery is an option for this patient.  He has had previous bypasses and subsequent preop with aortic valve replacement with a tissue valve.  Seems medical management.    Assessment:  CAD/CABG/AVR  Bradycardia   CHB  VF arrest w/4 shocks   HFrEF EF 35%  BRENDA  HTN  HLD  pAF  DM  COPD  Lung cancer    Plan:  -Keep K>4, Mg>2.  -ASA 81 mg daily   -Metoprolol on hold d/t bradycardia   -Crestor    No 
altered mental status with auditory and visual hallucinations    Alexandr Witt is a 58 y.o. male with history of NSCLC, CAD, testicular cancer, COPD who presents altered mental status. Patient normally lives alone. His daughter is normally calm every few days patient has not been answering for several days. Due to this they went to his house and visited him. He initially refused transport by EMS, however, his daughter brought him into the ED for further evaluation. He told his daughter that he had been urinating gross blood and having blood in his vomit but does not report that to me now. The patient has difficulty providing any further useful history at this time. When asked where he is he reports that he has had a Jiffy Lube.     Subjective: Patient still with no appetite and bland affect.  Patient still complaining of abdominal distention.  Nursing report no issues overnight    Review of Systems:      Pertinent positives and negatives discussed in HPI    Objective:     Intake/Output Summary (Last 24 hours) at 4/22/2024 1201  Last data filed at 4/22/2024 0758  Gross per 24 hour   Intake 994.16 ml   Output 600 ml   Net 394.16 ml        Vitals:   Vitals:    04/22/24 0900 04/22/24 0915 04/22/24 0930 04/22/24 1001   BP: 129/72      Pulse: 61 62 61 64   Resp: 13 26 13 16   Temp:       TempSrc:       SpO2: 98% 99% 97% 93%   Weight:       Height:             Physical Exam:      General: NAD   Male sitting up in bed  Eyes: Clear nonicteric   ENT: neck supple trach midline  Cardiovascular: Regular rate.  Respiratory: Clear to auscultation  Gastrointestinal: Soft, non tender  BS Positive  Genitourinary: no suprapubic tenderness  Musculoskeletal:  edema none  Skin: warm, dry  Neuro: A & O: X 3  Psych: Affect depressed        Medications:   Medications:    thiamine  200 mg Oral Daily    multivitamin  1 tablet Oral Daily    folic acid  1 mg Oral Daily    piperacillin-tazobactam  3,375 mg IntraVENous Q8H    doxycycline 
at the time of discharge  - folate deficiency  -Continue replacement  -Transfuse less than 7    Paroxysmal A-fib  -Discussed with cardiology.  Resume Coumadin on discharge  -Not on any rhythm/rate control medication due to bradycardia      Chronic pain-continue home medication, patient states he takes morphine 3 times a day.  As needed oxycodone prescription given for a few days as patient is having severe chest pain due to chest compression    Non-small cell lung cancer  -S/p left minithoracotomy on 8/23.  PET scan on 4/5/2024 showed hypermetabolic uptake in the medial basal left lower lobe  -Needs outpatient CT scan and follow-up with oncology    I spent > 55  minutes in the care of this patient.  Over 50% of that time was in face-to-face counseling regarding disease process, diagnostic testing, preventative measures, and answering patient and family questions.     Diet ADULT DIET; Regular; Low Fat/Low Chol/High Fiber/2 gm Na   DVT Prophylaxis [] Lovenox, []  Heparin, [] SCDs, [] Ambulation,  [] Eliquis, [] Xarelto  [] Coumadin   Code Status Full Code   Disposition From: home  Expected Disposition: home  Estimated Date of Discharge: today   Patient requires continued admission due to    Surrogate Decision Maker/ POA       Personally reviewed Lab Studies and Imaging     Discussed management of the case with cardiology who recommended post AICD care            Subjective:     Chief Complaint: Pneumonia    Alexandr Witt is a 58 y.o. male who presents with pneumonia  Patient feels okay.      Review of Systems:      Pertinent positives and negatives discussed in HPI    Objective:     Intake/Output Summary (Last 24 hours) at 4/30/2024 1447  Last data filed at 4/30/2024 1422  Gross per 24 hour   Intake 600 ml   Output 400 ml   Net 200 ml        Vitals:   Vitals:    04/30/24 0717 04/30/24 0914 04/30/24 1127 04/30/24 1241   BP: (!) 145/83  (!) 160/81    Pulse: 60  58 62   Resp: 18  18    Temp: 97.6 °F (36.4 °C)  98 °F 
supratherapeutic INRs for past several months.    Date INR Warfarin   4/16 3.33 --   4/17 2.95 2 mg   4/18 2.67             Recent Labs     04/16/24  1646 04/17/24  0619 04/17/24  1608 04/18/24  0747   INR 3.33* 2.95*  --  2.67*   HGB 13.7 12.1*  --  12.2*    110*  --  99*   CREATININE 1.1 0.9 0.8 0.7*        
   Ventricular fibrillation (HCC) 04/18/2024    Cardiac arrest (HCC) 04/18/2024    Altered mental status 04/17/2024    Pneumonia, bacterial 04/16/2024    Secondary hypercoagulable state (HCC) 03/28/2024    Recurrent otitis media, bilateral 03/28/2024    ACP (advance care planning) 12/13/2023    Hypokalemia 12/13/2023    NSCLC of left lung (HCC) 09/27/2023    Lightheadedness 09/20/2023    Mild malnutrition (HCC) 09/13/2023    Primary malignant neoplasm of left lower lobe of lung (HCC) 09/06/2023    Acute pulmonary edema (HCC) 09/06/2023    Pulmonary nodule 08/31/2023    COPD exacerbation ([facility]) 05/10/2023    Type 2 diabetes mellitus without complication, without long-term current use of insulin (HCC) 07/13/2021    Unipolar depression 07/13/2021    Abnormal gait 07/13/2021    Severe obesity (BMI 35.0-39.9) with comorbidity ([facility]) 10/01/2020    Colonic mass 07/26/2019    BPH associated with nocturia 09/17/2018       Plan:  Remains sinus.  Keep K >4.0.  MG> 2.  Hold ACE/entresto/aldactone/SGLT2  until  Cr stabilizes.  HR low for B blocker.  Tx  PNA.   Consider cath in view of VF and decline in EF when cr stabilizes/PNA improved.  Cr still up at 1.7.  EP to see.      Core Measures:  Discharge instructions:   LVEF documented:   ACEI for LV dysfunction:   Smoking Cessation:    Alexandr Davies MD, MD 4/22/2024 1:44 PM  
  03/28/24 89.7 kg (197 lb 11.2 oz)      24HR INTAKE/OUTPUT:    Intake/Output Summary (Last 24 hours) at 4/24/2024 0928  Last data filed at 4/24/2024 0400  Gross per 24 hour   Intake 285 ml   Output 825 ml   Net -540 ml         NECK: supple, no JVD  Cardiovascular:  S1, S2 without m/r/g  Respiratory:  CTA B without w/r/r  Abdomen: +bs, soft, nt  Ext: No LE edema  Skin Warm and dry   Assessment and Plan:       IMAGING:  XR ABDOMEN (KUB) (SINGLE AP VIEW)   Final Result      Nonspecific nonobstructive bowel gas pattern. Diffuse gaseous distention of the bowel.      Electronically signed by Karl Howell MD      XR CHEST PORTABLE   Final Result   Right fifth rib fracture. No pneumothorax.      CTA Chest W/ Contrast R/O PE   Final Result      Chest:   1.  No evidence of pulmonary embolus.   2.  Mild patchy opacities throughout the left lung which may be infectious/inflammatory.   3.  Unchanged 9 mm pulmonary nodule in the medial basal left lower lobe, previously mildly hypermetabolic on PET/CT.   4.  Unchanged 11 mm pulmonary nodule in the posterior basal left lower lobe, previously nonhypermetabolic.   5.  Ectatic ascending thoracic aorta measuring up to 4 cm.      Abdomen pelvis:   1.  Mild sigmoid colonic wall thickening which may reflect colitis.   2.  Mild hepatic steatosis.            Electronically signed by Karl Howell MD      CT ABDOMEN PELVIS W IV CONTRAST Additional Contrast? None   Final Result      Chest:   1.  No evidence of pulmonary embolus.   2.  Mild patchy opacities throughout the left lung which may be infectious/inflammatory.   3.  Unchanged 9 mm pulmonary nodule in the medial basal left lower lobe, previously mildly hypermetabolic on PET/CT.   4.  Unchanged 11 mm pulmonary nodule in the posterior basal left lower lobe, previously nonhypermetabolic.   5.  Ectatic ascending thoracic aorta measuring up to 4 cm.      Abdomen pelvis:   1.  Mild sigmoid colonic wall thickening which may reflect colitis.   2.  
-- -- -- -- 21 --   04/21/24 1100 104/65 -- -- 56 17 --   04/21/24 1008 -- -- -- (!) 38 10 --   04/21/24 1000 102/68 -- -- 54 15 --   04/21/24 0945 -- -- -- -- -- 98 %   04/21/24 0900 121/69 -- -- (!) 48 13 98 %   04/21/24 0800 131/69 96.8 °F (36 °C) Temporal 53 16 99 %   04/21/24 0700 95/69 -- -- (!) 49 16 97 %         Intake/Output Summary (Last 24 hours) at 4/21/2024 1145  Last data filed at 4/21/2024 0945  Gross per 24 hour   Intake 899.18 ml   Output 600 ml   Net 299.18 ml       Review of Systems   Constitutional:  Positive for appetite change, fatigue and unexpected weight change. Negative for activity change.   Cardiovascular:  Positive for chest pain. Negative for palpitations and leg swelling.   Gastrointestinal:  Negative for constipation, diarrhea and vomiting.        Hematemesis   Genitourinary:         Hematuria   Psychiatric/Behavioral:  Positive for confusion. Negative for agitation and sleep disturbance. The patient is not nervous/anxious and is not hyperactive.        Physical Exam  Constitutional:       Appearance: Normal appearance.   HENT:      Head: Normocephalic and atraumatic.   Eyes:      Extraocular Movements: Extraocular movements intact.      Conjunctiva/sclera: Conjunctivae normal.   Cardiovascular:      Rate and Rhythm: Regular rhythm. Bradycardia present.      Pulses: Normal pulses.      Heart sounds: Normal heart sounds. No murmur heard.     No friction rub. No gallop.   Pulmonary:      Effort: Pulmonary effort is normal. No respiratory distress.      Breath sounds: Normal breath sounds. No stridor. No wheezing, rhonchi or rales.   Chest:      Chest wall: No tenderness.   Abdominal:      General: Bowel sounds are normal. There is distension.      Palpations: Abdomen is soft.   Musculoskeletal:         General: No swelling or deformity. Normal range of motion.      Right lower leg: No edema.      Comments: Chest tenderness from compressions   Skin:     General: Skin is warm.      
72 hours.    Objective:   Vitals: /75   Pulse 56   Temp 97.6 °F (36.4 °C) (Oral)   Resp 14   Ht 1.829 m (6')   Wt 83 kg (182 lb 15.7 oz)   SpO2 92%   BMI 24.82 kg/m²    Wt Readings from Last 3 Encounters:   04/28/24 83 kg (182 lb 15.7 oz)   04/11/24 87.7 kg (193 lb 4.8 oz)   03/28/24 89.7 kg (197 lb 11.2 oz)      24HR INTAKE/OUTPUT:    Intake/Output Summary (Last 24 hours) at 4/28/2024 1137  Last data filed at 4/28/2024 0510  Gross per 24 hour   Intake 1140 ml   Output 2050 ml   Net -910 ml       Cachectic , depressed   NECK: supple, no JVD  Cardiovascular:  S1, S2 without m/r/g  Respiratory:  CTA B without w/r/r  Abdomen: +bs, soft, mild tenderness no peritoneal signs   Ext: no  LE edema  Skin Warm and dry   Assessment and Plan:       IMAGING:  XR ABDOMEN (KUB) (SINGLE AP VIEW)   Final Result      Nonspecific nonobstructive bowel gas pattern. Diffuse gaseous distention of the bowel.      Electronically signed by Karl Howell MD      XR CHEST PORTABLE   Final Result   Right fifth rib fracture. No pneumothorax.      CTA Chest W/ Contrast R/O PE   Final Result      Chest:   1.  No evidence of pulmonary embolus.   2.  Mild patchy opacities throughout the left lung which may be infectious/inflammatory.   3.  Unchanged 9 mm pulmonary nodule in the medial basal left lower lobe, previously mildly hypermetabolic on PET/CT.   4.  Unchanged 11 mm pulmonary nodule in the posterior basal left lower lobe, previously nonhypermetabolic.   5.  Ectatic ascending thoracic aorta measuring up to 4 cm.      Abdomen pelvis:   1.  Mild sigmoid colonic wall thickening which may reflect colitis.   2.  Mild hepatic steatosis.            Electronically signed by Karl Howell MD      CT ABDOMEN PELVIS W IV CONTRAST Additional Contrast? None   Final Result      Chest:   1.  No evidence of pulmonary embolus.   2.  Mild patchy opacities throughout the left lung which may be infectious/inflammatory.   3.  Unchanged 9 mm pulmonary nodule in 
Temp 97.6 °F (36.4 °C) (Oral)   Resp 18   Ht 1.829 m (6')   Wt 83.5 kg (184 lb 1.4 oz)   SpO2 95%   BMI 24.97 kg/m²   General appearance: alert, appears stated age and cooperative, No acute distress   Eyes: Conjunctiva and pupils normal and reactive  Skin: Skin color, texture, turgor normal. No rashes or ecchymosis.  Neck: no JVD, supple, symmetrical, trachea midline   Lungs: , no accessory muscle use, no respiratory distress  Heart: RRR  Abdomen: soft, non-tender; bowel sounds normal  Extremities: No edema, DP +  Psychiatric: normal insight and affect  Incision- Right chest dressing CDI      Patient Active Problem List:     Primary hypertension     Hyperlipidemia     Generalized Arthralgias      Lumbar spinal stenosis     Lumbar herniated disc     S/P aortic valve replacement     Hx of CABG     Needle phobia     CAD, multiple vessel     History of testicular cancer     Degenerative arthritis of right knee     Exostosis of toe     S/P hernia repair     Myofascial pain dysfunction syndrome     Postlaminectomy syndrome, lumbar region     GERD (gastroesophageal reflux disease)     Severe hypoxemia     Atypical pneumonia     Bradycardia     Carpal tunnel syndrome on left     History of partial colectomy     NSTEMI (non-ST elevated myocardial infarction) ([facility])     Nonrheumatic aortic valve insufficiency     Obesity, Class I, BMI 30.0-34.9 (see actual BMI)     S/P AVR     CAD in native artery     Atrial fibrillation (HCC)     Pure hypercholesterolemia     Plantar fasciitis     Adjustment disorder with depressed mood     BPH associated with nocturia     Colonic mass     Severe obesity (BMI 35.0-39.9) with comorbidity (HCC)     Type 2 diabetes mellitus without complication, without long-term current use of insulin (HCC)     Unipolar depression     Abnormal gait     Dysphagia     COPD exacerbation (HCC)     Pulmonary nodule     Primary malignant neoplasm of left lower lobe of lung (HCC)     Acute pulmonary edema ([facility])   
spent greater than 50 minutes in completing progress note      Diet ADULT DIET; Regular  ADULT ORAL NUTRITION SUPPLEMENT; Breakfast, Lunch, Dinner; Frozen Oral Supplement  Diet NPO   DVT Prophylaxis [] Lovenox, []  Heparin, [] SCDs, [] Ambulation,  [] Eliquis, [] Xarelto  [] Coumadin   Code Status Full Code       Surrogate Decision Maker/ POA      Personally reviewed Lab Studies and Imaging             Subjective:     Chief Complaint: Hematuria and hemoptysis with altered mental status    Alexandr Witt is a 58 y.o. male with history of NSCLC, CAD, testicular cancer, COPD who presents altered mental status. Patient normally lives alone. His daughter is normally calm every few days patient has not been answering for several days. Due to this they went to his house and visited him. He initially refused transport by EMS, however, his daughter brought him into the ED for further evaluation. He told his daughter that he had been urinating gross blood and having blood in his vomit but does not report that to me now. The patient has difficulty providing any further useful history at this time. When asked where he is he reports that he has had a Jiffy Lube.     Subjective: Patient is feeling pretty good today.  No specific complaints.  Uneventful night overnigh      Review of Systems:      Pertinent positives and negatives discussed in HPI    Objective:     Intake/Output Summary (Last 24 hours) at 4/28/2024 1540  Last data filed at 4/28/2024 0510  Gross per 24 hour   Intake 780 ml   Output 2050 ml   Net -1270 ml        Vitals:   Vitals:    04/28/24 0600 04/28/24 0829 04/28/24 0859 04/28/24 0923   BP:   120/75    Pulse:  52 56    Resp:  18 18 14   Temp:   97.6 °F (36.4 °C)    TempSrc:   Oral    SpO2:  96% 92%    Weight: 83 kg (182 lb 15.7 oz)      Height:             Physical Exam:      General: NAD   Male cooperative sitting up in bed  Eyes: Clear nonicteric   ENT: neck supple trach midline  Cardiovascular: Regular 
throughout the abdomen.  Impression: Nonspecific nonobstructive bowel gas pattern. Diffuse gaseous distention of the bowel.    Electronically signed by Karl Howell MD      Problem List  Patient Active Problem List   Diagnosis    Primary hypertension    Hyperlipidemia    Generalized Arthralgias     Lumbar spinal stenosis    Lumbar herniated disc    S/P aortic valve replacement    Hx of CABG    Needle phobia    CAD, multiple vessel    History of testicular cancer    Degenerative arthritis of right knee    Exostosis of toe    S/P hernia repair    Myofascial pain dysfunction syndrome    Postlaminectomy syndrome, lumbar region    GERD (gastroesophageal reflux disease)    Severe hypoxemia    Atypical pneumonia    Bradycardia    Carpal tunnel syndrome on left    History of partial colectomy    NSTEMI (non-ST elevated myocardial infarction) (HCC)    Nonrheumatic aortic valve insufficiency    Obesity, Class I, BMI 30.0-34.9 (see actual BMI)    S/P AVR    CAD in native artery    Atrial fibrillation (HCC)    Pure hypercholesterolemia    Plantar fasciitis    Adjustment disorder with depressed mood    BPH associated with nocturia    Colonic mass    Severe obesity (BMI 35.0-39.9) with comorbidity (HCC)    Type 2 diabetes mellitus without complication, without long-term current use of insulin (HCC)    Unipolar depression    Abnormal gait    Dysphagia    COPD exacerbation (HCC)    Pulmonary nodule    Primary malignant neoplasm of left lower lobe of lung (HCC)    Acute pulmonary edema (HCC)    Mild malnutrition (HCC)    Lightheadedness    NSCLC of left lung (HCC)    ACP (advance care planning)    Hypokalemia    Secondary hypercoagulable state (HCC)    Recurrent otitis media, bilateral    Pneumonia, bacterial    Altered mental status    Severe malnutrition (HCC)    Ventricular fibrillation (HCC)    Cardiac arrest (HCC)    Myocardiopathy (HCC)    BRENDA (acute kidney injury) (HCC)    Elevated brain natriuretic peptide (BNP) level    
Assessment  AROM: Within functional limits  Strength: Generally decreased, functional                    Bed mobility  Supine to Sit: Contact guard assistance  Sit to Supine: Contact guard assistance  Bed Mobility Comments: increased time and effort needed with bed mobility  Transfers  Sit to Stand: Contact guard assistance (from EOB)  Stand to Sit: Contact guard assistance  Ambulation  Comments: pt took 1-2 lateral steps at EOB towards HOB with CGA     Balance  Sitting - Static: Good  Sitting - Dynamic: Fair (SBA sitting EOB)  Standing - Static: Fair  Standing - Dynamic: Fair (CGA)           OutComes Score                                                  AM-PAC - Mobility    AM-PAC Basic Mobility - Inpatient   How much help is needed turning from your back to your side while in a flat bed without using bedrails?: A Little  How much help is needed moving from lying on your back to sitting on the side of a flat bed without using bedrails?: A Little  How much help is needed moving to and from a bed to a chair?: A Little  How much help is needed standing up from a chair using your arms?: A Little  How much help is needed walking in hospital room?: A Lot  How much help is needed climbing 3-5 steps with a railing?: A Lot  AM-PAC Inpatient Mobility Raw Score : 16  AM-PAC Inpatient T-Scale Score : 40.78  Mobility Inpatient CMS 0-100% Score: 54.16  Mobility Inpatient CMS G-Code Modifier : CK         Tinneti Score       Goals  Short Term Goals  Time Frame for Short Term Goals: discharge  Short Term Goal 1: Pt will transfer supine <--> sit with supervision  Short Term Goal 2: Pt will transfer sit <--> stand with supervision  Short Term Goal 3: Pt will amb 50 ft with LRAD and supervision  Short Term Goal 4: Pt will negotiate 3 steps with CGA (if pt returns home at d/c)  Patient Goals   Patient Goals : return home       Education  Patient Education  Education Given To: Patient  Education Provided: Role of Therapy  Education 
colectomy (2016), s/p LHC w/ PCI (7/2014), AV stenosis, CAD, s/p CABG w/ AVR (2017), NSC lung CA, s/p lung resection (8/2023), pAF on warfarin who p/w AMS, gross hematuria and hematemesis, developed VF w/ Code Blue (4/18), K= 2.6 - replaced, received 4 shocks w/ intermittent CHB, echo (4/19/24) EF 30-35%, ProBNP > 8000, HsTrop 65--> 54 .  [id number] 4. TSH 7.39 (4.16.2024). HAS-BLED 4.    pAF  - In SB w/ occ PVCs, no pauses or sig bradycardia  - Warfarin on hold  - Dig, BB on hold  - Reviewed risk factors, pathophysiology, treatment options and lifestyle modification for atrial fibrillation: Blood pressure control, blood sugar control, healthy diet, minimal alcohol intake, no smoking, activity and exercise, manage stress sleep apnea evaluation and symptoms of a stroke.  - Keep K+ > 4.0 and Mg > 2.0  - Reviewed recent labs  - Consider sleep study outpatient    VF  - VF w/ Code Blue w/ 4 shocks  - Plan for ischemic eval when stable  - Discussed plan for PPM vs ICD after ischemic eval - patient forgetful  - Will follow    CMP  - EF 30-35%  - NYHA class II/III  -   - BB on hold  - BP soft limiting addition of meds  - No ACE/ARB/ARNI/SGL2 due to CKD.   - Ischemic eval when stable  - Dr. Davies following      [facility]    I spent a total of 51 minutes in care of the patient and greater than 50% of the time was spent counseling with Alexandr Witt and coordinating care regarding their diagnosis, treatments and plan of care.    
from a chair using your arms?: A Little  How much help is needed walking in hospital room?: A Little  How much help is needed climbing 3-5 steps with a railing?: A Lot  AM-PAC Inpatient Mobility Raw Score : 17  AM-PAC Inpatient T-Scale Score : 42.13  Mobility Inpatient CMS 0-100% Score: 50.57  Mobility Inpatient CMS G-Code Modifier : CK      Goals  Short Term Goals  Time Frame for Short Term Goals: discharge  ongoing 4/26  Short Term Goal 1: Pt will transfer supine <--> sit with supervision  ongoing  Short Term Goal 2: Pt will transfer sit <--> stand with supervision  ongoing  Short Term Goal 3: Pt will amb 50 ft with LRAD and supervision  ongoing  Short Term Goal 4: Pt will negotiate 3 steps with CGA (if pt returns home at d/c)  ongoing  Patient Goals   Patient Goals : return home       Education  Patient Education  Education Given To: Patient  Education Provided: Role of Therapy;Plan of Care  Education Provided Comments: transfers and gait, importance of OOB  Education Method: Verbal  Barriers to Learning: None  Education Outcome: Continued education needed;Verbalized understanding      Therapy Time   Individual Concurrent Group Co-treatment   Time In 1035         Time Out 1100         Minutes 25             Timed Code Treatment Minutes:   25    Total Treatment Minutes:  25      Nubia Piedra, [id number]         
meals?: A Little  AM-[facility] Inpatient Daily Activity Raw Score: 16  AM-PAC Inpatient ADL T-Scale Score : 35.96  ADL Inpatient CMS 0-100% Score: 53.32  ADL Inpatient CMS G-Code Modifier : CK        Goals  Short Term Goals  Time Frame for Short Term Goals: by dc  Short Term Goal 1: Pt will complete LE dressing w/ SBA  Short Term Goal 2: Pt will complete toileting w/ SBA  Short Term Goal 3: Pt will complete functional transfers w/ SBA  Short Term Goal 4: Pt will complete item retrieval and transportation within room w/ SBA       Therapy Time   Individual Concurrent Group Co-treatment   Time In 0957         Time Out 1036         Minutes 39         Timed Code Treatment Minutes: 24 Minutes     Total Treatment Minutes:  39 Minutes    Missy cisneros OT     
/ Lower neck: No significant abnormality. Bones: Degenerative changes of the spine. Thoracic stimulator lead in the midthoracic spine. Other: None. FINDINGS: Liver: Mildly decreased in attenuation diffusely. No solid mass. Gallbladder and bile ducts: Surgically absent gallbladder. No biliary dilatation. Spleen: Unremarkable. Pancreas: Unremarkable. Adrenals: Unremarkable. Kidneys and ureters: Cortical thinning of the inferior pole of the left kidney, likely sequela of remote ischemia or infection. No hydronephrosis. Bilateral simple appearing renal cysts. No follow up required. Bowel: Prior right and transverse colonic resection with anastomosis. Mild wall thickening of the sigmoid colon. Bladder: Unremarkable. Reproductive organs: Unremarkable. Lymph nodes: Unremarkable. Peritoneum: Unremarkable. Vessels: Moderate atherosclerosis. Abdominal wall: Small fat-containing right inguinal hernia. Bones: Multilevel degenerative changes of the lumbar spine.     Chest: 1.  No evidence of pulmonary embolus. 2.  Mild patchy opacities throughout the left lung which may be infectious/inflammatory. 3.  Unchanged 9 mm pulmonary nodule in the medial basal left lower lobe, previously mildly hypermetabolic on PET/CT. 4.  Unchanged 11 mm pulmonary nodule in the posterior basal left lower lobe, previously nonhypermetabolic. 5.  Ectatic ascending thoracic aorta measuring up to 4 cm. Abdomen pelvis: 1.  Mild sigmoid colonic wall thickening which may reflect colitis. 2.  Mild hepatic steatosis. Electronically signed by Karl Howell MD    CT Head W/O Contrast    Result Date: 4/16/2024  CT HEAD WO CONTRAST HISTORY: 58 years Male; \"altered mental status, history of CA, eval mets/bleed\" COMPARISON: CT brain 9/21/2023 TECHNIQUE: CT brain without contrast per standard protocol. Multiplanar reformatted images were reviewed. Up-to-date CT equipment and radiation dose reduction techniques were employed. FINDINGS: No acute intracranial hemorrhage, 
sigmoid colonic wall thickening which may reflect colitis. 2.  Mild hepatic steatosis. Electronically signed by Karl Howell MD    CT Head W/O Contrast    Result Date: 4/16/2024  CT HEAD WO CONTRAST HISTORY: 58 years Male; \"altered mental status, history of CA, eval mets/bleed\" COMPARISON: CT brain 9/21/2023 TECHNIQUE: CT brain without contrast per standard protocol. Multiplanar reformatted images were reviewed. Up-to-date CT equipment and radiation dose reduction techniques were employed. FINDINGS: No acute intracranial hemorrhage, mass effect, midline shift, or hydrocephalus. Gray-white matter differentiation is within normal limits. Ventricles and sulci are normal in size. Basal cisterns are clear. Visualized paranasal sinuses are clear. Visualized mastoid air cells are clear. Calvarium is intact.     No acute intracranial hemorrhage or mass effect. Electronically signed by Karl Howell MD      CBC:   Recent Labs     04/21/24  0556 04/22/24  0440 04/23/24  0540   WBC 6.6 5.4 4.7   HGB 9.3* 8.9* 8.4*   PLT 86* 83* 83*       BMP:    Recent Labs     04/21/24  2153 04/22/24  0440 04/23/24  0540   * 134* 139   K 3.9 4.0 3.9   CL 98* 99 103   CO2 23 23 24   BUN 28* 29* 29*   CREATININE 1.8* 1.7* 1.5*   GLUCOSE 88 104* 105*       Hepatic:   No results for input(s): \"AST\", \"ALT\", \"ALB\", \"BILITOT\", \"ALKPHOS\" in the last 72 hours.    Lipids:   Lab Results   Component Value Date/Time    CHOL 111 04/22/2022 07:09 AM    HDL 34 04/22/2022 07:09 AM    TRIG 192 04/22/2022 07:09 AM     Hemoglobin A1C:   Lab Results   Component Value Date/Time    LABA1C 7.3 03/28/2024 09:58 AM     TSH:   Lab Results   Component Value Date/Time    TSH 2.94 08/03/2012 02:27 PM     Troponin: No results found for: \"TROPONINT\"  Lactic Acid:   No results for input(s): \"LACTA\" in the last 72 hours.    BNP: No results for input(s): \"PROBNP\" in the last 72 hours.  UA:  Lab Results   Component Value Date/Time    NITRU POSITIVE 04/17/2024 05:00 AM    COLORU

## 2024-05-02 LAB — EPO SERPL-ACNC: 41 MU/ML (ref 4–27)

## 2024-05-03 PROBLEM — Z95.810 CARDIAC DEFIBRILLATOR IN SITU: Status: ACTIVE | Noted: 2024-05-03

## 2024-05-03 NOTE — TELEPHONE ENCOUNTER
Pt discharged from [facility] on 4/30. Had v-fib arrest secondary to hypokalemia on 4/18, persistently elevated INR through 4/24.  AICD placed 4/29. Appears that warfarin was never restarted in hospital, instructed to resume on discharge.    LVM to schedule clinic appointment for early next week.    Jose R Ruiz, PharmD  PGY1 Pharmacy Resident  [facility]  Office: [phone]  Fax: [phone]  5/3/2024 3:48 PM

## 2024-05-08 ENCOUNTER — NURSE ONLY (OUTPATIENT)
Dept: CARDIOLOGY CLINIC | Age: 59
End: 2024-05-08

## 2024-05-08 DIAGNOSIS — R00.1 BRADYCARDIA: ICD-10-CM

## 2024-05-08 DIAGNOSIS — I44.2 INTERMITTENT COMPLETE HEART BLOCK (HCC): ICD-10-CM

## 2024-05-08 DIAGNOSIS — Z95.810 CARDIAC DEFIBRILLATOR IN SITU: Primary | ICD-10-CM

## 2024-05-08 DIAGNOSIS — I49.01 VENTRICULAR FIBRILLATION (HCC): ICD-10-CM

## 2024-05-08 DIAGNOSIS — I25.5 ISCHEMIC CARDIOMYOPATHY: ICD-10-CM

## 2024-05-08 DIAGNOSIS — I46.9 CARDIAC ARREST (HCC): ICD-10-CM

## 2024-05-08 NOTE — TELEPHONE ENCOUNTER
Called and spoke to patient. He is currently in facility and they are managing INR. Asked for pt to contact clinic when d/c home from facility.

## 2024-05-08 NOTE — PATIENT INSTRUCTIONS
[facility] office at:  [phone]  Cherie Beltran RN      Permanent Pacemaker (PPM)   Implantable Cardioverter Defibrillator (ICD)  Care Instructions      Your permanent pacemaker/ICD is a sophisticated piece of electronic equipment and both the wound and the device need special care during your recovery period and into the future.  Please use these instructions as guide.  If you have any questions please call the office.     Wound Care:   May start to shower and get incision wet.  DO NOT allow shower jets to directly hit incision area.  DO NOT submerge in any bodies of water until incision is completely healed.   Do not remove the steri strips (the pieces of \"tape\" that cover the incision). These will eventually fall off on their own.    DO NOT apply soaps, ointments,  lotion or powder to the incision site.   Wear comfortable clothes that will not rub on the incision site. Avoid suspenders and/or any straps to prevent irritation.  When to contact the office:  Changes in how your incision site is healing including:  Increase in swelling and/or tenderness   Increase in redness at the incision site or surrounding the device  Drainage from the incision  If you experience:  Lightheadedness, dizziness or passing out  Increase in fatigue or shortness of breath  Fever (temperature greater than 100 degrees F)  Chills   Prolonged hiccups or chest discomfort  If you have any questions     Activity:   Do not raise your elbow above shoulder level or reach behind your back for 4 weeks after your procedure.  DO NOT lift more than 8 pounds with your affected arm for 4 weeks after your procedure. (A gallon of milk is 8 pounds).  Avoid excessive pushing, pulling or twisting.  DO NOT drive until instructed it is OK by your provider.  Wear a sling only as a reminder to limit the activity of your affected arm.  It is important

## 2024-05-09 NOTE — PROGRESS NOTES
Patient comes in for a 1 week wound and programming evaluation s/p Right Sided MDT SC ICD implanted by Dr. Pastrana on 04.29.2024.  Pt accompanied by daughter Mavis.  They are [facility] on placement [facility] a [facility].    Relay HM ordered today and shipping to daughter's home.  Discussed setup/remote appts/frequencies/communications.  Relay literature, [employer] brochure and AVS provided today.    Dressing removed from right chest device site.  Incision appears well approximated, CDI, SS remains.  Reviewed post op wound care and restrictions. Pt informed to call office if he develops any fever, chills, increased swelling, redness or drainage from site.  Pt voiced an understanding.    Interrogation available in [employer] and will be uploaded in EPIC under Cardiology tab once reviewed by EPMD and device.  Patient will follow up in 3 months w/EPNP and device.  Will monitor remotely once setup completed.

## 2024-05-23 PROBLEM — R79.89 ELEVATED TROPONIN: Status: RESOLVED | Noted: 2024-04-23 | Resolved: 2024-05-23

## 2024-05-28 ENCOUNTER — TELEPHONE (OUTPATIENT)
Dept: INTERNAL MEDICINE CLINIC | Age: 59
End: 2024-05-28

## 2024-05-28 NOTE — TELEPHONE ENCOUNTER
NEWTON  FROM [facility] WANT TO KNOW WHO PT'S PCP IS AND IF DR WILL SIGN HOME CARE ORDERS FOR SERVICES THAT STARTED TODAY? [phone]

## 2024-05-31 ENCOUNTER — OFFICE VISIT (OUTPATIENT)
Dept: INTERNAL MEDICINE CLINIC | Age: 59
End: 2024-05-31
Payer: MEDICARE

## 2024-05-31 ENCOUNTER — ANTI-COAG VISIT (OUTPATIENT)
Dept: PHARMACY | Age: 59
End: 2024-05-31
Payer: MEDICARE

## 2024-05-31 VITALS
TEMPERATURE: 97.3 F | SYSTOLIC BLOOD PRESSURE: 106 MMHG | HEART RATE: 49 BPM | BODY MASS INDEX: 26.95 KG/M2 | RESPIRATION RATE: 20 BRPM | OXYGEN SATURATION: 97 % | HEIGHT: 72 IN | WEIGHT: 199 LBS | DIASTOLIC BLOOD PRESSURE: 60 MMHG

## 2024-05-31 DIAGNOSIS — Z95.2 S/P AORTIC VALVE REPLACEMENT: Primary | Chronic | ICD-10-CM

## 2024-05-31 DIAGNOSIS — I48.91 ATRIAL FIBRILLATION, UNSPECIFIED TYPE (HCC): ICD-10-CM

## 2024-05-31 DIAGNOSIS — F33.9 RECURRENT MAJOR DEPRESSIVE DISORDER, REMISSION STATUS UNSPECIFIED (HCC): ICD-10-CM

## 2024-05-31 DIAGNOSIS — E11.9 TYPE 2 DIABETES MELLITUS WITHOUT COMPLICATION, WITHOUT LONG-TERM CURRENT USE OF INSULIN (HCC): Chronic | ICD-10-CM

## 2024-05-31 DIAGNOSIS — I46.9 CARDIAC ARREST (HCC): Primary | ICD-10-CM

## 2024-05-31 PROBLEM — F33.0 MAJOR DEPRESSIVE DISORDER, RECURRENT, MILD (HCC): Status: RESOLVED | Noted: 2024-05-31 | Resolved: 2024-05-31

## 2024-05-31 PROBLEM — R41.82 ALTERED MENTAL STATUS: Status: RESOLVED | Noted: 2024-04-17 | Resolved: 2024-05-31

## 2024-05-31 PROBLEM — R79.89 ELEVATED BRAIN NATRIURETIC PEPTIDE (BNP) LEVEL: Status: RESOLVED | Noted: 2024-04-23 | Resolved: 2024-05-31

## 2024-05-31 PROBLEM — J44.1 COPD EXACERBATION (HCC): Status: RESOLVED | Noted: 2023-05-10 | Resolved: 2024-05-31

## 2024-05-31 PROBLEM — C34.32 PRIMARY MALIGNANT NEOPLASM OF LEFT LOWER LOBE OF LUNG (HCC): Status: RESOLVED | Noted: 2023-09-06 | Resolved: 2024-05-31

## 2024-05-31 PROBLEM — D63.8 ANEMIA OF CHRONIC DISEASE: Status: RESOLVED | Noted: 2024-04-28 | Resolved: 2024-05-31

## 2024-05-31 PROBLEM — Z95.810 CARDIAC DEFIBRILLATOR IN SITU: Status: RESOLVED | Noted: 2024-05-03 | Resolved: 2024-05-31

## 2024-05-31 PROBLEM — F33.0 MAJOR DEPRESSIVE DISORDER, RECURRENT, MILD (HCC): Status: ACTIVE | Noted: 2024-05-31

## 2024-05-31 PROBLEM — F43.21 ADJUSTMENT DISORDER WITH DEPRESSED MOOD: Status: RESOLVED | Noted: 2018-04-06 | Resolved: 2024-05-31

## 2024-05-31 PROBLEM — R35.1 BPH ASSOCIATED WITH NOCTURIA: Status: RESOLVED | Noted: 2018-09-17 | Resolved: 2024-05-31

## 2024-05-31 PROBLEM — F33.1 MAJOR DEPRESSIVE DISORDER, RECURRENT, MODERATE (HCC): Status: ACTIVE | Noted: 2024-05-31

## 2024-05-31 PROBLEM — F32.9 UNIPOLAR DEPRESSION: Chronic | Status: RESOLVED | Noted: 2021-07-13 | Resolved: 2024-05-31

## 2024-05-31 PROBLEM — I21.4 NSTEMI (NON-ST ELEVATED MYOCARDIAL INFARCTION) (HCC): Status: RESOLVED | Noted: 2017-05-10 | Resolved: 2024-05-31

## 2024-05-31 PROBLEM — E11.22 TYPE 2 DIABETES MELLITUS WITH CHRONIC KIDNEY DISEASE (HCC): Status: ACTIVE | Noted: 2024-05-31

## 2024-05-31 PROBLEM — F33.1 MAJOR DEPRESSIVE DISORDER, RECURRENT, MODERATE (HCC): Status: RESOLVED | Noted: 2024-05-31 | Resolved: 2024-05-31

## 2024-05-31 PROBLEM — E11.40 TYPE 2 DIABETES MELLITUS WITH DIABETIC NEUROPATHY (HCC): Status: ACTIVE | Noted: 2024-05-31

## 2024-05-31 PROBLEM — R13.10 DYSPHAGIA: Status: RESOLVED | Noted: 2022-08-02 | Resolved: 2024-05-31

## 2024-05-31 PROBLEM — R26.9 ABNORMAL GAIT: Status: RESOLVED | Noted: 2021-07-13 | Resolved: 2024-05-31

## 2024-05-31 PROBLEM — F32.A DEPRESSION: Status: ACTIVE | Noted: 2024-05-31

## 2024-05-31 PROBLEM — M72.2 PLANTAR FASCIITIS: Status: RESOLVED | Noted: 2017-07-24 | Resolved: 2024-05-31

## 2024-05-31 PROBLEM — J15.9 PNEUMONIA, BACTERIAL: Status: RESOLVED | Noted: 2024-04-16 | Resolved: 2024-05-31

## 2024-05-31 PROBLEM — E11.22 TYPE 2 DIABETES MELLITUS WITH CHRONIC KIDNEY DISEASE (HCC): Status: RESOLVED | Noted: 2024-05-31 | Resolved: 2024-05-31

## 2024-05-31 PROBLEM — I42.9 MYOCARDIOPATHY (HCC): Status: RESOLVED | Noted: 2024-04-23 | Resolved: 2024-05-31

## 2024-05-31 PROBLEM — J81.0 ACUTE PULMONARY EDEMA (HCC): Status: RESOLVED | Noted: 2023-09-06 | Resolved: 2024-05-31

## 2024-05-31 PROBLEM — Z71.89 ACP (ADVANCE CARE PLANNING): Status: RESOLVED | Noted: 2023-12-13 | Resolved: 2024-05-31

## 2024-05-31 PROBLEM — N40.1 BPH ASSOCIATED WITH NOCTURIA: Status: RESOLVED | Noted: 2018-09-17 | Resolved: 2024-05-31

## 2024-05-31 PROBLEM — R42 LIGHTHEADEDNESS: Status: RESOLVED | Noted: 2023-09-20 | Resolved: 2024-05-31

## 2024-05-31 PROBLEM — E44.1 MILD MALNUTRITION (HCC): Status: RESOLVED | Noted: 2023-09-13 | Resolved: 2024-05-31

## 2024-05-31 PROBLEM — D68.69 SECONDARY HYPERCOAGULABLE STATE (HCC): Status: RESOLVED | Noted: 2024-03-28 | Resolved: 2024-05-31

## 2024-05-31 PROBLEM — E11.40 TYPE 2 DIABETES MELLITUS WITH DIABETIC NEUROPATHY (HCC): Status: RESOLVED | Noted: 2024-05-31 | Resolved: 2024-05-31

## 2024-05-31 PROBLEM — Z90.49 HISTORY OF PARTIAL COLECTOMY: Status: RESOLVED | Noted: 2017-03-13 | Resolved: 2024-05-31

## 2024-05-31 PROBLEM — R91.1 PULMONARY NODULE: Status: RESOLVED | Noted: 2023-08-31 | Resolved: 2024-05-31

## 2024-05-31 PROBLEM — N17.9 AKI (ACUTE KIDNEY INJURY) (HCC): Status: RESOLVED | Noted: 2024-04-23 | Resolved: 2024-05-31

## 2024-05-31 PROBLEM — E83.42 HYPOMAGNESEMIA: Status: RESOLVED | Noted: 2024-04-26 | Resolved: 2024-05-31

## 2024-05-31 PROBLEM — E87.6 HYPOKALEMIA: Status: RESOLVED | Noted: 2023-12-13 | Resolved: 2024-05-31

## 2024-05-31 LAB — INTERNATIONAL NORMALIZATION RATIO, POC: 1.9

## 2024-05-31 PROCEDURE — 85610 PROTHROMBIN TIME: CPT

## 2024-05-31 PROCEDURE — 99211 OFF/OP EST MAY X REQ PHY/QHP: CPT

## 2024-05-31 PROCEDURE — 99213 OFFICE O/P EST LOW 20 MIN: CPT

## 2024-05-31 RX ORDER — CARVEDILOL 3.12 MG/1
3.12 TABLET ORAL 2 TIMES DAILY
Qty: 60 TABLET | Refills: 0 | Status: CANCELLED | OUTPATIENT
Start: 2024-05-31

## 2024-05-31 RX ORDER — CARVEDILOL 3.12 MG/1
3.12 TABLET ORAL 2 TIMES DAILY WITH MEALS
Qty: 60 TABLET | Refills: 3 | Status: SHIPPED | OUTPATIENT
Start: 2024-05-31

## 2024-05-31 NOTE — ASSESSMENT & PLAN NOTE
Patient underwent V. Fib cardiac arrest on 4/18 while he was hospitalized. Cardiology was consulted. ECHO showed EF of 30-35%. Patient underwent a cardiac angiogram which showed significant disease. Cardiology placed AICD on 4/29/24. Patient was discharged on low dose of coreg. Patient stated he has not been taking his coreg due to not receiving it.   - start coreg 3.125 mg BID   - cardiology follow up

## 2024-05-31 NOTE — ASSESSMENT & PLAN NOTE
HgbA1c 3/28/24: 7.3. Patient was informed to stop taking metformin & pioglitazone upon discharge. Patient has not been documenting his blood glucose at home.   - stop metformin & pioglitazone  - repeat RFP & Mg to evaluate kidney function  - start jardiance 10 mg daily

## 2024-05-31 NOTE — PATIENT INSTRUCTIONS
We have ordered routine lab work for you to get done.   We are starting you on carvedilol 3.125 mg twice daily and jardiance 10 mg daily.   Please continue taking your medications.   We will see you back here in one month!

## 2024-05-31 NOTE — PROGRESS NOTES
surgeries or procedures None        Thanks!  Nellie Medina, PharmD, [facility]  Medication Management Clinic   [facility] [place] Ph: [phone]  [facility] [place] Ph: [phone]  5/31/2024 4:17 PM      For Pharmacy Admin Tracking Only    Intervention Detail: Dose Adjustment: 2, reason: Therapy De-escalation  Total # of Interventions Recommended: 2  Total # of Interventions Accepted: 2  Time Spent (min): 15

## 2024-05-31 NOTE — ASSESSMENT & PLAN NOTE
Patient is still depressed. Denies any suicidal ideation. He does not like where he is living, missing his family, thinks he is alone. Informed him that he should see a psychiatrist. He does not think a psychiatrist will help. Looking through his previous medication list seems like he has used multiple different depression treatments.   - referred to psychiatrist   - continue citalopram

## 2024-05-31 NOTE — PROGRESS NOTES
CLINIC    7.5 mg MWF, 5 mg Tuesday, Thursday, Saturday. Sunday  Alexandr Davies MD   Handicap Placard MISC by Does not apply route Effective through 12/28/2020 through 12/27/2025  Patient not taking: Reported on 4/16/2024  Sanya Bailey DO   aspirin 81 MG EC tablet Take 1 tablet by mouth daily  Teodora Gee MD   Blood Pressure KIT 1 Unit  Patient not taking: Reported on 4/16/2024  Oneal Bryan MD        Vitals:    05/31/24 1446   BP: 106/60   Site: Right Upper Arm   Position: Sitting   Cuff Size: Large Adult   Pulse: (!) 49   Resp: 20   Temp: 97.3 °F (36.3 °C)   TempSrc: Temporal   SpO2: 97%   Weight: 90.3 kg (199 lb)   Height: 1.829 m (6')      Estimated body mass index is 26.99 kg/m² as calculated from the following:    Height as of this encounter: 1.829 m (6').    Weight as of this encounter: 90.3 kg (199 lb).  Physical Exam  Constitutional:       Appearance: Normal appearance.   Cardiovascular:      Rate and Rhythm: Normal rate and regular rhythm.   Pulmonary:      Effort: Pulmonary effort is normal.      Breath sounds: Normal breath sounds.   Abdominal:      General: Abdomen is flat. Bowel sounds are normal. There is no distension.      Palpations: Abdomen is soft.      Tenderness: There is no abdominal tenderness.   Musculoskeletal:         General: Normal range of motion.      Right lower leg: No edema.      Left lower leg: No edema.   Skin:     General: Skin is warm.   Neurological:      Mental Status: He is alert and oriented to person, place, and time.   Psychiatric:         Mood and Affect: Mood normal.         Behavior: Behavior normal.         Thought Content: Thought content normal.         Judgment: Judgment normal.         ASSESSMENT/PLAN:     1. Cardiac arrest (HCC)  Assessment & Plan:  Patient underwent V. Fib cardiac arrest on 4/18 while he was hospitalized. Cardiology was consulted. ECHO showed EF of 30-35%. Patient underwent a cardiac angiogram which showed significant disease.

## 2024-06-04 DIAGNOSIS — R13.10 DYSPHAGIA, UNSPECIFIED TYPE: Primary | ICD-10-CM

## 2024-06-04 RX ORDER — FAMOTIDINE 20 MG/1
20 TABLET, FILM COATED ORAL 2 TIMES DAILY
Qty: 180 TABLET | Refills: 2 | Status: SHIPPED | OUTPATIENT
Start: 2024-06-04

## 2024-06-13 ENCOUNTER — TELEPHONE (OUTPATIENT)
Dept: PHARMACY | Age: 59
End: 2024-06-13

## 2024-06-13 DIAGNOSIS — Z95.2 S/P AORTIC VALVE REPLACEMENT: Primary | ICD-10-CM

## 2024-06-13 DIAGNOSIS — I48.91 ATRIAL FIBRILLATION, UNSPECIFIED TYPE (HCC): ICD-10-CM

## 2024-06-13 NOTE — TELEPHONE ENCOUNTER
Patient is getting HH services through [facility]. Faxed order for INR checks weekly with home health visits.     HH INRs will start 6/24.     Bethany Hernandez, PharmD, [facility]  [facility] Medication Management Clinic  [place]: [phone]  [facility]: [phone]  6/13/2024 4:04 PM    For Pharmacy Admin Tracking Only  Time Spent (min): 10

## 2024-06-13 NOTE — TELEPHONE ENCOUNTER
To Dr Méndez: patient reports he was not fasting when he completed these labs on 5/30/24.    Spoke with patient and notified. Patient verbalized agreement and understanding. Denies further questions or concerns at this time.   Labs have been previously ordered.     Amarilys Jose RN patient has standing orders at lab; he will go there

## 2024-06-17 ENCOUNTER — ANTI-COAG VISIT (OUTPATIENT)
Dept: PHARMACY | Age: 59
End: 2024-06-17
Payer: MEDICARE

## 2024-06-17 DIAGNOSIS — I48.91 ATRIAL FIBRILLATION, UNSPECIFIED TYPE (HCC): Primary | ICD-10-CM

## 2024-06-17 LAB — INTERNATIONAL NORMALIZATION RATIO, POC: 2.2

## 2024-06-17 PROCEDURE — 99211 OFF/OP EST MAY X REQ PHY/QHP: CPT | Performed by: PHARMACIST

## 2024-06-17 PROCEDURE — 85610 PROTHROMBIN TIME: CPT | Performed by: PHARMACIST

## 2024-06-17 NOTE — PROGRESS NOTES
CLINICAL PHARMACY NOTE-- Anticoagulation     Alexandr Witt is a 58 y.o. male with PMHx significant for AVR (re-do in May, 2017), CAD s/p CABG (x3 in May, 2017), pA-fib, HLD, HTN, testicular CA who presents to clinic on 6/17/2024 for anticoagulation monitoring. Note: patient was also referred by Dr. Cherry for smoking cessation but patient has declined services at this time.     Anticoagulation Indication(s):  Heart Valve Replacement (bovine AVR), A-fib  Referring Physician:  Dr. Davies  Goal INR Range:  2-3  Duration of Anticoagulation Therapy:  TBD  Time of day dose taken:  PM  Product patient has at home:  warfarin 5 mg (peach)    [id number] Score for Atrial Fibrillation Stroke Risk   Risk   Factors  Component Value   C CHF No 0   H HTN Yes 1   A2 Age >= 75 No,  (58 y.o.) 0   D DM Yes 1   S2 Prior Stroke/TIA No 0   V Vascular Disease Yes 1   A Age 65-74 No,  (58 y.o.) 0   Sc Sex male 0    [id number]  Score  3   Score last updated 5/30/19 1:51 PM      Lab Results   Component Value Date    RBC 2.95 (L) 04/28/2024    HGB 8.7 (L) 04/28/2024    HCT 27.6 (L) 04/28/2024    MCV 93.6 04/28/2024    MCH 29.5 04/28/2024    MPV 8.2 04/28/2024    RDW 18.4 (H) 04/28/2024     (L) 04/28/2024     INR Summary                            Warfarin regimen (mg)  Date INR   A/P    Sun Mon Tue Wed Thu Fri Sat Mg/wk  6/17 2.2 At goal, continue   5 5 5 5 5 7.5 5 37.5  5/31 1.9 Below, inc   5 5 5 5 5 7.5 5 37.5  *was admitted then in SNF between appts.  4/12 8.0 Above goal, hold x3  2.5 2.5 2.5 2.5 2.5 0/2.5 0/2.5 17.5  3/18 2.9 Above goal, hold+dec  2.5 0/2.5 2.5 2.5 2.5 2.5 2.5 17.5  3/4 3.7 Above goal, hold+dec  2.5 0/2.5 2.5 2.5 2.5 2.5 2.5 17.5  2/14 6.3 Above goal, hold+dec  2.5 2.5 2.5 0/5 0/2.5 2.5 2.5 20  1/31 3.7 Above goal, hold x1   2.5 5 2.5 0/5 2.5 5 2.5 25  1/24 7.0 Above goal, hold+dec  2.5 5 2.5 0/5 0/2.5 5 2.5 25  1/10 4.6 Above goal, hold+dec  2.5 5 5 0/5 0/2.5 5 5 30  12/27 2.7 At goal,

## 2024-06-28 ENCOUNTER — OFFICE VISIT (OUTPATIENT)
Dept: INTERNAL MEDICINE CLINIC | Age: 59
End: 2024-06-28
Payer: MEDICARE

## 2024-06-28 VITALS
TEMPERATURE: 97.3 F | RESPIRATION RATE: 20 BRPM | DIASTOLIC BLOOD PRESSURE: 88 MMHG | HEART RATE: 100 BPM | OXYGEN SATURATION: 98 % | WEIGHT: 215.4 LBS | BODY MASS INDEX: 29.17 KG/M2 | HEIGHT: 72 IN | SYSTOLIC BLOOD PRESSURE: 167 MMHG

## 2024-06-28 DIAGNOSIS — I46.9 CARDIAC ARREST (HCC): ICD-10-CM

## 2024-06-28 DIAGNOSIS — E11.9 TYPE 2 DIABETES MELLITUS WITHOUT COMPLICATION, WITHOUT LONG-TERM CURRENT USE OF INSULIN (HCC): Chronic | ICD-10-CM

## 2024-06-28 DIAGNOSIS — I10 PRIMARY HYPERTENSION: ICD-10-CM

## 2024-06-28 DIAGNOSIS — F33.1 MAJOR DEPRESSIVE DISORDER, RECURRENT, MODERATE (HCC): ICD-10-CM

## 2024-06-28 DIAGNOSIS — M25.512 ACUTE PAIN OF LEFT SHOULDER: Primary | ICD-10-CM

## 2024-06-28 PROBLEM — F33.0 MAJOR DEPRESSIVE DISORDER, RECURRENT, MILD (HCC): Status: ACTIVE | Noted: 2024-06-28

## 2024-06-28 PROBLEM — F33.9 MAJOR DEPRESSIVE DISORDER, RECURRENT, UNSPECIFIED (HCC): Status: ACTIVE | Noted: 2024-06-28

## 2024-06-28 LAB
ALBUMIN SERPL-MCNC: 4.3 G/DL (ref 3.4–5)
ANION GAP SERPL CALCULATED.3IONS-SCNC: 12 MMOL/L (ref 3–16)
BUN SERPL-MCNC: 8 MG/DL (ref 7–20)
CALCIUM SERPL-MCNC: 8.7 MG/DL (ref 8.3–10.6)
CHLORIDE SERPL-SCNC: 98 MMOL/L (ref 99–110)
CO2 SERPL-SCNC: 24 MMOL/L (ref 21–32)
CREAT SERPL-MCNC: 1 MG/DL (ref 0.8–1.3)
GFR SERPLBLD CREATININE-BSD FMLA CKD-EPI: 86 ML/MIN/{1.73_M2}
GLUCOSE SERPL-MCNC: 169 MG/DL (ref 70–99)
MAGNESIUM SERPL-MCNC: 2.2 MG/DL (ref 1.8–2.4)
PHOSPHATE SERPL-MCNC: 3.7 MG/DL (ref 2.5–4.9)
POTASSIUM SERPL-SCNC: 3.7 MMOL/L (ref 3.5–5.1)
SODIUM SERPL-SCNC: 134 MMOL/L (ref 136–145)

## 2024-06-28 PROCEDURE — 99213 OFFICE O/P EST LOW 20 MIN: CPT

## 2024-06-28 NOTE — PROGRESS NOTES
Outpatient Clinic Established Patient Note    Patient: Alexandr Witt  : 1965 (58 y.o.)  Date: 2024    CC: follow up    HPI:    Patient is a 58 years old male with past medical history of non-small cell lung carcinoma of left lung s/p left minithoracotomy on , V-fib cardiac arrest s/p AICD on 2024, depression, type 2 diabetes who presented to the clinic for a follow-up visit.    Patient states that he has been doing okay overall except for he had 2 falls recently when he was trying to play with his friends dog who kind of entangled his leash around his foot making him to fall on the floor.  Patient states that his left shoulder and left elbow has been hurting since.  Patient does take morphine extended release for his chronic low back pain but has not been helpful with his current symptoms.  He denies any lightheadedness, headache, shortness of breath, chest pain, loss of consciousness.  He has been compliant with his current medications since his hospital discharge.  He denies any other concerns at this time.      V-fib cardiac arrest s/p AICD  Of note patient was recently in the hospital few months ago with concerns of altered mental status, underwent V-fib cardiac arrest, echo with EF of 30 to 35% s/p cardiac angiogram and AICD on .      History of non-small cell lung carcinoma of the left lung  S/p left minithoracotomy on   Patient also had a recent PET scan on 2024 showing hypermetabolic uptake in the medial basal left lower lobe, but has not followed up with pulmonologist yet.  Patient currently lives at an assisted living facility    Type 2 diabetes mellitus  HbA1c 3/28/2024 was 7.3, patient was informed to stop taking metformin and pioglitazone upon discharge.  Patient was started on Jardiance 10 mg his last visit which he is not taking due to cost related issues.      Home Meds:  Prior to Visit Medications    Medication Sig Taking? Authorizing Provider   diclofenac sodium

## 2024-07-01 ENCOUNTER — ANTI-COAG VISIT (OUTPATIENT)
Dept: PHARMACY | Age: 59
End: 2024-07-01

## 2024-07-01 DIAGNOSIS — I48.91 ATRIAL FIBRILLATION, UNSPECIFIED TYPE (HCC): Primary | ICD-10-CM

## 2024-07-01 DIAGNOSIS — I25.10 CORONARY ARTERY DISEASE INVOLVING NATIVE CORONARY ARTERY OF NATIVE HEART WITHOUT ANGINA PECTORIS: ICD-10-CM

## 2024-07-01 LAB
INR BLD: 1.8
PROTIME: NORMAL SECONDS

## 2024-07-01 RX ORDER — WARFARIN SODIUM 5 MG/1
TABLET ORAL
Qty: 135 TABLET | Refills: 3 | Status: SHIPPED | OUTPATIENT
Start: 2024-07-01

## 2024-07-01 RX ORDER — ROSUVASTATIN CALCIUM 20 MG/1
TABLET, COATED ORAL
Qty: 90 TABLET | Refills: 3 | Status: SHIPPED | OUTPATIENT
Start: 2024-07-01

## 2024-07-01 NOTE — PROGRESS NOTES
CLINICAL PHARMACY NOTE-- Anticoagulation     Alexandr Witt is a 58 y.o. male with PMHx significant for AVR (re-do in May, 2017), CAD s/p CABG (x3 in May, 2017), pA-fib, HLD, HTN, testicular CA who had INR called into clinic on 7/1/2024 for anticoagulation monitoring. Note: patient was also referred by Dr. Cherry for smoking cessation but patient has declined services at this time.     Anticoagulation Indication(s):  Heart Valve Replacement (bovine AVR), A-fib  Referring Physician:  Dr. Davies  Goal INR Range:  2-3  Duration of Anticoagulation Therapy:  TBD  Time of day dose taken:  PM  Product patient has at home:  warfarin 5 mg (peach)    [id number] Score for Atrial Fibrillation Stroke Risk   Risk   Factors  Component Value   C CHF No 0   H HTN Yes 1   A2 Age >= 75 No,  (58 y.o.) 0   D DM Yes 1   S2 Prior Stroke/TIA No 0   V Vascular Disease Yes 1   A Age 65-74 No,  (58 y.o.) 0   Sc Sex male 0    [id number]  Score  3   Score last updated 5/30/19 1:51 PM      Lab Results   Component Value Date    RBC 2.95 (L) 04/28/2024    HGB 8.7 (L) 04/28/2024    HCT 27.6 (L) 04/28/2024    MCV 93.6 04/28/2024    MCH 29.5 04/28/2024    MPV 8.2 04/28/2024    RDW 18.4 (H) 04/28/2024     (L) 04/28/2024     INR Summary                            Warfarin regimen (mg)  Date INR   A/P    Sun Mon Tue Wed Thu Fri Sat Mg/wk  7/1 1.8 Below goal, increase   5 7.5 5 5 5 7.5 5 40  6/24 1.9 Below goal, continue   5 5 5 5 5 7.5 5 37.5  6/17 2.2 At goal, continue   5 5 5 5 5 7.5 5 37.5  5/31 1.9 Below, inc   5 5 5 5 5 7.5 5 37.5  *was admitted then in SNF between appts.  4/12 8.0 Above goal, hold x3  2.5 2.5 2.5 2.5 2.5 0/2.5 0/2.5 17.5  3/18 2.9 Above goal, hold+dec  2.5 0/2.5 2.5 2.5 2.5 2.5 2.5 17.5  3/4 3.7 Above goal, hold+dec  2.5 0/2.5 2.5 2.5 2.5 2.5 2.5 17.5  2/14 6.3 Above goal, hold+dec  2.5 2.5 2.5 0/5 0/2.5 2.5 2.5 20  1/31 3.7 Above goal, hold x1   2.5 5 2.5 0/5 2.5 5 2.5 25  1/24 7.0 Above goal,

## 2024-07-05 DIAGNOSIS — I25.10 CORONARY ARTERY DISEASE INVOLVING NATIVE CORONARY ARTERY OF NATIVE HEART WITHOUT ANGINA PECTORIS: ICD-10-CM

## 2024-07-05 RX ORDER — ROSUVASTATIN CALCIUM 20 MG/1
TABLET, COATED ORAL
Qty: 90 TABLET | Refills: 3 | OUTPATIENT
Start: 2024-07-05

## 2024-07-05 NOTE — TELEPHONE ENCOUNTER
Requested Prescriptions     Pending Prescriptions Disp Refills    rosuvastatin (CRESTOR) 20 MG tablet 90 tablet 3     Sig: - Take one tablets nightly       Last Clinic Visit:  6/28/2024     Next Clinic Appointment:  7/26/2024

## 2024-07-08 ENCOUNTER — ANTI-COAG VISIT (OUTPATIENT)
Dept: PHARMACY | Age: 59
End: 2024-07-08

## 2024-07-08 DIAGNOSIS — I48.91 ATRIAL FIBRILLATION, UNSPECIFIED TYPE (HCC): Primary | ICD-10-CM

## 2024-07-08 LAB
INTERNATIONAL NORMALIZATION RATIO, POC: 1.5
PROTHROMBIN TIME, POC: NORMAL

## 2024-07-08 NOTE — PROGRESS NOTES
change  7.5 5 5 5 5 5 7.5 40  5/11 2.9 At goal, no change  7.5 5 5 5 5 5 7.5 40  3/30 3.0 At goal, no change  7.5 5 5 5 5 5 7.5 40  2/26 2.3 At goal, no change  7.5 5 5 5 5 5 7.5 40  2/5 3.3 Above goal, decrease  7.5 5 5 5 5 5 7.5 40  1/8 2.9 At goal, no change  7.5 7.5 5 5 5 5 7.5 42.5    Patient History:  Recent hospitalizations/HC visits 4/16-30: [facility] for PNA and AMS, coded 4/18; discharged to SNF   9/20-9/31/23: [facility] with lightheadedness and hypotension   8/31-9/17/23: [facility] s/p lung nodule resection   4/8/22: bronchoscopy per Dr. Cherry, warfarin held x3 days (no bridge). Findings were likely infectious process, prescribed Augmentin.   2/21/22: Pulmonology, work-up underway for lung nodule    Recent medication changes 9/17/23: digoxin 250 mg daily   4/14-4/21/22: Augmentin BID x7 days    Medications taken regularly that may interact with warfarin or alter INR ASA 81 mg (increase bleeding)   Digoxin 250 mg daily (increase INR)    Warfarin dose taken as prescribed Usually compliant   Does not use pillbox  Patient has been taking warfarin since re-do AVR in May, 2017   Signs/symptoms of bleeding No h/o major bleeding   Vitamin K intake Normally has ~0 servings of green, leafy vegetables per week   Recent vomiting/diarrhea/fever, changes in weight or activity level None reported   Tobacco or alcohol use -No recent changes in smoking as of 11/24/20 (~3/4 PPD)  -Patient cut back from 2 PPD to 3/4 PPD in February (2019) when he moved in with his daughter. Decrease in smoking typically increases INR gradually.   -Patient denies any alcohol or illicit drug use  See above for smoking patterns      Upcoming surgeries or procedures None        Assessment/Plan:     Patient's INR was subtherapeutic (1.5) again today per RN ([place] [phone]). He reports weight loss of >100 lbs but has gained 20 lbs back since hospitalization. He states he feels good, appetite is back. He denies any recent changes to medications, diet,

## 2024-07-15 ENCOUNTER — ANTI-COAG VISIT (OUTPATIENT)
Dept: PHARMACY | Age: 59
End: 2024-07-15

## 2024-07-15 DIAGNOSIS — I48.91 ATRIAL FIBRILLATION, UNSPECIFIED TYPE (HCC): Primary | ICD-10-CM

## 2024-07-15 LAB
INTERNATIONAL NORMALIZATION RATIO, POC: 1.4
PROTHROMBIN TIME, POC: NORMAL

## 2024-07-15 NOTE — PROGRESS NOTES
He states he feels good, appetite is back. He denies any recent changes to medications, diet, lifestyle, or bleeding.     Patient was admitted to [facility] from 4/16-30 with PNA and AMS. He had cardiac arrest during hospitalization. He was discharged to SNF and is now in assisted living with home health services with [facility]. HHRN will check INR while patient is getting services through them.     Patient / RN did not answer phone calls. LVM with each of then instructing to bolus with 10 mg today and then increase warfarin to 7.5 mg daily except 5 mg on Sundays and Thursdays (12% increase). Repeat INR weekly with HHRN.     Date of next INR: 1 week     Thanks!  Bethany Hernandez, PharmD, BCPS  [facility] Medication Management Clinic  [place]: [phone]  [place]: [phone]  7/15/2024 2:39 PM    For Pharmacy Admin Tracking Only    Intervention Detail: Dose Adjustment: 1, reason: Therapy Optimization  Total # of Interventions Recommended: 1  Total # of Interventions Accepted: 1  Time Spent (min): 10

## 2024-07-18 ENCOUNTER — ANTI-COAG VISIT (OUTPATIENT)
Dept: PHARMACY | Age: 59
End: 2024-07-18

## 2024-07-18 DIAGNOSIS — I48.91 ATRIAL FIBRILLATION, UNSPECIFIED TYPE (HCC): Primary | ICD-10-CM

## 2024-07-18 LAB
INR BLD: 1.9
PROTIME: NORMAL SECONDS

## 2024-07-18 NOTE — PROGRESS NOTES
CLINICAL PHARMACY NOTE-- Anticoagulation     Alexandr Witt is a 58 y.o. male with PMHx significant for AVR (re-do in May, 2017), CAD s/p CABG (x3 in May, 2017), pA-fib, HLD, HTN, testicular CA who had INR called into clinic on 7/18/2024 for anticoagulation monitoring. Note: patient was also referred by Dr. Cherry for smoking cessation but patient has declined services at this time.     Anticoagulation Indication(s):  Heart Valve Replacement (bovine AVR), A-fib  Referring Physician:  Dr. Davies  Goal INR Range:  2-3  Duration of Anticoagulation Therapy:  TBD  Time of day dose taken:  PM  Product patient has at home:  warfarin 5 mg (peach)    [id number] Score for Atrial Fibrillation Stroke Risk   Risk   Factors  Component Value   C CHF No 0   H HTN Yes 1   A2 Age >= 75 No,  (58 y.o.) 0   D DM Yes 1   S2 Prior Stroke/TIA No 0   V Vascular Disease Yes 1   A Age 65-74 No,  (58 y.o.) 0   Sc Sex male 0    [id number]  Score  3   Score last updated 5/30/19 1:51 PM      Lab Results   Component Value Date    RBC 2.95 (L) 04/28/2024    HGB 8.7 (L) 04/28/2024    HCT 27.6 (L) 04/28/2024    MCV 93.6 04/28/2024    MCH 29.5 04/28/2024    MPV 8.2 04/28/2024    RDW 18.4 (H) 04/28/2024     (L) 04/28/2024     INR Summary                            Warfarin regimen (mg)  Date INR   A/P    Sun Mon Tue Wed Thu Fri Sat Mg/wk  7/18 1.9 Below goal, continue   5 7.5 7.5 7.5 5 7.5 7.5 47.5  7/15 1.4 Below goal, bolus+inc   5 10/7.5 7.5 7.5 5 7.5 7.5 47.5  7/8 1.5 Below goal, bolus+inc  5 10/7.5 5 7.5 5 7.5 5 42.5  7/1 1.8 Below goal, increase   5 7.5 5 5 5 7.5 5 40  6/24 1.9 Below goal, continue   5 5 5 5 5 7.5 5 37.5  6/17 2.2 At goal, continue   5 5 5 5 5 7.5 5 37.5  5/31 1.9 Below, inc   5 5 5 5 5 7.5 5 37.5  *was admitted then in SNF between appts.  4/12 8.0 Above goal, hold x3  2.5 2.5 2.5 2.5 2.5 0/2.5 0/2.5 17.5  3/18 2.9 Above goal, hold+dec  2.5 0/2.5 2.5 2.5 2.5 2.5 2.5 17.5  3/4 3.7 Above goal,

## 2024-07-22 ENCOUNTER — ANTI-COAG VISIT (OUTPATIENT)
Dept: PHARMACY | Age: 59
End: 2024-07-22

## 2024-07-22 ENCOUNTER — TELEPHONE (OUTPATIENT)
Dept: INTERNAL MEDICINE CLINIC | Age: 59
End: 2024-07-22

## 2024-07-22 DIAGNOSIS — I48.91 ATRIAL FIBRILLATION, UNSPECIFIED TYPE (HCC): Primary | ICD-10-CM

## 2024-07-22 LAB
INR BLD: 2.1
PROTIME: NORMAL SECONDS

## 2024-07-22 NOTE — PROGRESS NOTES
was therapeutic (2.1) today per HHRN (Danika [phone]). He reports weight loss of >100 lbs but has gained 40 lbs back since hospitalization. He states he feels good, appetite is back. He denies any recent changes to medications, diet, lifestyle, or bleeding.     Patient was admitted to [facility] from 4/16-30 with PNA and AMS. He had cardiac arrest during hospitalization. He was discharged to SNF and is now in assisted living with home health services with [facility]. HHRN will check INR while patient is getting services through them.     Continue warfarin to 7.5 mg daily except 5 mg on Sundays and Thursdays. Repeat INR weekly with HHRN. LVM with RN.    Date of next INR: Mondays and Thursdays     Thanks!  Nanda Antony, PharmD  [facility] Medication Management Clinic  [place]: [phone]  [place]: [phone]  7/22/2024 1:19 PM    For Pharmacy Admin Tracking Only    Time Spent (min): 15

## 2024-07-25 ENCOUNTER — ANTI-COAG VISIT (OUTPATIENT)
Dept: PHARMACY | Age: 59
End: 2024-07-25

## 2024-07-25 DIAGNOSIS — I48.91 ATRIAL FIBRILLATION, UNSPECIFIED TYPE (HCC): Primary | ICD-10-CM

## 2024-07-25 LAB
INR BLD: 2.3
PROTIME: NORMAL SECONDS

## 2024-07-25 NOTE — PROGRESS NOTES
CLINICAL PHARMACY NOTE-- Anticoagulation     Alexandr Witt is a 58 y.o. male with PMHx significant for AVR (re-do in May, 2017), CAD s/p CABG (x3 in May, 2017), pA-fib, HLD, HTN, testicular CA who had INR called into clinic on 7/25/2024 for anticoagulation monitoring. Note: patient was also referred by Dr. Cherry for smoking cessation but patient has declined services at this time.     Anticoagulation Indication(s):  Heart Valve Replacement (bovine AVR), A-fib  Referring Physician:  Dr. Davies  Goal INR Range:  2-3  Duration of Anticoagulation Therapy:  TBD  Time of day dose taken:  PM  Product patient has at home:  warfarin 5 mg (peach)    [id number] Score for Atrial Fibrillation Stroke Risk   Risk   Factors  Component Value   C CHF No 0   H HTN Yes 1   A2 Age >= 75 No,  (58 y.o.) 0   D DM Yes 1   S2 Prior Stroke/TIA No 0   V Vascular Disease Yes 1   A Age 65-74 No,  (58 y.o.) 0   Sc Sex male 0    [id number]  Score  3   Score last updated 5/30/19 1:51 PM      Lab Results   Component Value Date    RBC 2.95 (L) 04/28/2024    HGB 8.7 (L) 04/28/2024    HCT 27.6 (L) 04/28/2024    MCV 93.6 04/28/2024    MCH 29.5 04/28/2024    MPV 8.2 04/28/2024    RDW 18.4 (H) 04/28/2024     (L) 04/28/2024     INR Summary                            Warfarin regimen (mg)  Date INR   A/P    Sun Mon Tue Wed Thu Fri Sat Mg/wk  7/25 2.3 At goal, continue   5 7.5 7.5 7.5 5 7.5 7.5 47.5  7/22 2.1 At goal, continue   5 7.5 7.5 7.5 5 7.5 7.5 47.5  7/18 1.9 Below goal, continue   5 7.5 7.5 7.5 5 7.5 7.5 47.5  7/15 1.4 Below goal, bolus+inc   5 10/7.5 7.5 7.5 5 7.5 7.5 47.5  7/8 1.5 Below goal, bolus+inc  5 10/7.5 5 7.5 5 7.5 5 42.5  7/1 1.8 Below goal, increase   5 7.5 5 5 5 7.5 5 40  6/24 1.9 Below goal, continue   5 5 5 5 5 7.5 5 37.5  6/17 2.2 At goal, continue   5 5 5 5 5 7.5 5 37.5  5/31 1.9 Below, inc   5 5 5 5 5 7.5 5 37.5  *was admitted then in SNF between appts.  4/12 8.0 Above goal, hold

## 2024-07-26 ENCOUNTER — OFFICE VISIT (OUTPATIENT)
Dept: INTERNAL MEDICINE CLINIC | Age: 59
End: 2024-07-26
Payer: MEDICARE

## 2024-07-26 VITALS
TEMPERATURE: 98.4 F | DIASTOLIC BLOOD PRESSURE: 107 MMHG | HEIGHT: 72 IN | HEART RATE: 77 BPM | RESPIRATION RATE: 20 BRPM | SYSTOLIC BLOOD PRESSURE: 163 MMHG | WEIGHT: 228.7 LBS | BODY MASS INDEX: 30.98 KG/M2 | OXYGEN SATURATION: 97 %

## 2024-07-26 DIAGNOSIS — I10 ESSENTIAL HYPERTENSION: Primary | ICD-10-CM

## 2024-07-26 DIAGNOSIS — I50.20 HFREF (HEART FAILURE WITH REDUCED EJECTION FRACTION) (HCC): ICD-10-CM

## 2024-07-26 DIAGNOSIS — I10 PRIMARY HYPERTENSION: ICD-10-CM

## 2024-07-26 DIAGNOSIS — E11.22 TYPE 2 DIABETES MELLITUS WITH CHRONIC KIDNEY DISEASE, WITHOUT LONG-TERM CURRENT USE OF INSULIN, UNSPECIFIED CKD STAGE (HCC): ICD-10-CM

## 2024-07-26 DIAGNOSIS — E11.9 TYPE 2 DIABETES MELLITUS WITHOUT COMPLICATION, WITHOUT LONG-TERM CURRENT USE OF INSULIN (HCC): ICD-10-CM

## 2024-07-26 DIAGNOSIS — R45.89 DEPRESSED MOOD: ICD-10-CM

## 2024-07-26 DIAGNOSIS — I46.9 CARDIAC ARREST (HCC): ICD-10-CM

## 2024-07-26 DIAGNOSIS — I25.10 CAD, MULTIPLE VESSEL: Chronic | ICD-10-CM

## 2024-07-26 PROCEDURE — 99213 OFFICE O/P EST LOW 20 MIN: CPT

## 2024-07-26 RX ORDER — CITALOPRAM 40 MG/1
40 TABLET ORAL DAILY
Qty: 30 TABLET | Refills: 2 | Status: SHIPPED | OUTPATIENT
Start: 2024-07-26

## 2024-07-26 RX ORDER — AMLODIPINE BESYLATE 5 MG/1
5 TABLET ORAL DAILY
Qty: 90 TABLET | Refills: 0 | Status: SHIPPED | OUTPATIENT
Start: 2024-07-26

## 2024-07-26 RX ORDER — CARVEDILOL 6.25 MG/1
12.5 TABLET ORAL 2 TIMES DAILY WITH MEALS
Qty: 180 TABLET | Refills: 1 | Status: SHIPPED | OUTPATIENT
Start: 2024-07-26

## 2024-07-26 ASSESSMENT — ENCOUNTER SYMPTOMS
ABDOMINAL DISTENTION: 0
COUGH: 0
BACK PAIN: 1
ABDOMINAL PAIN: 0
SHORTNESS OF BREATH: 0

## 2024-07-26 NOTE — PROGRESS NOTES
Outpatient Clinic Established Patient Note    Patient: Alexandr Witt  : 1965 (58 y.o.)  Date: 2024    CC: routine follow up     HPI: Alexandr Witt is a 58 y.o. year old male with a medical history of non-small cell lung carcinoma of left lung s/p left minithoracotomy on , CAD s/p stents, V-fib cardiac arrest s/p AICD on 2024, depression, type 2 diabetes who presented to the clinic for a follow-up visit.     Since his last visit on 24 - he has no new complaints or concerns. Follows closely with Dr. Davila for management of CKD. Recently BP has been elevated and his coreg was increased from 66.25 to 12.5 BID. Not taking norvasc 5 however, despite it being on his med list. Pt does not check his BP at home, due to recent move he had misplaced his cuff. Daughter is to bring him his belongings.     Regarding his DM - last A1c of 7.3 in 2024. Was taken off metformin at that time to be placed on Jardiance for GDMT. However, pt not able to get this due to financial barriers. Has not been checking A1c at home.     Overall, denies any chest pain, SOB, palpitations. He does note headaches when his BP is elevated. Previously was following up with Dr. Elizondo for CAD s/p stents. Is taking his aspirin and crestor. Recent echo had shown reduced EF and pt was scheduled to follow up with Dr. Prieto, as Dr. Elizondo has left [facility]. He is scheduled to see Dr. Prieto on .         Home Meds:  Prior to Visit Medications    Medication Sig Taking? Authorizing Provider   citalopram (CELEXA) 40 MG tablet TAKE ONE TABLET BY MOUTH ONCE A DAY Yes Leandro Miller, DO   carvedilol (COREG) 6.25 MG tablet Take 2 tablets by mouth 2 times daily (with meals) Yes Lavon Diaz, DO   metFORMIN (GLUCOPHAGE) 1000 MG tablet Take 1 tablet by mouth 2 times daily (with meals) Yes Lavon Diaz, DO   oxyCODONE-Acetaminophen (PERCOCET PO) Take by mouth Indications: Pain Management Yes Provider, MD Lisa

## 2024-07-26 NOTE — TELEPHONE ENCOUNTER
Requested Prescriptions     Pending Prescriptions Disp Refills    citalopram (CELEXA) 40 MG tablet [Pharmacy Med Name: CITALOPRAM HYDROBROMIDE 40MG TABS] 30 tablet 2     Sig: TAKE ONE TABLET BY MOUTH ONCE A DAY       Last Clinic Visit:  6/28/2024     Next Clinic Appointment:  7/26/2024

## 2024-07-26 NOTE — ASSESSMENT & PLAN NOTE
Echo in April 2024 with EF 30-35%. To follow up with Dr. Prieto. No swelling to legs or SOB. Exam revealed no fluid in lungs on auscultation and no edema. Has systolic murmur on exam - pt reports this is old.  - F/u with Dr. Prieto August 13

## 2024-07-26 NOTE — PATIENT INSTRUCTIONS
You were seen today for routine follow up    - we have prescribed norvasc 5 again  - Additionally, we have started metformin 500 twice a day - take this until you are able to get your jardiance    We will follow up in 3 months time

## 2024-07-26 NOTE — ASSESSMENT & PLAN NOTE
Not well controlled. Recently BP has been elevated and his coreg was increased from 66.25 to 12.5 BID. Not taking norvasc 5 however.   - Rx   - norvasc 5  - Continue coreg at 12.5 dose BID  - Pt to follow up with Dr. Prieto, potentially to be placed on lasix which will further help BP

## 2024-07-26 NOTE — ASSESSMENT & PLAN NOTE
At goal. Last A1c of 7.3 in march 2024. Was taken off metformin at that time to be placed on Jardiance for GDMT. However, pt not able to get this due to financial barriers. Has not been checking A1c at home.   - added metformin 500 BID today for meantime until patient can get jardiance for cheaper  - A1c at next visit

## 2024-08-01 ENCOUNTER — ANTI-COAG VISIT (OUTPATIENT)
Dept: PHARMACY | Age: 59
End: 2024-08-01

## 2024-08-01 DIAGNOSIS — I48.91 ATRIAL FIBRILLATION, UNSPECIFIED TYPE (HCC): Primary | ICD-10-CM

## 2024-08-01 LAB
INTERNATIONAL NORMALIZATION RATIO, POC: 2.6
PROTHROMBIN TIME, POC: NORMAL

## 2024-08-01 NOTE — PROGRESS NOTES
change  7.5 5 5 5 5 5 7.5 40  11/24 2.4 At goal, no change  7.5 5 5 5 5 5 7.5 40  10/30 2.5 At goal, no change  7.5 5 5 5 5 5 7.5 40  10/2 2.2 At goal, no change  7.5 5 5 5 5 5 7.5 40  9/3 2.4 At goal, no change  7.5 5 5 5 5 5 7.5 40  8/3 2.9 At goal, no change  7.5 5 5 5 5 5 7.5 40  7/6 2.7 At goal, no change  7.5 5 5 5 5 5 7.5 40  6/8 2.8 At goal, no change  7.5 5 5 5 5 5 7.5 40  5/11 2.9 At goal, no change  7.5 5 5 5 5 5 7.5 40  3/30 3.0 At goal, no change  7.5 5 5 5 5 5 7.5 40  2/26 2.3 At goal, no change  7.5 5 5 5 5 5 7.5 40  2/5 3.3 Above goal, decrease  7.5 5 5 5 5 5 7.5 40  1/8 2.9 At goal, no change  7.5 7.5 5 5 5 5 7.5 42.5    Patient History:  Recent hospitalizations/HC visits 4/16-30: [facility] for PNA and AMS, coded 4/18; discharged to SNF   9/20-9/31/23: TJ with lightheadedness and hypotension   8/31-9/17/23: [facility] s/p lung nodule resection   4/8/22: bronchoscopy per Dr. Cherry, warfarin held x3 days (no bridge). Findings were likely infectious process, prescribed Augmentin.   2/21/22: Pulmonology, work-up underway for lung nodule    Recent medication changes 9/17/23: digoxin 250 mg daily   4/14-4/21/22: Augmentin BID x7 days    Medications taken regularly that may interact with warfarin or alter INR ASA 81 mg (increase bleeding)   Digoxin 250 mg daily (increase INR)    Warfarin dose taken as prescribed Usually compliant   Does not use pillbox  Patient has been taking warfarin since re-do AVR in May, 2017   Signs/symptoms of bleeding No h/o major bleeding   Vitamin K intake Normally has ~0 servings of green, leafy vegetables per week   Recent vomiting/diarrhea/fever, changes in weight or activity level None reported   Tobacco or alcohol use -No recent changes in smoking as of 11/24/20 (~3/4 PPD)  -Patient cut back from 2 PPD to 3/4 PPD in February (2019) when he moved in with his daughter. Decrease in smoking typically increases INR gradually.   -Patient denies any alcohol or illicit drug use    Upcoming

## 2024-08-08 LAB
INR BLD: 2.9
PROTIME: NORMAL SECONDS

## 2024-08-09 ENCOUNTER — ANTI-COAG VISIT (OUTPATIENT)
Dept: PHARMACY | Age: 59
End: 2024-08-09

## 2024-08-09 DIAGNOSIS — I48.91 ATRIAL FIBRILLATION, UNSPECIFIED TYPE (HCC): Primary | ICD-10-CM

## 2024-08-09 NOTE — PROGRESS NOTES
CLINICAL PHARMACY NOTE-- Anticoagulation     Alexandr Witt is a 59 y.o. male with PMHx significant for AVR (re-do in May, 2017), CAD s/p CABG (x3 in May, 2017), pA-fib, HLD, HTN, testicular CA who had INR called into clinic on 8/9/2024 for anticoagulation monitoring. Note: patient was also referred by Dr. Cherry for smoking cessation but patient has declined services at this time.     Anticoagulation Indication(s):  Heart Valve Replacement (bovine AVR), A-fib  Referring Physician:  Dr. Davies  Goal INR Range:  2-3  Duration of Anticoagulation Therapy:  TBD  Time of day dose taken:  PM  Product patient has at home:  warfarin 5 mg (peach)    [id number] Score for Atrial Fibrillation Stroke Risk   Risk   Factors  Component Value   C CHF No 0   H HTN Yes 1   A2 Age >= 75 No,  (59 y.o.) 0   D DM Yes 1   S2 Prior Stroke/TIA No 0   V Vascular Disease Yes 1   A Age 65-74 No,  (59 y.o.) 0   Sc Sex male 0    [id number]  Score  3   Score last updated 5/30/19 1:51 PM      Lab Results   Component Value Date    RBC 2.95 (L) 04/28/2024    HGB 8.7 (L) 04/28/2024    HCT 27.6 (L) 04/28/2024    MCV 93.6 04/28/2024    MCH 29.5 04/28/2024    MPV 8.2 04/28/2024    RDW 18.4 (H) 04/28/2024     (L) 04/28/2024     INR Summary                            Warfarin regimen (mg)  Date INR   A/P    Sun Mon Tue Wed Thu Fri Sat Mg/wk  8/8 2.9 At goal, continue   5 7.5 7.5 7.5 5 7.5 7.5 47.5  8/1 2.6 At goal, continue   5 7.5 7.5 7.5 5 7.5 7.5 47.5  7/29 2.2 At goal, continue   5 7.5 7.5 7.5 5 7.5 7.5 47.5  7/25 2.3 At goal, continue   5 7.5 7.5 7.5 5 7.5 7.5 47.5  7/22 2.1 At goal, continue   5 7.5 7.5 7.5 5 7.5 7.5 47.5  7/18 1.9 Below goal, continue   5 7.5 7.5 7.5 5 7.5 7.5 47.5  7/15 1.4 Below goal, bolus+inc   5 10/7.5 7.5 7.5 5 7.5 7.5 47.5  7/8 1.5 Below goal, bolus+inc  5 10/7.5 5 7.5 5 7.5 5 42.5  7/1 1.8 Below goal, increase   5 7.5 5 5 5 7.5 5 40  6/24 1.9 Below goal, continue   5 5 5 5 5 7.5 5 37.5  6/17 2.2 At goal, continue

## 2024-08-13 ENCOUNTER — OFFICE VISIT (OUTPATIENT)
Dept: CARDIOLOGY CLINIC | Age: 59
End: 2024-08-13
Payer: MEDICARE

## 2024-08-13 VITALS
DIASTOLIC BLOOD PRESSURE: 84 MMHG | BODY MASS INDEX: 31.87 KG/M2 | HEART RATE: 63 BPM | SYSTOLIC BLOOD PRESSURE: 140 MMHG | WEIGHT: 235 LBS

## 2024-08-13 DIAGNOSIS — I46.9 CARDIAC ARREST (HCC): ICD-10-CM

## 2024-08-13 DIAGNOSIS — I50.20 HFREF (HEART FAILURE WITH REDUCED EJECTION FRACTION) (HCC): Primary | ICD-10-CM

## 2024-08-13 DIAGNOSIS — I10 ESSENTIAL HYPERTENSION: ICD-10-CM

## 2024-08-13 DIAGNOSIS — I48.0 PAROXYSMAL ATRIAL FIBRILLATION (HCC): ICD-10-CM

## 2024-08-13 PROCEDURE — G8427 DOCREV CUR MEDS BY ELIG CLIN: HCPCS | Performed by: INTERNAL MEDICINE

## 2024-08-13 PROCEDURE — 99215 OFFICE O/P EST HI 40 MIN: CPT | Performed by: INTERNAL MEDICINE

## 2024-08-13 PROCEDURE — 3017F COLORECTAL CA SCREEN DOC REV: CPT | Performed by: INTERNAL MEDICINE

## 2024-08-13 PROCEDURE — 3077F SYST BP >= 140 MM HG: CPT | Performed by: INTERNAL MEDICINE

## 2024-08-13 PROCEDURE — G8417 CALC BMI ABV UP PARAM F/U: HCPCS | Performed by: INTERNAL MEDICINE

## 2024-08-13 PROCEDURE — 4004F PT TOBACCO SCREEN RCVD TLK: CPT | Performed by: INTERNAL MEDICINE

## 2024-08-13 PROCEDURE — 3079F DIAST BP 80-89 MM HG: CPT | Performed by: INTERNAL MEDICINE

## 2024-08-13 RX ORDER — CARVEDILOL 25 MG/1
25 TABLET ORAL 2 TIMES DAILY WITH MEALS
Qty: 180 TABLET | Refills: 3 | Status: SHIPPED | OUTPATIENT
Start: 2024-08-13

## 2024-08-13 NOTE — PROGRESS NOTES
time with the patient with more than 50% spent counseling and coordinating care.       I have personally reviewed the reports and images of labs, radiological studies, cardiac studies including ECG's and telemetry, current and old medical records. The note was completed using EMR and Dragon dictation system. Every effort was made to ensure accuracy; however, inadvertent computerized transcription errors may be present.    All questions and concerns were addressed to the patient/family. Alternatives to my treatment were discussed.     I would like to thank you for providing me the opportunity to participate in the care of your patient. If you have any questions, please do not hesitate to contact me.     Erwin Prieto MD, FACC, [facility]  [facility] [place] [address]  Main Office Phone: [phone]  Fax: [phone]

## 2024-08-14 ENCOUNTER — TELEPHONE (OUTPATIENT)
Dept: CARDIOLOGY CLINIC | Age: 59
End: 2024-08-14

## 2024-08-14 DIAGNOSIS — C34.92 NSCLC OF LEFT LUNG (HCC): ICD-10-CM

## 2024-08-14 DIAGNOSIS — K63.89 COLONIC MASS: Primary | ICD-10-CM

## 2024-08-14 NOTE — TELEPHONE ENCOUNTER
----- Message from Dr. Erwin Prieto MD sent at 8/13/2024  5:01 PM EDT -----  Regarding: referral to [facility]  Can you please refer patient to [facility] for the multiple cancers he has including the abnormal PET scan in 04/2024?  Thank you

## 2024-08-15 ENCOUNTER — ANTI-COAG VISIT (OUTPATIENT)
Dept: PHARMACY | Age: 59
End: 2024-08-15

## 2024-08-15 ENCOUNTER — TELEPHONE (OUTPATIENT)
Dept: PHARMACY | Age: 59
End: 2024-08-15

## 2024-08-15 DIAGNOSIS — Z95.2 S/P AORTIC VALVE REPLACEMENT: ICD-10-CM

## 2024-08-15 DIAGNOSIS — I48.0 PAROXYSMAL ATRIAL FIBRILLATION (HCC): Primary | ICD-10-CM

## 2024-08-15 DIAGNOSIS — I48.91 ATRIAL FIBRILLATION, UNSPECIFIED TYPE (HCC): ICD-10-CM

## 2024-08-15 LAB
INR BLD: 2.9
PROTIME: NORMAL

## 2024-08-15 NOTE — TELEPHONE ENCOUNTER
Dr. Prieto,    The patient's warfarin therapy is currently being managed by [facility]. The referral on file is from Dr. Davies, who patient no longer follows with. Please sign pended referral for patient to continue care with the anticoagulation clinic.     Thanks,   Bethany Hernandez, PharmD, [facility]  [facility]  Jonathan: [phone]  [place]uca: [phone]  8/15/2024 2:19 PM

## 2024-08-15 NOTE — PROGRESS NOTES
CLINICAL PHARMACY NOTE-- Anticoagulation     Alexandr Witt is a 59 y.o. male with PMHx significant for AVR (re-do in May, 2017), CAD s/p CABG (x3 in May, 2017), pA-fib, HLD, HTN, testicular CA who had INR called into clinic on 8/15/2024 for anticoagulation monitoring. Note: patient was also referred by Dr. Cherry for smoking cessation but patient has declined services at this time.     Anticoagulation Indication(s):  Heart Valve Replacement (bovine AVR), A-fib  Referring Physician:  Dr. Davies - tristen referral sent to Dr. Prieto   Goal INR Range:  2-3  Duration of Anticoagulation Therapy:  TBD  Time of day dose taken:  PM  Product patient has at home:  warfarin 5 mg (peach)    [id number] Score for Atrial Fibrillation Stroke Risk   Risk   Factors  Component Value   C CHF No 0   H HTN Yes 1   A2 Age >= 75 No,  (59 y.o.) 0   D DM Yes 1   S2 Prior Stroke/TIA No 0   V Vascular Disease Yes 1   A Age 65-74 No,  (59 y.o.) 0   Sc Sex male 0    [id number]  Score  3   Score last updated 5/30/19 1:51 PM      Lab Results   Component Value Date    RBC 2.95 (L) 04/28/2024    HGB 8.7 (L) 04/28/2024    HCT 27.6 (L) 04/28/2024    MCV 93.6 04/28/2024    MCH 29.5 04/28/2024    MPV 8.2 04/28/2024    RDW 18.4 (H) 04/28/2024     (L) 04/28/2024     INR Summary                            Warfarin regimen (mg)  Date INR   A/P    Sun Mon Tue Wed Thu Fri Sat Mg/wk  8/15 2.9 At goal, continue   5 7.5 7.5 7.5 5 7.5 7.5 47.5  8/8 2.9 At goal, continue   5 7.5 7.5 7.5 5 7.5 7.5 47.5  8/1 2.6 At goal, continue   5 7.5 7.5 7.5 5 7.5 7.5 47.5  7/29 2.2 At goal, continue   5 7.5 7.5 7.5 5 7.5 7.5 47.5  7/25 2.3 At goal, continue   5 7.5 7.5 7.5 5 7.5 7.5 47.5  7/22 2.1 At goal, continue   5 7.5 7.5 7.5 5 7.5 7.5 47.5  7/18 1.9 Below goal, continue   5 7.5 7.5 7.5 5 7.5 7.5 47.5  7/15 1.4 Below goal, bolus+inc   5 10/7.5 7.5 7.5 5 7.5 7.5 47.5  7/8 1.5 Below goal, bolus+inc  5 10/7.5 5 7.5 5 7.5 5 42.5  7/1 1.8 Below goal, increase

## 2024-08-16 ENCOUNTER — NURSE ONLY (OUTPATIENT)
Dept: CARDIOLOGY CLINIC | Age: 59
End: 2024-08-16

## 2024-08-16 DIAGNOSIS — Z95.810 CARDIAC DEFIBRILLATOR IN SITU: Primary | ICD-10-CM

## 2024-08-22 ENCOUNTER — ANTI-COAG VISIT (OUTPATIENT)
Dept: PHARMACY | Age: 59
End: 2024-08-22

## 2024-08-22 DIAGNOSIS — I48.0 PAROXYSMAL ATRIAL FIBRILLATION (HCC): Primary | ICD-10-CM

## 2024-08-22 LAB
INR BLD: 2.6
PROTIME: NORMAL

## 2024-08-22 NOTE — PROGRESS NOTES
as of 11/24/20 (~3/4 PPD)  -Patient cut back from 2 PPD to 3/4 PPD in February (2019) when he moved in with his daughter. Decrease in smoking typically increases INR gradually.   -Patient denies any alcohol or illicit drug use    Upcoming surgeries or procedures None        Assessment/Plan:   Patient's INR was therapeutic (2.6) per HHRN (Danika [phone]). He reports weight loss of >100 lbs but has gained 40 lbs back since hospitalization. He states he feels good, appetite is back. He denies any recent changes to medications, diet, lifestyle, or bleeding.     Patient was admitted to [facility] from 4/16-30 with PNA and AMS. He had cardiac arrest during hospitalization. He was discharged to SNF and is now in assisted living with home health services with [facility]. HHRN will check INR while patient is getting services through them.     LVM with Danika and patient with instructions. Patient was instructed to continue warfarin 7.5 mg daily except 5 mg on Sundays and Thursdays. Repeat INR weekly with HHRN.     Date of next INR: 8/22    Bethany Hernandez, PharmD, BCPS  [facility]  Jonathan: [phone]  [place]: [phone]  8/22/2024 2:31 PM    For Pharmacy Admin Tracking Only  Time Spent (min): 10

## 2024-08-28 ENCOUNTER — TELEPHONE (OUTPATIENT)
Dept: CARDIOLOGY CLINIC | Age: 59
End: 2024-08-28

## 2024-08-28 NOTE — TELEPHONE ENCOUNTER
BP and HR recordings taken approx 1 hr after medication taken    08.13.2024 - 128/68 - 58  08.14.2024 - 118/74 - 54  08.15.2024 - 125/71 - 57  08.16.2024 - 128/69 - 65  08.17.2024 - 113/63 - 55  08.18.2024 - 129/84 - 59  08.19.2024 - 116/68 - 60  08.20.2024 - 128/80 - 61  08.21.2024 - 111/62 - 58  08.22.2024 - 124/62 - 60  08.23.2024 - 128/70 - 62  08.24.2024 - 143/72 - 62  08.25.2024 - 132/87 - 61  08.26.2024 - 128/78 - 58  08.27.2024 - 117/70 - 59    Pt would like to know if he is still to continue recording and reporting back or if he needs to adjust anything.     [phone]

## 2024-08-29 ENCOUNTER — ANTI-COAG VISIT (OUTPATIENT)
Dept: PHARMACY | Age: 59
End: 2024-08-29

## 2024-08-29 DIAGNOSIS — I48.0 PAROXYSMAL ATRIAL FIBRILLATION (HCC): Primary | ICD-10-CM

## 2024-08-29 LAB
INR BLD: 2.7
PROTIME: NORMAL

## 2024-08-29 NOTE — PROGRESS NOTES
when he moved in with his daughter. Decrease in smoking typically increases INR gradually.   -Patient denies any alcohol or illicit drug use    Upcoming surgeries or procedures None        Assessment/Plan:   Patient's INR was therapeutic (2.7) per HHRN ([place] [phone]). He reports weight loss of >100 lbs but has gained 40 lbs back since hospitalization. He states he feels good, appetite is back. He denies any recent changes to medications, diet, lifestyle, or bleeding.     Patient was admitted to [facility] from 4/16-30 with PNA and AMS. He had cardiac arrest during hospitalization. He was discharged to SNF and is now in assisted living with home health services with [facility]. HHRN will check INR while patient is getting services through them.     Patient was instructed to continue warfarin 7.5 mg daily except 5 mg on Sundays and Thursdays. Repeat INR weekly with HHRN.     Date of next INR: 9/5    Bethany Hernandez, PharmD, BCPS  [facility]  Jonathan: [phone]  [place]uca: [phone]  8/29/2024 9:25 AM    For Pharmacy Admin Tracking Only  Time Spent (min): 10

## 2024-09-05 ENCOUNTER — ANTI-COAG VISIT (OUTPATIENT)
Dept: PHARMACY | Age: 59
End: 2024-09-05

## 2024-09-05 DIAGNOSIS — I48.0 PAROXYSMAL ATRIAL FIBRILLATION (HCC): Primary | ICD-10-CM

## 2024-09-05 LAB
INR BLD: 2.8
PROTIME: NORMAL

## 2024-09-05 NOTE — PROGRESS NOTES
CLINICAL PHARMACY NOTE-- Anticoagulation     Alexandr Witt is a 59 y.o. male with PMHx significant for AVR (re-do in May, 2017), CAD s/p CABG (x3 in May, 2017), pA-fib, HLD, HTN, testicular CA who had INR called into clinic on 9/5/2024 for anticoagulation monitoring. Note: patient was also referred by Dr. Cherry for smoking cessation but patient has declined services at this time.     Anticoagulation Indication(s):  Heart Valve Replacement (bovine AVR), A-fib  Referring Physician:  Dr. Prieto   Goal INR Range:  2-3  Duration of Anticoagulation Therapy:  TBD  Time of day dose taken:  PM  Product patient has at home:  warfarin 5 mg (peach)    [id number] Score for Atrial Fibrillation Stroke Risk   Risk   Factors  Component Value   C CHF No 0   H HTN Yes 1   A2 Age >= 75 No,  (59 y.o.) 0   D DM Yes 1   S2 Prior Stroke/TIA No 0   V Vascular Disease Yes 1   A Age 65-74 No,  (59 y.o.) 0   Sc Sex male 0    [id number]  Score  3   Score last updated 5/30/19 1:51 PM      Lab Results   Component Value Date    RBC 2.95 (L) 04/28/2024    HGB 8.7 (L) 04/28/2024    HCT 27.6 (L) 04/28/2024    MCV 93.6 04/28/2024    MCH 29.5 04/28/2024    MPV 8.2 04/28/2024    RDW 18.4 (H) 04/28/2024     (L) 04/28/2024     INR Summary                            Warfarin regimen (mg)  Date INR   A/P    Sun Mon Tue Wed Thu Fri Sat Mg/wk  9/5 2.8 At goal, continue   5 7.5 7.5 7.5 5 7.5 7.5 47.5  8/29 2.7 At goal, continue   5 7.5 7.5 7.5 5 7.5 7.5 47.5  8/22 2.6 At goal, continue   5 7.5 7.5 7.5 5 7.5 7.5 47.5  8/15 2.9 At goal, continue   5 7.5 7.5 7.5 5 7.5 7.5 47.5  8/8 2.9 At goal, continue   5 7.5 7.5 7.5 5 7.5 7.5 47.5  8/1 2.6 At goal, continue   5 7.5 7.5 7.5 5 7.5 7.5 47.5  7/29 2.2 At goal, continue   5 7.5 7.5 7.5 5 7.5 7.5 47.5  7/25 2.3 At goal, continue   5 7.5 7.5 7.5 5 7.5 7.5 47.5  7/22 2.1 At goal, continue   5 7.5 7.5 7.5 5 7.5 7.5 47.5  7/18 1.9 Below goal, continue   5 7.5 7.5 7.5 5 7.5 7.5 47.5  7/15 1.4 Below goal,

## 2024-09-09 NOTE — PROGRESS NOTES
ASA and coumadin on hold but will be regulated by coumadin clinic.
Plan:     1. Preoperative workup as follows: ECG, hemoglobin, electrolytes, creatinine, glucose, liver function studies  2. Change in medication regimen before surgery: None  3. Prophylaxis for cardiac events with perioperative beta-blockers: Currently taking  metoprolol  ACC/AHA indications for pre-operative beta-blocker use:    · Vascular surgery with history of postitive stress test  · Intermediate or high risk surgery with history of CAD   · Intermediate or high risk surgery with multiple clinical predictors of CAD- 2 of the following: history of compensated or prior heart failure, history of cerebrovascular disease, DM, or renal insufficiency    Routine administration of higher-dose, long-acting metoprolol in beta-blocker-naïve patients on the day of surgery, and in the absence of dose titration is associated with an overall increase in mortality. Beta-blockers should be started days to weeks prior to surgery and titrated to pulse < 70.  4. Deep vein thrombosis prophylaxis: regimen to be chosen by surgical team  5. No contraindications to planned surgery,      I have discussed and evaluated this patient with the resident physician. A plan of care has been formulated and discussed with the patient. All questions have been answered.     Hector Brady  at 7:49 PM
Yes

## 2024-09-16 ENCOUNTER — NURSE ONLY (OUTPATIENT)
Dept: CARDIOLOGY CLINIC | Age: 59
End: 2024-09-16
Payer: MEDICARE

## 2024-09-16 ENCOUNTER — OFFICE VISIT (OUTPATIENT)
Dept: CARDIOLOGY CLINIC | Age: 59
End: 2024-09-16
Payer: MEDICARE

## 2024-09-16 VITALS
SYSTOLIC BLOOD PRESSURE: 110 MMHG | HEART RATE: 55 BPM | WEIGHT: 237 LBS | BODY MASS INDEX: 32.14 KG/M2 | DIASTOLIC BLOOD PRESSURE: 76 MMHG

## 2024-09-16 DIAGNOSIS — I49.01 VENTRICULAR FIBRILLATION (HCC): Primary | ICD-10-CM

## 2024-09-16 DIAGNOSIS — I50.22 CHRONIC SYSTOLIC CHF (CONGESTIVE HEART FAILURE) (HCC): ICD-10-CM

## 2024-09-16 DIAGNOSIS — Z51.81 ENCOUNTER FOR MONITORING WARFARIN THERAPY: ICD-10-CM

## 2024-09-16 DIAGNOSIS — I25.5 ISCHEMIC CARDIOMYOPATHY: ICD-10-CM

## 2024-09-16 DIAGNOSIS — Z95.810 CARDIAC DEFIBRILLATOR IN SITU: Primary | ICD-10-CM

## 2024-09-16 DIAGNOSIS — I48.0 PAROXYSMAL ATRIAL FIBRILLATION (HCC): ICD-10-CM

## 2024-09-16 DIAGNOSIS — I49.01 VENTRICULAR FIBRILLATION (HCC): ICD-10-CM

## 2024-09-16 DIAGNOSIS — Z95.810 ICD (IMPLANTABLE CARDIOVERTER-DEFIBRILLATOR), SINGLE, IN SITU: ICD-10-CM

## 2024-09-16 DIAGNOSIS — I10 BENIGN ESSENTIAL HTN: ICD-10-CM

## 2024-09-16 DIAGNOSIS — Z79.01 ENCOUNTER FOR MONITORING WARFARIN THERAPY: ICD-10-CM

## 2024-09-16 DIAGNOSIS — I46.9 CARDIAC ARREST (HCC): ICD-10-CM

## 2024-09-16 PROCEDURE — 3078F DIAST BP <80 MM HG: CPT | Performed by: NURSE PRACTITIONER

## 2024-09-16 PROCEDURE — 3017F COLORECTAL CA SCREEN DOC REV: CPT | Performed by: NURSE PRACTITIONER

## 2024-09-16 PROCEDURE — 3074F SYST BP LT 130 MM HG: CPT | Performed by: NURSE PRACTITIONER

## 2024-09-16 PROCEDURE — 4004F PT TOBACCO SCREEN RCVD TLK: CPT | Performed by: NURSE PRACTITIONER

## 2024-09-16 PROCEDURE — G8427 DOCREV CUR MEDS BY ELIG CLIN: HCPCS | Performed by: NURSE PRACTITIONER

## 2024-09-16 PROCEDURE — 99215 OFFICE O/P EST HI 40 MIN: CPT | Performed by: NURSE PRACTITIONER

## 2024-09-16 PROCEDURE — 93000 ELECTROCARDIOGRAM COMPLETE: CPT | Performed by: NURSE PRACTITIONER

## 2024-09-16 PROCEDURE — G8417 CALC BMI ABV UP PARAM F/U: HCPCS | Performed by: NURSE PRACTITIONER

## 2024-09-16 RX ORDER — WARFARIN SODIUM 5 MG/1
TABLET ORAL
Qty: 135 TABLET | Refills: 3 | Status: SHIPPED | OUTPATIENT
Start: 2024-09-16

## 2024-09-16 RX ORDER — MORPHINE SULFATE 15 MG/1
15 TABLET ORAL PRN
COMMUNITY
Start: 2024-08-30

## 2024-09-16 RX ORDER — PREGABALIN 150 MG/1
150 CAPSULE ORAL DAILY
COMMUNITY
Start: 2024-08-30

## 2024-09-18 PROCEDURE — 93282 PRGRMG EVAL IMPLANTABLE DFB: CPT | Performed by: INTERNAL MEDICINE

## 2024-09-19 ENCOUNTER — ANTI-COAG VISIT (OUTPATIENT)
Dept: PHARMACY | Age: 59
End: 2024-09-19

## 2024-09-19 DIAGNOSIS — R45.89 DEPRESSED MOOD: ICD-10-CM

## 2024-09-19 DIAGNOSIS — I48.0 PAROXYSMAL ATRIAL FIBRILLATION (HCC): Primary | ICD-10-CM

## 2024-09-19 LAB
INR BLD: 3.6
PROTIME: NORMAL

## 2024-09-19 RX ORDER — CITALOPRAM HYDROBROMIDE 40 MG/1
40 TABLET ORAL DAILY
Qty: 30 TABLET | Refills: 0 | Status: SHIPPED | OUTPATIENT
Start: 2024-09-19

## 2024-09-24 PROCEDURE — 93297 REM INTERROG DEV EVAL ICPMS: CPT | Performed by: CLINICAL NURSE SPECIALIST

## 2024-09-25 PROCEDURE — 93296 REM INTERROG EVL PM/IDS: CPT | Performed by: INTERNAL MEDICINE

## 2024-09-25 PROCEDURE — 93295 DEV INTERROG REMOTE 1/2/MLT: CPT | Performed by: INTERNAL MEDICINE

## 2024-09-26 ENCOUNTER — ANTI-COAG VISIT (OUTPATIENT)
Dept: PHARMACY | Age: 59
End: 2024-09-26

## 2024-09-26 DIAGNOSIS — I48.0 PAROXYSMAL ATRIAL FIBRILLATION (HCC): Primary | ICD-10-CM

## 2024-09-26 LAB
INR BLD: 2.8
PROTIME: NORMAL

## 2024-10-03 ENCOUNTER — ANTI-COAG VISIT (OUTPATIENT)
Dept: PHARMACY | Age: 59
End: 2024-10-03

## 2024-10-03 ENCOUNTER — OFFICE VISIT (OUTPATIENT)
Dept: INTERNAL MEDICINE CLINIC | Age: 59
End: 2024-10-03
Payer: MEDICARE

## 2024-10-03 VITALS
OXYGEN SATURATION: 98 % | HEART RATE: 58 BPM | DIASTOLIC BLOOD PRESSURE: 66 MMHG | RESPIRATION RATE: 18 BRPM | TEMPERATURE: 97.9 F | WEIGHT: 233.8 LBS | SYSTOLIC BLOOD PRESSURE: 101 MMHG | BODY MASS INDEX: 31.71 KG/M2

## 2024-10-03 DIAGNOSIS — G89.29 CHRONIC LEFT SHOULDER PAIN: ICD-10-CM

## 2024-10-03 DIAGNOSIS — M25.512 CHRONIC LEFT SHOULDER PAIN: ICD-10-CM

## 2024-10-03 DIAGNOSIS — M51.26 LUMBAR HERNIATED DISC: ICD-10-CM

## 2024-10-03 DIAGNOSIS — E11.9 TYPE 2 DIABETES MELLITUS WITHOUT COMPLICATION, WITHOUT LONG-TERM CURRENT USE OF INSULIN (HCC): Primary | Chronic | ICD-10-CM

## 2024-10-03 DIAGNOSIS — I10 PRIMARY HYPERTENSION: ICD-10-CM

## 2024-10-03 DIAGNOSIS — I48.0 PAROXYSMAL ATRIAL FIBRILLATION (HCC): Primary | ICD-10-CM

## 2024-10-03 DIAGNOSIS — I25.10 CAD, MULTIPLE VESSEL: Chronic | ICD-10-CM

## 2024-10-03 LAB
INR BLD: 3.5
PROTIME: NORMAL

## 2024-10-03 PROCEDURE — 99213 OFFICE O/P EST LOW 20 MIN: CPT

## 2024-10-03 ASSESSMENT — PATIENT HEALTH QUESTIONNAIRE - PHQ9
2. FEELING DOWN, DEPRESSED OR HOPELESS: NOT AT ALL
SUM OF ALL RESPONSES TO PHQ QUESTIONS 1-9: 4
8. MOVING OR SPEAKING SO SLOWLY THAT OTHER PEOPLE COULD HAVE NOTICED. OR THE OPPOSITE, BEING SO FIGETY OR RESTLESS THAT YOU HAVE BEEN MOVING AROUND A LOT MORE THAN USUAL: NOT AT ALL
SUM OF ALL RESPONSES TO PHQ9 QUESTIONS 1 & 2: 1
10. IF YOU CHECKED OFF ANY PROBLEMS, HOW DIFFICULT HAVE THESE PROBLEMS MADE IT FOR YOU TO DO YOUR WORK, TAKE CARE OF THINGS AT HOME, OR GET ALONG WITH OTHER PEOPLE: NOT DIFFICULT AT ALL
4. FEELING TIRED OR HAVING LITTLE ENERGY: NEARLY EVERY DAY
7. TROUBLE CONCENTRATING ON THINGS, SUCH AS READING THE NEWSPAPER OR WATCHING TELEVISION: NOT AT ALL
SUM OF ALL RESPONSES TO PHQ QUESTIONS 1-9: 4
SUM OF ALL RESPONSES TO PHQ QUESTIONS 1-9: 4
9. THOUGHTS THAT YOU WOULD BE BETTER OFF DEAD, OR OF HURTING YOURSELF: NOT AT ALL
1. LITTLE INTEREST OR PLEASURE IN DOING THINGS: SEVERAL DAYS
5. POOR APPETITE OR OVEREATING: NOT AT ALL
3. TROUBLE FALLING OR STAYING ASLEEP: NOT AT ALL
6. FEELING BAD ABOUT YOURSELF - OR THAT YOU ARE A FAILURE OR HAVE LET YOURSELF OR YOUR FAMILY DOWN: NOT AT ALL
SUM OF ALL RESPONSES TO PHQ QUESTIONS 1-9: 4

## 2024-10-03 NOTE — ASSESSMENT & PLAN NOTE
At goal. Last A1c of 7.3 in march 2024. Was taken off metformin at that time to be placed on Jardiance for GDMT. However, pt not able to get this due to financial barriers therefore was restarted on metformin at last visit. Has not been checking BG at home.   - Continue current meds  - Labs   - CMP    - A1c

## 2024-10-03 NOTE — PROGRESS NOTES
Outpatient Clinic Established Patient Note    Patient: Alexandr Witt  : 1965 (59 y.o.)  Date: 10/3/2024    CC: routine f/u     HPI:   Alexandr Witt is a 58 y.o. year old male with a medical history of non-small cell lung carcinoma of left lung s/p left minithoracotomy on , CAD s/p stents, V-fib cardiac arrest s/p AICD on 2024, PAF, DM2, choric pain, depression who presented to the clinic for a follow-up visit.     Patient was last here 24. Since being here, he reports feeling overall stable, however notes that he had a recent \"scan\" that showed his lung cancer had returned? Unable to find this imaging - last CT done while he was in hospital shows a stable pulmonary nodule. He states he has an appointment with pulm upcoming at the end of October. He is also c/o of L shoulder pain, this is chronic but pt is having difficulty tolerating pain. He follows with pain clinic and has been out of medications recently, but picked them up today. Would like a referral to orthopedics for further evaluation of his L shoulder pain.     Regarding chronic conditions:     DM2 - At that visit, we started pt on Metformin since he was not able to get his jardiance (for GDMT) for financial reasons, and required a maintenance medication for diabetes. A1c 7.3 in 2024. No recent A1c since.     Regarding his cardiac conditions, he follows with [facility] cardiology every 2 months for HFrEF, PAF, VF s/p ICD 2024, and CAD. Has been stable on his current meds. Denies chest pain. We had added Norvasc for better BP control mat his last visit and his BP is better controlled.       Home Meds:  Prior to Visit Medications    Medication Sig Taking? Authorizing Provider   amLODIPine (NORVASC) 10 MG tablet TAKE 1 TABLET (10MG) BY MOUTH EVERY DAY Yes Marjorie Nguyen MD   citalopram (CELEXA) 40 MG tablet TAKE 1 TABLET BY MOUTH EVERY DAY Yes Malik Rubio MD   morphine (MSIR) 15 MG tablet Take 1 tablet by mouth as needed 
Discharged

## 2024-10-03 NOTE — PROGRESS NOTES
change  7.5 5 5 5 5 5 7.5 40  6/11 1.5 Below goal, increase  7.5 5 5 5 5 5 7.5 40  5/24 3.6 Above goal, hold x 1  5 0/5 5 5 5 5 5 35  4/23 2.7 At goal, no change  5 5 5 5 5 5 5 35  4/5 3.6 Above goal, hold x 1  5 0/5 5 5 5 5 5 35   3/8 3.0 At goal, no change  5 5 5 5 5 5 5 35  2/19 2.0 At goal, no change  5 5 5 5 5 5 5 35  2/4 2.7 At goal, no change  5 5 5 5 5 5 5 35  1/20 2.7 At goal, no change  5 5 5 5 5 5 5 35  1/11 4.5 Above goal (Cymbalta) 5 0/5 0/5 5 5 5 5 35  12/28 2.0 At goal, no change  7.5 5 5 5 5 5 7.5 40  11/24 2.4 At goal, no change  7.5 5 5 5 5 5 7.5 40  10/30 2.5 At goal, no change  7.5 5 5 5 5 5 7.5 40  10/2 2.2 At goal, no change  7.5 5 5 5 5 5 7.5 40  9/3 2.4 At goal, no change  7.5 5 5 5 5 5 7.5 40  8/3 2.9 At goal, no change  7.5 5 5 5 5 5 7.5 40  7/6 2.7 At goal, no change  7.5 5 5 5 5 5 7.5 40  6/8 2.8 At goal, no change  7.5 5 5 5 5 5 7.5 40  5/11 2.9 At goal, no change  7.5 5 5 5 5 5 7.5 40  3/30 3.0 At goal, no change  7.5 5 5 5 5 5 7.5 40  2/26 2.3 At goal, no change  7.5 5 5 5 5 5 7.5 40  2/5 3.3 Above goal, decrease  7.5 5 5 5 5 5 7.5 40  1/8 2.9 At goal, no change  7.5 7.5 5 5 5 5 7.5 42.5    Patient History:  Recent hospitalizations/HC visits 4/16-30: [facility] for PNA and AMS, coded 4/18; discharged to SNF   9/20-9/31/23: [facility] with lightheadedness and hypotension   8/31-9/17/23: [facility] s/p lung nodule resection   4/8/22: bronchoscopy per Dr. Cherry, warfarin held x3 days (no bridge). Findings were likely infectious process, prescribed Augmentin.   2/21/22: Pulmonology, work-up underway for lung nodule    Recent medication changes 9/17/23: digoxin 250 mg daily   4/14-4/21/22: Augmentin BID x7 days    Medications taken regularly that may interact with warfarin or alter INR ASA 81 mg (increase bleeding)   Digoxin 250 mg daily (increase INR)    Warfarin dose taken as prescribed Usually compliant   Does not use pillbox  Patient has been taking warfarin since re-do AVR in May, 2017   Signs/symptoms of

## 2024-10-03 NOTE — ASSESSMENT & PLAN NOTE
Stable. Asymptomatic. Follows with cardiology every 2 months.   - Continue with schedule cardiology visits at [facility] cardiology  - continue ASA and Crestor  - Labs   - CBC

## 2024-10-03 NOTE — PATIENT INSTRUCTIONS
You were seen today for routine follow up     - Please get labwork done at your earliest convenience  - We have placed a referral to orthopedics for evaluation of your shoulder      We will see you back in 3 months time     Please reach out if you have any questions

## 2024-10-03 NOTE — ASSESSMENT & PLAN NOTE
Limited ROM 2/2 pain. No recent imaging. Suspect osteoarthritis.   - Referral to [facility] orthopedics placed

## 2024-10-08 ENCOUNTER — OFFICE VISIT (OUTPATIENT)
Dept: ORTHOPEDIC SURGERY | Age: 59
End: 2024-10-08
Payer: MEDICARE

## 2024-10-08 VITALS — HEIGHT: 72 IN | WEIGHT: 233 LBS | BODY MASS INDEX: 31.56 KG/M2

## 2024-10-08 DIAGNOSIS — M25.512 LEFT SHOULDER PAIN, UNSPECIFIED CHRONICITY: Primary | ICD-10-CM

## 2024-10-08 PROCEDURE — G8484 FLU IMMUNIZE NO ADMIN: HCPCS | Performed by: ORTHOPAEDIC SURGERY

## 2024-10-08 PROCEDURE — 4004F PT TOBACCO SCREEN RCVD TLK: CPT | Performed by: ORTHOPAEDIC SURGERY

## 2024-10-08 PROCEDURE — G8417 CALC BMI ABV UP PARAM F/U: HCPCS | Performed by: ORTHOPAEDIC SURGERY

## 2024-10-08 PROCEDURE — 99204 OFFICE O/P NEW MOD 45 MIN: CPT | Performed by: ORTHOPAEDIC SURGERY

## 2024-10-08 PROCEDURE — 3017F COLORECTAL CA SCREEN DOC REV: CPT | Performed by: ORTHOPAEDIC SURGERY

## 2024-10-08 PROCEDURE — G8427 DOCREV CUR MEDS BY ELIG CLIN: HCPCS | Performed by: ORTHOPAEDIC SURGERY

## 2024-10-08 RX ORDER — DIAZEPAM 5 MG
5 TABLET ORAL
Qty: 1 TABLET | Refills: 0 | Status: SHIPPED | OUTPATIENT
Start: 2024-10-08 | End: 2024-10-08

## 2024-10-08 NOTE — PROGRESS NOTES
not apply route Effective through 12/28/2020 through 12/27/2025 1 each 0    aspirin 81 MG EC tablet Take 1 tablet by mouth daily 30 tablet 0    Blood Pressure KIT 1 Unit 1 kit 0     Current Facility-Administered Medications on File Prior to Visit   Medication Dose Route Frequency Provider Last Rate Last Admin    cloNIDine (CATAPRES) tablet 0.1 mg  0.1 mg Oral Daily PRN Marjorie Nguyen MD        hydrALAZINE (APRESOLINE) tablet 25 mg  25 mg Oral 3 times per day Marjorie Nguyen MD          Allergies   Allergen Reactions    Cymbalta [Duloxetine Hcl] Other (See Comments)     Unknown reaction    Atorvastatin Calcium [Lipitor] Headaches     Severe headaches      Hydrocodone Itching    Wellbutrin [Bupropion] Anxiety and Other (See Comments)     patient complains of feeling anxious, irritable and \"hyped up\" on wellbutrin around 2011.        Review of Systems:  Constitutional: Patient is adequately groomed with no evidence of malnutrition  Mental Status: The patient is oriented to time, place and person.  The patient's mood and affect are appropriate.  Lymphatic: The lymphatic examination bilaterally reveals all areas to be without enlargement or induration.  Vascular: Examination reveals no swelling or calf tenderness.  Peripheral pulses are palpable and 2+.  Neurological: The patient has good coordination.  There is no weakness or sensory deficit.  Skin:  Head/Neck: inspection reveals no rashes, ulcerations or lesions.Trunk: inspection reveals no rashes, ulcerations or lesions.    Objective:  Ht 1.829 m (6')   Wt 105.7 kg (233 lb)   BMI 31.60 kg/m²      Physical Exam:  The patient is well-appearing and in no apparent distress  Negative Spurling's test    Examination of the left shoulder  There is no swelling, ecchymosis, or gross deformity  There is no evidence of muscle atrophy  + Vegas test, + Neers test  + bicipital groove tenderness, - AC joint tenderness  Active range of motion reveals 80 degrees of

## 2024-10-09 ENCOUNTER — TELEPHONE (OUTPATIENT)
Dept: ORTHOPEDIC SURGERY | Age: 59
End: 2024-10-09

## 2024-10-09 NOTE — TELEPHONE ENCOUNTER
CALLED PATIENT NO ANSWER NO OPTION TO LEAVE MESSAGE     WILL CALL PATIENT AGAIN CURRENTLY WORKING ON OBTAINING MORE     INFORMATION ON WHICH LOCATION WOULD BE BEST FOR PATIENT TO COMPLETE HIS MRI       PATIENT REQUEST [facility]

## 2024-10-09 NOTE — TELEPHONE ENCOUNTER
General Question     Subject: [facility] CAN'T DO MRI - Pt HAS A PACEMAKER   Patient and /or Facility Request: Alexandr Witt   Contact Number: [phone]      Pt CALLED [facility] FOR THE LARGER MRI APPT, AND BECAUSE HE HAS A PACEMAKER, THEY SAY HE WILL NEED TO GO TO ONE OF THE HOSPITALS.     PLEASE CALL TO ADVISE.

## 2024-10-09 NOTE — TELEPHONE ENCOUNTER
CALLED PATIENT AGAIN NO ANSWER STRAIGHT TO VOICE MAIL INSTRUTED PATIENT TO CALL THE IMAGING [facility]BER [facility]     [phone] TO SCHEDULE MRI       INSTRUCTED PATIENT TO CALL US BACK WITH FURTHER QUESTIONS

## 2024-10-10 ENCOUNTER — ANTI-COAG VISIT (OUTPATIENT)
Dept: PHARMACY | Age: 59
End: 2024-10-10

## 2024-10-10 DIAGNOSIS — I48.0 PAROXYSMAL ATRIAL FIBRILLATION (HCC): Primary | ICD-10-CM

## 2024-10-10 LAB
INR BLD: 2.6
PROTIME: NORMAL

## 2024-10-10 NOTE — PROGRESS NOTES
CLINICAL PHARMACY NOTE-- Anticoagulation     Alexandr Witt is a 59 y.o. male with PMHx significant for AVR (re-do in May, 2017), CAD s/p CABG (x3 in May, 2017), pA-fib, HLD, HTN, testicular CA who had INR called into clinic on 10/10/2024 for anticoagulation monitoring. Note: patient was also referred by Dr. Cherry for smoking cessation but patient has declined services at this time.     Anticoagulation Indication(s):  Heart Valve Replacement (bovine AVR), A-fib  Referring Physician:  Dr. Prieto   Goal INR Range:  2-3  Duration of Anticoagulation Therapy:  TBD  Time of day dose taken:  PM  Product patient has at home:  warfarin 5 mg (peach)    [id number] Score for Atrial Fibrillation Stroke Risk   Risk   Factors  Component Value   C CHF Yes 1   H HTN Yes 1   A2 Age >= 75 No,  (59 y.o.) 0   D DM Yes 1   S2 Prior Stroke/TIA No 0   V Vascular Disease Yes 1   A Age 65-74 No,  (59 y.o.) 0   Sc Sex male 0    [id number]  Score  4   Score last updated 5/30/19 1:51 PM      Lab Results   Component Value Date    RBC 2.95 (L) 04/28/2024    HGB 8.7 (L) 04/28/2024    HCT 27.6 (L) 04/28/2024    MCV 93.6 04/28/2024    MCH 29.5 04/28/2024    MPV 8.2 04/28/2024    RDW 18.4 (H) 04/28/2024     (L) 04/28/2024     INR Summary                            Warfarin regimen (mg)  Date INR   A/P    Sun Mon Tue Wed Thu Fri Sat Mg/wk  10/10 2.6 At goal, continue   5 7.5 7.5 7.5 5 7.5 7.5 47.5  10/3 3.5 Above goal, hold x1  5 7.5 7.5 7.5 0/5 7.5 7.5 47.5  9/26 2.8 At goal, continue    5 7.5 7.5 7.5 5 7.5 7.5 47.5  9/19 3.6 Above goal, hold x1   5 7.5 7.5 7.5 0/5 7.5 7.5 47.5  9/5 2.8 At goal, continue   5 7.5 7.5 7.5 5 7.5 7.5 47.5  8/29 2.7 At goal, continue   5 7.5 7.5 7.5 5 7.5 7.5 47.5  8/22 2.6 At goal, continue   5 7.5 7.5 7.5 5 7.5 7.5 47.5  8/15 2.9 At goal, continue   5 7.5 7.5 7.5 5 7.5 7.5 47.5  8/8 2.9 At goal, continue   5 7.5 7.5 7.5 5 7.5 7.5 47.5  8/1 2.6 At goal, continue   5 7.5 7.5 7.5 5 7.5 7.5 47.5  7/29 2.2 At goal,

## 2024-10-21 ENCOUNTER — TELEPHONE (OUTPATIENT)
Dept: ORTHOPEDIC SURGERY | Age: 59
End: 2024-10-21

## 2024-10-21 NOTE — TELEPHONE ENCOUNTER
Called spoke with patient he his scheduled for 11/1/2024 mri patient has pace maker and spinal stimulator       Follow up call

## 2024-10-22 ENCOUNTER — OFFICE VISIT (OUTPATIENT)
Dept: PULMONOLOGY | Age: 59
End: 2024-10-22
Payer: MEDICARE

## 2024-10-22 VITALS
HEART RATE: 57 BPM | OXYGEN SATURATION: 98 % | HEIGHT: 72 IN | BODY MASS INDEX: 32.78 KG/M2 | RESPIRATION RATE: 16 BRPM | WEIGHT: 242 LBS | SYSTOLIC BLOOD PRESSURE: 116 MMHG | DIASTOLIC BLOOD PRESSURE: 78 MMHG

## 2024-10-22 DIAGNOSIS — Z72.0 TOBACCO ABUSE DISORDER: ICD-10-CM

## 2024-10-22 DIAGNOSIS — R91.1 PULMONARY NODULE: Primary | ICD-10-CM

## 2024-10-22 DIAGNOSIS — C34.32 MALIGNANT NEOPLASM OF LOWER LOBE OF LEFT LUNG (HCC): ICD-10-CM

## 2024-10-22 PROCEDURE — 3017F COLORECTAL CA SCREEN DOC REV: CPT | Performed by: INTERNAL MEDICINE

## 2024-10-22 PROCEDURE — 99214 OFFICE O/P EST MOD 30 MIN: CPT | Performed by: INTERNAL MEDICINE

## 2024-10-22 PROCEDURE — G8427 DOCREV CUR MEDS BY ELIG CLIN: HCPCS | Performed by: INTERNAL MEDICINE

## 2024-10-22 PROCEDURE — 3078F DIAST BP <80 MM HG: CPT | Performed by: INTERNAL MEDICINE

## 2024-10-22 PROCEDURE — 4004F PT TOBACCO SCREEN RCVD TLK: CPT | Performed by: INTERNAL MEDICINE

## 2024-10-22 PROCEDURE — 3074F SYST BP LT 130 MM HG: CPT | Performed by: INTERNAL MEDICINE

## 2024-10-22 PROCEDURE — G8417 CALC BMI ABV UP PARAM F/U: HCPCS | Performed by: INTERNAL MEDICINE

## 2024-10-22 PROCEDURE — G8484 FLU IMMUNIZE NO ADMIN: HCPCS | Performed by: INTERNAL MEDICINE

## 2024-10-22 NOTE — PROGRESS NOTES
[facility]    Outpatient Follow up Note    Subjective:   Referring Physician: Laura    CHIEF COMPLAINT / HPI:     The patient is 59 y.o. male who presents today for a follow up visit for lung cancer and acute hypoxemic respiratory failure.  Alexandr's follow-up with me got when he laid by an admission to the hospital back in April.  He was very ill at that time and had lost approximately 100 pounds, was not eating, was only drinking Coca-Cola and was generally failing to thrive when he was admitted he was confused, had hematemesis and had potassium that was low and did not respond quickly to replenishment.  He ended up with a V-fib arrest and it needed to go to the intensive care unit.  Obviously he survived, but he has no recollection of the admission.  Part of his reason for following up was that he was under the impression that his lung cancer was back.  Prior to getting admitted to the hospital in April he had a CT of his chest that showed a new nodule in the left lower lobe.  A PET scan was done that showed an SUV of 3.1.  He was then in the hospital soon thereafter.  A CT while he was in the hospital showed the nodule still present.  He has not had any follow-up for the nodule since then.  He has improved in other ways however, as he has been able to gain 50 of the 100 pounds he lost back and he is eating again.  He is now living in an assisted living.  His daughter took his dogs to her place.    October 2023: Alexandr had his wedge resection of the LLL in late August, but his post op course was complicated by acute hypoxemic respiratory failure and acute hypercapnic failure.  He briefly needed bipap, but was on high flow oxygen for an extended period.  His hospitalization lasted 21 days and he was discharged on oxygen.  He came back within 24 hours for hypotension but that was a short stay.  Other than shoulder and back pain which are worse since surgery he's doing pretty

## 2024-10-23 NOTE — DISCHARGE INSTRUCTIONS
-- You were admitted for hypotension (symptomatic low blood pressure) and treated with intravenous fluids. -- The primary reason for your low blood pressure is likely from your different blood pressure medications which you had from before your previous hospitalization. Resuming these after discharge may had too strong of an effect on your blood pressure. -- Please stop taking your lisinopril and amlodipine and follow-up with your PCP to more comprehensively address your blood pressure medication management. -- You also had low blood sodium levels on your blood work. We think this is because you are drinking water without enough food intake. The food provides the minerals and electrolytes that your body needs. Please work to increase your fluid consumption based off of the principles you discussed with the dietitian    It was a pleasure taking care of you. We wish you the best and loved seeing the pictures of your dogs! Medical Week 1 Survey      Flowsheet Row Responses   [facility] facility patient discharged from? [facility]   Does the patient have one of the following disease processes/diagnoses(primary or secondary)? Other   Week 1 attempt successful? No   Unsuccessful attempts Attempt 1            Jesus Manuel BETANCUR - Registered Nurse

## 2024-10-24 ENCOUNTER — ANTI-COAG VISIT (OUTPATIENT)
Dept: PHARMACY | Age: 59
End: 2024-10-24

## 2024-10-24 DIAGNOSIS — I48.0 PAROXYSMAL ATRIAL FIBRILLATION (HCC): Primary | ICD-10-CM

## 2024-10-24 LAB
INTERNATIONAL NORMALIZATION RATIO, POC: 3
PROTHROMBIN TIME, POC: 0

## 2024-10-24 NOTE — PROGRESS NOTES
CLINICAL PHARMACY NOTE-- Anticoagulation     Alexandr Witt is a 59 y.o. male with PMHx significant for AVR (re-do in May, 2017), CAD s/p CABG (x3 in May, 2017), pA-fib, HLD, HTN, testicular CA who had INR called into clinic on 10/24/2024 for anticoagulation monitoring. Note: patient was also referred by Dr. Cherry for smoking cessation but patient has declined services at this time.     Anticoagulation Indication(s):  Heart Valve Replacement (bovine AVR), A-fib  Referring Physician:  Dr. Prieto   Goal INR Range:  2-3  Duration of Anticoagulation Therapy:  TBD  Time of day dose taken:  PM  Product patient has at home:  warfarin 5 mg (peach)    [id number] Score for Atrial Fibrillation Stroke Risk   Risk   Factors  Component Value   C CHF Yes 1   H HTN Yes 1   A2 Age >= 75 No,  (59 y.o.) 0   D DM Yes 1   S2 Prior Stroke/TIA No 0   V Vascular Disease Yes 1   A Age 65-74 No,  (59 y.o.) 0   Sc Sex male 0    [id number]  Score  4   Score last updated 5/30/19 1:51 PM      Lab Results   Component Value Date    RBC 2.95 (L) 04/28/2024    HGB 8.7 (L) 04/28/2024    HCT 27.6 (L) 04/28/2024    MCV 93.6 04/28/2024    MCH 29.5 04/28/2024    MPV 8.2 04/28/2024    RDW 18.4 (H) 04/28/2024     (L) 04/28/2024     INR Summary                            Warfarin regimen (mg)  Date INR   A/P    Sun Mon Tue Wed Thu Fri Sat Mg/wk  10/24 3.0 At goal, continue   5 7.5 7.5 7.5 5 7.5 7.5 47.5  10/10 2.6 At goal, continue   5 7.5 7.5 7.5 5 7.5 7.5 47.5  10/3 3.5 Above goal, hold x1  5 7.5 7.5 7.5 0/5 7.5 7.5 47.5  9/26 2.8 At goal, continue    5 7.5 7.5 7.5 5 7.5 7.5 47.5  9/19 3.6 Above goal, hold x1   5 7.5 7.5 7.5 0/5 7.5 7.5 47.5  9/5 2.8 At goal, continue   5 7.5 7.5 7.5 5 7.5 7.5 47.5  8/29 2.7 At goal, continue   5 7.5 7.5 7.5 5 7.5 7.5 47.5  8/22 2.6 At goal, continue   5 7.5 7.5 7.5 5 7.5 7.5 47.5  8/15 2.9 At goal, continue   5 7.5 7.5 7.5 5 7.5 7.5 47.5  8/8 2.9 At goal, continue   5 7.5 7.5 7.5 5 7.5 7.5 47.5  8/1 2.6 At

## 2024-11-04 ENCOUNTER — HOSPITAL ENCOUNTER (OUTPATIENT)
Dept: MRI IMAGING | Age: 59
Discharge: HOME | End: 2024-11-01
Attending: ORTHOPAEDIC SURGERY

## 2024-11-04 DIAGNOSIS — M25.512 LEFT SHOULDER PAIN, UNSPECIFIED CHRONICITY: ICD-10-CM

## 2024-11-07 ENCOUNTER — ANTI-COAG VISIT (OUTPATIENT)
Dept: PHARMACY | Age: 59
End: 2024-11-07

## 2024-11-07 DIAGNOSIS — I48.0 PAROXYSMAL ATRIAL FIBRILLATION (HCC): Primary | ICD-10-CM

## 2024-11-07 LAB
INR BLD: 4.2
PROTIME: NORMAL

## 2024-11-07 NOTE — PROGRESS NOTES
CLINICAL PHARMACY NOTE-- Anticoagulation     Alexandr Witt is a 59 y.o. male with PMHx significant for AVR (re-do in May, 2017), CAD s/p CABG (x3 in May, 2017), pA-fib, HLD, HTN, testicular CA who had INR called into clinic on 11/7/2024 for anticoagulation monitoring. Note: patient was also referred by Dr. Cherry for smoking cessation but patient has declined services at this time.     Anticoagulation Indication(s):  Heart Valve Replacement (bovine AVR), A-fib  Referring Physician:  Dr. Prieto   Goal INR Range:  2-3  Duration of Anticoagulation Therapy:  TBD  Time of day dose taken:  PM  Product patient has at home:  warfarin 5 mg (peach)    [id number] Score for Atrial Fibrillation Stroke Risk   Risk   Factors  Component Value   C CHF Yes 1   H HTN Yes 1   A2 Age >= 75 No,  (59 y.o.) 0   D DM Yes 1   S2 Prior Stroke/TIA No 0   V Vascular Disease Yes 1   A Age 65-74 No,  (59 y.o.) 0   Sc Sex male 0    [id number]  Score  4   Score last updated 5/30/19 1:51 PM      Lab Results   Component Value Date    RBC 2.95 (L) 04/28/2024    HGB 8.7 (L) 04/28/2024    HCT 27.6 (L) 04/28/2024    MCV 93.6 04/28/2024    MCH 29.5 04/28/2024    MPV 8.2 04/28/2024    RDW 18.4 (H) 04/28/2024     (L) 04/28/2024     INR Summary                            Warfarin regimen (mg)  Date INR   A/P    Sun Mon Tue Wed Thu Fri Sat Mg/wk  11/7 4.2 Above goal, hold+dec  5 7.5 5 7.5 5 7.5 7.5 45  10/24 3.0 At goal, continue   5 7.5 7.5 7.5 5 7.5 7.5 47.5  10/10 2.6 At goal, continue   5 7.5 7.5 7.5 5 7.5 7.5 47.5  10/3 3.5 Above goal, hold x1  5 7.5 7.5 7.5 0/5 7.5 7.5 47.5  9/26 2.8 At goal, continue    5 7.5 7.5 7.5 5 7.5 7.5 47.5  9/19 3.6 Above goal, hold x1   5 7.5 7.5 7.5 0/5 7.5 7.5 47.5  9/5 2.8 At goal, continue   5 7.5 7.5 7.5 5 7.5 7.5 47.5  8/29 2.7 At goal, continue   5 7.5 7.5 7.5 5 7.5 7.5 47.5  8/22 2.6 At goal, continue   5 7.5 7.5 7.5 5 7.5 7.5 47.5  8/15 2.9 At goal, continue   5 7.5 7.5 7.5 5 7.5 7.5 47.5  8/8 2.9 At goal,

## 2024-11-14 ENCOUNTER — ANTI-COAG VISIT (OUTPATIENT)
Dept: PHARMACY | Age: 59
End: 2024-11-14

## 2024-11-14 DIAGNOSIS — I48.0 PAROXYSMAL ATRIAL FIBRILLATION (HCC): Primary | ICD-10-CM

## 2024-11-14 LAB
INR BLD: 2.6
PROTIME: NORMAL

## 2024-11-14 NOTE — PROGRESS NOTES
CLINICAL PHARMACY NOTE-- Anticoagulation     Alexandr Witt is a 59 y.o. male with PMHx significant for AVR (re-do in May, 2017), CAD s/p CABG (x3 in May, 2017), pA-fib, HLD, HTN, testicular CA who had INR called into clinic on 11/14/2024 for anticoagulation monitoring. Note: patient was also referred by Dr. Cherry for smoking cessation but patient has declined services at this time.     Anticoagulation Indication(s):  Heart Valve Replacement (bovine AVR), A-fib  Referring Physician:  Dr. Prieto   Goal INR Range:  2-3  Duration of Anticoagulation Therapy:  TBD  Time of day dose taken:  PM  Product patient has at home:  warfarin 5 mg (peach)    [id number] Score for Atrial Fibrillation Stroke Risk   Risk   Factors  Component Value   C CHF Yes 1   H HTN Yes 1   A2 Age >= 75 No,  (59 y.o.) 0   D DM Yes 1   S2 Prior Stroke/TIA No 0   V Vascular Disease Yes 1   A Age 65-74 No,  (59 y.o.) 0   Sc Sex male 0    [id number]  Score  4   Score last updated 5/30/19 1:51 PM      Lab Results   Component Value Date    RBC 2.95 (L) 04/28/2024    HGB 8.7 (L) 04/28/2024    HCT 27.6 (L) 04/28/2024    MCV 93.6 04/28/2024    MCH 29.5 04/28/2024    MPV 8.2 04/28/2024    RDW 18.4 (H) 04/28/2024     (L) 04/28/2024     INR Summary                            Warfarin regimen (mg)  Date INR   A/P    Sun Mon Tue Wed Thu Fri Sat Mg/wk  11/14 2.6 At goal, continue   5 7.5 5 7.5 5 7.5 7.5 45  11/7 4.2 Above goal, hold+dec  5 7.5 5 7.5 5 7.5 7.5 45  10/24 3.0 At goal, continue   5 7.5 7.5 7.5 5 7.5 7.5 47.5  10/10 2.6 At goal, continue   5 7.5 7.5 7.5 5 7.5 7.5 47.5  10/3 3.5 Above goal, hold x1  5 7.5 7.5 7.5 0/5 7.5 7.5 47.5  9/26 2.8 At goal, continue    5 7.5 7.5 7.5 5 7.5 7.5 47.5  9/19 3.6 Above goal, hold x1   5 7.5 7.5 7.5 0/5 7.5 7.5 47.5  9/5 2.8 At goal, continue   5 7.5 7.5 7.5 5 7.5 7.5 47.5  8/29 2.7 At goal, continue   5 7.5 7.5 7.5 5 7.5 7.5 47.5  8/22 2.6 At goal, continue   5 7.5 7.5 7.5 5 7.5 7.5 47.5  8/15 2.9 At goal,

## 2024-11-19 ENCOUNTER — OFFICE VISIT (OUTPATIENT)
Dept: ORTHOPEDIC SURGERY | Age: 59
End: 2024-11-19
Payer: MEDICARE

## 2024-11-19 VITALS — WEIGHT: 242 LBS | BODY MASS INDEX: 32.78 KG/M2 | HEIGHT: 72 IN

## 2024-11-19 DIAGNOSIS — M25.512 LEFT SHOULDER PAIN, UNSPECIFIED CHRONICITY: Primary | ICD-10-CM

## 2024-11-19 PROCEDURE — G8427 DOCREV CUR MEDS BY ELIG CLIN: HCPCS | Performed by: ORTHOPAEDIC SURGERY

## 2024-11-19 PROCEDURE — 3017F COLORECTAL CA SCREEN DOC REV: CPT | Performed by: ORTHOPAEDIC SURGERY

## 2024-11-19 PROCEDURE — G8484 FLU IMMUNIZE NO ADMIN: HCPCS | Performed by: ORTHOPAEDIC SURGERY

## 2024-11-19 PROCEDURE — 99214 OFFICE O/P EST MOD 30 MIN: CPT | Performed by: ORTHOPAEDIC SURGERY

## 2024-11-19 PROCEDURE — G8417 CALC BMI ABV UP PARAM F/U: HCPCS | Performed by: ORTHOPAEDIC SURGERY

## 2024-11-19 PROCEDURE — 4004F PT TOBACCO SCREEN RCVD TLK: CPT | Performed by: ORTHOPAEDIC SURGERY

## 2024-11-20 ENCOUNTER — OFFICE VISIT (OUTPATIENT)
Dept: CARDIOLOGY CLINIC | Age: 59
End: 2024-11-20
Payer: MEDICARE

## 2024-11-20 ENCOUNTER — HOSPITAL ENCOUNTER (OUTPATIENT)
Dept: CT IMAGING | Age: 59
Discharge: HOME | End: 2024-11-20
Attending: INTERNAL MEDICINE
Payer: MEDICARE

## 2024-11-20 VITALS
HEART RATE: 66 BPM | SYSTOLIC BLOOD PRESSURE: 126 MMHG | WEIGHT: 246.2 LBS | DIASTOLIC BLOOD PRESSURE: 70 MMHG | BODY MASS INDEX: 33.39 KG/M2

## 2024-11-20 DIAGNOSIS — R91.1 PULMONARY NODULE: ICD-10-CM

## 2024-11-20 DIAGNOSIS — I50.22 CHRONIC SYSTOLIC CHF (CONGESTIVE HEART FAILURE) (HCC): Primary | ICD-10-CM

## 2024-11-20 PROCEDURE — 3078F DIAST BP <80 MM HG: CPT | Performed by: INTERNAL MEDICINE

## 2024-11-20 PROCEDURE — 3074F SYST BP LT 130 MM HG: CPT | Performed by: INTERNAL MEDICINE

## 2024-11-20 PROCEDURE — 99214 OFFICE O/P EST MOD 30 MIN: CPT | Performed by: INTERNAL MEDICINE

## 2024-11-20 PROCEDURE — 4004F PT TOBACCO SCREEN RCVD TLK: CPT | Performed by: INTERNAL MEDICINE

## 2024-11-20 PROCEDURE — 3017F COLORECTAL CA SCREEN DOC REV: CPT | Performed by: INTERNAL MEDICINE

## 2024-11-20 PROCEDURE — G8417 CALC BMI ABV UP PARAM F/U: HCPCS | Performed by: INTERNAL MEDICINE

## 2024-11-20 PROCEDURE — G8427 DOCREV CUR MEDS BY ELIG CLIN: HCPCS | Performed by: INTERNAL MEDICINE

## 2024-11-20 PROCEDURE — G8484 FLU IMMUNIZE NO ADMIN: HCPCS | Performed by: INTERNAL MEDICINE

## 2024-11-20 PROCEDURE — 71250 CT THORAX DX C-: CPT

## 2024-11-21 ENCOUNTER — ANTI-COAG VISIT (OUTPATIENT)
Dept: PHARMACY | Age: 59
End: 2024-11-21

## 2024-11-21 DIAGNOSIS — C34.92 SQUAMOUS CELL CARCINOMA LUNG, LEFT (HCC): ICD-10-CM

## 2024-11-21 DIAGNOSIS — I48.0 PAROXYSMAL ATRIAL FIBRILLATION (HCC): Primary | ICD-10-CM

## 2024-11-21 DIAGNOSIS — C34.32 MALIGNANT NEOPLASM OF LOWER LOBE OF LEFT LUNG (HCC): Primary | ICD-10-CM

## 2024-11-21 LAB
INTERNATIONAL NORMALIZATION RATIO, POC: 4
PROTHROMBIN TIME, POC: 0

## 2024-11-21 NOTE — RESULT ENCOUNTER NOTE
I looked at Alexandr's CT and it looks like he has recurrence of his lung cancer at the site of his previous wedge resection.  Since the previous resection nearly killed him in 2022 and he did arrest for other reasons earlier this year, he's not a candidate for surgical treatments.  I'm going to get a PET for staging and refer him to radiation oncology in the hopes that he's a candidate for radiation.  If the PET shows evidence of anything more than local recurrence then he'll need a Heme/onc referral, and probably a new port since his doesn't work.  I spoke with Paresh about the results and the plan.  Orders for PET and Radiation oncology referral have been placed.

## 2024-11-21 NOTE — PROGRESS NOTES
that may interact with warfarin or alter INR ASA 81 mg (increase bleeding)   Digoxin 250 mg daily (increase INR)    Warfarin dose taken as prescribed Usually compliant   Does not use pillbox  Patient has been taking warfarin since re-do AVR in May, 2017   Signs/symptoms of bleeding No h/o major bleeding   Vitamin K intake Normally has ~0 servings of green, leafy vegetables per week   Recent vomiting/diarrhea/fever, changes in weight or activity level None reported   Tobacco or alcohol use -No recent changes in smoking as of 11/24/20 (~3/4 PPD)  -Patient cut back from 2 PPD to 3/4 PPD in February (2019) when he moved in with his daughter. Decrease in smoking typically increases INR gradually.   -Patient denies any alcohol or illicit drug use    Upcoming surgeries or procedures None        Assessment/Plan:   Patient's INR was supratherapeutic today per HHRN ([facility] [phone]). Unknown reason for elevated INR today, he reports dry cough but has had this for >2 weeks. No other symptoms.     Instructed patient to hold warfarin today then decrease dose to 7.5 mg MWF and 5 mg all other days. Repeat INR in 1 week with HHRN. Patient typically gets INRs checked on Thursdays, but changing to Wednesday next week due to holiday.     Patient was admitted to [facility] from 4/16-30 with PNA and AMS. He had cardiac arrest during hospitalization. He was discharged to SNF and is now in assisted living with home health services with [facility]. HHRN will check INR while patient is getting services through them.     Bethany Hernandez, PharmD, BCPS  [facility]  [place]: [phone]  [place]: [phone]  11/21/2024 10:12 AM    For Pharmacy Admin Tracking Only    Intervention Detail: Dose Adjustment: 1, reason: Therapy De-escalation  Total # of Interventions Recommended: 1  Total # of Interventions Accepted: 1  Time Spent (min): 15

## 2024-11-25 PROCEDURE — 93297 REM INTERROG DEV EVAL ICPMS: CPT | Performed by: NURSE PRACTITIONER

## 2024-11-25 RX ORDER — METHYLPREDNISOLONE 4 MG/1
TABLET ORAL
Qty: 1 KIT | Refills: 0 | Status: SHIPPED | OUTPATIENT
Start: 2024-11-25

## 2024-11-25 NOTE — PROGRESS NOTES
2024     Reason for visit:  Left shoulder pain, worsening over past year    History of Present Illness:  Patient is a 59-year-old male right-hand-dominant presenting with left shoulder pain which has been worsening over the past year.  He reports multiple episodes of instability for which he has been able to self reduce.  He has never seen a physician for this problem and states that it began about 1 year ago prior to his first surgery.  These instability episodes have stopped in the last 2 months, but he is now experiencing a great deal more pain instead.    Objective:  Ht 1.829 m (6')   Wt 109.8 kg (242 lb)   BMI 32.82 kg/m²      Physical Exam:  The patient is well-appearing and in no apparent distress  Negative Spurling's test    Examination of the left shoulder  There is no swelling, ecchymosis, or gross deformity  There is no evidence of muscle atrophy  + Vegas test, + Neers test  + bicipital groove tenderness, - AC joint tenderness  Active range of motion reveals 80 degrees of forward flexion, 80 degrees of abduction, 45 degrees of external rotation, internal rotation to upper lumbar spine  Passive range of motion reveals 110 degrees of forward flexion, 110 degrees of abduction, 45 degrees of external rotation, internal rotation to upper lumbar spine  5/5 strength with resisted abduction, 5/5 strength with resisted external rotation, 5/5 strength with resisted internal rotation  Intact motor and sensory function throughout the median/radial/ulnar/PIN/AIN distributions  Palpable radial pulse, brisk cap refill, 2+ symmetric reflexes      Imagin view x-rays of the left shoulder were obtained the office today on 10/8/2024 and reviewed.  No acute fracture subluxation or dislocation.  There is a small inferior humeral head osteophyte with mild glenohumeral joint space narrowing.  AC joint space is preserved.  AHI 10 mm  MRI of the left shoulder was reviewed.  Glenohumeral degenerative joint

## 2024-11-27 ENCOUNTER — ANTI-COAG VISIT (OUTPATIENT)
Dept: PHARMACY | Age: 59
End: 2024-11-27

## 2024-11-27 DIAGNOSIS — I48.0 PAROXYSMAL ATRIAL FIBRILLATION (HCC): Primary | ICD-10-CM

## 2024-11-27 LAB
INTERNATIONAL NORMALIZATION RATIO, POC: 4.1
PROTHROMBIN TIME, POC: 0

## 2024-11-27 NOTE — PROGRESS NOTES
CLINICAL PHARMACY NOTE-- Anticoagulation     Alexandr Witt is a 59 y.o. male with PMHx significant for AVR (re-do in May, 2017), CAD s/p CABG (x3 in May, 2017), pA-fib, HLD, HTN, testicular CA who had INR called into clinic on 11/27/2024 for anticoagulation monitoring. Note: patient was also referred by Dr. Cherry for smoking cessation but patient has declined services at this time.     Anticoagulation Indication(s):  Heart Valve Replacement (bovine AVR), A-fib  Referring Physician:  Dr. Prieto   Goal INR Range:  2-3  Duration of Anticoagulation Therapy:  TBD  Time of day dose taken:  PM  Product patient has at home:  warfarin 5 mg (peach)    [id number] Score for Atrial Fibrillation Stroke Risk   Risk   Factors  Component Value   C CHF Yes 1   H HTN Yes 1   A2 Age >= 75 No,  (59 y.o.) 0   D DM Yes 1   S2 Prior Stroke/TIA No 0   V Vascular Disease Yes 1   A Age 65-74 No,  (59 y.o.) 0   Sc Sex male 0    [id number]  Score  4   Score last updated 5/30/19 1:51 PM      Lab Results   Component Value Date    RBC 2.95 (L) 04/28/2024    HGB 8.7 (L) 04/28/2024    HCT 27.6 (L) 04/28/2024    MCV 93.6 04/28/2024    MCH 29.5 04/28/2024    MPV 8.2 04/28/2024    RDW 18.4 (H) 04/28/2024     (L) 04/28/2024     INR Summary                            Warfarin regimen (mg)  Date INR   A/P    Sun Mon Tue Wed Thu Fri Sat Mg/wk  11/27 4.1 Above goal, hold+dec  5 7.5 5 0/5  5 7.5 5 40  11/21 4.0 Above goal, hold+dec  5 7.5 5 7.5 0/5 7.5 5 42.5  11/14 2.6 At goal, continue   5 7.5 5 7.5 5 7.5 7.5 45  11/7 4.2 Above goal, hold+dec  5 7.5 5 7.5 5 7.5 7.5 45  10/24 3.0 At goal, continue   5 7.5 7.5 7.5 5 7.5 7.5 47.5  10/10 2.6 At goal, continue   5 7.5 7.5 7.5 5 7.5 7.5 47.5  10/3 3.5 Above goal, hold x1  5 7.5 7.5 7.5 0/5 7.5 7.5 47.5  9/26 2.8 At goal, continue    5 7.5 7.5 7.5 5 7.5 7.5 47.5  9/19 3.6 Above goal, hold x1   5 7.5 7.5 7.5 0/5 7.5 7.5 47.5  9/5 2.8 At goal, continue   5 7.5 7.5 7.5 5 7.5 7.5 47.5  8/29 2.7 At goal,

## 2024-12-05 ENCOUNTER — ANTI-COAG VISIT (OUTPATIENT)
Dept: PHARMACY | Age: 59
End: 2024-12-05

## 2024-12-05 DIAGNOSIS — C34.32 MALIGNANT NEOPLASM OF LOWER LOBE OF LEFT LUNG (HCC): ICD-10-CM

## 2024-12-05 DIAGNOSIS — I48.0 PAROXYSMAL ATRIAL FIBRILLATION (HCC): Primary | ICD-10-CM

## 2024-12-05 DIAGNOSIS — C34.92 SQUAMOUS CELL CARCINOMA LUNG, LEFT (HCC): ICD-10-CM

## 2024-12-05 LAB
INR BLD: 2.8
PROTIME: NORMAL

## 2024-12-05 NOTE — PROGRESS NOTES
CLINICAL PHARMACY NOTE-- Anticoagulation     Alexandr Witt is a 59 y.o. male with PMHx significant for AVR (re-do in May, 2017), CAD s/p CABG (x3 in May, 2017), pA-fib, HLD, HTN, testicular CA who had INR called into clinic on 12/5/2024 for anticoagulation monitoring. Note: patient was also referred by Dr. Cherry for smoking cessation but patient has declined services at this time.     Anticoagulation Indication(s):  Heart Valve Replacement (bovine AVR), A-fib  Referring Physician:  Dr. Prieto   Goal INR Range:  2-3  Duration of Anticoagulation Therapy:  TBD  Time of day dose taken:  PM  Product patient has at home:  warfarin 5 mg (peach)    [id number] Score for Atrial Fibrillation Stroke Risk   Risk   Factors  Component Value   C CHF Yes 1   H HTN Yes 1   A2 Age >= 75 No,  (59 y.o.) 0   D DM Yes 1   S2 Prior Stroke/TIA No 0   V Vascular Disease Yes 1   A Age 65-74 No,  (59 y.o.) 0   Sc Sex male 0    [id number]  Score  4   Score last updated 5/30/19 1:51 PM      Lab Results   Component Value Date    RBC 2.95 (L) 04/28/2024    HGB 8.7 (L) 04/28/2024    HCT 27.6 (L) 04/28/2024    MCV 93.6 04/28/2024    MCH 29.5 04/28/2024    MPV 8.2 04/28/2024    RDW 18.4 (H) 04/28/2024     (L) 04/28/2024     INR Summary                            Warfarin regimen (mg)  Date INR   A/P    Sun Mon Tue Wed Thu Fri Sat Mg/wk  12/5 2.8 At goal, continue   5 7.5 5 5  5 7.5 5 40  11/27 4.1 Above goal, hold+dec  5 7.5 5 0/5  5 7.5 5 40  11/21 4.0 Above goal, hold+dec  5 7.5 5 7.5 0/5 7.5 5 42.5  11/14 2.6 At goal, continue   5 7.5 5 7.5 5 7.5 7.5 45  11/7 4.2 Above goal, hold+dec  5 7.5 5 7.5 5 7.5 7.5 45  10/24 3.0 At goal, continue   5 7.5 7.5 7.5 5 7.5 7.5 47.5  10/10 2.6 At goal, continue   5 7.5 7.5 7.5 5 7.5 7.5 47.5  10/3 3.5 Above goal, hold x1  5 7.5 7.5 7.5 0/5 7.5 7.5 47.5  9/26 2.8 At goal, continue    5 7.5 7.5 7.5 5 7.5 7.5 47.5  9/19 3.6 Above goal, hold x1   5 7.5 7.5 7.5 0/5 7.5 7.5 47.5  9/5 2.8 At goal, continue

## 2024-12-12 ENCOUNTER — ANTI-COAG VISIT (OUTPATIENT)
Dept: PHARMACY | Age: 59
End: 2024-12-12

## 2024-12-12 DIAGNOSIS — I48.0 PAROXYSMAL ATRIAL FIBRILLATION (HCC): Primary | ICD-10-CM

## 2024-12-12 LAB
INR BLD: 2.7
PROTIME: NORMAL

## 2024-12-12 NOTE — PROGRESS NOTES
5 7.5 7.5 7.5 0/5 7.5 7.5 47.5  9/5 2.8 At goal, continue   5 7.5 7.5 7.5 5 7.5 7.5 47.5  8/29 2.7 At goal, continue   5 7.5 7.5 7.5 5 7.5 7.5 47.5  8/22 2.6 At goal, continue   5 7.5 7.5 7.5 5 7.5 7.5 47.5  8/15 2.9 At goal, continue   5 7.5 7.5 7.5 5 7.5 7.5 47.5  8/8 2.9 At goal, continue   5 7.5 7.5 7.5 5 7.5 7.5 47.5  8/1 2.6 At goal, continue   5 7.5 7.5 7.5 5 7.5 7.5 47.5  7/29 2.2 At goal, continue   5 7.5 7.5 7.5 5 7.5 7.5 47.5  7/25 2.3 At goal, continue   5 7.5 7.5 7.5 5 7.5 7.5 47.5  7/22 2.1 At goal, continue   5 7.5 7.5 7.5 5 7.5 7.5 47.5  7/18 1.9 Below goal, continue   5 7.5 7.5 7.5 5 7.5 7.5 47.5  7/15 1.4 Below goal, bolus+inc   5 10/7.5 7.5 7.5 5 7.5 7.5 47.5  7/8 1.5 Below goal, bolus+inc  5 10/7.5 5 7.5 5 7.5 5 42.5  7/1 1.8 Below goal, increase   5 7.5 5 5 5 7.5 5 40  6/24 1.9 Below goal, continue   5 5 5 5 5 7.5 5 37.5  6/17 2.2 At goal, continue   5 5 5 5 5 7.5 5 37.5  5/31 1.9 Below, inc   5 5 5 5 5 7.5 5 37.5  *was admitted then in SNF between appts.  4/12 8.0 Above goal, hold x3  2.5 2.5 2.5 2.5 2.5 0/2.5 0/2.5 17.5  3/18 2.9 Above goal, hold+dec  2.5 0/2.5 2.5 2.5 2.5 2.5 2.5 17.5  3/4 3.7 Above goal, hold+dec  2.5 0/2.5 2.5 2.5 2.5 2.5 2.5 17.5  2/14 6.3 Above goal, hold+dec  2.5 2.5 2.5 0/5 0/2.5 2.5 2.5 20  1/31 3.7 Above goal, hold x1   2.5 5 2.5 0/5 2.5 5 2.5 25  1/24 7.0 Above goal, hold+dec  2.5 5 2.5 0/5 0/2.5 5 2.5 25  1/10 4.6 Above goal, hold+dec  2.5 5 5 0/5 0/2.5 5 5 30  12/27 2.7 At goal, continue  5 5 5 5 2.5 5 5 32.5  12/13 1.3 Below goal, bolus+increase 5 5 5 7.5/5 2.5 5 5 32.5  11/29 3.2 Above goal, decrease  5 5 2.5 5 2.5 5 5 30  11/17 2.2 At goal, continue  5 5 5 5 2.5 5 5 32.5  11/2 3.1 Above goal, decrease  5 5 5 5 2.5 5 5 32.5  10/19 2.3 At goal, continue  5 5 5 5 5 5 5 35  10/16 1.8 Below goal, bolus+increase 5 7.5/5 5 5 5 5 5 35  10/12 1.9 Below goal, continue  5 5 5 5 2.5 5 5 32.5  10/9 2.0 At goal, continue  5 5 5 5 2.5 5 5 32.5  10/5 2.1 At goal,

## 2024-12-19 ENCOUNTER — ANTI-COAG VISIT (OUTPATIENT)
Dept: PHARMACY | Age: 59
End: 2024-12-19

## 2024-12-19 DIAGNOSIS — I48.0 PAROXYSMAL ATRIAL FIBRILLATION (HCC): Primary | ICD-10-CM

## 2024-12-19 LAB
INTERNATIONAL NORMALIZATION RATIO, POC: 3.3
PROTHROMBIN TIME, POC: 0

## 2024-12-19 NOTE — PROGRESS NOTES
CLINICAL PHARMACY NOTE-- Anticoagulation     Alexandr Witt is a 59 y.o. male with PMHx significant for AVR (re-do in May, 2017), CAD s/p CABG (x3 in May, 2017), pA-fib, HLD, HTN, testicular CA who had INR called into clinic on 12/19/2024 for anticoagulation monitoring. Note: patient was also referred by Dr. Cherry for smoking cessation but patient has declined services at this time.     Anticoagulation Indication(s):  Heart Valve Replacement (bovine AVR), A-fib  Referring Physician:  Dr. Prieto   Goal INR Range:  2-3  Duration of Anticoagulation Therapy:  TBD  Time of day dose taken:  PM  Product patient has at home:  warfarin 5 mg (peach)    [id number] Score for Atrial Fibrillation Stroke Risk   Risk   Factors  Component Value   C CHF Yes 1   H HTN Yes 1   A2 Age >= 75 No,  (59 y.o.) 0   D DM Yes 1   S2 Prior Stroke/TIA No 0   V Vascular Disease Yes 1   A Age 65-74 No,  (59 y.o.) 0   Sc Sex male 0    [id number]  Score  4   Score last updated 5/30/19 1:51 PM      Lab Results   Component Value Date    RBC 2.95 (L) 04/28/2024    HGB 8.7 (L) 04/28/2024    HCT 27.6 (L) 04/28/2024    MCV 93.6 04/28/2024    MCH 29.5 04/28/2024    MPV 8.2 04/28/2024    RDW 18.4 (H) 04/28/2024     (L) 04/28/2024     INR Summary                            Warfarin regimen (mg)  Date INR   A/P    Sun Mon Tue Wed Thu Fri Sat Mg/wk  12/12 3.3 Above goal, dec x1   5 7.5 5 5  2.5/5 7.5 5 40  12/12 2.7 At goal, continue   5 7.5 5 5  5 7.5 5 40  12/5 2.8 At goal, continue   5 7.5 5 5  5 7.5 5 40  11/27 4.1 Above goal, hold+dec  5 7.5 5 0/5  5 7.5 5 40  11/21 4.0 Above goal, hold+dec  5 7.5 5 7.5 0/5 7.5 5 42.5  11/14 2.6 At goal, continue   5 7.5 5 7.5 5 7.5 7.5 45  11/7 4.2 Above goal, hold+dec  5 7.5 5 7.5 5 7.5 7.5 45  10/24 3.0 At goal, continue   5 7.5 7.5 7.5 5 7.5 7.5 47.5  10/10 2.6 At goal, continue   5 7.5 7.5 7.5 5 7.5 7.5 47.5  10/3 3.5 Above goal, hold x1  5 7.5 7.5 7.5 0/5 7.5 7.5 47.5  9/26 2.8 At goal, continue

## 2024-12-27 ENCOUNTER — ANTI-COAG VISIT (OUTPATIENT)
Dept: PHARMACY | Age: 59
End: 2024-12-27

## 2024-12-27 DIAGNOSIS — I48.0 PAROXYSMAL ATRIAL FIBRILLATION (HCC): Primary | ICD-10-CM

## 2024-12-27 LAB
INR BLD: 3.4
PROTIME: NORMAL

## 2024-12-27 NOTE — PROGRESS NOTES
x1  5 7.5 7.5 7.5 0/5 7.5 7.5 47.5  9/26 2.8 At goal, continue    5 7.5 7.5 7.5 5 7.5 7.5 47.5  9/19 3.6 Above goal, hold x1   5 7.5 7.5 7.5 0/5 7.5 7.5 47.5  9/5 2.8 At goal, continue   5 7.5 7.5 7.5 5 7.5 7.5 47.5  8/29 2.7 At goal, continue   5 7.5 7.5 7.5 5 7.5 7.5 47.5  8/22 2.6 At goal, continue   5 7.5 7.5 7.5 5 7.5 7.5 47.5  8/15 2.9 At goal, continue   5 7.5 7.5 7.5 5 7.5 7.5 47.5  8/8 2.9 At goal, continue   5 7.5 7.5 7.5 5 7.5 7.5 47.5  8/1 2.6 At goal, continue   5 7.5 7.5 7.5 5 7.5 7.5 47.5  7/29 2.2 At goal, continue   5 7.5 7.5 7.5 5 7.5 7.5 47.5  7/25 2.3 At goal, continue   5 7.5 7.5 7.5 5 7.5 7.5 47.5  7/22 2.1 At goal, continue   5 7.5 7.5 7.5 5 7.5 7.5 47.5  7/18 1.9 Below goal, continue   5 7.5 7.5 7.5 5 7.5 7.5 47.5  7/15 1.4 Below goal, bolus+inc   5 10/7.5 7.5 7.5 5 7.5 7.5 47.5  7/8 1.5 Below goal, bolus+inc  5 10/7.5 5 7.5 5 7.5 5 42.5  7/1 1.8 Below goal, increase   5 7.5 5 5 5 7.5 5 40  6/24 1.9 Below goal, continue   5 5 5 5 5 7.5 5 37.5  6/17 2.2 At goal, continue   5 5 5 5 5 7.5 5 37.5  5/31 1.9 Below, inc   5 5 5 5 5 7.5 5 37.5  *was admitted then in SNF between appts.  4/12 8.0 Above goal, hold x3  2.5 2.5 2.5 2.5 2.5 0/2.5 0/2.5 17.5  3/18 2.9 Above goal, hold+dec  2.5 0/2.5 2.5 2.5 2.5 2.5 2.5 17.5  3/4 3.7 Above goal, hold+dec  2.5 0/2.5 2.5 2.5 2.5 2.5 2.5 17.5  2/14 6.3 Above goal, hold+dec  2.5 2.5 2.5 0/5 0/2.5 2.5 2.5 20  1/31 3.7 Above goal, hold x1   2.5 5 2.5 0/5 2.5 5 2.5 25  1/24 7.0 Above goal, hold+dec  2.5 5 2.5 0/5 0/2.5 5 2.5 25  1/10 4.6 Above goal, hold+dec  2.5 5 5 0/5 0/2.5 5 5 30  12/27 2.7 At goal, continue  5 5 5 5 2.5 5 5 32.5  12/13 1.3 Below goal, bolus+increase 5 5 5 7.5/5 2.5 5 5 32.5  11/29 3.2 Above goal, decrease  5 5 2.5 5 2.5 5 5 30  11/17 2.2 At goal, continue  5 5 5 5 2.5 5 5 32.5  11/2 3.1 Above goal, decrease  5 5 5 5 2.5 5 5 32.5  10/19 2.3 At goal, continue  5 5 5 5 5 5 5 35  10/16 1.8 Below goal,

## 2025-01-03 ENCOUNTER — ANTI-COAG VISIT (OUTPATIENT)
Dept: PHARMACY | Age: 60
End: 2025-01-03

## 2025-01-03 DIAGNOSIS — I48.0 PAROXYSMAL ATRIAL FIBRILLATION (HCC): Primary | ICD-10-CM

## 2025-01-03 LAB
INR BLD: 2.5
PROTIME: NORMAL

## 2025-01-03 NOTE — PROGRESS NOTES
CLINICAL PHARMACY NOTE-- Anticoagulation     Alexandr Witt is a 59 y.o. male with PMHx significant for AVR (re-do in May, 2017), CAD s/p CABG (x3 in May, 2017), pA-fib, HLD, HTN, testicular CA who had INR called into clinic on 1/3/2025 for anticoagulation monitoring. Note: patient was also referred by Dr. Cherry for smoking cessation but patient has declined services at this time.     Anticoagulation Indication(s):  Heart Valve Replacement (bovine AVR), A-fib  Referring Physician:  Dr. Prieto   Goal INR Range:  2-3  Duration of Anticoagulation Therapy:  TBD  Time of day dose taken:  PM  Product patient has at home:  warfarin 5 mg (peach)    [id number] Score for Atrial Fibrillation Stroke Risk   Risk   Factors  Component Value   C CHF Yes 1   H HTN Yes 1   A2 Age >= 75 No,  (59 y.o.) 0   D DM Yes 1   S2 Prior Stroke/TIA No 0   V Vascular Disease Yes 1   A Age 65-74 No,  (59 y.o.) 0   Sc Sex male 0    [id number]  Score  4   Score last updated 5/30/19 1:51 PM      Lab Results   Component Value Date    RBC 2.95 (L) 04/28/2024    HGB 8.7 (L) 04/28/2024    HCT 27.6 (L) 04/28/2024    MCV 93.6 04/28/2024    MCH 29.5 04/28/2024    MPV 8.2 04/28/2024    RDW 18.4 (H) 04/28/2024     (L) 04/28/2024     INR Summary                            Warfarin regimen (mg)  Date INR   A/P    Sun Mon Tue Wed Thu Fri Sat Mg/wk  1/3 2.5 At goal, continue  5 7.5 5 5  5 5 5 37.5  12/27 3.4 Above goal, decrease  5 7.5 5 5  5 5 5 37.5  12/12 3.3 Above goal, dec x1   5 7.5 5 5  2.5/5 7.5 5 40  12/12 2.7 At goal, continue   5 7.5 5 5  5 7.5 5 40  12/5 2.8 At goal, continue   5 7.5 5 5  5 7.5 5 40  11/27 4.1 Above goal, hold+dec  5 7.5 5 0/5  5 7.5 5 40  11/21 4.0 Above goal, hold+dec  5 7.5 5 7.5 0/5 7.5 5 42.5  11/14 2.6 At goal, continue   5 7.5 5 7.5 5 7.5 7.5 45  11/7 4.2 Above goal, hold+dec  5 7.5 5 7.5 5 7.5 7.5 45  10/24 3.0 At goal, continue   5 7.5 7.5 7.5 5 7.5 7.5 47.5  10/10 2.6 At goal, continue

## 2025-01-10 ENCOUNTER — ANTI-COAG VISIT (OUTPATIENT)
Dept: PHARMACY | Age: 60
End: 2025-01-10

## 2025-01-10 DIAGNOSIS — I48.0 PAROXYSMAL ATRIAL FIBRILLATION (HCC): Primary | ICD-10-CM

## 2025-01-10 LAB
INR BLD: 6.5
PROTIME: NORMAL

## 2025-01-10 NOTE — PROGRESS NOTES
below 5 7.5 5 7.5 5 7.5 5 42.5  3/4 2.2 At goal, continue   5 7.5 5 7.5 5 7.5 5 42.5  2/1 2.1 At goal, continue   5 7.5 5 7.5 5 7.5 5 42.5  1/7 1.9 Below goal, continue   5 7.5 5 7.5 5 7.5 5 42.5  11/22 2.2 At goal, continue   5 7.5 5 7.5 5 7.5 5 42.5  11/22 2.1 At goal, continue   5 7.5 5 7.5 5 7.5 5 42.5  11/8 1.8 Below goal, increase  5 7.5 5 7.5 5 7.5 5 42.5  10/25 1.6 Below goal, bolus   7.5 7.5/5 5 5 5 5 7.5 40  10/4 2.0 At goal, continue   7.5 5 5 5 5 5 7.5 40  9/17 2.7 At goal (dosing error)  7.5 5 5 5 5 5 7.5 40  8/23 4.3 Above goal, decrease  7.5 0/5 5 5 5 5 7.5 40  8/9 3.3 Above goal, dec  7.5 5 5 5 7.5 5 5 40  7/21 1.4 Below goal, increase  5 7.5 5 7.5 5 7.5 5 42.5  6/28 2.0 At goal, no change  7.5 5 5 5 5 5 7.5 40  6/11 1.5 Below goal, increase  7.5 5 5 5 5 5 7.5 40  5/24 3.6 Above goal, hold x 1  5 0/5 5 5 5 5 5 35  4/23 2.7 At goal, no change  5 5 5 5 5 5 5 35  4/5 3.6 Above goal, hold x 1  5 0/5 5 5 5 5 5 35   3/8 3.0 At goal, no change  5 5 5 5 5 5 5 35  2/19 2.0 At goal, no change  5 5 5 5 5 5 5 35  2/4 2.7 At goal, no change  5 5 5 5 5 5 5 35  1/20 2.7 At goal, no change  5 5 5 5 5 5 5 35  1/11 4.5 Above goal (Cymbalta) 5 0/5 0/5 5 5 5 5 35  12/28 2.0 At goal, no change  7.5 5 5 5 5 5 7.5 40  11/24 2.4 At goal, no change  7.5 5 5 5 5 5 7.5 40  10/30 2.5 At goal, no change  7.5 5 5 5 5 5 7.5 40  10/2 2.2 At goal, no change  7.5 5 5 5 5 5 7.5 40  9/3 2.4 At goal, no change  7.5 5 5 5 5 5 7.5 40  8/3 2.9 At goal, no change  7.5 5 5 5 5 5 7.5 40  7/6 2.7 At goal, no change  7.5 5 5 5 5 5 7.5 40  6/8 2.8 At goal, no change  7.5 5 5 5 5 5 7.5 40  5/11 2.9 At goal, no change  7.5 5 5 5 5 5 7.5 40  3/30 3.0 At goal, no change  7.5 5 5 5 5 5 7.5 40  2/26 2.3 At goal, no change  7.5 5 5 5 5 5 7.5 40  2/5 3.3 Above goal, decrease  7.5 5 5 5 5 5 7.5 40  1/8 2.9 At goal, no change  7.5 7.5 5 5 5 5 7.5 42.5    Patient History:  Recent hospitalizations/HC visits 4/16-30: [facility] for PNA and AMS, coded 4/18;

## 2025-01-13 ENCOUNTER — ANTI-COAG VISIT (OUTPATIENT)
Dept: PHARMACY | Age: 60
End: 2025-01-13

## 2025-01-13 DIAGNOSIS — I48.0 PAROXYSMAL ATRIAL FIBRILLATION (HCC): Primary | ICD-10-CM

## 2025-01-13 LAB
INR BLD: 1.9
PROTIME: NORMAL

## 2025-01-13 NOTE — PROGRESS NOTES
incr 5 7.5 5 7.5 5 10/7.5 7.5 45  4/21 1.5 Below goal, bolus  5 7.5 5 7.5 7.5/5 7.5 5 42.5  4/7 1.2 Below goal (held) see below 5 7.5 5 7.5 5 7.5 5 42.5  3/4 2.2 At goal, continue   5 7.5 5 7.5 5 7.5 5 42.5  2/1 2.1 At goal, continue   5 7.5 5 7.5 5 7.5 5 42.5  1/7 1.9 Below goal, continue   5 7.5 5 7.5 5 7.5 5 42.5  11/22 2.2 At goal, continue   5 7.5 5 7.5 5 7.5 5 42.5  11/22 2.1 At goal, continue   5 7.5 5 7.5 5 7.5 5 42.5  11/8 1.8 Below goal, increase  5 7.5 5 7.5 5 7.5 5 42.5  10/25 1.6 Below goal, bolus   7.5 7.5/5 5 5 5 5 7.5 40  10/4 2.0 At goal, continue   7.5 5 5 5 5 5 7.5 40  9/17 2.7 At goal (dosing error)  7.5 5 5 5 5 5 7.5 40  8/23 4.3 Above goal, decrease  7.5 0/5 5 5 5 5 7.5 40  8/9 3.3 Above goal, dec  7.5 5 5 5 7.5 5 5 40  7/21 1.4 Below goal, increase  5 7.5 5 7.5 5 7.5 5 42.5  6/28 2.0 At goal, no change  7.5 5 5 5 5 5 7.5 40  6/11 1.5 Below goal, increase  7.5 5 5 5 5 5 7.5 40  5/24 3.6 Above goal, hold x 1  5 0/5 5 5 5 5 5 35  4/23 2.7 At goal, no change  5 5 5 5 5 5 5 35  4/5 3.6 Above goal, hold x 1  5 0/5 5 5 5 5 5 35   3/8 3.0 At goal, no change  5 5 5 5 5 5 5 35  2/19 2.0 At goal, no change  5 5 5 5 5 5 5 35  2/4 2.7 At goal, no change  5 5 5 5 5 5 5 35  1/20 2.7 At goal, no change  5 5 5 5 5 5 5 35  1/11 4.5 Above goal (Cymbalta) 5 0/5 0/5 5 5 5 5 35  12/28 2.0 At goal, no change  7.5 5 5 5 5 5 7.5 40  11/24 2.4 At goal, no change  7.5 5 5 5 5 5 7.5 40  10/30 2.5 At goal, no change  7.5 5 5 5 5 5 7.5 40  10/2 2.2 At goal, no change  7.5 5 5 5 5 5 7.5 40  9/3 2.4 At goal, no change  7.5 5 5 5 5 5 7.5 40  8/3 2.9 At goal, no change  7.5 5 5 5 5 5 7.5 40  7/6 2.7 At goal, no change  7.5 5 5 5 5 5 7.5 40  6/8 2.8 At goal, no change  7.5 5 5 5 5 5 7.5 40  5/11 2.9 At goal, no change  7.5 5 5 5 5 5 7.5 40  3/30 3.0 At goal, no change  7.5 5 5 5 5 5 7.5 40  2/26 2.3 At goal, no change  7.5 5 5 5 5 5 7.5 40  2/5 3.3 Above goal, decrease  7.5 5 5 5 5 5 7.5 40  1/8 2.9 At goal, no

## 2025-01-16 ENCOUNTER — ANTI-COAG VISIT (OUTPATIENT)
Dept: PHARMACY | Age: 60
End: 2025-01-16

## 2025-01-16 DIAGNOSIS — I48.0 PAROXYSMAL ATRIAL FIBRILLATION (HCC): Primary | ICD-10-CM

## 2025-01-16 LAB
INR BLD: 4.2
PROTIME: NORMAL

## 2025-01-16 NOTE — PROGRESS NOTES
CLINICAL PHARMACY NOTE-- Anticoagulation     Alexandr Witt is a 59 y.o. male with PMHx significant for AVR (re-do in May, 2017), CAD s/p CABG (x3 in May, 2017), pA-fib, HLD, HTN, testicular CA who had INR called into clinic on 1/16/2025 for anticoagulation monitoring. Note: patient was also referred by Dr. Cherry for smoking cessation but patient has declined services at this time.     Anticoagulation Indication(s):  Heart Valve Replacement (bovine AVR), A-fib  Referring Physician:  Dr. Prieto   Goal INR Range:  2-3  Duration of Anticoagulation Therapy:  TBD  Time of day dose taken:  PM  Product patient has at home:  warfarin 5 mg (peach)    [id number] Score for Atrial Fibrillation Stroke Risk   Risk   Factors  Component Value   C CHF Yes 1   H HTN Yes 1   A2 Age >= 75 No,  (59 y.o.) 0   D DM Yes 1   S2 Prior Stroke/TIA No 0   V Vascular Disease Yes 1   A Age 65-74 No,  (59 y.o.) 0   Sc Sex male 0    [id number]  Score  4   Score last updated 5/30/19 1:51 PM      Lab Results   Component Value Date    RBC 2.95 (L) 04/28/2024    HGB 8.7 (L) 04/28/2024    HCT 27.6 (L) 04/28/2024    MCV 93.6 04/28/2024    MCH 29.5 04/28/2024    MPV 8.2 04/28/2024    RDW 18.4 (H) 04/28/2024     (L) 04/28/2024     INR Summary                            Warfarin regimen (mg)  Date INR   A/P    Sun Mon Tue Wed Thu Fri Sat Mg/wk  1/16 4.2 Above goal, hold+dec  5 2.5 5 5  0/5 0/2.5 5 30  1/13 1.9 Below goal, continue   5 7.5 5 5  5 5 5 37.5  1/10 6.5 Above goal, hold   5 7.5 5 5  5 5 5 37.5  1/10 6.5 Above goal, hold   0/5 7.5 5 5  5 0/5 0/5 37.5  1/3 2.5 At goal, continue  5 7.5 5 5  5 5 5 37.5  12/27 3.4 Above goal, decrease  5 7.5 5 5  5 5 5 37.5  12/12 3.3 Above goal, dec x1   5 7.5 5 5  2.5/5 7.5 5 40  12/12 2.7 At goal, continue   5 7.5 5 5  5 7.5 5 40  12/5 2.8 At goal, continue   5 7.5 5 5  5 7.5 5 40  11/27 4.1 Above goal, hold+dec  5 7.5 5 0/5  5 7.5 5 40  11/21 4.0 Above goal,

## 2025-01-20 ENCOUNTER — ANTI-COAG VISIT (OUTPATIENT)
Dept: PHARMACY | Age: 60
End: 2025-01-20

## 2025-01-20 DIAGNOSIS — I48.0 PAROXYSMAL ATRIAL FIBRILLATION (HCC): Primary | ICD-10-CM

## 2025-01-20 LAB
INR BLD: 1.4
PROTIME: NORMAL

## 2025-01-20 NOTE — PROGRESS NOTES
all other days. Suspect that patient's INR will start to level out with previous reduction in weekly dose. Will recheck INR on Thursday. RN is able to go to patients house on Mondays and Thursdays for INR checks.    Jose R Ruiz PharmD  [facility]  Office: [phone]  Fax: [phone]  1/20/2025 3:07 PM      For Pharmacy Admin Tracking Only  Time Spent (min): 15

## 2025-01-23 ENCOUNTER — ANTI-COAG VISIT (OUTPATIENT)
Dept: PHARMACY | Age: 60
End: 2025-01-23

## 2025-01-23 DIAGNOSIS — R13.10 DYSPHAGIA, UNSPECIFIED TYPE: ICD-10-CM

## 2025-01-23 DIAGNOSIS — I48.0 PAROXYSMAL ATRIAL FIBRILLATION (HCC): Primary | ICD-10-CM

## 2025-01-23 LAB
INR BLD: 2.1
PROTIME: NORMAL

## 2025-01-23 RX ORDER — FAMOTIDINE 20 MG/1
20 TABLET, FILM COATED ORAL 2 TIMES DAILY
Qty: 60 TABLET | Refills: 0 | Status: ON HOLD | OUTPATIENT
Start: 2025-01-23 | End: 2025-03-10 | Stop reason: HOSPADM

## 2025-01-23 NOTE — PROGRESS NOTES
CLINICAL PHARMACY NOTE-- Anticoagulation     Alexandr Witt is a 59 y.o. male with PMHx significant for AVR (re-do in May, 2017), CAD s/p CABG (x3 in May, 2017), pA-fib, HLD, HTN, testicular CA who had INR called into clinic on 1/23/2025 for anticoagulation monitoring. Note: patient was also referred by Dr. Cherry for smoking cessation but patient has declined services at this time.     Anticoagulation Indication(s):  Heart Valve Replacement (bovine AVR), A-fib  Referring Physician:  Dr. Prieto   Goal INR Range:  2-3  Duration of Anticoagulation Therapy:  TBD  Time of day dose taken:  PM  Product patient has at home:  warfarin 5 mg (peach)    [id number] Score for Atrial Fibrillation Stroke Risk   Risk   Factors  Component Value   C CHF Yes 1   H HTN Yes 1   A2 Age >= 75 No,  (59 y.o.) 0   D DM Yes 1   S2 Prior Stroke/TIA No 0   V Vascular Disease Yes 1   A Age 65-74 No,  (59 y.o.) 0   Sc Sex male 0    [id number]  Score  4   Score last updated 5/30/19 1:51 PM      Lab Results   Component Value Date    RBC 2.95 (L) 04/28/2024    HGB 8.7 (L) 04/28/2024    HCT 27.6 (L) 04/28/2024    MCV 93.6 04/28/2024    MCH 29.5 04/28/2024    MPV 8.2 04/28/2024    RDW 18.4 (H) 04/28/2024     (L) 04/28/2024     INR Summary                            Warfarin regimen (mg)  Date INR   A/P    Sun Mon Tue Wed Thu Fri Sat Mg/wk  1/23 2.1 At goal, continue   5 2.5 5 5  5 2.5 5 30  1/20 1.4 Below goal, continue  5 2.5 5 5  5 2.5 5 30  1/16 4.2 Above goal, hold+dec  5 2.5 5 5  0/5 0/2.5 5 30  1/13 1.9 Below goal, continue   5 7.5 5 5  5 5 5 37.5  1/10 6.5 Above goal, hold   5 7.5 5 5  5 5 5 37.5  1/10 6.5 Above goal, hold   0/5 7.5 5 5  5 0/5 0/5 37.5  1/3 2.5 At goal, continue  5 7.5 5 5  5 5 5 37.5  12/27 3.4 Above goal, decrease  5 7.5 5 5  5 5 5 37.5  12/12 3.3 Above goal, dec x1   5 7.5 5 5  2.5/5 7.5 5 40  12/12 2.7 At goal, continue   5 7.5 5 5  5 7.5 5 40  12/5 2.8 At goal, continue   5 7.5 5 5  5 7.5 5 40  11/27 4.1 Above

## 2025-01-23 NOTE — TELEPHONE ENCOUNTER
Requested Prescriptions     Pending Prescriptions Disp Refills    famotidine (PEPCID) 20 MG tablet [Pharmacy Med Name: FAMOTIDINE 20MG T(R)] 60 tablet 0     Sig: TAKE 1 TABLET BY MOUTH 2 TIMES A DAY       Last Clinic Visit:  10/3/2024     Next Clinic Appointment:  Visit date not found

## 2025-01-23 NOTE — TELEPHONE ENCOUNTER
Refilled medications. Please call patient to let them know and have him schedule an appointment with us.

## 2025-01-27 ENCOUNTER — OFFICE VISIT (OUTPATIENT)
Dept: PULMONOLOGY | Age: 60
End: 2025-01-27
Payer: MEDICARE

## 2025-01-27 VITALS
WEIGHT: 240 LBS | SYSTOLIC BLOOD PRESSURE: 120 MMHG | DIASTOLIC BLOOD PRESSURE: 70 MMHG | OXYGEN SATURATION: 97 % | HEART RATE: 70 BPM | BODY MASS INDEX: 32.51 KG/M2 | RESPIRATION RATE: 16 BRPM | HEIGHT: 72 IN

## 2025-01-27 DIAGNOSIS — J44.9 COPD, SEVERITY TO BE DETERMINED (HCC): ICD-10-CM

## 2025-01-27 DIAGNOSIS — Z72.0 TOBACCO ABUSE DISORDER: ICD-10-CM

## 2025-01-27 DIAGNOSIS — C34.32 MALIGNANT NEOPLASM OF LOWER LOBE OF LEFT LUNG (HCC): Primary | ICD-10-CM

## 2025-01-27 PROBLEM — I49.01 VENTRICULAR FIBRILLATION: Status: RESOLVED | Noted: 2024-04-18 | Resolved: 2025-01-27

## 2025-01-27 PROBLEM — I46.9 CARDIAC ARREST: Status: RESOLVED | Noted: 2024-04-18 | Resolved: 2025-01-27

## 2025-01-27 PROCEDURE — 99406 BEHAV CHNG SMOKING 3-10 MIN: CPT | Performed by: INTERNAL MEDICINE

## 2025-01-27 PROCEDURE — 99213 OFFICE O/P EST LOW 20 MIN: CPT | Performed by: INTERNAL MEDICINE

## 2025-01-27 PROCEDURE — 3078F DIAST BP <80 MM HG: CPT | Performed by: INTERNAL MEDICINE

## 2025-01-27 PROCEDURE — 4004F PT TOBACCO SCREEN RCVD TLK: CPT | Performed by: INTERNAL MEDICINE

## 2025-01-27 PROCEDURE — G8417 CALC BMI ABV UP PARAM F/U: HCPCS | Performed by: INTERNAL MEDICINE

## 2025-01-27 PROCEDURE — 3017F COLORECTAL CA SCREEN DOC REV: CPT | Performed by: INTERNAL MEDICINE

## 2025-01-27 PROCEDURE — 3023F SPIROM DOC REV: CPT | Performed by: INTERNAL MEDICINE

## 2025-01-27 PROCEDURE — G8427 DOCREV CUR MEDS BY ELIG CLIN: HCPCS | Performed by: INTERNAL MEDICINE

## 2025-01-27 PROCEDURE — 3074F SYST BP LT 130 MM HG: CPT | Performed by: INTERNAL MEDICINE

## 2025-01-27 NOTE — PROGRESS NOTES
post-bronchodilator FEV-1 was 2.46 liters (62% of predicted), which was moderately decreased. The FVC was 3.02 liters (58% of predicted), which was decreased. Response to inhaled bronchodilators (albuterol) was not significant.     Lung volumes:  Lung volumes were tested by plethysmography. The total lung capacity was 5.62 liters (78% of predicted), which was decreased. The residual volume was 2.49 liters (100% of predicted), which was normal. The ratio of residual volume to total lung capacity (RV/TLC) was 121, which was increased. Specific airway resistance was increased.     Diffusion capacity was found to be decreased.        Interpretation:  Mild restrictive lung disease with air trapping and mildly reduced diffusion capacity.    Immunizations:   Immunization History   Administered Date(s) Administered    COVID-19, J&J, (age 18y+), IM, 0.5 mL 03/13/2021    COVID-19, PFIZER PURPLE top, DILUTE for use, (age 12 y+), 30mcg/0.3mL 01/06/2022    Pneumococcal, PPSV23, PNEUMOVAX 23, (age 2y+), SC/IM, 0.5mL 07/26/2014       Pathology:  FINAL DIAGNOSIS:     Core biopsy, left lower lobe lung lesion:   Lung/alveolar tissue with prominent acute and chronic inflammation,   extravasated blood and focal histiocytic aggregates with features of   early noncaseating granulomatous inflammation.   An AFB stain shows no evidence of acid-fast bacilli and a GMS stain shows   no evidence of pneumocystis or fungal forms.   No evidence of malignancy.     Cytology:  FINAL DIAGNOSIS:     A. Lung, left lower lobe, endoscopic ultrasound guided fine needle   aspiration:   Atypical cells present.   Atypical cell population identified against the background of reactive   bronchial cells, prominent acute inflammation and occasional histiocytes.   See case comment.     B. Lung, left lower lobe, brushing:    No malignant cells identified.   Hypocellular specimen.     C. Lung, left lower lobe, bronchial alveolar lavage:   Atypical cell population

## 2025-02-03 ENCOUNTER — ANTI-COAG VISIT (OUTPATIENT)
Dept: PHARMACY | Age: 60
End: 2025-02-03

## 2025-02-03 DIAGNOSIS — I48.0 PAROXYSMAL ATRIAL FIBRILLATION (HCC): Primary | ICD-10-CM

## 2025-02-03 LAB
INR BLD: 3
PROTIME: NORMAL

## 2025-02-03 NOTE — PROGRESS NOTES
change  7.5 5 5 5 5 5 7.5 40  5/11 2.9 At goal, no change  7.5 5 5 5 5 5 7.5 40  3/30 3.0 At goal, no change  7.5 5 5 5 5 5 7.5 40  2/26 2.3 At goal, no change  7.5 5 5 5 5 5 7.5 40  2/5 3.3 Above goal, decrease  7.5 5 5 5 5 5 7.5 40  1/8 2.9 At goal, no change  7.5 7.5 5 5 5 5 7.5 42.5    Patient History:  Recent hospitalizations/HC visits 4/16-30: [facility] for PNA and AMS, coded 4/18; discharged to SNF   9/20-9/31/23: [facility] with lightheadedness and hypotension   8/31-9/17/23: [facility] s/p lung nodule resection   4/8/22: bronchoscopy per Dr. Cherry, warfarin held x3 days (no bridge). Findings were likely infectious process, prescribed Augmentin.   2/21/22: Pulmonology, work-up underway for lung nodule    Recent medication changes 9/17/23: digoxin 250 mg daily   4/14-4/21/22: Augmentin BID x7 days    Medications taken regularly that may interact with warfarin or alter INR ASA 81 mg (increase bleeding)   Digoxin 250 mg daily (increase INR)    Warfarin dose taken as prescribed Usually compliant   Does not use pillbox  Patient has been taking warfarin since re-do AVR in May, 2017   Signs/symptoms of bleeding No h/o major bleeding   Vitamin K intake Normally has ~0 servings of green, leafy vegetables per week   Recent vomiting/diarrhea/fever, changes in weight or activity level None reported   Tobacco or alcohol use -No recent changes in smoking as of 11/24/20 (~3/4 PPD)  -Patient cut back from 2 PPD to 3/4 PPD in February (2019) when he moved in with his daughter. Decrease in smoking typically increases INR gradually.   -Patient denies any alcohol or illicit drug use    Upcoming surgeries or procedures None        Assessment/Plan:   Patient's INR was slightly supratherapeutic today per HHRN Radha. Patient denies any recent changes to medications, diet, etc. Would expect patient needs slightly more time for INR to stabilize after recent dose change.     Patient had a PET scan on 11/20 which showed recurrence of his lung cancer.

## 2025-02-10 ENCOUNTER — ANTI-COAG VISIT (OUTPATIENT)
Dept: PHARMACY | Age: 60
End: 2025-02-10

## 2025-02-10 DIAGNOSIS — I48.0 PAROXYSMAL ATRIAL FIBRILLATION (HCC): Primary | ICD-10-CM

## 2025-02-10 LAB
INR BLD: 3.1
PROTIME: NORMAL

## 2025-02-14 ENCOUNTER — APPOINTMENT (OUTPATIENT)
Dept: CT IMAGING | Age: 60
End: 2025-02-14
Payer: MEDICARE

## 2025-02-14 ENCOUNTER — HOSPITAL ENCOUNTER (EMERGENCY)
Age: 60
Discharge: HOME | End: 2025-02-14
Attending: EMERGENCY MEDICINE
Payer: MEDICARE

## 2025-02-14 ENCOUNTER — APPOINTMENT (OUTPATIENT)
Dept: GENERAL RADIOLOGY | Age: 60
End: 2025-02-14
Payer: MEDICARE

## 2025-02-14 VITALS
BODY MASS INDEX: 32.51 KG/M2 | DIASTOLIC BLOOD PRESSURE: 66 MMHG | RESPIRATION RATE: 10 BRPM | OXYGEN SATURATION: 92 % | HEART RATE: 65 BPM | HEIGHT: 72 IN | WEIGHT: 240 LBS | SYSTOLIC BLOOD PRESSURE: 105 MMHG | TEMPERATURE: 98.6 F

## 2025-02-14 DIAGNOSIS — R19.7 DIARRHEA, UNSPECIFIED TYPE: ICD-10-CM

## 2025-02-14 DIAGNOSIS — J10.1 INFLUENZA A: Primary | ICD-10-CM

## 2025-02-14 DIAGNOSIS — E87.8 HYPOCHLOREMIA: ICD-10-CM

## 2025-02-14 DIAGNOSIS — E87.1 HYPONATREMIA: ICD-10-CM

## 2025-02-14 DIAGNOSIS — R74.8 ELEVATED LIPASE: ICD-10-CM

## 2025-02-14 DIAGNOSIS — R11.0 NAUSEA: ICD-10-CM

## 2025-02-14 LAB
ALBUMIN SERPL-MCNC: 3.9 G/DL (ref 3.4–5)
ALBUMIN/GLOB SERPL: 1 {RATIO} (ref 1.1–2.2)
ALP SERPL-CCNC: 69 U/L (ref 40–129)
ALT SERPL-CCNC: 14 U/L (ref 10–40)
ANION GAP SERPL CALCULATED.3IONS-SCNC: 14 MMOL/L (ref 3–16)
AST SERPL-CCNC: 36 U/L (ref 15–37)
BASOPHILS # BLD: 0 K/UL (ref 0–0.2)
BASOPHILS NFR BLD: 0.6 %
BILIRUB SERPL-MCNC: 0.5 MG/DL (ref 0–1)
BUN SERPL-MCNC: 17 MG/DL (ref 7–20)
CALCIUM SERPL-MCNC: 8.3 MG/DL (ref 8.3–10.6)
CHLORIDE SERPL-SCNC: 96 MMOL/L (ref 99–110)
CO2 SERPL-SCNC: 21 MMOL/L (ref 21–32)
CREAT SERPL-MCNC: 1.2 MG/DL (ref 0.8–1.3)
DEPRECATED RDW RBC AUTO: 18.2 % (ref 12.4–15.4)
EOSINOPHIL # BLD: 0 K/UL (ref 0–0.6)
EOSINOPHIL NFR BLD: 0.7 %
FLUAV RNA RESP QL NAA+PROBE: DETECTED
FLUBV RNA RESP QL NAA+PROBE: NOT DETECTED
GFR SERPLBLD CREATININE-BSD FMLA CKD-EPI: 69 ML/MIN/{1.73_M2}
GLUCOSE SERPL-MCNC: 132 MG/DL (ref 70–99)
HCT VFR BLD AUTO: 42.8 % (ref 40.5–52.5)
HGB BLD-MCNC: 14.3 G/DL (ref 13.5–17.5)
INR PPP: 2.8 (ref 0.85–1.15)
LIPASE SERPL-CCNC: 153 U/L (ref 13–60)
LYMPHOCYTES # BLD: 0.4 K/UL (ref 1–5.1)
LYMPHOCYTES NFR BLD: 12.5 %
MAGNESIUM SERPL-MCNC: 2.49 MG/DL (ref 1.8–2.4)
MCH RBC QN AUTO: 28.9 PG (ref 26–34)
MCHC RBC AUTO-ENTMCNC: 33.5 G/DL (ref 31–36)
MCV RBC AUTO: 86.2 FL (ref 80–100)
MONOCYTES # BLD: 0.4 K/UL (ref 0–1.3)
MONOCYTES NFR BLD: 12.4 %
NEUTROPHILS # BLD: 2.6 K/UL (ref 1.7–7.7)
NEUTROPHILS NFR BLD: 73.8 %
NT-PROBNP SERPL-MCNC: 697 PG/ML (ref 0–124)
PHOSPHATE SERPL-MCNC: 2.7 MG/DL (ref 2.5–4.9)
PLATELET # BLD AUTO: 110 K/UL (ref 135–450)
PMV BLD AUTO: 8.3 FL (ref 5–10.5)
POTASSIUM SERPL-SCNC: 4.8 MMOL/L (ref 3.5–5.1)
PROT SERPL-MCNC: 7.8 G/DL (ref 6.4–8.2)
PROTHROMBIN TIME: 29.4 SEC (ref 11.9–14.9)
RBC # BLD AUTO: 4.97 M/UL (ref 4.2–5.9)
SARS-COV-2 RNA RESP QL NAA+PROBE: NOT DETECTED
SODIUM SERPL-SCNC: 131 MMOL/L (ref 136–145)
TROPONIN, HIGH SENSITIVITY: 14 NG/L (ref 0–22)
TROPONIN, HIGH SENSITIVITY: 23 NG/L (ref 0–22)
WBC # BLD AUTO: 3.5 K/UL (ref 4–11)

## 2025-02-14 PROCEDURE — 83880 ASSAY OF NATRIURETIC PEPTIDE: CPT

## 2025-02-14 PROCEDURE — 83690 ASSAY OF LIPASE: CPT

## 2025-02-14 PROCEDURE — 85610 PROTHROMBIN TIME: CPT

## 2025-02-14 PROCEDURE — 85025 COMPLETE CBC W/AUTO DIFF WBC: CPT

## 2025-02-14 PROCEDURE — 83735 ASSAY OF MAGNESIUM: CPT

## 2025-02-14 PROCEDURE — 96374 THER/PROPH/DIAG INJ IV PUSH: CPT

## 2025-02-14 PROCEDURE — 99285 EMERGENCY DEPT VISIT HI MDM: CPT

## 2025-02-14 PROCEDURE — 2580000003 HC RX 258: Performed by: PHYSICIAN ASSISTANT

## 2025-02-14 PROCEDURE — 6360000002 HC RX W HCPCS: Performed by: PHYSICIAN ASSISTANT

## 2025-02-14 PROCEDURE — 84484 ASSAY OF TROPONIN QUANT: CPT

## 2025-02-14 PROCEDURE — 94640 AIRWAY INHALATION TREATMENT: CPT

## 2025-02-14 PROCEDURE — 84100 ASSAY OF PHOSPHORUS: CPT

## 2025-02-14 PROCEDURE — 96375 TX/PRO/DX INJ NEW DRUG ADDON: CPT

## 2025-02-14 PROCEDURE — 93005 ELECTROCARDIOGRAM TRACING: CPT | Performed by: PHYSICIAN ASSISTANT

## 2025-02-14 PROCEDURE — 2500000003 HC RX 250 WO HCPCS: Performed by: PHYSICIAN ASSISTANT

## 2025-02-14 PROCEDURE — 80053 COMPREHEN METABOLIC PANEL: CPT

## 2025-02-14 PROCEDURE — 71045 X-RAY EXAM CHEST 1 VIEW: CPT

## 2025-02-14 PROCEDURE — 74177 CT ABD & PELVIS W/CONTRAST: CPT

## 2025-02-14 PROCEDURE — 6370000000 HC RX 637 (ALT 250 FOR IP): Performed by: PHYSICIAN ASSISTANT

## 2025-02-14 PROCEDURE — 6360000004 HC RX CONTRAST MEDICATION: Performed by: PHYSICIAN ASSISTANT

## 2025-02-14 PROCEDURE — 87636 SARSCOV2 & INF A&B AMP PRB: CPT

## 2025-02-14 RX ORDER — IOPAMIDOL 755 MG/ML
75 INJECTION, SOLUTION INTRAVASCULAR
Status: COMPLETED | OUTPATIENT
Start: 2025-02-14 | End: 2025-02-14

## 2025-02-14 RX ORDER — ALBUTEROL SULFATE 0.83 MG/ML
5 SOLUTION RESPIRATORY (INHALATION) ONCE
Status: COMPLETED | OUTPATIENT
Start: 2025-02-14 | End: 2025-02-14

## 2025-02-14 RX ORDER — ONDANSETRON 2 MG/ML
4 INJECTION INTRAMUSCULAR; INTRAVENOUS ONCE
Status: COMPLETED | OUTPATIENT
Start: 2025-02-14 | End: 2025-02-14

## 2025-02-14 RX ORDER — 0.9 % SODIUM CHLORIDE 0.9 %
1000 INTRAVENOUS SOLUTION INTRAVENOUS ONCE
Status: COMPLETED | OUTPATIENT
Start: 2025-02-14 | End: 2025-02-14

## 2025-02-14 RX ORDER — IPRATROPIUM BROMIDE AND ALBUTEROL SULFATE 2.5; .5 MG/3ML; MG/3ML
1 SOLUTION RESPIRATORY (INHALATION) ONCE
Status: COMPLETED | OUTPATIENT
Start: 2025-02-14 | End: 2025-02-14

## 2025-02-14 RX ADMIN — ONDANSETRON 4 MG: 2 INJECTION, SOLUTION INTRAMUSCULAR; INTRAVENOUS at 17:12

## 2025-02-14 RX ADMIN — IOPAMIDOL 75 ML: 755 INJECTION, SOLUTION INTRAVENOUS at 18:38

## 2025-02-14 RX ADMIN — IPRATROPIUM BROMIDE AND ALBUTEROL SULFATE 1 DOSE: .5; 3 SOLUTION RESPIRATORY (INHALATION) at 16:02

## 2025-02-14 RX ADMIN — ALBUTEROL SULFATE 5 MG: 2.5 SOLUTION RESPIRATORY (INHALATION) at 16:02

## 2025-02-14 RX ADMIN — WATER 125 MG: 1 INJECTION INTRAMUSCULAR; INTRAVENOUS; SUBCUTANEOUS at 16:11

## 2025-02-14 RX ADMIN — SODIUM CHLORIDE 1000 ML: 9 INJECTION, SOLUTION INTRAVENOUS at 16:10

## 2025-02-14 ASSESSMENT — LIFESTYLE VARIABLES
HOW MANY STANDARD DRINKS CONTAINING ALCOHOL DO YOU HAVE ON A TYPICAL DAY: PATIENT DOES NOT DRINK
HOW OFTEN DO YOU HAVE A DRINK CONTAINING ALCOHOL: NEVER

## 2025-02-14 ASSESSMENT — PAIN SCALES - GENERAL: PAINLEVEL_OUTOF10: 7

## 2025-02-14 ASSESSMENT — ENCOUNTER SYMPTOMS
DIARRHEA: 1
NAUSEA: 1
ABDOMINAL PAIN: 0
VOMITING: 0
SHORTNESS OF BREATH: 0
CHEST TIGHTNESS: 0
WHEEZING: 0
COUGH: 1

## 2025-02-14 ASSESSMENT — PAIN - FUNCTIONAL ASSESSMENT: PAIN_FUNCTIONAL_ASSESSMENT: 0-10

## 2025-02-14 ASSESSMENT — PAIN DESCRIPTION - PAIN TYPE: TYPE: CHRONIC PAIN

## 2025-02-14 NOTE — ED PROVIDER NOTES
[facility] EMERGENCY DEPARTMENT  EMERGENCY DEPARTMENT ENCOUNTER        Pt Name: Alexandr Witt  MRN: [id number]  Birthdate 1965  Date of evaluation: 2/14/2025  Provider: Anselmo Ames PA-C  PCP: Lavon Diaz DO  Note Started: 3:40 PM EST 2/14/25       I have seen and evaluated this patient with my supervising physician Janet Chicas MD.      CHIEF COMPLAINT       Chief Complaint   Patient presents with    Fatigue     Pt brought in per [facility] from home, pt reports increased weakness, fatigue, not eating or drinking, pt states he just doesn't want to eat.  Lung CA       HISTORY OF PRESENT ILLNESS: 1 or more Elements     History from : Patient    Limitations to history : None    Alexandr Witt is a 59 y.o. male with a history of hypertension, hyperlipidemia, CAD, CHF, CABG x 2, paroxysmal atrial fibrillation, chronic anticoagulation with Coumadin, aortic valve prosthesis, COPD, current tobacco abuse, diabetes and history of lung, colon and testicular cancer who presents to the emergency department today via ambulance from the [facility] with report of 5 days of generalized weakness and fatigue, decreased appetite, nausea and diarrhea.  Patient denies headache, lightheadedness, dizziness or vision changes.  He denies chest pain or shortness of breath.  He states he has a chronic cough which is unchanged.  He denies abdominal pain and has had no vomiting.        Nursing Notes were all reviewed and agreed with or any disagreements were addressed in the HPI.    REVIEW OF SYSTEMS :      Review of Systems   Constitutional:  Positive for appetite change and fatigue. Negative for chills and fever.   Respiratory:  Positive for cough. Negative for chest tightness, shortness of breath and wheezing.    Cardiovascular:  Negative for chest pain, palpitations and leg swelling.   Gastrointestinal:  Positive for diarrhea and nausea. Negative for abdominal pain and vomiting.

## 2025-02-14 NOTE — ED PROVIDER NOTES
ED Attending Attestation Note    I personally saw the patient and made/approved the management plan and take responsibility for patient management.     Briefly, 59 y.o. male presents with nausea, vomiting and diarrhea with generalized weakness and fatigue.  Patient currently lives at an independent living facility.  Patient states that he had a small volume of emesis but it seemed to be frequent.  He denies any abdominal pain.     Focused exam:   Gen: 59 y.o. male, NAD  He is chronically ill-appearing.  He is speaking full sentences without any distress.  Abdomen rotund but nondistended, soft and nontender.    Imaging:   XR CHEST PORTABLE   Final Result   Cardiomegaly. No acute process.         CT ABDOMEN PELVIS W IV CONTRAST Additional Contrast? None    (Results Pending)        The Ekg interpreted by me shows:  normal sinus rhythm with a rate of 68  Axis is   Normal  QTc is  normal  Intervals and Durations are unremarkable.      ST Segments: nonspecific changes  No significant change from prior EKG dated 9/16/24     MDM:   Patient with nausea vomiting diarrhea and noted be positive for flu, likely cause of symptoms.  However, his lipase was elevated 153, very certainly possibly reactive but we will obtain a CT scan of the abdomen pelvis to evaluate further.  This is currently pending    6:47 PM: I discussed the history, physical, and treatment plan with Dr. Carrillo. Alexandr Witt was signed out in stable condition. Please see Dr. Carrillo's note for further details, including diagnosis and disposition.      For further details of the patient's emergency department visit, please see the advanced practice provider's documentation.    Janet Chicas MD     This report has been produced using speech recognition software and may contain errors related to that system including errors in grammar, punctuation, and spelling, as well as words and phrases that may be inappropriate. If there are any questions or concerns

## 2025-02-15 ENCOUNTER — HOSPITAL ENCOUNTER (EMERGENCY)
Age: 60
Discharge: HOME | End: 2025-02-15
Attending: EMERGENCY MEDICINE
Payer: MEDICARE

## 2025-02-15 ENCOUNTER — APPOINTMENT (OUTPATIENT)
Dept: GENERAL RADIOLOGY | Age: 60
End: 2025-02-15
Payer: MEDICARE

## 2025-02-15 ENCOUNTER — APPOINTMENT (OUTPATIENT)
Dept: CT IMAGING | Age: 60
End: 2025-02-15
Payer: MEDICARE

## 2025-02-15 VITALS
WEIGHT: 221.8 LBS | OXYGEN SATURATION: 96 % | RESPIRATION RATE: 15 BRPM | DIASTOLIC BLOOD PRESSURE: 77 MMHG | HEART RATE: 68 BPM | TEMPERATURE: 97.7 F | SYSTOLIC BLOOD PRESSURE: 120 MMHG | BODY MASS INDEX: 30.08 KG/M2

## 2025-02-15 DIAGNOSIS — J10.1 INFLUENZA A: Primary | ICD-10-CM

## 2025-02-15 LAB
ALBUMIN SERPL-MCNC: 3.8 G/DL (ref 3.4–5)
ALBUMIN/GLOB SERPL: 1.1 {RATIO} (ref 1.1–2.2)
ALP SERPL-CCNC: 60 U/L (ref 40–129)
ALT SERPL-CCNC: 17 U/L (ref 10–40)
ANION GAP SERPL CALCULATED.3IONS-SCNC: 14 MMOL/L (ref 3–16)
AST SERPL-CCNC: 38 U/L (ref 15–37)
BASOPHILS # BLD: 0 K/UL (ref 0–0.2)
BASOPHILS NFR BLD: 0.8 %
BILIRUB SERPL-MCNC: 0.6 MG/DL (ref 0–1)
BUN SERPL-MCNC: 19 MG/DL (ref 7–20)
CALCIUM SERPL-MCNC: 9.2 MG/DL (ref 8.3–10.6)
CHLORIDE SERPL-SCNC: 97 MMOL/L (ref 99–110)
CO2 SERPL-SCNC: 21 MMOL/L (ref 21–32)
CREAT SERPL-MCNC: 1 MG/DL (ref 0.8–1.3)
DEPRECATED RDW RBC AUTO: 18 % (ref 12.4–15.4)
EKG ATRIAL RATE: 68 BPM
EKG DIAGNOSIS: NORMAL
EKG P AXIS: 50 DEGREES
EKG P-R INTERVAL: 174 MS
EKG Q-T INTERVAL: 418 MS
EKG QRS DURATION: 104 MS
EKG QTC CALCULATION (BAZETT): 444 MS
EKG R AXIS: 39 DEGREES
EKG T AXIS: 57 DEGREES
EKG VENTRICULAR RATE: 68 BPM
EOSINOPHIL # BLD: 0 K/UL (ref 0–0.6)
EOSINOPHIL NFR BLD: 0.1 %
GFR SERPLBLD CREATININE-BSD FMLA CKD-EPI: 85 ML/MIN/{1.73_M2}
GLUCOSE SERPL-MCNC: 155 MG/DL (ref 70–99)
HCT VFR BLD AUTO: 41.4 % (ref 40.5–52.5)
HGB BLD-MCNC: 13.5 G/DL (ref 13.5–17.5)
INR PPP: 3.87 (ref 0.85–1.15)
LYMPHOCYTES # BLD: 0.5 K/UL (ref 1–5.1)
LYMPHOCYTES NFR BLD: 12.4 %
MCH RBC QN AUTO: 28.8 PG (ref 26–34)
MCHC RBC AUTO-ENTMCNC: 32.8 G/DL (ref 31–36)
MCV RBC AUTO: 88 FL (ref 80–100)
MONOCYTES # BLD: 0.5 K/UL (ref 0–1.3)
MONOCYTES NFR BLD: 10.8 %
NEUTROPHILS # BLD: 3.4 K/UL (ref 1.7–7.7)
NEUTROPHILS NFR BLD: 75.9 %
NT-PROBNP SERPL-MCNC: 1928 PG/ML (ref 0–124)
PLATELET # BLD AUTO: 116 K/UL (ref 135–450)
PMV BLD AUTO: 8.3 FL (ref 5–10.5)
POTASSIUM SERPL-SCNC: 5 MMOL/L (ref 3.5–5.1)
PROT SERPL-MCNC: 7.3 G/DL (ref 6.4–8.2)
PROTHROMBIN TIME: 37.6 SEC (ref 11.9–14.9)
RBC # BLD AUTO: 4.7 M/UL (ref 4.2–5.9)
SODIUM SERPL-SCNC: 132 MMOL/L (ref 136–145)
TROPONIN, HIGH SENSITIVITY: 12 NG/L (ref 0–22)
TROPONIN, HIGH SENSITIVITY: 19 NG/L (ref 0–22)
WBC # BLD AUTO: 4.4 K/UL (ref 4–11)

## 2025-02-15 PROCEDURE — 93005 ELECTROCARDIOGRAM TRACING: CPT

## 2025-02-15 PROCEDURE — 70450 CT HEAD/BRAIN W/O DYE: CPT

## 2025-02-15 PROCEDURE — 96374 THER/PROPH/DIAG INJ IV PUSH: CPT

## 2025-02-15 PROCEDURE — 71260 CT THORAX DX C+: CPT

## 2025-02-15 PROCEDURE — 85610 PROTHROMBIN TIME: CPT

## 2025-02-15 PROCEDURE — 84484 ASSAY OF TROPONIN QUANT: CPT

## 2025-02-15 PROCEDURE — 6360000002 HC RX W HCPCS

## 2025-02-15 PROCEDURE — 80053 COMPREHEN METABOLIC PANEL: CPT

## 2025-02-15 PROCEDURE — 36415 COLL VENOUS BLD VENIPUNCTURE: CPT

## 2025-02-15 PROCEDURE — 99285 EMERGENCY DEPT VISIT HI MDM: CPT

## 2025-02-15 PROCEDURE — 71045 X-RAY EXAM CHEST 1 VIEW: CPT

## 2025-02-15 PROCEDURE — 6360000004 HC RX CONTRAST MEDICATION

## 2025-02-15 PROCEDURE — 93010 ELECTROCARDIOGRAM REPORT: CPT | Performed by: INTERNAL MEDICINE

## 2025-02-15 PROCEDURE — 85025 COMPLETE CBC W/AUTO DIFF WBC: CPT

## 2025-02-15 PROCEDURE — 83880 ASSAY OF NATRIURETIC PEPTIDE: CPT

## 2025-02-15 RX ORDER — FUROSEMIDE 10 MG/ML
20 INJECTION INTRAMUSCULAR; INTRAVENOUS ONCE
Status: COMPLETED | OUTPATIENT
Start: 2025-02-15 | End: 2025-02-15

## 2025-02-15 RX ORDER — IOPAMIDOL 755 MG/ML
75 INJECTION, SOLUTION INTRAVASCULAR
Status: COMPLETED | OUTPATIENT
Start: 2025-02-15 | End: 2025-02-15

## 2025-02-15 RX ADMIN — FUROSEMIDE 20 MG: 10 INJECTION, SOLUTION INTRAMUSCULAR; INTRAVENOUS at 18:49

## 2025-02-15 RX ADMIN — IOPAMIDOL 75 ML: 755 INJECTION, SOLUTION INTRAVENOUS at 16:20

## 2025-02-15 ASSESSMENT — PAIN - FUNCTIONAL ASSESSMENT: PAIN_FUNCTIONAL_ASSESSMENT: 0-10

## 2025-02-15 ASSESSMENT — ENCOUNTER SYMPTOMS
NAUSEA: 1
ABDOMINAL PAIN: 0
DIARRHEA: 0
SHORTNESS OF BREATH: 1
VOMITING: 1
COUGH: 1

## 2025-02-15 ASSESSMENT — PAIN DESCRIPTION - DESCRIPTORS: DESCRIPTORS: ACHING

## 2025-02-15 ASSESSMENT — LIFESTYLE VARIABLES: HOW OFTEN DO YOU HAVE A DRINK CONTAINING ALCOHOL: NEVER

## 2025-02-15 ASSESSMENT — PAIN DESCRIPTION - LOCATION: LOCATION: GENERALIZED

## 2025-02-15 ASSESSMENT — PAIN SCALES - GENERAL: PAINLEVEL_OUTOF10: 9

## 2025-02-15 NOTE — ED PROVIDER NOTES
ED Attending Attestation Note     Date of evaluation: 2/15/2025    This patient was seen by the advance practice provider.  I have seen and examined the patient, agree with the workup, evaluation, management and diagnosis. The care plan has been discussed.  I have reviewed the ECG and concur with the ANGEL's interpretation.  My assessment reveals a man who is awake and alert, nontoxic in appearance.  No increased work of breathing while at rest.     Sam Pugh MD  02/15/25 [id number]

## 2025-02-15 NOTE — ED PROVIDER NOTES
THE [facility]  EMERGENCY DEPARTMENT ENCOUNTER          PHYSICIAN ASSISTANT NOTE       Date of evaluation: 2/15/2025    Chief Complaint     Shortness of Breath (Pt presents for SOB x 2 days. Expresses being seen in the ED yesterday and diagnosed with the Flu. States he has been getting radiation over the past few weeks. )      History of Present Illness     Alexandr Witt is a 59 y.o. male who presents with shortness of breath.  He states he was diagnosed with the flu yesterday, went home and feels like he has gotten worse.  He feels like he is not moving much air.  He has a history of lung cancer and recently finished radiation therapy.  He also endorses nausea and vomiting with occasional dry heaving.  He denies any abdominal pain.  Denies any chest pain.  Denies any dizziness or lightheadedness.  Denies any numbness or tingling.  Daughter at bedside also feels like he is more disoriented than usual and is forgetting to take some of his medications.  He also states that last night he was using a bathroom and when he got up, indexing he knows he woke up on the floor.  He does endorse being on blood thinning medication.    ASSESSMENT / PLAN  (MEDICAL DECISION MAKING)     INITIAL VITALS: BP: 130/89, Temp: 97.7 °F (36.5 °C), Pulse: 84, Respirations: 23, SpO2: 98 %    Alexandr Witt is a 59 y.o. male who is presenting with shortness of breath..  On exam he appears like he does not feel well but is in no acute distress.  His vital signs were stable throughout this visit.  Abdominal exam is benign with no tenderness or guarding.  Heart is regular rate and rhythm.  Lungs CTAB with no rales rhonchi or wheezes.  His neuroexam was benign with no focal neurologic deficits.  CBC, CMP, BNP, PT/INR, serial troponins, EKG, chest x-ray, CT PA, CT head without contrast ordered and evaluations patient.  CBC unremarkable.  CMP remarkable for sodium 132, chloride 97.  Initial troponin was 19, subsequently serial troponin was 12.   his sister; Cancer in his father and mother; Liver Cancer in his brother.  He reports that he has been smoking cigarettes. He started smoking about 46 years ago. He has a 45 pack-year smoking history. He has never used smokeless tobacco. He reports that he does not drink alcohol and does not use drugs.    Medications     Discharge Medication List as of 2/15/2025  7:31 PM        CONTINUE these medications which have NOT CHANGED    Details   famotidine (PEPCID) 20 MG tablet TAKE 1 TABLET BY MOUTH 2 TIMES A DAY, Disp-60 tablet, R-0Normal      amLODIPine (NORVASC) 10 MG tablet TAKE 1 TABLET (10MG) BY MOUTH EVERY DAY, Disp-30 tablet, R-0Normal      methylPREDNISolone (MEDROL, LUIS MIGUEL,) 4 MG tablet Take by mouth., Disp-1 kit, R-0Normal      sacubitril-valsartan (ENTRESTO) 24-26 MG per tablet Take 1 tablet by mouth 2 times daily, Disp-60 tablet, R-5Normal      citalopram (CELEXA) 40 MG tablet TAKE 1 TABLET BY MOUTH EVERY DAY, Disp-30 tablet, R-0Normal      morphine (MSIR) 15 MG tablet Take 1 tablet by mouth as needed for Pain (every 5.5 hours).Historical Med      pregabalin (LYRICA) 150 MG capsule Take 1 capsule by mouth daily.Historical Med      warfarin (COUMADIN) 5 MG tablet TAKE 1 TO 1 AND 1/2 TABLETS BY MOUTH ONCE A DAY AS DIRECTED BY ANTICOAGULATION-CLINIC, Disp-135 tablet, R-3Normal      carvedilol (COREG) 25 MG tablet Take 1 tablet by mouth 2 times daily (with meals), Disp-180 tablet, R-3Normal      metFORMIN (GLUCOPHAGE) 1000 MG tablet Take 1 tablet by mouth 2 times daily (with meals), Disp-180 tablet, R-1Normal      oxyCODONE-Acetaminophen (PERCOCET PO) Take by mouth Indications: Pain ManagementHistorical Med      rosuvastatin (CRESTOR) 20 MG tablet - Take one tablets nightly, Disp-90 tablet, R-3Normal      Docusate Sodium (COLACE PO) Take by mouthHistorical Med      melatonin 5 MG TBDP disintegrating tablet Take 1 tablet by mouth nightly, Disp-30 tablet, R-0Normal      potassium chloride (KLOR-CON M) 20 MEQ extended

## 2025-02-15 NOTE — DISCHARGE INSTRUCTIONS
You were seen in evaluation of your shortness of breath today.  your lab work was all reassuring.  Your chest x-ray did not show any evidence of pneumonia.  I am writing a prescription for Zofran to take as needed for nausea.  You are treated with a dose of diuretic.  As we discussed please return the ER if you start to get worse, develop chest pain, shortness of breath, leg swelling, or any other concerning symptoms including also discussed.  Please hold you warfarin until Monday when you see your anticoagulation nurse.  Please make sure you are staying hydrated as influenza is a viral illness you need to make sure you are eating and drinking plenty.  I want you to follow-up with your primary care doctor in the next couple days as well.

## 2025-02-15 NOTE — ED NOTES
Pt ambulated in mirza with minimal assistance. Pt SpO2 was 96% while ambulating and pulse was 84 BPM. MD notified.     Giovanni Chow RN  02/15/25 1575

## 2025-02-16 LAB
EKG ATRIAL RATE: 72 BPM
EKG DIAGNOSIS: NORMAL
EKG P AXIS: 66 DEGREES
EKG P-R INTERVAL: 178 MS
EKG Q-T INTERVAL: 422 MS
EKG QRS DURATION: 106 MS
EKG QTC CALCULATION (BAZETT): 462 MS
EKG R AXIS: 57 DEGREES
EKG T AXIS: 59 DEGREES
EKG VENTRICULAR RATE: 72 BPM

## 2025-02-16 NOTE — DISCHARGE INSTR - COC
Continuity of Care Form    Patient Name: Alexandr Witt   :  1965  MRN:  [id number]    Admit date:  2/15/2025  Discharge date:  ***    Code Status Order: Prior   Advance Directives:   Advance Care Flowsheet Documentation             Admitting Physician:  No admitting provider for patient encounter.  PCP: Lavon Diaz DO    Discharging Nurse: ***  Discharging Hospital Unit/Room#: [facility]/NONE  Discharging Unit Phone Number: ***    Emergency Contact:   Extended Emergency Contact Information  Primary Emergency Contact: ShilovalerioCharity  [place]  Home Phone: [phone]  Mobile Phone: [phone]  Relation: Child  Secondary Emergency Contact: shola witt  Home Phone: [phone]  Mobile Phone: [phone]  Relation: Brother/Sister    Past Surgical History:  Past Surgical History:   Procedure Laterality Date    AORTA SURGERY  2004    Dr. Ramírez Wright - primary repair of limited ascending aortic dissection    AORTIC VALVE REPLACEMENT  2017    Dr. Zhou - redo w/25mm Castelan Theon ThermaFix model 3300 bovine pericardial bioprosthesis    AORTIC VALVE SURGERY  2004    Dr. Ramírez Wright - 27mm Kelton-Castelan pericardial prosthesis     BACK SURGERY      L4-S1    BRONCHOSCOPY N/A 2022    ROBOTIC NAVIGATIONAL BRONCHOSCOPY FOR TRANSBRONCHIAL LUNG BIOPSY WITH FLOURO performed by Justin Cherry MD at [facility] ENDOSCOPY    BRONCHOSCOPY  2022    BRONCHOSCOPY ALVEOLAR LAVAGE performed by Justin Cherry MD at [facility] ENDOSCOPY    CARDIAC CATHETERIZATION  2017    Dr. Daniels    CARDIAC CATHETERIZATION  2004    Dr. Daniels    CARPAL TUNNEL RELEASE Right 2015    Dr. Miller    COLECTOMY  10/10/2016    Dr. Young - w/laparascopic lysis of extensive adhesions, open transverse colon resection, excision of old abd mesh adhering to colon    CORONARY ANGIOPLASTY WITH STENT PLACEMENT  2014    CORONARY ARTERY BYPASS GRAFT  2004    Dr. Ramírez Wright - x2 (LIMA-LAD, SVG

## 2025-02-17 ENCOUNTER — ANTI-COAG VISIT (OUTPATIENT)
Dept: PHARMACY | Age: 60
End: 2025-02-17

## 2025-02-17 DIAGNOSIS — I48.0 PAROXYSMAL ATRIAL FIBRILLATION (HCC): Primary | ICD-10-CM

## 2025-02-17 LAB
INR BLD: 5.1
PROTIME: NORMAL

## 2025-02-17 NOTE — PROGRESS NOTES
thinks he missed his warfarin several days last week due to illness, therefore was surprised INR was this elevated today. He is starting to feel better, but the past two weeks to him are a blur, he is very confused.     Patient had a PET scan on 11/20 which showed recurrence of his lung cancer. He received radiation from 1/2-1/23. Per Dr. Cherry, he is not a candidate for surgery.    Patient was instructed to hold his warfarin until INR can be rechecked by HHRN on Thursday. RN is able to go to patients house on Mondays and Thursdays for INR checks.    Bethany Hernandez, PharmD, [facility]  [facility]  [place]: [phone]  [place]uca: [phone]  2/17/2025 3:06 PM    For Pharmacy Admin Tracking Only    Intervention Detail: Dose Adjustment: 1, reason: Therapy De-escalation  Total # of Interventions Recommended: 1  Total # of Interventions Accepted: 1  Time Spent (min): 20

## 2025-02-20 ENCOUNTER — ANTI-COAG VISIT (OUTPATIENT)
Dept: PHARMACY | Age: 60
End: 2025-02-20

## 2025-02-20 DIAGNOSIS — I48.0 PAROXYSMAL ATRIAL FIBRILLATION (HCC): Primary | ICD-10-CM

## 2025-02-20 LAB
INR BLD: 3.7
PROTIME: NORMAL

## 2025-02-20 NOTE — PROGRESS NOTES
CLINICAL PHARMACY NOTE-- Anticoagulation     Alexandr Witt is a 59 y.o. male with PMHx significant for AVR (re-do in May, 2017), CAD s/p CABG (x3 in May, 2017), pA-fib, HLD, HTN, testicular CA who had INR called into clinic on 2/20/2025 for anticoagulation monitoring. Note: patient was also referred by Dr. Cherry for smoking cessation but patient has declined services at this time.     Anticoagulation Indication(s):  Heart Valve Replacement (bovine AVR), A-fib  Referring Physician:  Dr. Prieto   Goal INR Range:  2-3  Duration of Anticoagulation Therapy:  TBD  Time of day dose taken:  PM  Product patient has at home:  warfarin 5 mg (peach)    [id number] Score for Atrial Fibrillation Stroke Risk   Risk   Factors  Component Value   C CHF Yes 1   H HTN Yes 1   A2 Age >= 75 No,  (59 y.o.) 0   D DM Yes 1   S2 Prior Stroke/TIA No 0   V Vascular Disease Yes 1   A Age 65-74 No,  (59 y.o.) 0   Sc Sex male 0    [id number]  Score  4   Score last updated 5/30/19 1:51 PM      Lab Results   Component Value Date    RBC 4.70 02/15/2025    HGB 13.5 02/15/2025    HCT 41.4 02/15/2025    MCV 88.0 02/15/2025    MCH 28.8 02/15/2025    MPV 8.3 02/15/2025    RDW 18.0 (H) 02/15/2025     (L) 02/15/2025     INR Summary                            Warfarin regimen (mg)  Date INR   A/P    Sun Mon Tue Wed Thu Fri Sat Mg/wk  2/20 3.7 Above goal, holdx2  2.5 INR   0 0 2.5  2/17 5.1 Above goal, hold   0 0 0  INR   2/10 3.1 Above goal, decrease  5 2.5 5 2.5  5 2.5 2.5 25  2/3 3.1 Above goal, continue  5 2.5 5 2.5  5 2.5 5 27.5  1/27 3.1 Above goal, decrease  5 2.5 5 2.5  5 2.5 5 27.5  1/23 2.1 At goal, continue   5 2.5 5 5  5 2.5 5 30  1/20 1.4 Below goal, continue  5 2.5 5 5  5 2.5 5 30  1/16 4.2 Above goal, hold+dec  5 2.5 5 5  0/5 0/2.5 5 30  1/13 1.9 Below goal, continue   5 7.5 5 5  5 5 5 37.5  1/10 6.5 Above goal, hold   5 7.5 5 5  5 5 5 37.5  1/10 6.5 Above goal, hold   0/5 7.5 5 5  5 0/5 0/5 37.5  1/3 2.5 At goal,

## 2025-02-27 ENCOUNTER — HOSPITAL ENCOUNTER (INPATIENT)
Age: 60
LOS: 11 days | Discharge: HOME HEALTH | DRG: 689 | End: 2025-03-10
Attending: EMERGENCY MEDICINE | Admitting: INTERNAL MEDICINE
Payer: MEDICARE

## 2025-02-27 ENCOUNTER — APPOINTMENT (OUTPATIENT)
Dept: GENERAL RADIOLOGY | Age: 60
DRG: 689 | End: 2025-02-27
Payer: MEDICARE

## 2025-02-27 ENCOUNTER — APPOINTMENT (OUTPATIENT)
Dept: CT IMAGING | Age: 60
DRG: 689 | End: 2025-02-27
Payer: MEDICARE

## 2025-02-27 ENCOUNTER — ANTI-COAG VISIT (OUTPATIENT)
Dept: PHARMACY | Age: 60
End: 2025-02-27

## 2025-02-27 DIAGNOSIS — R41.82 ALTERED MENTAL STATUS, UNSPECIFIED ALTERED MENTAL STATUS TYPE: Primary | ICD-10-CM

## 2025-02-27 DIAGNOSIS — I46.9 CARDIAC ARREST (HCC): ICD-10-CM

## 2025-02-27 DIAGNOSIS — I48.0 PAROXYSMAL ATRIAL FIBRILLATION (HCC): Primary | ICD-10-CM

## 2025-02-27 DIAGNOSIS — I10 ESSENTIAL HYPERTENSION: ICD-10-CM

## 2025-02-27 PROBLEM — G93.41 METABOLIC ENCEPHALOPATHY: Status: ACTIVE | Noted: 2025-02-27

## 2025-02-27 PROBLEM — G93.40 ENCEPHALOPATHY ACUTE: Status: ACTIVE | Noted: 2025-02-27

## 2025-02-27 LAB
ALBUMIN SERPL-MCNC: 3.7 G/DL (ref 3.4–5)
ALP SERPL-CCNC: 69 U/L (ref 40–129)
ALT SERPL-CCNC: 7 U/L (ref 10–40)
AMMONIA PLAS-SCNC: 11 UMOL/L (ref 16–60)
ANION GAP SERPL CALCULATED.3IONS-SCNC: 14 MMOL/L (ref 3–16)
AST SERPL-CCNC: 22 U/L (ref 15–37)
BASOPHILS # BLD: 0 K/UL (ref 0–0.2)
BASOPHILS NFR BLD: 0.5 %
BILIRUB DIRECT SERPL-MCNC: 0.2 MG/DL (ref 0–0.3)
BILIRUB INDIRECT SERPL-MCNC: 0.7 MG/DL (ref 0–1)
BILIRUB SERPL-MCNC: 0.9 MG/DL (ref 0–1)
BUN SERPL-MCNC: 17 MG/DL (ref 7–20)
CALCIUM SERPL-MCNC: 9.2 MG/DL (ref 8.3–10.6)
CHLORIDE SERPL-SCNC: 97 MMOL/L (ref 99–110)
CO2 SERPL-SCNC: 21 MMOL/L (ref 21–32)
CREAT SERPL-MCNC: 1.3 MG/DL (ref 0.8–1.3)
DEPRECATED RDW RBC AUTO: 18.3 % (ref 12.4–15.4)
EOSINOPHIL # BLD: 0.1 K/UL (ref 0–0.6)
EOSINOPHIL NFR BLD: 1.2 %
ETHANOLAMINE SERPL-MCNC: NORMAL MG/DL (ref 0–0.08)
GFR SERPLBLD CREATININE-BSD FMLA CKD-EPI: 62 ML/MIN/{1.73_M2}
GLUCOSE SERPL-MCNC: 135 MG/DL (ref 70–99)
HCT VFR BLD AUTO: 41.7 % (ref 40.5–52.5)
HGB BLD-MCNC: 13.9 G/DL (ref 13.5–17.5)
INR BLD: 4.1
INR PPP: 2.88 (ref 0.85–1.15)
LACTATE BLDV-SCNC: 1.1 MMOL/L (ref 0.4–1.9)
LACTATE BLDV-SCNC: 1.5 MMOL/L (ref 0.4–1.9)
LIPASE SERPL-CCNC: 30 U/L (ref 13–60)
LYMPHOCYTES # BLD: 0.7 K/UL (ref 1–5.1)
LYMPHOCYTES NFR BLD: 10.4 %
MCH RBC QN AUTO: 29.1 PG (ref 26–34)
MCHC RBC AUTO-ENTMCNC: 33.4 G/DL (ref 31–36)
MCV RBC AUTO: 87 FL (ref 80–100)
MONOCYTES # BLD: 0.7 K/UL (ref 0–1.3)
MONOCYTES NFR BLD: 9.2 %
NEUTROPHILS # BLD: 5.6 K/UL (ref 1.7–7.7)
NEUTROPHILS NFR BLD: 78.7 %
PLATELET # BLD AUTO: 142 K/UL (ref 135–450)
PMV BLD AUTO: 8.9 FL (ref 5–10.5)
POTASSIUM SERPL-SCNC: 3.8 MMOL/L (ref 3.5–5.1)
PROT SERPL-MCNC: 7.6 G/DL (ref 6.4–8.2)
PROTHROMBIN TIME: 30.1 SEC (ref 11.9–14.9)
PROTIME: NORMAL
RBC # BLD AUTO: 4.79 M/UL (ref 4.2–5.9)
SODIUM SERPL-SCNC: 132 MMOL/L (ref 136–145)
TROPONIN, HIGH SENSITIVITY: 21 NG/L (ref 0–22)
TROPONIN, HIGH SENSITIVITY: 22 NG/L (ref 0–22)
WBC # BLD AUTO: 7.1 K/UL (ref 4–11)

## 2025-02-27 PROCEDURE — 80307 DRUG TEST PRSMV CHEM ANLYZR: CPT

## 2025-02-27 PROCEDURE — 2580000003 HC RX 258

## 2025-02-27 PROCEDURE — 80076 HEPATIC FUNCTION PANEL: CPT

## 2025-02-27 PROCEDURE — 71046 X-RAY EXAM CHEST 2 VIEWS: CPT

## 2025-02-27 PROCEDURE — 93005 ELECTROCARDIOGRAM TRACING: CPT | Performed by: EMERGENCY MEDICINE

## 2025-02-27 PROCEDURE — 85610 PROTHROMBIN TIME: CPT

## 2025-02-27 PROCEDURE — 70450 CT HEAD/BRAIN W/O DYE: CPT

## 2025-02-27 PROCEDURE — 83605 ASSAY OF LACTIC ACID: CPT

## 2025-02-27 PROCEDURE — 80048 BASIC METABOLIC PNL TOTAL CA: CPT

## 2025-02-27 PROCEDURE — 1200000000 HC SEMI PRIVATE

## 2025-02-27 PROCEDURE — 99285 EMERGENCY DEPT VISIT HI MDM: CPT

## 2025-02-27 PROCEDURE — 82140 ASSAY OF AMMONIA: CPT

## 2025-02-27 PROCEDURE — 84484 ASSAY OF TROPONIN QUANT: CPT

## 2025-02-27 PROCEDURE — 2500000003 HC RX 250 WO HCPCS

## 2025-02-27 PROCEDURE — 82077 ASSAY SPEC XCP UR&BREATH IA: CPT

## 2025-02-27 PROCEDURE — 83690 ASSAY OF LIPASE: CPT

## 2025-02-27 PROCEDURE — 36415 COLL VENOUS BLD VENIPUNCTURE: CPT

## 2025-02-27 PROCEDURE — 85025 COMPLETE CBC W/AUTO DIFF WBC: CPT

## 2025-02-27 RX ORDER — SODIUM CHLORIDE 9 MG/ML
INJECTION, SOLUTION INTRAVENOUS PRN
Status: DISCONTINUED | OUTPATIENT
Start: 2025-02-27 | End: 2025-03-10 | Stop reason: HOSPADM

## 2025-02-27 RX ORDER — SODIUM CHLORIDE 0.9 % (FLUSH) 0.9 %
5-40 SYRINGE (ML) INJECTION PRN
Status: DISCONTINUED | OUTPATIENT
Start: 2025-02-27 | End: 2025-03-10 | Stop reason: HOSPADM

## 2025-02-27 RX ORDER — ONDANSETRON 2 MG/ML
4 INJECTION INTRAMUSCULAR; INTRAVENOUS EVERY 6 HOURS PRN
Status: DISCONTINUED | OUTPATIENT
Start: 2025-02-27 | End: 2025-03-10 | Stop reason: HOSPADM

## 2025-02-27 RX ORDER — POLYETHYLENE GLYCOL 3350 17 G/17G
17 POWDER, FOR SOLUTION ORAL DAILY PRN
Status: DISCONTINUED | OUTPATIENT
Start: 2025-02-27 | End: 2025-03-10 | Stop reason: HOSPADM

## 2025-02-27 RX ORDER — ACETAMINOPHEN 325 MG/1
650 TABLET ORAL EVERY 6 HOURS PRN
Status: DISCONTINUED | OUTPATIENT
Start: 2025-02-27 | End: 2025-03-10 | Stop reason: HOSPADM

## 2025-02-27 RX ORDER — SODIUM CHLORIDE 9 MG/ML
INJECTION, SOLUTION INTRAVENOUS CONTINUOUS
Status: ACTIVE | OUTPATIENT
Start: 2025-02-27 | End: 2025-02-28

## 2025-02-27 RX ORDER — ACETAMINOPHEN 650 MG/1
650 SUPPOSITORY RECTAL EVERY 6 HOURS PRN
Status: DISCONTINUED | OUTPATIENT
Start: 2025-02-27 | End: 2025-03-10 | Stop reason: HOSPADM

## 2025-02-27 RX ORDER — SODIUM CHLORIDE 0.9 % (FLUSH) 0.9 %
5-40 SYRINGE (ML) INJECTION EVERY 12 HOURS SCHEDULED
Status: DISCONTINUED | OUTPATIENT
Start: 2025-02-27 | End: 2025-03-10 | Stop reason: HOSPADM

## 2025-02-27 RX ORDER — ONDANSETRON 4 MG/1
4 TABLET, ORALLY DISINTEGRATING ORAL EVERY 8 HOURS PRN
Status: DISCONTINUED | OUTPATIENT
Start: 2025-02-27 | End: 2025-03-10 | Stop reason: HOSPADM

## 2025-02-27 RX ORDER — ENOXAPARIN SODIUM 100 MG/ML
40 INJECTION SUBCUTANEOUS DAILY
Status: DISCONTINUED | OUTPATIENT
Start: 2025-02-28 | End: 2025-02-28

## 2025-02-27 RX ADMIN — SODIUM CHLORIDE: 9 INJECTION, SOLUTION INTRAVENOUS at 23:40

## 2025-02-27 RX ADMIN — SODIUM CHLORIDE, PRESERVATIVE FREE 10 ML: 5 INJECTION INTRAVENOUS at 23:38

## 2025-02-27 ASSESSMENT — PAIN SCALES - GENERAL: PAINLEVEL_OUTOF10: 4

## 2025-02-27 ASSESSMENT — PAIN - FUNCTIONAL ASSESSMENT: PAIN_FUNCTIONAL_ASSESSMENT: 0-10

## 2025-02-27 NOTE — PROGRESS NOTES
daughter. Decrease in smoking typically increases INR gradually.   -Patient denies any alcohol or illicit drug use    Upcoming surgeries or procedures None        Assessment/Plan:   Patient's INR 4.1 was supratherapeutic today per RN  [phone].  RN states pt told her is isn't taking any of his meds, but his INR went up since just MON?    Patient was instructed to hold x 2 days then take  2.5 mg Sat and Sun,  recheck INR MON.    Pt may need a lower strength tablet if his dose requires lowering.   RN is able to go to patients house on  and  for INR checks.      Mariangel Frazier. D.    [facility]  [place]: [phone]  [place]uca: [phone]  2025 1:01 PM    For Pharmacy Admin Tracking Only    Intervention Detail: Adherence Monitorin  Total # of Interventions Recommended: 1  Total # of Interventions Accepted: 1  Time Spent (min): 15

## 2025-02-27 NOTE — ED PROVIDER NOTES
THE [facility]  EMERGENCY DEPARTMENT ENCOUNTER          ATTENDING PHYSICIAN NOTE       Date of evaluation: 2/27/2025    Chief Complaint     Altered Mental Status (Patient arrived from home after home nurse came today and suggested that he come, [facility] pt (lung cancer), stated that he is only oriented to person and place, nausea, and failure to thrive x 1 week. )      History of Present Illness     Alexandr Witt is a 59 y.o. male with a tree of lung cancer status post radiation who presents with increasing confusion.  He was seen by his home health nurse today who thought he should come to the hospital because he is oriented only to person and place and has not been eating or drinking very much for the last week.  He apparently has been experiencing some nausea and, increased irritability, nonproductive cough, runny nose, dizziness, and recently has not been compliant with medications.  When he asked the patient, he denies any complaints whatsoever.  He is denying any symptoms including fever, cough, shortness of breath, or abdominal pain.  The patient was diagnosed with influenza A on the 14th of this month.    ASSESSMENT / PLAN  (MEDICAL DECISION MAKING)     INITIAL VITALS: BP: (!) 183/133, Temp: 97.6 °F (36.4 °C), Pulse: 88, Respirations: 18, SpO2: 97 %      Alexandr Witt is a 59 y.o. male with a history of lung cancer status post radiation treatment sent in by his home health nurse for increasing confusion, failure to comply with taking his medications on a regular basis, and poor p.o. intake.  This has been present for the last week or more after being diagnosed with influenza A on the 14th.  Here the patient is alert and oriented only to person and place but does not seem to be aware of the reason he is here or what the date is at this time.  He is otherwise nonfocal on examination and has no complaints.  Evaluation revealed a normal CBC, normal lactic acid, slightly low sodium of 132 but normal kidney

## 2025-02-28 LAB
ALBUMIN SERPL-MCNC: 3.4 G/DL (ref 3.4–5)
AMPHETAMINES UR QL SCN>1000 NG/ML: ABNORMAL
ANION GAP SERPL CALCULATED.3IONS-SCNC: 14 MMOL/L (ref 3–16)
BACTERIA URNS QL MICRO: ABNORMAL /HPF
BARBITURATES UR QL SCN>200 NG/ML: ABNORMAL
BASOPHILS # BLD: 0 K/UL (ref 0–0.2)
BASOPHILS NFR BLD: 1 %
BENZODIAZ UR QL SCN>200 NG/ML: ABNORMAL
BILIRUB UR QL STRIP.AUTO: ABNORMAL
BUN SERPL-MCNC: 17 MG/DL (ref 7–20)
CALCIUM SERPL-MCNC: 9 MG/DL (ref 8.3–10.6)
CANNABINOIDS UR QL SCN>50 NG/ML: ABNORMAL
CHLORIDE SERPL-SCNC: 98 MMOL/L (ref 99–110)
CLARITY UR: CLEAR
CO2 SERPL-SCNC: 21 MMOL/L (ref 21–32)
COCAINE UR QL SCN: ABNORMAL
COLOR UR: YELLOW
CREAT SERPL-MCNC: 1.2 MG/DL (ref 0.8–1.3)
DEPRECATED RDW RBC AUTO: 17.9 % (ref 12.4–15.4)
DRUG SCREEN COMMENT UR-IMP: ABNORMAL
EOSINOPHIL # BLD: 0.1 K/UL (ref 0–0.6)
EOSINOPHIL NFR BLD: 1.3 %
FENTANYL SCREEN, URINE: ABNORMAL
GFR SERPLBLD CREATININE-BSD FMLA CKD-EPI: 69 ML/MIN/{1.73_M2}
GLUCOSE BLD-MCNC: 118 MG/DL (ref 70–99)
GLUCOSE BLD-MCNC: 137 MG/DL (ref 70–99)
GLUCOSE BLD-MCNC: 137 MG/DL (ref 70–99)
GLUCOSE BLD-MCNC: 151 MG/DL (ref 70–99)
GLUCOSE SERPL-MCNC: 122 MG/DL (ref 70–99)
GLUCOSE UR STRIP.AUTO-MCNC: NEGATIVE MG/DL
HCT VFR BLD AUTO: 39.7 % (ref 40.5–52.5)
HGB BLD-MCNC: 12.7 G/DL (ref 13.5–17.5)
HGB UR QL STRIP.AUTO: NEGATIVE
INR PPP: 3.01 (ref 0.85–1.15)
KETONES UR STRIP.AUTO-MCNC: ABNORMAL MG/DL
LEUKOCYTE ESTERASE UR QL STRIP.AUTO: ABNORMAL
LYMPHOCYTES # BLD: 0.5 K/UL (ref 1–5.1)
LYMPHOCYTES NFR BLD: 9.6 %
MAGNESIUM SERPL-MCNC: 2.05 MG/DL (ref 1.8–2.4)
MCH RBC QN AUTO: 28.6 PG (ref 26–34)
MCHC RBC AUTO-ENTMCNC: 32 G/DL (ref 31–36)
MCV RBC AUTO: 89.5 FL (ref 80–100)
METHADONE UR QL SCN>300 NG/ML: ABNORMAL
MONOCYTES # BLD: 0.4 K/UL (ref 0–1.3)
MONOCYTES NFR BLD: 8.8 %
MUCOUS THREADS #/AREA URNS LPF: ABNORMAL /LPF
NEUTROPHILS # BLD: 4 K/UL (ref 1.7–7.7)
NEUTROPHILS NFR BLD: 79.3 %
NITRITE UR QL STRIP.AUTO: POSITIVE
OPIATES UR QL SCN>300 NG/ML: POSITIVE
OXYCODONE UR QL SCN: ABNORMAL
PCP UR QL SCN>25 NG/ML: ABNORMAL
PERFORMED ON: ABNORMAL
PH UR STRIP.AUTO: 6 [PH] (ref 5–8)
PH UR STRIP: 6 [PH]
PHOSPHATE SERPL-MCNC: 3.5 MG/DL (ref 2.5–4.9)
PLATELET # BLD AUTO: 110 K/UL (ref 135–450)
PMV BLD AUTO: 9.1 FL (ref 5–10.5)
POTASSIUM SERPL-SCNC: 3.3 MMOL/L (ref 3.5–5.1)
POTASSIUM SERPL-SCNC: 3.7 MMOL/L (ref 3.5–5.1)
PROT UR STRIP.AUTO-MCNC: 30 MG/DL
PROTHROMBIN TIME: 31.1 SEC (ref 11.9–14.9)
RBC # BLD AUTO: 4.43 M/UL (ref 4.2–5.9)
RBC #/AREA URNS HPF: ABNORMAL /HPF (ref 0–4)
SODIUM SERPL-SCNC: 133 MMOL/L (ref 136–145)
SP GR UR STRIP.AUTO: >=1.03 (ref 1–1.03)
UA COMPLETE W REFLEX CULTURE PNL UR: ABNORMAL
UA DIPSTICK W REFLEX MICRO PNL UR: YES
URN SPEC COLLECT METH UR: ABNORMAL
UROBILINOGEN UR STRIP-ACNC: 1 E.U./DL
WBC # BLD AUTO: 5 K/UL (ref 4–11)
WBC #/AREA URNS HPF: ABNORMAL /HPF (ref 0–5)

## 2025-02-28 PROCEDURE — 6360000002 HC RX W HCPCS

## 2025-02-28 PROCEDURE — 2500000003 HC RX 250 WO HCPCS

## 2025-02-28 PROCEDURE — 85610 PROTHROMBIN TIME: CPT

## 2025-02-28 PROCEDURE — 84132 ASSAY OF SERUM POTASSIUM: CPT

## 2025-02-28 PROCEDURE — 1200000000 HC SEMI PRIVATE

## 2025-02-28 PROCEDURE — 82746 ASSAY OF FOLIC ACID SERUM: CPT

## 2025-02-28 PROCEDURE — 6370000000 HC RX 637 (ALT 250 FOR IP)

## 2025-02-28 PROCEDURE — 80069 RENAL FUNCTION PANEL: CPT

## 2025-02-28 PROCEDURE — G0480 DRUG TEST DEF 1-7 CLASSES: HCPCS

## 2025-02-28 PROCEDURE — 81001 URINALYSIS AUTO W/SCOPE: CPT

## 2025-02-28 PROCEDURE — 83735 ASSAY OF MAGNESIUM: CPT

## 2025-02-28 PROCEDURE — 80307 DRUG TEST PRSMV CHEM ANLYZR: CPT

## 2025-02-28 PROCEDURE — 36415 COLL VENOUS BLD VENIPUNCTURE: CPT

## 2025-02-28 PROCEDURE — 51798 US URINE CAPACITY MEASURE: CPT

## 2025-02-28 PROCEDURE — 82607 VITAMIN B-12: CPT

## 2025-02-28 PROCEDURE — 85025 COMPLETE CBC W/AUTO DIFF WBC: CPT

## 2025-02-28 PROCEDURE — 2580000003 HC RX 258

## 2025-02-28 RX ORDER — ASPIRIN 81 MG/1
81 TABLET ORAL DAILY
Status: DISCONTINUED | OUTPATIENT
Start: 2025-02-28 | End: 2025-03-10 | Stop reason: HOSPADM

## 2025-02-28 RX ORDER — HYDRALAZINE HYDROCHLORIDE 25 MG/1
25 TABLET, FILM COATED ORAL EVERY 8 HOURS SCHEDULED
Status: DISCONTINUED | OUTPATIENT
Start: 2025-02-28 | End: 2025-03-01

## 2025-02-28 RX ORDER — GLUCAGON 1 MG/ML
1 KIT INJECTION PRN
Status: DISCONTINUED | OUTPATIENT
Start: 2025-02-28 | End: 2025-03-10 | Stop reason: HOSPADM

## 2025-02-28 RX ORDER — OXYCODONE AND ACETAMINOPHEN 5; 325 MG/1; MG/1
1 TABLET ORAL EVERY 6 HOURS PRN
Status: DISCONTINUED | OUTPATIENT
Start: 2025-02-28 | End: 2025-03-08

## 2025-02-28 RX ORDER — DOCUSATE SODIUM 100 MG/1
100 CAPSULE, LIQUID FILLED ORAL DAILY
Status: DISCONTINUED | OUTPATIENT
Start: 2025-02-28 | End: 2025-03-09

## 2025-02-28 RX ORDER — SACUBITRIL AND VALSARTAN 24; 26 MG/1; MG/1
1 TABLET, FILM COATED ORAL 2 TIMES DAILY
Status: DISCONTINUED | OUTPATIENT
Start: 2025-02-28 | End: 2025-03-10 | Stop reason: HOSPADM

## 2025-02-28 RX ORDER — WARFARIN SODIUM 2.5 MG/1
2.5 TABLET ORAL EVERY EVENING
Status: DISCONTINUED | OUTPATIENT
Start: 2025-02-28 | End: 2025-02-28

## 2025-02-28 RX ORDER — OXYCODONE AND ACETAMINOPHEN 5; 325 MG/1; MG/1
2 TABLET ORAL EVERY 6 HOURS PRN
Status: DISCONTINUED | OUTPATIENT
Start: 2025-02-28 | End: 2025-03-08

## 2025-02-28 RX ORDER — DEXTROSE MONOHYDRATE 100 MG/ML
INJECTION, SOLUTION INTRAVENOUS CONTINUOUS PRN
Status: DISCONTINUED | OUTPATIENT
Start: 2025-02-28 | End: 2025-03-10 | Stop reason: HOSPADM

## 2025-02-28 RX ORDER — CARVEDILOL 25 MG/1
25 TABLET ORAL 2 TIMES DAILY WITH MEALS
Status: DISCONTINUED | OUTPATIENT
Start: 2025-02-28 | End: 2025-03-01

## 2025-02-28 RX ORDER — CITALOPRAM HYDROBROMIDE 40 MG/1
40 TABLET ORAL DAILY
Status: DISCONTINUED | OUTPATIENT
Start: 2025-02-28 | End: 2025-03-10 | Stop reason: HOSPADM

## 2025-02-28 RX ORDER — POTASSIUM CHLORIDE 7.45 MG/ML
10 INJECTION INTRAVENOUS
Status: COMPLETED | OUTPATIENT
Start: 2025-02-28 | End: 2025-02-28

## 2025-02-28 RX ORDER — ROSUVASTATIN CALCIUM 20 MG/1
20 TABLET, COATED ORAL NIGHTLY
Status: DISCONTINUED | OUTPATIENT
Start: 2025-02-28 | End: 2025-03-10 | Stop reason: HOSPADM

## 2025-02-28 RX ORDER — MECOBALAMIN 5000 MCG
5 TABLET,DISINTEGRATING ORAL NIGHTLY
Status: DISCONTINUED | OUTPATIENT
Start: 2025-02-28 | End: 2025-03-10 | Stop reason: HOSPADM

## 2025-02-28 RX ORDER — INSULIN LISPRO 100 [IU]/ML
0-4 INJECTION, SOLUTION INTRAVENOUS; SUBCUTANEOUS
Status: DISCONTINUED | OUTPATIENT
Start: 2025-02-28 | End: 2025-03-10 | Stop reason: HOSPADM

## 2025-02-28 RX ORDER — AMLODIPINE BESYLATE 10 MG/1
10 TABLET ORAL DAILY
Status: DISCONTINUED | OUTPATIENT
Start: 2025-02-28 | End: 2025-03-01

## 2025-02-28 RX ORDER — WARFARIN SODIUM 2.5 MG/1
2.5 TABLET ORAL DAILY
Status: DISCONTINUED | OUTPATIENT
Start: 2025-03-01 | End: 2025-03-01

## 2025-02-28 RX ORDER — FAMOTIDINE 20 MG/1
20 TABLET, FILM COATED ORAL 2 TIMES DAILY
Status: DISCONTINUED | OUTPATIENT
Start: 2025-02-28 | End: 2025-03-05

## 2025-02-28 RX ORDER — CLONIDINE HYDROCHLORIDE 0.1 MG/1
0.1 TABLET ORAL DAILY PRN
Status: DISCONTINUED | OUTPATIENT
Start: 2025-02-28 | End: 2025-03-10 | Stop reason: HOSPADM

## 2025-02-28 RX ORDER — WARFARIN SODIUM 1 MG/1
1 TABLET ORAL
Status: COMPLETED | OUTPATIENT
Start: 2025-02-28 | End: 2025-02-28

## 2025-02-28 RX ADMIN — Medication 5 MG: at 20:44

## 2025-02-28 RX ADMIN — SODIUM CHLORIDE: 9 INJECTION, SOLUTION INTRAVENOUS at 06:46

## 2025-02-28 RX ADMIN — FAMOTIDINE 20 MG: 20 TABLET, FILM COATED ORAL at 20:44

## 2025-02-28 RX ADMIN — FAMOTIDINE 20 MG: 20 TABLET, FILM COATED ORAL at 07:59

## 2025-02-28 RX ADMIN — ONDANSETRON 4 MG: 2 INJECTION INTRAMUSCULAR; INTRAVENOUS at 09:03

## 2025-02-28 RX ADMIN — WARFARIN SODIUM 1 MG: 1 TABLET ORAL at 18:13

## 2025-02-28 RX ADMIN — CARVEDILOL 25 MG: 25 TABLET, FILM COATED ORAL at 18:13

## 2025-02-28 RX ADMIN — POTASSIUM CHLORIDE 10 MEQ: 10 INJECTION, SOLUTION INTRAVENOUS at 08:53

## 2025-02-28 RX ADMIN — HYDRALAZINE HYDROCHLORIDE 25 MG: 25 TABLET ORAL at 16:27

## 2025-02-28 RX ADMIN — AMLODIPINE BESYLATE 10 MG: 10 TABLET ORAL at 07:59

## 2025-02-28 RX ADMIN — CITALOPRAM 40 MG: 40 TABLET, FILM COATED ORAL at 07:58

## 2025-02-28 RX ADMIN — HYDRALAZINE HYDROCHLORIDE 25 MG: 25 TABLET ORAL at 04:34

## 2025-02-28 RX ADMIN — ROSUVASTATIN CALCIUM 20 MG: 20 TABLET, FILM COATED ORAL at 20:44

## 2025-02-28 RX ADMIN — POTASSIUM CHLORIDE 10 MEQ: 10 INJECTION, SOLUTION INTRAVENOUS at 09:57

## 2025-02-28 RX ADMIN — WATER 1000 MG: 1 INJECTION INTRAMUSCULAR; INTRAVENOUS; SUBCUTANEOUS at 04:34

## 2025-02-28 RX ADMIN — SODIUM CHLORIDE: 0.9 INJECTION, SOLUTION INTRAVENOUS at 09:33

## 2025-02-28 RX ADMIN — CARVEDILOL 25 MG: 25 TABLET, FILM COATED ORAL at 07:58

## 2025-02-28 RX ADMIN — ASPIRIN 81 MG: 81 TABLET, COATED ORAL at 07:58

## 2025-02-28 NOTE — PLAN OF CARE
Problem: Chronic Conditions and Co-morbidities  Goal: Patient's chronic conditions and co-morbidity symptoms are monitored and maintained or improved  Outcome: Progressing  Flowsheets (Taken 2/28/2025 0800)  Care Plan - Patient's Chronic Conditions and Co-Morbidity Symptoms are Monitored and Maintained or Improved: Monitor and assess patient's chronic conditions and comorbid symptoms for stability, deterioration, or improvement     Problem: Discharge Planning  Goal: Discharge to home or other facility with appropriate resources  Outcome: Progressing  Flowsheets (Taken 2/28/2025 0800)  Discharge to home or other facility with appropriate resources: Identify barriers to discharge with patient and caregiver     Problem: Pain  Goal: Verbalizes/displays adequate comfort level or baseline comfort level  Outcome: Progressing  Flowsheets (Taken 2/28/2025 0757)  Verbalizes/displays adequate comfort level or baseline comfort level: Encourage patient to monitor pain and request assistance

## 2025-02-28 NOTE — PLAN OF CARE
Problem: Chronic Conditions and Co-morbidities  Goal: Patient's chronic conditions and co-morbidity symptoms are monitored and maintained or improved  Outcome: Progressing  Flowsheets (Taken 2/28/2025 0106)  Care Plan - Patient's Chronic Conditions and Co-Morbidity Symptoms are Monitored and Maintained or Improved:   Monitor and assess patient's chronic conditions and comorbid symptoms for stability, deterioration, or improvement   Collaborate with multidisciplinary team to address chronic and comorbid conditions and prevent exacerbation or deterioration   Update acute care plan with appropriate goals if chronic or comorbid symptoms are exacerbated and prevent overall improvement and discharge     Problem: Skin/Tissue Integrity  Goal: Skin integrity remains intact  Description: 1.  Monitor for areas of redness and/or skin breakdown  2.  Assess vascular access sites hourly  3.  Every 4-6 hours minimum:  Change oxygen saturation probe site  4.  Every 4-6 hours:  If on nasal continuous positive airway pressure, respiratory therapy assess nares and determine need for appliance change or resting period  Outcome: Progressing  Flowsheets (Taken 2/28/2025 0106)  Skin Integrity Remains Intact: Monitor for areas of redness and/or skin breakdown  Note: Patient with stage 1 in sacrum, scattered scabs on bilateral upper arm, no skin breakdown, Patient is turning to sides by  himself.ensured patient back is clean and dry     Problem: Safety - Adult  Goal: Free from fall injury  Outcome: Progressing  Flowsheets (Taken 2/28/2025 0106)  Free From Fall Injury: Based on caregiver fall risk screen, instruct family/caregiver to ask for assistance with transferring infant if caregiver noted to have fall risk factors  Note: Patient is confusion but easily re-directed, ensured all safety measures are in placed, bed  locked in lowest positioned, bed alarm on, gripper socks worn by patient, bedside table with patient's belongings and call

## 2025-02-28 NOTE — PLAN OF CARE
[facility] Hospitalist brief note  Consult received.  Case reviewed with ER physician- admit for altered MS    Full note to follow.    Lavon Diaz DO    Thanks  GARCIA LOCKE MD

## 2025-02-28 NOTE — ED NOTES
Patient Name: Alexandr Witt  : 1965 59 y.o.  MRN: [id number]  ED Room #: A10/A10-10     Chief complaint:   Chief Complaint   Patient presents with    Altered Mental Status     Patient arrived from home after home nurse came today and suggested that he come, [facility] pt (lung cancer), stated that he is only oriented to person and place, nausea, and failure to thrive x 1 week.      Hospital Problem/Diagnosis:   Hospital Problems             Last Modified POA    * (Principal) Encephalopathy acute 2025 Yes         O2 Flow Rate:O2 Device: None (Room air)   (if applicable)  Cardiac Rhythm:   (if applicable)  Active LDA's:           How does patient ambulate? Low Fall Risk (Ambulates by themselves without support    2. How does patient take pills? Unknown, no oral medications were given in the Emergency Department    3. Is patient alert? Alert    4. Is patient oriented? To Person, To Place, and Confused    5.   Patient arrived from:  home  Facility Name: ___________________________________________    6. If patient is disoriented or from a Skill Nursing Facility has family been notified of admission? Yes    7. Patient belongings? Belongings: Clothing    Disposition of belongings? Kept with Patient     8. Any specific patient or family belongings/needs/dynamics?   a. Daughter is primary contact      9. Miscellaneous comments/pending orders?  a. Pt coming from home per daughter for concern for worsening confusion since being dx with flu a couple of weeks ago and FTT. Has hx of lung cancer. Pt is alert to self and place but is confused and is unable to follow commands d/t confusion. Pt is wandering around room but low fall risk. On RA. Still need urine sample.      If there are any additional questions please reach out to the Emergency Department.      Cassi Brantley RN  [phone]

## 2025-02-28 NOTE — CARE COORDINATION
Case Management Assessment  Initial Evaluation    Date/Time of Evaluation: 2/28/2025 4:33 PM  Assessment Completed by: Fay Delcid    If patient is discharged prior to next notation, then this note serves as note for discharge by case management.    Patient Name: Alexandr Witt                   YOB: 1965  Diagnosis: Encephalopathy acute [G93.40]  Altered mental status, unspecified altered mental status type [R41.82]  Metabolic encephalopathy [G93.41]                   Date / Time: 2/27/2025  4:54 PM    Patient Admission Status: Inpatient   Readmission Risk (Low < 19, Mod (19-27), High > 27): Readmission Risk Score: 18.1    Current PCP: Lavon Diaz, DO  PCP verified by CM? No    Chart Reviewed: Yes      History Provided by: Child/Family, Medical Record  Patient Orientation: Person    Patient Cognition: Alert    Hospitalization in the last 30 days (Readmission):  No    If yes, Readmission Assessment in CM Navigator will be completed.    Advance Directives:      Code Status: Full Code   Patient's Primary Decision Maker is: Legal Next of Kin (Charity Thomason DTYENNY Primary decision maker)    Primary Decision Maker: Charity Sarabia Child - [phone]    Discharge Planning:    Patient lives with: Children Type of Home: Other (Comment) ([facility])  Primary Care Giver: Other (Comment) ([facility])  Patient Support Systems include: Children   Current Financial resources: Medicare  Current community resources: Other (Comment) ([facility])  Current services prior to admission: Other (Comment) ([facility])            Current DME:              Type of Home Care services:  None    ADLS  Prior functional level: Independent in ADLs/IADLs  Current functional level: Assistance with the following:, Bathing, Dressing, Toileting, Cooking, Housework, Shopping, Mobility    PT AM-PAC:   /24  OT AM-PAC:   /24    Family can provide assistance at DC: No  Would

## 2025-03-01 LAB
ALBUMIN SERPL-MCNC: 3.1 G/DL (ref 3.4–5)
ANION GAP SERPL CALCULATED.3IONS-SCNC: 12 MMOL/L (ref 3–16)
BASOPHILS # BLD: 0 K/UL (ref 0–0.2)
BASOPHILS NFR BLD: 1 %
BUN SERPL-MCNC: 16 MG/DL (ref 7–20)
CALCIUM SERPL-MCNC: 8.6 MG/DL (ref 8.3–10.6)
CHLORIDE SERPL-SCNC: 101 MMOL/L (ref 99–110)
CO2 SERPL-SCNC: 19 MMOL/L (ref 21–32)
CREAT SERPL-MCNC: 1 MG/DL (ref 0.8–1.3)
DEPRECATED RDW RBC AUTO: 17.6 % (ref 12.4–15.4)
EOSINOPHIL # BLD: 0.1 K/UL (ref 0–0.6)
EOSINOPHIL NFR BLD: 1.2 %
FOLATE SERPL-MCNC: 2.75 NG/ML (ref 4.78–24.2)
FOLATE SERPL-MCNC: 3.31 NG/ML (ref 4.78–24.2)
GFR SERPLBLD CREATININE-BSD FMLA CKD-EPI: 85 ML/MIN/{1.73_M2}
GLUCOSE BLD-MCNC: 109 MG/DL (ref 70–99)
GLUCOSE BLD-MCNC: 134 MG/DL (ref 70–99)
GLUCOSE BLD-MCNC: 139 MG/DL (ref 70–99)
GLUCOSE SERPL-MCNC: 123 MG/DL (ref 70–99)
HCT VFR BLD AUTO: 35.8 % (ref 40.5–52.5)
HGB BLD-MCNC: 11.5 G/DL (ref 13.5–17.5)
INR PPP: 3.45 (ref 0.85–1.15)
LYMPHOCYTES # BLD: 0.3 K/UL (ref 1–5.1)
LYMPHOCYTES NFR BLD: 7.5 %
MAGNESIUM SERPL-MCNC: 1.98 MG/DL (ref 1.8–2.4)
MCH RBC QN AUTO: 28.7 PG (ref 26–34)
MCHC RBC AUTO-ENTMCNC: 32.1 G/DL (ref 31–36)
MCV RBC AUTO: 89.4 FL (ref 80–100)
MONOCYTES # BLD: 0.3 K/UL (ref 0–1.3)
MONOCYTES NFR BLD: 6.1 %
NEUTROPHILS # BLD: 3.8 K/UL (ref 1.7–7.7)
NEUTROPHILS NFR BLD: 84.2 %
PERFORMED ON: ABNORMAL
PHOSPHATE SERPL-MCNC: 2.8 MG/DL (ref 2.5–4.9)
PLATELET # BLD AUTO: 105 K/UL (ref 135–450)
PMV BLD AUTO: 9 FL (ref 5–10.5)
POTASSIUM SERPL-SCNC: 3.4 MMOL/L (ref 3.5–5.1)
PROTHROMBIN TIME: 34.5 SEC (ref 11.9–14.9)
RBC # BLD AUTO: 4.01 M/UL (ref 4.2–5.9)
SODIUM SERPL-SCNC: 132 MMOL/L (ref 136–145)
VIT B12 SERPL-MCNC: 281 PG/ML (ref 211–911)
VIT B12 SERPL-MCNC: 335 PG/ML (ref 211–911)
WBC # BLD AUTO: 4.5 K/UL (ref 4–11)

## 2025-03-01 PROCEDURE — 85025 COMPLETE CBC W/AUTO DIFF WBC: CPT

## 2025-03-01 PROCEDURE — 1200000000 HC SEMI PRIVATE

## 2025-03-01 PROCEDURE — 80069 RENAL FUNCTION PANEL: CPT

## 2025-03-01 PROCEDURE — 85610 PROTHROMBIN TIME: CPT

## 2025-03-01 PROCEDURE — 82746 ASSAY OF FOLIC ACID SERUM: CPT

## 2025-03-01 PROCEDURE — 36415 COLL VENOUS BLD VENIPUNCTURE: CPT

## 2025-03-01 PROCEDURE — 6360000002 HC RX W HCPCS

## 2025-03-01 PROCEDURE — 82607 VITAMIN B-12: CPT

## 2025-03-01 PROCEDURE — 6370000000 HC RX 637 (ALT 250 FOR IP)

## 2025-03-01 PROCEDURE — 2500000003 HC RX 250 WO HCPCS

## 2025-03-01 PROCEDURE — 83735 ASSAY OF MAGNESIUM: CPT

## 2025-03-01 RX ORDER — QUETIAPINE FUMARATE 25 MG/1
25 TABLET, FILM COATED ORAL 2 TIMES DAILY PRN
Status: DISCONTINUED | OUTPATIENT
Start: 2025-03-01 | End: 2025-03-10 | Stop reason: HOSPADM

## 2025-03-01 RX ORDER — LORAZEPAM 2 MG/ML
1 INJECTION INTRAMUSCULAR
Status: DISCONTINUED | OUTPATIENT
Start: 2025-03-01 | End: 2025-03-02

## 2025-03-01 RX ORDER — WARFARIN SODIUM 1 MG/1
1 TABLET ORAL
Status: COMPLETED | OUTPATIENT
Start: 2025-03-01 | End: 2025-03-01

## 2025-03-01 RX ORDER — POTASSIUM CHLORIDE 7.45 MG/ML
10 INJECTION INTRAVENOUS
Status: DISCONTINUED | OUTPATIENT
Start: 2025-03-01 | End: 2025-03-01

## 2025-03-01 RX ADMIN — FAMOTIDINE 20 MG: 20 TABLET, FILM COATED ORAL at 21:57

## 2025-03-01 RX ADMIN — ASPIRIN 81 MG: 81 TABLET, COATED ORAL at 09:58

## 2025-03-01 RX ADMIN — AMLODIPINE BESYLATE 10 MG: 10 TABLET ORAL at 09:58

## 2025-03-01 RX ADMIN — CARVEDILOL 25 MG: 25 TABLET, FILM COATED ORAL at 09:58

## 2025-03-01 RX ADMIN — FAMOTIDINE 20 MG: 20 TABLET, FILM COATED ORAL at 09:58

## 2025-03-01 RX ADMIN — DOCUSATE SODIUM 100 MG: 100 CAPSULE, LIQUID FILLED ORAL at 09:58

## 2025-03-01 RX ADMIN — HYDRALAZINE HYDROCHLORIDE 25 MG: 25 TABLET ORAL at 06:21

## 2025-03-01 RX ADMIN — POTASSIUM BICARBONATE 40 MEQ: 782 TABLET, EFFERVESCENT ORAL at 11:09

## 2025-03-01 RX ADMIN — OXYCODONE HYDROCHLORIDE AND ACETAMINOPHEN 1 TABLET: 5; 325 TABLET ORAL at 22:03

## 2025-03-01 RX ADMIN — Medication 5 MG: at 21:57

## 2025-03-01 RX ADMIN — WARFARIN SODIUM 1 MG: 1 TABLET ORAL at 18:56

## 2025-03-01 RX ADMIN — ROSUVASTATIN CALCIUM 20 MG: 20 TABLET, FILM COATED ORAL at 21:57

## 2025-03-01 RX ADMIN — WATER 1000 MG: 1 INJECTION INTRAMUSCULAR; INTRAVENOUS; SUBCUTANEOUS at 04:25

## 2025-03-01 RX ADMIN — CITALOPRAM 40 MG: 40 TABLET, FILM COATED ORAL at 09:58

## 2025-03-01 ASSESSMENT — PAIN SCALES - GENERAL
PAINLEVEL_OUTOF10: 0
PAINLEVEL_OUTOF10: 6
PAINLEVEL_OUTOF10: 0

## 2025-03-01 ASSESSMENT — PAIN DESCRIPTION - ONSET: ONSET: GRADUAL

## 2025-03-01 ASSESSMENT — PAIN DESCRIPTION - PAIN TYPE: TYPE: CHRONIC PAIN

## 2025-03-01 ASSESSMENT — PAIN DESCRIPTION - DESCRIPTORS: DESCRIPTORS: ACHING

## 2025-03-01 ASSESSMENT — PAIN DESCRIPTION - ORIENTATION: ORIENTATION: OTHER (COMMENT)

## 2025-03-01 ASSESSMENT — PAIN DESCRIPTION - FREQUENCY: FREQUENCY: INTERMITTENT

## 2025-03-01 ASSESSMENT — PAIN - FUNCTIONAL ASSESSMENT: PAIN_FUNCTIONAL_ASSESSMENT: ACTIVITIES ARE NOT PREVENTED

## 2025-03-01 ASSESSMENT — PAIN DESCRIPTION - DIRECTION: RADIATING_TOWARDS: NO

## 2025-03-01 ASSESSMENT — PAIN DESCRIPTION - LOCATION: LOCATION: GENERALIZED

## 2025-03-01 NOTE — PLAN OF CARE
Problem: Pain  Goal: Verbalizes/displays adequate comfort level or baseline comfort level  3/1/2025 0825 by Shea Silverio, RN  Outcome: Progressing  Flowsheets (Taken 3/1/2025 0825)  Verbalizes/displays adequate comfort level or baseline comfort level:   Encourage patient to monitor pain and request assistance   Assess pain using appropriate pain scale   Administer analgesics based on type and severity of pain and evaluate response   Implement non-pharmacological measures as appropriate and evaluate response   Consider cultural and social influences on pain and pain management   Notify Licensed Independent Practitioner if interventions unsuccessful or patient reports new pain  3/1/2025 0230 by Kat Hammer, RN  Outcome: Progressing     Problem: Safety - Adult  Goal: Free from fall injury  3/1/2025 0825 by Shea Silverio, RN  Outcome: Progressing  Flowsheets (Taken 3/1/2025 0825)  Free From Fall Injury:   Instruct family/caregiver on patient safety   Based on caregiver fall risk screen, instruct family/caregiver to ask for assistance with transferring infant if caregiver noted to have fall risk factors  3/1/2025 0230 by Kat Hammer, RN  Outcome: Progressing  Flowsheets (Taken 2/28/2025 1704 by Zeke Bird, RN)  Free From Fall Injury: Instruct family/caregiver on patient safety     Problem: Confusion  Goal: Confusion, delirium, dementia, or psychosis is improved or at baseline  Description: INTERVENTIONS:  1. Assess for possible contributors to thought disturbance, including medications, impaired vision or hearing, underlying metabolic abnormalities, dehydration, psychiatric diagnoses, and notify attending LIP  2. [place] high risk fall precautions, as indicated  3. Provide frequent short contacts to provide reality reorientation, refocusing and direction  4. Decrease environmental stimuli, including noise as appropriate  5. Monitor and intervene to maintain adequate nutrition, hydration, elimination,

## 2025-03-01 NOTE — PLAN OF CARE
Complaints of pain this shift but patient declined interventions. No new skin issues noted. Patient remains free from falls and injury. Confusion continues.  Problem: Pain  Goal: Verbalizes/displays adequate comfort level or baseline comfort level  3/1/2025 0230 by Kat Hammer RN  Outcome: Progressing     Problem: Skin/Tissue Integrity  Goal: Skin integrity remains intact  Description: 1.  Monitor for areas of redness and/or skin breakdown  2.  Assess vascular access sites hourly  3.  Every 4-6 hours minimum:  Change oxygen saturation probe site  4.  Every 4-6 hours:  If on nasal continuous positive airway pressure, respiratory therapy assess nares and determine need for appliance change or resting period  Outcome: Progressing    Problem: Safety - Adult  Goal: Free from fall injury  Outcome: Progressing        Problem: Confusion  Goal: Confusion, delirium, dementia, or psychosis is improved or at baseline  Description: INTERVENTIONS:  1. Assess for possible contributors to thought disturbance, including medications, impaired vision or hearing, underlying metabolic abnormalities, dehydration, psychiatric diagnoses, and notify attending LIP  2. [place] high risk fall precautions, as indicated  3. Provide frequent short contacts to provide reality reorientation, refocusing and direction  4. Decrease environmental stimuli, including noise as appropriate  5. Monitor and intervene to maintain adequate nutrition, hydration, elimination, sleep and activity  6. If unable to ensure safety without constant attention obtain sitter and review sitter guidelines with assigned personnel  7. Initiate Psychosocial CNS and Spiritual Care consult, as indicated  Outcome: Not Progressing

## 2025-03-02 ENCOUNTER — APPOINTMENT (OUTPATIENT)
Dept: MRI IMAGING | Age: 60
DRG: 689 | End: 2025-03-02
Payer: MEDICARE

## 2025-03-02 LAB
ALBUMIN SERPL-MCNC: 3.4 G/DL (ref 3.4–5)
AMPHETAMINES SERPL QL SCN: NEGATIVE NG/ML
ANION GAP SERPL CALCULATED.3IONS-SCNC: 13 MMOL/L (ref 3–16)
ANISOCYTOSIS BLD QL SMEAR: ABNORMAL
ANNOTATION COMMENT IMP: NORMAL
BARBITURATES SERPL QL SCN: NEGATIVE NG/ML
BASOPHILS # BLD: 0 K/UL (ref 0–0.2)
BASOPHILS NFR BLD: 0 %
BENZODIAZ SERPL QL SCN: NEGATIVE NG/ML
BUN SERPL-MCNC: 13 MG/DL (ref 7–20)
BUPRENORPHINE SERPL-MCNC: NEGATIVE NG/ML
CALCIUM SERPL-MCNC: 8.7 MG/DL (ref 8.3–10.6)
CANNABINOIDS SERPL QL SCN: NEGATIVE NG/ML
CHLORIDE SERPL-SCNC: 99 MMOL/L (ref 99–110)
CO2 SERPL-SCNC: 22 MMOL/L (ref 21–32)
COCAINE SERPL QL SCN: NEGATIVE NG/ML
CREAT SERPL-MCNC: 1 MG/DL (ref 0.8–1.3)
DEPRECATED RDW RBC AUTO: 17.6 % (ref 12.4–15.4)
EOSINOPHIL # BLD: 0 K/UL (ref 0–0.6)
EOSINOPHIL NFR BLD: 1 %
GFR SERPLBLD CREATININE-BSD FMLA CKD-EPI: 85 ML/MIN/{1.73_M2}
GLUCOSE BLD-MCNC: 101 MG/DL (ref 70–99)
GLUCOSE BLD-MCNC: 109 MG/DL (ref 70–99)
GLUCOSE BLD-MCNC: 119 MG/DL (ref 70–99)
GLUCOSE BLD-MCNC: 121 MG/DL (ref 70–99)
GLUCOSE SERPL-MCNC: 114 MG/DL (ref 70–99)
HCT VFR BLD AUTO: 35.8 % (ref 40.5–52.5)
HGB BLD-MCNC: 11.7 G/DL (ref 13.5–17.5)
INR PPP: 3.87 (ref 0.85–1.15)
LYMPHOCYTES # BLD: 0.5 K/UL (ref 1–5.1)
LYMPHOCYTES NFR BLD: 12 %
MAGNESIUM SERPL-MCNC: 1.99 MG/DL (ref 1.8–2.4)
MCH RBC QN AUTO: 28.5 PG (ref 26–34)
MCHC RBC AUTO-ENTMCNC: 32.6 G/DL (ref 31–36)
MCV RBC AUTO: 87.4 FL (ref 80–100)
METHADONE SERPL QL SCN: NEGATIVE NG/ML
METHAMPHET SERPL QL: NEGATIVE NG/ML
MONOCYTES # BLD: 0.1 K/UL (ref 0–1.3)
MONOCYTES NFR BLD: 2 %
NEUTROPHILS # BLD: 3.3 K/UL (ref 1.7–7.7)
NEUTROPHILS NFR BLD: 85 %
OPIATES SERPL QL SCN: POSITIVE NG/ML
OXYCODONE SERPL QL: NEGATIVE NG/ML
PATH INTERP BLD-IMP: NO
PCP SERPL QL SCN: NEGATIVE NG/ML
PERFORMED ON: ABNORMAL
PHOSPHATE SERPL-MCNC: 2.9 MG/DL (ref 2.5–4.9)
PLATELET # BLD AUTO: 110 K/UL (ref 135–450)
PLATELET BLD QL SMEAR: ABNORMAL
PMV BLD AUTO: 9.1 FL (ref 5–10.5)
POTASSIUM SERPL-SCNC: 3.2 MMOL/L (ref 3.5–5.1)
PROTHROMBIN TIME: 37.7 SEC (ref 11.9–14.9)
RBC # BLD AUTO: 4.09 M/UL (ref 4.2–5.9)
RBC MORPH BLD: NORMAL
SLIDE REVIEW: ABNORMAL
SODIUM SERPL-SCNC: 134 MMOL/L (ref 136–145)
WBC # BLD AUTO: 3.9 K/UL (ref 4–11)

## 2025-03-02 PROCEDURE — 6360000002 HC RX W HCPCS

## 2025-03-02 PROCEDURE — 6370000000 HC RX 637 (ALT 250 FOR IP)

## 2025-03-02 PROCEDURE — 2500000003 HC RX 250 WO HCPCS

## 2025-03-02 PROCEDURE — 85610 PROTHROMBIN TIME: CPT

## 2025-03-02 PROCEDURE — 85025 COMPLETE CBC W/AUTO DIFF WBC: CPT

## 2025-03-02 PROCEDURE — 36415 COLL VENOUS BLD VENIPUNCTURE: CPT

## 2025-03-02 PROCEDURE — 83735 ASSAY OF MAGNESIUM: CPT

## 2025-03-02 PROCEDURE — 51798 US URINE CAPACITY MEASURE: CPT

## 2025-03-02 PROCEDURE — 80069 RENAL FUNCTION PANEL: CPT

## 2025-03-02 PROCEDURE — 1200000000 HC SEMI PRIVATE

## 2025-03-02 RX ORDER — POTASSIUM CHLORIDE 1500 MG/1
40 TABLET, EXTENDED RELEASE ORAL ONCE
Status: COMPLETED | OUTPATIENT
Start: 2025-03-02 | End: 2025-03-02

## 2025-03-02 RX ORDER — CALAMINE 8% AND ZINC OXIDE 8% 160 MG/ML
LOTION TOPICAL 3 TIMES DAILY
Status: DISCONTINUED | OUTPATIENT
Start: 2025-03-02 | End: 2025-03-10 | Stop reason: HOSPADM

## 2025-03-02 RX ORDER — HALOPERIDOL 5 MG/ML
2 INJECTION INTRAMUSCULAR
Status: DISCONTINUED | OUTPATIENT
Start: 2025-03-02 | End: 2025-03-03

## 2025-03-02 RX ORDER — LORAZEPAM 2 MG/ML
2 INJECTION INTRAMUSCULAR
Status: DISCONTINUED | OUTPATIENT
Start: 2025-03-02 | End: 2025-03-03

## 2025-03-02 RX ORDER — ALBENDAZOLE 200 MG/1
400 TABLET, FILM COATED ORAL ONCE
Status: COMPLETED | OUTPATIENT
Start: 2025-03-02 | End: 2025-03-02

## 2025-03-02 RX ORDER — FOLIC ACID 1 MG/1
1 TABLET ORAL DAILY
Status: DISCONTINUED | OUTPATIENT
Start: 2025-03-02 | End: 2025-03-10 | Stop reason: HOSPADM

## 2025-03-02 RX ADMIN — OXYCODONE HYDROCHLORIDE AND ACETAMINOPHEN 1 TABLET: 5; 325 TABLET ORAL at 20:04

## 2025-03-02 RX ADMIN — FAMOTIDINE 20 MG: 20 TABLET, FILM COATED ORAL at 09:13

## 2025-03-02 RX ADMIN — FOLIC ACID 1 MG: 1 TABLET ORAL at 09:14

## 2025-03-02 RX ADMIN — POTASSIUM CHLORIDE 40 MEQ: 1500 TABLET, EXTENDED RELEASE ORAL at 09:14

## 2025-03-02 RX ADMIN — CITALOPRAM 40 MG: 40 TABLET, FILM COATED ORAL at 09:14

## 2025-03-02 RX ADMIN — DOCUSATE SODIUM 100 MG: 100 CAPSULE, LIQUID FILLED ORAL at 09:14

## 2025-03-02 RX ADMIN — ALBENDAZOLE 400 MG: 200 TABLET, FILM COATED ORAL at 13:22

## 2025-03-02 RX ADMIN — ASPIRIN 81 MG: 81 TABLET, COATED ORAL at 09:14

## 2025-03-02 RX ADMIN — Medication 5 MG: at 20:05

## 2025-03-02 RX ADMIN — SODIUM CHLORIDE, PRESERVATIVE FREE 10 ML: 5 INJECTION INTRAVENOUS at 09:14

## 2025-03-02 RX ADMIN — FERRIC OXIDE RED: 8; 8 LOTION TOPICAL at 17:20

## 2025-03-02 RX ADMIN — WATER 1000 MG: 1 INJECTION INTRAMUSCULAR; INTRAVENOUS; SUBCUTANEOUS at 05:45

## 2025-03-02 RX ADMIN — ONDANSETRON 4 MG: 2 INJECTION INTRAMUSCULAR; INTRAVENOUS at 08:17

## 2025-03-02 RX ADMIN — FAMOTIDINE 20 MG: 20 TABLET, FILM COATED ORAL at 20:05

## 2025-03-02 RX ADMIN — FERRIC OXIDE RED: 8; 8 LOTION TOPICAL at 22:44

## 2025-03-02 RX ADMIN — SODIUM CHLORIDE, PRESERVATIVE FREE 10 ML: 5 INJECTION INTRAVENOUS at 20:05

## 2025-03-02 RX ADMIN — ROSUVASTATIN CALCIUM 20 MG: 20 TABLET, FILM COATED ORAL at 20:05

## 2025-03-02 ASSESSMENT — PAIN DESCRIPTION - DESCRIPTORS: DESCRIPTORS: ACHING

## 2025-03-02 ASSESSMENT — PAIN - FUNCTIONAL ASSESSMENT: PAIN_FUNCTIONAL_ASSESSMENT: ACTIVITIES ARE NOT PREVENTED

## 2025-03-02 ASSESSMENT — PAIN DESCRIPTION - FREQUENCY: FREQUENCY: INTERMITTENT

## 2025-03-02 ASSESSMENT — PAIN DESCRIPTION - DIRECTION: RADIATING_TOWARDS: NO

## 2025-03-02 ASSESSMENT — PAIN DESCRIPTION - ONSET: ONSET: GRADUAL

## 2025-03-02 ASSESSMENT — PAIN SCALES - GENERAL
PAINLEVEL_OUTOF10: 8
PAINLEVEL_OUTOF10: 0

## 2025-03-02 ASSESSMENT — PAIN DESCRIPTION - PAIN TYPE: TYPE: CHRONIC PAIN

## 2025-03-02 ASSESSMENT — PAIN DESCRIPTION - LOCATION: LOCATION: GENERALIZED

## 2025-03-02 ASSESSMENT — PAIN DESCRIPTION - ORIENTATION: ORIENTATION: OTHER (COMMENT)

## 2025-03-02 NOTE — PLAN OF CARE
Problem: Chronic Conditions and Co-morbidities  Goal: Patient's chronic conditions and co-morbidity symptoms are monitored and maintained or improved  Outcome: Progressing  Flowsheets (Taken 3/1/2025 1959)  Care Plan - Patient's Chronic Conditions and Co-Morbidity Symptoms are Monitored and Maintained or Improved: Collaborate with multidisciplinary team to address chronic and comorbid conditions and prevent exacerbation or deterioration     Problem: Pain  Goal: Verbalizes/displays adequate comfort level or baseline comfort level  3/1/2025 2029 by Karolina Thomas RN  Outcome: Progressing  Flowsheets (Taken 3/1/2025 1959)  Verbalizes/displays adequate comfort level or baseline comfort level: Encourage patient to monitor pain and request assistance     Problem: Skin/Tissue Integrity  Goal: Skin integrity remains intact  Description: 1.  Monitor for areas of redness and/or skin breakdown  2.  Assess vascular access sites hourly  3.  Every 4-6 hours minimum:  Change oxygen saturation probe site  4.  Every 4-6 hours:  If on nasal continuous positive airway pressure, respiratory therapy assess nares and determine need for appliance change or resting period  Outcome: Progressing  Flowsheets  Taken 3/1/2025 2028 by Karolina Thomas RN  Skin Integrity Remains Intact: Monitor for areas of redness and/or skin breakdown  Taken 3/1/2025 1959 by Karolina Thomas RN  Skin Integrity Remains Intact: Monitor for areas of redness and/or skin breakdown  Problem: Discharge Planning  Goal: Discharge to home or other facility with appropriate resources  Recent Flowsheet Documentation  Taken 3/1/2025 1959 by Karolina Thomas RN  Discharge to home or other facility with appropriate resources: Arrange for needed discharge resources and transportation as appropriate     Problem: Confusion  Goal: Confusion, delirium, dementia, or psychosis is improved or at baseline  Description: INTERVENTIONS:  1. Assess for possible contributors to

## 2025-03-02 NOTE — DISCHARGE SUMMARY
INTERNAL MEDICINE DEPARTMENT AT THE [facility]  DISCHARGE SUMMARY    Patient ID: Alexandr Witt                                             Discharge Date: 3/4/2025   Patient's PCP: Lavon Diaz DO                                          Discharge Physician: Quinten Barnes MD   Admit Date: 2/27/2025   Admitting Physician: Carlos Whitten MD    PROBLEMS DURING HOSPITALIZATION:  Present on Admission:   Encephalopathy acute   Metabolic encephalopathy   Severe malnutrition      DISCHARGE DIAGNOSES:    [place]min[place] Infection  Acute encephalopathy  Folic acid deficiency    HPI:    Patient is a 58-year-old male with PMHx CAD s/p 2 vessel and 3 vessel CABG in 2005 and 2017, bioprosthetic aortic valve replacement, pAF on warfarin, HFrEF 30-35%, V-fib arrest 4/2024 after LLL wedge resection s/p ICD placement, severe COPD on baseline 2 L O2, tobacco use (40 pack years), T2DM, transverse colectomy (2016), right colectomy (2019), cholecystectomy (2019), testicular cancer s/p chemo and resection, left lower lobe SCC s/p wedge resection 08/2023, and recent lung mass suspected to be NSCLC s/p SBRT 12/2024 who presents with altered mental status.     Presented to the ED after home health nurse today noted he was less oriented than baseline. Reportedly has been less compliant with medications, more irritable, and having decreased PO intake after testing positive for influenza A on 2/14.     He is currently an unreliable historian due to his encephalopathy. Alert and oriented only to self and place. Able to tell me his full name and that he is at [facility]. When as the year he replied \"that's a good answer... I don't know.\" He answers other questions vaguely and sometimes incorrectly.     ED Course:   Vitals only notable for /133, which subsequently improved. Sodium 132 and chloride 97. Bicarbonate and anion gap were both normal range. Creatinine mildly elevated to 1.3 from baseline of 1.0. Blood glucose 135. Hepatic labs

## 2025-03-03 ENCOUNTER — APPOINTMENT (OUTPATIENT)
Dept: MRI IMAGING | Age: 60
DRG: 689 | End: 2025-03-03
Payer: MEDICARE

## 2025-03-03 ENCOUNTER — APPOINTMENT (OUTPATIENT)
Dept: CT IMAGING | Age: 60
DRG: 689 | End: 2025-03-03
Payer: MEDICARE

## 2025-03-03 LAB
ALBUMIN SERPL-MCNC: 3.2 G/DL (ref 3.4–5)
ANION GAP SERPL CALCULATED.3IONS-SCNC: 13 MMOL/L (ref 3–16)
BASOPHILS # BLD: 0 K/UL (ref 0–0.2)
BASOPHILS NFR BLD: 0.9 %
BUN SERPL-MCNC: 14 MG/DL (ref 7–20)
CALCIUM SERPL-MCNC: 8.6 MG/DL (ref 8.3–10.6)
CHLORIDE SERPL-SCNC: 98 MMOL/L (ref 99–110)
CO2 SERPL-SCNC: 21 MMOL/L (ref 21–32)
CREAT SERPL-MCNC: 0.9 MG/DL (ref 0.8–1.3)
DEPRECATED RDW RBC AUTO: 17.5 % (ref 12.4–15.4)
EKG ATRIAL RATE: 91 BPM
EKG DIAGNOSIS: NORMAL
EKG P AXIS: 46 DEGREES
EKG P-R INTERVAL: 152 MS
EKG Q-T INTERVAL: 386 MS
EKG QRS DURATION: 104 MS
EKG QTC CALCULATION (BAZETT): 474 MS
EKG R AXIS: 43 DEGREES
EKG T AXIS: 108 DEGREES
EKG VENTRICULAR RATE: 91 BPM
EOSINOPHIL # BLD: 0.1 K/UL (ref 0–0.6)
EOSINOPHIL NFR BLD: 1.7 %
EST. AVERAGE GLUCOSE BLD GHB EST-MCNC: 151.3 MG/DL
FERRITIN SERPL IA-MCNC: 218 NG/ML (ref 30–400)
GFR SERPLBLD CREATININE-BSD FMLA CKD-EPI: >90 ML/MIN/{1.73_M2}
GLUCOSE BLD-MCNC: 115 MG/DL (ref 70–99)
GLUCOSE BLD-MCNC: 118 MG/DL (ref 70–99)
GLUCOSE BLD-MCNC: 122 MG/DL (ref 70–99)
GLUCOSE BLD-MCNC: 126 MG/DL (ref 70–99)
GLUCOSE SERPL-MCNC: 104 MG/DL (ref 70–99)
HBA1C MFR BLD: 6.9 %
HCT VFR BLD AUTO: 34.6 % (ref 40.5–52.5)
HGB BLD-MCNC: 11.4 G/DL (ref 13.5–17.5)
INR PPP: 4.09 (ref 0.85–1.15)
LYMPHOCYTES # BLD: 0.4 K/UL (ref 1–5.1)
LYMPHOCYTES NFR BLD: 9.6 %
MAGNESIUM SERPL-MCNC: 2.03 MG/DL (ref 1.8–2.4)
MCH RBC QN AUTO: 29 PG (ref 26–34)
MCHC RBC AUTO-ENTMCNC: 33 G/DL (ref 31–36)
MCV RBC AUTO: 88 FL (ref 80–100)
MONOCYTES # BLD: 0.3 K/UL (ref 0–1.3)
MONOCYTES NFR BLD: 7.8 %
NEUTROPHILS # BLD: 3.3 K/UL (ref 1.7–7.7)
NEUTROPHILS NFR BLD: 80 %
PERFORMED ON: ABNORMAL
PHOSPHATE SERPL-MCNC: 2.8 MG/DL (ref 2.5–4.9)
PLATELET # BLD AUTO: 114 K/UL (ref 135–450)
PMV BLD AUTO: 9 FL (ref 5–10.5)
POTASSIUM SERPL-SCNC: 3.4 MMOL/L (ref 3.5–5.1)
PROTHROMBIN TIME: 39.3 SEC (ref 11.9–14.9)
RBC # BLD AUTO: 3.94 M/UL (ref 4.2–5.9)
SODIUM SERPL-SCNC: 132 MMOL/L (ref 136–145)
WBC # BLD AUTO: 4.1 K/UL (ref 4–11)

## 2025-03-03 PROCEDURE — 97530 THERAPEUTIC ACTIVITIES: CPT

## 2025-03-03 PROCEDURE — 83735 ASSAY OF MAGNESIUM: CPT

## 2025-03-03 PROCEDURE — 83550 IRON BINDING TEST: CPT

## 2025-03-03 PROCEDURE — 97162 PT EVAL MOD COMPLEX 30 MIN: CPT

## 2025-03-03 PROCEDURE — 6370000000 HC RX 637 (ALT 250 FOR IP)

## 2025-03-03 PROCEDURE — 1200000000 HC SEMI PRIVATE

## 2025-03-03 PROCEDURE — 97116 GAIT TRAINING THERAPY: CPT

## 2025-03-03 PROCEDURE — 2580000003 HC RX 258

## 2025-03-03 PROCEDURE — 82728 ASSAY OF FERRITIN: CPT

## 2025-03-03 PROCEDURE — 36415 COLL VENOUS BLD VENIPUNCTURE: CPT

## 2025-03-03 PROCEDURE — 85610 PROTHROMBIN TIME: CPT

## 2025-03-03 PROCEDURE — 70460 CT HEAD/BRAIN W/DYE: CPT

## 2025-03-03 PROCEDURE — 83036 HEMOGLOBIN GLYCOSYLATED A1C: CPT

## 2025-03-03 PROCEDURE — 97166 OT EVAL MOD COMPLEX 45 MIN: CPT

## 2025-03-03 PROCEDURE — 85025 COMPLETE CBC W/AUTO DIFF WBC: CPT

## 2025-03-03 PROCEDURE — 97535 SELF CARE MNGMENT TRAINING: CPT

## 2025-03-03 PROCEDURE — 80069 RENAL FUNCTION PANEL: CPT

## 2025-03-03 PROCEDURE — 83540 ASSAY OF IRON: CPT

## 2025-03-03 PROCEDURE — 2500000003 HC RX 250 WO HCPCS

## 2025-03-03 PROCEDURE — 6360000004 HC RX CONTRAST MEDICATION

## 2025-03-03 RX ORDER — IOPAMIDOL 755 MG/ML
75 INJECTION, SOLUTION INTRAVASCULAR
Status: COMPLETED | OUTPATIENT
Start: 2025-03-03 | End: 2025-03-03

## 2025-03-03 RX ORDER — LORAZEPAM 2 MG/ML
1 INJECTION INTRAMUSCULAR
Status: ACTIVE | OUTPATIENT
Start: 2025-03-03 | End: 2025-03-04

## 2025-03-03 RX ORDER — POTASSIUM CHLORIDE 1500 MG/1
40 TABLET, EXTENDED RELEASE ORAL ONCE
Status: COMPLETED | OUTPATIENT
Start: 2025-03-03 | End: 2025-03-03

## 2025-03-03 RX ORDER — SODIUM CHLORIDE 9 MG/ML
INJECTION, SOLUTION INTRAVENOUS CONTINUOUS
Status: ACTIVE | OUTPATIENT
Start: 2025-03-03 | End: 2025-03-04

## 2025-03-03 RX ADMIN — FOLIC ACID 1 MG: 1 TABLET ORAL at 08:19

## 2025-03-03 RX ADMIN — SODIUM CHLORIDE: 0.9 INJECTION, SOLUTION INTRAVENOUS at 15:30

## 2025-03-03 RX ADMIN — OXYCODONE HYDROCHLORIDE AND ACETAMINOPHEN 2 TABLET: 5; 325 TABLET ORAL at 20:29

## 2025-03-03 RX ADMIN — ASPIRIN 81 MG: 81 TABLET, COATED ORAL at 08:20

## 2025-03-03 RX ADMIN — DOCUSATE SODIUM 100 MG: 100 CAPSULE, LIQUID FILLED ORAL at 08:20

## 2025-03-03 RX ADMIN — FERRIC OXIDE RED: 8; 8 LOTION TOPICAL at 20:30

## 2025-03-03 RX ADMIN — Medication 5 MG: at 20:29

## 2025-03-03 RX ADMIN — ROSUVASTATIN CALCIUM 20 MG: 20 TABLET, FILM COATED ORAL at 20:29

## 2025-03-03 RX ADMIN — FERRIC OXIDE RED: 8; 8 LOTION TOPICAL at 15:30

## 2025-03-03 RX ADMIN — FAMOTIDINE 20 MG: 20 TABLET, FILM COATED ORAL at 20:29

## 2025-03-03 RX ADMIN — FAMOTIDINE 20 MG: 20 TABLET, FILM COATED ORAL at 08:20

## 2025-03-03 RX ADMIN — IOPAMIDOL 75 ML: 755 INJECTION, SOLUTION INTRAVENOUS at 16:06

## 2025-03-03 RX ADMIN — CITALOPRAM 40 MG: 40 TABLET, FILM COATED ORAL at 08:20

## 2025-03-03 RX ADMIN — FERRIC OXIDE RED: 8; 8 LOTION TOPICAL at 08:20

## 2025-03-03 RX ADMIN — POTASSIUM CHLORIDE 40 MEQ: 1500 TABLET, EXTENDED RELEASE ORAL at 08:20

## 2025-03-03 RX ADMIN — SODIUM CHLORIDE, PRESERVATIVE FREE 10 ML: 5 INJECTION INTRAVENOUS at 08:21

## 2025-03-03 RX ADMIN — ONDANSETRON 4 MG: 4 TABLET, ORALLY DISINTEGRATING ORAL at 08:20

## 2025-03-03 RX ADMIN — OXYCODONE HYDROCHLORIDE AND ACETAMINOPHEN 1 TABLET: 5; 325 TABLET ORAL at 04:08

## 2025-03-03 RX ADMIN — CLONIDINE HYDROCHLORIDE 0.1 MG: 0.1 TABLET ORAL at 15:30

## 2025-03-03 ASSESSMENT — PAIN DESCRIPTION - DESCRIPTORS
DESCRIPTORS: ACHING;SHARP;THROBBING
DESCRIPTORS: ACHING

## 2025-03-03 ASSESSMENT — PAIN DESCRIPTION - ONSET: ONSET: ON-GOING

## 2025-03-03 ASSESSMENT — PAIN SCALES - GENERAL
PAINLEVEL_OUTOF10: 9
PAINLEVEL_OUTOF10: 0
PAINLEVEL_OUTOF10: 6
PAINLEVEL_OUTOF10: 0
PAINLEVEL_OUTOF10: 2

## 2025-03-03 ASSESSMENT — PAIN DESCRIPTION - ORIENTATION
ORIENTATION: RIGHT
ORIENTATION: LOWER;UPPER

## 2025-03-03 ASSESSMENT — PAIN DESCRIPTION - LOCATION
LOCATION: BUTTOCKS
LOCATION: BACK;NECK

## 2025-03-03 ASSESSMENT — PAIN DESCRIPTION - FREQUENCY: FREQUENCY: CONTINUOUS

## 2025-03-03 ASSESSMENT — PAIN DESCRIPTION - PAIN TYPE: TYPE: CHRONIC PAIN

## 2025-03-03 NOTE — CARE COORDINATION
LINDA  recv'd call from     [facility]  Address: [address] [place] [address], [place] [address]  Phone: [phone]        CM  spoke with pt  dgtr  t follow p on disposition  and  she  is  not  wanting him to return to  ILF  as he  is  forgetful  and prefers ro hav him go to  longterm  closer to her  which she is working on , and  requesting in the  meantime if  pt could go to SNF or respite, While she  confirms  plans  and  then transfer t  longterm .     CM making  referrals local and near [place] request.    Electronically signed by Jo Ann Moncada RN on 3/3/2025 at 2:48 PM     ++++++++++++++++++++++++++++++++++++++++++++++++++++++      CM  following for  d/c planning:       Disposition: Pending PT/OT evals, SNF vs return to [place],   -  CM can set pt up with home health to set up pt's meds if needed.     Electronically signed by Jo Ann Moncada RN on 3/3/2025 at 9:49 AM         Jo Ann Moncada RN Case Manager  [facility] [address]  [facility] [address] [phone]  Fax [phone]

## 2025-03-03 NOTE — DISCHARGE INSTR - COC
SECTION    Prognosis: Good    Condition at Discharge: Stable    Rehab Potential (if transferring to Rehab): Good    Recommended Labs or Other Treatments After Discharge:     -Repeat CBC, RFP, Magnesium three times weekly  -encourage oral nutrition  -check BP twice daily, AND ONLY ADMINISTER HOME BP MEDS IF ABOVE 130 SYSTOLIC  -continue calamine/zinc lotion   -review stool for evidence of worms    Physician Certification: I certify the above information and transfer of Alexandr Witt  is necessary for the continuing treatment of the diagnosis listed and that he requires Skilled Nursing Facility for less 30 days.     Update Admission H&P: Changes in H&P as follows -      Acute metabolic encephalopathy  Possible UTI  Concern for possible infectious etiology or malignancy spread to brain, but more likely to be depressed based on daughter's provided history where he was laying in bed for weeks at a time and not taking medications in independent living facility. Urinalysis demonstrated 2+ bacteria with some leukocytes concerning for possible urinary tract infection, and ceftriaxone was initiated IV.   Completed Ceftriaxone 1g daily IV 02/27-03/01  - MRI w/ w/o contrast to rule out malignancy/ metastasis was deemed not feasible as there is no remote for spinal stimulator.   -Patient uses Morphine sulfate 15mg every 5-6 hours PRN at home, will order Oxy pain panel 5-10mg to match home dosing in MME  -CT head w/ contrast 03/03 unremarkable     Likely GI Parasite infection  Patient has not been caring for self recently, concern for eating food that is unsafe. Noticed to have worms in stool per nursing staff, has semi-formed stools with bowel urgency and frequency. Unclear for how long, but abdomen is non-tender. Does not voice itching to anal region. Folate found to be low, with RDW increased and mild anemia of 11.7. Concern for accompanying iron deficiency anemia.   -one time dose of Albendazole 400mg PO given 03/02    Folic

## 2025-03-03 NOTE — PLAN OF CARE
Problem: Pain  Goal: Verbalizes/displays adequate comfort level or baseline comfort level  Outcome: Progressing  Note: Pt denies having any pain or discomfort this shift.      Problem: Safety - Adult  Goal: Free from fall injury  Outcome: Progressing  Note: Bed in lowest position, wheels locked, bed alarm on, call light within reach, video monitor in use,  at door.

## 2025-03-04 LAB
ALBUMIN SERPL-MCNC: 3 G/DL (ref 3.4–5)
ANION GAP SERPL CALCULATED.3IONS-SCNC: 10 MMOL/L (ref 3–16)
BASOPHILS # BLD: 0 K/UL (ref 0–0.2)
BASOPHILS NFR BLD: 0.8 %
BUN SERPL-MCNC: 23 MG/DL (ref 7–20)
CALCIUM SERPL-MCNC: 8.3 MG/DL (ref 8.3–10.6)
CHLORIDE SERPL-SCNC: 100 MMOL/L (ref 99–110)
CO2 SERPL-SCNC: 21 MMOL/L (ref 21–32)
CREAT SERPL-MCNC: 0.9 MG/DL (ref 0.8–1.3)
DEPRECATED RDW RBC AUTO: 17.5 % (ref 12.4–15.4)
EOSINOPHIL # BLD: 0.1 K/UL (ref 0–0.6)
EOSINOPHIL NFR BLD: 2 %
GFR SERPLBLD CREATININE-BSD FMLA CKD-EPI: >90 ML/MIN/{1.73_M2}
GLUCOSE BLD-MCNC: 127 MG/DL (ref 70–99)
GLUCOSE BLD-MCNC: 130 MG/DL (ref 70–99)
GLUCOSE BLD-MCNC: 132 MG/DL (ref 70–99)
GLUCOSE BLD-MCNC: 146 MG/DL (ref 70–99)
GLUCOSE SERPL-MCNC: 112 MG/DL (ref 70–99)
HCT VFR BLD AUTO: 32.1 % (ref 40.5–52.5)
HGB BLD-MCNC: 10.5 G/DL (ref 13.5–17.5)
INR PPP: 3.99 (ref 0.85–1.15)
IRON SATN MFR SERPL: 23 % (ref 20–50)
IRON SERPL-MCNC: 40 UG/DL (ref 59–158)
LYMPHOCYTES # BLD: 0.5 K/UL (ref 1–5.1)
LYMPHOCYTES NFR BLD: 13.7 %
MAGNESIUM SERPL-MCNC: 2.04 MG/DL (ref 1.8–2.4)
MCH RBC QN AUTO: 28.8 PG (ref 26–34)
MCHC RBC AUTO-ENTMCNC: 32.7 G/DL (ref 31–36)
MCV RBC AUTO: 88.2 FL (ref 80–100)
MONOCYTES # BLD: 0.3 K/UL (ref 0–1.3)
MONOCYTES NFR BLD: 10 %
NEUTROPHILS # BLD: 2.5 K/UL (ref 1.7–7.7)
NEUTROPHILS NFR BLD: 73.5 %
PERFORMED ON: ABNORMAL
PHOSPHATE SERPL-MCNC: 2.3 MG/DL (ref 2.5–4.9)
PLATELET # BLD AUTO: 110 K/UL (ref 135–450)
PMV BLD AUTO: 8.8 FL (ref 5–10.5)
POTASSIUM SERPL-SCNC: 3.9 MMOL/L (ref 3.5–5.1)
PROTHROMBIN TIME: 38.5 SEC (ref 11.9–14.9)
RBC # BLD AUTO: 3.64 M/UL (ref 4.2–5.9)
SODIUM SERPL-SCNC: 131 MMOL/L (ref 136–145)
TIBC SERPL-MCNC: 177 UG/DL (ref 260–445)
WBC # BLD AUTO: 3.4 K/UL (ref 4–11)

## 2025-03-04 PROCEDURE — 80069 RENAL FUNCTION PANEL: CPT

## 2025-03-04 PROCEDURE — 85025 COMPLETE CBC W/AUTO DIFF WBC: CPT

## 2025-03-04 PROCEDURE — 2580000003 HC RX 258

## 2025-03-04 PROCEDURE — 1200000000 HC SEMI PRIVATE

## 2025-03-04 PROCEDURE — 6370000000 HC RX 637 (ALT 250 FOR IP)

## 2025-03-04 PROCEDURE — 6360000002 HC RX W HCPCS

## 2025-03-04 PROCEDURE — 36415 COLL VENOUS BLD VENIPUNCTURE: CPT

## 2025-03-04 PROCEDURE — 51798 US URINE CAPACITY MEASURE: CPT

## 2025-03-04 PROCEDURE — 85610 PROTHROMBIN TIME: CPT

## 2025-03-04 PROCEDURE — 2500000003 HC RX 250 WO HCPCS

## 2025-03-04 PROCEDURE — 83735 ASSAY OF MAGNESIUM: CPT

## 2025-03-04 RX ORDER — AMLODIPINE BESYLATE 10 MG/1
10 TABLET ORAL DAILY
Qty: 30 TABLET | Refills: 0 | Status: SHIPPED | OUTPATIENT
Start: 2025-03-04 | End: 2025-03-10

## 2025-03-04 RX ORDER — CARVEDILOL 25 MG/1
25 TABLET ORAL 2 TIMES DAILY WITH MEALS
Qty: 180 TABLET | Refills: 3 | Status: SHIPPED | OUTPATIENT
Start: 2025-03-04

## 2025-03-04 RX ORDER — PROCHLORPERAZINE EDISYLATE 5 MG/ML
10 INJECTION INTRAMUSCULAR; INTRAVENOUS EVERY 6 HOURS PRN
Status: DISCONTINUED | OUTPATIENT
Start: 2025-03-04 | End: 2025-03-10 | Stop reason: HOSPADM

## 2025-03-04 RX ORDER — HYDRALAZINE HYDROCHLORIDE 25 MG/1
25 TABLET, FILM COATED ORAL EVERY 8 HOURS SCHEDULED
Status: SHIPPED | OUTPATIENT
Start: 2025-03-04

## 2025-03-04 RX ORDER — SACUBITRIL AND VALSARTAN 24; 26 MG/1; MG/1
1 TABLET, FILM COATED ORAL 2 TIMES DAILY
Qty: 60 TABLET | Refills: 5 | Status: SHIPPED | OUTPATIENT
Start: 2025-03-04

## 2025-03-04 RX ORDER — CALAMINE 8% AND ZINC OXIDE 8% 160 MG/ML
LOTION TOPICAL 3 TIMES DAILY
Qty: 177 ML | Refills: 0 | Status: SHIPPED | OUTPATIENT
Start: 2025-03-04

## 2025-03-04 RX ADMIN — FERRIC OXIDE RED: 8; 8 LOTION TOPICAL at 11:06

## 2025-03-04 RX ADMIN — Medication 5 MG: at 20:08

## 2025-03-04 RX ADMIN — DOCUSATE SODIUM 100 MG: 100 CAPSULE, LIQUID FILLED ORAL at 11:04

## 2025-03-04 RX ADMIN — FERRIC OXIDE RED: 8; 8 LOTION TOPICAL at 14:20

## 2025-03-04 RX ADMIN — ONDANSETRON 4 MG: 2 INJECTION INTRAMUSCULAR; INTRAVENOUS at 21:05

## 2025-03-04 RX ADMIN — FAMOTIDINE 20 MG: 20 TABLET, FILM COATED ORAL at 11:03

## 2025-03-04 RX ADMIN — ONDANSETRON 4 MG: 2 INJECTION INTRAMUSCULAR; INTRAVENOUS at 11:04

## 2025-03-04 RX ADMIN — PANTOPRAZOLE SODIUM 40 MG: 40 INJECTION, POWDER, FOR SOLUTION INTRAVENOUS at 15:51

## 2025-03-04 RX ADMIN — SODIUM CHLORIDE, PRESERVATIVE FREE 10 ML: 5 INJECTION INTRAVENOUS at 09:00

## 2025-03-04 RX ADMIN — ROSUVASTATIN CALCIUM 20 MG: 20 TABLET, FILM COATED ORAL at 20:08

## 2025-03-04 RX ADMIN — ASPIRIN 81 MG: 81 TABLET, COATED ORAL at 11:03

## 2025-03-04 RX ADMIN — FOLIC ACID 1 MG: 1 TABLET ORAL at 11:03

## 2025-03-04 RX ADMIN — PROCHLORPERAZINE EDISYLATE 10 MG: 5 INJECTION INTRAMUSCULAR; INTRAVENOUS at 15:51

## 2025-03-04 RX ADMIN — SODIUM CHLORIDE, PRESERVATIVE FREE 10 ML: 5 INJECTION INTRAVENOUS at 20:08

## 2025-03-04 RX ADMIN — CITALOPRAM 40 MG: 40 TABLET, FILM COATED ORAL at 11:03

## 2025-03-04 NOTE — DISCHARGE INSTRUCTIONS
Dear Mr. Witt,     You were brought to [facility] after having progressively worsening intake for one month with increased confusion and diarrhea. Your lab work and imaging (CT of the head) were unremarkable but we did observe some worms in your stool. For this you were treated with an anti-parasite medication and your diarrhea resolved. We a couple adjustments to your home medications listed below    -START Zinc-Calamine lotion three times daily for anal excoriation    -ALSO, make sure to only take your blood pressure medications IF BLOOD PRESSURE IS ABOVE A SPECIFIC RANGE (PER INSTRUCTIONS)    - Please DO NOT TAKE YOUR AMLODIPINE (Norvasc) until you follow up with your cardiologist.  - Also, DO NOT TAKE YOUR WARFARIN until you follow up with cardiology due to risk of re-bleeding from the ulcer  - Continue taking Aspirin 1 tab daily.  - You are being discharged on Pantoprazole 40 MG tablet, Take 1 tablet by mouth in the morning and at bedtime for 60 days, THEN on 5/9/2025 start taking 1 tablet daily indefinitely.         We would also recommend you schedule a follow-up appointment with your PCP within a week .    If you experience an acute change please do not hesitate to visit our ER or call 911.     It was a pleasure to care for you,        Extra Heart Failure Education/ Tools/ Resources:     [email or web address]/publication/?[email or web address]   --- this is American Heart Association interactive Healthier Living with Heart Failure guidebook.  Please click hyperlink or copy / paste link into search bar. The QR Code is also available below. Use your mouse to scroll through the pages.  Lots of information about weight monitoring, diet tips, activity, meds, etc    Heart Failure Tools and Resources QR Code is below. It includes multiple resources to include symptom tracker, med tracker, further HF info, and access to a HF Support Network online Community    HF [place] Cleopatra  -- this is a free smart phone cleopatra available for

## 2025-03-04 NOTE — PLAN OF CARE
D: Low light, bed alarm, non skid socks, and avasys monitor for safety. C/o chronic pain neck and lower back see pain  assessment and MAR. Did sleep some overnight.   A: Cont to monitor during hourly rounds    Problem: Chronic Conditions and Co-morbidities  Goal: Patient's chronic conditions and co-morbidity symptoms are monitored and maintained or improved  Outcome: Progressing     Problem: Pain  Goal: Verbalizes/displays adequate comfort level or baseline comfort level  3/4/2025 0505 by Shabnam Diaz, RN  Outcome: Progressing  3/3/2025 1539 by Catrachita Lee, RN  Outcome: Progressing  Note: Pt denies having any pain or discomfort this shift.      Problem: Safety - Adult  Goal: Free from fall injury  3/3/2025 1539 by Catrachita Lee, RN  Outcome: Progressing  Note: Bed in lowest position, wheels locked, bed alarm on, call light within reach, video monitor in use,  at door.

## 2025-03-04 NOTE — CARE COORDINATION
CM  following for  d/c planning      CM  spoke with  pt  dede to update her  re: SNF referrals;    However  Rosa Maria stated  she  changed her mind  and prefers  her father got to his  ILF since they are pay ing rent already and she will  arrange  for 24 hr  HHA for  supervision initially , ( then plans to find custodial closer to her  in [place] ).  -  CM provided [facility]  as resource to assist her  and she is familiar  with them .    She reached out to  [facility]  for resources and  arranged  HHA through  [facility]:  and they can  begin tomorrow . Patient will need  EMS transport  , and  [facility] liaison is arranging  HHC  .    Patient's dgtr to let us know time aid is available and CM  will arrange  transport .  Rosa Maria  Lives  in [facility] ) and unable to transport .     [facility]  Address: [address], [place] [address]  Phone: [phone]    Electronically signed by Jo Ann Moncada RN on 3/4/2025 at 5:27 PM       Jo Ann Moncada RN Case Manager  [facility] [place] [facility] [address]  [facility] [id number] [phone]  Fax [phone]

## 2025-03-05 PROBLEM — B83.9: Status: ACTIVE | Noted: 2025-03-05

## 2025-03-05 PROBLEM — D62 ACUTE BLOOD LOSS ANEMIA: Status: ACTIVE | Noted: 2025-03-05

## 2025-03-05 PROBLEM — K92.1 MELENA: Status: ACTIVE | Noted: 2025-03-05

## 2025-03-05 LAB
ABO/RH: NORMAL
ALBUMIN SERPL-MCNC: 3.2 G/DL (ref 3.4–5)
ANION GAP SERPL CALCULATED.3IONS-SCNC: 11 MMOL/L (ref 3–16)
ANTIBODY SCREEN: NORMAL
BASOPHILS # BLD: 0 K/UL (ref 0–0.2)
BASOPHILS NFR BLD: 0.9 %
BUN SERPL-MCNC: 39 MG/DL (ref 7–20)
CALCIUM SERPL-MCNC: 8.4 MG/DL (ref 8.3–10.6)
CHLORIDE SERPL-SCNC: 99 MMOL/L (ref 99–110)
CO2 SERPL-SCNC: 21 MMOL/L (ref 21–32)
CREAT SERPL-MCNC: 1 MG/DL (ref 0.8–1.3)
DEPRECATED RDW RBC AUTO: 17.3 % (ref 12.4–15.4)
EOSINOPHIL # BLD: 0 K/UL (ref 0–0.6)
EOSINOPHIL NFR BLD: 0.7 %
GFR SERPLBLD CREATININE-BSD FMLA CKD-EPI: 85 ML/MIN/{1.73_M2}
GLUCOSE BLD-MCNC: 157 MG/DL (ref 70–99)
GLUCOSE BLD-MCNC: 157 MG/DL (ref 70–99)
GLUCOSE BLD-MCNC: 188 MG/DL (ref 70–99)
GLUCOSE BLD-MCNC: 215 MG/DL (ref 70–99)
GLUCOSE SERPL-MCNC: 134 MG/DL (ref 70–99)
HCT VFR BLD AUTO: 22.7 % (ref 40.5–52.5)
HCT VFR BLD AUTO: 24.1 % (ref 40.5–52.5)
HCT VFR BLD AUTO: 26.9 % (ref 40.5–52.5)
HCT VFR BLD AUTO: 27.3 % (ref 40.5–52.5)
HGB BLD-MCNC: 7.3 G/DL (ref 13.5–17.5)
HGB BLD-MCNC: 8 G/DL (ref 13.5–17.5)
HGB BLD-MCNC: 8.8 G/DL (ref 13.5–17.5)
HGB BLD-MCNC: 8.9 G/DL (ref 13.5–17.5)
INR PPP: 3.83 (ref 0.85–1.15)
LYMPHOCYTES # BLD: 0.7 K/UL (ref 1–5.1)
LYMPHOCYTES NFR BLD: 16.1 %
MAGNESIUM SERPL-MCNC: 2.08 MG/DL (ref 1.8–2.4)
MCH RBC QN AUTO: 28.8 PG (ref 26–34)
MCHC RBC AUTO-ENTMCNC: 33 G/DL (ref 31–36)
MCV RBC AUTO: 87.2 FL (ref 80–100)
MONOCYTES # BLD: 0.4 K/UL (ref 0–1.3)
MONOCYTES NFR BLD: 9.9 %
NEUTROPHILS # BLD: 3 K/UL (ref 1.7–7.7)
NEUTROPHILS NFR BLD: 72.4 %
PERFORMED ON: ABNORMAL
PHOSPHATE SERPL-MCNC: 2.2 MG/DL (ref 2.5–4.9)
PLATELET # BLD AUTO: 128 K/UL (ref 135–450)
PMV BLD AUTO: 9.1 FL (ref 5–10.5)
POTASSIUM SERPL-SCNC: 3.6 MMOL/L (ref 3.5–5.1)
PROTHROMBIN TIME: 37.3 SEC (ref 11.9–14.9)
RBC # BLD AUTO: 3.08 M/UL (ref 4.2–5.9)
SODIUM SERPL-SCNC: 131 MMOL/L (ref 136–145)
WBC # BLD AUTO: 4.2 K/UL (ref 4–11)

## 2025-03-05 PROCEDURE — 86923 COMPATIBILITY TEST ELECTRIC: CPT

## 2025-03-05 PROCEDURE — 2500000003 HC RX 250 WO HCPCS

## 2025-03-05 PROCEDURE — 6370000000 HC RX 637 (ALT 250 FOR IP)

## 2025-03-05 PROCEDURE — 86901 BLOOD TYPING SEROLOGIC RH(D): CPT

## 2025-03-05 PROCEDURE — 85014 HEMATOCRIT: CPT

## 2025-03-05 PROCEDURE — 83735 ASSAY OF MAGNESIUM: CPT

## 2025-03-05 PROCEDURE — 85025 COMPLETE CBC W/AUTO DIFF WBC: CPT

## 2025-03-05 PROCEDURE — 86850 RBC ANTIBODY SCREEN: CPT

## 2025-03-05 PROCEDURE — 85610 PROTHROMBIN TIME: CPT

## 2025-03-05 PROCEDURE — 6360000002 HC RX W HCPCS

## 2025-03-05 PROCEDURE — 86920 COMPATIBILITY TEST SPIN: CPT

## 2025-03-05 PROCEDURE — 80069 RENAL FUNCTION PANEL: CPT

## 2025-03-05 PROCEDURE — 2580000003 HC RX 258

## 2025-03-05 PROCEDURE — 85018 HEMOGLOBIN: CPT

## 2025-03-05 PROCEDURE — 1200000000 HC SEMI PRIVATE

## 2025-03-05 PROCEDURE — P9016 RBC LEUKOCYTES REDUCED: HCPCS

## 2025-03-05 PROCEDURE — 86900 BLOOD TYPING SEROLOGIC ABO: CPT

## 2025-03-05 PROCEDURE — 30233N1 TRANSFUSION OF NONAUTOLOGOUS RED BLOOD CELLS INTO PERIPHERAL VEIN, PERCUTANEOUS APPROACH: ICD-10-PCS | Performed by: INTERNAL MEDICINE

## 2025-03-05 PROCEDURE — 36415 COLL VENOUS BLD VENIPUNCTURE: CPT

## 2025-03-05 RX ORDER — SODIUM CHLORIDE 9 MG/ML
INJECTION, SOLUTION INTRAVENOUS CONTINUOUS
Status: ACTIVE | OUTPATIENT
Start: 2025-03-05 | End: 2025-03-06

## 2025-03-05 RX ORDER — SODIUM CHLORIDE 9 MG/ML
INJECTION, SOLUTION INTRAVENOUS CONTINUOUS
Status: DISCONTINUED | OUTPATIENT
Start: 2025-03-05 | End: 2025-03-05

## 2025-03-05 RX ADMIN — CITALOPRAM 40 MG: 40 TABLET, FILM COATED ORAL at 08:45

## 2025-03-05 RX ADMIN — SODIUM CHLORIDE: 0.9 INJECTION, SOLUTION INTRAVENOUS at 12:41

## 2025-03-05 RX ADMIN — SODIUM CHLORIDE, PRESERVATIVE FREE 10 ML: 5 INJECTION INTRAVENOUS at 08:45

## 2025-03-05 RX ADMIN — OXYCODONE HYDROCHLORIDE AND ACETAMINOPHEN 1 TABLET: 5; 325 TABLET ORAL at 22:44

## 2025-03-05 RX ADMIN — ROSUVASTATIN CALCIUM 20 MG: 20 TABLET, FILM COATED ORAL at 22:45

## 2025-03-05 RX ADMIN — PANTOPRAZOLE SODIUM 40 MG: 40 INJECTION, POWDER, FOR SOLUTION INTRAVENOUS at 16:36

## 2025-03-05 RX ADMIN — FERRIC OXIDE RED: 8; 8 LOTION TOPICAL at 22:46

## 2025-03-05 RX ADMIN — SODIUM CHLORIDE, PRESERVATIVE FREE 10 ML: 5 INJECTION INTRAVENOUS at 22:45

## 2025-03-05 RX ADMIN — SODIUM CHLORIDE: 0.9 INJECTION, SOLUTION INTRAVENOUS at 22:56

## 2025-03-05 RX ADMIN — FERRIC OXIDE RED: 8; 8 LOTION TOPICAL at 18:09

## 2025-03-05 RX ADMIN — FERRIC OXIDE RED: 8; 8 LOTION TOPICAL at 08:45

## 2025-03-05 RX ADMIN — INSULIN LISPRO 1 UNITS: 100 INJECTION, SOLUTION INTRAVENOUS; SUBCUTANEOUS at 08:46

## 2025-03-05 RX ADMIN — DOCUSATE SODIUM 100 MG: 100 CAPSULE, LIQUID FILLED ORAL at 08:45

## 2025-03-05 RX ADMIN — FOLIC ACID 1 MG: 1 TABLET ORAL at 08:45

## 2025-03-05 RX ADMIN — Medication 5 MG: at 22:45

## 2025-03-05 ASSESSMENT — PAIN SCALES - GENERAL
PAINLEVEL_OUTOF10: 0
PAINLEVEL_OUTOF10: 7

## 2025-03-05 ASSESSMENT — PAIN DESCRIPTION - ORIENTATION: ORIENTATION: OTHER (COMMENT)

## 2025-03-05 ASSESSMENT — PAIN DESCRIPTION - DESCRIPTORS: DESCRIPTORS: ACHING

## 2025-03-05 ASSESSMENT — PAIN - FUNCTIONAL ASSESSMENT: PAIN_FUNCTIONAL_ASSESSMENT: ACTIVITIES ARE NOT PREVENTED

## 2025-03-05 ASSESSMENT — PAIN DESCRIPTION - LOCATION: LOCATION: GENERALIZED

## 2025-03-05 NOTE — PLAN OF CARE
Problem: Chronic Conditions and Co-morbidities  Goal: Patient's chronic conditions and co-morbidity symptoms are monitored and maintained or improved  Outcome: Progressing     Problem: Discharge Planning  Goal: Discharge to home or other facility with appropriate resources  Outcome: Progressing     Problem: Pain  Goal: Verbalizes/displays adequate comfort level or baseline comfort level  Outcome: Progressing     Problem: Skin/Tissue Integrity  Goal: Skin integrity remains intact  Description: 1.  Monitor for areas of redness and/or skin breakdown  2.  Assess vascular access sites hourly  3.  Every 4-6 hours minimum:  Change oxygen saturation probe site  4.  Every 4-6 hours:  If on nasal continuous positive airway pressure, respiratory therapy assess nares and determine need for appliance change or resting period  Outcome: Progressing     Problem: Safety - Adult  Goal: Free from fall injury  Outcome: Progressing     Problem: Confusion  Goal: Confusion, delirium, dementia, or psychosis is improved or at baseline  Description: INTERVENTIONS:  1. Assess for possible contributors to thought disturbance, including medications, impaired vision or hearing, underlying metabolic abnormalities, dehydration, psychiatric diagnoses, and notify attending LIP  2. [place] high risk fall precautions, as indicated  3. Provide frequent short contacts to provide reality reorientation, refocusing and direction  4. Decrease environmental stimuli, including noise as appropriate  5. Monitor and intervene to maintain adequate nutrition, hydration, elimination, sleep and activity  6. If unable to ensure safety without constant attention obtain sitter and review sitter guidelines with assigned personnel  7. Initiate Psychosocial CNS and Spiritual Care consult, as indicated  Outcome: Progressing     Problem: Nutrition Deficit:  Goal: Optimize nutritional status  Outcome: Progressing    Problem: Respiratory - Adult  Goal: Achieves optimal

## 2025-03-05 NOTE — CONSENT
Informed Consent for Blood Component Transfusion Note    I have discussed with the patient and daughter the rationale for blood component transfusion; its benefits in treating or preventing fatigue, organ damage, or death; and its risk which includes mild transfusion reactions, rare risk of blood borne infection, or more serious but rare reactions. I have discussed the alternatives to transfusion, including the risk and consequences of not receiving transfusion. The patient and daughter had an opportunity to ask questions and had agreed to proceed with transfusion of blood components.    Electronically signed by Quinten Barnes MD on 3/5/25 at 2:08 PM EST

## 2025-03-05 NOTE — PLAN OF CARE
Problem: Chronic Conditions and Co-morbidities  Goal: Patient's chronic conditions and co-morbidity symptoms are monitored and maintained or improved  Outcome: Progressing     Problem: Discharge Planning  Goal: Discharge to home or other facility with appropriate resources  Outcome: Progressing     Problem: Pain  Goal: Verbalizes/displays adequate comfort level or baseline comfort level  Outcome: Progressing     Problem: Skin/Tissue Integrity  Goal: Skin integrity remains intact  Description: 1.  Monitor for areas of redness and/or skin breakdown  2.  Assess vascular access sites hourly  3.  Every 4-6 hours minimum:  Change oxygen saturation probe site  4.  Every 4-6 hours:  If on nasal continuous positive airway pressure, respiratory therapy assess nares and determine need for appliance change or resting period  Outcome: Progressing     Problem: Safety - Adult  Goal: Free from fall injury  Outcome: Progressing     Problem: Confusion  Goal: Confusion, delirium, dementia, or psychosis is improved or at baseline  Description: INTERVENTIONS:  1. Assess for possible contributors to thought disturbance, including medications, impaired vision or hearing, underlying metabolic abnormalities, dehydration, psychiatric diagnoses, and notify attending LIP  2. [place] high risk fall precautions, as indicated  3. Provide frequent short contacts to provide reality reorientation, refocusing and direction  4. Decrease environmental stimuli, including noise as appropriate  5. Monitor and intervene to maintain adequate nutrition, hydration, elimination, sleep and activity  6. If unable to ensure safety without constant attention obtain sitter and review sitter guidelines with assigned personnel  7. Initiate Psychosocial CNS and Spiritual Care consult, as indicated  Outcome: Progressing     Problem: Nutrition Deficit:  Goal: Optimize nutritional status  Outcome: Progressing     Problem: Respiratory - Adult  Goal: Achieves optimal

## 2025-03-06 ENCOUNTER — ANESTHESIA EVENT (OUTPATIENT)
Dept: ENDOSCOPY | Age: 60
End: 2025-03-06
Payer: MEDICARE

## 2025-03-06 ENCOUNTER — APPOINTMENT (OUTPATIENT)
Dept: ENDOSCOPY | Age: 60
DRG: 689 | End: 2025-03-06
Attending: INTERNAL MEDICINE
Payer: MEDICARE

## 2025-03-06 ENCOUNTER — ANESTHESIA (OUTPATIENT)
Dept: ENDOSCOPY | Age: 60
End: 2025-03-06
Payer: MEDICARE

## 2025-03-06 LAB
6MAM SERPL-MCNC: <2 NG/ML
ALBUMIN SERPL-MCNC: 3 G/DL (ref 3.4–5)
ALBUMIN SERPL-MCNC: 3 G/DL (ref 3.4–5)
ALP SERPL-CCNC: 34 U/L (ref 40–129)
ALT SERPL-CCNC: 8 U/L (ref 10–40)
ANION GAP SERPL CALCULATED.3IONS-SCNC: 9 MMOL/L (ref 3–16)
AST SERPL-CCNC: 16 U/L (ref 15–37)
BASOPHILS # BLD: 1.9 K/UL (ref 0–0.2)
BASOPHILS NFR BLD: 45.5 %
BILIRUB DIRECT SERPL-MCNC: <0.1 MG/DL (ref 0–0.3)
BILIRUB INDIRECT SERPL-MCNC: ABNORMAL MG/DL (ref 0–1)
BILIRUB SERPL-MCNC: <0.2 MG/DL (ref 0–1)
BLOOD BANK DISPENSE STATUS: NORMAL
BLOOD BANK DISPENSE STATUS: NORMAL
BLOOD BANK PRODUCT CODE: NORMAL
BLOOD BANK PRODUCT CODE: NORMAL
BPU ID: NORMAL
BPU ID: NORMAL
BUN SERPL-MCNC: 32 MG/DL (ref 7–20)
CALCIUM SERPL-MCNC: 8.2 MG/DL (ref 8.3–10.6)
CHLORIDE SERPL-SCNC: 103 MMOL/L (ref 99–110)
CO2 SERPL-SCNC: 22 MMOL/L (ref 21–32)
CODEINE SERPL-MCNC: <2 NG/ML
CREAT SERPL-MCNC: 0.9 MG/DL (ref 0.8–1.3)
DEPRECATED RDW RBC AUTO: 17.3 % (ref 12.4–15.4)
DESCRIPTION BLOOD BANK: NORMAL
DESCRIPTION BLOOD BANK: NORMAL
EOSINOPHIL # BLD: 0 K/UL (ref 0–0.6)
EOSINOPHIL NFR BLD: 1.1 %
GFR SERPLBLD CREATININE-BSD FMLA CKD-EPI: >90 ML/MIN/{1.73_M2}
GLUCOSE BLD-MCNC: 160 MG/DL (ref 70–99)
GLUCOSE BLD-MCNC: 161 MG/DL (ref 70–99)
GLUCOSE BLD-MCNC: 187 MG/DL (ref 70–99)
GLUCOSE BLD-MCNC: 211 MG/DL (ref 70–99)
GLUCOSE BLD-MCNC: 212 MG/DL (ref 70–99)
GLUCOSE SERPL-MCNC: 139 MG/DL (ref 70–99)
HCT VFR BLD AUTO: 19.4 % (ref 40.5–52.5)
HCT VFR BLD AUTO: 21.3 % (ref 40.5–52.5)
HCT VFR BLD AUTO: 22.9 % (ref 40.5–52.5)
HCT VFR BLD AUTO: 23 % (ref 40.5–52.5)
HGB BLD-MCNC: 6.4 G/DL (ref 13.5–17.5)
HGB BLD-MCNC: 6.9 G/DL (ref 13.5–17.5)
HGB BLD-MCNC: 7.6 G/DL (ref 13.5–17.5)
HGB BLD-MCNC: 7.7 G/DL (ref 13.5–17.5)
HYDROCODONE SERPL-MCNC: <2 NG/ML
HYDROMORPHONE SERPL-MCNC: <2 NG/ML
INR PPP: 1.12 (ref 0.85–1.15)
INR PPP: 1.89 (ref 0.85–1.15)
INR PPP: 4.09 (ref 0.85–1.15)
LYMPHOCYTES # BLD: 0.6 K/UL (ref 1–5.1)
LYMPHOCYTES NFR BLD: 15.5 %
MAGNESIUM SERPL-MCNC: 2.09 MG/DL (ref 1.8–2.4)
MCH RBC QN AUTO: 28.4 PG (ref 26–34)
MCHC RBC AUTO-ENTMCNC: 32.5 G/DL (ref 31–36)
MCV RBC AUTO: 87.2 FL (ref 80–100)
MONOCYTES # BLD: 0.4 K/UL (ref 0–1.3)
MONOCYTES NFR BLD: 8.5 %
MORPHINE SERPL-MCNC: <2 NG/ML
NEUTROPHILS # BLD: 1.2 K/UL (ref 1.7–7.7)
NEUTROPHILS NFR BLD: 29.4 %
OXYCODONE SERPL-MCNC: <2 NG/ML
OXYMORPHONE SERPL-MCNC: <2 NG/ML
PERFORMED ON: ABNORMAL
PHOSPHATE SERPL-MCNC: 2.1 MG/DL (ref 2.5–4.9)
PLATELET # BLD AUTO: 117 K/UL (ref 135–450)
PMV BLD AUTO: 8.5 FL (ref 5–10.5)
POTASSIUM SERPL-SCNC: 3.6 MMOL/L (ref 3.5–5.1)
PROT SERPL-MCNC: 5.6 G/DL (ref 6.4–8.2)
PROTHROMBIN TIME: 14.6 SEC (ref 11.9–14.9)
PROTHROMBIN TIME: 21.8 SEC (ref 11.9–14.9)
PROTHROMBIN TIME: 39.3 SEC (ref 11.9–14.9)
RBC # BLD AUTO: 2.44 M/UL (ref 4.2–5.9)
SODIUM SERPL-SCNC: 134 MMOL/L (ref 136–145)
WBC # BLD AUTO: 4.2 K/UL (ref 4–11)

## 2025-03-06 PROCEDURE — 6360000002 HC RX W HCPCS: Performed by: INTERNAL MEDICINE

## 2025-03-06 PROCEDURE — 6360000002 HC RX W HCPCS

## 2025-03-06 PROCEDURE — 36415 COLL VENOUS BLD VENIPUNCTURE: CPT

## 2025-03-06 PROCEDURE — 7100000001 HC PACU RECOVERY - ADDTL 15 MIN: Performed by: INTERNAL MEDICINE

## 2025-03-06 PROCEDURE — 80069 RENAL FUNCTION PANEL: CPT

## 2025-03-06 PROCEDURE — 2580000003 HC RX 258: Performed by: INTERNAL MEDICINE

## 2025-03-06 PROCEDURE — 85018 HEMOGLOBIN: CPT

## 2025-03-06 PROCEDURE — 2580000003 HC RX 258

## 2025-03-06 PROCEDURE — 6370000000 HC RX 637 (ALT 250 FOR IP)

## 2025-03-06 PROCEDURE — 85025 COMPLETE CBC W/AUTO DIFF WBC: CPT

## 2025-03-06 PROCEDURE — 3609013800 HC EGD SUBMUCOSAL/BOTOX INJECTION: Performed by: INTERNAL MEDICINE

## 2025-03-06 PROCEDURE — 6360000002 HC RX W HCPCS: Performed by: NURSE PRACTITIONER

## 2025-03-06 PROCEDURE — 3700000000 HC ANESTHESIA ATTENDED CARE: Performed by: INTERNAL MEDICINE

## 2025-03-06 PROCEDURE — 7100000000 HC PACU RECOVERY - FIRST 15 MIN: Performed by: INTERNAL MEDICINE

## 2025-03-06 PROCEDURE — 36430 TRANSFUSION BLD/BLD COMPNT: CPT

## 2025-03-06 PROCEDURE — 85014 HEMATOCRIT: CPT

## 2025-03-06 PROCEDURE — 2500000003 HC RX 250 WO HCPCS

## 2025-03-06 PROCEDURE — 3609013000 HC EGD TRANSORAL CONTROL BLEEDING ANY METHOD: Performed by: INTERNAL MEDICINE

## 2025-03-06 PROCEDURE — 0DJ08ZZ INSPECTION OF UPPER INTESTINAL TRACT, VIA NATURAL OR ARTIFICIAL OPENING ENDOSCOPIC: ICD-10-PCS | Performed by: INTERNAL MEDICINE

## 2025-03-06 PROCEDURE — 2000000000 HC ICU R&B

## 2025-03-06 PROCEDURE — 83735 ASSAY OF MAGNESIUM: CPT

## 2025-03-06 PROCEDURE — 3700000001 HC ADD 15 MINUTES (ANESTHESIA): Performed by: INTERNAL MEDICINE

## 2025-03-06 PROCEDURE — 85610 PROTHROMBIN TIME: CPT

## 2025-03-06 PROCEDURE — 80076 HEPATIC FUNCTION PANEL: CPT

## 2025-03-06 PROCEDURE — 6360000002 HC RX W HCPCS: Performed by: ANESTHESIOLOGY

## 2025-03-06 RX ORDER — SODIUM CHLORIDE, SODIUM LACTATE, POTASSIUM CHLORIDE, CALCIUM CHLORIDE 600; 310; 30; 20 MG/100ML; MG/100ML; MG/100ML; MG/100ML
INJECTION, SOLUTION INTRAVENOUS ONCE
Status: COMPLETED | OUTPATIENT
Start: 2025-03-06 | End: 2025-03-06

## 2025-03-06 RX ORDER — PROPOFOL 10 MG/ML
INJECTION, EMULSION INTRAVENOUS
Status: DISCONTINUED | OUTPATIENT
Start: 2025-03-06 | End: 2025-03-06 | Stop reason: SDUPTHER

## 2025-03-06 RX ORDER — LIDOCAINE HYDROCHLORIDE 20 MG/ML
INJECTION, SOLUTION INFILTRATION; PERINEURAL
Status: DISCONTINUED | OUTPATIENT
Start: 2025-03-06 | End: 2025-03-06 | Stop reason: SDUPTHER

## 2025-03-06 RX ORDER — ONDANSETRON 2 MG/ML
INJECTION INTRAMUSCULAR; INTRAVENOUS
Status: DISCONTINUED | OUTPATIENT
Start: 2025-03-06 | End: 2025-03-06 | Stop reason: SDUPTHER

## 2025-03-06 RX ORDER — SODIUM CHLORIDE 0.9 % (FLUSH) 0.9 %
5-40 SYRINGE (ML) INJECTION PRN
Status: DISCONTINUED | OUTPATIENT
Start: 2025-03-06 | End: 2025-03-06 | Stop reason: HOSPADM

## 2025-03-06 RX ORDER — DEXAMETHASONE SODIUM PHOSPHATE 4 MG/ML
INJECTION, SOLUTION INTRA-ARTICULAR; INTRALESIONAL; INTRAMUSCULAR; INTRAVENOUS; SOFT TISSUE
Status: DISCONTINUED | OUTPATIENT
Start: 2025-03-06 | End: 2025-03-06 | Stop reason: SDUPTHER

## 2025-03-06 RX ORDER — METOCLOPRAMIDE HYDROCHLORIDE 5 MG/ML
10 INJECTION INTRAMUSCULAR; INTRAVENOUS EVERY 6 HOURS
Status: COMPLETED | OUTPATIENT
Start: 2025-03-06 | End: 2025-03-07

## 2025-03-06 RX ORDER — PROCHLORPERAZINE EDISYLATE 5 MG/ML
5 INJECTION INTRAMUSCULAR; INTRAVENOUS
Status: COMPLETED | OUTPATIENT
Start: 2025-03-06 | End: 2025-03-06

## 2025-03-06 RX ORDER — HYDRALAZINE HYDROCHLORIDE 20 MG/ML
INJECTION INTRAMUSCULAR; INTRAVENOUS
Status: DISCONTINUED | OUTPATIENT
Start: 2025-03-06 | End: 2025-03-06 | Stop reason: SDUPTHER

## 2025-03-06 RX ORDER — SODIUM CHLORIDE 9 MG/ML
50 INJECTION, SOLUTION INTRAVENOUS ONCE
Status: COMPLETED | OUTPATIENT
Start: 2025-03-06 | End: 2025-03-06

## 2025-03-06 RX ORDER — PHENYLEPHRINE HYDROCHLORIDE 10 MG/ML
INJECTION INTRAVENOUS
Status: DISCONTINUED | OUTPATIENT
Start: 2025-03-06 | End: 2025-03-06 | Stop reason: SDUPTHER

## 2025-03-06 RX ORDER — ONDANSETRON 2 MG/ML
4 INJECTION INTRAMUSCULAR; INTRAVENOUS
Status: DISCONTINUED | OUTPATIENT
Start: 2025-03-06 | End: 2025-03-06 | Stop reason: HOSPADM

## 2025-03-06 RX ORDER — SODIUM CHLORIDE 0.9 % (FLUSH) 0.9 %
5-40 SYRINGE (ML) INJECTION EVERY 12 HOURS SCHEDULED
Status: DISCONTINUED | OUTPATIENT
Start: 2025-03-06 | End: 2025-03-06 | Stop reason: HOSPADM

## 2025-03-06 RX ORDER — EPINEPHRINE 1 MG/ML
INJECTION, SOLUTION, CONCENTRATE INTRAVENOUS PRN
Status: DISCONTINUED | OUTPATIENT
Start: 2025-03-06 | End: 2025-03-06 | Stop reason: HOSPADM

## 2025-03-06 RX ORDER — NALOXONE HYDROCHLORIDE 0.4 MG/ML
INJECTION, SOLUTION INTRAMUSCULAR; INTRAVENOUS; SUBCUTANEOUS PRN
Status: DISCONTINUED | OUTPATIENT
Start: 2025-03-06 | End: 2025-03-06 | Stop reason: HOSPADM

## 2025-03-06 RX ORDER — SODIUM CHLORIDE, SODIUM LACTATE, POTASSIUM CHLORIDE, CALCIUM CHLORIDE 600; 310; 30; 20 MG/100ML; MG/100ML; MG/100ML; MG/100ML
INJECTION, SOLUTION INTRAVENOUS
Status: DISCONTINUED | OUTPATIENT
Start: 2025-03-06 | End: 2025-03-06 | Stop reason: SDUPTHER

## 2025-03-06 RX ORDER — SUCCINYLCHOLINE CHLORIDE 20 MG/ML
INJECTION INTRAMUSCULAR; INTRAVENOUS
Status: DISCONTINUED | OUTPATIENT
Start: 2025-03-06 | End: 2025-03-06 | Stop reason: SDUPTHER

## 2025-03-06 RX ORDER — HYDROMORPHONE HYDROCHLORIDE 1 MG/ML
0.5 INJECTION, SOLUTION INTRAMUSCULAR; INTRAVENOUS; SUBCUTANEOUS EVERY 5 MIN PRN
Status: DISCONTINUED | OUTPATIENT
Start: 2025-03-06 | End: 2025-03-06 | Stop reason: HOSPADM

## 2025-03-06 RX ORDER — LABETALOL HYDROCHLORIDE 5 MG/ML
10 INJECTION, SOLUTION INTRAVENOUS
Status: DISCONTINUED | OUTPATIENT
Start: 2025-03-06 | End: 2025-03-06 | Stop reason: HOSPADM

## 2025-03-06 RX ORDER — FENTANYL CITRATE 50 UG/ML
25 INJECTION, SOLUTION INTRAMUSCULAR; INTRAVENOUS EVERY 5 MIN PRN
Status: DISCONTINUED | OUTPATIENT
Start: 2025-03-06 | End: 2025-03-06 | Stop reason: HOSPADM

## 2025-03-06 RX ORDER — SODIUM CHLORIDE 9 MG/ML
INJECTION, SOLUTION INTRAVENOUS PRN
Status: DISCONTINUED | OUTPATIENT
Start: 2025-03-06 | End: 2025-03-06 | Stop reason: HOSPADM

## 2025-03-06 RX ORDER — PROCHLORPERAZINE EDISYLATE 5 MG/ML
5 INJECTION INTRAMUSCULAR; INTRAVENOUS ONCE
Status: DISCONTINUED | OUTPATIENT
Start: 2025-03-06 | End: 2025-03-10 | Stop reason: HOSPADM

## 2025-03-06 RX ORDER — SODIUM CHLORIDE 9 MG/ML
INJECTION, SOLUTION INTRAVENOUS PRN
Status: DISCONTINUED | OUTPATIENT
Start: 2025-03-06 | End: 2025-03-10 | Stop reason: HOSPADM

## 2025-03-06 RX ORDER — ACETAMINOPHEN 325 MG/1
650 TABLET ORAL
Status: DISCONTINUED | OUTPATIENT
Start: 2025-03-06 | End: 2025-03-06 | Stop reason: HOSPADM

## 2025-03-06 RX ORDER — METOCLOPRAMIDE HYDROCHLORIDE 5 MG/ML
INJECTION INTRAMUSCULAR; INTRAVENOUS
Status: DISCONTINUED | OUTPATIENT
Start: 2025-03-06 | End: 2025-03-06 | Stop reason: SDUPTHER

## 2025-03-06 RX ORDER — MORPHINE SULFATE 2 MG/ML
2 INJECTION, SOLUTION INTRAMUSCULAR; INTRAVENOUS ONCE
Status: COMPLETED | OUTPATIENT
Start: 2025-03-06 | End: 2025-03-06

## 2025-03-06 RX ORDER — IPRATROPIUM BROMIDE AND ALBUTEROL SULFATE 2.5; .5 MG/3ML; MG/3ML
1 SOLUTION RESPIRATORY (INHALATION)
Status: DISCONTINUED | OUTPATIENT
Start: 2025-03-06 | End: 2025-03-06 | Stop reason: HOSPADM

## 2025-03-06 RX ADMIN — INSULIN LISPRO 1 UNITS: 100 INJECTION, SOLUTION INTRAVENOUS; SUBCUTANEOUS at 19:02

## 2025-03-06 RX ADMIN — SUCCINYLCHOLINE CHLORIDE 140 MG: 20 INJECTION, SOLUTION INTRAMUSCULAR; INTRAVENOUS at 15:12

## 2025-03-06 RX ADMIN — DEXAMETHASONE SODIUM PHOSPHATE 4 MG: 4 INJECTION INTRA-ARTICULAR; INTRALESIONAL; INTRAMUSCULAR; INTRAVENOUS; SOFT TISSUE at 16:15

## 2025-03-06 RX ADMIN — INSULIN LISPRO 1 UNITS: 100 INJECTION, SOLUTION INTRAVENOUS; SUBCUTANEOUS at 20:49

## 2025-03-06 RX ADMIN — SODIUM CHLORIDE, PRESERVATIVE FREE 10 ML: 5 INJECTION INTRAVENOUS at 20:50

## 2025-03-06 RX ADMIN — LIDOCAINE HYDROCHLORIDE 60 MG: 20 INJECTION, SOLUTION INFILTRATION; PERINEURAL at 15:12

## 2025-03-06 RX ADMIN — PROPOFOL 10 MG: 10 INJECTION, EMULSION INTRAVENOUS at 16:17

## 2025-03-06 RX ADMIN — PANTOPRAZOLE SODIUM 40 MG: 40 INJECTION, POWDER, FOR SOLUTION INTRAVENOUS at 05:58

## 2025-03-06 RX ADMIN — OXYCODONE HYDROCHLORIDE AND ACETAMINOPHEN 1 TABLET: 5; 325 TABLET ORAL at 11:56

## 2025-03-06 RX ADMIN — OXYCODONE HYDROCHLORIDE AND ACETAMINOPHEN 1 TABLET: 5; 325 TABLET ORAL at 05:58

## 2025-03-06 RX ADMIN — ONDANSETRON 8 MG: 2 INJECTION INTRAMUSCULAR; INTRAVENOUS at 15:10

## 2025-03-06 RX ADMIN — MORPHINE SULFATE 2 MG: 2 INJECTION, SOLUTION INTRAMUSCULAR; INTRAVENOUS at 22:37

## 2025-03-06 RX ADMIN — PHENYLEPHRINE HYDROCHLORIDE 100 MCG: 10 INJECTION INTRAVENOUS at 15:32

## 2025-03-06 RX ADMIN — PHENYLEPHRINE HYDROCHLORIDE 100 MCG: 10 INJECTION INTRAVENOUS at 15:35

## 2025-03-06 RX ADMIN — PHENYLEPHRINE HYDROCHLORIDE 100 MCG: 10 INJECTION INTRAVENOUS at 15:29

## 2025-03-06 RX ADMIN — ONDANSETRON 4 MG: 2 INJECTION INTRAMUSCULAR; INTRAVENOUS at 09:51

## 2025-03-06 RX ADMIN — HYDRALAZINE HYDROCHLORIDE 5 MG: 20 INJECTION INTRAMUSCULAR; INTRAVENOUS at 16:20

## 2025-03-06 RX ADMIN — SODIUM CHLORIDE, SODIUM LACTATE, POTASSIUM CHLORIDE, AND CALCIUM CHLORIDE: .6; .31; .03; .02 INJECTION, SOLUTION INTRAVENOUS at 14:48

## 2025-03-06 RX ADMIN — METOCLOPRAMIDE 10 MG: 5 INJECTION, SOLUTION INTRAMUSCULAR; INTRAVENOUS at 15:29

## 2025-03-06 RX ADMIN — SODIUM CHLORIDE, SODIUM LACTATE, POTASSIUM CHLORIDE, AND CALCIUM CHLORIDE: .6; .31; .03; .02 INJECTION, SOLUTION INTRAVENOUS at 15:09

## 2025-03-06 RX ADMIN — METOCLOPRAMIDE HYDROCHLORIDE 10 MG: 5 INJECTION INTRAMUSCULAR; INTRAVENOUS at 20:38

## 2025-03-06 RX ADMIN — PROCHLORPERAZINE EDISYLATE 5 MG: 5 INJECTION INTRAMUSCULAR; INTRAVENOUS at 16:36

## 2025-03-06 RX ADMIN — PROTHROMBIN COMPLEX CONCENTRATE (HUMAN) 1000 UNITS: 25.5; 16.5; 24; 22; 22; 26 POWDER, FOR SOLUTION INTRAVENOUS at 10:49

## 2025-03-06 RX ADMIN — PHYTONADIONE 10 MG: 10 INJECTION, EMULSION INTRAMUSCULAR; INTRAVENOUS; SUBCUTANEOUS at 11:02

## 2025-03-06 RX ADMIN — PROPOFOL 80 MG: 10 INJECTION, EMULSION INTRAVENOUS at 15:12

## 2025-03-06 RX ADMIN — PANTOPRAZOLE SODIUM 8 MG/HR: 40 INJECTION, POWDER, FOR SOLUTION INTRAVENOUS at 16:39

## 2025-03-06 RX ADMIN — SODIUM CHLORIDE 50 ML: 9 INJECTION, SOLUTION INTRAVENOUS at 10:58

## 2025-03-06 ASSESSMENT — PAIN DESCRIPTION - DESCRIPTORS
DESCRIPTORS: ACHING;DISCOMFORT
DESCRIPTORS: ACHING

## 2025-03-06 ASSESSMENT — PAIN DESCRIPTION - PAIN TYPE
TYPE: CHRONIC PAIN
TYPE: CHRONIC PAIN

## 2025-03-06 ASSESSMENT — PAIN - FUNCTIONAL ASSESSMENT
PAIN_FUNCTIONAL_ASSESSMENT: ACTIVITIES ARE NOT PREVENTED
PAIN_FUNCTIONAL_ASSESSMENT: NONE - DENIES PAIN
PAIN_FUNCTIONAL_ASSESSMENT: ACTIVITIES ARE NOT PREVENTED

## 2025-03-06 ASSESSMENT — PAIN DESCRIPTION - ONSET
ONSET: ON-GOING
ONSET: GRADUAL

## 2025-03-06 ASSESSMENT — PAIN DESCRIPTION - LOCATION
LOCATION: BACK;NECK
LOCATION: NECK;BACK
LOCATION: GENERALIZED

## 2025-03-06 ASSESSMENT — PAIN SCALES - GENERAL
PAINLEVEL_OUTOF10: 6
PAINLEVEL_OUTOF10: 9
PAINLEVEL_OUTOF10: 8
PAINLEVEL_OUTOF10: 4
PAINLEVEL_OUTOF10: 7
PAINLEVEL_OUTOF10: 8

## 2025-03-06 ASSESSMENT — PAIN DESCRIPTION - ORIENTATION
ORIENTATION: POSTERIOR
ORIENTATION: OTHER (COMMENT)

## 2025-03-06 ASSESSMENT — PAIN DESCRIPTION - FREQUENCY
FREQUENCY: INTERMITTENT
FREQUENCY: INTERMITTENT

## 2025-03-06 ASSESSMENT — PAIN DESCRIPTION - DIRECTION
RADIATING_TOWARDS: NO
RADIATING_TOWARDS: N

## 2025-03-06 NOTE — FLOWSHEET NOTE
4 Eyes Skin Assessment     NAME:  Alexandr Witt  YOB: 1965  MEDICAL RECORD NUMBER:  [id number]    The patient is being assessed for  Admission transfer from / [facility]     I agree that at least one RN has performed a thorough Head to Toe Skin Assessment on the patient. ALL assessment sites listed below have been assessed.      Areas assessed by both nurses:    Head, Face, Ears, Shoulders, Back, Chest, Arms, Elbows, Hands, Sacrum. Buttock, Coccyx, Ischium, Legs. Feet and Heels, and Under Medical Devices     Patient with abrasions in various degrees of healing to lower extremities.   Notable reddened abrasions to Left top of foot.   Healed abdominal scars.         Does the Patient have a Wound? Yes wound(s) were present on assessment. LDA wound assessment was Initiated and completed by RN       Jc Prevention initiated by RN: Yes  Wound Care Orders initiated by RN: No    Pressure Injury (Stage 3,4, Unstageable, DTI, NWPT, and Complex wounds) if present, place Wound referral order by RN under : No    New Ostomies, if present place, Ostomy referral order under : No     Nurse 1 eSignature: Electronically signed by KACY GUDINO RN on 3/6/25 at 5:54 PM EST    **SHARE this note so that the co-signing nurse can place an eSignature**    Nurse 2 eSignature: Electronically signed by Justin Tovar RN on 3/6/25 at 7:39 PM EST

## 2025-03-06 NOTE — OP NOTE
EGD REPORT    Patient:  Alexandr Witt                  1965    Referring Physician:  Ivan    Endoscopist: Kenny     Indication:  Melena, elevated BUN, acute blood loss anemia, coagulopathy     Medications:  General anesthesia    Pre-Anesthesia Assessment:  I have reviewed and am in agreement with patient history and medication, including previous response to sedation.    Prior to the procedure, a History and Physical was performed, and patient medications and allergies were reviewed. The patient is competent. The risks and benefits of the procedure and the sedation options and risks were discussed with the patient.   Risks discussed included but were not limited to infection, bleeding, perforation, death, and missed lesions.  All questions were answered and informed consent was obtained. Patient identification and proposed procedure were verified by the physician and the nurse in the pre-procedure area in the procedure room.     [place]: II  ASA Grade Assessment: 3     After reviewing the risks and benefits, the patient was deemed in satisfactory condition to undergo the procedure. The anesthesia plan was to use general anesthesia. Immediately prior to administration of medications, the patient was re-assessed for adequacy to receive sedatives and a time out was performed. Patient and healthcare providers were in agreement it was the correct patient and procedure. The heart rate, respiratory rate, oxygen saturations, blood pressure, adequacy of pulmonary ventilation, and response to care were monitored throughout the procedure. The physical status of the patient was re-assessed after the procedure.     After obtaining informed consent, the endoscope was passed under direct vision.   The endoscope was introduced through the mouth, and advanced to the second part of duodenum. The EGD was accomplished without difficulty. The patient tolerated the procedure well.       Duodenum: Normal with bilious

## 2025-03-06 NOTE — ANESTHESIA POSTPROCEDURE EVALUATION
Department of Anesthesiology  Postprocedure Note    Patient: Alexandr Witt  MRN: [id number]  YOB: 1965  Date of evaluation: 3/6/2025    Procedure Summary       Date: 03/06/25 Room / Location: [facility] ENDO 01 / [facility]    Anesthesia Start: 1509 Anesthesia Stop: 1628    Procedures:       ESOPHAGOGASTRODUODENOSCOPY CONTROL HEMORRHAGE      ESOPHAGOGASTRODUODENOSCOPY SUBMUCOSA INJECTION Diagnosis:       Melena      (Melena [K92.1])    Surgeons: Mary Cox MD Responsible Provider: Von Shipley MD    Anesthesia Type: MAC ASA Status: 3            Anesthesia Type: No value filed.    Shaheed Phase I: Shaheed Score: 8    Shaheed Phase II:      Anesthesia Post Evaluation    Patient location during evaluation: PACU  Patient participation: complete - patient participated  Level of consciousness: awake  Pain score: 0  Nausea & Vomiting: no nausea and no vomiting  Cardiovascular status: blood pressure returned to baseline  Respiratory status: acceptable  Hydration status: euvolemic  Pain management: adequate    No notable events documented.

## 2025-03-06 NOTE — PLAN OF CARE
Problem: Chronic Conditions and Co-morbidities  Goal: Patient's chronic conditions and co-morbidity symptoms are monitored and maintained or improved  3/5/2025 2357 by Karolina Thomas, RN  Outcome: Progressing  Flowsheets (Taken 3/5/2025 2000)  Care Plan - Patient's Chronic Conditions and Co-Morbidity Symptoms are Monitored and Maintained or Improved: Collaborate with multidisciplinary team to address chronic and comorbid conditions and prevent exacerbation or deterioration     Problem: Pain  Goal: Verbalizes/displays adequate comfort level or baseline comfort level  3/5/2025 2357 by Karolina Thomas, RN  Outcome: Progressing  Flowsheets (Taken 3/5/2025 2000)  Verbalizes/displays adequate comfort level or baseline comfort level: Encourage patient to monitor pain and request assistance     Problem: Skin/Tissue Integrity  Goal: Skin integrity remains intact  Description: 1.  Monitor for areas of redness and/or skin breakdown  2.  Assess vascular access sites hourly  3.  Every 4-6 hours minimum:  Change oxygen saturation probe site  4.  Every 4-6 hours:  If on nasal continuous positive airway pressure, respiratory therapy assess nares and determine need for appliance change or resting period  3/5/2025 2357 by Karolina Thomas, RN  Outcome: Progressing  Flowsheets  Taken 3/5/2025 2356  Skin Integrity Remains Intact: Monitor for areas of redness and/or skin breakdown  Taken 3/5/2025 2000  Skin Integrity Remains Intact: Monitor for areas of redness and/or skin breakdown

## 2025-03-06 NOTE — ANESTHESIA PRE PROCEDURE
hypertension:, CAD:, CABG/stent:, CHF:        Rhythm: regular  Rate: normal                    Neuro/Psych:   (+) neuromuscular disease:, psychiatric history:            GI/Hepatic/Renal:   (+) GERD:          Endo/Other:    (+) DiabetesType II DM.                 Abdominal:             Vascular:          Other Findings:       Anesthesia Plan      MAC     ASA 3     (Conclusions      Summary   Left ventricular systolic function is moderately reduced with a visually   estimated ejection fraction of 30-35 %.   The left ventricle is normal in size with mild concentric hypertrophy.   Global hypokinesis with regional variation.   Indeterminate diastolic function   There is no evidence of a left ventricular thrombus .   Mild mitral regurgitation is present.   The left atrium appears upper limits of normal in size.   A bioprosthetic aortic valve is visualized with normal Doppler values.   There is a maximum gradient of 17 mmHg, a mean gradient of 8 mmHg, and the   valve area is calculated at 1.61 cm2 by the continuity equation.   There is no evidence of aortic valve regurgitation.   The right ventricle is normal in size and function.   The right atrium is normal in size      Signature      ------------------------------------------------------------------   Electronically signed by Jorge Childs MD (Interpreting   physician) on 04/19/2024 at 12:21 PM   ------------------------------------------------------------------  )  Induction: intravenous.      Anesthetic plan and risks discussed with patient.      Plan discussed with CRNA.                SONDRA LEMOS MD   3/6/2025

## 2025-03-06 NOTE — H&P
Pre-operative History and Physical    Patient: Alexandr Witt  : 1965     History Obtained From:  patient, electronic medical record    HISTORY OF PRESENT ILLNESS:    The patient is a 59 y.o. male who presents for an EGD for melena and anemia and elevated BUN.     Past Medical History:        Diagnosis Date    Abdominal hernia     s/p repair - HAS RETURNED    Aortic valve prosthesis present     Aortic valve replaced 2017    25 mm Castelan Model 3300 bovine pericardial bioprosthesis    CAD (coronary artery disease)     Chronic back pain greater than 3 months duration     Clostridium difficile diarrhea 10/17/2016    PCR    COPD, severity to be determined (HCC) 05/10/2023    Current every day smoker     DDD (degenerative disc disease), lumbosacral 2013    Encounter for imaging to screen for metal prior to magnetic resonance imaging (MRI)     VSHORE [id number] [place] XT VR MRI Surescan Serial # [id number], RV Lead [id number] Serial # [id number] Implanted 2024 followed by Dr. Trevino [facility]    Encounter for imaging to screen for metal prior to MRI 2024    Neurostimulator - Abbott Proclaim 5 model #: 3660; leads (2) both 3186; use remote to place in MRI mode. internal stim generator is not rechargable. MRI conditional system. 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 
MOUTH 2 TIMES A DAY  amLODIPine (NORVASC) 10 MG tablet, TAKE 1 TABLET (10MG) BY MOUTH EVERY DAY  sacubitril-valsartan (ENTRESTO) 24-26 MG per tablet, Take 1 tablet by mouth 2 times daily  citalopram (CELEXA) 40 MG tablet, TAKE 1 TABLET BY MOUTH EVERY DAY  morphine (MSIR) 15 MG tablet, Take 1 tablet by mouth as needed for Pain (every 5.5 hours).  pregabalin (LYRICA) 150 MG capsule, Take 1 capsule by mouth daily.  warfarin (COUMADIN) 5 MG tablet, TAKE 1 TO 1 AND 1/2 TABLETS BY MOUTH ONCE A DAY AS DIRECTED BY ANTICOAGULATION-CLINIC  carvedilol (COREG) 25 MG tablet, Take 1 tablet by mouth 2 times daily (with meals)  rosuvastatin (CRESTOR) 20 MG tablet, - Take one tablets nightly  potassium chloride (KLOR-CON M) 20 MEQ extended release tablet, Take 1 tablet by mouth daily  methocarbamol (ROBAXIN) 750 MG tablet,   Handicap Placard [facility], by Does not apply route Effective through 12/28/2020 through 12/27/2025  aspirin 81 MG EC tablet, Take 1 tablet by mouth daily  Blood Pressure KIT, 1 Unit  methylPREDNISolone (MEDROL, LUIS MIGUEL,) 4 MG tablet, Take by mouth.  metFORMIN (GLUCOPHAGE) 1000 MG tablet, Take 1 tablet by mouth 2 times daily (with meals) (Patient not taking: Reported on 2/27/2025)  oxyCODONE-Acetaminophen (PERCOCET PO), Take by mouth Indications: Pain Management (Patient not taking: Reported on 2/27/2025)  Docusate Sodium (COLACE PO), Take by mouth  melatonin 5 MG TBDP disintegrating tablet, Take 1 tablet by mouth nightly  ondansetron (ZOFRAN-ODT) 4 MG disintegrating tablet, Place 1 tablet under the tongue 3 times daily as needed for Nausea or Vomiting    Allergies:  Cymbalta [duloxetine hcl], Atorvastatin calcium [lipitor], Hydrocodone, and Wellbutrin [bupropion]    SocHx:  Social History     Socioeconomic History    Marital status:    Tobacco Use    Smoking status: Some Days     Current packs/day: 0.00     Average packs/day: 1 pack/day for 45.0 years (45.0 ttl pk-yrs)     Types: Cigarettes     Start date:

## 2025-03-06 NOTE — PLAN OF CARE
Problem: Chronic Conditions and Co-morbidities  Goal: Patient's chronic conditions and co-morbidity symptoms are monitored and maintained or improved  Outcome: Progressing  Flowsheets (Taken 3/6/2025 1500)  Care Plan - Patient's Chronic Conditions and Co-Morbidity Symptoms are Monitored and Maintained or Improved:   Monitor and assess patient's chronic conditions and comorbid symptoms for stability, deterioration, or improvement   Collaborate with multidisciplinary team to address chronic and comorbid conditions and prevent exacerbation or deterioration   Update acute care plan with appropriate goals if chronic or comorbid symptoms are exacerbated and prevent overall improvement and discharge     Problem: Discharge Planning  Goal: Discharge to home or other facility with appropriate resources  Outcome: Progressing  Flowsheets (Taken 3/6/2025 1500)  Discharge to home or other facility with appropriate resources:   Identify barriers to discharge with patient and caregiver   Arrange for needed discharge resources and transportation as appropriate   Refer to discharge planning if patient needs post-hospital services based on physician order or complex needs related to functional status, cognitive ability or social support system   Identify discharge learning needs (meds, wound care, etc)

## 2025-03-06 NOTE — CARE COORDINATION
CM  following for  d/c planning:    GI consulted  GIB:  Plan:  EGD today  Protonix 40 mg BID  Trend H/H and monitor for active bleeding.   Recommend follow up as outpatient for elective screening  Once medically cleared  will d/c to  [facility]  with 24/7 A and  [facility]  .      Will need  EMS transport at  d/c  , and  contact  Pt's dgtr  Charity Sarabia: [phone]    Electronically signed by Jo Ann Moncada RN on 3/6/2025 at 11:26 AM       Jo Ann Moncada RN Case Manager  [facility] [place] [address]  [facility] [address] [phone]  Fax [phone]

## 2025-03-07 LAB
ALBUMIN SERPL-MCNC: 2.9 G/DL (ref 3.4–5)
ANION GAP SERPL CALCULATED.3IONS-SCNC: 9 MMOL/L (ref 3–16)
BLOOD BANK DISPENSE STATUS: NORMAL
BLOOD BANK PRODUCT CODE: NORMAL
BPU ID: NORMAL
BUN SERPL-MCNC: 23 MG/DL (ref 7–20)
CALCIUM SERPL-MCNC: 8 MG/DL (ref 8.3–10.6)
CHLORIDE SERPL-SCNC: 103 MMOL/L (ref 99–110)
CO2 SERPL-SCNC: 23 MMOL/L (ref 21–32)
CREAT SERPL-MCNC: 0.8 MG/DL (ref 0.8–1.3)
DESCRIPTION BLOOD BANK: NORMAL
GFR SERPLBLD CREATININE-BSD FMLA CKD-EPI: >90 ML/MIN/{1.73_M2}
GLUCOSE BLD-MCNC: 125 MG/DL (ref 70–99)
GLUCOSE BLD-MCNC: 132 MG/DL (ref 70–99)
GLUCOSE BLD-MCNC: 135 MG/DL (ref 70–99)
GLUCOSE BLD-MCNC: 135 MG/DL (ref 70–99)
GLUCOSE BLD-MCNC: 137 MG/DL (ref 70–99)
GLUCOSE SERPL-MCNC: 123 MG/DL (ref 70–99)
HCT VFR BLD AUTO: 21.4 % (ref 40.5–52.5)
HCT VFR BLD AUTO: 23.3 % (ref 40.5–52.5)
HCT VFR BLD AUTO: 23.8 % (ref 40.5–52.5)
HCT VFR BLD AUTO: 24 % (ref 40.5–52.5)
HGB BLD-MCNC: 6.9 G/DL (ref 13.5–17.5)
HGB BLD-MCNC: 7.6 G/DL (ref 13.5–17.5)
HGB BLD-MCNC: 7.9 G/DL (ref 13.5–17.5)
HGB BLD-MCNC: 8 G/DL (ref 13.5–17.5)
INR PPP: 1.09 (ref 0.85–1.15)
MAGNESIUM SERPL-MCNC: 2.04 MG/DL (ref 1.8–2.4)
PERFORMED ON: ABNORMAL
PHOSPHATE SERPL-MCNC: 2.4 MG/DL (ref 2.5–4.9)
POTASSIUM SERPL-SCNC: 3.4 MMOL/L (ref 3.5–5.1)
PROTHROMBIN TIME: 14.3 SEC (ref 11.9–14.9)
SODIUM SERPL-SCNC: 135 MMOL/L (ref 136–145)

## 2025-03-07 PROCEDURE — 6370000000 HC RX 637 (ALT 250 FOR IP)

## 2025-03-07 PROCEDURE — 6360000002 HC RX W HCPCS: Performed by: INTERNAL MEDICINE

## 2025-03-07 PROCEDURE — 99223 1ST HOSP IP/OBS HIGH 75: CPT | Performed by: INTERNAL MEDICINE

## 2025-03-07 PROCEDURE — 2500000003 HC RX 250 WO HCPCS

## 2025-03-07 PROCEDURE — 97530 THERAPEUTIC ACTIVITIES: CPT

## 2025-03-07 PROCEDURE — 36415 COLL VENOUS BLD VENIPUNCTURE: CPT

## 2025-03-07 PROCEDURE — 2060000000 HC ICU INTERMEDIATE R&B

## 2025-03-07 PROCEDURE — 80069 RENAL FUNCTION PANEL: CPT

## 2025-03-07 PROCEDURE — 97535 SELF CARE MNGMENT TRAINING: CPT

## 2025-03-07 PROCEDURE — 85018 HEMOGLOBIN: CPT

## 2025-03-07 PROCEDURE — 2580000003 HC RX 258

## 2025-03-07 PROCEDURE — 36430 TRANSFUSION BLD/BLD COMPNT: CPT

## 2025-03-07 PROCEDURE — 97110 THERAPEUTIC EXERCISES: CPT

## 2025-03-07 PROCEDURE — 2580000003 HC RX 258: Performed by: INTERNAL MEDICINE

## 2025-03-07 PROCEDURE — 85610 PROTHROMBIN TIME: CPT

## 2025-03-07 PROCEDURE — 83735 ASSAY OF MAGNESIUM: CPT

## 2025-03-07 PROCEDURE — 85014 HEMATOCRIT: CPT

## 2025-03-07 RX ORDER — SODIUM CHLORIDE 9 MG/ML
INJECTION, SOLUTION INTRAVENOUS PRN
Status: DISCONTINUED | OUTPATIENT
Start: 2025-03-07 | End: 2025-03-10 | Stop reason: HOSPADM

## 2025-03-07 RX ORDER — HYDROMORPHONE HYDROCHLORIDE 1 MG/ML
0.5 INJECTION, SOLUTION INTRAMUSCULAR; INTRAVENOUS; SUBCUTANEOUS ONCE
Status: COMPLETED | OUTPATIENT
Start: 2025-03-07 | End: 2025-03-07

## 2025-03-07 RX ADMIN — OXYCODONE HYDROCHLORIDE AND ACETAMINOPHEN 2 TABLET: 5; 325 TABLET ORAL at 14:16

## 2025-03-07 RX ADMIN — OXYCODONE HYDROCHLORIDE AND ACETAMINOPHEN 2 TABLET: 5; 325 TABLET ORAL at 07:49

## 2025-03-07 RX ADMIN — POTASSIUM PHOSPHATE, MONOBASIC AND POTASSIUM PHOSPHATE, DIBASIC 15 MMOL: 224; 236 INJECTION, SOLUTION, CONCENTRATE INTRAVENOUS at 11:59

## 2025-03-07 RX ADMIN — PANTOPRAZOLE SODIUM 8 MG/HR: 40 INJECTION, POWDER, FOR SOLUTION INTRAVENOUS at 01:18

## 2025-03-07 RX ADMIN — METOCLOPRAMIDE HYDROCHLORIDE 10 MG: 5 INJECTION INTRAMUSCULAR; INTRAVENOUS at 02:52

## 2025-03-07 RX ADMIN — HYDROMORPHONE HYDROCHLORIDE 0.5 MG: 1 INJECTION, SOLUTION INTRAMUSCULAR; INTRAVENOUS; SUBCUTANEOUS at 02:53

## 2025-03-07 RX ADMIN — OXYCODONE HYDROCHLORIDE AND ACETAMINOPHEN 2 TABLET: 5; 325 TABLET ORAL at 20:15

## 2025-03-07 RX ADMIN — ROSUVASTATIN CALCIUM 20 MG: 20 TABLET, FILM COATED ORAL at 20:15

## 2025-03-07 RX ADMIN — PANTOPRAZOLE SODIUM 8 MG/HR: 40 INJECTION, POWDER, FOR SOLUTION INTRAVENOUS at 08:21

## 2025-03-07 RX ADMIN — METOCLOPRAMIDE HYDROCHLORIDE 10 MG: 5 INJECTION INTRAMUSCULAR; INTRAVENOUS at 07:42

## 2025-03-07 RX ADMIN — ACETAMINOPHEN 650 MG: 325 TABLET ORAL at 17:45

## 2025-03-07 RX ADMIN — FERRIC OXIDE RED: 8; 8 LOTION TOPICAL at 12:00

## 2025-03-07 RX ADMIN — SODIUM CHLORIDE, PRESERVATIVE FREE 10 ML: 5 INJECTION INTRAVENOUS at 07:42

## 2025-03-07 RX ADMIN — PANTOPRAZOLE SODIUM 8 MG/HR: 40 INJECTION, POWDER, FOR SOLUTION INTRAVENOUS at 17:47

## 2025-03-07 ASSESSMENT — PAIN SCALES - GENERAL
PAINLEVEL_OUTOF10: 7
PAINLEVEL_OUTOF10: 10
PAINLEVEL_OUTOF10: 5
PAINLEVEL_OUTOF10: 4
PAINLEVEL_OUTOF10: 10
PAINLEVEL_OUTOF10: 9
PAINLEVEL_OUTOF10: 5
PAINLEVEL_OUTOF10: 9
PAINLEVEL_OUTOF10: 8
PAINLEVEL_OUTOF10: 0
PAINLEVEL_OUTOF10: 9
PAINLEVEL_OUTOF10: 0
PAINLEVEL_OUTOF10: 7

## 2025-03-07 ASSESSMENT — PAIN DESCRIPTION - DESCRIPTORS
DESCRIPTORS: ACHING
DESCRIPTORS: ACHING;DISCOMFORT
DESCRIPTORS: ACHING
DESCRIPTORS: ACHING;DISCOMFORT

## 2025-03-07 ASSESSMENT — PAIN SCALES - WONG BAKER
WONGBAKER_NUMERICALRESPONSE: NO HURT
WONGBAKER_NUMERICALRESPONSE: NO HURT

## 2025-03-07 ASSESSMENT — PAIN DESCRIPTION - PAIN TYPE: TYPE: CHRONIC PAIN

## 2025-03-07 ASSESSMENT — PAIN DESCRIPTION - LOCATION
LOCATION: BACK;NECK
LOCATION: BACK;LEG;HIP
LOCATION: BACK;LEG
LOCATION: NECK;BACK
LOCATION: BACK;HIP
LOCATION: BACK

## 2025-03-07 ASSESSMENT — PAIN DESCRIPTION - ORIENTATION
ORIENTATION: LOWER

## 2025-03-07 ASSESSMENT — PAIN DESCRIPTION - ONSET: ONSET: ON-GOING

## 2025-03-07 NOTE — PLAN OF CARE
Problem: Nutrition Deficit:  Goal: Optimize nutritional status  Outcome: Not Progressing       Problem: Chronic Conditions and Co-morbidities  Goal: Patient's chronic conditions and co-morbidity symptoms are monitored and maintained or improved  3/7/2025 0352 by Andrew Sams RN  Outcome: Progressing  Flowsheets (Taken 3/6/2025 2000)  Care Plan - Patient's Chronic Conditions and Co-Morbidity Symptoms are Monitored and Maintained or Improved: Monitor and assess patient's chronic conditions and comorbid symptoms for stability, deterioration, or improvement  3/6/2025 1500 by Benita Remy RN  Outcome: Progressing  Flowsheets (Taken 3/6/2025 1500)  Care Plan - Patient's Chronic Conditions and Co-Morbidity Symptoms are Monitored and Maintained or Improved:   Monitor and assess patient's chronic conditions and comorbid symptoms for stability, deterioration, or improvement   Collaborate with multidisciplinary team to address chronic and comorbid conditions and prevent exacerbation or deterioration   Update acute care plan with appropriate goals if chronic or comorbid symptoms are exacerbated and prevent overall improvement and discharge     Problem: Discharge Planning  Goal: Discharge to home or other facility with appropriate resources  3/7/2025 0352 by Andrew Sams RN  Outcome: Progressing  Flowsheets (Taken 3/6/2025 2000)  Discharge to home or other facility with appropriate resources: Identify barriers to discharge with patient and caregiver  3/6/2025 1500 by Benita Remy RN  Outcome: Progressing  Flowsheets (Taken 3/6/2025 1500)  Discharge to home or other facility with appropriate resources:   Identify barriers to discharge with patient and caregiver   Arrange for needed discharge resources and transportation as appropriate   Refer to discharge planning if patient needs post-hospital services based on physician order or complex needs related to functional status, cognitive ability or social support

## 2025-03-07 NOTE — PLAN OF CARE
Problem: Chronic Conditions and Co-morbidities  Goal: Patient's chronic conditions and co-morbidity symptoms are monitored and maintained or improved  3/7/2025 0805 by Keiry Jeffery RN  Outcome: Progressing  3/7/2025 0352 by Andrew Sams RN  Outcome: Progressing  Flowsheets (Taken 3/6/2025 2000)  Care Plan - Patient's Chronic Conditions and Co-Morbidity Symptoms are Monitored and Maintained or Improved: Monitor and assess patient's chronic conditions and comorbid symptoms for stability, deterioration, or improvement     Problem: Discharge Planning  Goal: Discharge to home or other facility with appropriate resources  3/7/2025 0805 by Keiry Jeffery RN  Outcome: Progressing  3/7/2025 0352 by Andrew Sams RN  Outcome: Progressing  Flowsheets (Taken 3/6/2025 2000)  Discharge to home or other facility with appropriate resources: Identify barriers to discharge with patient and caregiver     Problem: Pain  Goal: Verbalizes/displays adequate comfort level or baseline comfort level  3/7/2025 0805 by Keiry Jeffery RN  Outcome: Progressing  3/7/2025 0352 by Andrew Sams RN  Outcome: Progressing     Problem: Skin/Tissue Integrity  Goal: Skin integrity remains intact  Description: 1.  Monitor for areas of redness and/or skin breakdown  2.  Assess vascular access sites hourly  3.  Every 4-6 hours minimum:  Change oxygen saturation probe site  4.  Every 4-6 hours:  If on nasal continuous positive airway pressure, respiratory therapy assess nares and determine need for appliance change or resting period  3/7/2025 0805 by Keiry Jeffery RN  Outcome: Progressing  3/7/2025 0352 by Andrew Sams RN  Outcome: Progressing  Flowsheets  Taken 3/7/2025 0200  Skin Integrity Remains Intact: Monitor for areas of redness and/or skin breakdown  Taken 3/6/2025 2000  Skin Integrity Remains Intact: Monitor for areas of redness and/or skin breakdown     Problem: Safety - Adult  Goal: Free from fall

## 2025-03-07 NOTE — CARE COORDINATION
Patient from [facility]  . Daughter has arranged  private HHA through  [employer]:  and they can  begin at [place] . Patient will need  EMS transport. HHC arranged through [facility].   Patient's dgtr to let us know time aid is available and CM  will arrange  transport .     GI following. On Protonix gtt.       ,Electronically signed by Zeke Kenyon RN, CM on 3/7/2025 at 3:20 PM.  Phone: [phone]  Fax: [phone]

## 2025-03-07 NOTE — PLAN OF CARE
-[facility]-  -ICU to [place] Transfer Note-  HPI from H&P  \"Patient is a 58-year-old male with PMHx CAD s/p 2 vessel and 3 vessel CABG in 2005 and 2017, bioprosthetic aortic valve replacement, pAF on warfarin, HFrEF 30-35%, V-fib arrest 4/2024 after LLL wedge resection s/p ICD placement, severe COPD on baseline 2 L O2, tobacco use (40 pack years), T2DM, transverse colectomy (2016), right colectomy (2019), cholecystectomy (2019), testicular cancer s/p chemo and resection, left lower lobe SCC s/p wedge resection 08/2023, and recent lung mass suspected to be NSCLC s/p SBRT 12/2024 who presents with altered mental status.      Per chart review, patient was admitted for altered mental status secondary to UTI. Completed 1 course of ceftriaxone for UTI. Patient was also found to have worms in stool per nursing and was given a dose of albendazole.      On 3/4 patient had small volume of dark emesis, and on 3/6/25 patient underwent EGD for melena, anemia (Hgb 6.9 from 8.9) and elevated BUN (32). INR 4.09 received vitamin K and PCC, repeat 1.89.     I  ICU Admission Reason & Brief ICU Course:     Patient underwent EGD on 3/6. Extensive clots and ulcers found in the stomach without visible vessel or active bleeding during EGD and patient was transferred to the ICU for further monitoring. Protonix gtt was started. Patient hemodynamically stable MAPs 79-90s and saturating well on room air. No signs of overt bleeding upon transfer to the ICU.    Received 1 unit pRBCs after scope and 1 unit morning of 3/7, total of 3 units of pRBCs during hospitalization. Repeat H&H after last transfusion was 8.0. Patient has been hemodynamically stable. No melena or hematochezia. BUN improved. Per GI, continue protonix drip, trend H&H, continue clear liquid diet, possible EGD next week.       C  Code Status/DPOA Info/Goals of Care/ACP Note:   -Code Status Full  -POA/ACP documentation?: no  -Any changes to goals of care?

## 2025-03-08 LAB
ALBUMIN SERPL-MCNC: 3 G/DL (ref 3.4–5)
ANION GAP SERPL CALCULATED.3IONS-SCNC: 8 MMOL/L (ref 3–16)
BASOPHILS # BLD: 0 K/UL (ref 0–0.2)
BASOPHILS NFR BLD: 0.3 %
BLOOD BANK DISPENSE STATUS: NORMAL
BLOOD BANK PRODUCT CODE: NORMAL
BPU ID: NORMAL
BUN SERPL-MCNC: 16 MG/DL (ref 7–20)
CALCIUM SERPL-MCNC: 8 MG/DL (ref 8.3–10.6)
CHLORIDE SERPL-SCNC: 102 MMOL/L (ref 99–110)
CO2 SERPL-SCNC: 24 MMOL/L (ref 21–32)
CREAT SERPL-MCNC: 0.8 MG/DL (ref 0.8–1.3)
DEPRECATED RDW RBC AUTO: 15.9 % (ref 12.4–15.4)
DESCRIPTION BLOOD BANK: NORMAL
EOSINOPHIL # BLD: 0 K/UL (ref 0–0.6)
EOSINOPHIL NFR BLD: 1.2 %
GFR SERPLBLD CREATININE-BSD FMLA CKD-EPI: >90 ML/MIN/{1.73_M2}
GLUCOSE BLD-MCNC: 109 MG/DL (ref 70–99)
GLUCOSE BLD-MCNC: 117 MG/DL (ref 70–99)
GLUCOSE BLD-MCNC: 118 MG/DL (ref 70–99)
GLUCOSE BLD-MCNC: 121 MG/DL (ref 70–99)
GLUCOSE SERPL-MCNC: 105 MG/DL (ref 70–99)
HCT VFR BLD AUTO: 22.5 % (ref 40.5–52.5)
HCT VFR BLD AUTO: 25.2 % (ref 40.5–52.5)
HGB BLD-MCNC: 7.4 G/DL (ref 13.5–17.5)
HGB BLD-MCNC: 8.6 G/DL (ref 13.5–17.5)
INR PPP: 1.05 (ref 0.85–1.15)
LYMPHOCYTES # BLD: 0.6 K/UL (ref 1–5.1)
LYMPHOCYTES NFR BLD: 15.6 %
MAGNESIUM SERPL-MCNC: 1.99 MG/DL (ref 1.8–2.4)
MCH RBC QN AUTO: 29.6 PG (ref 26–34)
MCHC RBC AUTO-ENTMCNC: 34.2 G/DL (ref 31–36)
MCV RBC AUTO: 86.5 FL (ref 80–100)
MONOCYTES # BLD: 0.2 K/UL (ref 0–1.3)
MONOCYTES NFR BLD: 6.6 %
NEUTROPHILS # BLD: 2.8 K/UL (ref 1.7–7.7)
NEUTROPHILS NFR BLD: 76.3 %
PERFORMED ON: ABNORMAL
PHOSPHATE SERPL-MCNC: 2.7 MG/DL (ref 2.5–4.9)
PLATELET # BLD AUTO: 106 K/UL (ref 135–450)
PMV BLD AUTO: 8.5 FL (ref 5–10.5)
POTASSIUM SERPL-SCNC: 3.2 MMOL/L (ref 3.5–5.1)
PROTHROMBIN TIME: 13.9 SEC (ref 11.9–14.9)
RBC # BLD AUTO: 2.91 M/UL (ref 4.2–5.9)
SODIUM SERPL-SCNC: 134 MMOL/L (ref 136–145)
WBC # BLD AUTO: 3.6 K/UL (ref 4–11)

## 2025-03-08 PROCEDURE — 6370000000 HC RX 637 (ALT 250 FOR IP)

## 2025-03-08 PROCEDURE — 6360000002 HC RX W HCPCS: Performed by: INTERNAL MEDICINE

## 2025-03-08 PROCEDURE — 2500000003 HC RX 250 WO HCPCS

## 2025-03-08 PROCEDURE — 83735 ASSAY OF MAGNESIUM: CPT

## 2025-03-08 PROCEDURE — 36430 TRANSFUSION BLD/BLD COMPNT: CPT

## 2025-03-08 PROCEDURE — 85018 HEMOGLOBIN: CPT

## 2025-03-08 PROCEDURE — 6360000002 HC RX W HCPCS

## 2025-03-08 PROCEDURE — 80069 RENAL FUNCTION PANEL: CPT

## 2025-03-08 PROCEDURE — 2060000000 HC ICU INTERMEDIATE R&B

## 2025-03-08 PROCEDURE — 2580000003 HC RX 258: Performed by: INTERNAL MEDICINE

## 2025-03-08 PROCEDURE — 36415 COLL VENOUS BLD VENIPUNCTURE: CPT

## 2025-03-08 PROCEDURE — 85610 PROTHROMBIN TIME: CPT

## 2025-03-08 PROCEDURE — 85014 HEMATOCRIT: CPT

## 2025-03-08 PROCEDURE — 85025 COMPLETE CBC W/AUTO DIFF WBC: CPT

## 2025-03-08 RX ORDER — OXYCODONE AND ACETAMINOPHEN 5; 325 MG/1; MG/1
1 TABLET ORAL EVERY 6 HOURS PRN
Refills: 0 | Status: DISCONTINUED | OUTPATIENT
Start: 2025-03-08 | End: 2025-03-10 | Stop reason: HOSPADM

## 2025-03-08 RX ORDER — PREGABALIN 150 MG/1
150 CAPSULE ORAL DAILY
Status: DISCONTINUED | OUTPATIENT
Start: 2025-03-08 | End: 2025-03-10 | Stop reason: HOSPADM

## 2025-03-08 RX ORDER — MORPHINE SULFATE 10 MG/5ML
15 SOLUTION ORAL EVERY 6 HOURS PRN
Refills: 0 | Status: DISCONTINUED | OUTPATIENT
Start: 2025-03-08 | End: 2025-03-08

## 2025-03-08 RX ORDER — SODIUM CHLORIDE 9 MG/ML
INJECTION, SOLUTION INTRAVENOUS PRN
Status: DISCONTINUED | OUTPATIENT
Start: 2025-03-08 | End: 2025-03-10 | Stop reason: HOSPADM

## 2025-03-08 RX ORDER — METHOCARBAMOL 750 MG/1
750 TABLET, FILM COATED ORAL 4 TIMES DAILY
Status: DISCONTINUED | OUTPATIENT
Start: 2025-03-08 | End: 2025-03-08

## 2025-03-08 RX ORDER — MORPHINE SULFATE 15 MG/1
15 TABLET ORAL EVERY 6 HOURS PRN
Refills: 0 | Status: DISCONTINUED | OUTPATIENT
Start: 2025-03-08 | End: 2025-03-10 | Stop reason: HOSPADM

## 2025-03-08 RX ORDER — METHOCARBAMOL 750 MG/1
750 TABLET, FILM COATED ORAL 3 TIMES DAILY PRN
Status: DISCONTINUED | OUTPATIENT
Start: 2025-03-08 | End: 2025-03-10 | Stop reason: HOSPADM

## 2025-03-08 RX ORDER — OXYCODONE AND ACETAMINOPHEN 5; 325 MG/1; MG/1
1 TABLET ORAL EVERY 6 HOURS PRN
Refills: 0 | Status: DISCONTINUED | OUTPATIENT
Start: 2025-03-08 | End: 2025-03-08

## 2025-03-08 RX ADMIN — ROSUVASTATIN CALCIUM 20 MG: 20 TABLET, FILM COATED ORAL at 21:41

## 2025-03-08 RX ADMIN — SODIUM CHLORIDE, PRESERVATIVE FREE 10 ML: 5 INJECTION INTRAVENOUS at 21:43

## 2025-03-08 RX ADMIN — CITALOPRAM 40 MG: 40 TABLET, FILM COATED ORAL at 08:18

## 2025-03-08 RX ADMIN — DOCUSATE SODIUM 100 MG: 100 CAPSULE, LIQUID FILLED ORAL at 08:19

## 2025-03-08 RX ADMIN — MORPHINE SULFATE 15 MG: 15 TABLET ORAL at 11:30

## 2025-03-08 RX ADMIN — OXYCODONE HYDROCHLORIDE AND ACETAMINOPHEN 2 TABLET: 5; 325 TABLET ORAL at 01:54

## 2025-03-08 RX ADMIN — SODIUM CHLORIDE, PRESERVATIVE FREE 10 ML: 5 INJECTION INTRAVENOUS at 08:19

## 2025-03-08 RX ADMIN — Medication 5 MG: at 21:41

## 2025-03-08 RX ADMIN — OXYCODONE HYDROCHLORIDE AND ACETAMINOPHEN 2 TABLET: 5; 325 TABLET ORAL at 08:18

## 2025-03-08 RX ADMIN — MORPHINE SULFATE 15 MG: 15 TABLET ORAL at 21:41

## 2025-03-08 RX ADMIN — PREGABALIN 150 MG: 150 CAPSULE ORAL at 11:30

## 2025-03-08 RX ADMIN — PANTOPRAZOLE SODIUM 8 MG/HR: 40 INJECTION, POWDER, FOR SOLUTION INTRAVENOUS at 16:04

## 2025-03-08 RX ADMIN — OXYCODONE HYDROCHLORIDE AND ACETAMINOPHEN 1 TABLET: 5; 325 TABLET ORAL at 16:05

## 2025-03-08 RX ADMIN — FOLIC ACID 1 MG: 1 TABLET ORAL at 08:18

## 2025-03-08 RX ADMIN — ONDANSETRON 4 MG: 2 INJECTION INTRAMUSCULAR; INTRAVENOUS at 08:18

## 2025-03-08 RX ADMIN — METHOCARBAMOL 750 MG: 750 TABLET ORAL at 14:19

## 2025-03-08 RX ADMIN — PANTOPRAZOLE SODIUM 8 MG/HR: 40 INJECTION, POWDER, FOR SOLUTION INTRAVENOUS at 04:26

## 2025-03-08 ASSESSMENT — PAIN DESCRIPTION - LOCATION
LOCATION: BACK;NECK;SHOULDER
LOCATION: BACK;NECK
LOCATION: NECK;BACK
LOCATION: BACK;NECK
LOCATION: NECK;BACK
LOCATION: NECK;BACK
LOCATION: BACK;NECK

## 2025-03-08 ASSESSMENT — PAIN DESCRIPTION - PAIN TYPE
TYPE: CHRONIC PAIN

## 2025-03-08 ASSESSMENT — PAIN - FUNCTIONAL ASSESSMENT
PAIN_FUNCTIONAL_ASSESSMENT: ACTIVITIES ARE NOT PREVENTED

## 2025-03-08 ASSESSMENT — PAIN SCALES - GENERAL
PAINLEVEL_OUTOF10: 10
PAINLEVEL_OUTOF10: 0
PAINLEVEL_OUTOF10: 9
PAINLEVEL_OUTOF10: 9
PAINLEVEL_OUTOF10: 7
PAINLEVEL_OUTOF10: 8
PAINLEVEL_OUTOF10: 8
PAINLEVEL_OUTOF10: 0
PAINLEVEL_OUTOF10: 9
PAINLEVEL_OUTOF10: 0
PAINLEVEL_OUTOF10: 8
PAINLEVEL_OUTOF10: 8

## 2025-03-08 ASSESSMENT — PAIN DESCRIPTION - ONSET
ONSET: ON-GOING

## 2025-03-08 ASSESSMENT — PAIN DESCRIPTION - ORIENTATION
ORIENTATION: LOWER;MID
ORIENTATION: MID;LOWER

## 2025-03-08 ASSESSMENT — PAIN DESCRIPTION - FREQUENCY
FREQUENCY: CONTINUOUS

## 2025-03-08 ASSESSMENT — PAIN DESCRIPTION - DESCRIPTORS
DESCRIPTORS: ACHING;THROBBING
DESCRIPTORS: ACHING;DISCOMFORT
DESCRIPTORS: THROBBING
DESCRIPTORS: ACHING;THROBBING

## 2025-03-08 NOTE — PLAN OF CARE
Problem: Chronic Conditions and Co-morbidities  Goal: Patient's chronic conditions and co-morbidity symptoms are monitored and maintained or improved  Outcome: Progressing     Problem: Discharge Planning  Goal: Discharge to home or other facility with appropriate resources  Outcome: Progressing     Problem: Pain  Goal: Verbalizes/displays adequate comfort level or baseline comfort level  Outcome: Progressing     Problem: Skin/Tissue Integrity  Goal: Skin integrity remains intact  Description: 1.  Monitor for areas of redness and/or skin breakdown  2.  Assess vascular access sites hourly  3.  Every 4-6 hours minimum:  Change oxygen saturation probe site  4.  Every 4-6 hours:  If on nasal continuous positive airway pressure, respiratory therapy assess nares and determine need for appliance change or resting period  Outcome: Progressing     Problem: Safety - Adult  Goal: Free from fall injury  Outcome: Progressing     Problem: Confusion  Goal: Confusion, delirium, dementia, or psychosis is improved or at baseline  Description: INTERVENTIONS:  1. Assess for possible contributors to thought disturbance, including medications, impaired vision or hearing, underlying metabolic abnormalities, dehydration, psychiatric diagnoses, and notify attending LIP  2. [place] high risk fall precautions, as indicated  3. Provide frequent short contacts to provide reality reorientation, refocusing and direction  4. Decrease environmental stimuli, including noise as appropriate  5. Monitor and intervene to maintain adequate nutrition, hydration, elimination, sleep and activity  6. If unable to ensure safety without constant attention obtain sitter and review sitter guidelines with assigned personnel  7. Initiate Psychosocial CNS and Spiritual Care consult, as indicated  Outcome: Progressing     Problem: Nutrition Deficit:  Goal: Optimize nutritional status  Outcome: Progressing     Problem: Respiratory - Adult  Goal: Achieves optimal

## 2025-03-08 NOTE — PLAN OF CARE
Problem: Chronic Conditions and Co-morbidities  Goal: Patient's chronic conditions and co-morbidity symptoms are monitored and maintained or improved  3/7/2025 2157 by Triny Zhang, RN  Outcome: Progressing  Flowsheets (Taken 3/7/2025 2000)  Care Plan - Patient's Chronic Conditions and Co-Morbidity Symptoms are Monitored and Maintained or Improved: Monitor and assess patient's chronic conditions and comorbid symptoms for stability, deterioration, or improvement     Problem: Discharge Planning  Goal: Discharge to home or other facility with appropriate resources  3/7/2025 2157 by Triny Zhang, RN  Outcome: Progressing       Problem: Pain  Goal: Verbalizes/displays adequate comfort level or baseline comfort level  3/7/2025 2157 by Triny Zhang, RN  Outcome: Progressing

## 2025-03-09 PROBLEM — N39.0 UTI (URINARY TRACT INFECTION): Status: ACTIVE | Noted: 2025-03-09

## 2025-03-09 PROBLEM — K28.9: Status: ACTIVE | Noted: 2025-03-09

## 2025-03-09 LAB
ALBUMIN SERPL-MCNC: 2.9 G/DL (ref 3.4–5)
ANION GAP SERPL CALCULATED.3IONS-SCNC: 10 MMOL/L (ref 3–16)
ANISOCYTOSIS BLD QL SMEAR: ABNORMAL
BASOPHILS # BLD: 0 K/UL (ref 0–0.2)
BASOPHILS NFR BLD: 0.7 %
BUN SERPL-MCNC: 17 MG/DL (ref 7–20)
CALCIUM SERPL-MCNC: 8.2 MG/DL (ref 8.3–10.6)
CHLORIDE SERPL-SCNC: 100 MMOL/L (ref 99–110)
CO2 SERPL-SCNC: 24 MMOL/L (ref 21–32)
CREAT SERPL-MCNC: 0.9 MG/DL (ref 0.8–1.3)
DACRYOCYTES BLD QL SMEAR: ABNORMAL
DEPRECATED RDW RBC AUTO: 15.4 % (ref 12.4–15.4)
EOSINOPHIL # BLD: 0.1 K/UL (ref 0–0.6)
EOSINOPHIL NFR BLD: 1.4 %
GFR SERPLBLD CREATININE-BSD FMLA CKD-EPI: >90 ML/MIN/{1.73_M2}
GLUCOSE BLD-MCNC: 102 MG/DL (ref 70–99)
GLUCOSE BLD-MCNC: 116 MG/DL (ref 70–99)
GLUCOSE BLD-MCNC: 122 MG/DL (ref 70–99)
GLUCOSE BLD-MCNC: 122 MG/DL (ref 70–99)
GLUCOSE SERPL-MCNC: 99 MG/DL (ref 70–99)
HCT VFR BLD AUTO: 25.6 % (ref 40.5–52.5)
HGB BLD-MCNC: 8.6 G/DL (ref 13.5–17.5)
INR PPP: 1.06 (ref 0.85–1.15)
LYMPHOCYTES # BLD: 0.4 K/UL (ref 1–5.1)
LYMPHOCYTES NFR BLD: 9.9 %
MAGNESIUM SERPL-MCNC: 2.1 MG/DL (ref 1.8–2.4)
MCH RBC QN AUTO: 29.3 PG (ref 26–34)
MCHC RBC AUTO-ENTMCNC: 33.5 G/DL (ref 31–36)
MCV RBC AUTO: 87.4 FL (ref 80–100)
MICROCYTES BLD QL SMEAR: ABNORMAL
MONOCYTES # BLD: 0.2 K/UL (ref 0–1.3)
MONOCYTES NFR BLD: 6.1 %
NEUTROPHILS # BLD: 3 K/UL (ref 1.7–7.7)
NEUTROPHILS NFR BLD: 81.9 %
OVALOCYTES BLD QL SMEAR: ABNORMAL
PERFORMED ON: ABNORMAL
PHOSPHATE SERPL-MCNC: 3.4 MG/DL (ref 2.5–4.9)
PLATELET # BLD AUTO: 90 K/UL (ref 135–450)
PLATELET BLD QL SMEAR: ABNORMAL
PMV BLD AUTO: 8.8 FL (ref 5–10.5)
POTASSIUM SERPL-SCNC: 3 MMOL/L (ref 3.5–5.1)
PROTHROMBIN TIME: 14 SEC (ref 11.9–14.9)
RBC # BLD AUTO: 2.93 M/UL (ref 4.2–5.9)
SODIUM SERPL-SCNC: 134 MMOL/L (ref 136–145)
WBC # BLD AUTO: 3.7 K/UL (ref 4–11)

## 2025-03-09 PROCEDURE — 6370000000 HC RX 637 (ALT 250 FOR IP)

## 2025-03-09 PROCEDURE — 85025 COMPLETE CBC W/AUTO DIFF WBC: CPT

## 2025-03-09 PROCEDURE — 6370000000 HC RX 637 (ALT 250 FOR IP): Performed by: HOSPITALIST

## 2025-03-09 PROCEDURE — 6360000002 HC RX W HCPCS

## 2025-03-09 PROCEDURE — 6360000002 HC RX W HCPCS: Performed by: INTERNAL MEDICINE

## 2025-03-09 PROCEDURE — 2580000003 HC RX 258: Performed by: INTERNAL MEDICINE

## 2025-03-09 PROCEDURE — 36415 COLL VENOUS BLD VENIPUNCTURE: CPT

## 2025-03-09 PROCEDURE — 2060000000 HC ICU INTERMEDIATE R&B

## 2025-03-09 PROCEDURE — 2500000003 HC RX 250 WO HCPCS

## 2025-03-09 PROCEDURE — 80069 RENAL FUNCTION PANEL: CPT

## 2025-03-09 PROCEDURE — 83735 ASSAY OF MAGNESIUM: CPT

## 2025-03-09 PROCEDURE — 85610 PROTHROMBIN TIME: CPT

## 2025-03-09 RX ORDER — POTASSIUM CHLORIDE 7.45 MG/ML
10 INJECTION INTRAVENOUS
Status: COMPLETED | OUTPATIENT
Start: 2025-03-09 | End: 2025-03-09

## 2025-03-09 RX ORDER — POTASSIUM CHLORIDE 1500 MG/1
40 TABLET, EXTENDED RELEASE ORAL ONCE
Status: COMPLETED | OUTPATIENT
Start: 2025-03-09 | End: 2025-03-09

## 2025-03-09 RX ADMIN — OXYCODONE HYDROCHLORIDE AND ACETAMINOPHEN 1 TABLET: 5; 325 TABLET ORAL at 13:06

## 2025-03-09 RX ADMIN — OXYCODONE HYDROCHLORIDE AND ACETAMINOPHEN 1 TABLET: 5; 325 TABLET ORAL at 03:59

## 2025-03-09 RX ADMIN — METHOCARBAMOL 750 MG: 750 TABLET ORAL at 13:06

## 2025-03-09 RX ADMIN — FOLIC ACID 1 MG: 1 TABLET ORAL at 08:59

## 2025-03-09 RX ADMIN — FERRIC OXIDE RED: 8; 8 LOTION TOPICAL at 13:15

## 2025-03-09 RX ADMIN — POTASSIUM BICARBONATE 40 MEQ: 782 TABLET, EFFERVESCENT ORAL at 08:59

## 2025-03-09 RX ADMIN — MORPHINE SULFATE 15 MG: 15 TABLET ORAL at 16:00

## 2025-03-09 RX ADMIN — SODIUM CHLORIDE, PRESERVATIVE FREE 10 ML: 5 INJECTION INTRAVENOUS at 21:23

## 2025-03-09 RX ADMIN — PANTOPRAZOLE SODIUM 8 MG/HR: 40 INJECTION, POWDER, FOR SOLUTION INTRAVENOUS at 03:54

## 2025-03-09 RX ADMIN — MORPHINE SULFATE 15 MG: 15 TABLET ORAL at 22:17

## 2025-03-09 RX ADMIN — POTASSIUM CHLORIDE 40 MEQ: 1500 TABLET, EXTENDED RELEASE ORAL at 14:40

## 2025-03-09 RX ADMIN — POTASSIUM CHLORIDE 10 MEQ: 7.46 INJECTION, SOLUTION INTRAVENOUS at 10:40

## 2025-03-09 RX ADMIN — Medication 5 MG: at 21:04

## 2025-03-09 RX ADMIN — SODIUM CHLORIDE, PRESERVATIVE FREE 10 ML: 5 INJECTION INTRAVENOUS at 09:00

## 2025-03-09 RX ADMIN — POTASSIUM CHLORIDE 10 MEQ: 7.46 INJECTION, SOLUTION INTRAVENOUS at 09:00

## 2025-03-09 RX ADMIN — OXYCODONE HYDROCHLORIDE AND ACETAMINOPHEN 1 TABLET: 5; 325 TABLET ORAL at 21:04

## 2025-03-09 RX ADMIN — PANTOPRAZOLE SODIUM 8 MG/HR: 40 INJECTION, POWDER, FOR SOLUTION INTRAVENOUS at 15:47

## 2025-03-09 RX ADMIN — ROSUVASTATIN CALCIUM 20 MG: 20 TABLET, FILM COATED ORAL at 21:04

## 2025-03-09 RX ADMIN — METHOCARBAMOL 750 MG: 750 TABLET ORAL at 03:59

## 2025-03-09 RX ADMIN — CITALOPRAM 40 MG: 40 TABLET, FILM COATED ORAL at 08:59

## 2025-03-09 RX ADMIN — PREGABALIN 150 MG: 150 CAPSULE ORAL at 08:59

## 2025-03-09 RX ADMIN — FERRIC OXIDE RED: 8; 8 LOTION TOPICAL at 08:59

## 2025-03-09 ASSESSMENT — PAIN DESCRIPTION - ONSET
ONSET: ON-GOING

## 2025-03-09 ASSESSMENT — PAIN DESCRIPTION - LOCATION
LOCATION: NECK;BACK
LOCATION: BACK
LOCATION: NECK;BACK
LOCATION: NECK;BACK
LOCATION: BACK
LOCATION: NECK;BACK
LOCATION: BACK
LOCATION: NECK;BACK
LOCATION: BACK
LOCATION: BACK

## 2025-03-09 ASSESSMENT — PAIN DESCRIPTION - FREQUENCY
FREQUENCY: CONTINUOUS

## 2025-03-09 ASSESSMENT — PAIN - FUNCTIONAL ASSESSMENT
PAIN_FUNCTIONAL_ASSESSMENT: ACTIVITIES ARE NOT PREVENTED

## 2025-03-09 ASSESSMENT — PAIN SCALES - GENERAL
PAINLEVEL_OUTOF10: 8
PAINLEVEL_OUTOF10: 7
PAINLEVEL_OUTOF10: 8
PAINLEVEL_OUTOF10: 7
PAINLEVEL_OUTOF10: 8
PAINLEVEL_OUTOF10: 7
PAINLEVEL_OUTOF10: 8
PAINLEVEL_OUTOF10: 7
PAINLEVEL_OUTOF10: 8

## 2025-03-09 ASSESSMENT — PAIN DESCRIPTION - ORIENTATION
ORIENTATION: MID
ORIENTATION: MID
ORIENTATION: MID;LOWER
ORIENTATION: MID
ORIENTATION: LOWER;MID
ORIENTATION: MID;LOWER
ORIENTATION: MID
ORIENTATION: MID;LOWER
ORIENTATION: MID
ORIENTATION: MID;LOWER
ORIENTATION: MID
ORIENTATION: MID;LOWER

## 2025-03-09 ASSESSMENT — PAIN DESCRIPTION - PAIN TYPE
TYPE: CHRONIC PAIN

## 2025-03-09 ASSESSMENT — PAIN DESCRIPTION - DESCRIPTORS
DESCRIPTORS: ACHING;THROBBING
DESCRIPTORS: ACHING;THROBBING
DESCRIPTORS: THROBBING
DESCRIPTORS: ACHING;THROBBING
DESCRIPTORS: ACHING;SORE
DESCRIPTORS: THROBBING
DESCRIPTORS: ACHING;SORE
DESCRIPTORS: ACHING;THROBBING
DESCRIPTORS: ACHING;THROBBING;PRESSURE
DESCRIPTORS: ACHING;THROBBING

## 2025-03-09 NOTE — PLAN OF CARE
Problem: Chronic Conditions and Co-morbidities  Goal: Patient's chronic conditions and co-morbidity symptoms are monitored and maintained or improved  Outcome: Progressing  Flowsheets  Taken 3/9/2025 0800 by Pam Ingram RN  Care Plan - Patient's Chronic Conditions and Co-Morbidity Symptoms are Monitored and Maintained or Improved: Monitor and assess patient's chronic conditions and comorbid symptoms for stability, deterioration, or improvement  Taken 3/8/2025 2000 by Kinjal Sinclair RN  Care Plan - Patient's Chronic Conditions and Co-Morbidity Symptoms are Monitored and Maintained or Improved:   Monitor and assess patient's chronic conditions and comorbid symptoms for stability, deterioration, or improvement   Collaborate with multidisciplinary team to address chronic and comorbid conditions and prevent exacerbation or deterioration   Update acute care plan with appropriate goals if chronic or comorbid symptoms are exacerbated and prevent overall improvement and discharge     Problem: Discharge Planning  Goal: Discharge to home or other facility with appropriate resources  Outcome: Progressing  Flowsheets  Taken 3/9/2025 0800 by Pam Ingram RN  Discharge to home or other facility with appropriate resources: Identify barriers to discharge with patient and caregiver  Taken 3/8/2025 2000 by Kinjal Sinclair RN  Discharge to home or other facility with appropriate resources:   Identify barriers to discharge with patient and caregiver   Arrange for needed discharge resources and transportation as appropriate   Identify discharge learning needs (meds, wound care, etc)   Refer to discharge planning if patient needs post-hospital services based on physician order or complex needs related to functional status, cognitive ability or social support system     Problem: Pain  Goal: Verbalizes/displays adequate comfort level or baseline comfort level  Outcome: Progressing  Flowsheets (Taken 3/8/2025 2000 by

## 2025-03-10 VITALS
DIASTOLIC BLOOD PRESSURE: 80 MMHG | BODY MASS INDEX: 25.11 KG/M2 | OXYGEN SATURATION: 98 % | TEMPERATURE: 98.2 F | SYSTOLIC BLOOD PRESSURE: 115 MMHG | HEIGHT: 72 IN | HEART RATE: 80 BPM | RESPIRATION RATE: 20 BRPM | WEIGHT: 185.41 LBS

## 2025-03-10 LAB
ALBUMIN SERPL-MCNC: 3 G/DL (ref 3.4–5)
ANION GAP SERPL CALCULATED.3IONS-SCNC: 12 MMOL/L (ref 3–16)
BASOPHILS # BLD: 0 K/UL (ref 0–0.2)
BASOPHILS NFR BLD: 0.9 %
BUN SERPL-MCNC: 16 MG/DL (ref 7–20)
CALCIUM SERPL-MCNC: 8.3 MG/DL (ref 8.3–10.6)
CHLORIDE SERPL-SCNC: 98 MMOL/L (ref 99–110)
CO2 SERPL-SCNC: 23 MMOL/L (ref 21–32)
CREAT SERPL-MCNC: 0.8 MG/DL (ref 0.8–1.3)
DEPRECATED RDW RBC AUTO: 15.6 % (ref 12.4–15.4)
EOSINOPHIL # BLD: 0 K/UL (ref 0–0.6)
EOSINOPHIL NFR BLD: 1.2 %
GFR SERPLBLD CREATININE-BSD FMLA CKD-EPI: >90 ML/MIN/{1.73_M2}
GLUCOSE BLD-MCNC: 111 MG/DL (ref 70–99)
GLUCOSE BLD-MCNC: 134 MG/DL (ref 70–99)
GLUCOSE SERPL-MCNC: 100 MG/DL (ref 70–99)
HCT VFR BLD AUTO: 26.9 % (ref 40.5–52.5)
HGB BLD-MCNC: 9 G/DL (ref 13.5–17.5)
INR PPP: 1.04 (ref 0.85–1.15)
LYMPHOCYTES # BLD: 0.4 K/UL (ref 1–5.1)
LYMPHOCYTES NFR BLD: 9.5 %
MAGNESIUM SERPL-MCNC: 2.09 MG/DL (ref 1.8–2.4)
MCH RBC QN AUTO: 29.1 PG (ref 26–34)
MCHC RBC AUTO-ENTMCNC: 33.5 G/DL (ref 31–36)
MCV RBC AUTO: 86.9 FL (ref 80–100)
MONOCYTES # BLD: 0.2 K/UL (ref 0–1.3)
MONOCYTES NFR BLD: 5.5 %
NEUTROPHILS # BLD: 3.2 K/UL (ref 1.7–7.7)
NEUTROPHILS NFR BLD: 82.9 %
PERFORMED ON: ABNORMAL
PERFORMED ON: ABNORMAL
PHOSPHATE SERPL-MCNC: 3.2 MG/DL (ref 2.5–4.9)
PLATELET # BLD AUTO: 92 K/UL (ref 135–450)
PMV BLD AUTO: 8.3 FL (ref 5–10.5)
POTASSIUM SERPL-SCNC: 3.3 MMOL/L (ref 3.5–5.1)
PROTHROMBIN TIME: 13.8 SEC (ref 11.9–14.9)
RBC # BLD AUTO: 3.1 M/UL (ref 4.2–5.9)
SODIUM SERPL-SCNC: 133 MMOL/L (ref 136–145)
WBC # BLD AUTO: 3.9 K/UL (ref 4–11)

## 2025-03-10 PROCEDURE — 6360000002 HC RX W HCPCS

## 2025-03-10 PROCEDURE — 6370000000 HC RX 637 (ALT 250 FOR IP)

## 2025-03-10 PROCEDURE — 85610 PROTHROMBIN TIME: CPT

## 2025-03-10 PROCEDURE — 2580000003 HC RX 258

## 2025-03-10 PROCEDURE — 85025 COMPLETE CBC W/AUTO DIFF WBC: CPT

## 2025-03-10 PROCEDURE — 80069 RENAL FUNCTION PANEL: CPT

## 2025-03-10 PROCEDURE — 97116 GAIT TRAINING THERAPY: CPT

## 2025-03-10 PROCEDURE — 97535 SELF CARE MNGMENT TRAINING: CPT

## 2025-03-10 PROCEDURE — 36415 COLL VENOUS BLD VENIPUNCTURE: CPT

## 2025-03-10 PROCEDURE — 2500000003 HC RX 250 WO HCPCS

## 2025-03-10 PROCEDURE — 97530 THERAPEUTIC ACTIVITIES: CPT

## 2025-03-10 PROCEDURE — 83735 ASSAY OF MAGNESIUM: CPT

## 2025-03-10 RX ORDER — PANTOPRAZOLE SODIUM 40 MG/1
40 TABLET, DELAYED RELEASE ORAL 2 TIMES DAILY
Qty: 120 TABLET | Refills: 0 | Status: SHIPPED | OUTPATIENT
Start: 2025-03-10 | End: 2025-03-10

## 2025-03-10 RX ORDER — PANTOPRAZOLE SODIUM 20 MG/1
40 TABLET, DELAYED RELEASE ORAL
Qty: 180 TABLET | Refills: 1 | Status: SHIPPED | OUTPATIENT
Start: 2025-05-10 | End: 2025-03-10 | Stop reason: HOSPADM

## 2025-03-10 RX ORDER — PANTOPRAZOLE SODIUM 40 MG/1
TABLET, DELAYED RELEASE ORAL
Qty: 180 TABLET | Refills: 0 | Status: SHIPPED | OUTPATIENT
Start: 2025-03-10 | End: 2025-07-08

## 2025-03-10 RX ORDER — POTASSIUM CHLORIDE 1500 MG/1
20 TABLET, EXTENDED RELEASE ORAL ONCE
Status: COMPLETED | OUTPATIENT
Start: 2025-03-10 | End: 2025-03-10

## 2025-03-10 RX ORDER — AMLODIPINE BESYLATE 10 MG/1
10 TABLET ORAL DAILY
Qty: 30 TABLET | Refills: 0 | Status: SHIPPED | OUTPATIENT
Start: 2025-03-24

## 2025-03-10 RX ORDER — WARFARIN SODIUM 5 MG/1
TABLET ORAL
Qty: 135 TABLET | Refills: 3 | Status: SHIPPED | OUTPATIENT
Start: 2025-03-17

## 2025-03-10 RX ADMIN — METHOCARBAMOL 750 MG: 750 TABLET ORAL at 00:05

## 2025-03-10 RX ADMIN — PREGABALIN 150 MG: 150 CAPSULE ORAL at 08:41

## 2025-03-10 RX ADMIN — FERRIC OXIDE RED: 8; 8 LOTION TOPICAL at 08:42

## 2025-03-10 RX ADMIN — MORPHINE SULFATE 15 MG: 15 TABLET ORAL at 05:20

## 2025-03-10 RX ADMIN — CITALOPRAM 40 MG: 40 TABLET, FILM COATED ORAL at 08:41

## 2025-03-10 RX ADMIN — FOLIC ACID 1 MG: 1 TABLET ORAL at 08:41

## 2025-03-10 RX ADMIN — SODIUM CHLORIDE, PRESERVATIVE FREE 10 ML: 5 INJECTION INTRAVENOUS at 08:41

## 2025-03-10 RX ADMIN — OXYCODONE HYDROCHLORIDE AND ACETAMINOPHEN 1 TABLET: 5; 325 TABLET ORAL at 03:07

## 2025-03-10 RX ADMIN — ASPIRIN 81 MG: 81 TABLET, COATED ORAL at 08:41

## 2025-03-10 RX ADMIN — POTASSIUM CHLORIDE 20 MEQ: 1500 TABLET, EXTENDED RELEASE ORAL at 08:41

## 2025-03-10 RX ADMIN — PANTOPRAZOLE SODIUM 40 MG: 40 INJECTION, POWDER, FOR SOLUTION INTRAVENOUS at 08:42

## 2025-03-10 RX ADMIN — PANTOPRAZOLE SODIUM 40 MG: 40 INJECTION, POWDER, FOR SOLUTION INTRAVENOUS at 00:05

## 2025-03-10 ASSESSMENT — PAIN DESCRIPTION - FREQUENCY
FREQUENCY: CONTINUOUS
FREQUENCY: CONTINUOUS

## 2025-03-10 ASSESSMENT — PAIN DESCRIPTION - DESCRIPTORS
DESCRIPTORS: ACHING;SORE
DESCRIPTORS: ACHING;SORE

## 2025-03-10 ASSESSMENT — PAIN DESCRIPTION - PAIN TYPE
TYPE: CHRONIC PAIN
TYPE: CHRONIC PAIN

## 2025-03-10 ASSESSMENT — PAIN DESCRIPTION - LOCATION
LOCATION: BACK
LOCATION: BACK

## 2025-03-10 ASSESSMENT — PAIN DESCRIPTION - ORIENTATION
ORIENTATION: MID
ORIENTATION: MID

## 2025-03-10 ASSESSMENT — PAIN DESCRIPTION - ONSET
ONSET: ON-GOING
ONSET: ON-GOING

## 2025-03-10 ASSESSMENT — PAIN SCALES - GENERAL
PAINLEVEL_OUTOF10: 8
PAINLEVEL_OUTOF10: 8

## 2025-03-10 NOTE — PLAN OF CARE
Problem: Chronic Conditions and Co-morbidities  Goal: Patient's chronic conditions and co-morbidity symptoms are monitored and maintained or improved  Outcome: Progressing  Flowsheets (Taken 3/9/2025 2000)  Care Plan - Patient's Chronic Conditions and Co-Morbidity Symptoms are Monitored and Maintained or Improved:   Monitor and assess patient's chronic conditions and comorbid symptoms for stability, deterioration, or improvement   Collaborate with multidisciplinary team to address chronic and comorbid conditions and prevent exacerbation or deterioration   Update acute care plan with appropriate goals if chronic or comorbid symptoms are exacerbated and prevent overall improvement and discharge     Problem: Discharge Planning  Goal: Discharge to home or other facility with appropriate resources  Outcome: Progressing  Flowsheets (Taken 3/9/2025 2000)  Discharge to home or other facility with appropriate resources:   Identify barriers to discharge with patient and caregiver   Arrange for needed discharge resources and transportation as appropriate   Identify discharge learning needs (meds, wound care, etc)   Refer to discharge planning if patient needs post-hospital services based on physician order or complex needs related to functional status, cognitive ability or social support system     Problem: Pain  Goal: Verbalizes/displays adequate comfort level or baseline comfort level  Outcome: Progressing  Flowsheets (Taken 3/9/2025 2000)  Verbalizes/displays adequate comfort level or baseline comfort level:   Encourage patient to monitor pain and request assistance   Assess pain using appropriate pain scale   Administer analgesics based on type and severity of pain and evaluate response   Implement non-pharmacological measures as appropriate and evaluate response     Problem: Skin/Tissue Integrity  Goal: Skin integrity remains intact  Description: 1.  Monitor for areas of redness and/or skin breakdown  2.  Assess

## 2025-03-10 NOTE — PROGRESS NOTES
Internal Medicine Progress Note    Patient: Alexandr Witt   : 1965   Admit Date: 2025     Date: 2025     Interval History     S: Recently visited ED after daughter Charity found him very weak/ laying in bed for long periods-found to be flu +. But he has had several months of not taking medications and voicing that \"he doesn't want to live anymore\" without physical changes or signs. He has diffuse, non-specific pain and has not taken a shower in weeks at his assisted living facility. He has not been eating or taking medications per Charity. On assessment he is calm, comfortable, oriented x2 but short term memory poor. Apathetic, but does not voice acute issue. Soiled self in bed.   O:  Incontinent of stool. --> 119, Tm 36.7, O2 99% RA. Lower extremities non-edematous. Lungs clear, abdomen non-tender.   Labs: K 3.3, wbc 5.0, HgB 12.7, INR 3.01  A/P: Urinalysis, stool studies pending to rule out possible infection leading to global weakness/malaise but presentation is more suggestive of depression. Will consult palliative care, based on several prior stated death wishes. Oxycodone pain panel if patient develops severe pain.       ROS   Review of Systems   Constitutional:  Positive for activity change.   Musculoskeletal:  Positive for arthralgias.   Psychiatric/Behavioral:  Positive for decreased concentration, dysphoric mood and suicidal ideas.    All other systems reviewed and are negative.         Objective     I/Os:  I/O last 3 completed shifts:  In: 10 [I.V.:10]  Out: -       Vital Signs:  Patient Vitals for the past 5 hrs:   BP Temp Temp src Pulse Resp SpO2 Weight   25 0757 119/76 97.5 °F (36.4 °C) Oral 97 17 99 % --   25 0641 -- -- -- -- -- -- 97.9 kg (215 lb 13.3 oz)       Physical Exam:  Physical Exam  Vitals and nursing note reviewed.   Constitutional:       Appearance: Normal appearance.      Comments: Surgical scar on abdomen and sternum.   Poorly groomed   HENT:      Head: 
      Internal Medicine Progress Note    Patient: Alexandr Witt   : 1965   Admit Date: 2025     Date: 3/3/2025     Interval History     S: Apathetic, slightly confused but more oriented than yesterday. Just wants to sleep all day. Oriented to person/place but not time. Spoke to daughter, she is concerned that confusion is due to malignancy, and wants head imaging despite MRI being not feasible (no spinal stimulator remote).   O:  - 168, Tm 36.9, O2 98% RA. No BM reported. No focal neurological deficits, but globally confused. Susan-rectal area remains erythematous/ irritated.   Labs: K 3.4, Na 132, WBC 4.1, INR 4.09 supratherapeutic  A/P: Competed UTI treatment with 4 days ceftriaxone. CT head w/ contrast pending to rule out malignancy.     ROS   Review of Systems   Constitutional:  Positive for fatigue.   Psychiatric/Behavioral:  Positive for decreased concentration and dysphoric mood.    All other systems reviewed and are negative.         Objective     I/Os:  I/O last 3 completed shifts:  In: 270 [P.O.:270]  Out: 300 [Urine:300]      Vital Signs:  Patient Vitals for the past 5 hrs:   BP Temp Temp src Pulse Resp SpO2   25 0438 -- -- -- -- 16 --   25 0401 (!) 166/89 97.9 °F (36.6 °C) Oral 79 18 97 %       Physical Exam:  Physical Exam  Vitals and nursing note reviewed.   Constitutional:       Appearance: Normal appearance.      Comments: Surgical scar on abdomen and sternum.   Poorly groomed   HENT:      Head: Normocephalic and atraumatic.      Nose: Nose normal.      Mouth/Throat:      Mouth: Mucous membranes are dry.      Pharynx: Oropharynx is clear.   Eyes:      Extraocular Movements: Extraocular movements intact.      Conjunctiva/sclera: Conjunctivae normal.      Pupils: Pupils are equal, round, and reactive to light.   Cardiovascular:      Rate and Rhythm: Normal rate and regular rhythm.      Pulses: Normal pulses.      Heart sounds: Normal heart sounds.   Pulmonary:      Effort: 
      Internal Medicine Progress Note    Patient: Alexandr Witt   : 1965   Admit Date: 2025     Date: 3/4/2025     Interval History     S: Remains slightly confused but oriented x4 this morning. Just wants to sleep all day, poor appetite remains.   O:  - 157, Tm 36.9, O2 98% RA. No BM reported. No focal neurological deficits, but globally confused. Susan-rectal area remains erythematous/ irritated.   Labs: K 3.9, Na 131, WBC 3.4, INR 3.99 supratherapeutic  Imaging: CT head w/ IV shows possible occult metastases without evidence of edema.   A/P: Competed UTI treatment with 4 days ceftriaxone. Will likely need MRI head for better evaluation to rule out metastases.     ROS   Review of Systems   Constitutional:  Positive for fatigue.   Psychiatric/Behavioral:  Positive for dysphoric mood.    All other systems reviewed and are negative.         Objective     I/Os:  I/O last 3 completed shifts:  In: 1795 [P.O.:1360; I.V.:435]  Out: 700 [Urine:700]      Vital Signs:  Patient Vitals for the past 5 hrs:   BP Temp Temp src Pulse Resp SpO2 Weight   25 0403 110/69 97.6 °F (36.4 °C) Oral 86 16 96 % --   25 0228 -- -- -- -- -- -- 96.4 kg (212 lb 8.4 oz)       Physical Exam:  Physical Exam  Vitals and nursing note reviewed.   Constitutional:       Appearance: Normal appearance.      Comments: Surgical scar on abdomen and sternum.   Poorly groomed   HENT:      Head: Normocephalic and atraumatic.      Nose: Nose normal.      Mouth/Throat:      Mouth: Mucous membranes are dry.      Pharynx: Oropharynx is clear.   Eyes:      Extraocular Movements: Extraocular movements intact.      Conjunctiva/sclera: Conjunctivae normal.      Pupils: Pupils are equal, round, and reactive to light.   Cardiovascular:      Rate and Rhythm: Normal rate and regular rhythm.      Pulses: Normal pulses.      Heart sounds: Normal heart sounds.   Pulmonary:      Effort: Pulmonary effort is normal.      Breath sounds: Normal breath 
      Internal Medicine Progress Note    Patient: Alexandr Witt   : 1965   Admit Date: 2025     Date: 3/6/2025     Interval History     S: Remains slightly confused this morning. Wants to leave. Not voicing new symptoms otherwise. Received one unit PRBC overnight without adverse effect.   O:  - 177, Tm 37.0, O2 98% RA. 1x BM reported. No focal neurological deficits, but globally confused. Epigastric area non-tender to palpation. No melenic discoloration around rectum.   Labs: K 3.6, Na 134, WBC 4.2, INR 4.09 supratherapeutic. BUN 32. HgB 10.5--> 8.8-->6.9.   A/P: New upper GI bleed, requiring blood transfusion overnight but not currently symptomatic. Plan for EGD today, will require PCC and vitamin K prior to reverse Warfarin due to supra therapeutic INR of 4.     ROS   Review of Systems   All other systems reviewed and are negative.         Objective     I/Os:  I/O last 3 completed shifts:  In: 738 [P.O.:120; I.V.:20; Blood:598]  Out: 1230 [Urine:450; Other:780]      Vital Signs:  Patient Vitals for the past 5 hrs:   BP Temp Temp src Pulse Resp SpO2 Weight   25 0600 -- -- -- -- -- -- 93.4 kg (205 lb 12.8 oz)   25 0545 118/76 97.9 °F (36.6 °C) Oral 76 18 98 % --   25 0540 113/70 97.9 °F (36.6 °C) Oral 77 18 98 % --   25 0329 134/84 97.8 °F (36.6 °C) Oral 90 18 -- --   25 0325 121/76 97.8 °F (36.6 °C) Oral 90 18 98 % --   25 0313 -- -- Oral -- -- -- --   25 0310 (!) 145/79 97.4 °F (36.3 °C) Oral 96 16 98 % --   25 0257 (!) 177/75 97.6 °F (36.4 °C) Oral 97 18 99 % --   25 0249 122/80 97.6 °F (36.4 °C) Oral 87 18 99 % --       Physical Exam:  Physical Exam  Vitals and nursing note reviewed.   Constitutional:       Appearance: Normal appearance.      Comments: Surgical scar on abdomen and sternum.      HENT:      Head: Normocephalic and atraumatic.      Nose: Nose normal.      Mouth/Throat:      Mouth: Mucous membranes are dry.      Pharynx: 
    Progress Note    Patient Alexandr Witt  MRN: [id number]  YOB: 1965 Age: 59 y.o. Sex: male  Room: [address] [place] [address]       Admitting Physician: Carlos Whitten MD   Date of Admission: 2/27/2025  4:54 PM   Primary Care Physician: Lavon Diaz DO     Subjective:  Alexandr Witt was seen and examined. We are following for GIB.  --  no overt bleeding today. No BM    ROS:  Constitutional: Denies fever, no change in appetite  Respiratory: Denies cough or shortness of breath  Cardiovascular: Denies chest pain or edema    Objective:  Vital Signs:   Vitals:    03/07/25 1100   BP: (!) 130/90   Pulse: 86   Resp: 15   Temp:    SpO2: 96%         Physical Exam:  Constitutional: Alert and oriented x 4. No acute distress.   HEENT: Sclera anicteric, mucosal membranes moist  Cardiovascular: Regular rate and rhythm.  No murmurs.  Respiratory: Respirations nonlabored, no crepitus  GI: Abdomen nondistended, soft, and nontender.  Normal active bowel sounds.  No masses palpable.   Rectal: Deferred  Musculoskeletal:  No pitting edema of the lower legs.  Neurological: Gross memory appears intact.       Intake/Output:    Intake/Output Summary (Last 24 hours) at 3/7/2025 1125  Last data filed at 3/7/2025 0600  Gross per 24 hour   Intake 1223 ml   Output 775 ml   Net 448 ml        Current Medications:  Current Facility-Administered Medications   Medication Dose Route Frequency Provider Last Rate Last Admin    0.9 % sodium chloride infusion   IntraVENous PRN Curtis Ace MD        potassium phosphate 15 mmol in sodium chloride 0.9 % 250 mL IVPB  15 mmol IntraVENous Once Lowell Deluna DO        0.9 % sodium chloride infusion   IntraVENous PRN Kat Mcgarry MD        pantoprazole (PROTONIX) 80 mg in sodium chloride 0.9 % 100 mL infusion  8 mg/hr IntraVENous Continuous Mary Cox MD 10 mL/hr at 03/07/25 0821 8 mg/hr at 03/07/25 0821    0.9 % sodium chloride infusion   IntraVENous PRN Lowell Deluna 
  Comprehensive Nutrition Assessment    RECOMMENDATIONS:  PO Diet: Continue Regular  Nutrition Supplement: Continue Ensure +HP tid  Nutrition Education: No recommendation at this time     NUTRITION ASSESSMENT:   Nutritional summary & status: Positive nutrition screen for poor appetite and weight loss. Pt admitted with AMS. Familiar with pt from previous encounter. Pt with recent lung mass suspected to be NSCLC s/p SBRT 12/2024. Pt with poor po intake past several weeks since having flu per chart review. EMR showing severe wt loss of  10.7% in 2 months. Pt meeting ASPEN criteria for severe malnutrition. Unable to perform NFPE as pt with other staff on attempted encounter. Pt with hx of DM. Will monitor need for addition of carb restriction.  Ensure supplements in place tid. Will monitor intake adequacy and need for addtional nutrition interventions.   Admission // PMH: Encephalopathy acute//abdom hernia, CAD, COPD, GERD, T2DM    MALNUTRITION ASSESSMENT  Context of Malnutrition: Chronic Illness   Malnutrition Status: Severe malnutrition  Findings of the 6 clinical characteristics of malnutrition (Minimum of 2 out of 6 clinical characteristics is required to make the diagnosis of moderate or severe Protein Calorie Malnutrition based on AND/ASPEN Guidelines):  Energy Intake:  75% or less estimated energy requirements for 1 month or longer  Weight Loss:  Greater than 7.5% over 3 months     Body Fat Loss:  Unable to assess (pt with other staff)     Muscle Mass Loss:  Unable to assess      NUTRITION DIAGNOSIS   Severe malnutrition related to inadequate protein-energy intake as evidenced by criteria as identified in malnutrition assessment    Nutrition Monitoring and Evaluation:   Food/Nutrient Intake Outcomes:  Food and Nutrient Intake, Supplement Intake  Physical Signs/Symptoms Outcomes:  Biochemical Data, Nutrition Focused Physical Findings, Weight     OBJECTIVE DATA: Significant to nutrition assessment  Nutrition 
  Comprehensive Nutrition Assessment    RECOMMENDATIONS:  PO Diet: Continue clear liquid diet  Nutrition Supplement: Continue Ensure clear TID  Nutrition Education: Education/Counseling not indicated     NUTRITION ASSESSMENT:   Nutritional summary & status: Pt transferred to ICU for post EGD monitoring for hemorrhage. Per GI ok for clear liquid diet, clear nutrition supplements also on board TID. He has had poor intakes for the duration of his admission (x8 days). GI parasites resolved, likely source of unsafe foods consumed by pt. No melena reported today, pt reports abdominal pain and nausea. If pt continues w/ inadequate intakes once advanced past clears pt would benefit from nutrition support. Will discuss w/ MD once diet is advanced.   Admission // PMH: Encephalopathy acute//abdom hernia, CAD, COPD, GERD, T2DM    MALNUTRITION ASSESSMENT  Context of Malnutrition: Chronic Illness   Malnutrition Status: Severe malnutrition  Findings of the 6 clinical characteristics of malnutrition (Minimum of 2 out of 6 clinical characteristics is required to make the diagnosis of moderate or severe Protein Calorie Malnutrition based on AND/ASPEN Guidelines):  Energy Intake:  75% or less estimated energy requirements for 1 month or longer  Weight Loss:  Greater than 7.5% over 3 months (12% in 2 months)     Body Fat Loss:  Mild body fat loss Buccal region, Orbital   Muscle Mass Loss:  Mild muscle mass loss Temples (temporalis)  Fluid Accumulation:  No fluid accumulation      NUTRITION DIAGNOSIS   Severe malnutrition related to inadequate protein-energy intake as evidenced by criteria as identified in malnutrition assessment    Nutrition Monitoring and Evaluation:   Food/Nutrient Intake Outcomes:  Food and Nutrient Intake, Supplement Intake  Physical Signs/Symptoms Outcomes:  Biochemical Data, Nutrition Focused Physical Findings, Weight, Hemodynamic Status, GI Status     OBJECTIVE DATA: Significant to nutrition 
  GI Progress Note      Alexandr Witt is a 59 y.o. male patient.  1. Altered mental status, unspecified altered mental status type    2. Essential hypertension    3. Cardiac arrest (HCC)        Admit Date: 2/27/2025    Subjective:       No new complaints. Denies s/s bleeding.      ROS:  As per above    Scheduled Meds:   pregabalin  150 mg Oral Daily    prochlorperazine  5 mg IntraVENous Once    folic acid  1 mg Oral Daily    calamine-zinc oxide   Topical TID    aspirin  81 mg Oral Daily    citalopram  40 mg Oral Daily    melatonin  5 mg Oral Nightly    rosuvastatin  20 mg Oral Nightly    [Held by provider] sacubitril-valsartan  1 tablet Oral BID    insulin lispro  0-4 Units SubCUTAneous 4x Daily AC & HS    sodium chloride flush  5-40 mL IntraVENous 2 times per day       Continuous Infusions:   sodium chloride      sodium chloride      sodium chloride      pantoprazole (PROTONIX) 80 mg in sodium chloride 0.9 % 100 mL infusion 8 mg/hr (03/09/25 0354)    sodium chloride      dextrose      sodium chloride 25 mL/hr at 02/28/25 0933       PRN Meds:  sodium chloride, methocarbamol, morphine, oxyCODONE-acetaminophen, sodium chloride, sodium chloride, sodium chloride, prochlorperazine, QUEtiapine, cloNIDine, glucose, dextrose bolus **OR** dextrose bolus, glucagon (rDNA), dextrose, sodium chloride flush, sodium chloride, ondansetron **OR** ondansetron, polyethylene glycol, acetaminophen **OR** acetaminophen      Objective:       Patient Vitals for the past 24 hrs:   BP Temp Temp src Pulse Resp SpO2 Weight   03/09/25 1045 -- -- -- 80 13 96 % --   03/09/25 1030 -- -- -- 80 10 97 % --   03/09/25 1015 -- -- -- 76 12 97 % --   03/09/25 1000 -- -- -- 76 11 96 % --   03/09/25 0945 -- -- -- 79 12 98 % --   03/09/25 0930 -- -- -- 74 15 97 % --   03/09/25 0915 -- -- -- 85 17 96 % --   03/09/25 0900 -- -- -- 75 15 97 % --   03/09/25 0845 -- -- -- 77 10 99 % --   03/09/25 0830 -- -- -- 76 15 100 % --   03/09/25 0815 -- -- -- 73 (!) 9 99 
  GI Progress Note      Alexandr Witt is a 59 y.o. male patient.  1. Altered mental status, unspecified altered mental status type    2. Essential hypertension    3. Cardiac arrest (HCC)        Admit Date: 2/27/2025    Subjective:       No specific complaints. Still rather lethargic but awakens and answers all questions appropriately. Not interested in clear liquid diet. Received 1u PRBCs overnight. No BM.      ROS:  As per above    Scheduled Meds:   pregabalin  150 mg Oral Daily    prochlorperazine  5 mg IntraVENous Once    folic acid  1 mg Oral Daily    calamine-zinc oxide   Topical TID    [Held by provider] aspirin  81 mg Oral Daily    citalopram  40 mg Oral Daily    docusate sodium  100 mg Oral Daily    melatonin  5 mg Oral Nightly    rosuvastatin  20 mg Oral Nightly    [Held by provider] sacubitril-valsartan  1 tablet Oral BID    insulin lispro  0-4 Units SubCUTAneous 4x Daily AC & HS    sodium chloride flush  5-40 mL IntraVENous 2 times per day       Continuous Infusions:   sodium chloride      sodium chloride      sodium chloride      pantoprazole (PROTONIX) 80 mg in sodium chloride 0.9 % 100 mL infusion 8 mg/hr (03/08/25 0600)    sodium chloride      dextrose      sodium chloride 25 mL/hr at 02/28/25 0933       PRN Meds:  sodium chloride, oxyCODONE-acetaminophen, methocarbamol, morphine, sodium chloride, sodium chloride, sodium chloride, prochlorperazine, QUEtiapine, cloNIDine, glucose, dextrose bolus **OR** dextrose bolus, glucagon (rDNA), dextrose, [DISCONTINUED] oxyCODONE-acetaminophen **OR** oxyCODONE-acetaminophen, sodium chloride flush, sodium chloride, ondansetron **OR** ondansetron, polyethylene glycol, acetaminophen **OR** acetaminophen      Objective:       Patient Vitals for the past 24 hrs:   BP Temp Temp src Pulse Resp SpO2 Weight   03/08/25 0848 -- -- -- -- 18 -- --   03/08/25 0818 -- -- -- -- 19 -- --   03/08/25 0800 (!) 148/93 97.5 °F (36.4 °C) Oral 83 20 96 % --   03/08/25 0600 -- -- -- -- 
  Physician Progress Note      PATIENT:               KATHARINE MARSHALL  [facility] #:                  [id number]  :                       1965  ADMIT DATE:       2025 4:54 PM  DISCH DATE:  RESPONDING  PROVIDER #:        KRISTYN SO          QUERY TEXT:    Patient admitted with UTI.  Noted documentation of Acute Kidney Injury in PN   , per labs 1.3, 1.2 1.0 and 0.9 with baseline of 1.0.  In order to support   the diagnosis of BRENDA, please include additional clinical indicators in your   documentation.? Or please document if the diagnosis of BRENDA has been ruled out   after further study.    The medical record reflects the following:    Risk Factors: UTI, Obesity, diarrhea and poor intake, age 59, male    Clinical Indicators:    ED Note : Altered Mental Status (Patient arrived from home after home   nurse came today and suggested that he come, [facility] pt (lung cancer), stated that   he is only oriented to person and place, nausea, and failure to thrive x 1   week. )    H & P : He is currently an unreliable historian due to his encephalopathy.    PN : Acute kidney injury - Creatinine 1.3 from baseline of 1.0. Has been   having possible diarrhea and poor intake. Likely pre-renal etiology.    Creatinine: 1.3, 1.2, 1.0, 0.9 from  to 3/3    GFR 62, 69, 85, >90 from  to 3/3    BUN 17, 16, 13, 14 from  to 3/3    Treatment: IV fluids, Encourage PO intake, Monitoring Labs & Vitals    Thank you,  JOSIE Purcell, CDS    Defined by Kidney Disease Improving Global Outcomes (KDIGO) clinical practice   guideline for acute kidney injury:  -Increase in SCr by greater than or equal to 0.3 mg/dl within 48 hours; or  -Increase or decrease in SCr to greater than or equal to 1.5 times baseline,   which is known or presumed to have occurred within the prior 7 days; or  -Urine volume < 0.5ml/kg/h for 6 hours.  Options provided:  -- acute kidney injury was evidenced by, Please document evidence  -- Acute kidney 
1u PRBC transfused over night, tolerated well.   
4 Eyes Admission Assessment     I agree as the admission nurse that 2 RN's have performed a thorough Head to Toe Skin Assessment on the patient. ALL assessment sites listed below have been assessed on admission.       Areas assessed by both nurses:   [x]   Head, Face, and Ears   [x]   Shoulders, Back, and Chest  [x]   Arms, Elbows, and Hands   [x]   Coccyx, Sacrum, and Ischum  [x]   Legs, Feet, and Heels        Does the Patient have Skin Breakdown?  Stage 1 on the sacrum and scattered scabs  Maxwell upper arm  Jc Prevention initiated:  Yes   Wound Care Orders initiated:  Yes      WOC nurse consulted for Pressure Injury (Stage 3,4, Unstageable, DTI, NWPT, and Complex wounds):  No      Nurse 1 eSignature: Electronically signed by Annalee Teague RN on 2/28/25 at 12:13 AM EST    **SHARE this note so that the co-signing nurse is able to place an eSignature**    Nurse 2 eSignature: Electronically signed by Rea Pro LPN on 2/28/25 at 12:24 AM EST    
BID PPI x 8 weeks  Treat H pylori if stool ag is +  Follow up with Dr. Cox  Call back with questions/concerns  
From ENDO to PACU.   Post Procedures (1 of 2)  ESOPHAGOGASTRODUODENOSCOPY CONTROL HEMORRHAGE  Mary Cox MD  In PACU, ENDO 01  Report received from CRNA and ENDO RN at bedside.   Patient alert to self and time, confused on situation.   Complains of nausea- compazine 5 mg given and protonix drip started.   VS stable.   
MRI screening completed with daughter. MRI dept. has been informed of several marks on checklist. (Spinal stimulator, stents, valve repl., port, pacemaker, recent radiation, etc). They are looking into patient history for patient's MRI. Patient's daughter is bringing remote for spinal stim. to be turned off. Resident on call notified.  
Message left for GI regarding patient's diet order. Waiting on response.   
NP was notified about critical H&H and pt pain level @0046, 0048, and 0214 without a response. RN then contacted the on call hospitalist @0214 and was able to obtain an order for pain medicine and PRBCs.   
Occupational Therapy      Pt supine in bed upon OT arrival. Pt adamantly declining to work with therapy due to his stomach being upset. Lengthy education/encouragement on importance of OOB activity --pt continues to decline. RN aware. Will continue to follow patient per POC and attempt pt tomorrow-- will discuss w/ patient needs/goals for continued acute care OT if patient continues to refuse.     Sowmya Hearn OTR/L   
Occupational Therapy  Facility/Department: [address]  Occupational Therapy Initial Assessment/Treatment    Name: Alexandr Witt  : 1965  MRN: [id number]  Date of Service: 3/3/2025    Discharge Recommendations:  Home with Home health OT, 24 hour supervision or assist  OT Equipment Recommendations  Equipment Needed: No  Other: reports having a shower chair and 2WW already     Patient Diagnosis(es): The primary encounter diagnosis was Altered mental status, unspecified altered mental status type. A diagnosis of Essential hypertension was also pertinent to this visit.  Past Medical History:  has a past medical history of Abdominal hernia, Aortic valve prosthesis present, Aortic valve replaced, CAD (coronary artery disease), Chronic back pain greater than 3 months duration, Clostridium difficile diarrhea, COPD, severity to be determined ([facility]), Current every day smoker, DDD (degenerative disc disease), lumbosacral, Encounter for imaging to screen for metal prior to magnetic resonance imaging (MRI), Encounter for imaging to screen for metal prior to MRI, Erectile dysfunction, GERD (gastroesophageal reflux disease), Hyperlipidemia, Hypertension, Joint pain, Left testicular cancer ([facility]), Myalgia and myositis, unspecified, Neuropathy, OA (osteoarthritis) of knee, Obesity, Class I, BMI 30.0-34.9 (see actual BMI), Prolonged emergence from general anesthesia, S/P AVR, S/P CABG x 2, Shoulder instability-LEFT, Type 2 diabetes mellitus without complication, without long-term current use of insulin ([facility]), and Wears dentures.  Past Surgical History:  has a past surgical history that includes Aortic valve surgery (2004); Testicle removal (Left, ); Coronary artery bypass graft (2004); Foot surgery (Left, 2012); Shoulder arthroscopy (Right, 2013); Coronary angioplasty with stent (2014); Carpal tunnel release (Right, 2015); lumbar discectomy (); Tunneled venous port placement 
Occupational Therapy  Facility/Department: [facility] ICU  Occupational Therapy Treatment Note    Name: Alexandr Witt  : 1965  MRN: [id number]  Date of Service: 3/7/2025    Discharge Recommendations:  Home with Home health OT, 24 hour supervision or assist          Patient Diagnosis(es): The primary encounter diagnosis was Altered mental status, unspecified altered mental status type. Diagnoses of Essential hypertension and Cardiac arrest (HCC) were also pertinent to this visit.  Past Medical History:  has a past medical history of Abdominal hernia, Aortic valve prosthesis present, Aortic valve replaced, CAD (coronary artery disease), Chronic back pain greater than 3 months duration, Clostridium difficile diarrhea, COPD, severity to be determined (HCC), Current every day smoker, DDD (degenerative disc disease), lumbosacral, Encounter for imaging to screen for metal prior to magnetic resonance imaging (MRI), Encounter for imaging to screen for metal prior to MRI, Erectile dysfunction, GERD (gastroesophageal reflux disease), Hyperlipidemia, Hypertension, Joint pain, Left testicular cancer (HCC), Myalgia and myositis, unspecified, Neuropathy, OA (osteoarthritis) of knee, Obesity, Class I, BMI 30.0-34.9 (see actual BMI), Prolonged emergence from general anesthesia, S/P AVR, S/P CABG x 2, Shoulder instability-LEFT, Type 2 diabetes mellitus without complication, without long-term current use of insulin (HCC), and Wears dentures.  Past Surgical History:  has a past surgical history that includes Aortic valve surgery (2004); Testicle removal (Left, ); Coronary artery bypass graft (2004); Foot surgery (Left, 2012); Shoulder arthroscopy (Right, 2013); Coronary angioplasty with stent (2014); Carpal tunnel release (Right, 2015); lumbar discectomy (); Tunneled venous port placement (); colectomy (10/10/2016); Incisional hernia repair (); EMG (2012); Cardiac catheterization 
Occupational Therapy  Facility/Department: [facility] ICU  Occupational Therapy Treatment    Name: Alexandr Witt  : 1965  MRN: [id number]  Date of Service: 3/10/2025    Discharge Recommendations:  Subacute/Skilled Nursing Facility  OT Equipment Recommendations  Equipment Needed: No     Treatment Diagnosis: decreased independence w/ ADLs and functional mobility    Assessment  Performance deficits / Impairments: Decreased functional mobility ;Decreased endurance;Decreased ADL status;Decreased strength;Decreased safe awareness;Decreased high-level IADLs;Decreased balance  Assessment: Pt demo decreased standing balance this date, requiring assist for transfers and ambulation with RW and balance assist during ADLs. Pt activity limited by symptomatic orthostatic hypotension. Pt demo impaired cognition with poor insight into his current deficts and decreased problem solving for safety. Safety concerns present should pt return to his independent living apartment. Recommend SNF for ongoing OT tx. Pt may benefit from transition into more of assisted living setting. Discussed with case management  Treatment Diagnosis: decreased independence w/ ADLs and functional mobility  REQUIRES OT FOLLOW-UP: Yes  Activity Tolerance  Activity Tolerance: Treatment limited secondary to medical complications (free text)     Plan  Occupational Therapy Plan  Times Per Week: 2-5  Current Treatment Recommendations: Strengthening, Balance training, Safety education & training, Functional mobility training, Self-Care / ADL, Endurance training    Restrictions  Position Activity Restriction  Other Position/Activity Restrictions: up with assistance    Subjective  General  Chart Reviewed: Yes  Patient assessed for rehabilitation services?: Yes  Additional Pertinent Hx: 59 y.o. male with PMHx extensive oncologic, cardiopulmonary, and abdominal surgical history who presents with AMS. Patient being followed for acute metabolic encephalopathy, MRI w/ w/o 
Occupational Therapy Attempt  Chart reviewed. Pt in bed upon entry, pt declining OT this date, stated he is not up for any therapy at this time. Pt declining all ADLs. Pt educated on the importance of OOB activity while admitted, pt still declining OT. RN aware. Will follow up later today/tomorrow as time and schedule allows.    Keiry Lee, OTR/L [id number]   
PACU Transfer Note    Vitals:    03/06/25 1715   BP: (!) 155/83   Pulse: 89   Resp: 17   Temp: (!) 96.3 °F (35.7 °C)   SpO2:        In: 250 [I.V.:250]  Out: 300 [Urine:300]    Pain assessment:  none Report called to RN ICU. Stat lab and tranfuse blood order on chart- RN made aware. Patient to room #4516 as scheduled. Telemetry eram sent to [address] [place] [address] through tube station.   Pain Level: 0    Report given to Receiving unit RN.    3/6/2025 5:25 PM       
PRBC administration started now  
Patient did not have BM during shift for stool sample. Patient has not been eating or drinking much of anything today or yesterday. MRI stated that they wanted to proceed with MRI but stated they needed a nurse down stairs for entire MRI to monitor heart rate. This was not a possibility today. MRI stated they will try again tomorrow. PRN ativan is ordered. Patient currently has sitter due to impulsivity. Patient also on camera. Chart reviewed and patient does not have radiation seeds currently that we can find documentation on. Daughter perhaps meant recent chemo and not radiation/ radiation seeds when conducting MRI screening.   
Patient had visible worms in stool. Patient was instructed to not flush stool in hat placed in toilet and patient did not comply with request. Resident notified. Additional stool testing ordered for future bowel mvmts.  
Patient is alert to self only, disoriented with place, date and time, on room air. With IV fluid infusing but patient did not void since admission, bladder scan him 170ml, Norris Moreno MD  informed. Camera in room for additional safety measures.   
Patient transferred to 63 (closer to nurses station)   
Patients pain not well controlled with PRN percocet. Requesting that his home morphine and lyrica be restarted, discussed with floor residents, stated they would defer to day team.   
Physical Therapy    Daily Treatment Note      Discharge Recommendations:  Subacute/Skilled Nursing Facility  Equipment Needs:  Rolling walker (if home)    Assessment:  Pt presents with decreased insight and safety awareness, requiring increased assist for all mobility today.  Gait is unsteady, requiring use of rolling walker for balance.  Activity limited by lightheadedness and (+) orthostatics.  BP decreased to 81/67 in standing.  Concerns for pt being able to safely care for self back at independent living.  Feel pt would benefit from short SNF stay to increase strength and maximize mobility before returning home.  IF pt returns home, pt would need 24hr capable assist and home PT to ensure safety.     Chart Reviewed: Yes   Other Position/Activity Restrictions: up with assistance   Additional Pertinent Hx: Adm 2/27 with AMS and supratherapeutic INR.  Head CT negative for acute process.  Work-up (+) for UTI.  3/4: small volume of dark emesis.  3/6: underwent EGD for melena, anemia; transferred to ICU after EGD for further monitoring. PRBCs.  PMH:  CAD, HTN, neuropathy, GERD, DDD, OA, CABG, DM, COPD, smoker      Diagnosis: AMS   Treatment Diagnosis: Decreased functional mobility      Subjective:  Pt found supine in bed.  \"I feel lightheaded.\"  Pt agreeable for PT.     Pain:  Denies pain      Objective:    Bed mobility  Supine to sit:  Min A (pulling from HHA of therapist, HOB raised)    Transfers  Sit to stand:  Min A (with and without use of rolling walker)  Stand to sit:  Min A (decreased control to sit)  Bed <> chair:  Min A (stand pivot without assistive device)    Ambulation  Assistance Level:  Min A   Assistive device:  Rolling walker  Distance:  10ft x2  Quality of gait:  flexed posture, cues for walker safety, unsteady gait  Other:  Pt with c/o dizziness after ambulating into bathroom.  Pt ambulated back to bedside chair.  BP obtained at 88/77.  Repeat BP seated was 106/81.  Pt stood from chair and BP 
Physical Therapy  Facility/Department: [address]  Physical Therapy Initial Assessment and Treatment    Name: Alexandr Witt  : 1965  MRN: [id number]  Date of Service: 3/3/2025    Discharge Recommendations:  24 hour supervision or assist, Home with Home health PT   PT Equipment Recommendations  Equipment Needed: No      Patient Diagnosis(es): The primary encounter diagnosis was Altered mental status, unspecified altered mental status type. A diagnosis of Essential hypertension was also pertinent to this visit.  Past Medical History:  has a past medical history of Abdominal hernia, Aortic valve prosthesis present, Aortic valve replaced, CAD (coronary artery disease), Chronic back pain greater than 3 months duration, Clostridium difficile diarrhea, COPD, severity to be determined ([facility]), Current every day smoker, DDD (degenerative disc disease), lumbosacral, Encounter for imaging to screen for metal prior to magnetic resonance imaging (MRI), Encounter for imaging to screen for metal prior to MRI, Erectile dysfunction, GERD (gastroesophageal reflux disease), Hyperlipidemia, Hypertension, Joint pain, Left testicular cancer ([facility]), Myalgia and myositis, unspecified, Neuropathy, OA (osteoarthritis) of knee, Obesity, Class I, BMI 30.0-34.9 (see actual BMI), Prolonged emergence from general anesthesia, S/P AVR, S/P CABG x 2, Shoulder instability-LEFT, Type 2 diabetes mellitus without complication, without long-term current use of insulin ([facility]), and Wears dentures.  Past Surgical History:  has a past surgical history that includes Aortic valve surgery (2004); Testicle removal (Left, ); Coronary artery bypass graft (2004); Foot surgery (Left, 2012); Shoulder arthroscopy (Right, 2013); Coronary angioplasty with stent (2014); Carpal tunnel release (Right, 2015); lumbar discectomy (); Tunneled venous port placement (); colectomy (10/10/2016); Incisional hernia repair 
Physical/ Occupational Therapy  Refusal    PT attempted to see pt for therapy, pt declining despite edu on importance of mobility. Pt reports frustration being in the hospital \"I'm just ready to get out of here.\" Pt refusing OOB to chair, sitting up for breakfast, ambulating with PT. \"Maybe tomorrow.\" RN aware. Will follow up per POC.    Felicia Magana PT, DPT, NCS, CSRS     Pt resting in bed upon therapy arrival 12:15pm. Pt politely declines therapy this afternoon due to severe nausea and vomiting prior to OT arrival. Education on importance of OOB activity when feeling better, pt receptive.     Sowmya Hearn OTR/L    
Progress Note    Admit Date: 2/27/2025  Day: 9  Diet: ADULT ORAL NUTRITION SUPPLEMENT; Breakfast, Lunch, Dinner; Clear Liquid Oral Supplement  ADULT DIET; Dysphagia - Soft and Bite Sized    CC:   - Patient hgb stable  - VSS  - No new complaints    Medications:     Scheduled Meds:   pregabalin  150 mg Oral Daily    prochlorperazine  5 mg IntraVENous Once    folic acid  1 mg Oral Daily    calamine-zinc oxide   Topical TID    aspirin  81 mg Oral Daily    citalopram  40 mg Oral Daily    melatonin  5 mg Oral Nightly    rosuvastatin  20 mg Oral Nightly    [Held by provider] sacubitril-valsartan  1 tablet Oral BID    insulin lispro  0-4 Units SubCUTAneous 4x Daily AC & HS    sodium chloride flush  5-40 mL IntraVENous 2 times per day     Continuous Infusions:   sodium chloride      sodium chloride      sodium chloride      pantoprazole (PROTONIX) 80 mg in sodium chloride 0.9 % 100 mL infusion 8 mg/hr (03/09/25 0354)    sodium chloride      dextrose      sodium chloride 25 mL/hr at 02/28/25 0933     PRN Meds:sodium chloride, methocarbamol, morphine, oxyCODONE-acetaminophen, sodium chloride, sodium chloride, sodium chloride, prochlorperazine, QUEtiapine, cloNIDine, glucose, dextrose bolus **OR** dextrose bolus, glucagon (rDNA), dextrose, sodium chloride flush, sodium chloride, ondansetron **OR** ondansetron, polyethylene glycol, acetaminophen **OR** acetaminophen    Objective:   Vitals:   T-max:  Patient Vitals for the past 8 hrs:   BP Temp Temp src Pulse Resp SpO2 Weight   03/09/25 1045 -- -- -- 80 13 96 % --   03/09/25 1030 -- -- -- 80 10 97 % --   03/09/25 1015 -- -- -- 76 12 97 % --   03/09/25 1000 -- -- -- 76 11 96 % --   03/09/25 0945 -- -- -- 79 12 98 % --   03/09/25 0930 -- -- -- 74 15 97 % --   03/09/25 0915 -- -- -- 85 17 96 % --   03/09/25 0900 -- -- -- 75 15 97 % --   03/09/25 0845 -- -- -- 77 10 99 % --   03/09/25 0830 -- -- -- 76 15 100 % --   03/09/25 0815 -- -- -- 73 (!) 9 99 % --   03/09/25 0800 134/83 98.2 
Pt Bp- 157/85, P-88. PRN clonidine given.  
Pt assisted to restroom and back to bed. Nurse attempted to help pt replace depends. Pt unhappy and agitated. Began cursing and being aggressive towards nurse. When nurse told pt this behavior isn't appropriate pt began trying to vomit on floor despite being given an emesis bag. Pt continued to act aggressive. Pt left with bed alarm on.   
Pt to endo  
Received from ED with complaint of Altered Mental Status, nausea and failure to thrive. Alert to self and place, disoriented to time and date, on room air. [place] patient to staff, bed set up, room and use of call light. Vital signs and physical assessment done and recorded. Ensured all safety measures are in placed, bed locked in lowest positioned, bed alarm  on, gripper socks worn by patient. Keep patient comfortable in bed.  
Stat H&H ordered for 0415. Lab contacted at 0430 to come collect. 0525 H&H remains uncollected; Lab called to come collect.   
[facility] -  Clinical Pharmacy Note    Warfarin - Management by Pharmacy    Consult Date(s): 2/28/25  Consulting Provider(s): Dr Dotson    Assessment / Plan  1)  h/o AVR, A.fib - Warfarin  Goal INR: 2 - 3  Concurrent Anticoagulants / Antiplatelets: Aspirin  Interactions:  No significant acute interactions noted  Current Regimen / Plan:   INR today = 3.99  INR continues to be elevated (although now leveling out) despite holding x2 days and giving lower doses 2 days prior - likely due to decreased oral intake / acute illness.    Will HOLD Warfarin again today.    Will continue to hold until INR is trending down steadily and approaches 3 - can possibly resume tomorrow.  Will likely need reduced total weekly dose when resumed.  Will monitor pt's clinical status and INR daily, and make dose adjustments as needed.    Please call with questions--  Thanks--  Rosalia Cortez, PharmD, BCPS, BCGP  [id number] ([facility])   3/4/2025 9:54 AM      Interval update:  Per notes, slightly confused but more oriented this AM.  Refused to work with PT today.    Subjective/Objective:   Alexandr Witt is a 59 y.o. male with a PMHx significant for A.fib, bioprosthetic AVR, on chronic Warfarin, CAD s/p CABG x2, V.fib arrest 4/2024 after LLL wedge resection s/p ICD placement, HTN, HLD, COPD, GERD, neuropathy, DM, transverse colectomy (2016), right colectomy (2019), testicular cancer s/p chemo & resection, LLL SCC s/p wedge resection 8/2023), and recent lung mass suspected to be NSCLC s/p SBRT 12/2024 who is admitted with AMS.     Pharmacy is consulted to dose Warfarin.    Ht Readings from Last 1 Encounters:   02/27/25 1.829 m (6' 0.01\")     Wt Readings from Last 1 Encounters:   03/04/25 96.4 kg (212 lb 8.4 oz)     Prior / Home Warfarin Regimen:  Indication:  AVR, A.fib  Goal INR:  2.0-3.0  Home dose:   Warfarin 2.5mg po daily  Warfarin is managed by [facility]  Appt 2/17:  INR 5.1 (thought to be 2/2 significant N/V/D 
[facility] -  Clinical Pharmacy Note    Warfarin - Management by Pharmacy    Consult Date(s): 2/28/25  Consulting Provider(s): Dr Dotson    Assessment / Plan  1)  h/o AVR, A.fib - Warfarin  Goal INR: 2 - 3  Concurrent Anticoagulants / Antiplatelets: Aspirin  Interactions:  No significant acute interactions noted  Current Regimen / Plan:   INR at [facility] 2/27 AM = 4.01.  INR today = 3.45  PLAN: will give lower dose of 1mg PO x 1 again today since INR increasing and reassess tomorrow. If INR downtrending back to goal range tomorrow, will consider resuming home dose of 2.5mg daily. Might need to consider TWD decrease if INR remains above INR goal range.   Will monitor pt's clinical status and INR daily, and make dose adjustments as needed.    Please call with questions--  Thanks--  Ector Carty, PharmD  PGY1 Pharmacy Resident   Wireless: [phone]  03/01/25       Interval update: Patient remains disoriented today, empirically started on ceftriaxone due to possible UTI. A1c pending.     Subjective/Objective:   Alexandr Witt is a 59 y.o. male with a PMHx significant for A.fib, bioprosthetic AVR, on chronic Warfarin, CAD s/p CABG x2, V.fib arrest 4/2024 after LLL wedge resection s/p ICD placement, HTN, HLD, COPD, GERD, neuropathy, DM, transverse colectomy (2016), right colectomy (2019), testicular cancer s/p chemo & resection, LLL SCC s/p wedge resection 8/2023), and recent lung mass suspected to be NSCLC s/p SBRT 12/2024 who is admitted with AMS.     Pharmacy is consulted to dose Warfarin.    Ht Readings from Last 1 Encounters:   02/27/25 1.829 m (6' 0.01\")     Wt Readings from Last 1 Encounters:   03/01/25 97.6 kg (215 lb 2.7 oz)     Prior / Home Warfarin Regimen:  Indication:  AVR, A.fib  Goal INR:  2.0-3.0  Home dose:   Warfarin 2.5mg po daily  Warfarin is managed by [facility]  Appt 2/17:  INR 5.1 (thought to be 2/2 significant N/V/D 2/2 influenza).  Pt instructed to hold Warfarin until next 
The [facility] -  Clinical Pharmacy Note    Warfarin - Management by Pharmacy    Consult Date(s): 2/28/25  Consulting Provider(s): Dr Dotson    Assessment / Plan  1)  h/o AVR, A.fib - Warfarin  Goal INR: 2 - 3  Concurrent Anticoagulants / Antiplatelets: Aspirin  Interactions:  No significant acute interactions noted  Current Regimen / Plan:   INR at [facility] yesterday AM = 4.01.  INR on presentation to [facility] last night = 2.88.  INR today = 3.01  Will give reduced dose of Warfarin 1mg po x1 today.  If INR stable tomorrow, will plan to continue prior home dose of Warfarin 2.5mg po daily.  Will monitor pt's clinical status and INR daily, and make dose adjustments as needed.    Please call with questions--  Thanks--  Rosalia Cortez, PharmD, BCPS, BCGP  [id number] ([facility])   2/28/2025 10:39 AM        Interval update:  Remains disoriented per notes.    Subjective/Objective:   Alexandr Witt is a 59 y.o. male with a PMHx significant for A.fib, bioprosthetic AVR, on chronic Warfarin, CAD s/p CABG x2, V.fib arrest 4/2024 after LLL wedge resection s/p ICD placement, HTN, HLD, COPD, GERD, neuropathy, DM, transverse colectomy (2016), right colectomy (2019), testicular cancer s/p chemo & resection, LLL SCC s/p wedge resection 8/2023), and recent lung mass suspected to be NSCLC s/p SBRT 12/2024 who is admitted with AMS.     Pharmacy is consulted to dose Warfarin.    Ht Readings from Last 1 Encounters:   02/27/25 1.829 m (6' 0.01\")     Wt Readings from Last 1 Encounters:   02/28/25 97.9 kg (215 lb 13.3 oz)     Prior / Home Warfarin Regimen:  Indication:  AVR, A.fib  Goal INR:  2.0-3.0  Home dose:   Warfarin 2.5mg po daily  Warfarin is managed by [facility]  Appt 2/17:  INR 5.1 (thought to be 2/2 significant N/V/D 2/2 influenza).  Pt instructed to hold Warfarin until next INR check.  Appt 2/20:  INR 3.7.  Pt instructed to hold  x2 days, then resume 2.5mg po daily.  Appt 2/24:  INR 3.2.  Pt instructed to 
The [facility] -  Clinical Pharmacy Note    Warfarin - Management by Pharmacy    Consult Date(s): 2/28/25  Consulting Provider(s): Dr Dotson    Assessment / Plan  1)  h/o AVR, A.fib - Warfarin  Goal INR: 2 - 3  Concurrent Anticoagulants / Antiplatelets: Aspirin  Interactions:  No significant acute interactions noted  Current Regimen / Plan:   INR at [facility] 2/27 AM = 4.01.  INR today = 3.87  PLAN: would have expected INR to be downtrending given that warfarin should have been held on 2/27 and patient received 2x lower doses. However, patient noted to have AMS on admission so might have taken his 2/27 dose. Will hold warfarin dose tonight and reassess tomorrow. Warfarin placeholder ordered  Might need to consider TWD decrease if patient remains out of range with his current home dose of 2.5mg daily  Will monitor pt's clinical status and INR daily, and make dose adjustments as needed.    Please call with questions--  Thanks--  Ector Carty, PharmD  PGY1 Pharmacy Resident   Wireless: [phone]  03/02/25       Interval update: Patient slightly hypertensive this AM but otherwise HDS on RA. MRI screening completed yesterday afternoon. Additional stool testing ordered for future bowel movements.     Subjective/Objective:   Alexandr Witt is a 59 y.o. male with a PMHx significant for A.fib, bioprosthetic AVR, on chronic Warfarin, CAD s/p CABG x2, V.fib arrest 4/2024 after LLL wedge resection s/p ICD placement, HTN, HLD, COPD, GERD, neuropathy, DM, transverse colectomy (2016), right colectomy (2019), testicular cancer s/p chemo & resection, LLL SCC s/p wedge resection 8/2023), and recent lung mass suspected to be NSCLC s/p SBRT 12/2024 who is admitted with AMS.     Pharmacy is consulted to dose Warfarin.    Ht Readings from Last 1 Encounters:   02/27/25 1.829 m (6' 0.01\")     Wt Readings from Last 1 Encounters:   03/02/25 95.3 kg (210 lb 1.6 oz)     Prior / Home Warfarin Regimen:  Indication:  AVR, A.fib  Goal INR:  
The [facility] -  Clinical Pharmacy Note    Warfarin - Management by Pharmacy    Consult Date(s): 2/28/25  Consulting Provider(s): Dr Dotson    Assessment / Plan  1)  h/o AVR, A.fib - Warfarin  Goal INR: 2 - 3  Concurrent Anticoagulants / Antiplatelets: Aspirin  Interactions:  No significant acute interactions noted  Current Regimen / Plan:   INR today = 4.09  INR continues to be elevated despite holding the last 4 days - likely due to decreased oral intake & acute illness.    Given concern for acute GI bleed and elevated INR, will HOLD Warfarin again today.    Will continue to hold until GI bleed resolved and INR is trending down steadily / approaches 3.  Will likely need reduced total weekly dose when resumed.  Will monitor pt's clinical status and INR daily, and make dose adjustments as needed.    Please call with questions--  Thanks--  Rosalia Cortez, PharmD, BCPS, BCGP  [id number] ([facility])   3/6/2025 8:29 AM      Interval update:  Discharge yesterday canceled due to new concern for GI bleed with drop in H/H, melena, and azotemia.  GI consulted to evaluate.  Warfarin remains on hold for supratherapeutic INR.    Subjective/Objective:   Alexandr Witt is a 59 y.o. male with a PMHx significant for A.fib, bioprosthetic AVR, on chronic Warfarin, CAD s/p CABG x2, V.fib arrest 4/2024 after LLL wedge resection s/p ICD placement, HTN, HLD, COPD, GERD, neuropathy, DM, transverse colectomy (2016), right colectomy (2019), testicular cancer s/p chemo & resection, LLL SCC s/p wedge resection 8/2023), and recent lung mass suspected to be NSCLC s/p SBRT 12/2024 who is admitted with AMS.     Pharmacy is consulted to dose Warfarin.    Ht Readings from Last 1 Encounters:   02/27/25 1.829 m (6' 0.01\")     Wt Readings from Last 1 Encounters:   03/06/25 93.4 kg (205 lb 12.8 oz)     Prior / Home Warfarin Regimen:  Indication:  AVR, A.fib  Goal INR:  2.0-3.0  Home dose:   Warfarin 2.5mg po daily  Warfarin is managed by [facility] 
The [facility] -  Clinical Pharmacy Note    Warfarin - Management by Pharmacy    Consult Date(s): 2/28/25  Consulting Provider(s): Dr Dotson    Assessment / Plan  1)  h/o AVR, A.fib - Warfarin  Goal INR: 2 - 3  Concurrent Anticoagulants / Antiplatelets: Aspirin  Interactions:  No significant acute interactions noted  Current Regimen / Plan:   INR today = 4.09  INR continues to trend upwards despite holding yesterday's dose and giving lower doses 2 days prior - likely due to decreased oral intake.    Will HOLD Warfarin again today.  Will continue to hold until INR is trending down steadily and approaches 3.  Will likely need reduced total weekly dose when resumed.  Will monitor pt's clinical status and INR daily, and make dose adjustments as needed.    Please call with questions--  Thanks--  Rosalia Cortez, PharmD, BCPS, [facility]  [id number] ([facility])   3/3/2025 8:44 AM      Interval update:   Albendazole x1 dose given yesterday for concern of GI parasite / worms seen in stool.  MRI to be done today.    Subjective/Objective:   Alexandr Witt is a 59 y.o. male with a PMHx significant for A.fib, bioprosthetic AVR, on chronic Warfarin, CAD s/p CABG x2, V.fib arrest 4/2024 after LLL wedge resection s/p ICD placement, HTN, HLD, COPD, GERD, neuropathy, DM, transverse colectomy (2016), right colectomy (2019), testicular cancer s/p chemo & resection, LLL SCC s/p wedge resection 8/2023), and recent lung mass suspected to be NSCLC s/p SBRT 12/2024 who is admitted with AMS.     Pharmacy is consulted to dose Warfarin.    Ht Readings from Last 1 Encounters:   02/27/25 1.829 m (6' 0.01\")     Wt Readings from Last 1 Encounters:   03/02/25 95.3 kg (210 lb 1.6 oz)     Prior / Home Warfarin Regimen:  Indication:  AVR, A.fib  Goal INR:  2.0-3.0  Home dose:   Warfarin 2.5mg po daily  Warfarin is managed by [facility]  Appt 2/17:  INR 5.1 (thought to be 2/2 significant N/V/D 2/2 influenza).  Pt instructed to hold 
Significant to nutrition assessment  Nutrition Related Findings: Na 131, P 2.3, LBM 3/2  Wounds: None  Nutrition Goals: PO intake 50% or greater     CURRENT NUTRITION THERAPIES  ADULT ORAL NUTRITION SUPPLEMENT; Breakfast; Standard High Calorie/High Protein Oral Supplement  ADULT DIET; Regular  ADULT ORAL NUTRITION SUPPLEMENT; Breakfast, Lunch, Dinner; Frozen Oral Supplement  PO Intake: 0%, 1-25%   PO Supplement Intake:0%  Additional Sources of Calories/IVF:N/A     COMPARATIVE STANDARDS  Energy (kcal):  2697-6081 (20-25 kcal/CBW)     Protein (g):  105-121 (1.3-1.5 gm/IBW)       Fluid (ml/day):  or per provider    ANTHROPOMETRICS  Current Height: 182.9 cm (6' 0.01\")  Current Weight - Scale: 96.4 kg (212 lb 8.4 oz)    Admission weight: 97.8 kg (215 lb 9.8 oz)    The patient will be monitored per nutrition standards of care. Consult dietitian if additional nutrition interventions are needed prior to RD reassessment.     [facility]  [place]:  [phone]     
Warfarin Regimen:  Indication:  AVR, A.fib  Goal INR:  2.0-3.0  Home dose:   Warfarin 2.5mg po daily  Warfarin is managed by [facility]  Had previously been on 5mg Sun-Tue-Thur & 2.5mg Vah-Cte-Syk-Sat  Appt 2/17:  INR 5.1 (thought to be 2/2 significant N/V/D 2/2 influenza).  Pt instructed to hold Warfarin until next INR check.  Appt 2/20:  INR 3.7.  Pt instructed to hold  x2 days, then resume 2.5mg po daily.  Appt 2/24:  INR 3.2.  Pt instructed to continue 2.5mg po daily as INR was trending down.  Appt 2/27:  INR 4.1:  Pt instructed to hold x2 days, then take 2.5mg Sat/Sun, then recheck INR Monday 3/3.  Pt has INR checked by RN ([phone]) - they are able to go to pt's house on Mondays & thurdays if needed.  HHRN calls results to [facility].  Spoke with clinic pharmacist 3/3 - pt has h/o INRs that are difficult to manage during episodes of acute illness.  Pt has 5mg tablets at home.     Date INR Warfarin   2/27 4.1 (per HHRN)  2.88 at [facility] --   2/28 3.01 1 mg   3/1 3.45 1 mg   3/2 3.87 HOLD   3/3 4.09 HOLD   3/4 3.99 HOLD   3/5 3.83        Recent Labs     03/03/25  0452 03/04/25  0357 03/05/25  0408 03/05/25  0749   INR 4.09* 3.99* 3.83*  --    HGB 11.4* 10.5* 8.9* 8.8*   * 110* 128*  --    CREATININE 0.9 0.9 1.0  --        
MD  Hospitalist  Attending Physician    
There is small metastases without vasogenic edema could be occult on CT. Consider follow-up in 3 months if clinically indicated.      Electronically signed by Julio Witt MD      MRI BRAIN W WO CONTRAST         XR CHEST (2 VW)   Final Result      No acute findings.      Electronically signed by Alexandr Barragan      CT Head W/O Contrast   Final Result      1.  No evidence of acute intracranial hemorrhage or mass effect.      Electronically signed by Osman Arreguin            Assessment & Plan   Alexandr Witt is a 59 y.o. male with PMHx extensive oncologic, cardiopulmonary, and abdominal surgical history who presents with AMS.     Acute metabolic encephalopathy  Possible UTI  Concern for possible infectious etiology or malignancy spread to brain, but more likely to be depressed based on daughter's provided history where he was laying in bed for weeks at a time and not taking medications in independent living facility. Urinalysis demonstrated 2+ bacteria with some leukocytes concerning for possible urinary tract infection, and ceftriaxone was initiated IV.   Completed Ceftriaxone 1g daily IV 02/27-03/01  -Patient uses Morphine sulfate 15mg every 5-6 hours PRN at home, will order Oxy pain panel 5-10mg to match home dosing in MME  -CT head w/ contrast 03/03 shows no concern for metastases, no edema noted. Initial read was a typo suggesting possible occult metastases, but confirmed with radiology that scan is clear.     GERD  Concern for upper GI bleed  Patient has known reflux disease without a recent EGD, last one was reported in 2014 but no results visible in his chart.  He has had poor recent intake but continues to take famotidine 20 mg twice daily per home medication regimen.  On 03/04 in the afternoon patient had a reported small-volume emesis episode reported as dark, food like but not coffee ground.  High-dose PPI IV were initiated with resolution of emesis, but patient had an increase in urea nitrogen and a slight 
infection, and ceftriaxone was initiated IV.   -Continue Ceftriaxone 1g daily IV empirically  - MRI w/ w/o contrast to rule out malignancy/ metastasis  - Vitamin B12, B9 pending  -Patient uses Morphine sulfate 15mg every 5-6 hours PRN at home, will order Oxy pain panel 5-10mg to match home dosing in MME  -consider COWS protocol for opioid withdrawal (UDS positive on admission)  -New depressive symptoms/weight loss concerning for possible pancreatic lesion, but recent CT abdomen/pelvis 02/14/2025 was unremarkable. Can consider CA 19-9, CEA screen-->abdominal MRI if positive.      Acute kidney injury  Creatinine 1.3 from baseline of 1.0. Has been having possible diarrhea and poor intake. Likely pre-renal etiology.   -Encourage PO intake  -Daily RFP  -consider urine studies     Chronic Conditions  Coronary artery disease s/p 2 vessel CABG 2005 and 3 vessel CABG 2017  Paroxymal atrial fibrillation on warfarin  Aortic stenosis s/p bioprosthetic AVR 2017  HFrEF s/p ICD 4/29/2024  - Pharmacy consulted for warfarin dosing  - Continue home ASA 81, Coreg 25, Entresto 24-26  - Restart Entresto once BRENDA resolves     Hypertension  - Continue home amlodipine, hydralazine, Coreg, Entresto once BRENDA resolves    Hyperlipidemia  - Continue home Crestor 20     T2DM  Only on metformin at home. Last A1C 7.3 03/2024.   - Hold metformin  - LDSSI w/ hypoglycemic protocol  -Repeat A1C pending       DVT PPx:Warfarin  Diet:  ADULT ORAL NUTRITION SUPPLEMENT; Breakfast; Standard High Calorie/High Protein Oral Supplement  ADULT DIET; Regular   Code status:  Full Code     Estimated time to discharge: 1-2 days  Barriers to discharge: Encephalopathy  Disposition  - Preadmission: Home w/ HHC  - Current: IP  - Upon discharge: TBD      Will discuss with attending physician Kelsey Pina MD.     Quinten Barnes MD, PGY-1  Internal Medicine Resident  Contact via [employer]         
who presents with AMS.     Acute metabolic encephalopathy  Possible UTI  Concern for possible infectious etiology or malignancy spread to brain, but more likely to be depressed based on daughter's provided history where he was laying in bed for weeks at a time and not taking medications in independent living facility. Urinalysis demonstrated 2+ bacteria with some leukocytes concerning for possible urinary tract infection, and ceftriaxone was initiated IV.   Completed Ceftriaxone 1g daily IV 02/27-03/01  - MRI w/ w/o contrast to rule out malignancy/ metastasis  -PRN Ativan 2mg and Haldol 2mg one time prior to MRI  -Patient uses Morphine sulfate 15mg every 5-6 hours PRN at home, will order Oxy pain panel 5-10mg to match home dosing in MME  -consider COWS protocol for opioid withdrawal (UDS positive on admission)    Likely GI Parasite infection  Patient has not been caring for self recently, concern for eating food that is unsafe. Noticed to have worms in stool per nursing staff, has semi-formed stools with bowel urgency and frequency. Unclear for how long, but abdomen is non-tender. Does not voice itching to anal region. Folate found to be low, with RDW increased and mild anemia of 11.7. Concern for accompanying iron deficiency anemia.   -Ova and parasites pending  -Stool pathogens pending  -Ferritin, TIBC, Iron pending  -one time dose of Albendazole 400mg PO     Acute kidney injury-resolved  Creatinine 1.3 from baseline of 1.0. Has been having possible diarrhea and poor intake. Likely pre-renal etiology.   -Encourage PO intake  -Daily RFP    Chronic Conditions  Coronary artery disease s/p 2 vessel CABG 2005 and 3 vessel CABG 2017  Paroxymal atrial fibrillation on warfarin  Aortic stenosis s/p bioprosthetic AVR 2017  HFrEF s/p ICD 4/29/2024  - Pharmacy consulted for warfarin dosing  - Continue home ASA 81, Coreg 25, Entresto 24-26  - Restart Entresto once BRENDA resolves     Hypertension  - Continue home amlodipine, 
resolves     Hyperlipidemia  - Continue home Crestor 20     T2DM  Only on metformin at home. Last A1C 7.3 03/2024.   - Hold metformin  - LDSSI w/ hypoglycemic protocol  -Repeat A1C 6.9 03/03/2025        DVT PPx:Warfarin held since 03/01  Diet:  Diet NPO   Code status:  Full Code      Estimated time to discharge: 1 day  Barriers to discharge: Encephalopathy  Disposition  - Preadmission: Home w/ HHC  - Current: IP  - Upon discharge: TBD    Malik Rubio MD, PGY-3  03/08/25  1:39 PM    This patient has been staffed and discussed with Jose Angel Dao MD.

## 2025-03-10 NOTE — PLAN OF CARE
Problem: Chronic Conditions and Co-morbidities  Goal: Patient's chronic conditions and co-morbidity symptoms are monitored and maintained or improved  3/10/2025 0935 by Pam Ingram RN  Outcome: Progressing  Flowsheets (Taken 3/10/2025 0800)  Care Plan - Patient's Chronic Conditions and Co-Morbidity Symptoms are Monitored and Maintained or Improved: Monitor and assess patient's chronic conditions and comorbid symptoms for stability, deterioration, or improvement     Problem: Discharge Planning  Goal: Discharge to home or other facility with appropriate resources  3/10/2025 0935 by Pam Ingram RN  Outcome: Progressing  Flowsheets (Taken 3/10/2025 0800)  Discharge to home or other facility with appropriate resources: Identify barriers to discharge with patient and caregiver     Problem: Pain  Goal: Verbalizes/displays adequate comfort level or baseline comfort level  3/10/2025 0935 by Pam Ingram RN  Outcome: Progressing     Problem: Skin/Tissue Integrity  Goal: Skin integrity remains intact  Description: 1.  Monitor for areas of redness and/or skin breakdown  2.  Assess vascular access sites hourly  3.  Every 4-6 hours minimum:  Change oxygen saturation probe site  4.  Every 4-6 hours:  If on nasal continuous positive airway pressure, respiratory therapy assess nares and determine need for appliance change or resting period  3/10/2025 0935 by Pam Ingram RN  Outcome: Progressing  Flowsheets (Taken 3/10/2025 0800)  Skin Integrity Remains Intact: Monitor for areas of redness and/or skin breakdown     Problem: Safety - Adult  Goal: Free from fall injury  3/10/2025 0935 by Pam Ingram RN  Outcome: Progressing     Problem: Confusion  Goal: Confusion, delirium, dementia, or psychosis is improved or at baseline  Description: INTERVENTIONS:  1. Assess for possible contributors to thought disturbance, including medications, impaired vision or hearing, underlying metabolic abnormalities, dehydration,

## 2025-03-10 NOTE — CARE COORDINATION
Case Management Assessment            Discharge Note                    Date / Time of Note: 3/10/2025 1:21 PM                  Discharge Note Completed by: Zeke Kenyon RN    Patient Name: Alexandr Witt   YOB: 1965  Diagnosis: Encephalopathy acute [G93.40]  Altered mental status, unspecified altered mental status type [R41.82]  Metabolic encephalopathy [G93.41]   Date / Time: 2/27/2025  4:54 PM    Current PCP: Lavon Diaz DO  Clinic patient: Yes    Hospitalization in the last 30 days: No       Advance Directives:  Code Status: Full Code  [place] DNR form completed and on chart: Not Indicated    Financial:  Payor: MEDICARE / Plan: MEDICARE PART A AND B / Product Type: *No Product type* /      Pharmacy:    [facility] [address] [place] [address] [address] - P [phone] - F [phone] [address]  [place] [address]  Phone: [phone] Fax: [phone]    [facility] [address] [place] [address] - [facility] [address] - P [phone] - F [phone]  [address]  [facility] [address]  Phone: [phone] Fax: [phone]    [address] -  [phone] - F [phone]  [address] [place] [address]  Phone: [phone] Fax: [phone]    [address] -  [phone] - F [phone]  [address] [place] [address]  Phone: [phone] Fax: [phone]      Assistance purchasing medications?: Potential Assistance Purchasing Medications: No  Assistance provided by Case Management: None at this time    Does patient want to participate in local refill/ meds to beds program?: No    Meds To Beds General Rules:  1. Can ONLY be done Monday- Friday between 8:30am-5pm  2. Prescription(s) must be in pharmacy by 3pm to be filled same day  3.Copy of patient's insurance/ prescription drug

## 2025-03-10 NOTE — PLAN OF CARE
Point of Care Note:  Internal Medicine  Alexandr Witt    2:49 PM  3/10/2025    Had a risk-benefit discussion with the patient and his daughter Charity at bedside regarding anticoagulation. Educated that given the ulcer with extensive clot, there is high risk for re-bleeding especially on anticoagulation. In light of review of EKG's over the past couple years all showing sinus rhythm (and tele showing sinus rhythm along duration of stay here), unsure of the patient's total burden of pAfib. Discussed that without anticoagulation, if he goes into afib, it could increase his risk for stroke, albeit fairly small over a short period of time. Patient advised to have close follow up with cardiology/ EP for ambulatory monitoring for burden/incidence of afib and potential further workup for Watchman's.    Patient and family agreed that risk of bleeding is greater than risk for stroke. Joint-decision-making utilized to decide to hold warfarin pending further EP workup. Changes made in discharge instructions.    Discussed with attending, Dr. Dao.    Brendan Hastings DO  PGY-3, Internal Medicine  03/10/25  3:01 PM

## 2025-03-10 NOTE — DISCHARGE SUMMARY
INTERNAL MEDICINE DEPARTMENT  DISCHARGE SUMMARY    Patient ID: Alexandr Witt                                             Discharge Date: 3/10/2025   Patient's PCP: Lavon Diaz,                                           Discharge Physician: Winston Syed MD MD  Admit Date: 2/27/2025   Admitting Physician: Carlos Whitten MD    PROBLEMS DURING HOSPITALIZATION:  Present on Admission:   Encephalopathy acute   Metabolic encephalopathy   Severe malnutrition   Melena   Acute blood loss anemia   Helminth infection   UTI (urinary tract infection)      DISCHARGE DIAGNOSES:  Upper GI bleed  Acute metabolic encephalopathy  Likely GI Parasite infection   Coronary artery disease s/p 2 vessel CABG 2005 and 3 vessel CABG 2017  Paroxymal atrial fibrillation on warfarin  Aortic stenosis s/p bioprosthetic AVR 2017  HFrEF s/p ICD 4/29/2024  Hypertension   T2DM       Hospital Course:      HPI:  Patient is a 58-year-old male with PMHx CAD s/p 2 vessel and 3 vessel CABG in 2005 and 2017, bioprosthetic aortic valve replacement, pAF on warfarin, HFrEF 30-35%, V-fib arrest 4/2024 after LLL wedge resection s/p ICD placement, severe COPD on baseline 2 L O2, tobacco use (40 pack years), T2DM, transverse colectomy (2016), right colectomy (2019), cholecystectomy (2019), testicular cancer s/p chemo and resection, left lower lobe SCC s/p wedge resection 08/2023, and recent lung mass suspected to be NSCLC s/p SBRT 12/2024 who presents with altered mental status.     Presented to the ED after home health nurse today noted he was less oriented than baseline. Reportedly has been less compliant with medications, more irritable, and having decreased PO intake after testing positive for influenza A on 2/14.     He is currently an unreliable historian due to his encephalopathy. Alert and oriented only to self and place. Able to tell me his full name and that he is at [facility]. When as the year he replied \"that's a good answer... I don't know.\"

## 2025-03-10 NOTE — CARE COORDINATION
9:02 AM  DC order noted. Call placed to daughterCharity. States that she is waiting to hear back from the agency for a home aid; expecting call today.   Patient will require EMS transportation; awaiting call back from Charity to ensure there is not a gap in coverage/ assistance for patient upon return to [place].   Call placed to [facility]; aware of patients return today, no additional needs for return     Electronically signed by Zeke Kenyon RN, CM on 3/10/2025 at 9:08 AM.  Phone: [phone]  Fax: [phone]

## 2025-03-12 ENCOUNTER — TELEPHONE (OUTPATIENT)
Dept: PHARMACY | Age: 60
End: 2025-03-12

## 2025-03-12 NOTE — TELEPHONE ENCOUNTER
Patient was discharged on 3/10 after ICU admission with GI bleed. He was given PCC and vitamin K, warfarin was discontinued until he can be reassessed with cardiology outpatient.     Disposition: [facility] [place]   Discharged Condition: Stable  Follow Up: Primary Care Physician in one week    DISCHARGE Disposition: Home with Home Health Care: [facility]

## 2025-03-18 ENCOUNTER — TELEPHONE (OUTPATIENT)
Dept: CARDIOLOGY CLINIC | Age: 60
End: 2025-03-18

## 2025-03-24 ENCOUNTER — TELEPHONE (OUTPATIENT)
Dept: INTERNAL MEDICINE CLINIC | Age: 60
End: 2025-03-24

## 2025-03-24 DIAGNOSIS — C34.92 NSCLC OF LEFT LUNG (HCC): Primary | ICD-10-CM

## 2025-03-24 NOTE — TELEPHONE ENCOUNTER
Patient daughter called needing Order for Hospice Care.    Daughter can be reached at [phone]

## 2025-03-25 ENCOUNTER — APPOINTMENT (OUTPATIENT)
Dept: CT IMAGING | Age: 60
DRG: 193 | End: 2025-03-25
Payer: MEDICARE

## 2025-03-25 ENCOUNTER — APPOINTMENT (OUTPATIENT)
Dept: GENERAL RADIOLOGY | Age: 60
DRG: 193 | End: 2025-03-25
Payer: MEDICARE

## 2025-03-25 ENCOUNTER — HOSPITAL ENCOUNTER (INPATIENT)
Age: 60
LOS: 1 days | Discharge: HOSPICE | DRG: 193 | End: 2025-03-27
Attending: STUDENT IN AN ORGANIZED HEALTH CARE EDUCATION/TRAINING PROGRAM | Admitting: INTERNAL MEDICINE
Payer: MEDICARE
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
## 2025-03-25 DIAGNOSIS — J96.01 ACUTE RESPIRATORY FAILURE WITH HYPOXEMIA: ICD-10-CM

## 2025-03-25 DIAGNOSIS — I10 ESSENTIAL HYPERTENSION: ICD-10-CM

## 2025-03-25 DIAGNOSIS — I50.9 ACUTE ON CHRONIC CONGESTIVE HEART FAILURE, UNSPECIFIED HEART FAILURE TYPE (HCC): Primary | ICD-10-CM

## 2025-03-25 DIAGNOSIS — Z95.3 HISTORY OF AORTIC VALVE REPLACEMENT WITH BIOPROSTHETIC VALVE: ICD-10-CM

## 2025-03-25 LAB
ALBUMIN SERPL-MCNC: 3.2 G/DL (ref 3.4–5)
ALBUMIN/GLOB SERPL: 0.9 {RATIO} (ref 1.1–2.2)
ALP SERPL-CCNC: 55 U/L (ref 40–129)
ALT SERPL-CCNC: <5 U/L (ref 10–40)
AMMONIA PLAS-SCNC: 13 UMOL/L (ref 16–60)
AMORPHOUS: PRESENT
ANION GAP SERPL CALCULATED.3IONS-SCNC: 16 MMOL/L (ref 3–16)
AST SERPL-CCNC: 13 U/L (ref 15–37)
BACTERIA URNS QL MICRO: ABNORMAL /HPF
BASE EXCESS BLDV CALC-SCNC: -0.8 MMOL/L (ref -3–3)
BASOPHILS # BLD: 0 K/UL (ref 0–0.2)
BASOPHILS NFR BLD: 0.6 %
BILIRUB SERPL-MCNC: 1.8 MG/DL (ref 0–1)
BILIRUB UR QL STRIP.AUTO: ABNORMAL
BUN SERPL-MCNC: 34 MG/DL (ref 7–20)
CALCIUM SERPL-MCNC: 8.7 MG/DL (ref 8.3–10.6)
CHLORIDE SERPL-SCNC: 98 MMOL/L (ref 99–110)
CLARITY UR: ABNORMAL
CO2 BLDV-SCNC: 58 MMOL/L
CO2 SERPL-SCNC: 22 MMOL/L (ref 21–32)
COHGB MFR BLDV: 4.1 % (ref 0–1.5)
COLOR UR: ABNORMAL
CREAT SERPL-MCNC: 2 MG/DL (ref 0.8–1.3)
DEPRECATED RDW RBC AUTO: 18.6 % (ref 12.4–15.4)
DO-HGB MFR BLDV: 13 %
EOSINOPHIL # BLD: 0 K/UL (ref 0–0.6)
EOSINOPHIL NFR BLD: 0.2 %
EPI CELLS #/AREA URNS AUTO: 21 /HPF (ref 0–5)
FLUAV RNA RESP QL NAA+PROBE: NOT DETECTED
FLUBV RNA RESP QL NAA+PROBE: NOT DETECTED
GFR SERPLBLD CREATININE-BSD FMLA CKD-EPI: 37 ML/MIN/{1.73_M2}
GLUCOSE SERPL-MCNC: 160 MG/DL (ref 70–99)
GLUCOSE UR STRIP.AUTO-MCNC: NEGATIVE MG/DL
HCO3 BLDV-SCNC: 24.5 MMOL/L (ref 23–29)
HCT VFR BLD AUTO: 27 % (ref 40.5–52.5)
HGB BLD-MCNC: 8.8 G/DL (ref 13.5–17.5)
HGB UR QL STRIP.AUTO: ABNORMAL
HYALINE CASTS #/AREA URNS AUTO: 45 /LPF (ref 0–8)
INR PPP: 1.28 (ref 0.85–1.15)
KETONES UR STRIP.AUTO-MCNC: 15 MG/DL
LACTATE BLDV-SCNC: 2 MMOL/L (ref 0.4–1.9)
LEUKOCYTE ESTERASE UR QL STRIP.AUTO: ABNORMAL
LIPASE SERPL-CCNC: 89 U/L (ref 13–60)
LYMPHOCYTES # BLD: 0.3 K/UL (ref 1–5.1)
LYMPHOCYTES NFR BLD: 6.9 %
MCH RBC QN AUTO: 29.7 PG (ref 26–34)
MCHC RBC AUTO-ENTMCNC: 32.4 G/DL (ref 31–36)
MCV RBC AUTO: 91.8 FL (ref 80–100)
METHGB MFR BLDV: 0.4 %
MONOCYTES # BLD: 0.2 K/UL (ref 0–1.3)
MONOCYTES NFR BLD: 3.5 %
MUCUS: PRESENT
NEUTROPHILS # BLD: 4 K/UL (ref 1.7–7.7)
NEUTROPHILS NFR BLD: 88.8 %
NITRITE UR QL STRIP.AUTO: NEGATIVE
NT-PROBNP SERPL-MCNC: 5735 PG/ML (ref 0–124)
O2 CT VFR BLDV CALC: 10 VOL %
O2 THERAPY: ABNORMAL
PCO2 BLDV: 42.5 MMHG (ref 40–50)
PH BLDV: 7.37 [PH] (ref 7.35–7.45)
PH UR STRIP.AUTO: 6 [PH] (ref 5–8)
PLATELET # BLD AUTO: 69 K/UL (ref 135–450)
PLATELET BLD QL SMEAR: ABNORMAL
PMV BLD AUTO: 10.9 FL (ref 5–10.5)
PO2 BLDV: 54.9 MMHG (ref 25–40)
POTASSIUM SERPL-SCNC: 3.8 MMOL/L (ref 3.5–5.1)
PROT SERPL-MCNC: 6.9 G/DL (ref 6.4–8.2)
PROT UR STRIP.AUTO-MCNC: 300 MG/DL
PROTHROMBIN TIME: 16.2 SEC (ref 11.9–14.9)
RBC # BLD AUTO: 2.95 M/UL (ref 4.2–5.9)
RBC CLUMPS #/AREA URNS AUTO: 1 /HPF (ref 0–4)
SAO2 % BLDV: 86 %
SARS-COV-2 RNA RESP QL NAA+PROBE: NOT DETECTED
SLIDE REVIEW: ABNORMAL
SODIUM SERPL-SCNC: 136 MMOL/L (ref 136–145)
SP GR UR STRIP.AUTO: 1.02 (ref 1–1.03)
TROPONIN, HIGH SENSITIVITY: 23 NG/L (ref 0–22)
UA COMPLETE W REFLEX CULTURE PNL UR: ABNORMAL
UA DIPSTICK W REFLEX MICRO PNL UR: YES
URN SPEC COLLECT METH UR: ABNORMAL
UROBILINOGEN UR STRIP-ACNC: 2 E.U./DL
WBC # BLD AUTO: 4.5 K/UL (ref 4–11)
WBC #/AREA URNS AUTO: 4 /HPF (ref 0–5)

## 2025-03-25 PROCEDURE — 0202U NFCT DS 22 TRGT SARS-COV-2: CPT

## 2025-03-25 PROCEDURE — 82140 ASSAY OF AMMONIA: CPT

## 2025-03-25 PROCEDURE — 85025 COMPLETE CBC W/AUTO DIFF WBC: CPT

## 2025-03-25 PROCEDURE — 74176 CT ABD & PELVIS W/O CONTRAST: CPT

## 2025-03-25 PROCEDURE — 81001 URINALYSIS AUTO W/SCOPE: CPT

## 2025-03-25 PROCEDURE — 70450 CT HEAD/BRAIN W/O DYE: CPT

## 2025-03-25 PROCEDURE — 72125 CT NECK SPINE W/O DYE: CPT

## 2025-03-25 PROCEDURE — 83690 ASSAY OF LIPASE: CPT

## 2025-03-25 PROCEDURE — 93005 ELECTROCARDIOGRAM TRACING: CPT | Performed by: PHYSICIAN ASSISTANT

## 2025-03-25 PROCEDURE — 87636 SARSCOV2 & INF A&B AMP PRB: CPT

## 2025-03-25 PROCEDURE — 71045 X-RAY EXAM CHEST 1 VIEW: CPT

## 2025-03-25 PROCEDURE — 72100 X-RAY EXAM L-S SPINE 2/3 VWS: CPT

## 2025-03-25 PROCEDURE — 85610 PROTHROMBIN TIME: CPT

## 2025-03-25 PROCEDURE — 85379 FIBRIN DEGRADATION QUANT: CPT

## 2025-03-25 PROCEDURE — 87807 RSV ASSAY W/OPTIC: CPT

## 2025-03-25 PROCEDURE — 82803 BLOOD GASES ANY COMBINATION: CPT

## 2025-03-25 PROCEDURE — 83605 ASSAY OF LACTIC ACID: CPT

## 2025-03-25 PROCEDURE — 80053 COMPREHEN METABOLIC PANEL: CPT

## 2025-03-25 PROCEDURE — 83880 ASSAY OF NATRIURETIC PEPTIDE: CPT

## 2025-03-25 PROCEDURE — 99285 EMERGENCY DEPT VISIT HI MDM: CPT

## 2025-03-25 PROCEDURE — 84145 PROCALCITONIN (PCT): CPT

## 2025-03-25 PROCEDURE — 84484 ASSAY OF TROPONIN QUANT: CPT

## 2025-03-25 RX ORDER — FUROSEMIDE 10 MG/ML
40 INJECTION INTRAMUSCULAR; INTRAVENOUS ONCE
Status: COMPLETED | OUTPATIENT
Start: 2025-03-25 | End: 2025-03-26

## 2025-03-25 ASSESSMENT — ENCOUNTER SYMPTOMS
VOMITING: 0
NAUSEA: 0
RHINORRHEA: 0
COUGH: 0
ABDOMINAL PAIN: 0
SHORTNESS OF BREATH: 0
DIARRHEA: 0

## 2025-03-25 ASSESSMENT — PAIN SCALES - GENERAL: PAINLEVEL_OUTOF10: 10

## 2025-03-25 ASSESSMENT — PAIN - FUNCTIONAL ASSESSMENT: PAIN_FUNCTIONAL_ASSESSMENT: 0-10

## 2025-03-25 ASSESSMENT — PAIN DESCRIPTION - LOCATION: LOCATION: BACK

## 2025-03-26 PROBLEM — J96.01 ACUTE RESPIRATORY FAILURE WITH HYPOXEMIA: Status: ACTIVE | Noted: 2025-03-26

## 2025-03-26 PROBLEM — I50.23 ACUTE ON CHRONIC HEART FAILURE WITH REDUCED EJECTION FRACTION (HFREF, <= 40%) (HCC): Status: ACTIVE | Noted: 2025-03-26

## 2025-03-26 PROBLEM — I50.9 ACUTE ON CHRONIC CONGESTIVE HEART FAILURE (HCC): Status: ACTIVE | Noted: 2025-03-26
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
## 2025-03-26 LAB
ALBUMIN SERPL-MCNC: 3 G/DL (ref 3.4–5)
ALP SERPL-CCNC: 48 U/L (ref 40–129)
ALT SERPL-CCNC: <5 U/L (ref 10–40)
AMORPH SED URNS QL MICRO: ABNORMAL /HPF
ANION GAP SERPL CALCULATED.3IONS-SCNC: 15 MMOL/L (ref 3–16)
AST SERPL-CCNC: 11 U/L (ref 15–37)
BACTERIA URNS QL MICRO: ABNORMAL /HPF
BILIRUB DIRECT SERPL-MCNC: 0.8 MG/DL (ref 0–0.3)
BILIRUB INDIRECT SERPL-MCNC: 0.7 MG/DL (ref 0–1)
BILIRUB SERPL-MCNC: 1.5 MG/DL (ref 0–1)
BILIRUB UR QL STRIP.AUTO: ABNORMAL
BUN SERPL-MCNC: 38 MG/DL (ref 7–20)
CALCIUM SERPL-MCNC: 8.6 MG/DL (ref 8.3–10.6)
CHLORIDE SERPL-SCNC: 98 MMOL/L (ref 99–110)
CLARITY UR: ABNORMAL
CO2 SERPL-SCNC: 23 MMOL/L (ref 21–32)
COARSE GRAN CASTS #/AREA URNS LPF: ABNORMAL /LPF (ref 0–2)
COLOR UR: ABNORMAL
CREAT SERPL-MCNC: 2.2 MG/DL (ref 0.8–1.3)
D-DIMER QUANTITATIVE: 1.42 UG/ML FEU (ref 0–0.6)
DEPRECATED RDW RBC AUTO: 19.2 % (ref 12.4–15.4)
EKG ATRIAL RATE: 96 BPM
EKG DIAGNOSIS: NORMAL
EKG P AXIS: 51 DEGREES
EKG P-R INTERVAL: 164 MS
EKG Q-T INTERVAL: 374 MS
EKG QRS DURATION: 108 MS
EKG QTC CALCULATION (BAZETT): 472 MS
EKG R AXIS: 33 DEGREES
EKG T AXIS: 66 DEGREES
EKG VENTRICULAR RATE: 96 BPM
EPI CELLS #/AREA URNS HPF: ABNORMAL /HPF (ref 0–5)
FERRITIN SERPL IA-MCNC: 257 NG/ML (ref 30–400)
FOLATE SERPL-MCNC: 4.34 NG/ML (ref 4.78–24.2)
GFR SERPLBLD CREATININE-BSD FMLA CKD-EPI: 33 ML/MIN/{1.73_M2}
GLUCOSE SERPL-MCNC: 136 MG/DL (ref 70–99)
GLUCOSE UR STRIP.AUTO-MCNC: NEGATIVE MG/DL
HCT VFR BLD AUTO: 25.2 % (ref 40.5–52.5)
HGB BLD-MCNC: 8.1 G/DL (ref 13.5–17.5)
HGB UR QL STRIP.AUTO: NEGATIVE
HYALINE CASTS #/AREA URNS LPF: ABNORMAL /LPF (ref 0–2)
INR PPP: 1.17 (ref 0.85–1.15)
KETONES UR STRIP.AUTO-MCNC: ABNORMAL MG/DL
LACTATE BLDV-SCNC: 1.6 MMOL/L (ref 0.4–1.9)
LEUKOCYTE ESTERASE UR QL STRIP.AUTO: ABNORMAL
LIPASE SERPL-CCNC: 24 U/L (ref 13–60)
MAGNESIUM SERPL-MCNC: 2.79 MG/DL (ref 1.8–2.4)
MCH RBC QN AUTO: 28.6 PG (ref 26–34)
MCHC RBC AUTO-ENTMCNC: 32.1 G/DL (ref 31–36)
MCV RBC AUTO: 89.2 FL (ref 80–100)
MUCOUS THREADS #/AREA URNS LPF: ABNORMAL /LPF
NITRITE UR QL STRIP.AUTO: NEGATIVE
PH UR STRIP.AUTO: 5.5 [PH] (ref 5–8)
PLATELET # BLD AUTO: 60 K/UL (ref 135–450)
PMV BLD AUTO: 10.4 FL (ref 5–10.5)
POTASSIUM SERPL-SCNC: 3.9 MMOL/L (ref 3.5–5.1)
PROCALCITONIN SERPL IA-MCNC: 0.82 NG/ML (ref 0–0.15)
PROT SERPL-MCNC: 6.5 G/DL (ref 6.4–8.2)
PROT UR STRIP.AUTO-MCNC: 300 MG/DL
PROTHROMBIN TIME: 15.1 SEC (ref 11.9–14.9)
RBC # BLD AUTO: 2.82 M/UL (ref 4.2–5.9)
RBC #/AREA URNS HPF: ABNORMAL /HPF (ref 0–4)
REASON FOR REJECTION: NORMAL
REJECTED TEST: NORMAL
REPORT: NORMAL
RESP PATH DNA+RNA PNL NPH NAA+NON-PROBE: NORMAL
RSV AG NOSE QL: NEGATIVE
SODIUM SERPL-SCNC: 136 MMOL/L (ref 136–145)
SP GR UR STRIP.AUTO: 1.02 (ref 1–1.03)
UA DIPSTICK W REFLEX MICRO PNL UR: YES
URN SPEC COLLECT METH UR: ABNORMAL
UROBILINOGEN UR STRIP-ACNC: 2 E.U./DL
VIT B12 SERPL-MCNC: 638 PG/ML (ref 211–911)
WBC # BLD AUTO: 3.6 K/UL (ref 4–11)
WBC #/AREA URNS HPF: ABNORMAL /HPF (ref 0–5)

## 2025-03-26 PROCEDURE — 36415 COLL VENOUS BLD VENIPUNCTURE: CPT

## 2025-03-26 PROCEDURE — 83735 ASSAY OF MAGNESIUM: CPT

## 2025-03-26 PROCEDURE — 99223 1ST HOSP IP/OBS HIGH 75: CPT | Performed by: INTERNAL MEDICINE

## 2025-03-26 PROCEDURE — 97530 THERAPEUTIC ACTIVITIES: CPT

## 2025-03-26 PROCEDURE — 2500000003 HC RX 250 WO HCPCS: Performed by: NURSE PRACTITIONER

## 2025-03-26 PROCEDURE — 83540 ASSAY OF IRON: CPT

## 2025-03-26 PROCEDURE — 82728 ASSAY OF FERRITIN: CPT

## 2025-03-26 PROCEDURE — 6360000002 HC RX W HCPCS: Performed by: NURSE PRACTITIONER

## 2025-03-26 PROCEDURE — 87086 URINE CULTURE/COLONY COUNT: CPT

## 2025-03-26 PROCEDURE — 6370000000 HC RX 637 (ALT 250 FOR IP): Performed by: INTERNAL MEDICINE

## 2025-03-26 PROCEDURE — 83605 ASSAY OF LACTIC ACID: CPT

## 2025-03-26 PROCEDURE — 93010 ELECTROCARDIOGRAM REPORT: CPT | Performed by: INTERNAL MEDICINE

## 2025-03-26 PROCEDURE — 97162 PT EVAL MOD COMPLEX 30 MIN: CPT

## 2025-03-26 PROCEDURE — 82607 VITAMIN B-12: CPT

## 2025-03-26 PROCEDURE — 85610 PROTHROMBIN TIME: CPT

## 2025-03-26 PROCEDURE — 2700000000 HC OXYGEN THERAPY PER DAY

## 2025-03-26 PROCEDURE — 94761 N-INVAS EAR/PLS OXIMETRY MLT: CPT

## 2025-03-26 PROCEDURE — 81001 URINALYSIS AUTO W/SCOPE: CPT

## 2025-03-26 PROCEDURE — 94640 AIRWAY INHALATION TREATMENT: CPT

## 2025-03-26 PROCEDURE — 6370000000 HC RX 637 (ALT 250 FOR IP): Performed by: NURSE PRACTITIONER

## 2025-03-26 PROCEDURE — 2580000003 HC RX 258: Performed by: INTERNAL MEDICINE

## 2025-03-26 PROCEDURE — 96374 THER/PROPH/DIAG INJ IV PUSH: CPT

## 2025-03-26 PROCEDURE — 83550 IRON BINDING TEST: CPT

## 2025-03-26 PROCEDURE — 85027 COMPLETE CBC AUTOMATED: CPT

## 2025-03-26 PROCEDURE — 6360000002 HC RX W HCPCS: Performed by: STUDENT IN AN ORGANIZED HEALTH CARE EDUCATION/TRAINING PROGRAM

## 2025-03-26 PROCEDURE — 83690 ASSAY OF LIPASE: CPT

## 2025-03-26 PROCEDURE — 2500000003 HC RX 250 WO HCPCS: Performed by: INTERNAL MEDICINE

## 2025-03-26 PROCEDURE — 97166 OT EVAL MOD COMPLEX 45 MIN: CPT

## 2025-03-26 PROCEDURE — 1200000000 HC SEMI PRIVATE

## 2025-03-26 PROCEDURE — 80048 BASIC METABOLIC PNL TOTAL CA: CPT

## 2025-03-26 PROCEDURE — 51798 US URINE CAPACITY MEASURE: CPT

## 2025-03-26 PROCEDURE — 82746 ASSAY OF FOLIC ACID SERUM: CPT

## 2025-03-26 PROCEDURE — 80076 HEPATIC FUNCTION PANEL: CPT

## 2025-03-26 RX ORDER — ONDANSETRON 4 MG/1
4 TABLET, ORALLY DISINTEGRATING ORAL EVERY 8 HOURS PRN
Status: DISCONTINUED | OUTPATIENT
Start: 2025-03-26 | End: 2025-03-27 | Stop reason: HOSPADM

## 2025-03-26 RX ORDER — CARVEDILOL 6.25 MG/1
6.25 TABLET ORAL 2 TIMES DAILY WITH MEALS
Status: DISCONTINUED | OUTPATIENT
Start: 2025-03-26 | End: 2025-03-27

## 2025-03-26 RX ORDER — IPRATROPIUM BROMIDE AND ALBUTEROL SULFATE 2.5; .5 MG/3ML; MG/3ML
1 SOLUTION RESPIRATORY (INHALATION)
Status: DISCONTINUED | OUTPATIENT
Start: 2025-03-26 | End: 2025-03-27 | Stop reason: HOSPADM

## 2025-03-26 RX ORDER — CITALOPRAM HYDROBROMIDE 20 MG/1
40 TABLET ORAL DAILY
Status: DISCONTINUED | OUTPATIENT
Start: 2025-03-26 | End: 2025-03-27 | Stop reason: HOSPADM

## 2025-03-26 RX ORDER — SODIUM CHLORIDE 0.9 % (FLUSH) 0.9 %
10 SYRINGE (ML) INJECTION PRN
Status: DISCONTINUED | OUTPATIENT
Start: 2025-03-26 | End: 2025-03-27 | Stop reason: HOSPADM

## 2025-03-26 RX ORDER — POTASSIUM CHLORIDE 7.45 MG/ML
10 INJECTION INTRAVENOUS PRN
Status: DISCONTINUED | OUTPATIENT
Start: 2025-03-26 | End: 2025-03-27 | Stop reason: HOSPADM

## 2025-03-26 RX ORDER — ONDANSETRON 2 MG/ML
4 INJECTION INTRAMUSCULAR; INTRAVENOUS EVERY 6 HOURS PRN
Status: DISCONTINUED | OUTPATIENT
Start: 2025-03-26 | End: 2025-03-27 | Stop reason: HOSPADM

## 2025-03-26 RX ORDER — PREGABALIN 75 MG/1
150 CAPSULE ORAL DAILY
Status: DISCONTINUED | OUTPATIENT
Start: 2025-03-26 | End: 2025-03-27 | Stop reason: HOSPADM

## 2025-03-26 RX ORDER — WARFARIN SODIUM 7.5 MG/1
7.5 TABLET ORAL DAILY
Status: DISCONTINUED | OUTPATIENT
Start: 2025-03-26 | End: 2025-03-26

## 2025-03-26 RX ORDER — SODIUM CHLORIDE 9 MG/ML
INJECTION, SOLUTION INTRAVENOUS CONTINUOUS
Status: ACTIVE | OUTPATIENT
Start: 2025-03-26 | End: 2025-03-26

## 2025-03-26 RX ORDER — HYDRALAZINE HYDROCHLORIDE 25 MG/1
25 TABLET, FILM COATED ORAL EVERY 8 HOURS SCHEDULED
Status: DISCONTINUED | OUTPATIENT
Start: 2025-03-26 | End: 2025-03-27

## 2025-03-26 RX ORDER — MAGNESIUM SULFATE IN WATER 40 MG/ML
2000 INJECTION, SOLUTION INTRAVENOUS PRN
Status: DISCONTINUED | OUTPATIENT
Start: 2025-03-26 | End: 2025-03-27 | Stop reason: HOSPADM

## 2025-03-26 RX ORDER — PANTOPRAZOLE SODIUM 40 MG/10ML
40 INJECTION, POWDER, LYOPHILIZED, FOR SOLUTION INTRAVENOUS 2 TIMES DAILY
Status: DISCONTINUED | OUTPATIENT
Start: 2025-03-26 | End: 2025-03-27 | Stop reason: HOSPADM

## 2025-03-26 RX ORDER — POTASSIUM CHLORIDE 1500 MG/1
40 TABLET, EXTENDED RELEASE ORAL PRN
Status: DISCONTINUED | OUTPATIENT
Start: 2025-03-26 | End: 2025-03-27 | Stop reason: HOSPADM

## 2025-03-26 RX ORDER — ACETAMINOPHEN 325 MG/1
650 TABLET ORAL EVERY 6 HOURS PRN
Status: DISCONTINUED | OUTPATIENT
Start: 2025-03-26 | End: 2025-03-27 | Stop reason: HOSPADM

## 2025-03-26 RX ORDER — CLONIDINE HYDROCHLORIDE 0.1 MG/1
0.1 TABLET ORAL DAILY PRN
Status: DISCONTINUED | OUTPATIENT
Start: 2025-03-26 | End: 2025-03-27 | Stop reason: HOSPADM

## 2025-03-26 RX ORDER — CARVEDILOL 25 MG/1
25 TABLET ORAL 2 TIMES DAILY WITH MEALS
Status: DISCONTINUED | OUTPATIENT
Start: 2025-03-26 | End: 2025-03-26

## 2025-03-26 RX ORDER — ASPIRIN 81 MG/1
81 TABLET ORAL DAILY
Status: DISCONTINUED | OUTPATIENT
Start: 2025-03-26 | End: 2025-03-27 | Stop reason: HOSPADM

## 2025-03-26 RX ORDER — SODIUM CHLORIDE 0.9 % (FLUSH) 0.9 %
10 SYRINGE (ML) INJECTION EVERY 12 HOURS SCHEDULED
Status: DISCONTINUED | OUTPATIENT
Start: 2025-03-26 | End: 2025-03-27 | Stop reason: HOSPADM

## 2025-03-26 RX ORDER — ACETAMINOPHEN 650 MG/1
650 SUPPOSITORY RECTAL EVERY 6 HOURS PRN
Status: DISCONTINUED | OUTPATIENT
Start: 2025-03-26 | End: 2025-03-27 | Stop reason: HOSPADM

## 2025-03-26 RX ORDER — 0.9 % SODIUM CHLORIDE 0.9 %
1000 INTRAVENOUS SOLUTION INTRAVENOUS ONCE
Status: COMPLETED | OUTPATIENT
Start: 2025-03-26 | End: 2025-03-26

## 2025-03-26 RX ORDER — SENNOSIDES A AND B 8.6 MG/1
1 TABLET, FILM COATED ORAL DAILY PRN
Status: DISCONTINUED | OUTPATIENT
Start: 2025-03-26 | End: 2025-03-27 | Stop reason: HOSPADM

## 2025-03-26 RX ORDER — AMLODIPINE BESYLATE 5 MG/1
10 TABLET ORAL DAILY
Status: DISCONTINUED | OUTPATIENT
Start: 2025-03-26 | End: 2025-03-27

## 2025-03-26 RX ORDER — ROSUVASTATIN CALCIUM 20 MG/1
20 TABLET, COATED ORAL NIGHTLY
Status: DISCONTINUED | OUTPATIENT
Start: 2025-03-26 | End: 2025-03-27 | Stop reason: HOSPADM

## 2025-03-26 RX ORDER — SODIUM CHLORIDE 9 MG/ML
INJECTION, SOLUTION INTRAVENOUS PRN
Status: DISCONTINUED | OUTPATIENT
Start: 2025-03-26 | End: 2025-03-27 | Stop reason: HOSPADM

## 2025-03-26 RX ORDER — FUROSEMIDE 10 MG/ML
40 INJECTION INTRAMUSCULAR; INTRAVENOUS 2 TIMES DAILY
Status: CANCELLED | OUTPATIENT
Start: 2025-03-27

## 2025-03-26 RX ADMIN — SODIUM CHLORIDE: 9 INJECTION, SOLUTION INTRAVENOUS at 06:03

## 2025-03-26 RX ADMIN — IPRATROPIUM BROMIDE AND ALBUTEROL SULFATE 1 DOSE: .5; 3 SOLUTION RESPIRATORY (INHALATION) at 16:03

## 2025-03-26 RX ADMIN — SODIUM CHLORIDE 1000 ML: 9 INJECTION, SOLUTION INTRAVENOUS at 03:00

## 2025-03-26 RX ADMIN — DOXYCYCLINE 100 MG: 100 INJECTION, POWDER, LYOPHILIZED, FOR SOLUTION INTRAVENOUS at 17:12

## 2025-03-26 RX ADMIN — ASPIRIN 81 MG: 81 TABLET, COATED ORAL at 15:55

## 2025-03-26 RX ADMIN — PANTOPRAZOLE SODIUM 40 MG: 40 INJECTION, POWDER, FOR SOLUTION INTRAVENOUS at 15:55

## 2025-03-26 RX ADMIN — CITALOPRAM HYDROBROMIDE 40 MG: 20 TABLET ORAL at 16:05

## 2025-03-26 RX ADMIN — WATER 5 MG: 1 INJECTION INTRAMUSCULAR; INTRAVENOUS; SUBCUTANEOUS at 05:58

## 2025-03-26 RX ADMIN — CARVEDILOL 6.25 MG: 6.25 TABLET, FILM COATED ORAL at 15:54

## 2025-03-26 RX ADMIN — IPRATROPIUM BROMIDE AND ALBUTEROL SULFATE 1 DOSE: .5; 3 SOLUTION RESPIRATORY (INHALATION) at 10:30

## 2025-03-26 RX ADMIN — DOXYCYCLINE 100 MG: 100 INJECTION, POWDER, LYOPHILIZED, FOR SOLUTION INTRAVENOUS at 07:52

## 2025-03-26 RX ADMIN — FUROSEMIDE 20 MG: 10 INJECTION, SOLUTION INTRAMUSCULAR; INTRAVENOUS at 00:30

## 2025-03-26 ASSESSMENT — PAIN SCALES - GENERAL: PAINLEVEL_OUTOF10: 0

## 2025-03-26 NOTE — PROGRESS NOTES
[facility] - Inpatient Rehabilitation Department   Phone: [phone]    Physical Therapy    [x] Initial Evaluation            [] Daily Treatment Note         [] Discharge Summary      Patient: Alexandr Witt   : 1965   MRN: [id number]   Date of Service:  3/26/2025  Admitting Diagnosis: Acute on chronic heart failure with reduced ejection fraction (HFrEF, <= 40%) ([facility])  Current Admission Summary: Per ED H&P \"Alexandr Witt is a 59 y.o. male who presents to the emergency department today from nursing home facility for evaluation for concerns of a fall.  The patient reports that over the past 2 to 3 days he has had generalized weakness, and states he is generally not been feeling well.  The patient reports that today he got up to use the restroom, and he states that upon walking back he states that he felt very weak which caused him to fall.  He is unsure if he hit his head.  No loss of consciousness.  Although the triage report notes nausea patient denies any nausea to me.  He states he is nauseous frequently.  He denies any diarrhea.  He has no chest pain.  No shortness of breath.  He has not any fever or chills.  No cough.  He just reports that he is not feeling well.  Does have a history of GI bleed in the chart I see that he has a history of A-fib, he is unsure if he is on blood thinners.  He has no complaints of pain\"  - 3/26 SIGNIFICANT lethargy  - Dx: CHF exacerbation, pneumonia  Past Medical History:  has a past medical history of Abdominal hernia, Aortic valve prosthesis present, Aortic valve replaced, CAD (coronary artery disease), Chronic back pain greater than 3 months duration, Clostridium difficile diarrhea, COPD, severity to be determined ([facility]), Current every day smoker, DDD (degenerative disc disease), lumbosacral, Encounter for imaging to screen for metal prior to magnetic resonance imaging (MRI), Encounter for imaging to screen for metal prior to MRI, Erectile dysfunction, GERD

## 2025-03-26 NOTE — ED PROVIDER NOTES
EMERGENCY DEPARTMENT PROVIDER NOTE         PATIENT IDENTIFICATION     Name:   Alexandr Witt  MRN:   [id number]  YOB: 1965  Date of Evaluation:   3/25/2025  Provider:   EDIN Cordoba; Ed Rios DO  PCP:   Lavon Diaz DO        CHIEF COMPLAINT       Fall (PT arrived via [facility] EMS from SNF per EMS pt fell and hit head, vomiting x1, nausea. 4mg zofran PO en route. PT unsure if on blood thinners. PT c/o 10/10 back pain. Per EMS pt takes zofran regularly.)        HISTORY OF PRESENT ILLNESS     I independently interviewed patient and/or caretaker(s).  See Advanced Practice Provider (ANGEL) note for full HPI.  In summary, Alexandr Witt  is a(n) 59 y.o. male who presents with generalized weakness for the last 2-3 days or so.  States that today when he was walking the bathroom he felt very weak and resulted in a fall.  Unsure whether or not he lost consciousness.        PHYSICAL EXAM     I reviewed physical exam performed and documented by ANGEL.  I performed an independent physical examination with findings as follows:  Chronically ill-appearing gentleman with rales in the bilateral lower lung fields.  No respiratory distress on 3 L nasal cannula.  On room air at baseline.        EKG INTERPRETATION     TIME:     RATE:   96 bpm  IN INTERVAL:   164 ms  QRS DURATION:   108 ms  QT/QTc:   374/472 ms  RHYTHM:   Normal sinus  AXIS:   Regular  ABNORMALITIES  No STEMI  No ischemic changes    PRIOR EK/27/25- current EKG without significant changes when compared to prior    INTERPRETATION:  Normal EKG    REVIEWED BY:   Ed Rios Jr., DO    EKG was independently reviewed by emergency department physician in absence of cardiologist.        LAB RESULTS     Results for orders placed or performed during the hospital encounter of 25   COVID-19 & Influenza Combo    Specimen: Nasopharyngeal Swab   Result Value Ref Range    SARS-CoV-2 RNA, RT PCR NOT DETECTED NOT DETECTED    Influenza A NOT  Negative mg/dL    Urobilinogen, Urine 2.0 (A) <2.0 E.U./dL    Nitrite, Urine Negative Negative    Leukocyte Esterase, Urine TRACE (A) Negative    Microscopic Examination YES     Urine Type Voided     Urine Reflex to Culture Not Indicated    Protime-INR   Result Value Ref Range    Protime 16.2 (H) 11.9 - 14.9 sec    INR 1.28 (H) 0.85 - 1.15   Ammonia   Result Value Ref Range    Ammonia 13 (L) 16 - 60 umol/L   Blood gas, venous   Result Value Ref Range    pH, Jason 7.369 7.350 - 7.450    pCO2, Jason 42.5 40.0 - 50.0 mmHg    PO2, Jason 54.9 (H) 25.0 - 40.0 mmHg    HCO3, Venous 24.5 23.0 - 29.0 mmol/L    Base Excess, Jason -0.8 -3.0 - 3.0 mmol/L    O2 Sat, Jason 86 Not Established %    Carboxyhemoglobin 4.1 (H) 0.0 - 1.5 %    MetHgb, Jason 0.4 <1.5 %    TC02 (Calc), Jason 58 Not Established mmol/L    O2 Content, Jason 10 Not Established VOL %    O2 Therapy Unknown     Hemoglobin, Jason, Reduced 13 %   Lactate, Sepsis   Result Value Ref Range    Lactic Acid, Sepsis 2.0 (H) 0.4 - 1.9 mmol/L   Microscopic Urinalysis   Result Value Ref Range    Bacteria, UA None Seen None Seen /HPF    Hyaline Casts, UA 45 (H) 0 - 8 /LPF    WBC, UA 4 0 - 5 /HPF    RBC, UA 1 0 - 4 /HPF    Epithelial Cells, UA 21 (H) 0 - 5 /HPF    Amorphous, UA Present (A) None Seen    Mucus, UA Present (A) None Seen   Troponin   Result Value Ref Range    Troponin, High Sensitivity 23 (H) 0 - 22 ng/L   Brain Natriuretic Peptide   Result Value Ref Range    NT Pro-BNP 5,735 (H) 0 - 124 pg/mL   EKG 12 Lead   Result Value Ref Range    Ventricular Rate 96 BPM    Atrial Rate 96 BPM    P-R Interval 164 ms    QRS Duration 108 ms    Q-T Interval 374 ms    QTc Calculation (Bazett) 472 ms    P Axis 51 degrees    R Axis 33 degrees    T Axis 66 degrees    Diagnosis Normal sinus rhythmNormal ECG          IMAGING RESULTS     CT CHEST ABDOMEN PELVIS WO CONTRAST Additional Contrast? None   Final Result   Hazy subpleural ground-glass infiltrates along the upper lobes extending and   scattered

## 2025-03-26 NOTE — PROGRESS NOTES
Pt daughter Charity (ph# [phone]) provided update on recent discharge from [facility], reporting that pt was admitted for poor intake, confusion, N/V. Pt was diagnosed with UTI and Flu and \"ulcer with blood\". Daughter also mentioned that she witnessed the pt's fall on home camera and called the emergency service. Per Charity, pt has hx of colon, and lung cancer, spinal stimulator, pacemaker. (last radiation was on January).

## 2025-03-26 NOTE — ED NOTES
Patient Name: Alexandr Witt  : 1965 59 y.o.  MRN: [id number]  ED Room #: ED-0007/07     Chief complaint:   Chief Complaint   Patient presents with    Fall     PT arrived via [facility] from SNF per EMS pt fell and hit head, vomiting x1, nausea. 4mg zofran PO en route. PT unsure if on blood thinners. PT c/o 10/10 back pain. Per EMS pt takes zofran regularly.     Hospital Problem/Diagnosis:   Hospital Problems           Last Modified POA    * (Principal) Acute on chronic heart failure with reduced ejection fraction (HFrEF, <= 40%) ([facility]) 3/26/2025 Yes         O2 Flow Rate:O2 Device: Nasal cannula O2 Flow Rate (L/min): 2 L/min (if applicable)  Cardiac Rhythm:   (if applicable)  Active LDA's:   Peripheral IV 25 Right Antecubital (Active)            How does patient ambulate? Unknown, did not assess in the Emergency Department    2. How does patient take pills? Unknown, no oral medications were given in the Emergency Department    3. Is patient alert? Unresponsive    4. Is patient oriented? To Person and Follows Commands    5.   Patient arrived from:  nursing home  Facility Name: ___________________________________________    6. If patient is disoriented or from a Skill Nursing Facility has family been notified of admission?     7. Patient belongings? Belongings: Clothing    Disposition of belongings? Kept with Patient     8. Any specific patient or family belongings/needs/dynamics?   a.    9. Miscellaneous comments/pending orders?  a.      If there are any additional questions please reach out to the Emergency Department.

## 2025-03-26 NOTE — PROGRESS NOTES
Dominance: [] Left                 [] Right  Current Employment: unemployed -   Hobbies: TV  Recent Falls: Fall causing this hospitalization     Available Assistance at Discharge: available PRN - daughter appears intermittently available per chart review though unclear.  Examination   Vision:   Vision Gross Assessment: WFL (Per eval 3/3/26)  Hearing:   WFL  Perception:   WFL (grossly WFL)  Observation:   General Observation:  1 L O2,   Sensation:   accurately detects gross touch to all extremities  Proprioception:    WFL  Tone:   Normotonic  Coordination Testing:   Unable to formally test secondary to lethargy; pt keeping eyes closed     ROM:   (L) Shoulder: ~30 degrees AROM     (R) Shoulder: ~45 degrees AROM  (L) Elbow: ~30 degrees AROM, WFL PROM     (R) Elbow: ~30 degrees ARM, WFL PROM  (L) Wrist: WFL PROM     (R) Wrist: WFL PROM  (L) Hand: WFL     (R) Hand: WFL  Strength:   (L)  Strength: grossly 3/5    (R)  Strength: grossly 3/5    Therapist Clinical Decision Making (Complexity): medium complexity  Clinical Presentation: evolving      Subjective  General: Pt in semi-kirk's position in bed on therapy arrival. Pt lethargic with eyes close. Pt agreeable to OT/PT eval. Frequent reminders to open eyes.  Pain: Patient does not rate upon questioning \"Butt hurts pretty bad.\" No sores noted.  Pain Interventions: RN notified and repositioned         Activities of Daily Living  Basic Activities of Daily Living  Feeding: moderate assistance requires assistance for beverage management  Feeding Comments: Assist to lift drink to mouth; pt needing assist to lift and maintain grasp on cup  Grooming: maximum assistance  Grooming Comments: wash face; pt dropped washcloth out of hand; UE fell while attempting to wash face.  Lower Extremity Dressing: moderate assistance  Dressing Comments: Seated on EOB, Min A to hold LE in no 4 position to doff each sock; D don socks due to fatigue.  Instrumental

## 2025-03-26 NOTE — CONSULTS
Pulmonary and Critical Care   Consult Note      Reason for Consult: Shortness of breath, pneumonia  Requesting Physician: Dr nobles    Subjective:   CHIEF COMPLAINT / HPI:                The patient is a 59 y.o. male with significant past medical history of:      Diagnosis Date    Abdominal hernia     s/p repair 2010- HAS RETURNED    Aortic valve prosthesis present     Aortic valve replaced 05/2017    25 mm Castelan Model 3300 bovine pericardial bioprosthesis    CAD (coronary artery disease)     Chronic back pain greater than 3 months duration     Clostridium difficile diarrhea 10/17/2016    PCR    COPD, severity to be determined ([facility]) 05/10/2023    Current every day smoker     DDD (degenerative disc disease), lumbosacral 08/07/2013    Encounter for imaging to screen for metal prior to magnetic resonance imaging (MRI)     [employer] [id number] [place] XT VR MRI Surescan Serial # [id number], RV Lead [id number] Serial # [id number] Implanted 4/30/2024 followed by Dr. Trevino [facility]    Encounter for imaging to screen for metal prior to MRI 04/19/2024    Neurostimulator - Abbott Proclaim 5 model #: 3660; leads (2) both 3186; use remote to place in MRI mode. internal stim generator is not rechargable. MRI conditional system. normal mode, no TISHA restrictions, whole body eligible, any receive only or transmit receive coil [id number]    Erectile dysfunction     GERD (gastroesophageal reflux disease)     Hyperlipidemia     Hypertension     Joint pain     Left testicular cancer (HCC) 2002    s/p abdominal resection    Myalgia and myositis, unspecified 08/26/2013    Neuropathy     OA (osteoarthritis) of knee     bilateral    Obesity, Class I, BMI 30.0-34.9 (see actual BMI)     Prolonged emergence from general anesthesia     after CABG    S/P AVR 2005    tissue valve    S/P CABG x 2 2005    w/AVR & primary repair of dissected ascending aorta    Shoulder instability-LEFT 08/28/2023    \"POPS OUT ABOUT 10 TIMES A DAY\"    Type 2

## 2025-03-26 NOTE — PROGRESS NOTES
Clinical Pharmacy Note: Pharmacy to Dose Warfarin    Pharmacy consulted by Dr. Chaparro to dose warfarin.    Alexandr Witt is a 59 y.o. male  is receiving warfarin for indication: Afib.    INR Goal Range: 2-3  Prior to admission warfarin dosing regimen: 7.5mg daily  INR today:   Lab Results   Component Value Date/Time    INR 1.28 03/25/2025 08:00 PM       Assessment/Plan:  INR is subtherapeutic on prior to admission dosing regimen.    Based on today's assessment, dose warfarin 7.5mg.  Daily INR is ordered. Pharmacy will continue to monitor and make adjustments to regimen as necessary.     Thank you for the consult,     Robin Singleton, KarinD

## 2025-03-26 NOTE — PROGRESS NOTES
Unable to get hold of nursing staff at [facility]# [phone] to verify pts home medication at this time.

## 2025-03-26 NOTE — CARE COORDINATION
03/26/25 1604   Readmission Assessment   Number of Days since last admission? 8-30 days   Previous Disposition Home with Home Health  ([facility],  [place] @ [place])   Who is being Interviewed Unable to Complete   What was the patient's/caregiver's perception as to why they think they needed to return back to the hospital? Other (Comment)  (Unable to complete)   Did you visit your Primary Care Physician after you left the hospital, before you returned this time? No   Why weren't you able to visit your PCP? Other (Comment)  (Unable to complete)   Did you see a specialist, such as Cardiac, Pulmonary, Orthopedic Physician, etc. after you left the hospital? Other (Comment)  (Unable to complete)   Who advised the patient to return to the hospital? Other (Comment)  (Unable to complete)   Does the patient report anything that got in the way of taking their medications? No   In our efforts to provide the best possible care to you and others like you, can you think of anything that we could have done to help you after you left the hospital the first time, so that you might not have needed to return so soon? Other (Comment)  (Unable to complete)     Electronically signed by BOGDAN Henning on 3/26/25 at 4:07 PM EDT

## 2025-03-26 NOTE — PROGRESS NOTES
Patient admitted from [facility] per patient, ED stated no paperwork sent with patient. Confused alert to name x1. Ambulatory status unknown, Regular diet, pmhx unknown at this time. Redness to buttocks and bilat heels blanchable. Pulls at IV and 02 lines. Placed on camera for impulsiveness. 0400 Pulled out IV in right AC new IV placed in left hand.    CHUCK NICOLE RN

## 2025-03-26 NOTE — PROGRESS NOTES
Speech Language Pathology  Attempt/Hold    Alexandr FOSTER Witt  1965    SLP evaluation and treatment orders received and appreciated for this pt. SLP attempted to see pt this date for clinical swallow evaluation. Chart reviewed, spoke with RN who reports pt is sleepy. Upon SLP attempt, pt sleeping soundly, opened eyes briefly to verbal and tactile stimuli but did not maintain appropriate level of alertness to participate. Will hold and attempt again as pt is appropriate.    Hiral Oliver MA CCC-SLP  Speech-Language Pathologist  SP. [id number]

## 2025-03-26 NOTE — PROGRESS NOTES
Hospitalist Progress Note      Name:  Alexandr Witt /Age/Sex: 1965  (59 y.o. male)   MRN & CSN:  [id number] & [id number] Encounter Date/Time: 3/26/2025 9:19 AM EDT   Location:  [id number] PCP: Lavon Diaz DO     Attending:Neelam Faria MD       Hospital Day: 2    Subjective:   Chief Complaint:   Chief Complaint   Patient presents with    Fall     PT arrived via  EMS from SNF per EMS pt fell and hit head, vomiting x1, nausea. 4mg zofran PO en route. PT unsure if on blood thinners. PT c/o 10/10 back pain. Per EMS pt takes zofran regularly.     Alexandr Witt is a 59 y.o. male with a past medical history of hypertension, hyperlipidemia, COPD, C-diff,  pAfib (on warfarin), CAD (s/p CABG , ), aortic stenosis (s/p bioprosthetic AVR ), HFrEF (EF 30-35% in ), VFib arrest (2024, s/p AICD), transverse colectomy (), testicular cancer (s/p chemo, resection), SCC (s/p wedge resection 2023; with lung mass s/p SBRT 2024), and recent admission for AMS, GI bleed, and parasitic stool infection ([facility] 2025) who presented to the ER from [facility] following a fall.      Interval History:  Today, he is drowsy.  Able to answer short questions and falls back asleep. More alert throughout the day per nursing.  He received Zyprexa at 0500 for agitation.       Independently reviewed interval ancillary notes from pulmonology.     Assessment and Recommendations   Problem List  Principal Problem:    Acute on chronic heart failure with reduced ejection fraction (HFrEF, <= 40%) ([facility])  Resolved Problems:    * No resolved hospital problems. *     Assessment and Plan:    Acute metabolic Encephalopathy  Fall with head Injury   - Remains altered, recent admission for acute metabolic encephalopathy and GIB with possible parasitic infection  - Repeat CT head 24 hours from admission if remains altered  -  He was NOT on warfarin PTA it was stopped after his last discharge it  ill-defined opacity at the left lung base   favored to represent atelectasis.  Pneumonia and contusion are considered   less likely.  There no other acute findings in the chest.         XR LUMBAR SPINE (2-3 VIEWS)   Final Result   No acute lumbar spine abnormality is identified.         CT CERVICAL SPINE WO CONTRAST   Final Result   No acute abnormality of the cervical spine.         CT HEAD WO CONTRAST   Final Result   No acute intracranial abnormality.           Echo: 3/26/2024  Summary   Left ventricular systolic function is moderately reduced with a visually   estimated ejection fraction of 30-35 %.   The left ventricle is normal in size with mild concentric hypertrophy.   Global hypokinesis with regional variation.   Indeterminate diastolic function   There is no evidence of a left ventricular thrombus .   Mild mitral regurgitation is present.   The left atrium appears upper limits of normal in size.   A bioprosthetic aortic valve is visualized with normal Doppler values.   There is a maximum gradient of 17 mmHg, a mean gradient of 8 mmHg, and the   valve area is calculated at 1.61 cm2 by the continuity equation.   There is no evidence of aortic valve regurgitation.   The right ventricle is normal in size and function.   The right atrium is normal in size     CBC:   Recent Labs     03/25/25 2000   WBC 4.5   HGB 8.8*   PLT 69*     BMP:    Recent Labs     03/25/25 2000 03/26/25  0820    136   K 3.8 3.9   CL 98* 98*   CO2 22 23   BUN 34* 38*   CREATININE 2.0* 2.2*   GLUCOSE 160* 136*     Hepatic:   Recent Labs     03/25/25 2000   AST 13*   ALT <5*   BILITOT 1.8*   ALKPHOS 55     Lipids:   Lab Results   Component Value Date/Time    CHOL 111 04/22/2022 07:09 AM    HDL 34 04/22/2022 07:09 AM    TRIG 192 04/22/2022 07:09 AM     Hemoglobin A1C:   Lab Results   Component Value Date/Time    LABA1C 6.9 03/03/2025 04:52 AM     TSH:   Lab Results   Component Value Date/Time    TSH 7.39 04/16/2024 04:46 PM

## 2025-03-26 NOTE — H&P
HOSPITAL MEDICINE   HISTORY & PHYSICAL        Date of Admission:  03/26/25  MRN: [id number]  Date of Service: 03/26/25  Location:  [facility]       CC:    Chief Complaint   Patient presents with    Fall     PT arrived via  EMS from SNF per EMS pt fell and hit head, vomiting x1, nausea. 4mg zofran PO en route. PT unsure if on blood thinners. PT c/o 10/10 back pain. Per EMS pt takes zofran regularly.        HISTORY OF PRESENT ILLNESS:     Alexandr Witt is a 59 y.o. male with a PMHx of HTN, pAfib (on warfarin), CAD (s/p CABG 2005, 2017), aortic stenosis (s/p bioprosthetic AVR 2017), HFrEF (EF 30-35% in 2024), VFib arrest (4/2024, s/p AICD), transverse colectomy (2016), testicular cancer (s/p chemo, resection), SCC (s/p wedge resection 8/2023; with lung mass s/p SBRT 12/2024), and recent admission for AMS, GI bleed, and parasitic stool infection ([facility] 2/2025) who presented to the ER from [facility] following a fall.      Pt reported to ER staff that he was walking to the bathroom and felt weak, subsequently causing him to fall backward striking his head.   Denies LOC.  He denied chest pain, shortness of breath, fevers, chills, or cough.  In the ER, pt normotensive, afebrile, O2 sat 88% on RA placed on 2L via NC.  Labs remarkable for Cr 2.0, lactic 2.0, lipase 89, troponin 23, and proBNP 5,735.  UA +ketones, trace blood, trace LE.   CTH and C-spine normal.  CT chest showed hazy b/l opacities c/w bronchopneumonia in pattern of Covid-19.  He was treated with IV lasix 40.       On my evaluation, pt is very confused and unable to provide any meaningful history.   He does not know where he is, nor how he fell.        Case discussed with ED attending for admission.  External medical records reviewed.    PAST MEDICAL HX:  Past Medical History:

## 2025-03-26 NOTE — ED NOTES
Report called to 5C RN. All questions and concerns answered at this time. Pt resting in bed with eyes closed. Respirations even and unlabored on 2L NC. IV intact.

## 2025-03-26 NOTE — PROGRESS NOTES
Restraints not needed or initiated this shift/ at this time, patient is not pulling at [facility] anymore.    CHUCK NICOLE RN

## 2025-03-26 NOTE — PROGRESS NOTES
Patient unable to answer admission questions appropriately. Reported to oncoming RN to follow up with [facility] for paperwork or report not obtained from ED. Patient unable to confirm if diabetic or not, notified MD, no diabetic orders placed at this time.    CHUCK NICOLE RN

## 2025-03-26 NOTE — CONSULTS
[facility]  Advanced CHF/Pulmonary Hypertension   Cardiac Evaluation      Alexandr Witt  YOB: 1965    Requesting PHysician:  Dr. Chaparro      Chief Complaint   Patient presents with    Fall     PT arrived via ff EMS from SNF per EMS pt fell and hit head, vomiting x1, nausea. 4mg zofran PO en route. PT unsure if on blood thinners. PT c/o 10/10 back pain. Per EMS pt takes zofran regularly.        History of Present Illness:  Alexandr Witt is a 60 yo male who presented to the ED yesterday from his independent nursing facility for evaluation for concerns for a fall.  Over the 2-3 days before admission, he had generalized weakness and was not feeling well.  He was weak and fell coming back from the bathroom.  No loss of consciousness.  No chest pain.  Denies shortness of breath.  No fever or chills.  No cough.      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      In the ED, he was very confused, and remains confused today.  He is unable to give any history today.  He was 88% on RA in the ED, placed on 2 liters NC.  Labs show creatinine 2.2.  procalcitonin elevated.  Bnp elevated at 5700.  He is anemic.  Ammonia was elevated.  He was given IV lasix in the ED.  Creatinine has increased from 2.0 to 2.2 overnight.             Labs:  Sodium 136  K 3.9  BUN/Cre 38/2.2  Procalcitonin 0.82  Troponin 23  Bnp 5735  H/H 8.8/27.0  Wbc 4.5  INR 1.17    Echo:  4/19/24:   Summary   Left ventricular systolic function is moderately reduced with a visually   estimated ejection fraction of 30-35 %.   The left ventricle is normal in size with mild concentric hypertrophy.   Global hypokinesis with regional variation.   Indeterminate diastolic function   There is no evidence of a

## 2025-03-26 NOTE — ED PROVIDER NOTES
exostectomy    HEMICOLECTOMY N/A 07/26/2019    ROBOTIC ASSISTED  LAPAROSCOPIC RIGHT COLECTOMY AND CHOLECYSTECTOMY performed by Alexi Luciano MD at [facility] OR    INCISIONAL HERNIA REPAIR  2010    multiple    LUMBAR DISCECTOMY  2012    L4-5    LUNG REMOVAL, TOTAL Left 8/31/2023    L mini thoracotomy; wide local resection of LLL mass; lysis of multiple adhesions; ICNB x 4 levels (4 through 8) performed by Richardson Pantoja MD at [facility] OR    SHOULDER ARTHROSCOPY Right 11/21/2013    Dr. Selby - w/subacromial decompression, debridement of the SLAP tear, distal clavicle excision    SOFT TISSUE TUMOR RESECTION  2001    retroperitoneal mass    SPINAL CORD STIMULATOR SURGERY Right 10/15/2019    RIGHT FLANK    TESTICLE REMOVAL Left 2001    radical orchiectomy    TRANSESOPHAGEAL ECHOCARDIOGRAM  05/30/2017    during redo CABG & AVR    TUNNELED VENOUS PORT PLACEMENT  2002    portacath     UPPER GASTROINTESTINAL ENDOSCOPY N/A 3/6/2025    ESOPHAGOGASTRODUODENOSCOPY CONTROL HEMORRHAGE performed by Mary Cox MD at [facility] ENDOSCOPY    UPPER GASTROINTESTINAL ENDOSCOPY N/A 3/6/2025    ESOPHAGOGASTRODUODENOSCOPY SUBMUCOSA INJECTION performed by Mary Cox MD at [facility] ENDOSCOPY       CURRENTMEDICATIONS       Previous Medications    AMLODIPINE (NORVASC) 10 MG TABLET    Take 1 tablet by mouth daily Hold if systolic blood pressure less than 130.    ASPIRIN 81 MG EC TABLET    Take 1 tablet by mouth daily    BLOOD PRESSURE KIT    1 Unit    CALAMINE-ZINC OXIDE 8-8 % LOTN LOTION    Apply topically 3 times daily Apply to rectal area for excoriation.    CARVEDILOL (COREG) 25 MG TABLET    Take 1 tablet by mouth 2 times daily (with meals) Hold if systolic blood pressure less than 110.    CITALOPRAM (CELEXA) 40 MG TABLET    TAKE 1 TABLET BY MOUTH EVERY DAY    DOCUSATE SODIUM (COLACE PO)    Take by mouth    HANDICAP PLACARD MISC    by Does not apply route Effective through 12/28/2020 through 12/27/2025    MELATONIN 5 MG TBDP  HISTORY: ORDERING SYSTEM PROVIDED HISTORY: fall TECHNOLOGIST PROVIDED HISTORY: Reason for exam:->fall Reason for Exam: fall FINDINGS: Lumbar alignment and curvature is maintained.  No acute displaced fracture or subluxation is identified.  Vertebral body heights are preserved.  There are mild multilevel degenerative endplate changes, without significant disc space narrowing.  Scattered surgical clips are noted about the abdominal aorta. Right lower back stimulator device in place.     No acute lumbar spine abnormality is identified.     CT CERVICAL SPINE WO CONTRAST  Result Date: 3/25/2025  EXAMINATION: CT OF THE CERVICAL SPINE WITHOUT CONTRAST 3/25/2025 8:01 pm TECHNIQUE: CT of the cervical spine was performed without the administration of intravenous contrast. Multiplanar reformatted images are provided for review. Automated exposure control, iterative reconstruction, and/or weight based adjustment of the mA/kV was utilized to reduce the radiation dose to as low as reasonably achievable. COMPARISON: None. HISTORY: ORDERING SYSTEM PROVIDED HISTORY: fall TECHNOLOGIST PROVIDED HISTORY: Reason for exam:->fall Decision Support Exception - unselect if not a suspected or confirmed emergency medical condition->Emergency Medical Condition (MA) Reason for Exam: fall FINDINGS: BONES/ALIGNMENT: There is no acute fracture or traumatic malalignment. DEGENERATIVE CHANGES: No severe osseous spinal canal stenosis. SOFT TISSUES: There is no prevertebral soft tissue swelling.     No acute abnormality of the cervical spine.     CT HEAD WO CONTRAST  Result Date: 3/25/2025  EXAMINATION: CT OF THE HEAD WITHOUT CONTRAST  3/25/2025 8:01 pm TECHNIQUE: CT of the head was performed without the administration of intravenous contrast. Automated exposure control, iterative reconstruction, and/or weight based adjustment of the mA/kV was utilized to reduce the radiation dose to as low as reasonably achievable. COMPARISON: 03/03/2025 HISTORY:

## 2025-03-26 NOTE — CONSULTS
give any history today.    Cr elevated        Interval hx     Past Medical History:   Diagnosis Date    Abdominal hernia     s/p repair 2010- HAS RETURNED    Aortic valve prosthesis present     Aortic valve replaced 05/2017    25 mm Castelan Model 3300 bovine pericardial bioprosthesis    CAD (coronary artery disease)     Chronic back pain greater than 3 months duration     Clostridium difficile diarrhea 10/17/2016    PCR    COPD, severity to be determined ([facility]) 05/10/2023    Current every day smoker     DDD (degenerative disc disease), lumbosacral 08/07/2013    Encounter for imaging to screen for metal prior to magnetic resonance imaging (MRI)     Cell [employer] [id number] [place] XT VR MRI Surescan Serial # [id number], RV Lead [id number] Serial # [id number] Implanted 4/30/2024 followed by Dr. [facility]    Encounter for imaging to screen for metal prior to MRI 04/19/2024    Neurostimulator - Abbott Proclaim 5 model #: 3660; leads (2) both [id number]; use remote to place in MRI mode. internal stim generator is not rechargable. MRI conditional system. normal mode, no TISHA restrictions, whole body eligible, any receive only or transmit receive coil [id number]    Erectile dysfunction     GERD (gastroesophageal reflux disease)     Hyperlipidemia     Hypertension     Joint pain     Left testicular cancer ([facility]) 2002    s/p abdominal resection    Myalgia and myositis, unspecified 08/26/2013    Neuropathy     OA (osteoarthritis) of knee     bilateral    Obesity, Class I, BMI 30.0-34.9 (see actual BMI)     Prolonged emergence from general anesthesia     after CABG    S/P AVR 2005    tissue valve    S/P CABG x 2 2005    w/AVR & primary repair of dissected ascending aorta    Shoulder instability-LEFT 08/28/2023    \"POPS OUT ABOUT 10 TIMES A DAY\"    Type 2 diabetes mellitus without complication, without long-term current use of insulin ([facility]) 07/13/2021    Wears dentures        Past Surgical History:   Procedure Laterality Date    AORTA  B.[facility] - w/subacromial decompression, debridement of the SLAP tear, distal clavicle excision    SOFT TISSUE TUMOR RESECTION  2001    retroperitoneal mass    SPINAL CORD STIMULATOR SURGERY Right 10/15/2019    RIGHT FLANK    TESTICLE REMOVAL Left 2001    radical orchiectomy    TRANSESOPHAGEAL ECHOCARDIOGRAM  05/30/2017    during redo CABG & AVR    TUNNELED VENOUS PORT PLACEMENT  2002    portacath     UPPER GASTROINTESTINAL ENDOSCOPY N/A 3/6/2025    ESOPHAGOGASTRODUODENOSCOPY CONTROL HEMORRHAGE performed by Mary Cox MD at [facility] ENDOSCOPY    UPPER GASTROINTESTINAL ENDOSCOPY N/A 3/6/2025    ESOPHAGOGASTRODUODENOSCOPY SUBMUCOSA INJECTION performed by Mary Cox MD at [facility] ENDOSCOPY       Family History   Problem Relation Age of Onset    Cancer Mother     Cancer Father         lung    Alzheimer's Disease Sister     Liver Cancer Brother         reports that he has been smoking cigarettes. He started smoking about 46 years ago. He has a 45 pack-year smoking history. He has never used smokeless tobacco. He reports that he does not drink alcohol and does not use drugs.    Allergies:  Cymbalta [duloxetine hcl], Atorvastatin calcium [lipitor], Hydrocodone, and Wellbutrin [bupropion]    Current Medications:    [Held by provider] amLODIPine (NORVASC) tablet 10 mg, Daily  aspirin EC tablet 81 mg, Daily  citalopram (CELEXA) tablet 40 mg, Daily  cloNIDine (CATAPRES) tablet 0.1 mg, Daily PRN  [Held by provider] hydrALAZINE (APRESOLINE) tablet 25 mg, 3 times per day  melatonin tablet 6 mg, Nightly PRN  pregabalin (LYRICA) capsule 150 mg, Daily  rosuvastatin (CRESTOR) tablet 20 mg, Nightly  sodium chloride flush 0.9 % injection 10 mL, 2 times per day  sodium chloride flush 0.9 % injection 10 mL, PRN  0.9 % sodium chloride infusion, PRN  ondansetron (ZOFRAN-ODT) disintegrating tablet 4 mg, Q8H PRN   Or  ondansetron (ZOFRAN) injection 4 mg, Q6H PRN  senna (SENOKOT) tablet 8.6 mg, Daily PRN  acetaminophen (TYLENOL)

## 2025-03-26 NOTE — PROGRESS NOTES
4 Eyes Skin Assessment     NAME:  Alexandr Witt  YOB: 1965  MEDICAL RECORD NUMBER:  [id number]    The patient is being assessed for  Admission    I agree that at least one RN has performed a thorough Head to Toe Skin Assessment on the patient. ALL assessment sites listed below have been assessed.      Areas assessed by both nurses:    Head, Face, Ears, Shoulders, Back, Chest, Arms, Elbows, Hands, Sacrum. Buttock, Coccyx, Ischium, Legs. Feet and Heels, and Under Medical Devices         Does the Patient have a Wound? No noted wound(s)       Jc Prevention initiated by RN: Yes  Wound Care Orders initiated by RN: No    Pressure Injury (Stage 3,4, Unstageable, DTI, NWPT, and Complex wounds) if present, place Wound referral order by RN under : No    New Ostomies, if present place, Ostomy referral order under : No     Nurse 1 eSignature: Electronically signed by CHUCK NICOLE RN on 3/26/25 at 3:48 AM EDT    **SHARE this note so that the co-signing nurse can place an eSignature**    Nurse 2 eSignature: Electronically signed by CLIFFORD WILLS RN on 3/26/25 at 3:49 AM EDT

## 2025-03-26 NOTE — PROGRESS NOTES
Outpatient Antibiotic Therapy Plan:     Please send referral to [facility] infusion      1) Infection:  UTI     2) Discharge Antibiotics:     Intravenous antibiotics-  IV Ertapenem one gram daily for 3 days        3) Therapy Duration:       Estimated end date of IV antibiotics: 04/04/24     4) Outpatient Weekly Labs:    Please send all labs to [facility] .    5) Outpatient Infectious Diseases Follow-up   Occupational Therapy and Physical Therapy  Alexandr Witt      Received order for OT/the patient evaluation. Per RN, pt not medically appropriate for therapy this morning. Will follow up this afternoon, as schedule allows.   Thank you.   LAI Haque Ed., OTR/L, [id number]  Hilda Juarez PT, DPT #[id number] 3/26/2025

## 2025-03-27 ENCOUNTER — APPOINTMENT (OUTPATIENT)
Age: 60
DRG: 193 | End: 2025-03-27
Payer: MEDICARE

## 2025-03-27 VITALS
TEMPERATURE: 97.1 F | BODY MASS INDEX: 24.24 KG/M2 | WEIGHT: 179 LBS | HEART RATE: 69 BPM | HEIGHT: 72 IN | OXYGEN SATURATION: 99 % | DIASTOLIC BLOOD PRESSURE: 64 MMHG | RESPIRATION RATE: 18 BRPM | SYSTOLIC BLOOD PRESSURE: 94 MMHG

## 2025-03-27 PROBLEM — J18.9 MULTIFOCAL PNEUMONIA: Status: ACTIVE | Noted: 2024-04-16

## 2025-03-27 PROBLEM — Z85.118 HISTORY OF LUNG CANCER: Status: ACTIVE | Noted: 2025-03-27

## 2025-03-27 LAB
ALBUMIN SERPL-MCNC: 2.9 G/DL (ref 3.4–5)
ALP SERPL-CCNC: 45 U/L (ref 40–129)
ALT SERPL-CCNC: <5 U/L (ref 10–40)
ANION GAP SERPL CALCULATED.3IONS-SCNC: 10 MMOL/L (ref 3–16)
AST SERPL-CCNC: 9 U/L (ref 15–37)
BACTERIA UR CULT: NORMAL
BASOPHILS # BLD: 0 K/UL (ref 0–0.2)
BASOPHILS NFR BLD: 1 %
BILIRUB DIRECT SERPL-MCNC: 0.7 MG/DL (ref 0–0.3)
BILIRUB INDIRECT SERPL-MCNC: 0.4 MG/DL (ref 0–1)
BILIRUB SERPL-MCNC: 1.1 MG/DL (ref 0–1)
BUN SERPL-MCNC: 40 MG/DL (ref 7–20)
CALCIUM SERPL-MCNC: 8.5 MG/DL (ref 8.3–10.6)
CHLORIDE SERPL-SCNC: 102 MMOL/L (ref 99–110)
CHOLEST SERPL-MCNC: 67 MG/DL (ref 0–199)
CO2 SERPL-SCNC: 25 MMOL/L (ref 21–32)
CREAT SERPL-MCNC: 2 MG/DL (ref 0.8–1.3)
DEPRECATED RDW RBC AUTO: 18.9 % (ref 12.4–15.4)
ECHO AO ASC DIAM: 3.5 CM
ECHO AO ASCENDING AORTA INDEX: 1.72 CM/M2
ECHO AO ROOT DIAM: 3.8 CM
ECHO AO ROOT INDEX: 1.87 CM/M2
ECHO AV AREA PEAK VELOCITY: 1.6 CM2
ECHO AV AREA VTI: 1.8 CM2
ECHO AV AREA/BSA PEAK VELOCITY: 0.8 CM2/M2
ECHO AV AREA/BSA VTI: 0.9 CM2/M2
ECHO AV MEAN GRADIENT: 6 MMHG
ECHO AV MEAN VELOCITY: 1.2 M/S
ECHO AV PEAK GRADIENT: 11 MMHG
ECHO AV PEAK VELOCITY: 1.7 M/S
ECHO AV VELOCITY RATIO: 0.59
ECHO AV VTI: 28 CM
ECHO BSA: 2.03 M2
ECHO LA AREA 2C: 22.7 CM2
ECHO LA AREA 4C: 18.5 CM2
ECHO LA DIAMETER INDEX: 2.46 CM/M2
ECHO LA DIAMETER: 5 CM
ECHO LA MAJOR AXIS: 5.2 CM
ECHO LA MINOR AXIS: 5.4 CM
ECHO LA TO AORTIC ROOT RATIO: 1.32
ECHO LA VOL BP: 64 ML (ref 18–58)
ECHO LA VOL MOD A2C: 74 ML (ref 18–58)
ECHO LA VOL MOD A4C: 54 ML (ref 18–58)
ECHO LA VOL/BSA BIPLANE: 32 ML/M2 (ref 16–34)
ECHO LA VOLUME INDEX MOD A2C: 36 ML/M2 (ref 16–34)
ECHO LA VOLUME INDEX MOD A4C: 27 ML/M2 (ref 16–34)
ECHO LV E' LATERAL VELOCITY: 9.9 CM/S
ECHO LV E' SEPTAL VELOCITY: 6.31 CM/S
ECHO LV EDV A2C: 80 ML
ECHO LV EDV A4C: 113 ML
ECHO LV EDV INDEX A4C: 56 ML/M2
ECHO LV EDV NDEX A2C: 39 ML/M2
ECHO LV EF PHYSICIAN: 55 %
ECHO LV EJECTION FRACTION A2C: 53 %
ECHO LV EJECTION FRACTION A4C: 53 %
ECHO LV EJECTION FRACTION BIPLANE: 52 % (ref 55–100)
ECHO LV ESV A2C: 37 ML
ECHO LV ESV A4C: 53 ML
ECHO LV ESV INDEX A2C: 18 ML/M2
ECHO LV ESV INDEX A4C: 26 ML/M2
ECHO LV FRACTIONAL SHORTENING: 26 % (ref 28–44)
ECHO LV INTERNAL DIMENSION DIASTOLE INDEX: 2.81 CM/M2
ECHO LV INTERNAL DIMENSION DIASTOLIC: 5.7 CM (ref 4.2–5.9)
ECHO LV INTERNAL DIMENSION SYSTOLIC INDEX: 2.07 CM/M2
ECHO LV INTERNAL DIMENSION SYSTOLIC: 4.2 CM
ECHO LV IVSD: 1.2 CM (ref 0.6–1)
ECHO LV MASS 2D: 288.7 G (ref 88–224)
ECHO LV MASS INDEX 2D: 142.2 G/M2 (ref 49–115)
ECHO LV POSTERIOR WALL DIASTOLIC: 1.2 CM (ref 0.6–1)
ECHO LV RELATIVE WALL THICKNESS RATIO: 0.42
ECHO LVOT AREA: 2.5 CM2
ECHO LVOT AV VTI INDEX: 0.71
ECHO LVOT DIAM: 1.8 CM
ECHO LVOT MEAN GRADIENT: 2 MMHG
ECHO LVOT PEAK GRADIENT: 4 MMHG
ECHO LVOT PEAK VELOCITY: 1 M/S
ECHO LVOT STROKE VOLUME INDEX: 24.8 ML/M2
ECHO LVOT SV: 50.4 ML
ECHO LVOT VTI: 19.8 CM
ECHO MV A VELOCITY: 0.77 M/S
ECHO MV AREA VTI: 1.9 CM2
ECHO MV E DECELERATION TIME (DT): 264 MS
ECHO MV E VELOCITY: 0.63 M/S
ECHO MV E/A RATIO: 0.82
ECHO MV E/E' LATERAL: 6.36
ECHO MV E/E' RATIO (AVERAGED): 8.17
ECHO MV E/E' SEPTAL: 9.98
ECHO MV LVOT VTI INDEX: 1.37
ECHO MV MAX VELOCITY: 0.9 M/S
ECHO MV MEAN GRADIENT: 1 MMHG
ECHO MV MEAN VELOCITY: 0.6 M/S
ECHO MV PEAK GRADIENT: 3 MMHG
ECHO MV VTI: 27.1 CM
ECHO PV MAX VELOCITY: 0.9 M/S
ECHO PV MEAN GRADIENT: 2 MMHG
ECHO PV MEAN VELOCITY: 0.7 M/S
ECHO PV PEAK GRADIENT: 3 MMHG
ECHO PV VTI: 16.2 CM
ECHO RA AREA 4C: 28.4 CM2
ECHO RA END SYSTOLIC VOLUME APICAL 4 CHAMBER INDEX BSA: 51 ML/M2
ECHO RA VOLUME: 104 ML
ECHO RV BASAL DIMENSION: 5 CM
ECHO RV FREE WALL PEAK S': 8.6 CM/S
ECHO RV LONGITUDINAL DIMENSION: 7.3 CM
ECHO RV MID DIMENSION: 4.1 CM
ECHO RV TAPSE: 1.6 CM (ref 1.7–?)
ECHO TV REGURGITANT MAX VELOCITY: 1.35 M/S
ECHO TV REGURGITANT PEAK GRADIENT: 7 MMHG
EOSINOPHIL # BLD: 0 K/UL (ref 0–0.6)
EOSINOPHIL NFR BLD: 0.5 %
GFR SERPLBLD CREATININE-BSD FMLA CKD-EPI: 37 ML/MIN/{1.73_M2}
GLUCOSE BLD-MCNC: 119 MG/DL (ref 70–99)
GLUCOSE BLD-MCNC: 135 MG/DL (ref 70–99)
GLUCOSE BLD-MCNC: 158 MG/DL (ref 70–99)
GLUCOSE SERPL-MCNC: 127 MG/DL (ref 70–99)
HCT VFR BLD AUTO: 23 % (ref 40.5–52.5)
HDLC SERPL-MCNC: 21 MG/DL (ref 40–60)
HGB BLD-MCNC: 7.3 G/DL (ref 13.5–17.5)
INR PPP: 1.16 (ref 0.85–1.15)
IRON SATN MFR SERPL: 22 % (ref 20–50)
IRON SERPL-MCNC: 58 UG/DL (ref 59–158)
LDLC SERPL CALC-MCNC: 22 MG/DL
LYMPHOCYTES # BLD: 0.4 K/UL (ref 1–5.1)
LYMPHOCYTES NFR BLD: 13.3 %
MAGNESIUM SERPL-MCNC: 2.68 MG/DL (ref 1.8–2.4)
MCH RBC QN AUTO: 29.4 PG (ref 26–34)
MCHC RBC AUTO-ENTMCNC: 31.9 G/DL (ref 31–36)
MCV RBC AUTO: 92.2 FL (ref 80–100)
MONOCYTES # BLD: 0.1 K/UL (ref 0–1.3)
MONOCYTES NFR BLD: 4.4 %
NEUTROPHILS # BLD: 2.5 K/UL (ref 1.7–7.7)
NEUTROPHILS NFR BLD: 80.8 %
NT-PROBNP SERPL-MCNC: 1916 PG/ML (ref 0–124)
PERFORMED ON: ABNORMAL
PLATELET # BLD AUTO: 62 K/UL (ref 135–450)
PMV BLD AUTO: 12.1 FL (ref 5–10.5)
POTASSIUM SERPL-SCNC: 3.5 MMOL/L (ref 3.5–5.1)
PROCALCITONIN SERPL IA-MCNC: 0.76 NG/ML (ref 0–0.15)
PROT SERPL-MCNC: 6.1 G/DL (ref 6.4–8.2)
PROTHROMBIN TIME: 15 SEC (ref 11.9–14.9)
RBC # BLD AUTO: 2.49 M/UL (ref 4.2–5.9)
SODIUM SERPL-SCNC: 137 MMOL/L (ref 136–145)
TIBC SERPL-MCNC: 267 UG/DL (ref 260–445)
TRIGL SERPL-MCNC: 122 MG/DL (ref 0–150)
TSH SERPL DL<=0.005 MIU/L-ACNC: 3.02 UIU/ML (ref 0.27–4.2)
VLDLC SERPL CALC-MCNC: 24 MG/DL
WBC # BLD AUTO: 3.1 K/UL (ref 4–11)

## 2025-03-27 PROCEDURE — 84155 ASSAY OF PROTEIN SERUM: CPT

## 2025-03-27 PROCEDURE — 99233 SBSQ HOSP IP/OBS HIGH 50: CPT | Performed by: INTERNAL MEDICINE

## 2025-03-27 PROCEDURE — 84443 ASSAY THYROID STIM HORMONE: CPT

## 2025-03-27 PROCEDURE — 85610 PROTHROMBIN TIME: CPT

## 2025-03-27 PROCEDURE — 94761 N-INVAS EAR/PLS OXIMETRY MLT: CPT

## 2025-03-27 PROCEDURE — 84145 PROCALCITONIN (PCT): CPT

## 2025-03-27 PROCEDURE — 84165 PROTEIN E-PHORESIS SERUM: CPT

## 2025-03-27 PROCEDURE — 99232 SBSQ HOSP IP/OBS MODERATE 35: CPT | Performed by: INTERNAL MEDICINE

## 2025-03-27 PROCEDURE — 80048 BASIC METABOLIC PNL TOTAL CA: CPT

## 2025-03-27 PROCEDURE — 80076 HEPATIC FUNCTION PANEL: CPT

## 2025-03-27 PROCEDURE — 6360000002 HC RX W HCPCS: Performed by: NURSE PRACTITIONER

## 2025-03-27 PROCEDURE — 6370000000 HC RX 637 (ALT 250 FOR IP): Performed by: INTERNAL MEDICINE

## 2025-03-27 PROCEDURE — 94640 AIRWAY INHALATION TREATMENT: CPT

## 2025-03-27 PROCEDURE — 2500000003 HC RX 250 WO HCPCS: Performed by: INTERNAL MEDICINE

## 2025-03-27 PROCEDURE — 83880 ASSAY OF NATRIURETIC PEPTIDE: CPT

## 2025-03-27 PROCEDURE — 93306 TTE W/DOPPLER COMPLETE: CPT | Performed by: INTERNAL MEDICINE

## 2025-03-27 PROCEDURE — 36415 COLL VENOUS BLD VENIPUNCTURE: CPT

## 2025-03-27 PROCEDURE — 2580000003 HC RX 258: Performed by: INTERNAL MEDICINE

## 2025-03-27 PROCEDURE — 92610 EVALUATE SWALLOWING FUNCTION: CPT

## 2025-03-27 PROCEDURE — 80061 LIPID PANEL: CPT

## 2025-03-27 PROCEDURE — 93306 TTE W/DOPPLER COMPLETE: CPT

## 2025-03-27 PROCEDURE — 85025 COMPLETE CBC W/AUTO DIFF WBC: CPT

## 2025-03-27 PROCEDURE — 83735 ASSAY OF MAGNESIUM: CPT

## 2025-03-27 PROCEDURE — 2700000000 HC OXYGEN THERAPY PER DAY

## 2025-03-27 RX ORDER — FOLIC ACID 1 MG/1
1 TABLET ORAL DAILY
Status: DISCONTINUED | OUTPATIENT
Start: 2025-03-27 | End: 2025-03-27 | Stop reason: HOSPADM

## 2025-03-27 RX ORDER — MORPHINE SULFATE 2 MG/ML
2 INJECTION, SOLUTION INTRAMUSCULAR; INTRAVENOUS
Status: DISCONTINUED | OUTPATIENT
Start: 2025-03-27 | End: 2025-03-27 | Stop reason: HOSPADM

## 2025-03-27 RX ORDER — PANTOPRAZOLE SODIUM 40 MG/10ML
40 INJECTION, POWDER, LYOPHILIZED, FOR SOLUTION INTRAVENOUS 2 TIMES DAILY
Status: CANCELLED | DISCHARGE
Start: 2025-03-27

## 2025-03-27 RX ORDER — SODIUM CHLORIDE, SODIUM LACTATE, POTASSIUM CHLORIDE, CALCIUM CHLORIDE 600; 310; 30; 20 MG/100ML; MG/100ML; MG/100ML; MG/100ML
INJECTION, SOLUTION INTRAVENOUS CONTINUOUS
Status: DISCONTINUED | OUTPATIENT
Start: 2025-03-27 | End: 2025-03-27 | Stop reason: HOSPADM

## 2025-03-27 RX ORDER — CARVEDILOL 3.12 MG/1
3.12 TABLET ORAL 2 TIMES DAILY WITH MEALS
Status: DISCONTINUED | OUTPATIENT
Start: 2025-03-27 | End: 2025-03-27 | Stop reason: HOSPADM

## 2025-03-27 RX ADMIN — IPRATROPIUM BROMIDE AND ALBUTEROL SULFATE 1 DOSE: .5; 3 SOLUTION RESPIRATORY (INHALATION) at 08:28

## 2025-03-27 RX ADMIN — DOXYCYCLINE 100 MG: 100 INJECTION, POWDER, LYOPHILIZED, FOR SOLUTION INTRAVENOUS at 05:23

## 2025-03-27 RX ADMIN — SODIUM CHLORIDE, PRESERVATIVE FREE 10 ML: 5 INJECTION INTRAVENOUS at 09:02

## 2025-03-27 RX ADMIN — SODIUM CHLORIDE, SODIUM LACTATE, POTASSIUM CHLORIDE, AND CALCIUM CHLORIDE: .6; .31; .03; .02 INJECTION, SOLUTION INTRAVENOUS at 11:03

## 2025-03-27 RX ADMIN — IPRATROPIUM BROMIDE AND ALBUTEROL SULFATE 1 DOSE: .5; 3 SOLUTION RESPIRATORY (INHALATION) at 16:26

## 2025-03-27 RX ADMIN — PANTOPRAZOLE SODIUM 40 MG: 40 INJECTION, POWDER, FOR SOLUTION INTRAVENOUS at 09:02

## 2025-03-27 RX ADMIN — IPRATROPIUM BROMIDE AND ALBUTEROL SULFATE 1 DOSE: .5; 3 SOLUTION RESPIRATORY (INHALATION) at 11:31

## 2025-03-27 ASSESSMENT — PAIN SCALES - GENERAL
PAINLEVEL_OUTOF10: 0

## 2025-03-27 NOTE — PROGRESS NOTES
Hospice [facility]  Telephone call to daughter Radha (who lives in [place]) and daughter Charity (who lives in [place]) to discuss hospice philosophy, services, and level of care. Family would like to transport to [facility], transport setup with [facility] at 7:30pm. Also called 3rd daughter Lucy who lives in [place] and verified that she is also in agreement with Hospice. Since going to inpatient unit may leave in IV and farias.  Please call with any questions.  Thank you,  Cari Pratt, BSN, RN  [phone]

## 2025-03-27 NOTE — PROGRESS NOTES
Pt resting in bed with eyes closed. Arouses to voice. VSS, assessment complete, pt to lethargic to take medications. Call light in reach and bed alarm, engaged.

## 2025-03-27 NOTE — DISCHARGE SUMMARY
[facility] DISCHARGE SUMMARY    Patient Demographics    Patient. Alexandr Witt  Date of Birth. 1965  MRN. [id number]     Primary care provider. Lavon Diaz DO  (Tel: [phone])    Admit date: 3/25/2025    Discharge date (blank if same as Note Date):   Note Date: 3/27/2025     Reason for Hospitalization.   Chief Complaint   Patient presents with    Fall     PT arrived via [facility] from SNF per EMS pt fell and hit head, vomiting x1, nausea. 4mg zofran PO en route. PT unsure if on blood thinners. PT c/o 10/10 back pain. Per EMS pt takes zofran regularly.       Significant Findings.   Principal Problem:    Acute on chronic heart failure with reduced ejection fraction (HFrEF, <= 40%) (HCC)  Active Problems:    Essential hypertension    History of aortic valve replacement with bioprosthetic valve    COPD, severity to be determined (HCC)    Multifocal pneumonia    BRENDA (acute kidney injury)    Increased ammonia level    Systolic CHF, chronic (HCC)    Fall    Acute hypoxemic respiratory failure (HCC)    Acute on chronic congestive heart failure (HCC)    History of lung cancer  Resolved Problems:    * No resolved hospital problems. *     Problem-based Hospital Course.  Alexandr Witt is a 59 y.o. male with a past medical history of hypertension, hyperlipidemia, COPD, C-diff,  pAfib (on warfarin), CAD (s/p CABG 2005, 2017), aortic stenosis (s/p bioprosthetic AVR 2017), HFrEF (EF 30-35% in 2024), VFib arrest (4/2024, s/p AICD), transverse colectomy (2016), testicular cancer (s/p chemo, resection), SCC (s/p wedge resection 8/2023; with lung mass s/p SBRT 12/2024), and recent admission for AMS, GI bleed, and parasitic stool infection ([facility] 2/2025) who presented to the ER from [facility] following a fall.  Per daughters he has been continuing to decline for some time and he has expressed that he wants hospice and comfort care.  His daughters wish to honor his

## 2025-03-27 NOTE — TELEPHONE ENCOUNTER
Patient will be transitioning to inpatient hospice care with [facility].     Bethany Hernandez, PharmD, [facility]  [facility]  [place]: [phone]  [place]: [phone]  3/27/2025 1:35 PM

## 2025-03-27 NOTE — PROGRESS NOTES
Physical /Occupational Therapy  Attempted to see Pt., chart reviewed, Hospice was consulted and Pt. To be transferred to inpatient Hospice later this date.  Will d/c from PT./ZACHARY Osman PT, [id number]  LAI Haque Ed., OTR/L, [id number]

## 2025-03-27 NOTE — PROGRESS NOTES
[facility]  Spoke to daughters about deactivating ICD given hospice enrollment, agreeable. Called [employer] company and notified Neelam Gonzalez Md. Dr. Faria said that a rep would be out today.   Thank you,  Cari Pratt, BSN, RN

## 2025-03-27 NOTE — CONSULTS
Palliative Care:     a 60 yo male who presented to the ED yesterday from his independent nursing facility for evaluation for concerns for a fall.  Over the 2-3 days before admission, he had generalized weakness and was not feeling well.  He was weak and fell coming back from the bathroom.  No loss of consciousness.  No chest pain.  Denies shortness of breath.  No fever or chills.  No cough.     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  He is confused He is unable to give any history today.  Cr elevated. Admitted 3/25/25. Palliative consult placed 3/27/25.      Past Medical History:   has a past medical history of Abdominal hernia, Aortic valve prosthesis present, Aortic valve replaced, CAD (coronary artery disease), Chronic back pain greater than 3 months duration, Clostridium difficile diarrhea, COPD, severity to be determined ([facility]), Current every day smoker, DDD (degenerative disc disease), lumbosacral, Encounter for imaging to screen for metal prior to magnetic resonance imaging (MRI), Encounter for imaging to screen for metal prior to MRI, Erectile dysfunction, GERD (gastroesophageal reflux disease), Hyperlipidemia, Hypertension, Joint pain, Left testicular cancer ([facility]), Myalgia and myositis, unspecified, Neuropathy, OA (osteoarthritis) of knee, Obesity, Class I, BMI 30.0-34.9 (see actual BMI), Prolonged emergence from general anesthesia, S/P AVR, S/P CABG x 2, Shoulder instability-LEFT, Type 2 diabetes mellitus without complication, without long-term current use of insulin ([facility]), and Wears dentures.    Past Surgical History:   has a past surgical history that includes Aortic valve surgery (08/27/2004); Testicle removal (Left, 2001); Coronary artery bypass graft  minimal; Drowsy/coma  10%   [] Bed Bound; Extensive disease; Total care; Mouth care only; Drowsy/coma    PPS 20%    Symptom Assessment: Appetite/Nausea/Bowels/Fatigue:     lbs. Albumin 2.9, WBC 3.6, Hgb 8.1  Patient drowsy and not participating with speech evaluations at this time.    Social History:   reports that he has been smoking cigarettes. He started smoking about 46 years ago. He has a 45 pack-year smoking history. He has never used smokeless tobacco. He reports that he does not drink alcohol and does not use drugs.    Family History:  family history includes Alzheimer's Disease in his sister; Cancer in his father and mother; Liver Cancer in his brother.    Psychological/Spiritual:   Resides at [facility].  Delayed response to stimuli. Able to follow commands with repetition. Poor short term memory.        Family Discussion:    Day (2)      Patient laying in bed with minimal response with Palliative presence. Denies pain. Does not communicate in sentences. Respiratory in for tx. Patient tolerating well.     Call to daughter  Charity. Reviewed over recent events and current status. Education on hospice philosophy, all variables of code status, next of kin protocols without any ACP paperwork in place and goals of care with discharge planning.     I outlined the various approaches to health care in the setting of life-limiting/life-threatening illness going forward, which broadly consist of:    (1) Continued life-prolonging treatments such as lab monitoring, artificial nutrition/hydration, and disease-modifying treatment with clear benchmarks to regularly assess progress or decline, along with escalation of treatment to include CPR/ACLS, intubation, use of vasopressors, and other forms of life support.    (2) Continued life-prolonging treatments with clear boundaries such as withholding high-morbidity, likely non-beneficial interventions, specifically chest compressions, mechanical ventilation, and

## 2025-03-27 NOTE — PROGRESS NOTES
Hospitalist Progress Note      Name:  Alexandr Witt /Age/Sex: 1965  (59 y.o. male)   MRN & CSN:  [id number] & [id number] Encounter Date/Time: 3/27/2025 9:19 AM EDT   Location:  [id number] PCP: Lavon Diaz DO     Attending:Neelam Faria MD       Hospital Day: 3    Subjective:   Chief Complaint:   Chief Complaint   Patient presents with    Fall     PT arrived via  EMS from SNF per EMS pt fell and hit head, vomiting x1, nausea. 4mg zofran PO en route. PT unsure if on blood thinners. PT c/o 10/10 back pain. Per EMS pt takes zofran regularly.     Alexandr Witt is a 59 y.o. male with a past medical history of hypertension, hyperlipidemia, COPD, C-diff,  pAfib (on warfarin), CAD (s/p CABG , ), aortic stenosis (s/p bioprosthetic AVR ), HFrEF (EF 30-35% in ), VFib arrest (2024, s/p AICD), transverse colectomy (), testicular cancer (s/p chemo, resection), SCC (s/p wedge resection 2023; with lung mass s/p SBRT 2024), and recent admission for AMS, GI bleed, and parasitic stool infection ([facility] 2025) who presented to the ER from [facility] following a fall.      Interval History:  Today, he continues to be drowsy, lethargic, appears comfortable.  He is on 2 lpm at 100%.  His daughters have requested hospice and would like for us to provide comfort care to him.  Her daughter Charity has his spinal cord stimulator and is aware we cannot do the MRI until she brings it is.  She is not sure if she wants him to get the MRI or not at this time.  He has 3 biological daughters.  I also called Radha Eduar who confirmed he needs hospice and DNR-CC.  Discussed with HOC who will conference with daughters.     Independently reviewed interval ancillary notes from pulmonology.     Assessment and Recommendations   Problem List  Principal Problem:    Acute on chronic heart failure with reduced ejection fraction (HFrEF, <= 40%) (HCC)  Active Problems:    Essential  - signed off   Consult palliative care with HOC consult plan will be to discharge with hospice to SNF when can be arranged   MRI brain pending, need spinal stimulator, daughter not sure she wants it - declining aggressive neuro workup, reports he has been similar to this since his last hospitalization   Continue IV doxycyline, pulm following     His daughters have requested hospice and would like for us to provide comfort care to him.  Her daughter Charity has his spinal cord stimulator and is aware we cannot do the MRI until she brings it is.  She is not sure if she wants him to get the MRI or not at this time.  He has 3 biological daughters.  I also called Radha Witt who confirmed he needs hospice and DNR-CC.  Discussed with HOC      Drugs that require monitoring for toxicity include:  IV doxycycline  and the method of monitoring was/is daily BMP and CBC.    Discussed care with patient and nursing.   Discussed case with Dr. Faria, agrees with above plan     Diet: Diet NPO  Code Status: DNR-CC  DVT Prophylaxis: SCD's or Sequential Compression Device, hold for anemia thrombocytopenia and recent GIB   Disposition PTA:  AL  Discharge Disposition:  pending  Surrogate Decision Maker/ POA: Daughter Charity    Review of Systems:      Pertinent positives and negatives discussed in HPI    Objective:     Intake/Output Summary (Last 24 hours) at 3/27/2025 1116  Last data filed at 3/27/2025 0917  Gross per 24 hour   Intake 1304.35 ml   Output 1100 ml   Net 204.35 ml      Vitals:   Vitals:    03/27/25 0500 03/27/25 0736 03/27/25 0738 03/27/25 0828   BP: (!) 84/60 92/68 92/68    Pulse: 72 67     Resp: 16 16  16   Temp: 97.6 °F (36.4 °C) 97.4 °F (36.3 °C)     TempSrc: Temporal Temporal     SpO2: 100% 99%     Weight:   81.2 kg (179 lb)    Height:   1.829 m (6')        Physical Exam:    Physical Examination:  Vitals:    03/27/25 0828   BP:    Pulse:    Resp: 16   Temp:    SpO2:       In: 1304.4 [I.V.:1056]  Out: 1100    Wt Readings from

## 2025-03-27 NOTE — ACP (ADVANCE CARE PLANNING)
Advance Care Planning     Palliative Team Advance Care Planning (ACP) Conversation    Date of Conversation: 03/27/25    Individuals present for the conversation: Patient and Daughter Charity     ACP documents on file prior to discussion:  -None    Previously completed document/s not on file: Patient / participant reports that there are no previously executed ACP documents.    Healthcare Decision Maker:    Primary Decision Maker: Charity Sarabia - Child - [phone]     Conversation Summary:  I discussed Advance Care Planning with Alexandr Witt daughter Charity via phone today which included the importance of making their choices for care and treatment in the case of a health event that adversely affects their decision-making abilities. He has not completed the Advance Care Directives. He has an active health care agent at this time.  Three biological daughters/NOK. Charity is primary contact.      1. Goals of medical treatment were reviewed .   2. Patient's stated goal/treatment preference is DNR-CC.  3. Others present during the discussion wilton Nash   4. The discussion lasted 25 minutes.  5. I will notify physician of any change in care plan.    [place] Hierarchy of Surrogate Decision Makers (In ABSENCE of an appointed healthcare agent in Advance Directive / Healthcare Power of )   1. Legal, Court-Appointed Guardian for patient   2. Spouse of patient (except where there is pending divorce, dissolution, legal separation, or annulment proceeding has been filed)   3. Adult child of the patient (if more than one, majority of the reasonably available children)   4. Parent of the patient   5. Adult sibling of the patient (if more than one, majority of the reasonably available siblings)   6. The nearest relative of the patient by blood relationship or adoption who is reasonably available   [place] Code 2133.08      Resuscitation Status:   Code Status: DNR-CC     Documentation Completed:  -Portable DNR    I spent 25 minutes with the

## 2025-03-27 NOTE — PROGRESS NOTES
Pt resting with eyes closed. VSS and pt arouses to voice. Turned onto side with pillow support. Call light in reach and bed alarm engaged.

## 2025-03-27 NOTE — CONSULTS
[facility]    Consult Note      Requesting Physician:  The hospitalist    CHIEF COMPLAINT:  Weakness and frequent falls      HISTORY OF PRESENT ILLNESS:    Mr. Witt  is a 59 y.o. male we are seeing in consultation for Lung cancer.  He has multiple medical conditions including atrial fibrillation, coronary artery disease, aortic stenosis and kidney disease.  He was brought in from nursing facility.  He was found to have pneumonia.  He has history of stage I history left lower lobe lung cancer treated with SBRT finished on 1/27/2025.  He also was found to have pancytopenia this time.      ICD-10-CM    1. Acute on chronic congestive heart failure, unspecified heart failure type (HCC)  I50.9       2. Fall, initial encounter  W19.XXXA       3. Acute respiratory failure with hypoxemia ([facility])  J96.01       4. Essential hypertension  I10 cloNIDine (CATAPRES) tablet 0.1 mg     DISCONTINUED: hydrALAZINE (APRESOLINE) tablet 25 mg      5. History of aortic valve replacement with bioprosthetic valve  Z95.3 Echo (TTE) complete (PRN contrast/bubble/strain/3D)     Echo (TTE) complete (PRN contrast/bubble/strain/3D)             Past Medical History:  Past Medical History:   Diagnosis Date    Abdominal hernia     s/p repair 2010- HAS RETURNED    Aortic valve prosthesis present     Aortic valve replaced 05/2017    25 mm Castelan Model 3300 bovine pericardial bioprosthesis    CAD (coronary artery disease)     Chronic back pain greater than 3 months duration     Clostridium difficile diarrhea 10/17/2016    PCR    COPD, severity to be determined ([facility]) 05/10/2023    Current every day smoker     DDD (degenerative disc disease), lumbosacral 08/07/2013    Encounter for imaging to screen for metal prior to magnetic resonance imaging (MRI)     [employer] [id number] [place] XT VR MRI Surescan Serial # [id number], RV  effusion.    IVC/SVC: IVC was not assessed due to poor image quality.    Image quality is technically difficult. Procedure performed with the   patient in a supine position and unable to obtain iv access.      Problem List  Patient Active Problem List   Diagnosis    Essential hypertension    Lumbar herniated disc    CAD, multiple vessel    History of testicular cancer    S/P hernia repair    Postlaminectomy syndrome, lumbar region    GERD (gastroesophageal reflux disease)    Bradycardia    Nonrheumatic aortic valve insufficiency    Obesity, Class I, BMI 30.0-34.9 (see actual BMI)    History of aortic valve replacement with bioprosthetic valve    CAD in native artery    Paroxysmal atrial fibrillation (HCC)    Pure hypercholesterolemia    Colonic mass    Severe obesity (BMI 35.0-39.9) with comorbidity    Type 2 diabetes mellitus without complication, without long-term current use of insulin ([facility])    COPD, severity to be determined ([facility])    NSCLC of left lung ([facility])    Recurrent otitis media, bilateral    Multifocal pneumonia    Severe malnutrition    BRENDA (acute kidney injury)    Increased ammonia level    HFrEF (heart failure with reduced ejection fraction) ([facility])    Cardiac defibrillator in situ    Depression    Major depressive disorder, recurrent, mild    Major depressive disorder, recurrent, moderate    Major depressive disorder, recurrent, unspecified    Type 2 diabetes mellitus with chronic kidney disease    Chronic left shoulder pain    Systolic CHF, chronic ([facility])    Encephalopathy acute    Metabolic encephalopathy    Melena    Acute blood loss anemia    Helminth infection    Active gastrointestinal ulcer    UTI (urinary tract infection)    Acute on chronic heart failure with reduced ejection fraction (HFrEF, <= 40%) ([facility])    Fall    Acute hypoxemic respiratory failure ([facility])    Acute on chronic congestive heart failure (HCC)    History of lung cancer       IMPRESSION/RECOMMENDATIONS:  Pancytopenia, newly

## 2025-03-27 NOTE — PROGRESS NOTES
Speech Language Pathology  [facility]  [phone]  SLP Clinical Swallow Evaluation       Patient: Alexandr Witt   : 1965   MRN: [id number]      Evaluation Date: 3/27/2025      Admitting Dx: Essential hypertension [I10]  Fall, initial encounter [W19.XXXA]  Acute respiratory failure with hypoxemia [J96.01]  Acute on chronic congestive heart failure, unspecified heart failure type (HCC) [I50.9]  Acute on chronic heart failure with reduced ejection fraction (HFrEF, <= 40%) (HCC) [I50.23]  Treatment Diagnosis: Oropharyngeal Dysphagia   Pain: Denies                                  Recommendations      Recommended Diet and Intervention 3/27/2025:  Diet Solids Recommendation:  NPO  Liquid Consistency Recommendation:   Allow sips of mildly thick water/cup when awake/alert Recommended form of Meds: Meds crushed as able in puree (only when fully awake/alert)    **Ongoing SLP assessment of po texture tolerance as level of sustained alertness improves    Compensatory strategies: Aspiration Precautions , To be determined     Discharge Recommendations:  Discharge recommendations to be determined pending ongoing follow-up during acute care stay    History/Course of Treatment     H&P: Alexandr Witt is a 59 y.o. male with a PMHx of HTN, pAfib (on warfarin), CAD (s/p CABG , ), aortic stenosis (s/p bioprosthetic AVR ), HFrEF (EF 30-35% in ), VFib arrest (2024, s/p AICD), transverse colectomy (), testicular cancer (s/p chemo, resection), SCC (s/p wedge resection 2023; with lung mass s/p SBRT 2024), and recent admission for AMS, GI bleed, and parasitic stool infection ([facility] 2025) who presented to the ER from [facility] following a fall.       Pt reported to ER staff that he was walking to the bathroom and felt weak, subsequently causing him to fall backward striking his head.   Denies LOC.  He denied chest pain, shortness of

## 2025-03-27 NOTE — PROGRESS NOTES
Pulmonary and Critical Care    Follow Up Note    Subjective:   CHIEF COMPLAINT / HPI:   Chief Complaint   Patient presents with    Fall     PT [facility] SNF per EMS pt fell and hit head, vomiting x1, nausea. 4mg zofran PO en route. PT unsure if on blood thinners. PT c/o 10/10 back pain. Per EMS pt takes zofran regularly.       Interval history: The patient is difficult to arouse.    Past Medical History:    Reviewed; no changes    Social History:    Reviewed; no changes    REVIEW OF SYSTEMS:    unobtainable    Objective:   PHYSICAL EXAM:        VITALS:  BP 92/68   Pulse 67   Temp 97.4 °F (36.3 °C) (Temporal)   Resp 16   Ht 1.829 m (6')   Wt 81.2 kg (179 lb)   SpO2 99%   BMI 24.28 kg/m²  on 2L NC    24HR INTAKE/OUTPUT:    Intake/Output Summary (Last 24 hours) at 3/27/2025 1032  Last data filed at 3/27/2025 0917  Gross per 24 hour   Intake 1304.35 ml   Output 1100 ml   Net 204.35 ml       CONSTITUTIONAL:  Difficult to arouse  LUNGS:  No increased work of breathing and clear to auscultation, no crackles or wheezes  CARDIOVASCULAR: S1 and S2 and no JVD  ABDOMEN:  normal bowel sounds, non-distended and non-tender to palpation  EXT: No edema, no calf tenderness. Pulses are present bilaterally.  NEUROLOGIC:  Difficult to arouse  SKIN:  normal skin color, texture, turgor, no redness, warmth, or swelling at IV sites    DATA:    CBC:  Recent Labs     03/25/25 2000 03/26/25  1448 03/27/25  0459   WBC 4.5 3.6* 3.1*   RBC 2.95* 2.82* 2.49*   HGB 8.8* 8.1* 7.3*   HCT 27.0* 25.2* 23.0*   PLT 69* 60* 62*   MCV 91.8 89.2 92.2   MCH 29.7 28.6 29.4   MCHC 32.4 32.1 31.9   RDW 18.6* 19.2* 18.9*      BMP:  Recent Labs     03/25/25 2000 03/26/25  0820 03/27/25  0459    136 137   K 3.8 3.9 3.5   CL 98* 98* 102   CO2 22 23 25   BUN 34* 38* 40*   CREATININE 2.0* 2.2* 2.0*   CALCIUM 8.7 8.6 8.5   GLUCOSE 160* 136* 127*      ABG:  No results for input(s): \"PHART\", \"SHY9QDI\", \"PO2ART\", \"NBB3MRE\", \"Y2XZIZBG\",  \"BEART\" in the last 72 hours.  Procalcitonin  Recent Labs     03/25/25 2000   PROCAL 0.82*           Radiology Review:  Pertinent images / reports were reviewed as a part of this visit.       Assessment:     Multifocal pneumonia  Acute hypoxemic respiratory failure  History of see OPD related to smoking  History of lung cancer status post XRT completed 1/25    Plan:     Respiratory viral panel is negative.  No evidence of COVID, influenza, RSV or other common viral pathogen's.  \Pneumonia panel is pending but I doubt he can cooperate with expectorated sputum at the moment.  He remains on intravenous doxycycline.  Repeat procalcitonin  Continued scheduled nebulizer treatments  ?  Neuro work-up

## 2025-03-27 NOTE — PROGRESS NOTES
Nephrology Consult Note                                                                                                                                                                                                                                                                                                                                                               Office : [phone]     Fax :[phone]    Patient's Name: Alexandr Witt  9:20 AM  3/27/2025    Reason for Consult:  BRENDA   Requesting Physician:  Lavon Diaz DO  Chief Complaint:    Chief Complaint   Patient presents with    Fall     PT arrived [facility] SNF per EMS pt fell and hit head, vomiting x1, nausea. 4mg zofran PO en route. PT unsure if on blood thinners. PT c/o 10/10 back pain. Per EMS pt takes zofran regularly.       Assessment/Plan     # BRENDA on CKD 3 ( sec to vol changes)  - IVF - off now as pt going with hospice   - off  lasix   - Ur studies     # CHF   - seems hypovolemic   - hold lasix     # HTN  - hold entresto for 24 hours   - cont BB     # Ac resp failure  - ? Asp vs Viral PNA     # Acid- base/ Electrolyte imbalance   - monitor     # History of lung cancer, status post XRT completed 1/25    Plan will be discussed with Danika Ng     History of Present Ilness:    Alexandr Witt is a 60 yo male who presented to the ED yesterday from his independent nursing facility for evaluation for concerns for a fall.  Over the 2-3 days before admission, he had generalized weakness and was not feeling well.  He was weak and fell coming back from the bathroom.  No loss of consciousness.  No chest pain.  Denies shortness of breath.  No fever or chills.  No cough.     He has a history of hypertension, atrial fibrillation (on warfarin), CAD s/p CABG 2005, 2017), aortic stenosis (bioprosthetic AVR 2017), HFrEF (EF 30-35% in 2024), VF arrest 4/24, ICD, testicular cancer, squamous cell cancer of the lung with wedge resection 2023 with lung      Hepatic:   Recent Labs     03/25/25 2000 03/26/25  1448 03/27/25  0459   AST 13* 11* 9*   ALT <5* <5* <5*   BILITOT 1.8* 1.5* 1.1*   ALKPHOS 55 48 45     Troponin: No results for input(s): \"TROPONINI\" in the last 72 hours.  BNP: No results for input(s): \"BNP\" in the last 72 hours.  Lipids: No results for input(s): \"CHOL\", \"TRIG\", \"HDL\" in the last 72 hours.    Invalid input(s): \"LDLCALC\", \"LABVLDL\"  ABGs: No results for input(s): \"PHART\", \"PO2ART\", \"WEM0EUH\" in the last 72 hours.  INR:   Recent Labs     03/25/25 2000 03/26/25  0820 03/27/25  0459   INR 1.28* 1.17* 1.16*     UA:  Recent Labs     03/26/25  1500   COLORU DARK YELLOW*   CLARITYU CLOUDY*   GLUCOSEU Negative   BILIRUBINUR MODERATE*   KETUA TRACE*   BLOODU Negative   PHUR 5.5   PROTEINU 300   UROBILINOGEN 2.0*   NITRU Negative   LEUKOCYTESUR TRACE*   URINETYPE NotGiven      Urine Microscopic:   Recent Labs     03/26/25  1500   BACTERIA 1+*   HYALCAST 11-20*   WBCUA 3-5   RBCUA None seen     Urine Culture: No results for input(s): \"LABURIN\" in the last 72 hours.  Urine Chemistry: No results for input(s): \"CLUR\", \"LABCREA\", \"PROTEINUR\", \"NAUR\" in the last 72 hours.      IMAGING:  CT CHEST ABDOMEN PELVIS WO CONTRAST Additional Contrast? None   Final Result   Hazy subpleural ground-glass infiltrates along the upper lobes extending and   scattered throughout the lung bases which is more prominent and could   represent early multi segmental bronchopneumonia.  This pattern of pneumonia   has been described with COVID-19 disease.  Recommend correlating with lab   values      Mild subsegmental atelectasis vs scarring or early infiltrates left lower   lobe.      Mildly dilated thoracic aorta with no aneurysm and no mediastinal mass.      Status post CABG surgery.      Motion artifact limiting the exam of the abdomen with small air-fluid levels   throughout the stomach and bowel along the upper abdomen which could   represent a mild enteritis or developing

## 2025-03-28 LAB
ALBUMIN SERPL ELPH-MCNC: 2.2 G/DL (ref 3.1–4.9)
ALPHA1 GLOB SERPL ELPH-MCNC: 0.5 G/DL (ref 0.2–0.4)
ALPHA2 GLOB SERPL ELPH-MCNC: 0.9 G/DL (ref 0.4–1.1)
B-GLOBULIN SERPL ELPH-MCNC: 0.8 G/DL (ref 0.9–1.6)
GAMMA GLOB SERPL ELPH-MCNC: 1.1 G/DL (ref 0.6–1.8)
PROT SERPL-MCNC: 5.5 G/DL (ref 6.4–8.2)
SPE/IFE INTERPRETATION: NORMAL

## 2025-04-01 NOTE — PROGRESS NOTES
Physician Progress Note      PATIENT:               KATHARINE MARSHALL  CSN #:                  [id number]  :                       1965  ADMIT DATE:       3/25/2025 7:40 PM  DISCH DATE:        3/27/2025 7:45 PM  RESPONDING  PROVIDER #:        Neelam Faria MD          QUERY TEXT:    Acute respiratory failure is documented in the medical record IM provider note   3/27. Please provide additional clinical indicators supportive of your   documentation. Or please document if the diagnosis of acute respiratory   failure has been ruled out after study.    The patient?s clinical indicators include:  58 y/o male with viral PNA on room air at baseline.    SPO2 88% on room air, RR 15,  PER H&P 3/26, \" Pulmonary:  Effort: Pulmonary effort is normal. No respiratory distress.  Breath sounds: Normal breath sounds. No wheezing or rales.\"    Provided 2L NC, chest x-ray,  Options provided:  -- Acute Respiratory Failure ruled out after study and Hypoxia confirmed  -- Acute Respiratory Failure as evidenced by, Please document evidence.  -- Other - I will add my own diagnosis  -- Disagree - Not applicable / Not valid  -- Disagree - Clinically unable to determine / Unknown  -- Refer to Clinical Documentation Reviewer    PROVIDER RESPONSE TEXT:    Acute Respiratory Failure ruled out after study and Hypoxia confirmed.    Query created by: Talya Hargrove on 2025 9:01 AM      QUERY TEXT:    Acute metabolic encephalopathy is documented in the medical record on 3/26/25   in the H&P. Please provide additional clinical indicators supportive of your   documentation. Or please document if the diagnosis of Metabolic encephalopathy   has been ruled out after study.    The patient?s clinical indicators include:  60 Y/O male presents after fall and head injury. Alert x1    Per ED note, 3/25, \"  In regards to his mental state, this appears to be his   baseline, does have a history encephalopathic, there are no other focal   deficits and CT of head

## 2025-04-02 NOTE — PROGRESS NOTES
Physician Progress Note      PATIENT:               KATHARINE MARSHALL  CSN #:                  [id number]  :                       1965  ADMIT DATE:       3/25/2025 7:40 PM  DISCH DATE:        3/27/2025 7:45 PM  RESPONDING  PROVIDER #:        Neelam Faria MD          QUERY TEXT:    Acute on chronic CHF is documented in the medical record Acute on Chronic   heart failure with reduced EF on DC summary note 3/27.  Please provide   additional clinical indicators supportive of your documentation. Or please   document if the diagnosis of acute on chronic CHF has been ruled out after   study.    The clinical indicators include:  60 Y/O Male with history of CHF.    BNP 5735, CT Chest 3/25: Hazy subpleural ground-glass infiltrates along the   upper lobes extending and  scattered throughout the lung bases which is more prominent and could  represent early multi segmental bronchopneumonia.  This pattern of pneumonia  PER cardiology consult note on 3/26, \"Chronic systolic congestive heart   failure. He does not appear fluid overloaded.  Lying flat in bed without SOB.    No peripheral edema.  No JVP.  Additionally, he was given IV Lasix and his   creatinine increased.\"    given Lasix in ED, ECHO, Cxray, CT chest. abd  Options provided:  -- Acute on chronic systolic CHF ruled out after study and chronic systolic   CHF confirmed  -- Acute on chronic systolic CHF currently as evidenced by, Please document   evidence.  -- Other - I will add my own diagnosis  -- Disagree - Not applicable / Not valid  -- Disagree - Clinically unable to determine / Unknown  -- Refer to Clinical Documentation Reviewer    PROVIDER RESPONSE TEXT:    Acute on chronic systolic CHF has been ruled out after study and chronic   systolic CHF confirmed.    Query created by: Talya Hargrove on 2025 8:47 AM      Electronically signed by:  Neelam Faria MD 2025 11:03 AM

## 2025-04-04 NOTE — TELEPHONE ENCOUNTER
Patient passed away 4/3.     Bethany Hernandez, PharmD, [facility]  [facility]  [place]: [phone]  [place]: [phone]  4/4/2025 11:26 AM

## 2025-04-08 PROBLEM — N39.0 UTI (URINARY TRACT INFECTION): Status: RESOLVED | Noted: 2025-03-09 | Resolved: 2025-04-08
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
## 2025-07-30 NOTE — PROGRESS NOTES
CLINICAL PHARMACY NOTE-- Anticoagulation     Alexandr Witt is a 59 y.o. male with PMHx significant for AVR (re-do in May, 2017), CAD s/p CABG (x3 in May, 2017), pA-fib, HLD, HTN, testicular CA who had INR called into clinic on 2/10/2025 for anticoagulation monitoring. Note: patient was also referred by Dr. Cherry for smoking cessation but patient has declined services at this time.     Anticoagulation Indication(s):  Heart Valve Replacement (bovine AVR), A-fib  Referring Physician:  Dr. Prieto   Goal INR Range:  2-3  Duration of Anticoagulation Therapy:  TBD  Time of day dose taken:  PM  Product patient has at home:  warfarin 5 mg (peach)    [id number] Score for Atrial Fibrillation Stroke Risk   Risk   Factors  Component Value   C CHF Yes 1   H HTN Yes 1   A2 Age >= 75 No,  (59 y.o.) 0   D DM Yes 1   S2 Prior Stroke/TIA No 0   V Vascular Disease Yes 1   A Age 65-74 No,  (59 y.o.) 0   Sc Sex male 0    [id number]  Score  4   Score last updated 5/30/19 1:51 PM      Lab Results   Component Value Date    RBC 2.95 (L) 04/28/2024    HGB 8.7 (L) 04/28/2024    HCT 27.6 (L) 04/28/2024    MCV 93.6 04/28/2024    MCH 29.5 04/28/2024    MPV 8.2 04/28/2024    RDW 18.4 (H) 04/28/2024     (L) 04/28/2024     INR Summary                            Warfarin regimen (mg)  Date INR   A/P    Sun Mon Tue Wed Thu Fri Sat Mg/wk  2/10 3.1 Above goal, decrease  5 2.5 5 2.5  5 2.5 2.5 25  2/3 3.1 Above goal, continue  5 2.5 5 2.5  5 2.5 5 27.5  1/27 3.1 Above goal, decrease  5 2.5 5 2.5  5 2.5 5 27.5  1/23 2.1 At goal, continue   5 2.5 5 5  5 2.5 5 30  1/20 1.4 Below goal, continue  5 2.5 5 5  5 2.5 5 30  1/16 4.2 Above goal, hold+dec  5 2.5 5 5  0/5 0/2.5 5 30  1/13 1.9 Below goal, continue   5 7.5 5 5  5 5 5 37.5  1/10 6.5 Above goal, hold   5 7.5 5 5  5 5 5 37.5  1/10 6.5 Above goal, hold   0/5 7.5 5 5  5 0/5 0/5 37.5  1/3 2.5 At goal, continue  5 7.5 5 5  5 5 5 37.5  12/27 3.4 Above goal, decrease  5 7.5 5 5   Hold statin with elevated LFT

## (undated) DEVICE — TURNOVER KIT RM INF CTRL TECH

## (undated) DEVICE — PLATE ES AD W 9FT CRD 2

## (undated) DEVICE — 3M™ IOBAN™ 2 ANTIMICROBIAL INCISE DRAPE 6648EZ: Brand: IOBAN™ 2

## (undated) DEVICE — TISSUE RETRIEVAL SYSTEM: Brand: INZII RETRIEVAL SYSTEM

## (undated) DEVICE — SYSTEM SMK EVAC LAP TBNG FILTER HSNG BENT STYL PNK SEE CLR

## (undated) DEVICE — SUTURE PROL SZ 5-0 L36IN NONABSORBABLE BLU L13MM C-1 3/8 8720H

## (undated) DEVICE — SYRINGE MED 30ML STD CLR PLAS LUERLOCK TIP N CTRL DISP

## (undated) DEVICE — TROCAR: Brand: KII FIOS FIRST ENTRY

## (undated) DEVICE — PROTECTOR ULN NRV PUR FOAM HK LOOP STRP ANATOMICALLY

## (undated) DEVICE — SEAL

## (undated) DEVICE — SUTURE VCRL SZ 2 L27IN ABSRB VLT L65MM TP-1 1/2 CIR J649G

## (undated) DEVICE — SUTURE VCRL + SZ 3-0 L27IN ABSRB UD L26MM SH 1/2 CIR VCP416H

## (undated) DEVICE — E-Z CLEAN, NON-STICK, PTFE COATED, ELECTROSURGICAL BLADE ELECTRODE, MODIFIED EXTENDED INSULATION, 2.5 INCH (6.35 CM): Brand: MEGADYNE

## (undated) DEVICE — 3M™ TEGADERM™ TRANSPARENT FILM DRESSING FRAME STYLE, 1626, 4 IN X 4-3/4 IN (10 CM X 12 CM), 50/CT 4CT/CASE: Brand: 3M™ TEGADERM™

## (undated) DEVICE — E-Z CLEAN, NON-STICK, PTFE COATED, ELECTROSURGICAL BLADE ELECTRODE, 2.5 INCH (6.35 CM): Brand: EZ CLEAN

## (undated) DEVICE — VESSEL SEALER EXTEND: Brand: ENDOWRIST

## (undated) DEVICE — GENERAL: Brand: MEDLINE INDUSTRIES, INC.

## (undated) DEVICE — BINDER ABD H12IN FOR 62-74IN WAIST UNIV 4 PNL PREM DSGN E

## (undated) DEVICE — SOLUTION IV 1000ML 0.9% SOD CHL

## (undated) DEVICE — TIP COVER ACCESSORY

## (undated) DEVICE — SKIN AFFIX SURG ADHESIVE 72/CS 0.55ML: Brand: MEDLINE

## (undated) DEVICE — CHLORAPREP 26ML ORANGE

## (undated) DEVICE — SUTURE MCRYL SZ 4-0 L27IN ABSRB UD L19MM PS-2 1/2 CIR PRIM Y426H

## (undated) DEVICE — GLOVE SURG SZ 7 L12IN FNGR THK75MIL WHT LTX POLYMER BEAD

## (undated) DEVICE — ANTI-FOG SOLUTION WITH FOAM PAD: Brand: DEVON

## (undated) DEVICE — ELECTROSURGICAL PENCIL ROCKER SWITCH NON COATED BLADE ELECTRODE 10 FT (3 M) CORD HOLSTER: Brand: MEGADYNE

## (undated) DEVICE — SUTURE VCRL SZ 0 L54IN ABSRB VLT W/O NDL POLYGLACTIN 910 J616H

## (undated) DEVICE — BLADELESS OBTURATOR: Brand: WECK VISTA

## (undated) DEVICE — CONNECTOR TBNG WHT PLAS SUCT STR 5IN1 LTWT W/ M CONN

## (undated) DEVICE — 3M™ TEGADERM™ TRANSPARENT FILM DRESSING FRAME STYLE, 1628, 6 IN X 8 IN (15 CM X 20 CM), 10/CT 8CT/CASE: Brand: 3M™ TEGADERM™

## (undated) DEVICE — SUTURE PERMAHAND SZ 2-0 L30IN NONABSORBABLE BLK SILK W/O A305H

## (undated) DEVICE — REDUCER: Brand: ENDOWRIST

## (undated) DEVICE — SUTURE PDS II SZ 0 L60IN ABSRB VLT L48MM CTX 1/2 CIR Z990G

## (undated) DEVICE — LARGE HEM-O-LOK CLIP APPLIER: Brand: ENDOWRIST

## (undated) DEVICE — SCISSORS SURG DIA8MM MPLR CRV ENDOWRIST

## (undated) DEVICE — NEEDLE 21GAX0.75X12IN COLLECT BLD SFTY

## (undated) DEVICE — SURE SET-DOUBLE BASIN-LF: Brand: MEDLINE INDUSTRIES, INC.

## (undated) DEVICE — SUTURE VCRL + SZ 0 L18IN ABSRB VLT L36MM CT-1 1/2 CIR VCP740D

## (undated) DEVICE — CORD ES L10FT MPLR LAP

## (undated) DEVICE — STANDARD HYPODERMIC NEEDLE,POLYPROPYLENE HUB: Brand: MONOJECT

## (undated) DEVICE — TROCAR ENDOSCP L80MM DIA10/12MM THOR RIG SL DISP FLXPATH

## (undated) DEVICE — ELECTRO LUBE IS A SINGLE PATIENT USE DEVICE THAT IS INTENDED TO BE USED ON ELECTROSURGICAL ELECTRODES TO REDUCE STICKING.: Brand: KEY SURGICAL ELECTRO LUBE

## (undated) DEVICE — ARM DRAPE

## (undated) DEVICE — TUBING FLUIDICS BRONCHOSCOPE

## (undated) DEVICE — NEEDLE BRONCHSCP 21GA HNDL SHTH NDL STYL PERIVIEW FLX

## (undated) DEVICE — CANNULA SEAL

## (undated) DEVICE — BRONCHOSCOPES MONARCH

## (undated) DEVICE — TUBING, SUCTION, 1/4" X 12', STRAIGHT: Brand: MEDLINE

## (undated) DEVICE — GARMENT,MEDLINE,DVT,INT,CALF,MED, GEN2: Brand: MEDLINE

## (undated) DEVICE — FENESTRATED BIPOLAR FORCEPS: Brand: ENDOWRIST

## (undated) DEVICE — COLUMN DRAPE

## (undated) DEVICE — SPONGE,LAP,18"X18",DLX,XR,ST,5/PK,40/PK: Brand: MEDLINE

## (undated) DEVICE — FORCEPS BIOPSY SMOOTH CUP

## (undated) DEVICE — STAPLER 60 RELOAD BLUE: Brand: SUREFORM

## (undated) DEVICE — COVER LT HNDL CAM BLU DISP W/ SURG CTRL

## (undated) DEVICE — LIQUIBAND RAPID ADHESIVE 36/CS 0.8ML: Brand: MEDLINE

## (undated) DEVICE — INTENDED FOR TISSUE SEPARATION, AND OTHER PROCEDURES THAT REQUIRE A SHARP SURGICAL BLADE TO PUNCTURE OR CUT.: Brand: BARD-PARKER ® CARBON RIB-BACK BLADES

## (undated) DEVICE — LAPAROSCOPIC SCISSORS: Brand: EPIX LAPAROSCOPIC SCISSORS

## (undated) DEVICE — SUTURE PERMA-HAND SZ 2-0 L30IN NONABSORBABLE BLK L26MM SH K833H

## (undated) DEVICE — KIT,ANTI FOG,W/SPONGE & FLUID,SOFT PACK: Brand: MEDLINE

## (undated) DEVICE — APPLICATOR PREP 26ML 0.7% IOD POVACRYLEX 74% ISO ALC ST

## (undated) DEVICE — SUTURE VCRL SZ 0 L18IN ABSRB VLT L36MM CT-1 1/2 CIR J740D

## (undated) DEVICE — DRAIN SURG SGL COLL PT TB FOR ATS BG OASIS

## (undated) DEVICE — SYRINGE MED 10ML POLYPR LUERSLIP TIP FLAT TOP W/O SFTY DISP

## (undated) DEVICE — Z DUP USE 2641840 CLIP INT L POLYMER LOK LIG HEM O LOK

## (undated) DEVICE — Device

## (undated) DEVICE — SUTURE PERMAHAND SZ 0 L30IN NONABSORBABLE BLK SILK BRAID A306H

## (undated) DEVICE — LAPAROSCOPIC ACCESS SYSTEM: Brand: ALEXIS LAPAROSCOPIC SYSTEM WITH KII FIOS FIRST ENTRY

## (undated) DEVICE — SPONGE,LAP,4"X18",XR,ST,5/PK,40PK/CS: Brand: MEDLINE INDUSTRIES, INC.

## (undated) DEVICE — GLOVE SURG SZ 7 L12IN FNGR THK87MIL DK GRN LTX POLYMER W

## (undated) DEVICE — DRAPE,T,LAPARO,TRANS,STERILE: Brand: MEDLINE

## (undated) DEVICE — GOWN,SIRUS,POLYRNF,BRTHSLV,LG,30/CS: Brand: MEDLINE

## (undated) DEVICE — [HIGH FLOW INSUFFLATOR,  DO NOT USE IF PACKAGE IS DAMAGED,  KEEP DRY,  KEEP AWAY FROM SUNLIGHT,  PROTECT FROM HEAT AND RADIOACTIVE SOURCES.]: Brand: PNEUMOSURE

## (undated) DEVICE — SUTURE V-LOC 180 SZ 2-0 L9IN ABSRB VLT GS-21 L37MM 1/2 CIR VLOCM0345

## (undated) DEVICE — 40583 XL ADVANCED TRENDELENBURG POSITIONING KIT: Brand: 40583 XL ADVANCED TRENDELENBURG POSITIONING KIT

## (undated) DEVICE — STAPLER ECHELON 3000 45MM STANDARD

## (undated) DEVICE — SOLUTION ANTIFOG VIS SYS CLEARIFY LAPSCP

## (undated) DEVICE — PUMP SUC IRR TBNG L10FT W/ HNDPC ASSEMB STRYKEFLOW 2

## (undated) DEVICE — E-Z CLEAN, NON-STICK, PTFE COATED, ELECTROSURGICAL BLADE ELECTRODE, MODIFIED EXTENDED INSULATION, 6.5 INCH (16.5 CM): Brand: MEGADYNE

## (undated) DEVICE — SURGICAL SET UP - SURE SET: Brand: MEDLINE INDUSTRIES, INC.

## (undated) DEVICE — SUTURE PDS + SZ 0 L27IN ABSRB VLT L36MM CT 1 1 2 CIR PDP340H

## (undated) DEVICE — SHEET, T, LAPAROTOMY, STERILE: Brand: MEDLINE

## (undated) DEVICE — RELOAD STPL L45MM H2-4.1MM THCK TISS GRN GRIPPING SURF B

## (undated) DEVICE — TRAY CATHETER 16FR F INCLUDE BARDX IC COMPLT CARE DRNGE BG

## (undated) DEVICE — BLADE,CARBON-STEEL,10,STRL,DISPOSABLE,TB: Brand: MEDLINE

## (undated) DEVICE — VALVE SHEATH BRONCHOSCOPE

## (undated) DEVICE — DRAPE,LAP,CHOLE,W/TROUGHS,STERILE: Brand: MEDLINE

## (undated) DEVICE — 3M™ STERI-DRAPE™ INSTRUMENT POUCH 1018: Brand: STERI-DRAPE™

## (undated) DEVICE — SUTURE MCRYL + SZ 4-0 L27IN ABSRB UD L19MM PS-2 3/8 CIR MCP426H

## (undated) DEVICE — GLIDESCOPE BFLEX 3.8 BRONCHOSCOPE

## (undated) DEVICE — SPONGE,DRAIN,NONWVN,4"X4",6PLY,STRL,LF: Brand: MEDLINE

## (undated) DEVICE — TIP-UP FENESTRATED GRASPER: Brand: ENDOWRIST

## (undated) DEVICE — SUTURE PDS II SZ 1 L27IN ABSRB VLT CT-1 L36MM 1/2 CIR Z341H

## (undated) DEVICE — LARGE NEEDLE DRIVER: Brand: ENDOWRIST

## (undated) DEVICE — SPONGE GZ W4XL4IN COT 12 PLY TYP VII WVN C FLD DSGN STERILE

## (undated) DEVICE — TOWEL,OR,DSP,ST,WHITE,DLX,4/PK,20PK/CS: Brand: MEDLINE

## (undated) DEVICE — ROYALSILK SURGICAL GOWN, L: Brand: CONVERTORS

## (undated) DEVICE — SUTURE VCRL + SZ 2-0 L27IN ABSRB CLR CT-1 1/2 CIR TAPERCUT VCP259H

## (undated) DEVICE — 1LYRTR 16FR10ML100%SIL UMS SNP: Brand: MEDLINE INDUSTRIES, INC.

## (undated) DEVICE — GLOVE SURG SZ 75 L12IN FNGR THK94MIL TRNSLUC YEL LTX

## (undated) DEVICE — BLANKET WRM W40.2XL55.9IN IORT LO BODY + MISTRAL AIR

## (undated) DEVICE — STAPLER 60: Brand: SUREFORM

## (undated) DEVICE — SOLUTION INJ LR VISIV 1000ML BG

## (undated) DEVICE — SPONGE,PEANUT,XRAY,ST,SM,3/8",5/CARD: Brand: MEDLINE INDUSTRIES, INC.

## (undated) DEVICE — KIT INTRODUCER BRONCHOSCOPE PATIENT